# Patient Record
Sex: FEMALE | Race: WHITE | NOT HISPANIC OR LATINO | Employment: UNEMPLOYED | URBAN - METROPOLITAN AREA
[De-identification: names, ages, dates, MRNs, and addresses within clinical notes are randomized per-mention and may not be internally consistent; named-entity substitution may affect disease eponyms.]

---

## 2017-06-16 ENCOUNTER — HOSPITAL ENCOUNTER (OUTPATIENT)
Dept: MRI IMAGING | Facility: HOSPITAL | Age: 47
Discharge: HOME | End: 2017-06-16
Attending: ORTHOPAEDIC SURGERY | Admitting: ORTHOPAEDIC SURGERY

## 2017-07-02 ENCOUNTER — HOSPITAL ENCOUNTER (EMERGENCY)
Age: 47
Discharge: HOME | End: 2017-07-02
Attending: EMERGENCY MEDICINE

## 2017-07-02 VITALS
TEMPERATURE: 98.2 F | SYSTOLIC BLOOD PRESSURE: 130 MMHG | OXYGEN SATURATION: 98 % | HEART RATE: 81 BPM | DIASTOLIC BLOOD PRESSURE: 70 MMHG | RESPIRATION RATE: 16 BRPM

## 2017-07-02 DIAGNOSIS — N39.0 ACUTE UTI: Primary | ICD-10-CM

## 2017-07-02 LAB
APPEARANCE UR: ABNORMAL
BACTERIA #/AREA URNS HPF: ABNORMAL /HPF
BILIRUB UR QL STRIP: NEGATIVE
COLOR UR: YELLOW
GLUCOSE UR STRIP-MCNC: NEGATIVE MG/DL
HGB UR QL STRIP: ABNORMAL
HYALINE CASTS #/AREA URNS LPF: ABNORMAL /LPF (ref 0–5)
KETONES UR STRIP-MCNC: ABNORMAL MG/DL
LEUKOCYTE ESTERASE UR QL STRIP: ABNORMAL
NITRITE UR QL STRIP: NEGATIVE
PH UR STRIP: 6 UNITS (ref 5–7)
PROT UR STRIP-MCNC: 100 MG/DL
RBC #/AREA URNS HPF: >100 /HPF (ref 0–3)
SP GR UR STRIP: >1.03 (ref 1–1.03)
SPECIMEN SOURCE: ABNORMAL
SQUAMOUS #/AREA URNS HPF: ABNORMAL /HPF (ref 0–5)
UROBILINOGEN UR STRIP-MCNC: 1 MG/DL (ref 0–1)
WBC #/AREA URNS HPF: >100 /HPF (ref 0–5)

## 2017-07-02 PROCEDURE — 81001 URINALYSIS AUTO W/SCOPE: CPT

## 2017-07-02 PROCEDURE — 87086 URINE CULTURE/COLONY COUNT: CPT

## 2017-07-02 PROCEDURE — 99283 EMERGENCY DEPT VISIT LOW MDM: CPT

## 2017-07-02 RX ORDER — SULFAMETHOXAZOLE AND TRIMETHOPRIM 800; 160 MG/1; MG/1
1 TABLET ORAL 2 TIMES DAILY
Qty: 10 TABLET | Refills: 0 | Status: SHIPPED | OUTPATIENT
Start: 2017-07-02 | End: 2017-07-07

## 2017-07-02 ASSESSMENT — ENCOUNTER SYMPTOMS
ABDOMINAL PAIN: 1
NAUSEA: 0
NERVOUS/ANXIOUS: 0
DIARRHEA: 0
COUGH: 0
SHORTNESS OF BREATH: 0
VOMITING: 0
BACK PAIN: 0
HEADACHES: 0
CHILLS: 0
SORE THROAT: 0
FEVER: 0
WEAKNESS: 0

## 2017-07-02 ASSESSMENT — PAIN SCALES - GENERAL: PAINLEVEL_OUTOF10: 5

## 2017-07-03 LAB
ANNOTATION COMMENT IMP: NORMAL
BACTERIA UR CULT: NORMAL
REPORT STATUS (RPT): NORMAL
SPECIMEN SOURCE: NORMAL

## 2017-12-18 ENCOUNTER — HOSPITAL ENCOUNTER (OUTPATIENT)
Dept: FAMILY MEDICINE CLINIC | Facility: CLINIC | Age: 47
Setting detail: SPECIMEN
Discharge: HOME | End: 2017-12-18
Attending: INTERNAL MEDICINE | Admitting: INTERNAL MEDICINE

## 2017-12-18 LAB
ALBUMIN SERPL-MCNC: 3.9 G/DL (ref 3.5–4.8)
ALBUMIN/GLOB SERPL: 1.2 {RATIO} (ref 1–1.7)
ALP SERPL-CCNC: 136 IU/L (ref 32–91)
ALT SERPL-CCNC: 23 IU/L (ref 14–54)
ANION GAP SERPL CALC-SCNC: 10.1 MMOL/L (ref 10–20)
AST SERPL-CCNC: 26 IU/L (ref 15–41)
BASOPHILS # BLD AUTO: 0 10*3/UL (ref 0–0.2)
BASOPHILS NFR BLD AUTO: 0 % (ref 0–2)
BILIRUB SERPL-MCNC: 0.2 MG/DL (ref 0.3–1.2)
BUN SERPL-MCNC: 9 MG/DL (ref 8–20)
BUN/CREAT SERPL: 10 (ref 5.4–26.2)
CALCIUM SERPL-MCNC: 9.7 MG/DL (ref 8.9–10.3)
CHLORIDE SERPL-SCNC: 105 MMOL/L (ref 101–111)
CHOLEST SERPL-MCNC: 207 MG/DL
CHOLEST/HDLC SERPL: 4.7 {RATIO}
CONV CO2: 29 MMOL/L (ref 22–32)
CONV LDL CHOLESTEROL DIRECT: 136 MG/DL (ref 0–100)
CONV TOTAL PROTEIN: 7.2 G/DL (ref 6.1–7.9)
CREAT UR-MCNC: 0.9 MG/DL (ref 0.4–1)
DIFFERENTIAL METHOD BLD: (no result)
EOSINOPHIL # BLD AUTO: 0 % (ref 0–3)
EOSINOPHIL # BLD AUTO: 0 10*3/UL (ref 0–0.3)
ERYTHROCYTE [DISTWIDTH] IN BLOOD BY AUTOMATED COUNT: 15.8 % (ref 11.5–14.5)
GLOBULIN UR ELPH-MCNC: 3.3 G/DL (ref 2.5–3.8)
GLUCOSE SERPL-MCNC: 126 MG/DL (ref 65–99)
HCT VFR BLD AUTO: 38.6 % (ref 35–49)
HDLC SERPL-MCNC: 44 MG/DL
HGB BLD-MCNC: 12.5 G/DL (ref 12–15)
LDLC/HDLC SERPL: 3.1 {RATIO}
LIPID INTERPRETATION: ABNORMAL
LYMPHOCYTES # BLD AUTO: 1.8 10*3/UL (ref 0.8–4.8)
LYMPHOCYTES NFR BLD AUTO: 25 % (ref 18–42)
MCH RBC QN AUTO: 28.4 PG (ref 26–32)
MCHC RBC AUTO-ENTMCNC: 32.5 G/DL (ref 32–36)
MCV RBC AUTO: 87.4 FL (ref 80–94)
MONOCYTES # BLD AUTO: 0.5 10*3/UL (ref 0.1–1.3)
MONOCYTES NFR BLD AUTO: 7 % (ref 2–11)
NEUTROPHILS # BLD AUTO: 4.9 10*3/UL (ref 2.3–8.6)
NEUTROPHILS NFR BLD AUTO: 68 % (ref 50–75)
NRBC BLD AUTO-RTO: 0 /100{WBCS}
NRBC/RBC NFR BLD MANUAL: 0 10*3/UL
PLATELET # BLD AUTO: 247 10*3/UL (ref 150–450)
PMV BLD AUTO: 9.6 FL (ref 7.4–10.4)
POTASSIUM SERPL-SCNC: 4.1 MMOL/L (ref 3.6–5.1)
RBC # BLD AUTO: 4.42 10*6/UL (ref 4–5.4)
SODIUM SERPL-SCNC: 140 MMOL/L (ref 136–144)
TRIGL SERPL-MCNC: 233 MG/DL
VLDLC SERPL CALC-MCNC: 27.6 MG/DL
WBC # BLD AUTO: 7.2 10*3/UL (ref 4.5–11.5)

## 2018-03-08 ENCOUNTER — OFFICE (AMBULATORY)
Dept: URBAN - METROPOLITAN AREA CLINIC 64 | Facility: CLINIC | Age: 48
End: 2018-03-08

## 2018-03-08 VITALS
HEIGHT: 64 IN | HEART RATE: 76 BPM | DIASTOLIC BLOOD PRESSURE: 64 MMHG | WEIGHT: 267 LBS | SYSTOLIC BLOOD PRESSURE: 104 MMHG

## 2018-03-08 DIAGNOSIS — R15.9 FULL INCONTINENCE OF FECES: ICD-10-CM

## 2018-03-08 DIAGNOSIS — K58.0 IRRITABLE BOWEL SYNDROME WITH DIARRHEA: ICD-10-CM

## 2018-03-08 DIAGNOSIS — R19.7 DIARRHEA, UNSPECIFIED: ICD-10-CM

## 2018-03-08 PROCEDURE — 99214 OFFICE O/P EST MOD 30 MIN: CPT | Performed by: INTERNAL MEDICINE

## 2018-03-08 RX ORDER — ELUXADOLINE 75 MG/1
150 TABLET, FILM COATED ORAL
Qty: 60 | Refills: 5 | Status: ACTIVE
Start: 2018-03-08

## 2019-11-20 ENCOUNTER — OFFICE VISIT (OUTPATIENT)
Dept: FAMILY MEDICINE CLINIC | Facility: CLINIC | Age: 49
End: 2019-11-20

## 2019-11-20 VITALS
DIASTOLIC BLOOD PRESSURE: 82 MMHG | TEMPERATURE: 98 F | WEIGHT: 269.6 LBS | RESPIRATION RATE: 16 BRPM | BODY MASS INDEX: 44.86 KG/M2 | OXYGEN SATURATION: 96 % | HEART RATE: 76 BPM | SYSTOLIC BLOOD PRESSURE: 126 MMHG

## 2019-11-20 DIAGNOSIS — T43.595A LITHIUM ADVERSE REACTION: ICD-10-CM

## 2019-11-20 DIAGNOSIS — Z12.39 SCREENING FOR BREAST CANCER: ICD-10-CM

## 2019-11-20 DIAGNOSIS — E55.9 VITAMIN D DEFICIENCY DISEASE: ICD-10-CM

## 2019-11-20 DIAGNOSIS — F31.32 BIPOLAR 1 DISORDER, DEPRESSED, MODERATE (HCC): Primary | ICD-10-CM

## 2019-11-20 DIAGNOSIS — Z12.31 ENCOUNTER FOR SCREENING MAMMOGRAM FOR MALIGNANT NEOPLASM OF BREAST: ICD-10-CM

## 2019-11-20 LAB
25(OH)D3 SERPL-MCNC: 24.2 NG/ML (ref 30–100)
ALBUMIN SERPL-MCNC: 4.2 G/DL (ref 3.5–5.2)
ALBUMIN/GLOB SERPL: 1.2 G/DL
ALP SERPL-CCNC: 133 U/L (ref 39–117)
ALT SERPL W P-5'-P-CCNC: 16 U/L (ref 1–33)
ANION GAP SERPL CALCULATED.3IONS-SCNC: 14.5 MMOL/L (ref 5–15)
AST SERPL-CCNC: 22 U/L (ref 1–32)
BASOPHILS # BLD AUTO: 0.02 10*3/MM3 (ref 0–0.2)
BASOPHILS NFR BLD AUTO: 0.3 % (ref 0–1.5)
BILIRUB SERPL-MCNC: 0.2 MG/DL (ref 0.2–1.2)
BUN BLD-MCNC: 9 MG/DL (ref 6–20)
BUN/CREAT SERPL: 9.3 (ref 7–25)
CALCIUM SPEC-SCNC: 9.6 MG/DL (ref 8.6–10.5)
CHLORIDE SERPL-SCNC: 104 MMOL/L (ref 98–107)
CO2 SERPL-SCNC: 24.5 MMOL/L (ref 22–29)
CREAT BLD-MCNC: 0.97 MG/DL (ref 0.57–1)
DEPRECATED RDW RBC AUTO: 43.9 FL (ref 37–54)
EOSINOPHIL # BLD AUTO: 0 10*3/MM3 (ref 0–0.4)
EOSINOPHIL NFR BLD AUTO: 0 % (ref 0.3–6.2)
ERYTHROCYTE [DISTWIDTH] IN BLOOD BY AUTOMATED COUNT: 14.5 % (ref 12.3–15.4)
GFR SERPL CREATININE-BSD FRML MDRD: 61 ML/MIN/1.73
GLOBULIN UR ELPH-MCNC: 3.6 GM/DL
GLUCOSE BLD-MCNC: 126 MG/DL (ref 65–99)
HCT VFR BLD AUTO: 36.2 % (ref 34–46.6)
HGB BLD-MCNC: 12 G/DL (ref 12–15.9)
IMM GRANULOCYTES # BLD AUTO: 0.06 10*3/MM3 (ref 0–0.05)
IMM GRANULOCYTES NFR BLD AUTO: 0.9 % (ref 0–0.5)
LITHIUM SERPL-SCNC: 0.8 MMOL/L (ref 0.6–1.2)
LYMPHOCYTES # BLD AUTO: 1.8 10*3/MM3 (ref 0.7–3.1)
LYMPHOCYTES NFR BLD AUTO: 25.9 % (ref 19.6–45.3)
MCH RBC QN AUTO: 28 PG (ref 26.6–33)
MCHC RBC AUTO-ENTMCNC: 33.1 G/DL (ref 31.5–35.7)
MCV RBC AUTO: 84.4 FL (ref 79–97)
MONOCYTES # BLD AUTO: 0.43 10*3/MM3 (ref 0.1–0.9)
MONOCYTES NFR BLD AUTO: 6.2 % (ref 5–12)
NEUTROPHILS # BLD AUTO: 4.65 10*3/MM3 (ref 1.7–7)
NEUTROPHILS NFR BLD AUTO: 66.7 % (ref 42.7–76)
NRBC BLD AUTO-RTO: 0 /100 WBC (ref 0–0.2)
PLATELET # BLD AUTO: 242 10*3/MM3 (ref 140–450)
PMV BLD AUTO: 10.9 FL (ref 6–12)
POTASSIUM BLD-SCNC: 4 MMOL/L (ref 3.5–5.2)
PROT SERPL-MCNC: 7.8 G/DL (ref 6–8.5)
RBC # BLD AUTO: 4.29 10*6/MM3 (ref 3.77–5.28)
SODIUM BLD-SCNC: 143 MMOL/L (ref 136–145)
TSH SERPL DL<=0.05 MIU/L-ACNC: 1.67 UIU/ML (ref 0.27–4.2)
WBC NRBC COR # BLD: 6.96 10*3/MM3 (ref 3.4–10.8)

## 2019-11-20 PROCEDURE — 80178 ASSAY OF LITHIUM: CPT | Performed by: INTERNAL MEDICINE

## 2019-11-20 PROCEDURE — 85025 COMPLETE CBC W/AUTO DIFF WBC: CPT | Performed by: INTERNAL MEDICINE

## 2019-11-20 PROCEDURE — 84443 ASSAY THYROID STIM HORMONE: CPT | Performed by: INTERNAL MEDICINE

## 2019-11-20 PROCEDURE — 80053 COMPREHEN METABOLIC PANEL: CPT | Performed by: INTERNAL MEDICINE

## 2019-11-20 PROCEDURE — 82306 VITAMIN D 25 HYDROXY: CPT | Performed by: INTERNAL MEDICINE

## 2019-11-20 PROCEDURE — 99213 OFFICE O/P EST LOW 20 MIN: CPT | Performed by: INTERNAL MEDICINE

## 2019-11-20 RX ORDER — QUETIAPINE FUMARATE 50 MG/1
150 TABLET, FILM COATED ORAL 2 TIMES DAILY
Refills: 1 | COMMUNITY
Start: 2019-09-15 | End: 2020-04-03

## 2019-11-20 RX ORDER — HYOSCYAMINE SULFATE EXTENDED-RELEASE 0.38 MG/1
375 TABLET ORAL EVERY 12 HOURS PRN
Refills: 4 | COMMUNITY
Start: 2019-10-19 | End: 2021-06-04

## 2019-11-20 RX ORDER — BUSPIRONE HYDROCHLORIDE 10 MG/1
10 TABLET ORAL 2 TIMES DAILY
Qty: 60 TABLET | Refills: 1 | Status: SHIPPED | OUTPATIENT
Start: 2019-11-20 | End: 2019-12-13 | Stop reason: SDUPTHER

## 2019-11-20 RX ORDER — ARIPIPRAZOLE 30 MG/1
30 TABLET ORAL DAILY
Refills: 1 | COMMUNITY
Start: 2019-10-18 | End: 2021-02-25 | Stop reason: DRUGHIGH

## 2019-11-20 RX ORDER — LITHIUM CARBONATE 450 MG
900 TABLET, EXTENDED RELEASE ORAL
Refills: 0 | COMMUNITY
Start: 2019-10-17 | End: 2021-01-10

## 2019-11-20 RX ORDER — VENLAFAXINE HYDROCHLORIDE 150 MG/1
150 CAPSULE, EXTENDED RELEASE ORAL 2 TIMES DAILY
Refills: 2 | COMMUNITY
Start: 2019-11-04 | End: 2020-03-26

## 2019-11-20 NOTE — PROGRESS NOTES
Rooming Tab(CC,VS,Pt Hx,Fall Screen)  Chief Complaint   Patient presents with   • Drooling       Subjective   Pt here for drooling- has maxed out on psych meds and now with more side effects. Lots of stress at home with family. Denies chest pain or difficulty    I have reviewed and updated her medications, medical history and problem list during today's office visit.     Patient Care Team:  Marsha Lopez MD as PCP - General (Internal Medicine)    Problem List Tab  Medications Tab  Synopsis Tab  Chart Review Tab  Care Everywhere Tab  Immunizations Tab  Patient History Tab    Social History     Tobacco Use   • Smoking status: Never Smoker   • Smokeless tobacco: Never Used   Substance Use Topics   • Alcohol use: Not on file       Review of Systems   Constitutional: Negative for fatigue and fever.   HENT: Negative for congestion.    Respiratory: Negative for apnea, cough and wheezing.    Cardiovascular: Negative for chest pain.   Gastrointestinal: Negative for abdominal distention.   Neurological: Negative for syncope.   Psychiatric/Behavioral: Positive for depressed mood. Negative for behavioral problems. The patient is nervous/anxious.        Objective     Rooming Tab(CC,VS,Pt Hx,Fall Screen)  /82 (BP Location: Left arm, Patient Position: Sitting, Cuff Size: Adult)   Pulse 76   Temp 98 °F (36.7 °C) (Oral)   Resp 16   Wt 122 kg (269 lb 9.6 oz)   SpO2 96%   BMI 44.86 kg/m²     Body mass index is 44.86 kg/m².    Physical Exam   Constitutional: She is oriented to person, place, and time. She appears well-developed and well-nourished.   HENT:   Head: Normocephalic and atraumatic.   Right Ear: Tympanic membrane normal.   Left Ear: Tympanic membrane normal.   Eyes: Pupils are equal, round, and reactive to light.   Neck: Normal range of motion. Neck supple.   Cardiovascular: Normal rate and regular rhythm.   No murmur heard.  Pulmonary/Chest: Effort normal and breath sounds normal.   Abdominal: Soft. Bowel  sounds are normal. She exhibits no distension.   Neurological: She is oriented to person, place, and time.   Skin: Capillary refill takes less than 2 seconds.   Nursing note and vitals reviewed.       Statin Choice Calculator  Data Reviewed:                   Assessment/Plan   Order Review Tab  Health Maintenance Tab  Patient Plan/Order Tab  Diagnoses and all orders for this visit:    1. Bipolar 1 disorder, depressed, moderate (CMS/HCC) (Primary)  Comments:  check levels today as been > 1 year   add in buspar for anxiety at this point-  see Dr. Erazo  Orders:  -     TSH  -     CBC & Differential    2. Lithium adverse reaction  Comments:  call after thanksgiving for update  Orders:  -     Comprehensive Metabolic Panel  -     Lithium level    3. Vitamin D deficiency disease  -     Vitamin D 25 Hydroxy    Other orders  -     busPIRone (BUSPAR) 10 MG tablet; Take 1 tablet by mouth 2 (Two) Times a Day for 60 days.  Dispense: 60 tablet; Refill: 1        Wrapup Tab  Return in about 3 months (around 2/20/2020), or if symptoms worsen or fail to improve.       They were informed of the diagnosis and treatment plan and directed to f/u for any further problems or concerns.

## 2019-11-24 PROBLEM — Z12.31 ENCOUNTER FOR SCREENING MAMMOGRAM FOR MALIGNANT NEOPLASM OF BREAST: Status: ACTIVE | Noted: 2019-11-24

## 2019-11-24 PROBLEM — Z12.39 SCREENING FOR BREAST CANCER: Status: ACTIVE | Noted: 2019-11-24

## 2019-11-26 ENCOUNTER — TELEPHONE (OUTPATIENT)
Dept: FAMILY MEDICINE CLINIC | Facility: CLINIC | Age: 49
End: 2019-11-26

## 2019-11-26 NOTE — TELEPHONE ENCOUNTER
Patient called stating that she she has a lot going on right now with family. Patient states that family is all over the new right now and it is causing her a lot of anxiety. Patient is asking if script for   busPIRone (BUSPAR) 10 MG can be increased, or can she try something stronger.

## 2019-11-27 DIAGNOSIS — Z12.31 ENCOUNTER FOR SCREENING MAMMOGRAM FOR MALIGNANT NEOPLASM OF BREAST: ICD-10-CM

## 2019-11-27 DIAGNOSIS — Z12.39 SCREENING FOR BREAST CANCER: ICD-10-CM

## 2019-12-04 ENCOUNTER — TELEPHONE (OUTPATIENT)
Dept: FAMILY MEDICINE CLINIC | Facility: CLINIC | Age: 49
End: 2019-12-04

## 2019-12-13 RX ORDER — BUSPIRONE HYDROCHLORIDE 10 MG/1
10 TABLET ORAL 2 TIMES DAILY
Qty: 180 TABLET | Refills: 1 | Status: SHIPPED | OUTPATIENT
Start: 2019-12-13 | End: 2020-02-11

## 2020-02-20 ENCOUNTER — TELEPHONE (OUTPATIENT)
Dept: FAMILY MEDICINE CLINIC | Facility: CLINIC | Age: 50
End: 2020-02-20

## 2020-02-20 RX ORDER — METHYLPREDNISOLONE 4 MG/1
TABLET ORAL
Qty: 21 EACH | Refills: 0 | Status: SHIPPED | OUTPATIENT
Start: 2020-02-20 | End: 2020-03-26

## 2020-02-20 RX ORDER — TIZANIDINE 4 MG/1
4 TABLET ORAL EVERY 8 HOURS PRN
Qty: 30 TABLET | Refills: 1 | Status: SHIPPED | OUTPATIENT
Start: 2020-02-20 | End: 2020-04-03

## 2020-02-20 NOTE — TELEPHONE ENCOUNTER
VM MESSAGE.  Pt has been moving furniture and her back has gone completely out. She can't even lift her foot and having a lot of pain. She can't take Ibuprofen. Asking if you would send in med. Thank you.

## 2020-03-25 ENCOUNTER — APPOINTMENT (OUTPATIENT)
Dept: GENERAL RADIOLOGY | Facility: HOSPITAL | Age: 50
End: 2020-03-25

## 2020-03-25 ENCOUNTER — HOSPITAL ENCOUNTER (EMERGENCY)
Facility: HOSPITAL | Age: 50
Discharge: HOME | End: 2020-03-25
Admitting: EMERGENCY MEDICINE

## 2020-03-25 VITALS
HEART RATE: 88 BPM | DIASTOLIC BLOOD PRESSURE: 53 MMHG | HEIGHT: 65 IN | SYSTOLIC BLOOD PRESSURE: 115 MMHG | TEMPERATURE: 97.8 F | WEIGHT: 278.22 LBS | RESPIRATION RATE: 16 BRPM | BODY MASS INDEX: 46.35 KG/M2 | OXYGEN SATURATION: 99 %

## 2020-03-25 DIAGNOSIS — M54.50 BILATERAL LOW BACK PAIN WITHOUT SCIATICA, UNSPECIFIED CHRONICITY: Primary | ICD-10-CM

## 2020-03-25 PROCEDURE — 72110 X-RAY EXAM L-2 SPINE 4/>VWS: CPT

## 2020-03-25 PROCEDURE — 25010000002 DEXAMETHASONE SODIUM PHOSPHATE 10 MG/ML SOLUTION: Performed by: PHYSICIAN ASSISTANT

## 2020-03-25 PROCEDURE — 99283 EMERGENCY DEPT VISIT LOW MDM: CPT

## 2020-03-25 PROCEDURE — 96372 THER/PROPH/DIAG INJ SC/IM: CPT

## 2020-03-25 RX ORDER — METHOCARBAMOL 750 MG/1
750 TABLET, FILM COATED ORAL 3 TIMES DAILY
Qty: 10 TABLET | Refills: 0 | Status: SHIPPED | OUTPATIENT
Start: 2020-03-25 | End: 2020-03-26 | Stop reason: SDUPTHER

## 2020-03-25 RX ORDER — METHOCARBAMOL 750 MG/1
750 TABLET, FILM COATED ORAL ONCE
Status: COMPLETED | OUTPATIENT
Start: 2020-03-25 | End: 2020-03-25

## 2020-03-25 RX ORDER — LIDOCAINE 50 MG/G
1 PATCH TOPICAL EVERY 24 HOURS
Qty: 6 PATCH | Refills: 0 | Status: SHIPPED | OUTPATIENT
Start: 2020-03-25 | End: 2020-03-26

## 2020-03-25 RX ORDER — LIDOCAINE 50 MG/G
1 PATCH TOPICAL ONCE
Status: DISCONTINUED | OUTPATIENT
Start: 2020-03-25 | End: 2020-03-25 | Stop reason: HOSPADM

## 2020-03-25 RX ORDER — DEXAMETHASONE SODIUM PHOSPHATE 10 MG/ML
10 INJECTION, SOLUTION INTRAMUSCULAR; INTRAVENOUS ONCE
Status: COMPLETED | OUTPATIENT
Start: 2020-03-25 | End: 2020-03-25

## 2020-03-25 RX ADMIN — METHOCARBAMOL TABLETS 750 MG: 750 TABLET, COATED ORAL at 08:06

## 2020-03-25 RX ADMIN — LIDOCAINE 1 PATCH: 50 PATCH TOPICAL at 08:06

## 2020-03-25 RX ADMIN — DEXAMETHASONE SODIUM PHOSPHATE 10 MG: 10 INJECTION, SOLUTION INTRAMUSCULAR; INTRAVENOUS at 08:06

## 2020-03-26 ENCOUNTER — LAB (OUTPATIENT)
Dept: FAMILY MEDICINE CLINIC | Facility: CLINIC | Age: 50
End: 2020-03-26

## 2020-03-26 ENCOUNTER — OFFICE VISIT (OUTPATIENT)
Dept: FAMILY MEDICINE CLINIC | Facility: CLINIC | Age: 50
End: 2020-03-26

## 2020-03-26 VITALS
TEMPERATURE: 97.7 F | HEART RATE: 71 BPM | BODY MASS INDEX: 46.29 KG/M2 | RESPIRATION RATE: 16 BRPM | DIASTOLIC BLOOD PRESSURE: 72 MMHG | WEIGHT: 278.2 LBS | OXYGEN SATURATION: 99 % | SYSTOLIC BLOOD PRESSURE: 140 MMHG

## 2020-03-26 DIAGNOSIS — R73.9 HYPERGLYCEMIA: Primary | ICD-10-CM

## 2020-03-26 DIAGNOSIS — F31.32 BIPOLAR 1 DISORDER, DEPRESSED, MODERATE (HCC): ICD-10-CM

## 2020-03-26 DIAGNOSIS — T43.595A LITHIUM ADVERSE REACTION: ICD-10-CM

## 2020-03-26 DIAGNOSIS — R73.9 HYPERGLYCEMIA: ICD-10-CM

## 2020-03-26 LAB
ALBUMIN SERPL-MCNC: 4.3 G/DL (ref 3.5–5.2)
ALBUMIN/GLOB SERPL: 1.2 G/DL
ALP SERPL-CCNC: 126 U/L (ref 39–117)
ALT SERPL W P-5'-P-CCNC: 14 U/L (ref 1–33)
ANION GAP SERPL CALCULATED.3IONS-SCNC: 15.9 MMOL/L (ref 5–15)
AST SERPL-CCNC: 15 U/L (ref 1–32)
BILIRUB SERPL-MCNC: 0.3 MG/DL (ref 0.2–1.2)
BUN BLD-MCNC: 10 MG/DL (ref 6–20)
BUN/CREAT SERPL: 10.4 (ref 7–25)
CALCIUM SPEC-SCNC: 10.5 MG/DL (ref 8.6–10.5)
CHLORIDE SERPL-SCNC: 101 MMOL/L (ref 98–107)
CO2 SERPL-SCNC: 21.1 MMOL/L (ref 22–29)
CREAT BLD-MCNC: 0.96 MG/DL (ref 0.57–1)
GFR SERPL CREATININE-BSD FRML MDRD: 62 ML/MIN/1.73
GLOBULIN UR ELPH-MCNC: 3.5 GM/DL
GLUCOSE BLD-MCNC: 153 MG/DL (ref 65–99)
LITHIUM SERPL-SCNC: 0.8 MMOL/L (ref 0.6–1.2)
POTASSIUM BLD-SCNC: 4.2 MMOL/L (ref 3.5–5.2)
PROT SERPL-MCNC: 7.8 G/DL (ref 6–8.5)
SODIUM BLD-SCNC: 138 MMOL/L (ref 136–145)

## 2020-03-26 PROCEDURE — 80178 ASSAY OF LITHIUM: CPT | Performed by: INTERNAL MEDICINE

## 2020-03-26 PROCEDURE — 99214 OFFICE O/P EST MOD 30 MIN: CPT | Performed by: INTERNAL MEDICINE

## 2020-03-26 PROCEDURE — 36415 COLL VENOUS BLD VENIPUNCTURE: CPT

## 2020-03-26 PROCEDURE — 80053 COMPREHEN METABOLIC PANEL: CPT | Performed by: INTERNAL MEDICINE

## 2020-03-26 PROCEDURE — 83036 HEMOGLOBIN GLYCOSYLATED A1C: CPT | Performed by: INTERNAL MEDICINE

## 2020-03-26 RX ORDER — METFORMIN HYDROCHLORIDE 500 MG/1
500 TABLET, EXTENDED RELEASE ORAL
Qty: 30 TABLET | Refills: 2 | Status: ON HOLD | OUTPATIENT
Start: 2020-03-26 | End: 2020-04-04 | Stop reason: SDUPTHER

## 2020-03-26 RX ORDER — METHOCARBAMOL 750 MG/1
750 TABLET, FILM COATED ORAL 3 TIMES DAILY
Qty: 60 TABLET | Refills: 1 | Status: SHIPPED | OUTPATIENT
Start: 2020-03-26 | End: 2020-05-24

## 2020-03-26 NOTE — PROGRESS NOTES
Rooming Tab(CC,VS,Pt Hx,Fall Screen)  Chief Complaint   Patient presents with   • Shaking   • Polydipsia   • Hyperglycemia       Subjective   Pt here for elevated sugars- been at [facility] and [facility]- pt has change in psych as MD retired- now with elevated abilify along with increased seroquel and high dose of lithium. No SI stopped buspar as didn't work well- feels anxiety is under control   also with increased back pain- and sciatic pain- had medrol 2/20 and then steroid injection yesterday at [facility].  Started robaxin but only 2 days worth   very thirsty now-   Has some incontinence but no yeast infections.     I have reviewed and updated her medications, medical history and problem list during today's office visit.     Patient Care Team:  Marsha Lopez MD as PCP - General (Internal Medicine)    Problem List Tab  Medications Tab  Synopsis Tab  Chart Review Tab  Care Everywhere Tab  Immunizations Tab  Patient History Tab    Social History     Tobacco Use   • Smoking status: Never Smoker   • Smokeless tobacco: Never Used   Substance Use Topics   • Alcohol use: Not on file       Review of Systems   Constitutional: Positive for fatigue.   HENT: Negative for congestion and swollen glands.    Eyes: Negative for blurred vision and double vision.   Gastrointestinal: Positive for GERD. Negative for abdominal pain.   Endocrine: Positive for polydipsia and polyphagia.   Musculoskeletal: Positive for back pain. Negative for joint swelling.   Skin: Negative for rash and skin lesions.   Neurological: Negative for syncope and numbness.   Psychiatric/Behavioral: Positive for sleep disturbance. Negative for depressed mood.       Objective     Rooming Tab(CC,VS,Pt Hx,Fall Screen)  /72 (BP Location: Left arm, Patient Position: Sitting, Cuff Size: Adult)   Pulse 71   Temp 97.7 °F (36.5 °C) (Oral)   Resp 16   Wt 126 kg (278 lb 3.2 oz)   SpO2 99%   BMI 46.29 kg/m²     Body mass index is 46.29  kg/m².    Physical Exam   Constitutional: She is oriented to person, place, and time. She appears well-developed and well-nourished.   HENT:   Head: Normocephalic and atraumatic.   Right Ear: Tympanic membrane normal.   Left Ear: Tympanic membrane normal.   Eyes: Pupils are equal, round, and reactive to light.   Neck: Normal range of motion. Neck supple.   Cardiovascular: Normal rate and regular rhythm.   No murmur heard.  Pulmonary/Chest: Effort normal and breath sounds normal.   Abdominal: Soft. Bowel sounds are normal. She exhibits no distension.   Neurological: She is oriented to person, place, and time.   Skin: Skin is warm. Capillary refill takes less than 2 seconds.   Nursing note and vitals reviewed.       Statin Choice Calculator  Data Reviewed:    Xr Spine Lumbar Complete 4+vw    Result Date: 3/25/2020  Impression: Minimal degenerative change without acute osseous abnormality.  Electronically Signed By-Hernandez Ortez On:3/25/2020 8:03 AM This report was finalized on [id number] by  Hernandez Ortez, .                Assessment/Plan   Order Review Tab  Health Maintenance Tab  Patient Plan/Order Tab  Diagnoses and all orders for this visit:    1. Hyperglycemia (Primary)  Comments:  DM- check  a21c start metformin- has strips at home to check daily  probably  partially from steroid   Orders:  -     Hemoglobin A1c; Future  -     Comprehensive Metabolic Panel    2. Lithium adverse reaction    3. Bipolar 1 disorder, depressed, moderate (CMS/HCC)  Comments:  decrease seroquel to night only and stay on abilify  Orders:  -     Lithium level; Future    Other orders  -     methocarbamol (ROBAXIN) 750 MG tablet; Take 1 tablet by mouth 3 (Three) Times a Day.  Dispense: 60 tablet; Refill: 1  -     metFORMIN ER (GLUCOPHAGE-XR) 500 MG 24 hr tablet; Take 1 tablet by mouth Daily With Breakfast.  Dispense: 30 tablet; Refill: 2        Wrapup Tab  Return in about 4 weeks (around 4/23/2020), or if symptoms worsen or fail to  improve.       They were informed of the diagnosis and treatment plan and directed to f/u for any further problems or concerns.

## 2020-03-27 LAB — HBA1C MFR BLD: 5.9 % (ref 3.5–5.6)

## 2020-03-27 NOTE — PROGRESS NOTES
A1c was 5.9-- I think the high sugars were with the steroid- you are just borderline and the steroid threw you over the jenni- but the sugars should improve in the next week. Lets just do 1 daily med of metformin daily

## 2020-04-02 ENCOUNTER — TELEPHONE (OUTPATIENT)
Dept: FAMILY MEDICINE CLINIC | Facility: CLINIC | Age: 50
End: 2020-04-02

## 2020-04-02 NOTE — TELEPHONE ENCOUNTER
PATIENT CALLED IN AND STATED SHE NEEDS A NEW BLOOD SURGAR MONITOR AND TEST STRIPS . PHARMACY VERIFIED [facility] . PATIENT CALL BACK [phone].

## 2020-04-03 ENCOUNTER — TELEPHONE (OUTPATIENT)
Dept: FAMILY MEDICINE CLINIC | Facility: CLINIC | Age: 50
End: 2020-04-03

## 2020-04-03 ENCOUNTER — HOSPITAL ENCOUNTER (OUTPATIENT)
Facility: HOSPITAL | Age: 50
Setting detail: OBSERVATION
Discharge: HOME | End: 2020-04-04
Attending: EMERGENCY MEDICINE | Admitting: INTERNAL MEDICINE

## 2020-04-03 ENCOUNTER — APPOINTMENT (OUTPATIENT)
Dept: GENERAL RADIOLOGY | Facility: HOSPITAL | Age: 50
End: 2020-04-03

## 2020-04-03 DIAGNOSIS — R07.9 CHEST PAIN, UNSPECIFIED TYPE: Primary | ICD-10-CM

## 2020-04-03 DIAGNOSIS — Z20.822 SUSPECTED COVID-19 VIRUS INFECTION: ICD-10-CM

## 2020-04-03 DIAGNOSIS — R06.00 DYSPNEA, UNSPECIFIED TYPE: ICD-10-CM

## 2020-04-03 PROBLEM — R07.89 CHEST WALL PAIN: Status: ACTIVE | Noted: 2020-04-03

## 2020-04-03 PROBLEM — E66.01 MORBID OBESITY: Chronic | Status: ACTIVE | Noted: 2020-04-03

## 2020-04-03 PROBLEM — E11.9 TYPE 2 DIABETES MELLITUS WITHOUT COMPLICATION, WITHOUT LONG-TERM CURRENT USE OF INSULIN: Chronic | Status: ACTIVE | Noted: 2020-04-03

## 2020-04-03 LAB
ALBUMIN SERPL-MCNC: 3.7 G/DL (ref 3.5–5.2)
ALBUMIN/GLOB SERPL: 1.1 G/DL
ALP SERPL-CCNC: 123 U/L (ref 39–117)
ALT SERPL W P-5'-P-CCNC: 19 U/L (ref 1–33)
ANION GAP SERPL CALCULATED.3IONS-SCNC: 11 MMOL/L (ref 5–15)
AST SERPL-CCNC: 23 U/L (ref 1–32)
B PERT DNA SPEC QL NAA+PROBE: NOT DETECTED
BASOPHILS # BLD AUTO: 0.1 10*3/MM3 (ref 0–0.2)
BASOPHILS NFR BLD AUTO: 0.8 % (ref 0–1.5)
BILIRUB SERPL-MCNC: 0.2 MG/DL (ref 0.2–1.2)
BUN BLD-MCNC: 5 MG/DL (ref 6–20)
BUN/CREAT SERPL: 5.7 (ref 7–25)
C PNEUM DNA NPH QL NAA+NON-PROBE: NOT DETECTED
CALCIUM SPEC-SCNC: 9.5 MG/DL (ref 8.6–10.5)
CHLORIDE SERPL-SCNC: 106 MMOL/L (ref 98–107)
CO2 SERPL-SCNC: 26 MMOL/L (ref 22–29)
CREAT BLD-MCNC: 0.87 MG/DL (ref 0.57–1)
CRP SERPL-MCNC: 1.41 MG/DL (ref 0–0.5)
DEPRECATED RDW RBC AUTO: 50.8 FL (ref 37–54)
EOSINOPHIL # BLD AUTO: 0 10*3/MM3 (ref 0–0.4)
EOSINOPHIL NFR BLD AUTO: 0 % (ref 0.3–6.2)
ERYTHROCYTE [DISTWIDTH] IN BLOOD BY AUTOMATED COUNT: 16.6 % (ref 12.3–15.4)
FLUAV H1 2009 PAND RNA NPH QL NAA+PROBE: NOT DETECTED
FLUAV H1 HA GENE NPH QL NAA+PROBE: NOT DETECTED
FLUAV H3 RNA NPH QL NAA+PROBE: NOT DETECTED
FLUAV SUBTYP SPEC NAA+PROBE: NOT DETECTED
FLUBV RNA ISLT QL NAA+PROBE: NOT DETECTED
GFR SERPL CREATININE-BSD FRML MDRD: 69 ML/MIN/1.73
GLOBULIN UR ELPH-MCNC: 3.3 GM/DL
GLUCOSE BLD-MCNC: 90 MG/DL (ref 65–99)
GLUCOSE BLDC GLUCOMTR-MCNC: 125 MG/DL (ref 70–105)
GLUCOSE BLDC GLUCOMTR-MCNC: 153 MG/DL (ref 70–105)
GLUCOSE BLDC GLUCOMTR-MCNC: 82 MG/DL (ref 70–105)
HADV DNA SPEC NAA+PROBE: NOT DETECTED
HCOV 229E RNA SPEC QL NAA+PROBE: NOT DETECTED
HCOV HKU1 RNA SPEC QL NAA+PROBE: NOT DETECTED
HCOV NL63 RNA SPEC QL NAA+PROBE: NOT DETECTED
HCOV OC43 RNA SPEC QL NAA+PROBE: NOT DETECTED
HCT VFR BLD AUTO: 35.5 % (ref 34–46.6)
HGB BLD-MCNC: 11.9 G/DL (ref 12–15.9)
HMPV RNA NPH QL NAA+NON-PROBE: NOT DETECTED
HPIV1 RNA SPEC QL NAA+PROBE: NOT DETECTED
HPIV2 RNA SPEC QL NAA+PROBE: NOT DETECTED
HPIV3 RNA NPH QL NAA+PROBE: NOT DETECTED
HPIV4 P GENE NPH QL NAA+PROBE: NOT DETECTED
INR PPP: 1.02 (ref 0.9–1.1)
LDH SERPL-CCNC: 232 U/L (ref 135–214)
LYMPHOCYTES # BLD AUTO: 1.9 10*3/MM3 (ref 0.7–3.1)
LYMPHOCYTES NFR BLD AUTO: 22.5 % (ref 19.6–45.3)
M PNEUMO IGG SER IA-ACNC: NOT DETECTED
MCH RBC QN AUTO: 29 PG (ref 26.6–33)
MCHC RBC AUTO-ENTMCNC: 33.5 G/DL (ref 31.5–35.7)
MCV RBC AUTO: 86.5 FL (ref 79–97)
MONOCYTES # BLD AUTO: 0.6 10*3/MM3 (ref 0.1–0.9)
MONOCYTES NFR BLD AUTO: 7.4 % (ref 5–12)
NEUTROPHILS # BLD AUTO: 5.7 10*3/MM3 (ref 1.7–7)
NEUTROPHILS NFR BLD AUTO: 69.3 % (ref 42.7–76)
NRBC BLD AUTO-RTO: 0.1 /100 WBC (ref 0–0.2)
NT-PROBNP SERPL-MCNC: 76.5 PG/ML (ref 5–450)
PLATELET # BLD AUTO: 261 10*3/MM3 (ref 140–450)
PMV BLD AUTO: 7.8 FL (ref 6–12)
POTASSIUM BLD-SCNC: 3.9 MMOL/L (ref 3.5–5.2)
PROCALCITONIN SERPL-MCNC: 0.08 NG/ML (ref 0.1–0.25)
PROT SERPL-MCNC: 7 G/DL (ref 6–8.5)
PROTHROMBIN TIME: 10.7 SECONDS (ref 9.6–11.7)
RBC # BLD AUTO: 4.11 10*6/MM3 (ref 3.77–5.28)
RHINOVIRUS RNA SPEC NAA+PROBE: NOT DETECTED
RSV RNA NPH QL NAA+NON-PROBE: NOT DETECTED
SODIUM BLD-SCNC: 143 MMOL/L (ref 136–145)
TROPONIN T SERPL-MCNC: <0.01 NG/ML (ref 0–0.03)
TROPONIN T SERPL-MCNC: <0.01 NG/ML (ref 0–0.03)
WBC NRBC COR # BLD: 8.2 10*3/MM3 (ref 3.4–10.8)

## 2020-04-03 PROCEDURE — 93005 ELECTROCARDIOGRAM TRACING: CPT | Performed by: EMERGENCY MEDICINE

## 2020-04-03 PROCEDURE — 83036 HEMOGLOBIN GLYCOSYLATED A1C: CPT | Performed by: INTERNAL MEDICINE

## 2020-04-03 PROCEDURE — G0378 HOSPITAL OBSERVATION PER HR: HCPCS

## 2020-04-03 PROCEDURE — 85610 PROTHROMBIN TIME: CPT | Performed by: EMERGENCY MEDICINE

## 2020-04-03 PROCEDURE — 86140 C-REACTIVE PROTEIN: CPT | Performed by: EMERGENCY MEDICINE

## 2020-04-03 PROCEDURE — 82962 GLUCOSE BLOOD TEST: CPT

## 2020-04-03 PROCEDURE — 83880 ASSAY OF NATRIURETIC PEPTIDE: CPT | Performed by: EMERGENCY MEDICINE

## 2020-04-03 PROCEDURE — 99220 PR INITIAL OBSERVATION CARE/DAY 70 MINUTES: CPT | Performed by: INTERNAL MEDICINE

## 2020-04-03 PROCEDURE — 71045 X-RAY EXAM CHEST 1 VIEW: CPT

## 2020-04-03 PROCEDURE — 63710000001 INSULIN LISPRO (HUMAN) PER 5 UNITS: Performed by: INTERNAL MEDICINE

## 2020-04-03 PROCEDURE — 84484 ASSAY OF TROPONIN QUANT: CPT | Performed by: EMERGENCY MEDICINE

## 2020-04-03 PROCEDURE — 83615 LACTATE (LD) (LDH) ENZYME: CPT | Performed by: EMERGENCY MEDICINE

## 2020-04-03 PROCEDURE — 0099U HC BIOFIRE FILMARRAY RESP PANEL 1: CPT | Performed by: EMERGENCY MEDICINE

## 2020-04-03 PROCEDURE — U0001 2019-NCOV DIAGNOSTIC P: HCPCS | Performed by: EMERGENCY MEDICINE

## 2020-04-03 PROCEDURE — 85025 COMPLETE CBC W/AUTO DIFF WBC: CPT | Performed by: EMERGENCY MEDICINE

## 2020-04-03 PROCEDURE — 99284 EMERGENCY DEPT VISIT MOD MDM: CPT

## 2020-04-03 PROCEDURE — 84484 ASSAY OF TROPONIN QUANT: CPT | Performed by: NURSE PRACTITIONER

## 2020-04-03 PROCEDURE — 84145 PROCALCITONIN (PCT): CPT | Performed by: EMERGENCY MEDICINE

## 2020-04-03 PROCEDURE — 80053 COMPREHEN METABOLIC PANEL: CPT | Performed by: EMERGENCY MEDICINE

## 2020-04-03 RX ORDER — SODIUM CHLORIDE 0.9 % (FLUSH) 0.9 %
10 SYRINGE (ML) INJECTION AS NEEDED
Status: DISCONTINUED | OUTPATIENT
Start: 2020-04-03 | End: 2020-04-04 | Stop reason: HOSPADM

## 2020-04-03 RX ORDER — QUETIAPINE 150 MG/1
150 TABLET, FILM COATED, EXTENDED RELEASE ORAL NIGHTLY
COMMUNITY
End: 2020-07-15

## 2020-04-03 RX ORDER — LANCETS 28 GAUGE
EACH MISCELLANEOUS
Qty: 50 EACH | Refills: 12 | Status: SHIPPED | OUTPATIENT
Start: 2020-04-03 | End: 2020-04-03

## 2020-04-03 RX ORDER — ACETAMINOPHEN 325 MG/1
650 TABLET ORAL EVERY 4 HOURS PRN
Status: DISCONTINUED | OUTPATIENT
Start: 2020-04-03 | End: 2020-04-04 | Stop reason: HOSPADM

## 2020-04-03 RX ORDER — METHOCARBAMOL 750 MG/1
750 TABLET, FILM COATED ORAL 4 TIMES DAILY
Status: DISCONTINUED | OUTPATIENT
Start: 2020-04-03 | End: 2020-04-04 | Stop reason: HOSPADM

## 2020-04-03 RX ORDER — NICOTINE POLACRILEX 4 MG
15 LOZENGE BUCCAL
Status: DISCONTINUED | OUTPATIENT
Start: 2020-04-03 | End: 2020-04-04 | Stop reason: HOSPADM

## 2020-04-03 RX ORDER — BLOOD-GLUCOSE METER
KIT MISCELLANEOUS
Qty: 1 EACH | Refills: 0 | Status: SHIPPED | OUTPATIENT
Start: 2020-04-03 | End: 2020-04-03

## 2020-04-03 RX ORDER — ACETAMINOPHEN 650 MG/1
650 SUPPOSITORY RECTAL EVERY 4 HOURS PRN
Status: DISCONTINUED | OUTPATIENT
Start: 2020-04-03 | End: 2020-04-04 | Stop reason: HOSPADM

## 2020-04-03 RX ORDER — SODIUM CHLORIDE 0.9 % (FLUSH) 0.9 %
10 SYRINGE (ML) INJECTION EVERY 12 HOURS SCHEDULED
Status: DISCONTINUED | OUTPATIENT
Start: 2020-04-03 | End: 2020-04-04 | Stop reason: HOSPADM

## 2020-04-03 RX ORDER — ACETAMINOPHEN 160 MG/5ML
650 SOLUTION ORAL EVERY 4 HOURS PRN
Status: DISCONTINUED | OUTPATIENT
Start: 2020-04-03 | End: 2020-04-04 | Stop reason: HOSPADM

## 2020-04-03 RX ORDER — VENLAFAXINE HYDROCHLORIDE 75 MG/1
150 CAPSULE, EXTENDED RELEASE ORAL 2 TIMES DAILY
Status: DISCONTINUED | OUTPATIENT
Start: 2020-04-03 | End: 2020-04-04 | Stop reason: HOSPADM

## 2020-04-03 RX ORDER — CHOLECALCIFEROL (VITAMIN D3) 125 MCG
5 CAPSULE ORAL NIGHTLY PRN
Status: DISCONTINUED | OUTPATIENT
Start: 2020-04-03 | End: 2020-04-04 | Stop reason: HOSPADM

## 2020-04-03 RX ORDER — ONDANSETRON 2 MG/ML
4 INJECTION INTRAMUSCULAR; INTRAVENOUS EVERY 6 HOURS PRN
Status: DISCONTINUED | OUTPATIENT
Start: 2020-04-03 | End: 2020-04-04 | Stop reason: HOSPADM

## 2020-04-03 RX ORDER — VENLAFAXINE HYDROCHLORIDE 150 MG/1
150 CAPSULE, EXTENDED RELEASE ORAL 2 TIMES DAILY
COMMUNITY
End: 2022-07-08

## 2020-04-03 RX ORDER — ARIPIPRAZOLE 10 MG/1
30 TABLET ORAL NIGHTLY
Status: DISCONTINUED | OUTPATIENT
Start: 2020-04-03 | End: 2020-04-04 | Stop reason: HOSPADM

## 2020-04-03 RX ORDER — BISACODYL 10 MG
10 SUPPOSITORY, RECTAL RECTAL DAILY PRN
Status: DISCONTINUED | OUTPATIENT
Start: 2020-04-03 | End: 2020-04-04 | Stop reason: HOSPADM

## 2020-04-03 RX ORDER — BLOOD-GLUCOSE METER
KIT MISCELLANEOUS
Qty: 50 EACH | Refills: 12 | Status: SHIPPED | OUTPATIENT
Start: 2020-04-03 | End: 2020-04-03

## 2020-04-03 RX ORDER — LITHIUM CARBONATE 450 MG
450 TABLET, EXTENDED RELEASE ORAL EVERY 12 HOURS SCHEDULED
Status: DISCONTINUED | OUTPATIENT
Start: 2020-04-03 | End: 2020-04-04 | Stop reason: HOSPADM

## 2020-04-03 RX ORDER — DEXTROSE MONOHYDRATE 25 G/50ML
25 INJECTION, SOLUTION INTRAVENOUS
Status: DISCONTINUED | OUTPATIENT
Start: 2020-04-03 | End: 2020-04-04 | Stop reason: HOSPADM

## 2020-04-03 RX ORDER — ALUMINA, MAGNESIA, AND SIMETHICONE 2400; 2400; 240 MG/30ML; MG/30ML; MG/30ML
15 SUSPENSION ORAL EVERY 6 HOURS PRN
Status: DISCONTINUED | OUTPATIENT
Start: 2020-04-03 | End: 2020-04-04 | Stop reason: HOSPADM

## 2020-04-03 RX ORDER — BISACODYL 5 MG/1
5 TABLET, DELAYED RELEASE ORAL DAILY PRN
Status: DISCONTINUED | OUTPATIENT
Start: 2020-04-03 | End: 2020-04-04 | Stop reason: HOSPADM

## 2020-04-03 RX ORDER — HYOSCYAMINE SULFATE EXTENDED-RELEASE 0.38 MG/1
375 TABLET ORAL EVERY 12 HOURS PRN
Status: DISCONTINUED | OUTPATIENT
Start: 2020-04-03 | End: 2020-04-04 | Stop reason: HOSPADM

## 2020-04-03 RX ORDER — ONDANSETRON 4 MG/1
4 TABLET, FILM COATED ORAL EVERY 6 HOURS PRN
Status: DISCONTINUED | OUTPATIENT
Start: 2020-04-03 | End: 2020-04-04 | Stop reason: HOSPADM

## 2020-04-03 RX ADMIN — ARIPIPRAZOLE 30 MG: 10 TABLET ORAL at 21:40

## 2020-04-03 RX ADMIN — QUETIAPINE 150 MG: 25 TABLET, FILM COATED ORAL at 21:53

## 2020-04-03 RX ADMIN — VENLAFAXINE HYDROCHLORIDE 150 MG: 75 CAPSULE, EXTENDED RELEASE ORAL at 21:40

## 2020-04-03 RX ADMIN — INSULIN LISPRO 2 UNITS: 100 INJECTION, SOLUTION INTRAVENOUS; SUBCUTANEOUS at 17:22

## 2020-04-03 RX ADMIN — METHOCARBAMOL 750 MG: 750 TABLET ORAL at 21:39

## 2020-04-03 RX ADMIN — Medication 10 ML: at 21:27

## 2020-04-03 RX ADMIN — LITHIUM CARBONATE 450 MG: 450 TABLET ORAL at 21:39

## 2020-04-03 RX ADMIN — METFORMIN HYDROCHLORIDE 1000 MG: 500 TABLET, FILM COATED ORAL at 17:22

## 2020-04-03 NOTE — TELEPHONE ENCOUNTER
Sent in.  I dont know if this katherine be covered. If not, she needs to call her insurance policy to see what is.

## 2020-04-03 NOTE — TELEPHONE ENCOUNTER
Pt's BS this am is 409, and she stated she is headed to [facility] (lives in [place]). Is there any other instructions for her. Thank you.

## 2020-04-04 VITALS
SYSTOLIC BLOOD PRESSURE: 119 MMHG | BODY MASS INDEX: 44.81 KG/M2 | HEART RATE: 75 BPM | RESPIRATION RATE: 16 BRPM | DIASTOLIC BLOOD PRESSURE: 63 MMHG | HEIGHT: 65 IN | WEIGHT: 268.96 LBS | OXYGEN SATURATION: 92 % | TEMPERATURE: 98.8 F

## 2020-04-04 LAB
ANION GAP SERPL CALCULATED.3IONS-SCNC: 13 MMOL/L (ref 5–15)
BASOPHILS # BLD AUTO: 0.1 10*3/MM3 (ref 0–0.2)
BASOPHILS NFR BLD AUTO: 0.7 % (ref 0–1.5)
BUN BLD-MCNC: 7 MG/DL (ref 6–20)
BUN/CREAT SERPL: 7.2 (ref 7–25)
CALCIUM SPEC-SCNC: 9.8 MG/DL (ref 8.6–10.5)
CHLORIDE SERPL-SCNC: 104 MMOL/L (ref 98–107)
CO2 SERPL-SCNC: 26 MMOL/L (ref 22–29)
CREAT BLD-MCNC: 0.97 MG/DL (ref 0.57–1)
DEPRECATED RDW RBC AUTO: 51.6 FL (ref 37–54)
EOSINOPHIL # BLD AUTO: 0 10*3/MM3 (ref 0–0.4)
EOSINOPHIL NFR BLD AUTO: 0.1 % (ref 0.3–6.2)
ERYTHROCYTE [DISTWIDTH] IN BLOOD BY AUTOMATED COUNT: 16.8 % (ref 12.3–15.4)
GFR SERPL CREATININE-BSD FRML MDRD: 61 ML/MIN/1.73
GLUCOSE BLD-MCNC: 114 MG/DL (ref 65–99)
GLUCOSE BLDC GLUCOMTR-MCNC: 102 MG/DL (ref 70–105)
GLUCOSE BLDC GLUCOMTR-MCNC: 116 MG/DL (ref 70–105)
HCT VFR BLD AUTO: 36 % (ref 34–46.6)
HGB BLD-MCNC: 11.6 G/DL (ref 12–15.9)
LYMPHOCYTES # BLD AUTO: 2.2 10*3/MM3 (ref 0.7–3.1)
LYMPHOCYTES NFR BLD AUTO: 27.5 % (ref 19.6–45.3)
MCH RBC QN AUTO: 28.3 PG (ref 26.6–33)
MCHC RBC AUTO-ENTMCNC: 32.2 G/DL (ref 31.5–35.7)
MCV RBC AUTO: 87.7 FL (ref 79–97)
MONOCYTES # BLD AUTO: 0.5 10*3/MM3 (ref 0.1–0.9)
MONOCYTES NFR BLD AUTO: 6.4 % (ref 5–12)
NEUTROPHILS # BLD AUTO: 5.3 10*3/MM3 (ref 1.7–7)
NEUTROPHILS NFR BLD AUTO: 65.3 % (ref 42.7–76)
NRBC BLD AUTO-RTO: 0.1 /100 WBC (ref 0–0.2)
PLATELET # BLD AUTO: 279 10*3/MM3 (ref 140–450)
PMV BLD AUTO: 7.5 FL (ref 6–12)
POTASSIUM BLD-SCNC: 4 MMOL/L (ref 3.5–5.2)
RBC # BLD AUTO: 4.1 10*6/MM3 (ref 3.77–5.28)
SODIUM BLD-SCNC: 143 MMOL/L (ref 136–145)
TROPONIN T SERPL-MCNC: <0.01 NG/ML (ref 0–0.03)
TROPONIN T SERPL-MCNC: <0.01 NG/ML (ref 0–0.03)
WBC NRBC COR # BLD: 8.1 10*3/MM3 (ref 3.4–10.8)

## 2020-04-04 PROCEDURE — G0378 HOSPITAL OBSERVATION PER HR: HCPCS

## 2020-04-04 PROCEDURE — 84484 ASSAY OF TROPONIN QUANT: CPT | Performed by: NURSE PRACTITIONER

## 2020-04-04 PROCEDURE — 85025 COMPLETE CBC W/AUTO DIFF WBC: CPT | Performed by: NURSE PRACTITIONER

## 2020-04-04 PROCEDURE — 82962 GLUCOSE BLOOD TEST: CPT

## 2020-04-04 PROCEDURE — 99217 PR OBSERVATION CARE DISCHARGE MANAGEMENT: CPT | Performed by: INTERNAL MEDICINE

## 2020-04-04 PROCEDURE — 80048 BASIC METABOLIC PNL TOTAL CA: CPT | Performed by: NURSE PRACTITIONER

## 2020-04-04 RX ORDER — METFORMIN HYDROCHLORIDE 1000 MG/1
1000 TABLET, FILM COATED, EXTENDED RELEASE ORAL
Qty: 30 TABLET | Refills: 0 | Status: SHIPPED | OUTPATIENT
Start: 2020-04-04 | End: 2020-04-16

## 2020-04-04 RX ADMIN — VENLAFAXINE HYDROCHLORIDE 150 MG: 75 CAPSULE, EXTENDED RELEASE ORAL at 09:25

## 2020-04-04 RX ADMIN — METFORMIN HYDROCHLORIDE 1000 MG: 500 TABLET, FILM COATED ORAL at 09:25

## 2020-04-04 RX ADMIN — METHOCARBAMOL 750 MG: 750 TABLET ORAL at 09:25

## 2020-04-04 RX ADMIN — Medication 10 ML: at 09:25

## 2020-04-04 RX ADMIN — METHOCARBAMOL 750 MG: 750 TABLET ORAL at 12:13

## 2020-04-05 ENCOUNTER — READMISSION MANAGEMENT (OUTPATIENT)
Dept: CALL CENTER | Facility: HOSPITAL | Age: 50
End: 2020-04-05

## 2020-04-05 NOTE — OUTREACH NOTE
Prep Survey      Responses   [facility]   Is LACE score < 7 ?  No   Eligibility  Readm Mgmt   Discharge diagnosis  chest wall pain, DM2, suspected covid   Does the patient have one of the following disease processes/diagnoses(primary or secondary)?  Other   Does the patient have Home health ordered?  No   Is there a DME ordered?  No   Prep survey completed?  Yes          Keeley Del Angel RN

## 2020-04-05 NOTE — OUTREACH NOTE
Medical Week 1 Survey      Responses   [facility] patient discharged from?  [facility]   Does the patient have one of the following disease processes/diagnoses(primary or secondary)?  Other   Is there a successful TCM telephone encounter documented?  No   Week 1 attempt successful?  Yes   Call start time  1144   Call end time  1151   Discharge diagnosis  chest wall pain, DM2, suspected covid   Meds reviewed with patient/caregiver?  Yes   Is the patient having any side effects they believe may be caused by any medication additions or changes?  No   Does the patient have all medications ordered at discharge?  Yes   Is the patient taking all medications as directed (includes completed medication regime)?  Yes   Does the patient have a primary care provider?   Yes   Does the patient have an appointment with their PCP within 7 days of discharge?  Yes   Has the patient kept scheduled appointments due by today?  N/A   Has home health visited the patient within 72 hours of discharge?  N/A   Has all DME been delivered?  No   Psychosocial issues?  No   Did the patient receive a copy of their discharge instructions?  Yes   Nursing interventions  Reviewed instructions with patient   What is the patient's perception of their health status since discharge?  Improving   Is the patient/caregiver able to teach back signs and symptoms related to disease process for when to call PCP?  Yes   Is the patient/caregiver able to teach back signs and symptoms related to disease process for when to call 911?  Yes   Is the patient/caregiver able to teach back the hierarchy of who to call/visit for symptoms/problems? PCP, Specialist, Home health nurse, Urgent Care, ED, 911  Yes   Additional teach back comments  Afebrile, dry cough-feels its allergies, no shortness of air.  She only has 1 Thermometer.  Gave # for [place] rep.   Week 1 call completed?  Yes   Wrap up additional comments  She is doing well, says the cough is the same dry cough, no  SOA and she feels ok today.  She is isolating from her family as much as possible.          Shwetha Barry RN

## 2020-04-06 ENCOUNTER — READMISSION MANAGEMENT (OUTPATIENT)
Dept: CALL CENTER | Facility: HOSPITAL | Age: 50
End: 2020-04-06

## 2020-04-06 LAB — HBA1C MFR BLD: 6 % (ref 3.5–5.6)

## 2020-04-06 NOTE — OUTREACH NOTE
Medical Week 1 Survey      Responses   [facility] patient discharged from?  [facility]   COVID-19 Test Status  Negative   Does the patient have one of the following disease processes/diagnoses(primary or secondary)?  Other   Is there a successful TCM telephone encounter documented?  No   Week 1 attempt successful?  Yes   Call start time  1535   Call end time  1537   Meds reviewed with patient/caregiver?  Yes   Is the patient having any side effects they believe may be caused by any medication additions or changes?  No   Does the patient have all medications ordered at discharge?  Yes   Is the patient taking all medications as directed (includes completed medication regime)?  Yes   Does the patient have a primary care provider?   Yes   Does the patient have an appointment with their PCP within 7 days of discharge?  Yes   Has the patient kept scheduled appointments due by today?  N/A   Has home health visited the patient within 72 hours of discharge?  N/A   Psychosocial issues?  No   Did the patient receive a copy of their discharge instructions?  Yes   What is the patient's perception of their health status since discharge?  Improving   Week 1 call completed?  Yes   Wrap up additional comments  States is doing well-denies any problems with SOA or fever. STates BS running 130 today. Denies any complaints.          Bijal Minaya RN

## 2020-04-07 ENCOUNTER — TRANSITIONAL CARE MANAGEMENT TELEPHONE ENCOUNTER (OUTPATIENT)
Dept: FAMILY MEDICINE CLINIC | Facility: CLINIC | Age: 50
End: 2020-04-07

## 2020-04-07 ENCOUNTER — TELEPHONE (OUTPATIENT)
Dept: FAMILY MEDICINE CLINIC | Facility: CLINIC | Age: 50
End: 2020-04-07

## 2020-04-07 LAB
REF LAB TEST METHOD: NORMAL
SARS-COV-2 RNA RESP QL NAA+PROBE: NOT DETECTED

## 2020-04-07 RX ORDER — SULFAMETHOXAZOLE AND TRIMETHOPRIM 800; 160 MG/1; MG/1
1 TABLET ORAL 2 TIMES DAILY
Qty: 14 TABLET | Refills: 0 | Status: SHIPPED | OUTPATIENT
Start: 2020-04-07 | End: 2020-04-16

## 2020-04-07 NOTE — TELEPHONE ENCOUNTER
Patient states pharmacy never received monitor, test strips, or lancets. Could you please re-send to pharmacy? Thank you.

## 2020-04-08 RX ORDER — BLOOD-GLUCOSE METER
KIT MISCELLANEOUS
Qty: 50 EACH | Refills: 12 | Status: SHIPPED | OUTPATIENT
Start: 2020-04-08 | End: 2020-09-24

## 2020-04-08 RX ORDER — BLOOD-GLUCOSE METER
KIT MISCELLANEOUS
Qty: 1 EACH | Refills: 0 | Status: SHIPPED | OUTPATIENT
Start: 2020-04-08 | End: 2020-05-24 | Stop reason: SDUPTHER

## 2020-04-08 RX ORDER — LANCETS 28 GAUGE
EACH MISCELLANEOUS
Qty: 50 EACH | Refills: 12 | Status: SHIPPED | OUTPATIENT
Start: 2020-04-08 | End: 2020-09-24

## 2020-04-13 ENCOUNTER — READMISSION MANAGEMENT (OUTPATIENT)
Dept: CALL CENTER | Facility: HOSPITAL | Age: 50
End: 2020-04-13

## 2020-04-13 NOTE — OUTREACH NOTE
Medical Week 2 Survey      Responses   [facility] [place] patient discharged from?  [facility]   Does the patient have one of the following disease processes/diagnoses(primary or secondary)?  Other   Week 2 attempt successful?  Yes   Call start time  1520   Discharge diagnosis  chest wall pain, DM2, suspected covid   Call end time  1529   Meds reviewed with patient/caregiver?  Yes   Is the patient having any side effects they believe may be caused by any medication additions or changes?  No   Does the patient have all medications ordered at discharge?  Yes   Is the patient taking all medications as directed (includes completed medication regime)?  Yes   Does the patient have a primary care provider?   Yes   Does the patient have an appointment with their PCP within 7 days of discharge?  Yes   Has the patient kept scheduled appointments due by today?  N/A   Has home health visited the patient within 72 hours of discharge?  N/A   Has all DME been delivered?  No   Comments  denies shortness of breath, cough, FSBS WNL's   Did the patient receive a copy of their discharge instructions?  Yes   Nursing interventions  Reviewed instructions with patient   What is the patient's perception of their health status since discharge?  Improving   Is the patient/caregiver able to teach back signs and symptoms related to disease process for when to call PCP?  Yes   Is the patient/caregiver able to teach back signs and symptoms related to disease process for when to call 911?  Yes   Is the patient/caregiver able to teach back the hierarchy of who to call/visit for symptoms/problems? PCP, Specialist, Home health nurse, Urgent Care, ED, 911  Yes   Additional teach back comments  states has altered diet, reduced sugars, CHO's, has been losing wt, FSBS WNL's, states spouse has DM and is non-compliant with diet, but pt states continues to do own regimen with great recent success   Week 2 Call Completed?  Yes          Adrienne Ferguson RN

## 2020-04-16 ENCOUNTER — OFFICE VISIT (OUTPATIENT)
Dept: FAMILY MEDICINE CLINIC | Facility: CLINIC | Age: 50
End: 2020-04-16

## 2020-04-16 VITALS
WEIGHT: 273 LBS | OXYGEN SATURATION: 98 % | HEIGHT: 65 IN | RESPIRATION RATE: 16 BRPM | HEART RATE: 103 BPM | DIASTOLIC BLOOD PRESSURE: 80 MMHG | SYSTOLIC BLOOD PRESSURE: 134 MMHG | BODY MASS INDEX: 45.48 KG/M2 | TEMPERATURE: 97.7 F

## 2020-04-16 DIAGNOSIS — E11.9 TYPE 2 DIABETES MELLITUS WITHOUT COMPLICATION, WITHOUT LONG-TERM CURRENT USE OF INSULIN (HCC): Chronic | ICD-10-CM

## 2020-04-16 DIAGNOSIS — R07.89 CHEST WALL PAIN: ICD-10-CM

## 2020-04-16 DIAGNOSIS — IMO0001 TRANSITION OF CARE PERFORMED WITH SHARING OF CLINICAL SUMMARY: Primary | ICD-10-CM

## 2020-04-16 DIAGNOSIS — F31.32 BIPOLAR 1 DISORDER, DEPRESSED, MODERATE (HCC): ICD-10-CM

## 2020-04-16 PROBLEM — Z78.9 TRANSITION OF CARE PERFORMED WITH SHARING OF CLINICAL SUMMARY: Status: ACTIVE | Noted: 2020-04-16

## 2020-04-16 PROCEDURE — 99495 TRANSJ CARE MGMT MOD F2F 14D: CPT | Performed by: INTERNAL MEDICINE

## 2020-04-16 RX ORDER — BLOOD-GLUCOSE METER
KIT MISCELLANEOUS DAILY
COMMUNITY
Start: 2020-04-07 | End: 2020-09-24

## 2020-04-16 RX ORDER — INDOMETHACIN 50 MG/1
50 CAPSULE ORAL 2 TIMES DAILY WITH MEALS
Qty: 14 CAPSULE | Refills: 0 | Status: SHIPPED | OUTPATIENT
Start: 2020-04-16 | End: 2020-04-20 | Stop reason: SDUPTHER

## 2020-04-16 NOTE — ASSESSMENT & PLAN NOTE
Diabetes is improving with lifestyle modifications.   Dietary recommendations for ADA diet.  Diabetes will be reassessed in 3 months.   checking sugars 2/day and will call if worsens- increase seroquel so watch closely

## 2020-04-16 NOTE — PROGRESS NOTES
"Rooming Tab(CC,VS,Pt Hx,Fall Screen)  Chief Complaint   Patient presents with   • Transitional Care Management     chest pain       Subjective    Pt here for  TCM- was admitted overnight for  Chest pain- it was reproducible on right side- however EKG was slightly abnormal- had negative serial troponin- did NOT have stress test- and was discharged- stated the pain is still on going and worse with deep breathe or deep palpation of right chest. Has family history of early CAD however     stopped the metformin and watching diet  Down 5 lbs in 5 days- watching everything that eats and stopped the sodas-  Sugars from 400 to 125 - really working hard to avoid DM meds.   bipolar meds going well- talked to psych  And back on 150mg seroquel and 30 mg abilify.      also with increased diarrhea with metformin so stopped it and diarrhea resolved- tried to diet with DM    I have reviewed and updated her medications, medical history and problem list during today's office visit.     Patient Care Team:  Marsha Lopez MD as PCP - General (Internal Medicine)    Problem List Tab  Medications Tab  Synopsis Tab  Chart Review Tab  Care Everywhere Tab  Immunizations Tab  Patient History Tab    Social History     Tobacco Use   • Smoking status: Never Smoker   • Smokeless tobacco: Never Used   Substance Use Topics   • Alcohol use: No     Frequency: Never       Review of Systems   Constitutional: Negative for fatigue and fever.   HENT: Negative for congestion.    Respiratory: Negative for apnea, cough and wheezing.    Cardiovascular: Positive for chest pain.   Gastrointestinal: Negative for abdominal distention.   Neurological: Negative for syncope.   Psychiatric/Behavioral: Positive for sleep disturbance. Negative for behavioral problems.       Objective     Rooming Tab(CC,VS,Pt Hx,Fall Screen)  /80 (BP Location: Left arm, Cuff Size: Large Adult)   Pulse 103   Temp 97.7 °F (36.5 °C) (Oral)   Resp 16   Ht 165.1 cm (65\")   " Wt 124 kg (273 lb)   SpO2 98%   BMI 45.43 kg/m²     Body mass index is 45.43 kg/m².    Physical Exam   Constitutional: She is oriented to person, place, and time. She appears well-developed and well-nourished.   HENT:   Head: Normocephalic and atraumatic.   Right Ear: Tympanic membrane normal.   Left Ear: Tympanic membrane normal.   Eyes: Pupils are equal, round, and reactive to light.   Neck: Normal range of motion. Neck supple.   Cardiovascular: Normal rate and regular rhythm.   No murmur heard.  Pulmonary/Chest: Effort normal and breath sounds normal. She exhibits tenderness.   Abdominal: Soft. Bowel sounds are normal. She exhibits no distension.   Neurological: She is oriented to person, place, and time.   Skin: Capillary refill takes less than 2 seconds.   Nursing note and vitals reviewed.       Statin Choice Calculator  Data Reviewed:    Xr Chest Ap    Result Date: 4/3/2020  Impression: No acute process.  Electronically Signed By-Nick Koch On:4/3/2020 10:08 AM This report was finalized on [id number] by  Nick Koch .    Xr Spine Lumbar Complete 4+vw    Result Date: 3/25/2020  Impression: Minimal degenerative change without acute osseous abnormality.  Electronically Signed By-Hernandez Ortez On:3/25/2020 8:03 AM This report was finalized on [id number] by  Hernandez Ortez, .            Lab Results   Component Value Date    BUN 7 04/04/2020    CREATININE 0.97 04/04/2020    EGFRIFNONA 61 04/04/2020     04/04/2020    K 4.0 04/04/2020     04/04/2020    CALCIUM 9.8 04/04/2020    ALBUMIN 3.70 04/03/2020    BILITOT 0.2 04/03/2020    ALKPHOS 123 (H) 04/03/2020    AST 23 04/03/2020    ALT 19 04/03/2020    WBC 8.10 04/04/2020    RBC 4.10 04/04/2020    HCT 36.0 04/04/2020    MCV 87.7 04/04/2020    MCH 28.3 04/04/2020    INR 1.02 04/03/2020      Assessment/Plan   Order Review Tab  Health Maintenance Tab  Patient Plan/Order Tab  Diagnoses and all orders for this visit:    1. Transition of care performed  with sharing of clinical summary (Primary)  Comments:  treat as chest wall- wiht indocin but low threshold to schedule oupt stress test if changes symptoms  Assessment & Plan:  Will call if other symptoms and get a stress test      2. Bipolar 1 disorder, depressed, moderate (CMS/HCC)    3. Chest wall pain  Comments:   kidney function normal  Assessment & Plan:  Will give indocin and treat as pleruisy as pain worse with deep breath and cough/sneeze      4. Type 2 diabetes mellitus without complication, without long-term current use of insulin (CMS/[facility])  Assessment & Plan:  Diabetes is improving with lifestyle modifications.   Dietary recommendations for ADA diet.  Diabetes will be reassessed in 3 months.   checking sugars 2/day and will call if worsens- increase seroquel so watch closely      Other orders  -     indomethacin (INDOCIN) 50 MG capsule; Take 1 capsule by mouth 2 (Two) Times a Day With Meals.  Dispense: 14 capsule; Refill: 0      Wrapup Tab  Return in about 3 months (around 7/16/2020), or if symptoms worsen or fail to improve.         Transitional Care Management Certification  I certify that the following are true:  1. Communication was made within 2 business days of discharge.  2. Complexity of Medical Decision Making is moderate.  3. Face to face visit occurred within 11 days.    *Note: 46085 is for high complexity patients with a face to face visit within 7 days of discharge.  80115 is for high complexity patients with a face to face on days 8-14 post discharge or medium complexity with face to face visit within 14 days post discharge.

## 2020-04-17 PROBLEM — J45.909 ASTHMA: Status: ACTIVE | Noted: 2020-04-17

## 2020-04-20 ENCOUNTER — READMISSION MANAGEMENT (OUTPATIENT)
Dept: CALL CENTER | Facility: HOSPITAL | Age: 50
End: 2020-04-20

## 2020-04-20 RX ORDER — INDOMETHACIN 50 MG/1
50 CAPSULE ORAL 2 TIMES DAILY WITH MEALS
Qty: 14 CAPSULE | Refills: 0 | Status: SHIPPED | OUTPATIENT
Start: 2020-04-20 | End: 2020-07-15

## 2020-04-20 NOTE — OUTREACH NOTE
Medical Week 3 Survey      Responses   [facility] patient discharged from?  [facility]   COVID-19 Test Status  Negative   Does the patient have one of the following disease processes/diagnoses(primary or secondary)?  Other   Week 3 attempt successful?  Yes   Call start time  0901   Call end time  0904   Discharge diagnosis  chest wall pain, DM2, suspected covid   Meds reviewed with patient/caregiver?  Yes   Is the patient having any side effects they believe may be caused by any medication additions or changes?  No   Does the patient have all medications ordered at discharge?  Yes   Is the patient taking all medications as directed (includes completed medication regime)?  Yes   Medication comments  Had appt with PCP last Wed.   Does the patient have a primary care provider?   Yes   Does the patient have an appointment with their PCP within 7 days of discharge?  Yes   Has the patient kept scheduled appointments due by today?  Yes   Has home health visited the patient within 72 hours of discharge?  N/A   Did the patient receive a copy of their discharge instructions?  Yes   Nursing interventions  Reviewed instructions with patient   What is the patient's perception of their health status since discharge?  Improving   Is the patient/caregiver able to teach back signs and symptoms related to disease process for when to call PCP?  Yes   Is the patient/caregiver able to teach back signs and symptoms related to disease process for when to call 911?  Yes   Is the patient/caregiver able to teach back the hierarchy of who to call/visit for symptoms/problems? PCP, Specialist, Home health nurse, Urgent Care, ED, 911  Yes   Additional teach back comments  States she is doing well.  Has been monitoring her bs and they have been good.  No fever, sob or chest pains.     Week 3 Call Completed?  Yes   Graduated  Yes   Did the patient feel the follow up calls were helpful during their recovery period?  Yes   Was the number of calls  appropriate?  Yes   Graduated/Revoked comments  No quesitons or needs at this time          Alexandra Cardoso, ALEXIS

## 2020-05-19 ENCOUNTER — OFFICE VISIT (OUTPATIENT)
Dept: FAMILY MEDICINE CLINIC | Facility: CLINIC | Age: 50
End: 2020-05-19

## 2020-05-19 VITALS
DIASTOLIC BLOOD PRESSURE: 100 MMHG | HEIGHT: 65 IN | WEIGHT: 276 LBS | RESPIRATION RATE: 16 BRPM | BODY MASS INDEX: 45.98 KG/M2 | TEMPERATURE: 97.8 F | OXYGEN SATURATION: 99 % | SYSTOLIC BLOOD PRESSURE: 136 MMHG | HEART RATE: 73 BPM

## 2020-05-19 DIAGNOSIS — R07.89 ATYPICAL CHEST PAIN: Primary | ICD-10-CM

## 2020-05-19 DIAGNOSIS — R94.31 ABNORMAL EKG: ICD-10-CM

## 2020-05-19 PROCEDURE — 99214 OFFICE O/P EST MOD 30 MIN: CPT | Performed by: INTERNAL MEDICINE

## 2020-05-19 PROCEDURE — 93000 ELECTROCARDIOGRAM COMPLETE: CPT | Performed by: INTERNAL MEDICINE

## 2020-05-19 RX ORDER — INDOMETHACIN 50 MG/1
50 CAPSULE ORAL 2 TIMES DAILY WITH MEALS
Qty: 14 CAPSULE | Refills: 0 | Status: SHIPPED | OUTPATIENT
Start: 2020-05-19 | End: 2020-05-24 | Stop reason: SDUPTHER

## 2020-05-19 NOTE — PROGRESS NOTES
"Rooming Tab(CC,VS,Pt Hx,Fall Screen)  Chief Complaint   Patient presents with   • Chest Pain       Subjective   Pt here for right sided chest pain- worse with deep breathe- no radiating- no sweaty, not worse with food. Started 3 days ago-  Worse standing  than laying.   No ocughing last week, no fever.  Feels like needs to take a deep breathe- the sharp pain comes at end of breathe.  Was treated for pleurisy last month and this pain totally resolved then recurred 3 days ago    no increased working out or cleaning-    sugar doing better- off metformin as increased diarrhea- 134 was the highest in  Last 2 week without metfomrin  I have reviewed and updated her medications, medical history and problem list during today's office visit.     Patient Care Team:  Marsha Lopez MD as PCP - General (Internal Medicine)    Problem List Tab  Medications Tab  Synopsis Tab  Chart Review Tab  Care Everywhere Tab  Immunizations Tab  Patient History Tab    Social History     Tobacco Use   • Smoking status: Never Smoker   • Smokeless tobacco: Never Used   Substance Use Topics   • Alcohol use: No     Frequency: Never       Review of Systems   Constitutional: Negative for fatigue.   Respiratory: Positive for chest tightness and shortness of breath. Negative for cough.    Cardiovascular: Positive for chest pain.   Musculoskeletal: Negative for myalgias.   Neurological: Negative for weakness.   Hematological: Negative for adenopathy.       Objective     Rooming Tab(CC,VS,Pt Hx,Fall Screen)  /100 (BP Location: Right arm, Cuff Size: Large Adult)   Pulse 73   Temp 97.8 °F (36.6 °C)   Resp 16   Ht 165.1 cm (65\")   Wt 125 kg (276 lb)   SpO2 99%   BMI 45.93 kg/m²     Body mass index is 45.93 kg/m².    Physical Exam   Constitutional: She is oriented to person, place, and time. She appears well-developed and well-nourished.   HENT:   Head: Normocephalic and atraumatic.   Right Ear: Tympanic membrane normal.   Left Ear: " Tympanic membrane normal.   Eyes: Pupils are equal, round, and reactive to light.   Neck: Normal range of motion. Neck supple.   Cardiovascular: Normal rate and regular rhythm.   No murmur heard.  Not reproducible pain   Pulmonary/Chest: Effort normal and breath sounds normal.   Abdominal: Soft. Bowel sounds are normal. She exhibits no distension.   Neurological: She is oriented to person, place, and time.   Skin: Capillary refill takes less than 2 seconds.   Nursing note and vitals reviewed.       Statin Choice Calculator  Data Reviewed:      ECG 12 Lead  Date/Time: 5/19/2020 11:11 AM  Performed by: Marsha Lopez MD  Authorized by: Marsha Lopez MD   Previous ECG: no previous ECG available  Rhythm: sinus rhythm  QRS axis: normal  Other findings: T wave abnormality    Clinical impression: abnormal EKG  Comments: Diffuse ST changes lateral        .          Lab Results   Component Value Date    BUN 7 04/04/2020    CREATININE 0.97 04/04/2020    EGFRIFNONA 61 04/04/2020     04/04/2020    K 4.0 04/04/2020     04/04/2020    CALCIUM 9.8 04/04/2020    ALBUMIN 3.70 04/03/2020    BILITOT 0.2 04/03/2020    ALKPHOS 123 (H) 04/03/2020    AST 23 04/03/2020    ALT 19 04/03/2020    WBC 8.10 04/04/2020    RBC 4.10 04/04/2020    HCT 36.0 04/04/2020    MCV 87.7 04/04/2020    MCH 28.3 04/04/2020    INR 1.02 04/03/2020      Assessment/Plan   Order Review Tab  Health Maintenance Tab  Patient Plan/Order Tab  Diagnoses and all orders for this visit:    1. Atypical chest pain (Primary)  -     ECG 12 Lead    2. Abnormal EKG  Comments:  [facility] abnormal ST changes suggestive of lateral ischemia- will schedule outpt stress test at this time as not currently having chest pain  Orders:  -     Stress Test With Myocardial Perfusion One Day; Future    Other orders  -     indomethacin (INDOCIN) 50 MG capsule; Take 1 capsule by mouth 2 (Two) Times a Day With Meals.  Dispense: 14 capsule; Refill: 0        Wrapup Tab  Return in  about 6 months (around 11/19/2020), or if symptoms worsen or fail to improve.       They were informed of the diagnosis and treatment plan and directed to f/u for any further problems or concerns.

## 2020-05-24 PROBLEM — R94.31 ABNORMAL EKG: Status: ACTIVE | Noted: 2020-05-24

## 2020-05-24 PROBLEM — R07.89 ATYPICAL CHEST PAIN: Status: ACTIVE | Noted: 2020-05-24

## 2020-05-29 ENCOUNTER — HOSPITAL ENCOUNTER (OUTPATIENT)
Dept: NUCLEAR MEDICINE | Facility: HOSPITAL | Age: 50
Discharge: HOME | End: 2020-05-29

## 2020-05-29 DIAGNOSIS — R94.31 ABNORMAL EKG: ICD-10-CM

## 2020-05-29 LAB
BH CV NUCLEAR PRIOR STUDY: 3
BH CV STRESS BP STAGE 1: NORMAL
BH CV STRESS COMMENTS STAGE 1: NORMAL
BH CV STRESS DOSE REGADENOSON STAGE 1: 0.4
BH CV STRESS DURATION MIN STAGE 1: 4
BH CV STRESS DURATION SEC STAGE 1: 10
BH CV STRESS HR STAGE 1: 82
BH CV STRESS PROTOCOL 1: NORMAL
BH CV STRESS RECOVERY BP: NORMAL MMHG
BH CV STRESS RECOVERY HR: 67 BPM
BH CV STRESS STAGE 1: 1
LV EF NUC BP: 70 %
MAXIMAL PREDICTED HEART RATE: 171 BPM
PERCENT MAX PREDICTED HR: 47.95 %
STRESS BASELINE BP: NORMAL MMHG
STRESS BASELINE HR: 78 BPM
STRESS PERCENT HR: 56 %
STRESS POST PEAK BP: NORMAL MMHG
STRESS POST PEAK HR: 82 BPM
STRESS TARGET HR: 145 BPM

## 2020-05-29 PROCEDURE — 78452 HT MUSCLE IMAGE SPECT MULT: CPT

## 2020-05-29 PROCEDURE — A9500 TC99M SESTAMIBI: HCPCS | Performed by: INTERNAL MEDICINE

## 2020-05-29 PROCEDURE — 78452 HT MUSCLE IMAGE SPECT MULT: CPT | Performed by: INTERNAL MEDICINE

## 2020-05-29 PROCEDURE — 0 TECHNETIUM SESTAMIBI: Performed by: INTERNAL MEDICINE

## 2020-05-29 PROCEDURE — 93018 CV STRESS TEST I&R ONLY: CPT | Performed by: INTERNAL MEDICINE

## 2020-05-29 PROCEDURE — 25010000002 REGADENOSON 0.4 MG/5ML SOLUTION: Performed by: INTERNAL MEDICINE

## 2020-05-29 PROCEDURE — 93017 CV STRESS TEST TRACING ONLY: CPT

## 2020-05-29 RX ADMIN — REGADENOSON 0.4 MG: 0.08 INJECTION, SOLUTION INTRAVENOUS at 09:50

## 2020-05-29 RX ADMIN — TECHNETIUM TC 99M SESTAMIBI 1 DOSE: 1 INJECTION INTRAVENOUS at 08:30

## 2020-05-29 RX ADMIN — TECHNETIUM TC 99M SESTAMIBI 1 DOSE: 1 INJECTION INTRAVENOUS at 09:50

## 2020-06-01 ENCOUNTER — TELEPHONE (OUTPATIENT)
Dept: FAMILY MEDICINE CLINIC | Facility: CLINIC | Age: 50
End: 2020-06-01

## 2020-06-01 NOTE — TELEPHONE ENCOUNTER
The exercise part had one area that was of concern- however with the nuclear scan it did not she ischemia there- so she is negative but low exercise tolerance- needs to exercise more and lose weight

## 2020-06-17 RX ORDER — METFORMIN HYDROCHLORIDE 500 MG/1
TABLET, EXTENDED RELEASE ORAL
Qty: 90 TABLET | Refills: 0 | Status: SHIPPED | OUTPATIENT
Start: 2020-06-17 | End: 2020-07-15

## 2020-07-07 ENCOUNTER — TELEPHONE (OUTPATIENT)
Dept: FAMILY MEDICINE CLINIC | Facility: CLINIC | Age: 50
End: 2020-07-07

## 2020-07-07 DIAGNOSIS — F31.32 BIPOLAR 1 DISORDER, DEPRESSED, MODERATE (HCC): Primary | ICD-10-CM

## 2020-07-07 NOTE — TELEPHONE ENCOUNTER
PATIENT CALLED ADVISING WILL BE CHANGING PSYCHIATRIST. THE NEW PSYCH DR IS GEORGE STURGEON  PLEASE FAX AN ORDER TO  [phone]

## 2020-07-14 ENCOUNTER — TELEPHONE (OUTPATIENT)
Dept: FAMILY MEDICINE CLINIC | Facility: CLINIC | Age: 50
End: 2020-07-14

## 2020-07-14 NOTE — TELEPHONE ENCOUNTER
Patient called in saying that the Psychiatrist have not received the referral. Patient wants to know if the office can re fax it    Dr Princess Jones  Fax: [phone]

## 2020-07-15 ENCOUNTER — OFFICE VISIT (OUTPATIENT)
Dept: FAMILY MEDICINE CLINIC | Facility: CLINIC | Age: 50
End: 2020-07-15

## 2020-07-15 ENCOUNTER — LAB (OUTPATIENT)
Dept: FAMILY MEDICINE CLINIC | Facility: CLINIC | Age: 50
End: 2020-07-15

## 2020-07-15 VITALS
OXYGEN SATURATION: 98 % | HEART RATE: 62 BPM | RESPIRATION RATE: 8 BRPM | HEIGHT: 64 IN | WEIGHT: 275 LBS | TEMPERATURE: 97.8 F | SYSTOLIC BLOOD PRESSURE: 120 MMHG | BODY MASS INDEX: 46.95 KG/M2 | DIASTOLIC BLOOD PRESSURE: 80 MMHG

## 2020-07-15 DIAGNOSIS — T43.595A LITHIUM ADVERSE REACTION: Primary | ICD-10-CM

## 2020-07-15 DIAGNOSIS — T43.595A LITHIUM ADVERSE REACTION: ICD-10-CM

## 2020-07-15 DIAGNOSIS — K21.9 GASTROESOPHAGEAL REFLUX DISEASE WITHOUT ESOPHAGITIS: ICD-10-CM

## 2020-07-15 DIAGNOSIS — E11.9 TYPE 2 DIABETES MELLITUS WITHOUT COMPLICATION, WITHOUT LONG-TERM CURRENT USE OF INSULIN (HCC): ICD-10-CM

## 2020-07-15 LAB
ALBUMIN SERPL-MCNC: 4.2 G/DL (ref 3.5–5.2)
ALBUMIN/GLOB SERPL: 1.4 G/DL
ALP SERPL-CCNC: 135 U/L (ref 39–117)
ALT SERPL W P-5'-P-CCNC: 19 U/L (ref 1–33)
ANION GAP SERPL CALCULATED.3IONS-SCNC: 11.2 MMOL/L (ref 5–15)
AST SERPL-CCNC: 17 U/L (ref 1–32)
BILIRUB SERPL-MCNC: 0.3 MG/DL (ref 0–1.2)
BUN SERPL-MCNC: 10 MG/DL (ref 6–20)
BUN/CREAT SERPL: 10.5 (ref 7–25)
CALCIUM SPEC-SCNC: 9.7 MG/DL (ref 8.6–10.5)
CHLORIDE SERPL-SCNC: 103 MMOL/L (ref 98–107)
CO2 SERPL-SCNC: 25.8 MMOL/L (ref 22–29)
CREAT SERPL-MCNC: 0.95 MG/DL (ref 0.57–1)
GFR SERPL CREATININE-BSD FRML MDRD: 63 ML/MIN/1.73
GLOBULIN UR ELPH-MCNC: 3.1 GM/DL
GLUCOSE SERPL-MCNC: 107 MG/DL (ref 65–99)
HBA1C MFR BLD: 5.7 % (ref 3.5–5.6)
LITHIUM SERPL-SCNC: 0.8 MMOL/L (ref 0.6–1.2)
POTASSIUM SERPL-SCNC: 4.1 MMOL/L (ref 3.5–5.2)
PROT SERPL-MCNC: 7.3 G/DL (ref 6–8.5)
SODIUM SERPL-SCNC: 140 MMOL/L (ref 136–145)

## 2020-07-15 PROCEDURE — 80178 ASSAY OF LITHIUM: CPT | Performed by: INTERNAL MEDICINE

## 2020-07-15 PROCEDURE — 99213 OFFICE O/P EST LOW 20 MIN: CPT | Performed by: INTERNAL MEDICINE

## 2020-07-15 PROCEDURE — 83036 HEMOGLOBIN GLYCOSYLATED A1C: CPT | Performed by: INTERNAL MEDICINE

## 2020-07-15 PROCEDURE — 80053 COMPREHEN METABOLIC PANEL: CPT | Performed by: INTERNAL MEDICINE

## 2020-07-15 PROCEDURE — 36415 COLL VENOUS BLD VENIPUNCTURE: CPT

## 2020-07-15 RX ORDER — OMEPRAZOLE 20 MG/1
20 CAPSULE, DELAYED RELEASE ORAL DAILY
Qty: 30 CAPSULE | Refills: 1 | Status: SHIPPED | OUTPATIENT
Start: 2020-07-15 | End: 2020-08-06

## 2020-07-15 NOTE — PROGRESS NOTES
Rooming Tab(CC,VS,Pt Hx,Fall Screen)  Chief Complaint   Patient presents with   • Dry Mouth       Subjective   Pt here for drooling- thought was from seroquel so slowly off that.  She feels thirsty constantly so drinking water all day long- 7-8 glasses a day and doesn't quench the thirst.  No longer taking levbid every day as stomach not as bad- no indocin for awhile.   Dr. Erazo left [facility] and having to switch psychiatrist- has referral set up already. At [facility].    quit metformin as increased diarrhea- states sugars good range in mornings.  Started new job 4 weeks ago and very happy about it. happier      Answers for HPI/ROS submitted by the patient on 7/14/2020   What is the primary reason for your visit?: Other  Please describe your symptoms.: Referral tonpsych and discuss meds and drooling  Have you had these symptoms before?: No  How long have you been having these symptoms?: Greater than 2 weeks  Please list any medications you are currently taking for this condition.: Effexor. Abilify. Lithium    I have reviewed and updated her medications, medical history and problem list during today's office visit.     Patient Care Team:  Marsha Lopez MD as PCP - General (Internal Medicine)    Problem List Tab  Medications Tab  Synopsis Tab  Chart Review Tab  Care Everywhere Tab  Immunizations Tab  Patient History Tab    Social History     Tobacco Use   • Smoking status: Never Smoker   • Smokeless tobacco: Never Used   Substance Use Topics   • Alcohol use: No     Frequency: Never       Review of Systems   Constitutional: Negative for fatigue.   HENT: Positive for drooling. Negative for congestion.    Cardiovascular: Positive for chest pain.   Gastrointestinal: Positive for abdominal distention.   Musculoskeletal: Negative for arthralgias.   Psychiatric/Behavioral: Negative for sleep disturbance. The patient is not nervous/anxious.        Objective     Rooming Tab(CC,VS,Pt Hx,Fall Screen)  BP  "120/80   Pulse 62   Temp 97.8 °F (36.6 °C) (Temporal)   Resp 8   Ht 162.6 cm (64\")   Wt 125 kg (275 lb)   SpO2 98%   BMI 47.20 kg/m²     Body mass index is 47.2 kg/m².    Physical Exam   Constitutional: She is oriented to person, place, and time. She appears well-developed and well-nourished.   Muscle tone normal- but left side face resting is slightly ldrooping   HENT:   Head: Normocephalic and atraumatic.   Right Ear: Tympanic membrane normal.   Left Ear: Tympanic membrane normal.   Dry tongue- normal moist mucosa   Eyes: Pupils are equal, round, and reactive to light.   Neck: Normal range of motion. Neck supple.   Cardiovascular: Normal rate and regular rhythm.   No murmur heard.  Pulmonary/Chest: Effort normal and breath sounds normal.   Abdominal: Soft. Bowel sounds are normal. She exhibits no distension.   Neurological: She is oriented to person, place, and time.   Skin: Capillary refill takes less than 2 seconds.   Psychiatric: She has a normal mood and affect.   Nursing note and vitals reviewed.       Statin Choice Calculator  Data Reviewed:                   Assessment/Plan   Order Review Tab  Health Maintenance Tab  Patient Plan/Order Tab  Diagnoses and all orders for this visit:    1. Lithium adverse reaction (Primary)  Comments:  drooling can be from seroquel with abilify and  but could be lithium as well- will check level  Orders:  -     Lithium Level; Future    2. Type 2 diabetes mellitus without complication, without long-term current use of insulin (CMS/[facility])  Comments:  sugars being checked every morning- 140 highest- watching diet hoseaedgardo  stopped metformin last month  Orders:  -     Comprehensive Metabolic Panel  -     Hemoglobin A1c    3. Gastroesophageal reflux disease without esophagitis  Comments:  still with atypical CP- had negative cardiac- and no anxiety felt- will treat for PPI for GI symptoms    Other orders  -     omeprazole (PrilOSEC) 20 MG capsule; Take 1 capsule by mouth Daily.  " Dispense: 30 capsule; Refill: 1        Wrapup Tab  Return if symptoms worsen or fail to improve, for Medicare Wellness.       They were informed of the diagnosis and treatment plan and directed to f/u for any further problems or concerns.

## 2020-07-30 ENCOUNTER — OFFICE VISIT (OUTPATIENT)
Dept: FAMILY MEDICINE CLINIC | Facility: CLINIC | Age: 50
End: 2020-07-30

## 2020-07-30 VITALS
TEMPERATURE: 97.3 F | BODY MASS INDEX: 47.12 KG/M2 | RESPIRATION RATE: 8 BRPM | HEART RATE: 68 BPM | DIASTOLIC BLOOD PRESSURE: 70 MMHG | HEIGHT: 64 IN | OXYGEN SATURATION: 97 % | WEIGHT: 276 LBS | SYSTOLIC BLOOD PRESSURE: 130 MMHG

## 2020-07-30 DIAGNOSIS — R09.1 PLEURISY: ICD-10-CM

## 2020-07-30 DIAGNOSIS — K90.9 DIARRHEA DUE TO MALABSORPTION: Primary | ICD-10-CM

## 2020-07-30 DIAGNOSIS — R19.7 DIARRHEA DUE TO MALABSORPTION: Primary | ICD-10-CM

## 2020-07-30 PROCEDURE — 99213 OFFICE O/P EST LOW 20 MIN: CPT | Performed by: INTERNAL MEDICINE

## 2020-07-30 RX ORDER — DIPHENOXYLATE HYDROCHLORIDE AND ATROPINE SULFATE 2.5; .025 MG/1; MG/1
1 TABLET ORAL 4 TIMES DAILY PRN
Qty: 30 TABLET | Refills: 0 | Status: SHIPPED | OUTPATIENT
Start: 2020-07-30 | End: 2020-08-24

## 2020-08-06 RX ORDER — OMEPRAZOLE 20 MG/1
CAPSULE, DELAYED RELEASE ORAL
Qty: 30 CAPSULE | Refills: 1 | Status: SHIPPED | OUTPATIENT
Start: 2020-08-06 | End: 2020-08-08

## 2020-08-07 ENCOUNTER — OFFICE VISIT (OUTPATIENT)
Dept: FAMILY MEDICINE CLINIC | Facility: CLINIC | Age: 50
End: 2020-08-07

## 2020-08-07 ENCOUNTER — LAB (OUTPATIENT)
Dept: FAMILY MEDICINE CLINIC | Facility: CLINIC | Age: 50
End: 2020-08-07

## 2020-08-07 VITALS
TEMPERATURE: 97.7 F | HEART RATE: 80 BPM | DIASTOLIC BLOOD PRESSURE: 80 MMHG | SYSTOLIC BLOOD PRESSURE: 130 MMHG | OXYGEN SATURATION: 97 % | RESPIRATION RATE: 16 BRPM | WEIGHT: 275 LBS | BODY MASS INDEX: 46.95 KG/M2 | HEIGHT: 64 IN

## 2020-08-07 DIAGNOSIS — E11.9 TYPE 2 DIABETES MELLITUS WITHOUT COMPLICATION, WITHOUT LONG-TERM CURRENT USE OF INSULIN (HCC): ICD-10-CM

## 2020-08-07 DIAGNOSIS — Z12.11 SCREENING FOR COLON CANCER: ICD-10-CM

## 2020-08-07 DIAGNOSIS — Z00.00 WELL WOMAN EXAM WITHOUT GYNECOLOGICAL EXAM: ICD-10-CM

## 2020-08-07 DIAGNOSIS — E55.9 VITAMIN D DEFICIENCY DISEASE: ICD-10-CM

## 2020-08-07 DIAGNOSIS — Z00.00 MEDICARE ANNUAL WELLNESS VISIT, SUBSEQUENT: Primary | ICD-10-CM

## 2020-08-07 LAB
25(OH)D3 SERPL-MCNC: 29 NG/ML (ref 30–100)
ALBUMIN UR-MCNC: 1.5 MG/DL
CHOLEST SERPL-MCNC: 206 MG/DL (ref 0–200)
HDLC SERPL-MCNC: 38 MG/DL (ref 40–60)
LDLC SERPL CALC-MCNC: 131 MG/DL (ref 0–100)
LDLC/HDLC SERPL: 3.44 {RATIO}
TRIGL SERPL-MCNC: 187 MG/DL (ref 0–150)
TSH SERPL DL<=0.05 MIU/L-ACNC: 1.52 UIU/ML (ref 0.27–4.2)
VLDLC SERPL-MCNC: 37.4 MG/DL (ref 5–40)

## 2020-08-07 PROCEDURE — 80061 LIPID PANEL: CPT | Performed by: INTERNAL MEDICINE

## 2020-08-07 PROCEDURE — 82043 UR ALBUMIN QUANTITATIVE: CPT | Performed by: INTERNAL MEDICINE

## 2020-08-07 PROCEDURE — 90732 PPSV23 VACC 2 YRS+ SUBQ/IM: CPT | Performed by: INTERNAL MEDICINE

## 2020-08-07 PROCEDURE — G0439 PPPS, SUBSEQ VISIT: HCPCS | Performed by: INTERNAL MEDICINE

## 2020-08-07 PROCEDURE — 84443 ASSAY THYROID STIM HORMONE: CPT | Performed by: INTERNAL MEDICINE

## 2020-08-07 PROCEDURE — 99213 OFFICE O/P EST LOW 20 MIN: CPT | Performed by: INTERNAL MEDICINE

## 2020-08-07 PROCEDURE — 82306 VITAMIN D 25 HYDROXY: CPT | Performed by: INTERNAL MEDICINE

## 2020-08-07 PROCEDURE — G0009 ADMIN PNEUMOCOCCAL VACCINE: HCPCS | Performed by: INTERNAL MEDICINE

## 2020-08-07 RX ORDER — ALBUTEROL SULFATE 90 UG/1
2 AEROSOL, METERED RESPIRATORY (INHALATION) EVERY 4 HOURS PRN
Qty: 1 INHALER | Refills: 5 | Status: SHIPPED | OUTPATIENT
Start: 2020-08-07 | End: 2021-06-04

## 2020-08-07 NOTE — PROGRESS NOTES
The ABCs of the Annual Wellness Visit  Subsequent Medicare Wellness Visit    Chief Complaint   Patient presents with   • Medicare Wellness-subsequent   • Gynecologic Exam       Subjective   History of Present Illness:  Tessie Conner is a 50 y.o. female who presents for a Subsequent Medicare Wellness Visit and other concerns   has not had a pap in 5 years- not sexually active- .  Itch bumps in perineum.  Had mammogram and was good.  Sleeping  Great-  Work is stressful but likes it- hard to concentrate the whole time.  Still with GERD despite the  Omeprazole feels like gets worse with breathing- and wheezing at times.   Discussed c- sope- ok for cologuard during panademic but states not eligible yet      HEALTH RISK ASSESSMENT    Recent Hospitalizations:  Recently treated at the following:  [facility]     Current Medical Providers:  Patient Care Team:  Marsha Lopez MD as PCP - General (Internal Medicine)    Smoking Status:  Social History     Tobacco Use   Smoking Status Never Smoker   Smokeless Tobacco Never Used       Alcohol Consumption:  Social History     Substance and Sexual Activity   Alcohol Use No   • Frequency: Never       Depression Screen:   PHQ-2/PHQ-9 Depression Screening 8/7/2020   Little interest or pleasure in doing things 0   Feeling down, depressed, or hopeless 1   Total Score 1       Fall Risk Screen:  STEADI Fall Risk Assessment has not been completed.    Health Habits and Functional and Cognitive Screening:  Functional & Cognitive Status 8/7/2020   Do you have difficulty preparing food and eating? No   Do you have difficulty bathing yourself, getting dressed or grooming yourself? No   Do you have difficulty using the toilet? No   Do you have difficulty moving around from place to place? No   Do you have trouble with steps or getting out of a bed or a chair? No   Current Diet Unhealthy Diet   Dental Exam Up to date   Eye Exam Up to date   Current Exercise Activities  Include None   Do you need help using the phone?  No   Are you deaf or do you have serious difficulty hearing?  No   Do you need help with transportation? No   Do you need help shopping? No   Do you need help preparing meals?  No   Do you need help with housework?  No   Do you need help with laundry? No   Do you need help taking your medications? No   Do you need help managing money? No   Do you ever drive or ride in a car without wearing a seat belt? No   Have you felt unusual stress, anger or loneliness in the last month? Yes   Who do you live with? Spouse   If you need help, do you have trouble finding someone available to you? No   Do you have difficulty concentrating, remembering or making decisions? Yes         Does the patient have evidence of cognitive impairment? No    Asprin use counseling:Does not need ASA (and currently is not on it)    Age-appropriate Screening Schedule:  Refer to the list below for future screening recommendations based on patient's age, sex and/or medical conditions. Orders for these recommended tests are listed in the plan section. The patient has been provided with a written plan.    Health Maintenance   Topic Date Due   • TDAP/TD VACCINES (1 - Tdap) 01/12/1982   • DIABETIC FOOT EXAM  11/20/2019   • PAP SMEAR  11/20/2019   • COLONOSCOPY  11/20/2019   • INFLUENZA VACCINE  08/01/2020   • HEMOGLOBIN A1C  01/15/2021   • DIABETIC EYE EXAM  06/01/2021   • LIPID PANEL  08/07/2021   • URINE MICROALBUMIN  08/07/2021   • PNEUMOCOCCAL VACCINE (19-64 MEDIUM RISK)  Completed          The following portions of the patient's history were reviewed and updated as appropriate: allergies, current medications, past family history, past medical history, past social history, past surgical history and problem list.    Outpatient Medications Prior to Visit   Medication Sig Dispense Refill   • ARIPiprazole (ABILIFY) 30 MG tablet Take 30 mg by mouth Daily.  1   • hyoscyamine (LEVBID) 0.375 MG 12 hr tablet  Take 375 mcg by mouth As Needed.  4   • lithium (ESKALITH) 450 MG CR tablet Take 900 mg by mouth every night at bedtime.  0   • venlafaxine XR (EFFEXOR-XR) 150 MG 24 hr capsule Take 150 mg by mouth 2 (Two) Times a Day. Verified ER BID with [facility]     • Blood Glucose Monitoring Suppl (FREESTYLE LITE) device Daily. USE TO TEST BLOOD GLUCOSE     • diphenoxylate-atropine (Lomotil) 2.5-0.025 MG per tablet Take 1 tablet by mouth 4 (Four) Times a Day As Needed for Diarrhea. 30 tablet 0   • FREESTYLE LITE test strip To test once daily     DX  E11.9 50 each 12   • Lancets (FREESTYLE) lancets To test once  daily    DX; E11.9 50 each 12   • diclofenac (VOLTAREN) 1 % gel gel APPLY 4 GRAMS TOPICALLY 4 TIMES A DAY AS NEEDED FOR PAIN     • omeprazole (priLOSEC) 20 MG capsule TAKE 1 CAPSULE BY MOUTH EVERY DAY 30 capsule 1     No facility-administered medications prior to visit.        Patient Active Problem List   Diagnosis   • Bipolar 1 disorder, depressed, moderate (CMS/HCC)   • Lithium adverse reaction   • Vitamin D deficiency disease   • Screening for breast cancer   • Encounter for screening mammogram for malignant neoplasm of breast    • Chest wall pain   • Suspected COVID-19 virus infection   • Type 2 diabetes mellitus without complication, without long-term current use of insulin (CMS/HCC)   • Morbid obesity (CMS/HCC)   • Transition of care performed with sharing of clinical summary   • Anxiety   • Asthma   • Bipolar disorder (CMS/HCC)   • Dermatitis   • Extrapyramidal and movement disorder   • Hyperlipidemia   • Posttraumatic stress disorder   • Encounter for general adult medical examination without abnormal findings   • Atypical chest pain   • Abnormal EKG   • Gastroesophageal reflux disease without esophagitis   • Pleurisy   • Diarrhea due to malabsorption       Advanced Care Planning:  ACP discussion was held with the patient during this visit. Patient does not have an advance directive, information provided.    Review of  "Systems   Constitutional: Positive for fatigue. Negative for chills.   HENT: Negative for congestion.    Respiratory: Negative for apnea, cough and wheezing.    Cardiovascular: Negative for chest pain.   Gastrointestinal: Negative for abdominal distention.   Genitourinary: Negative for dyspareunia.   Musculoskeletal: Positive for back pain. Negative for arthralgias.   Neurological: Negative for seizures.   Psychiatric/Behavioral: Positive for sleep disturbance. Negative for behavioral problems and decreased concentration.       Compared to one year ago, the patient feels her physical health is the same.  Compared to one year ago, the patient feels her mental health is the same.    Reviewed chart for potential of high risk medication in the elderly: yes  Reviewed chart for potential of harmful drug interactions in the elderly:yes    Objective         Vitals:    08/07/20 0851   BP: 130/80   BP Location: Right arm   Cuff Size: Large Adult   Pulse: 80   Resp: 16   Temp: 97.7 °F (36.5 °C)   SpO2: 97%   Weight: 125 kg (275 lb)   Height: 162.6 cm (64\")       Body mass index is 47.2 kg/m².  Discussed the patient's BMI with her. The BMI is in the acceptable range.    Physical Exam   Constitutional: She appears well-developed and well-nourished.   HENT:   Head: Normocephalic and atraumatic.   Right Ear: External ear normal.   Left Ear: External ear normal.   Mouth/Throat: Oropharynx is clear and moist.   Eyes: Pupils are equal, round, and reactive to light. Conjunctivae and EOM are normal.   Neck: Normal range of motion. Neck supple.   Cardiovascular: Normal rate, regular rhythm, normal heart sounds and intact distal pulses.   Pulmonary/Chest: Effort normal and breath sounds normal. Right breast exhibits no inverted nipple, no mass and no tenderness. Left breast exhibits no inverted nipple, no mass and no tenderness. No breast discharge.   Abdominal: Soft. Bowel sounds are normal. There is no tenderness.   Genitourinary: " Rectal exam shows no mass. No breast discharge. There is no rash, tenderness or lesion on the right labia. Right adnexum displays no fullness. Left adnexum displays no fullness.   Genitourinary Comments: No cervix/uterus. No adnexa fullness. Dry mucosa- hair follicles dried and irritated   Psychiatric: She has a normal mood and affect.   Nursing note and vitals reviewed.  Diabetic foot exam:   Left:    Pulses Dorsalis Pedis:  present   Reflexes 2+    Vibratory sensation normal   Proprioception normal   Sharp/dull discrimination normal       Right:    Pulses Dorsalis Pedis:  present   Reflexes 2+    Vibratory sensation normal   Proprioception normal   Sharp/dull discrimination normal      Lab Results   Component Value Date    TRIG 187 (H) 08/07/2020    HDL 38 (L) 08/07/2020     (H) 08/07/2020    VLDL 37.4 08/07/2020    HGBA1C 5.7 (H) 07/15/2020        Assessment/Plan   Medicare Risks and Personalized Health Plan  CMS Preventative Services Quick Reference  Fall Risk    The above risks/problems have been discussed with the patient.  Pertinent information has been shared with the patient in the After Visit Summary.  Follow up plans and orders are seen below in the Assessment/Plan Section.    Diagnoses and all orders for this visit:    1. Medicare annual wellness visit, subsequent (Primary)  Comments:  discussed all reocmmednations- will do cologuard, mammogram, PNA 23  Orders:  -     Lipid Panel  -     TSH    2. Well woman exam without gynecological exam  Comments:  routine today  Orders:  -     Cancel: IGP,Aptima HPV,Age Gdln; Future  -     IGP,Aptima HPV,Age Gdln; Future    3. Vitamin D deficiency disease  -     Vitamin D 25 Hydroxy    4. Type 2 diabetes mellitus without complication, without long-term current use of insulin (CMS/[facility])  Comments:  foot care and eye care very important  Orders:  -     MicroAlbumin, Urine, Random - Urine, Clean Catch    5. Screening for colon cancer    Other orders  -      albuterol sulfate HFA (ProAir HFA) 108 (90 Base) MCG/ACT inhaler; Inhale 2 puffs Every 4 (Four) Hours As Needed for Wheezing.  Dispense: 1 inhaler; Refill: 5  -     Pneumococcal Polysaccharide Vaccine 23-Valent Greater Than or Equal To 3yo Subcutaneous / IM      Follow Up:  Return in about 6 months (around 2/7/2021), or if symptoms worsen or fail to improve, for Recheck.     An After Visit Summary and PPPS were given to the patient.

## 2020-08-11 LAB
AGE GDLN ACOG TESTING: NORMAL
CHROM ANALY OVERALL INTERP-IMP: NORMAL
CONV .: NORMAL
CONV PERFORMED BY:: NORMAL
DX ICD CODE: NORMAL
HIV 1 & 2 AB SER-IMP: NORMAL
HPV I/H RISK 4 DNA CVX QL PROBE+SIG AMP: NEGATIVE
REF LAB TEST METHOD: NORMAL
STAT OF ADQ CVX/VAG CYTO-IMP: NORMAL

## 2020-08-12 ENCOUNTER — TELEPHONE (OUTPATIENT)
Dept: FAMILY MEDICINE CLINIC | Facility: CLINIC | Age: 50
End: 2020-08-12

## 2020-08-12 RX ORDER — FLUCONAZOLE 200 MG/1
200 TABLET ORAL DAILY
Qty: 2 TABLET | Refills: 0 | Status: SHIPPED | OUTPATIENT
Start: 2020-08-12 | End: 2020-09-03

## 2020-08-12 NOTE — TELEPHONE ENCOUNTER
Patient called in to request a medication be called in to the pharmacy.    Best Callback: [phone]    Medicine: Diflucan    Pharmacy: [id number] [address] - [phone]  - [phone]   [phone]    Patient Notes: Patient states medication was previously discussed.

## 2020-08-24 ENCOUNTER — TELEMEDICINE (OUTPATIENT)
Dept: FAMILY MEDICINE CLINIC | Facility: CLINIC | Age: 50
End: 2020-08-24

## 2020-08-24 DIAGNOSIS — F31.32 BIPOLAR 1 DISORDER, DEPRESSED, MODERATE (HCC): Primary | ICD-10-CM

## 2020-08-24 DIAGNOSIS — G47.33 OBSTRUCTIVE SLEEP APNEA SYNDROME: ICD-10-CM

## 2020-08-24 PROCEDURE — 99213 OFFICE O/P EST LOW 20 MIN: CPT | Performed by: INTERNAL MEDICINE

## 2020-08-24 RX ORDER — BUPROPION HYDROCHLORIDE 150 MG/1
150 TABLET ORAL DAILY
Qty: 30 TABLET | Refills: 3 | Status: SHIPPED | OUTPATIENT
Start: 2020-08-24 | End: 2020-09-24 | Stop reason: ALTCHOICE

## 2020-08-24 RX ORDER — ALPRAZOLAM 0.5 MG/1
0.5 TABLET ORAL 2 TIMES DAILY PRN
Qty: 45 TABLET | Refills: 0 | Status: SHIPPED | OUTPATIENT
Start: 2020-08-24 | End: 2020-09-03

## 2020-08-24 NOTE — PROGRESS NOTES
"Rooming Tab(CC,VS,Pt Hx,Fall Screen)  Chief Complaint   Patient presents with   • Anxiety   • Depression       Subjective   Pt presents for a telemedicine visit-   Started new job recently- and works till 1am- and having hard time driving home- feels very tired and afraid to drive. She is getting overwhelmed at work and getting depressed. Called her psychiatrist and was told to \"figure it out\"      I have reviewed and updated her medications, medical history and problem list during today's office visit.     Patient Care Team:  Marsha Lopez MD as PCP - General (Internal Medicine)    Problem List Tab  Medications Tab  Synopsis Tab  Chart Review Tab  Care Everywhere Tab  Immunizations Tab  Patient History Tab    Social History     Tobacco Use   • Smoking status: Never Smoker   • Smokeless tobacco: Never Used   Substance Use Topics   • Alcohol use: No     Frequency: Never       Review of Systems   Constitutional: Negative for fatigue.   Cardiovascular: Negative for chest pain.   Psychiatric/Behavioral: Positive for sleep disturbance, depressed mood and stress. The patient is nervous/anxious.        Objective     Rooming Tab(CC,VS,Pt Hx,Fall Screen)  There were no vitals taken for this visit.    There is no height or weight on file to calculate BMI.    Physical Exam   Constitutional: She is oriented to person, place, and time. She appears well-developed and well-nourished. No distress.   Pulmonary/Chest: No respiratory distress.   Neurological: She is alert and oriented to person, place, and time.   Psychiatric:   Appears overwhelmed and stressed, no /SI        Statin Choice Calculator  Data Reviewed:               Lab Results   Component Value Date    BUN 10 07/15/2020    CREATININE 0.95 07/15/2020    EGFRIFNONA 63 07/15/2020     07/15/2020    K 4.1 07/15/2020     07/15/2020    CALCIUM 9.7 07/15/2020    ALBUMIN 4.20 07/15/2020    BILITOT 0.3 07/15/2020    ALKPHOS 135 (H) 07/15/2020    AST 17 " 07/15/2020    ALT 19 07/15/2020    TRIG 187 (H) 08/07/2020    HDL 38 (L) 08/07/2020    VLDL 37.4 08/07/2020     (H) 08/07/2020    LDLHDL 3.44 08/07/2020    MICROALBUR 1.5 08/07/2020    TSH 1.520 08/07/2020    VVGF95LY 29.0 (L) 08/07/2020      Assessment/Plan   Order Review Tab  Health Maintenance Tab  Patient Plan/Order Tab  Diagnoses and all orders for this visit:    1. Bipolar 1 disorder, depressed, moderate (CMS/[facility]) (Primary)  Comments:  continue with lithium, effexor and abilift- will add in wellbutrin- and then few xanax as needed  get back into counseling and get 8 horus of sleep everyday  Orders:  -     ALPRAZolam (Xanax) 0.5 MG tablet; Take 1 tablet by mouth 2 (Two) Times a Day As Needed for Anxiety.  Dispense: 45 tablet; Refill: 0    2. Obstructive sleep apnea syndrome  Comments:  refer for sleep study  Orders:  -     Ambulatory Referral to Sleep Medicine    Other orders  -     buPROPion XL (Wellbutrin XL) 150 MG 24 hr tablet; Take 1 tablet by mouth Daily.  Dispense: 30 tablet; Refill: 3        Wrapup Tab  Return in about 3 months (around 11/24/2020), or if symptoms worsen or fail to improve.       They were informed of the diagnosis and treatment plan and directed to f/u for any further problems or concerns.

## 2020-09-03 ENCOUNTER — OFFICE VISIT (OUTPATIENT)
Dept: FAMILY MEDICINE CLINIC | Facility: CLINIC | Age: 50
End: 2020-09-03

## 2020-09-03 VITALS
HEIGHT: 64 IN | DIASTOLIC BLOOD PRESSURE: 68 MMHG | OXYGEN SATURATION: 98 % | BODY MASS INDEX: 46.61 KG/M2 | TEMPERATURE: 96.9 F | WEIGHT: 273 LBS | SYSTOLIC BLOOD PRESSURE: 142 MMHG | HEART RATE: 98 BPM | RESPIRATION RATE: 16 BRPM

## 2020-09-03 DIAGNOSIS — J01.40 ACUTE NON-RECURRENT PANSINUSITIS: Primary | ICD-10-CM

## 2020-09-03 PROCEDURE — 99213 OFFICE O/P EST LOW 20 MIN: CPT | Performed by: INTERNAL MEDICINE

## 2020-09-03 RX ORDER — AMOXICILLIN AND CLAVULANATE POTASSIUM 875; 125 MG/1; MG/1
1 TABLET, FILM COATED ORAL 2 TIMES DAILY
Qty: 20 TABLET | Refills: 0 | Status: SHIPPED | OUTPATIENT
Start: 2020-09-03 | End: 2020-09-16

## 2020-09-03 NOTE — PROGRESS NOTES
"Rooming Tab(CC,VS,Pt Hx,Fall Screen)  Chief Complaint   Patient presents with   • Sinusitis       Subjective   Pt here for sinus infection- started 1 week ago- thick congestion- PND , no fever, taking tylenol cold/flu and helping minimal.   increased stress and  infidelity and she moved out-   not sleep[ing- run down   got into new MD   I have reviewed and updated her medications, medical history and problem list during today's office visit.     Patient Care Team:  Marsha Lopez MD as PCP - General (Internal Medicine)    Problem List Tab  Medications Tab  Synopsis Tab  Chart Review Tab  Care Everywhere Tab  Immunizations Tab  Patient History Tab    Social History     Tobacco Use   • Smoking status: Never Smoker   • Smokeless tobacco: Never Used   Substance Use Topics   • Alcohol use: No     Frequency: Never       Review of Systems   Constitutional: Negative for fatigue.   HENT: Positive for congestion, facial swelling, sinus pressure and sore throat.    Cardiovascular: Negative for chest pain and leg swelling.   Gastrointestinal: Negative for abdominal pain.     Answers for HPI/ROS submitted by the patient on 9/2/2020   Shortness of breath  What is the primary reason for your visit?: Shortness of Breath  Chronicity: new  Onset: in the past 7 days  Frequency: daily  Progression since onset: unchanged  Episode duration: 2 minutes  coryza: Yes  headaches: Yes  PND: Yes  syncope: No  Aggravating factors: URIs, any activity    Objective     Rooming Tab(CC,VS,Pt Hx,Fall Screen)  /68 (BP Location: Left arm, Patient Position: Sitting, Cuff Size: Large Adult)   Pulse 98   Temp 96.9 °F (36.1 °C) (Temporal)   Resp 16   Ht 162.6 cm (64\")   Wt 124 kg (273 lb)   SpO2 98%   BMI 46.86 kg/m²     Body mass index is 46.86 kg/m².    Physical Exam   Constitutional: She is oriented to person, place, and time.   HENT:   Head: Normocephalic and atraumatic.   PND, turbinates red and swollen   Eyes: Pupils are " equal, round, and reactive to light. EOM are normal.   Left sinus tenderness- turbinate swollen and red   Cardiovascular:   No murmur heard.  Pulmonary/Chest: Effort normal and breath sounds normal. No respiratory distress.   Abdominal: Soft. Bowel sounds are normal. She exhibits no distension.   Musculoskeletal: Normal range of motion.   Neurological: She is alert and oriented to person, place, and time.   Skin: Capillary refill takes less than 2 seconds.   Psychiatric: She has a normal mood and affect.   Nursing note and vitals reviewed.       Statin Choice Calculator  Data Reviewed:               Lab Results   Component Value Date    BUN 10 07/15/2020    CREATININE 0.95 07/15/2020    EGFRIFNONA 63 07/15/2020     07/15/2020    K 4.1 07/15/2020     07/15/2020    CALCIUM 9.7 07/15/2020    ALBUMIN 4.20 07/15/2020    BILITOT 0.3 07/15/2020    ALKPHOS 135 (H) 07/15/2020    AST 17 07/15/2020    ALT 19 07/15/2020    TRIG 187 (H) 08/07/2020    HDL 38 (L) 08/07/2020    VLDL 37.4 08/07/2020     (H) 08/07/2020    LDLHDL 3.44 08/07/2020    MICROALBUR 1.5 08/07/2020    TSH 1.520 08/07/2020    OGUG98WQ 29.0 (L) 08/07/2020      Assessment/Plan   Order Review Tab  Health Maintenance Tab  Patient Plan/Order Tab  Diagnoses and all orders for this visit:    1. Acute non-recurrent pansinusitis (Primary)  Comments:  stay on tylenol cold and sinus-  augmentin and follow up if not improving-    Other orders  -     amoxicillin-clavulanate (Augmentin) 875-125 MG per tablet; Take 1 tablet by mouth 2 (Two) Times a Day.  Dispense: 20 tablet; Refill: 0        Wrapup Tab  Return if symptoms worsen or fail to improve.       They were informed of the diagnosis and treatment plan and directed to f/u for any further problems or concerns.

## 2020-09-04 ENCOUNTER — TELEPHONE (OUTPATIENT)
Dept: FAMILY MEDICINE CLINIC | Facility: CLINIC | Age: 50
End: 2020-09-04

## 2020-09-04 NOTE — TELEPHONE ENCOUNTER
Patient stated she was in to see doctor chasity and a medcation of either pina cilian or amoxacilian was called in and need the medication please call patient to verify at [phone]

## 2020-09-16 ENCOUNTER — OFFICE VISIT (OUTPATIENT)
Dept: FAMILY MEDICINE CLINIC | Facility: CLINIC | Age: 50
End: 2020-09-16

## 2020-09-16 VITALS
SYSTOLIC BLOOD PRESSURE: 128 MMHG | TEMPERATURE: 97.8 F | RESPIRATION RATE: 18 BRPM | HEIGHT: 64 IN | WEIGHT: 276.2 LBS | BODY MASS INDEX: 47.15 KG/M2 | OXYGEN SATURATION: 97 % | HEART RATE: 77 BPM | DIASTOLIC BLOOD PRESSURE: 68 MMHG

## 2020-09-16 DIAGNOSIS — N88.8 NABOTHIAN CYST: Primary | ICD-10-CM

## 2020-09-16 PROCEDURE — 99213 OFFICE O/P EST LOW 20 MIN: CPT | Performed by: INTERNAL MEDICINE

## 2020-09-16 RX ORDER — ESTRADIOL 0.1 MG/G
2 CREAM VAGINAL 2 TIMES WEEKLY
Qty: 42.5 G | Refills: 1 | Status: SHIPPED | OUTPATIENT
Start: 2020-09-17 | End: 2020-10-01

## 2020-09-24 ENCOUNTER — HOSPITAL ENCOUNTER (OUTPATIENT)
Facility: HOSPITAL | Age: 50
Discharge: HOME | End: 2020-09-25
Attending: HOSPITALIST | Admitting: HOSPITALIST

## 2020-09-24 ENCOUNTER — APPOINTMENT (OUTPATIENT)
Dept: GENERAL RADIOLOGY | Facility: HOSPITAL | Age: 50
End: 2020-09-24

## 2020-09-24 DIAGNOSIS — I20.0 UNSTABLE ANGINA PECTORIS (HCC): ICD-10-CM

## 2020-09-24 DIAGNOSIS — R07.9 CHEST PAIN, UNSPECIFIED TYPE: Primary | ICD-10-CM

## 2020-09-24 LAB
ALBUMIN SERPL-MCNC: 4 G/DL (ref 3.5–5.2)
ALBUMIN/GLOB SERPL: 1.2 G/DL
ALP SERPL-CCNC: 165 U/L (ref 39–117)
ALT SERPL W P-5'-P-CCNC: 18 U/L (ref 1–33)
ANION GAP SERPL CALCULATED.3IONS-SCNC: 12 MMOL/L (ref 5–15)
ANION GAP SERPL CALCULATED.3IONS-SCNC: 9 MMOL/L (ref 5–15)
APTT PPP: 25.4 SECONDS (ref 24–31)
AST SERPL-CCNC: 23 U/L (ref 1–32)
BASOPHILS # BLD AUTO: 0 10*3/MM3 (ref 0–0.2)
BASOPHILS # BLD AUTO: 0.1 10*3/MM3 (ref 0–0.2)
BASOPHILS NFR BLD AUTO: 0.5 % (ref 0–1.5)
BASOPHILS NFR BLD AUTO: 1.5 % (ref 0–1.5)
BILIRUB SERPL-MCNC: 0.3 MG/DL (ref 0–1.2)
BUN SERPL-MCNC: 7 MG/DL (ref 6–20)
BUN SERPL-MCNC: 7 MG/DL (ref 6–20)
BUN SERPL-MCNC: ABNORMAL MG/DL
BUN SERPL-MCNC: ABNORMAL MG/DL
BUN/CREAT SERPL: ABNORMAL
BUN/CREAT SERPL: ABNORMAL
CALCIUM SPEC-SCNC: 9.6 MG/DL (ref 8.6–10.5)
CALCIUM SPEC-SCNC: 9.9 MG/DL (ref 8.6–10.5)
CHLORIDE SERPL-SCNC: 101 MMOL/L (ref 98–107)
CHLORIDE SERPL-SCNC: 101 MMOL/L (ref 98–107)
CO2 SERPL-SCNC: 26 MMOL/L (ref 22–29)
CO2 SERPL-SCNC: 28 MMOL/L (ref 22–29)
CREAT SERPL-MCNC: 1.06 MG/DL (ref 0.57–1)
CREAT SERPL-MCNC: 1.07 MG/DL (ref 0.57–1)
DEPRECATED RDW RBC AUTO: 47.7 FL (ref 37–54)
DEPRECATED RDW RBC AUTO: 49.4 FL (ref 37–54)
EOSINOPHIL # BLD AUTO: 0 10*3/MM3 (ref 0–0.4)
EOSINOPHIL # BLD AUTO: 0 10*3/MM3 (ref 0–0.4)
EOSINOPHIL NFR BLD AUTO: 0.1 % (ref 0.3–6.2)
EOSINOPHIL NFR BLD AUTO: 0.2 % (ref 0.3–6.2)
ERYTHROCYTE [DISTWIDTH] IN BLOOD BY AUTOMATED COUNT: 16 % (ref 12.3–15.4)
ERYTHROCYTE [DISTWIDTH] IN BLOOD BY AUTOMATED COUNT: 16.2 % (ref 12.3–15.4)
GFR SERPL CREATININE-BSD FRML MDRD: 55 ML/MIN/1.73
GFR SERPL CREATININE-BSD FRML MDRD: 55 ML/MIN/1.73
GLOBULIN UR ELPH-MCNC: 3.3 GM/DL
GLUCOSE BLDC GLUCOMTR-MCNC: 139 MG/DL (ref 70–105)
GLUCOSE BLDC GLUCOMTR-MCNC: 86 MG/DL (ref 70–105)
GLUCOSE SERPL-MCNC: 141 MG/DL (ref 65–99)
GLUCOSE SERPL-MCNC: 141 MG/DL (ref 65–99)
HBA1C MFR BLD: 5.8 % (ref 3.5–5.6)
HCT VFR BLD AUTO: 35.9 % (ref 34–46.6)
HCT VFR BLD AUTO: 38.9 % (ref 34–46.6)
HGB BLD-MCNC: 11.6 G/DL (ref 12–15.9)
HGB BLD-MCNC: 12.5 G/DL (ref 12–15.9)
HOLD SPECIMEN: NORMAL
INR PPP: 1 (ref 0.93–1.1)
LIPASE SERPL-CCNC: 31 U/L (ref 13–60)
LYMPHOCYTES # BLD AUTO: 1.9 10*3/MM3 (ref 0.7–3.1)
LYMPHOCYTES # BLD AUTO: 2.5 10*3/MM3 (ref 0.7–3.1)
LYMPHOCYTES NFR BLD AUTO: 19.7 % (ref 19.6–45.3)
LYMPHOCYTES NFR BLD AUTO: 28 % (ref 19.6–45.3)
MCH RBC QN AUTO: 27.5 PG (ref 26.6–33)
MCH RBC QN AUTO: 27.5 PG (ref 26.6–33)
MCHC RBC AUTO-ENTMCNC: 32.2 G/DL (ref 31.5–35.7)
MCHC RBC AUTO-ENTMCNC: 32.3 G/DL (ref 31.5–35.7)
MCV RBC AUTO: 85.1 FL (ref 79–97)
MCV RBC AUTO: 85.6 FL (ref 79–97)
MONOCYTES # BLD AUTO: 0.5 10*3/MM3 (ref 0.1–0.9)
MONOCYTES # BLD AUTO: 0.6 10*3/MM3 (ref 0.1–0.9)
MONOCYTES NFR BLD AUTO: 5 % (ref 5–12)
MONOCYTES NFR BLD AUTO: 6.8 % (ref 5–12)
NEUTROPHILS NFR BLD AUTO: 5.7 10*3/MM3 (ref 1.7–7)
NEUTROPHILS NFR BLD AUTO: 64.5 % (ref 42.7–76)
NEUTROPHILS NFR BLD AUTO: 7.1 10*3/MM3 (ref 1.7–7)
NEUTROPHILS NFR BLD AUTO: 73.7 % (ref 42.7–76)
NRBC BLD AUTO-RTO: 0 /100 WBC (ref 0–0.2)
NRBC BLD AUTO-RTO: 0 /100 WBC (ref 0–0.2)
PLATELET # BLD AUTO: 268 10*3/MM3 (ref 140–450)
PLATELET # BLD AUTO: 304 10*3/MM3 (ref 140–450)
PMV BLD AUTO: 7.6 FL (ref 6–12)
PMV BLD AUTO: 7.7 FL (ref 6–12)
POTASSIUM SERPL-SCNC: 3.8 MMOL/L (ref 3.5–5.2)
POTASSIUM SERPL-SCNC: 3.9 MMOL/L (ref 3.5–5.2)
PROT SERPL-MCNC: 7.3 G/DL (ref 6–8.5)
PROTHROMBIN TIME: 10.8 SECONDS (ref 9.6–11.7)
RBC # BLD AUTO: 4.22 10*6/MM3 (ref 3.77–5.28)
RBC # BLD AUTO: 4.55 10*6/MM3 (ref 3.77–5.28)
SODIUM SERPL-SCNC: 138 MMOL/L (ref 136–145)
SODIUM SERPL-SCNC: 139 MMOL/L (ref 136–145)
TROPONIN T SERPL-MCNC: <0.01 NG/ML (ref 0–0.03)
WBC # BLD AUTO: 8.9 10*3/MM3 (ref 3.4–10.8)
WBC # BLD AUTO: 9.7 10*3/MM3 (ref 3.4–10.8)
WHOLE BLOOD HOLD SPECIMEN: NORMAL
WHOLE BLOOD HOLD SPECIMEN: NORMAL

## 2020-09-24 PROCEDURE — 99152 MOD SED SAME PHYS/QHP 5/>YRS: CPT | Performed by: INTERNAL MEDICINE

## 2020-09-24 PROCEDURE — G0378 HOSPITAL OBSERVATION PER HR: HCPCS

## 2020-09-24 PROCEDURE — A9270 NON-COVERED ITEM OR SERVICE: HCPCS | Performed by: INTERNAL MEDICINE

## 2020-09-24 PROCEDURE — 93005 ELECTROCARDIOGRAM TRACING: CPT | Performed by: HOSPITALIST

## 2020-09-24 PROCEDURE — C1894 INTRO/SHEATH, NON-LASER: HCPCS | Performed by: INTERNAL MEDICINE

## 2020-09-24 PROCEDURE — 84484 ASSAY OF TROPONIN QUANT: CPT | Performed by: NURSE PRACTITIONER

## 2020-09-24 PROCEDURE — 84484 ASSAY OF TROPONIN QUANT: CPT | Performed by: HOSPITALIST

## 2020-09-24 PROCEDURE — 99153 MOD SED SAME PHYS/QHP EA: CPT | Performed by: INTERNAL MEDICINE

## 2020-09-24 PROCEDURE — 99283 EMERGENCY DEPT VISIT LOW MDM: CPT

## 2020-09-24 PROCEDURE — 85025 COMPLETE CBC W/AUTO DIFF WBC: CPT | Performed by: NURSE PRACTITIONER

## 2020-09-24 PROCEDURE — 99219 PR INITIAL OBSERVATION CARE/DAY 50 MINUTES: CPT | Performed by: HOSPITALIST

## 2020-09-24 PROCEDURE — 85610 PROTHROMBIN TIME: CPT | Performed by: NURSE PRACTITIONER

## 2020-09-24 PROCEDURE — 0 IOPAMIDOL PER 1 ML: Performed by: INTERNAL MEDICINE

## 2020-09-24 PROCEDURE — 93005 ELECTROCARDIOGRAM TRACING: CPT

## 2020-09-24 PROCEDURE — 84520 ASSAY OF UREA NITROGEN: CPT | Performed by: HOSPITALIST

## 2020-09-24 PROCEDURE — 80053 COMPREHEN METABOLIC PANEL: CPT | Performed by: NURSE PRACTITIONER

## 2020-09-24 PROCEDURE — 82962 GLUCOSE BLOOD TEST: CPT

## 2020-09-24 PROCEDURE — 71045 X-RAY EXAM CHEST 1 VIEW: CPT

## 2020-09-24 PROCEDURE — 63710000001 LITHIUM 300 MG TABLET CONTROLLED-RELEASE: Performed by: INTERNAL MEDICINE

## 2020-09-24 PROCEDURE — 85730 THROMBOPLASTIN TIME PARTIAL: CPT | Performed by: NURSE PRACTITIONER

## 2020-09-24 PROCEDURE — 25010000002 FENTANYL CITRATE (PF) 100 MCG/2ML SOLUTION: Performed by: INTERNAL MEDICINE

## 2020-09-24 PROCEDURE — 93458 L HRT ARTERY/VENTRICLE ANGIO: CPT | Performed by: INTERNAL MEDICINE

## 2020-09-24 PROCEDURE — 63710000001 VENLAFAXINE XR 75 MG CAPSULE SUSTAINED-RELEASE 24 HR: Performed by: INTERNAL MEDICINE

## 2020-09-24 PROCEDURE — C1769 GUIDE WIRE: HCPCS | Performed by: INTERNAL MEDICINE

## 2020-09-24 PROCEDURE — 25010000002 MIDAZOLAM PER 1 MG: Performed by: INTERNAL MEDICINE

## 2020-09-24 PROCEDURE — 83036 HEMOGLOBIN GLYCOSYLATED A1C: CPT | Performed by: HOSPITALIST

## 2020-09-24 PROCEDURE — 85025 COMPLETE CBC W/AUTO DIFF WBC: CPT | Performed by: HOSPITALIST

## 2020-09-24 PROCEDURE — 83690 ASSAY OF LIPASE: CPT | Performed by: NURSE PRACTITIONER

## 2020-09-24 RX ORDER — FENTANYL CITRATE 50 UG/ML
INJECTION, SOLUTION INTRAMUSCULAR; INTRAVENOUS AS NEEDED
Status: DISCONTINUED | OUTPATIENT
Start: 2020-09-24 | End: 2020-09-24 | Stop reason: HOSPADM

## 2020-09-24 RX ORDER — SODIUM CHLORIDE 9 MG/ML
3 INJECTION, SOLUTION INTRAVENOUS CONTINUOUS
Status: DISPENSED | OUTPATIENT
Start: 2020-09-24 | End: 2020-09-25

## 2020-09-24 RX ORDER — QUETIAPINE 150 MG/1
150 TABLET, FILM COATED, EXTENDED RELEASE ORAL NIGHTLY
COMMUNITY
End: 2020-09-24 | Stop reason: ALTCHOICE

## 2020-09-24 RX ORDER — HEPARIN SODIUM 5000 [USP'U]/ML
5000 INJECTION, SOLUTION INTRAVENOUS; SUBCUTANEOUS EVERY 8 HOURS SCHEDULED
Status: DISCONTINUED | OUTPATIENT
Start: 2020-09-24 | End: 2020-09-25 | Stop reason: HOSPADM

## 2020-09-24 RX ORDER — SODIUM CHLORIDE 9 MG/ML
100 INJECTION, SOLUTION INTRAVENOUS CONTINUOUS
Status: DISCONTINUED | OUTPATIENT
Start: 2020-09-24 | End: 2020-09-25 | Stop reason: HOSPADM

## 2020-09-24 RX ORDER — ALBUTEROL SULFATE 2.5 MG/3ML
2.5 SOLUTION RESPIRATORY (INHALATION) EVERY 4 HOURS PRN
Status: DISCONTINUED | OUTPATIENT
Start: 2020-09-24 | End: 2020-09-25 | Stop reason: HOSPADM

## 2020-09-24 RX ORDER — CALCIUM CARBONATE 200(500)MG
1 TABLET,CHEWABLE ORAL 2 TIMES DAILY PRN
Status: DISCONTINUED | OUTPATIENT
Start: 2020-09-24 | End: 2020-09-25 | Stop reason: HOSPADM

## 2020-09-24 RX ORDER — SODIUM CHLORIDE 0.9 % (FLUSH) 0.9 %
10 SYRINGE (ML) INJECTION AS NEEDED
Status: DISCONTINUED | OUTPATIENT
Start: 2020-09-24 | End: 2020-09-25 | Stop reason: HOSPADM

## 2020-09-24 RX ORDER — PANTOPRAZOLE SODIUM 40 MG/1
40 TABLET, DELAYED RELEASE ORAL
Status: DISCONTINUED | OUTPATIENT
Start: 2020-09-25 | End: 2020-09-25 | Stop reason: HOSPADM

## 2020-09-24 RX ORDER — POTASSIUM CHLORIDE 7.45 MG/ML
10 INJECTION INTRAVENOUS
Status: DISCONTINUED | OUTPATIENT
Start: 2020-09-24 | End: 2020-09-25 | Stop reason: HOSPADM

## 2020-09-24 RX ORDER — SODIUM CHLORIDE 0.9 % (FLUSH) 0.9 %
10 SYRINGE (ML) INJECTION AS NEEDED
Status: DISCONTINUED | OUTPATIENT
Start: 2020-09-24 | End: 2020-09-24

## 2020-09-24 RX ORDER — NICOTINE POLACRILEX 4 MG
15 LOZENGE BUCCAL
Status: DISCONTINUED | OUTPATIENT
Start: 2020-09-24 | End: 2020-09-25 | Stop reason: HOSPADM

## 2020-09-24 RX ORDER — LITHIUM CARBONATE 300 MG/1
900 TABLET, FILM COATED, EXTENDED RELEASE ORAL NIGHTLY
Status: DISCONTINUED | OUTPATIENT
Start: 2020-09-24 | End: 2020-09-25 | Stop reason: HOSPADM

## 2020-09-24 RX ORDER — NITROGLYCERIN 0.4 MG/1
0.4 TABLET SUBLINGUAL
Status: DISCONTINUED | OUTPATIENT
Start: 2020-09-24 | End: 2020-09-25 | Stop reason: HOSPADM

## 2020-09-24 RX ORDER — MIDAZOLAM HYDROCHLORIDE 1 MG/ML
INJECTION INTRAMUSCULAR; INTRAVENOUS AS NEEDED
Status: DISCONTINUED | OUTPATIENT
Start: 2020-09-24 | End: 2020-09-24 | Stop reason: HOSPADM

## 2020-09-24 RX ORDER — DOCUSATE SODIUM 100 MG/1
100 CAPSULE, LIQUID FILLED ORAL 2 TIMES DAILY PRN
Status: DISCONTINUED | OUTPATIENT
Start: 2020-09-24 | End: 2020-09-25 | Stop reason: HOSPADM

## 2020-09-24 RX ORDER — SODIUM CHLORIDE 0.9 % (FLUSH) 0.9 %
10 SYRINGE (ML) INJECTION EVERY 12 HOURS SCHEDULED
Status: DISCONTINUED | OUTPATIENT
Start: 2020-09-24 | End: 2020-09-25 | Stop reason: HOSPADM

## 2020-09-24 RX ORDER — VENLAFAXINE HYDROCHLORIDE 75 MG/1
150 CAPSULE, EXTENDED RELEASE ORAL 2 TIMES DAILY
Status: DISCONTINUED | OUTPATIENT
Start: 2020-09-24 | End: 2020-09-25 | Stop reason: HOSPADM

## 2020-09-24 RX ORDER — MELATONIN
1000 DAILY
COMMUNITY
End: 2021-01-20

## 2020-09-24 RX ORDER — ASPIRIN 325 MG
325 TABLET, DELAYED RELEASE (ENTERIC COATED) ORAL DAILY
Status: DISCONTINUED | OUTPATIENT
Start: 2020-09-24 | End: 2020-09-25 | Stop reason: HOSPADM

## 2020-09-24 RX ORDER — MAGNESIUM SULFATE HEPTAHYDRATE 40 MG/ML
2 INJECTION, SOLUTION INTRAVENOUS AS NEEDED
Status: DISCONTINUED | OUTPATIENT
Start: 2020-09-24 | End: 2020-09-25 | Stop reason: HOSPADM

## 2020-09-24 RX ORDER — DEXTROSE MONOHYDRATE 25 G/50ML
25 INJECTION, SOLUTION INTRAVENOUS
Status: DISCONTINUED | OUTPATIENT
Start: 2020-09-24 | End: 2020-09-25 | Stop reason: HOSPADM

## 2020-09-24 RX ORDER — ACETAMINOPHEN 325 MG/1
650 TABLET ORAL EVERY 4 HOURS PRN
Status: DISCONTINUED | OUTPATIENT
Start: 2020-09-24 | End: 2020-09-25 | Stop reason: HOSPADM

## 2020-09-24 RX ORDER — POTASSIUM CHLORIDE 1.5 G/1.77G
40 POWDER, FOR SOLUTION ORAL AS NEEDED
Status: DISCONTINUED | OUTPATIENT
Start: 2020-09-24 | End: 2020-09-25 | Stop reason: HOSPADM

## 2020-09-24 RX ORDER — ATENOLOL 25 MG/1
25 TABLET ORAL
Status: DISCONTINUED | OUTPATIENT
Start: 2020-09-24 | End: 2020-09-24

## 2020-09-24 RX ORDER — ONDANSETRON 4 MG/1
4 TABLET, FILM COATED ORAL EVERY 6 HOURS PRN
Status: DISCONTINUED | OUTPATIENT
Start: 2020-09-24 | End: 2020-09-25 | Stop reason: HOSPADM

## 2020-09-24 RX ORDER — MAGNESIUM SULFATE HEPTAHYDRATE 40 MG/ML
4 INJECTION, SOLUTION INTRAVENOUS AS NEEDED
Status: DISCONTINUED | OUTPATIENT
Start: 2020-09-24 | End: 2020-09-25 | Stop reason: HOSPADM

## 2020-09-24 RX ORDER — MORPHINE SULFATE 4 MG/ML
2 INJECTION, SOLUTION INTRAMUSCULAR; INTRAVENOUS EVERY 4 HOURS PRN
Status: DISCONTINUED | OUTPATIENT
Start: 2020-09-24 | End: 2020-09-25 | Stop reason: HOSPADM

## 2020-09-24 RX ORDER — ASPIRIN 325 MG
325 TABLET ORAL ONCE
Status: COMPLETED | OUTPATIENT
Start: 2020-09-24 | End: 2020-09-24

## 2020-09-24 RX ORDER — SODIUM CHLORIDE 0.9 % (FLUSH) 0.9 %
3 SYRINGE (ML) INJECTION EVERY 12 HOURS SCHEDULED
Status: DISCONTINUED | OUTPATIENT
Start: 2020-09-24 | End: 2020-09-24

## 2020-09-24 RX ORDER — LIDOCAINE HYDROCHLORIDE 20 MG/ML
INJECTION, SOLUTION INFILTRATION; PERINEURAL AS NEEDED
Status: DISCONTINUED | OUTPATIENT
Start: 2020-09-24 | End: 2020-09-24 | Stop reason: HOSPADM

## 2020-09-24 RX ORDER — SUCRALFATE 1 G/1
1 TABLET ORAL
Status: DISCONTINUED | OUTPATIENT
Start: 2020-09-24 | End: 2020-09-25 | Stop reason: HOSPADM

## 2020-09-24 RX ORDER — HYOSCYAMINE SULFATE EXTENDED-RELEASE 0.38 MG/1
375 TABLET ORAL EVERY 12 HOURS PRN
Status: DISCONTINUED | OUTPATIENT
Start: 2020-09-24 | End: 2020-09-25 | Stop reason: HOSPADM

## 2020-09-24 RX ORDER — ONDANSETRON 2 MG/ML
4 INJECTION INTRAMUSCULAR; INTRAVENOUS EVERY 6 HOURS PRN
Status: DISCONTINUED | OUTPATIENT
Start: 2020-09-24 | End: 2020-09-25 | Stop reason: HOSPADM

## 2020-09-24 RX ORDER — ARIPIPRAZOLE 10 MG/1
30 TABLET ORAL DAILY
Status: DISCONTINUED | OUTPATIENT
Start: 2020-09-24 | End: 2020-09-25 | Stop reason: HOSPADM

## 2020-09-24 RX ORDER — POTASSIUM CHLORIDE 20 MEQ/1
40 TABLET, EXTENDED RELEASE ORAL AS NEEDED
Status: DISCONTINUED | OUTPATIENT
Start: 2020-09-24 | End: 2020-09-25 | Stop reason: HOSPADM

## 2020-09-24 RX ORDER — ALPRAZOLAM 0.25 MG/1
0.25 TABLET ORAL 2 TIMES DAILY PRN
COMMUNITY
End: 2020-09-24

## 2020-09-24 RX ADMIN — LITHIUM CARBONATE 900 MG: 300 TABLET, EXTENDED RELEASE ORAL at 22:14

## 2020-09-24 RX ADMIN — SODIUM CHLORIDE 100 ML/HR: 900 INJECTION, SOLUTION INTRAVENOUS at 16:01

## 2020-09-24 RX ADMIN — Medication 10 ML: at 16:04

## 2020-09-24 RX ADMIN — VENLAFAXINE HYDROCHLORIDE 150 MG: 75 CAPSULE, EXTENDED RELEASE ORAL at 22:14

## 2020-09-24 RX ADMIN — ACETAMINOPHEN 650 MG: 325 TABLET, FILM COATED ORAL at 16:08

## 2020-09-24 RX ADMIN — ASPIRIN 325 MG ORAL TABLET 325 MG: 325 PILL ORAL at 09:37

## 2020-09-25 ENCOUNTER — APPOINTMENT (OUTPATIENT)
Dept: RESPIRATORY THERAPY | Facility: HOSPITAL | Age: 50
End: 2020-09-25

## 2020-09-25 ENCOUNTER — TELEPHONE (OUTPATIENT)
Dept: FAMILY MEDICINE CLINIC | Facility: CLINIC | Age: 50
End: 2020-09-25

## 2020-09-25 ENCOUNTER — APPOINTMENT (OUTPATIENT)
Dept: CARDIOLOGY | Facility: HOSPITAL | Age: 50
End: 2020-09-25

## 2020-09-25 ENCOUNTER — READMISSION MANAGEMENT (OUTPATIENT)
Dept: CALL CENTER | Facility: HOSPITAL | Age: 50
End: 2020-09-25

## 2020-09-25 VITALS
HEART RATE: 60 BPM | TEMPERATURE: 98.1 F | BODY MASS INDEX: 45.48 KG/M2 | WEIGHT: 273 LBS | HEIGHT: 65 IN | OXYGEN SATURATION: 97 % | DIASTOLIC BLOOD PRESSURE: 69 MMHG | RESPIRATION RATE: 17 BRPM | SYSTOLIC BLOOD PRESSURE: 116 MMHG

## 2020-09-25 LAB
ANION GAP SERPL CALCULATED.3IONS-SCNC: 6 MMOL/L (ref 5–15)
BASOPHILS # BLD AUTO: 0 10*3/MM3 (ref 0–0.2)
BASOPHILS NFR BLD AUTO: 0.3 % (ref 0–1.5)
BUN SERPL-MCNC: 9 MG/DL (ref 6–20)
BUN SERPL-MCNC: ABNORMAL MG/DL
BUN/CREAT SERPL: ABNORMAL
CALCIUM SPEC-SCNC: 8.8 MG/DL (ref 8.6–10.5)
CHLORIDE SERPL-SCNC: 106 MMOL/L (ref 98–107)
CHOLEST SERPL-MCNC: 181 MG/DL (ref 0–200)
CO2 SERPL-SCNC: 26 MMOL/L (ref 22–29)
CREAT SERPL-MCNC: 0.9 MG/DL (ref 0.57–1)
DEPRECATED RDW RBC AUTO: 49.9 FL (ref 37–54)
EOSINOPHIL # BLD AUTO: 0 10*3/MM3 (ref 0–0.4)
EOSINOPHIL NFR BLD AUTO: 0.2 % (ref 0.3–6.2)
ERYTHROCYTE [DISTWIDTH] IN BLOOD BY AUTOMATED COUNT: 16.4 % (ref 12.3–15.4)
GFR SERPL CREATININE-BSD FRML MDRD: 67 ML/MIN/1.73
GLUCOSE BLDC GLUCOMTR-MCNC: 101 MG/DL (ref 70–105)
GLUCOSE SERPL-MCNC: 103 MG/DL (ref 65–99)
HCT VFR BLD AUTO: 34.7 % (ref 34–46.6)
HDLC SERPL-MCNC: 38 MG/DL (ref 40–60)
HGB BLD-MCNC: 11.1 G/DL (ref 12–15.9)
LDLC SERPL CALC-MCNC: 121 MG/DL (ref 0–100)
LDLC/HDLC SERPL: 3.18 {RATIO}
LYMPHOCYTES # BLD AUTO: 1.7 10*3/MM3 (ref 0.7–3.1)
LYMPHOCYTES NFR BLD AUTO: 32 % (ref 19.6–45.3)
MCH RBC QN AUTO: 27.2 PG (ref 26.6–33)
MCHC RBC AUTO-ENTMCNC: 31.8 G/DL (ref 31.5–35.7)
MCV RBC AUTO: 85.5 FL (ref 79–97)
MONOCYTES # BLD AUTO: 0.4 10*3/MM3 (ref 0.1–0.9)
MONOCYTES NFR BLD AUTO: 7.4 % (ref 5–12)
NEUTROPHILS NFR BLD AUTO: 3.2 10*3/MM3 (ref 1.7–7)
NEUTROPHILS NFR BLD AUTO: 60.1 % (ref 42.7–76)
NRBC BLD AUTO-RTO: 0.1 /100 WBC (ref 0–0.2)
PLATELET # BLD AUTO: 216 10*3/MM3 (ref 140–450)
PMV BLD AUTO: 7.5 FL (ref 6–12)
POTASSIUM SERPL-SCNC: 4 MMOL/L (ref 3.5–5.2)
RBC # BLD AUTO: 4.06 10*6/MM3 (ref 3.77–5.28)
SODIUM SERPL-SCNC: 138 MMOL/L (ref 136–145)
TRIGL SERPL-MCNC: 111 MG/DL (ref 0–150)
TROPONIN T SERPL-MCNC: <0.01 NG/ML (ref 0–0.03)
TROPONIN T SERPL-MCNC: <0.01 NG/ML (ref 0–0.03)
VLDLC SERPL-MCNC: 22.2 MG/DL
WBC # BLD AUTO: 5.2 10*3/MM3 (ref 3.4–10.8)

## 2020-09-25 PROCEDURE — 93270 REMOTE 30 DAY ECG REV/REPORT: CPT

## 2020-09-25 PROCEDURE — A9270 NON-COVERED ITEM OR SERVICE: HCPCS | Performed by: INTERNAL MEDICINE

## 2020-09-25 PROCEDURE — 84484 ASSAY OF TROPONIN QUANT: CPT | Performed by: NURSE PRACTITIONER

## 2020-09-25 PROCEDURE — G0378 HOSPITAL OBSERVATION PER HR: HCPCS

## 2020-09-25 PROCEDURE — 25010000002 SULFUR HEXAFLUORIDE MICROSPH 60.7-25 MG RECONSTITUTED SUSPENSION: Performed by: HOSPITALIST

## 2020-09-25 PROCEDURE — 63710000001 ARIPIPRAZOLE 10 MG TABLET: Performed by: INTERNAL MEDICINE

## 2020-09-25 PROCEDURE — 85025 COMPLETE CBC W/AUTO DIFF WBC: CPT | Performed by: INTERNAL MEDICINE

## 2020-09-25 PROCEDURE — 63710000001 PANTOPRAZOLE 40 MG TABLET DELAYED-RELEASE: Performed by: INTERNAL MEDICINE

## 2020-09-25 PROCEDURE — 80048 BASIC METABOLIC PNL TOTAL CA: CPT | Performed by: INTERNAL MEDICINE

## 2020-09-25 PROCEDURE — 63710000001 VENLAFAXINE XR 75 MG CAPSULE SUSTAINED-RELEASE 24 HR: Performed by: INTERNAL MEDICINE

## 2020-09-25 PROCEDURE — 80061 LIPID PANEL: CPT | Performed by: INTERNAL MEDICINE

## 2020-09-25 PROCEDURE — 93306 TTE W/DOPPLER COMPLETE: CPT

## 2020-09-25 PROCEDURE — 99217 PR OBSERVATION CARE DISCHARGE MANAGEMENT: CPT | Performed by: HOSPITALIST

## 2020-09-25 PROCEDURE — 93306 TTE W/DOPPLER COMPLETE: CPT | Performed by: INTERNAL MEDICINE

## 2020-09-25 PROCEDURE — 82962 GLUCOSE BLOOD TEST: CPT

## 2020-09-25 PROCEDURE — 63710000001 ASPIRIN EC 325 MG TABLET DELAYED-RELEASE: Performed by: INTERNAL MEDICINE

## 2020-09-25 PROCEDURE — 63710000001 ACETAMINOPHEN 325 MG TABLET: Performed by: INTERNAL MEDICINE

## 2020-09-25 PROCEDURE — 63710000001 SUCRALFATE 1 G TABLET: Performed by: INTERNAL MEDICINE

## 2020-09-25 RX ORDER — ATORVASTATIN CALCIUM 20 MG/1
20 TABLET, FILM COATED ORAL DAILY
Qty: 30 TABLET | Refills: 0 | Status: SHIPPED | OUTPATIENT
Start: 2020-09-25 | End: 2020-10-28 | Stop reason: SDUPTHER

## 2020-09-25 RX ORDER — ASPIRIN 81 MG/1
81 TABLET ORAL DAILY
Qty: 30 TABLET | Refills: 0 | Status: SHIPPED | OUTPATIENT
Start: 2020-09-25 | End: 2020-10-28 | Stop reason: SDUPTHER

## 2020-09-25 RX ADMIN — ARIPIPRAZOLE 30 MG: 10 TABLET ORAL at 00:58

## 2020-09-25 RX ADMIN — ASPIRIN 325 MG: 325 TABLET, COATED ORAL at 08:56

## 2020-09-25 RX ADMIN — ACETAMINOPHEN 650 MG: 325 TABLET, FILM COATED ORAL at 04:58

## 2020-09-25 RX ADMIN — SUCRALFATE 1 G: 1 TABLET ORAL at 08:56

## 2020-09-25 RX ADMIN — Medication 10 ML: at 08:56

## 2020-09-25 RX ADMIN — PANTOPRAZOLE SODIUM 40 MG: 40 TABLET, DELAYED RELEASE ORAL at 06:35

## 2020-09-25 RX ADMIN — ACETAMINOPHEN 650 MG: 325 TABLET, FILM COATED ORAL at 00:57

## 2020-09-25 RX ADMIN — SULFUR HEXAFLUORIDE 3 ML: KIT at 07:33

## 2020-09-25 RX ADMIN — VENLAFAXINE HYDROCHLORIDE 150 MG: 75 CAPSULE, EXTENDED RELEASE ORAL at 08:55

## 2020-09-25 RX ADMIN — SODIUM CHLORIDE 100 ML/HR: 900 INJECTION, SOLUTION INTRAVENOUS at 01:10

## 2020-09-25 NOTE — TELEPHONE ENCOUNTER
Hub staff attempted to follow warm transfer process and was unsuccessful     Caller: Tessie Conner    Relationship to patient: Self    Best call back number: [phone]     Patient is needing: SCHEDULE HOSPITAL FOLLOW UP APPOINTMENT

## 2020-09-25 NOTE — OUTREACH NOTE
Prep Survey      Responses   [facility] patient discharged from?  [facility]   Is LACE score < 7 ?  Yes   Eligibility  [facility]   Date of Admission  09/24/20   Date of Discharge  09/25/20   Discharge Disposition  Home or Self Care   Discharge diagnosis  Chest pain, s/p [facility]   COVID-19 Test Status  Negative   Does the patient have one of the following disease processes/diagnoses(primary or secondary)?  Other   Does the patient have Home health ordered?  No   Is there a DME ordered?  No   Prep survey completed?  Yes          Marsha Wade RN

## 2020-09-28 ENCOUNTER — TRANSITIONAL CARE MANAGEMENT TELEPHONE ENCOUNTER (OUTPATIENT)
Dept: CALL CENTER | Facility: HOSPITAL | Age: 50
End: 2020-09-28

## 2020-09-28 LAB
BH CV ECHO MEAS - AO MAX PG (FULL): 1.2 MMHG
BH CV ECHO MEAS - AO MAX PG: 4.8 MMHG
BH CV ECHO MEAS - AO MEAN PG (FULL): 0.57 MMHG
BH CV ECHO MEAS - AO MEAN PG: 2.4 MMHG
BH CV ECHO MEAS - AO ROOT AREA (BSA CORRECTED): 1.4
BH CV ECHO MEAS - AO ROOT AREA: 7.4 CM^2
BH CV ECHO MEAS - AO ROOT DIAM: 3.1 CM
BH CV ECHO MEAS - AO V2 MAX: 109.4 CM/SEC
BH CV ECHO MEAS - AO V2 MEAN: 73.6 CM/SEC
BH CV ECHO MEAS - AO V2 VTI: 24.1 CM
BH CV ECHO MEAS - ASC AORTA: 2.8 CM
BH CV ECHO MEAS - AVA(I,A): 3.4 CM^2
BH CV ECHO MEAS - AVA(I,D): 3.4 CM^2
BH CV ECHO MEAS - AVA(V,A): 3.4 CM^2
BH CV ECHO MEAS - AVA(V,D): 3.4 CM^2
BH CV ECHO MEAS - BSA(HAYCOCK): 2.5 M^2
BH CV ECHO MEAS - BSA: 2.3 M^2
BH CV ECHO MEAS - BZI_BMI: 45.4 KILOGRAMS/M^2
BH CV ECHO MEAS - BZI_METRIC_HEIGHT: 165.1 CM
BH CV ECHO MEAS - BZI_METRIC_WEIGHT: 123.8 KG
BH CV ECHO MEAS - CONTRAST EF 4CH: 57 CM2
BH CV ECHO MEAS - EDV(CUBED): 108 ML
BH CV ECHO MEAS - EDV(MOD-SP4): 81.9 ML
BH CV ECHO MEAS - EDV(TEICH): 105.5 ML
BH CV ECHO MEAS - EF(CUBED): 70.8 %
BH CV ECHO MEAS - EF(MOD-SP4): 56.7 %
BH CV ECHO MEAS - EF(TEICH): 62.4 %
BH CV ECHO MEAS - ESV(CUBED): 31.5 ML
BH CV ECHO MEAS - ESV(MOD-SP4): 35.4 ML
BH CV ECHO MEAS - ESV(TEICH): 39.7 ML
BH CV ECHO MEAS - FS: 33.7 %
BH CV ECHO MEAS - IVS/LVPW: 0.96
BH CV ECHO MEAS - IVSD: 1.1 CM
BH CV ECHO MEAS - LA DIMENSION(2D): 3.9 CM
BH CV ECHO MEAS - LV DIASTOLIC VOL/BSA (35-75): 36.3 ML/M^2
BH CV ECHO MEAS - LV MASS(C)D: 206.5 GRAMS
BH CV ECHO MEAS - LV MASS(C)DI: 91.5 GRAMS/M^2
BH CV ECHO MEAS - LV MAX PG: 3.6 MMHG
BH CV ECHO MEAS - LV MEAN PG: 1.9 MMHG
BH CV ECHO MEAS - LV SYSTOLIC VOL/BSA (12-30): 15.7 ML/M^2
BH CV ECHO MEAS - LV V1 MAX: 95.1 CM/SEC
BH CV ECHO MEAS - LV V1 MEAN: 63.4 CM/SEC
BH CV ECHO MEAS - LV V1 VTI: 21.1 CM
BH CV ECHO MEAS - LVIDD: 4.8 CM
BH CV ECHO MEAS - LVIDS: 3.2 CM
BH CV ECHO MEAS - LVOT AREA: 3.9 CM^2
BH CV ECHO MEAS - LVOT DIAM: 2.2 CM
BH CV ECHO MEAS - LVPWD: 1.2 CM
BH CV ECHO MEAS - MV A MAX VEL: 74.6 CM/SEC
BH CV ECHO MEAS - MV DEC SLOPE: 416.5 CM/SEC^2
BH CV ECHO MEAS - MV DEC TIME: 0.19 SEC
BH CV ECHO MEAS - MV E MAX VEL: 77.4 CM/SEC
BH CV ECHO MEAS - MV E/A: 1
BH CV ECHO MEAS - MV MAX PG: 3.5 MMHG
BH CV ECHO MEAS - MV MEAN PG: 1.4 MMHG
BH CV ECHO MEAS - MV V2 MAX: 93.3 CM/SEC
BH CV ECHO MEAS - MV V2 MEAN: 55.9 CM/SEC
BH CV ECHO MEAS - MV V2 VTI: 28.4 CM
BH CV ECHO MEAS - MVA(VTI): 2.9 CM^2
BH CV ECHO MEAS - PA ACC TIME: 0.08 SEC
BH CV ECHO MEAS - PA MAX PG (FULL): 1.1 MMHG
BH CV ECHO MEAS - PA MAX PG: 3.3 MMHG
BH CV ECHO MEAS - PA MEAN PG (FULL): 0.41 MMHG
BH CV ECHO MEAS - PA MEAN PG: 1.4 MMHG
BH CV ECHO MEAS - PA PR(ACCEL): 43.7 MMHG
BH CV ECHO MEAS - PA V2 MAX: 90.4 CM/SEC
BH CV ECHO MEAS - PA V2 MEAN: 53.6 CM/SEC
BH CV ECHO MEAS - PA V2 VTI: 15.6 CM
BH CV ECHO MEAS - PVA(I,A): 7.3 CM^2
BH CV ECHO MEAS - PVA(I,D): 7.3 CM^2
BH CV ECHO MEAS - PVA(V,A): 6.3 CM^2
BH CV ECHO MEAS - PVA(V,D): 6.3 CM^2
BH CV ECHO MEAS - QP/QS: 1.4
BH CV ECHO MEAS - RAP SYSTOLE: 3 MMHG
BH CV ECHO MEAS - RV MAX PG: 2.2 MMHG
BH CV ECHO MEAS - RV MEAN PG: 0.99 MMHG
BH CV ECHO MEAS - RV V1 MAX: 73.8 CM/SEC
BH CV ECHO MEAS - RV V1 MEAN: 44.7 CM/SEC
BH CV ECHO MEAS - RV V1 VTI: 14.6 CM
BH CV ECHO MEAS - RVDD: 2.2 CM
BH CV ECHO MEAS - RVOT AREA: 7.8 CM^2
BH CV ECHO MEAS - RVOT DIAM: 3.1 CM
BH CV ECHO MEAS - RVSP: 12 MMHG
BH CV ECHO MEAS - SI(AO): 79.4 ML/M^2
BH CV ECHO MEAS - SI(CUBED): 33.9 ML/M^2
BH CV ECHO MEAS - SI(LVOT): 36.1 ML/M^2
BH CV ECHO MEAS - SI(MOD-SP4): 20.6 ML/M^2
BH CV ECHO MEAS - SI(TEICH): 29.2 ML/M^2
BH CV ECHO MEAS - SV(AO): 179.3 ML
BH CV ECHO MEAS - SV(CUBED): 76.5 ML
BH CV ECHO MEAS - SV(LVOT): 81.5 ML
BH CV ECHO MEAS - SV(MOD-SP4): 46.5 ML
BH CV ECHO MEAS - SV(RVOT): 113.6 ML
BH CV ECHO MEAS - SV(TEICH): 65.9 ML
BH CV ECHO MEAS - TR MAX VEL: 150 CM/SEC

## 2020-09-28 NOTE — OUTREACH NOTE
Call Center TCM Note      Responses   [facility] patient discharged from?  [facility]   COVID-19 Test Status  Negative   Does the patient have one of the following disease processes/diagnoses(primary or secondary)?  Other   TCM attempt successful?  Yes   Call start time  0941   Call end time  0947   Discharge diagnosis  Chest pain, s/p C   Meds reviewed with patient/caregiver?  Yes   Is the patient having any side effects they believe may be caused by any medication additions or changes?  No   Does the patient have all medications ordered at discharge?  Yes   Is the patient taking all medications as directed (includes completed medication regime)?  Yes   Does the patient have a primary care provider?   Yes   Does the patient have an appointment with their PCP within 7 days of discharge?  Yes   Comments regarding PCP  She has a hospital followup on 10/1/2020 with Dr Lopez.    Has the patient kept scheduled appointments due by today?  N/A   Has home health visited the patient within 72 hours of discharge?  N/A   Has all DME been delivered?  No   Did the patient receive a copy of their discharge instructions?  Yes   Nursing interventions  Reviewed instructions with patient   What is the patient's perception of their health status since discharge?  Improving   Is the patient/caregiver able to teach back signs and symptoms related to disease process for when to call PCP?  Yes   Is the patient/caregiver able to teach back signs and symptoms related to disease process for when to call 911?  Yes   Is the patient/caregiver able to teach back the hierarchy of who to call/visit for symptoms/problems? PCP, Specialist, Home health nurse, Urgent Care, ED, 911  Yes   Additional teach back comments  Patient states she is doing better. Wearing a heart moniter. She denies chest pain.    TCM call completed?  Yes          Radhames Betts RN    9/28/2020, 09:47 EDT

## 2020-10-01 ENCOUNTER — LAB (OUTPATIENT)
Dept: FAMILY MEDICINE CLINIC | Facility: CLINIC | Age: 50
End: 2020-10-01

## 2020-10-01 ENCOUNTER — OFFICE VISIT (OUTPATIENT)
Dept: FAMILY MEDICINE CLINIC | Facility: CLINIC | Age: 50
End: 2020-10-01

## 2020-10-01 VITALS
WEIGHT: 273 LBS | RESPIRATION RATE: 16 BRPM | BODY MASS INDEX: 45.48 KG/M2 | SYSTOLIC BLOOD PRESSURE: 118 MMHG | DIASTOLIC BLOOD PRESSURE: 82 MMHG | HEIGHT: 65 IN | HEART RATE: 85 BPM | OXYGEN SATURATION: 98 % | TEMPERATURE: 97.1 F

## 2020-10-01 DIAGNOSIS — F31.32 BIPOLAR 1 DISORDER, DEPRESSED, MODERATE (HCC): ICD-10-CM

## 2020-10-01 DIAGNOSIS — Z76.89 ENCOUNTER FOR SUPPORT AND COORDINATION OF TRANSITION OF CARE: Primary | ICD-10-CM

## 2020-10-01 DIAGNOSIS — I25.10 CORONARY ARTERY DISEASE DUE TO CALCIFIED CORONARY LESION: ICD-10-CM

## 2020-10-01 DIAGNOSIS — I25.84 CORONARY ARTERY DISEASE DUE TO CALCIFIED CORONARY LESION: ICD-10-CM

## 2020-10-01 LAB
ALBUMIN SERPL-MCNC: 4.5 G/DL (ref 3.5–5.2)
ALBUMIN/GLOB SERPL: 1.4 G/DL
ALP SERPL-CCNC: 132 U/L (ref 39–117)
ALT SERPL W P-5'-P-CCNC: 24 U/L (ref 1–33)
ANION GAP SERPL CALCULATED.3IONS-SCNC: 12.3 MMOL/L (ref 5–15)
AST SERPL-CCNC: 28 U/L (ref 1–32)
BILIRUB SERPL-MCNC: 0.3 MG/DL (ref 0–1.2)
BUN SERPL-MCNC: 12 MG/DL (ref 6–20)
BUN/CREAT SERPL: 10.9 (ref 7–25)
CALCIUM SPEC-SCNC: 10.3 MG/DL (ref 8.6–10.5)
CHLORIDE SERPL-SCNC: 104 MMOL/L (ref 98–107)
CO2 SERPL-SCNC: 26.7 MMOL/L (ref 22–29)
CREAT SERPL-MCNC: 1.1 MG/DL (ref 0.57–1)
GFR SERPL CREATININE-BSD FRML MDRD: 53 ML/MIN/1.73
GLOBULIN UR ELPH-MCNC: 3.2 GM/DL
GLUCOSE SERPL-MCNC: 86 MG/DL (ref 65–99)
POTASSIUM SERPL-SCNC: 4 MMOL/L (ref 3.5–5.2)
PROT SERPL-MCNC: 7.7 G/DL (ref 6–8.5)
SODIUM SERPL-SCNC: 143 MMOL/L (ref 136–145)

## 2020-10-01 PROCEDURE — 80053 COMPREHEN METABOLIC PANEL: CPT | Performed by: INTERNAL MEDICINE

## 2020-10-01 PROCEDURE — 99495 TRANSJ CARE MGMT MOD F2F 14D: CPT | Performed by: INTERNAL MEDICINE

## 2020-10-01 PROCEDURE — 90686 IIV4 VACC NO PRSV 0.5 ML IM: CPT | Performed by: INTERNAL MEDICINE

## 2020-10-01 PROCEDURE — G0008 ADMIN INFLUENZA VIRUS VAC: HCPCS | Performed by: INTERNAL MEDICINE

## 2020-10-01 RX ORDER — LISINOPRIL 2.5 MG/1
2.5 TABLET ORAL DAILY
Qty: 90 TABLET | Refills: 1 | Status: SHIPPED | OUTPATIENT
Start: 2020-10-01 | End: 2020-10-29

## 2020-10-01 NOTE — PROGRESS NOTES
Transitional Care Follow Up Visit  Subjective     Tessie Conner is a 50 y.o. female who presents for a transitional care management visit.    Within 48 business hours after discharge our office contacted her via telephone to coordinate her care and needs.      I reviewed and discussed the details of that call along with the discharge summary, hospital problems, inpatient lab results, inpatient diagnostic studies, and consultation reports with Tessie.     Current outpatient and discharge medications have been reconciled for the patient.  Reviewed by: Marsha Lopez MD      Date of TCM Phone Call 2020   [facility]   Date of Admission 2020   Date of Discharge 2020   Discharge Disposition Home or Self Care     Risk for Readmission (LACE) No data recorded      History of Present Illness   Course During Hospital Stay:  Pt was admitted to  [facility] for chest pain- had cardiac cath- and showed minimal blockage on liptior and ASA and doing better- talked to psych and will decrease seroquel if lipids don't come down   on low fat diet/healthy heart . No sodas- and down 3 lbs.     The following portions of the patient's history were reviewed and updated as appropriate: current medications, past family history, past medical history, past social history, past surgical history and problem list.    Review of Systems   Constitutional: Positive for fatigue. Negative for chills.   HENT: Negative for congestion.    Respiratory: Positive for chest tightness. Negative for apnea, cough and wheezing.    Cardiovascular: Negative for chest pain.   Gastrointestinal: Negative for abdominal distention.   Musculoskeletal: Negative for arthralgias.   Neurological: Negative for seizures.   Psychiatric/Behavioral: Positive for sleep disturbance.       Objective   Physical Exam  Vitals signs and nursing note reviewed.   Constitutional:       Appearance: Normal appearance. She is well-developed. She is obese.    HENT:      Head: Normocephalic and atraumatic.      Right Ear: Tympanic membrane normal.      Left Ear: Tympanic membrane normal.   Eyes:      Pupils: Pupils are equal, round, and reactive to light.   Neck:      Musculoskeletal: Normal range of motion and neck supple.   Cardiovascular:      Rate and Rhythm: Normal rate and regular rhythm.      Heart sounds: No murmur.   Pulmonary:      Effort: Pulmonary effort is normal.      Breath sounds: Normal breath sounds.   Abdominal:      General: Bowel sounds are normal. There is no distension.      Palpations: Abdomen is soft.   Skin:     Capillary Refill: Capillary refill takes less than 2 seconds.   Neurological:      Mental Status: She is alert and oriented to person, place, and time.         Assessment/Plan   Diagnoses and all orders for this visit:    1. Encounter for support and coordination of transition of care (Primary)  Comments:  discussed cardiac results- will treat BP better- check labs    2. Coronary artery disease due to calcified coronary lesion  -     Comprehensive Metabolic Panel    3. Bipolar 1 disorder, depressed, moderate (CMS/HCC)  Comments:  to see counselor soon    Other orders  -     lisinopril (PRINIVIL,ZESTRIL) 2.5 MG tablet; Take 1 tablet by mouth Daily for 180 days.  Dispense: 90 tablet; Refill: 1  -     FluLaval Quad >6 Months ([id number])      They were informed of the diagnosis and treatment plan and directed to f/u for any further problems or concerns.      to see in 1 month for fasting labs anc appt with me

## 2020-10-05 ENCOUNTER — TELEPHONE (OUTPATIENT)
Dept: FAMILY MEDICINE CLINIC | Facility: CLINIC | Age: 50
End: 2020-10-05

## 2020-10-05 NOTE — TELEPHONE ENCOUNTER
Caller: Tessie Conner    Relationship: Self    Best call back number: [phone]     What test was performed: Comprehensive Metabolic Panel [LAB17] (Order [id number])    When was the test performed: 10/01/2020    Additional notes: Patient stated that the labs were released to her via [employer], but she wanted to discuss with someone.

## 2020-10-05 NOTE — TELEPHONE ENCOUNTER
Patient called in and stated she got her test results back and she stated her kidney and liver levels were high and wanted to know what would be happening moving forward .         Please call patient and advise [phone]

## 2020-10-06 NOTE — TELEPHONE ENCOUNTER
Kidney is still in good range- increase water consumption- but not in worrisome level.  Can recheck in 4 weeks

## 2020-10-06 NOTE — TELEPHONE ENCOUNTER
MS. MUHAMMAD CALLED TODAY STATING THAT SHE WAS EXPECTING A CALL BACK YESTERDAY, NOTIFIED MS. MUHAMMAD OF THE 48 HR CALL BACK WINDOW. SHE STATED THAT WAS OK. I DID LET PATIENT KNOW THAT SOMEONE WAS WORKING ON HER MESSAGE.     Tessie Muhammad [phone]

## 2020-10-13 LAB
Lab: 1
TOAL ENROLLMENT DAYS: 17

## 2020-10-13 PROCEDURE — 93272 ECG/REVIEW INTERPRET ONLY: CPT | Performed by: INTERNAL MEDICINE

## 2020-10-17 PROBLEM — N88.8 NABOTHIAN CYST: Status: ACTIVE | Noted: 2020-10-17

## 2020-10-25 PROBLEM — I25.10 CORONARY ARTERY DISEASE DUE TO CALCIFIED CORONARY LESION: Status: ACTIVE | Noted: 2020-10-25

## 2020-10-25 PROBLEM — Z76.89 ENCOUNTER FOR SUPPORT AND COORDINATION OF TRANSITION OF CARE: Status: ACTIVE | Noted: 2020-10-25

## 2020-10-25 PROBLEM — I25.84 CORONARY ARTERY DISEASE DUE TO CALCIFIED CORONARY LESION: Status: ACTIVE | Noted: 2020-10-25

## 2020-10-28 ENCOUNTER — OFFICE VISIT (OUTPATIENT)
Dept: CARDIOLOGY | Facility: CLINIC | Age: 50
End: 2020-10-28

## 2020-10-28 VITALS
WEIGHT: 269 LBS | BODY MASS INDEX: 44.82 KG/M2 | HEIGHT: 65 IN | HEART RATE: 82 BPM | DIASTOLIC BLOOD PRESSURE: 86 MMHG | RESPIRATION RATE: 18 BRPM | SYSTOLIC BLOOD PRESSURE: 108 MMHG

## 2020-10-28 DIAGNOSIS — R07.89 ATYPICAL CHEST PAIN: ICD-10-CM

## 2020-10-28 DIAGNOSIS — E78.00 PURE HYPERCHOLESTEROLEMIA: ICD-10-CM

## 2020-10-28 DIAGNOSIS — R07.89 CHEST WALL PAIN: ICD-10-CM

## 2020-10-28 DIAGNOSIS — I25.9 CHEST PAIN DUE TO MYOCARDIAL ISCHEMIA, UNSPECIFIED ISCHEMIC CHEST PAIN TYPE: Primary | ICD-10-CM

## 2020-10-28 PROCEDURE — 99214 OFFICE O/P EST MOD 30 MIN: CPT | Performed by: INTERNAL MEDICINE

## 2020-10-28 RX ORDER — ATORVASTATIN CALCIUM 20 MG/1
20 TABLET, FILM COATED ORAL DAILY
Qty: 30 TABLET | Refills: 0 | Status: SHIPPED | OUTPATIENT
Start: 2020-10-28 | End: 2020-10-29 | Stop reason: SDUPTHER

## 2020-10-28 RX ORDER — ASPIRIN 81 MG/1
81 TABLET ORAL DAILY
Qty: 30 TABLET | Refills: 0 | Status: SHIPPED | OUTPATIENT
Start: 2020-10-28 | End: 2020-10-29 | Stop reason: SDUPTHER

## 2020-10-28 NOTE — PROGRESS NOTES
Cardiology clinic note  Thomas Zamora MD, PhD  Subjective:     Encounter Date:10/28/2020      Patient ID: Tessie Conner is a 50 y.o. female.    Chief Complaint:  No chief complaint on file.      HPI:  History of Present Illness  Ms. Conner is a very pleasant 50-year-old female who I saw in the hospital ultimately with abnormal stress in May 2020 as well as recurrent atypical chest pain.  There was a small area reversible ischemia in the lateral wall of the apical segment with EF of 70%.  Ultimately with recurrent symptoms she underwent left heart catheterization demonstrating no obstructive coronary artery disease with normal LV function.  2D echo demonstrated normal LV function with no significant valvular abnormality essentially unremarkable study.  She underwent cardiac event monitoring secondary to palpitations as well as nocturnal awakenings concerning for arrhythmia but no arrhythmia was seen with no significant pauses, no tacky or bradycardia arrhythmias, normal sinus rhythm with 1 triggered event.  She has low risk features and has primary prevention goals presently for CAD on lipid therapy with type 2 diabetes.  She is reassured by the findings.  No other complaints today.  She is not presently had sleep study which should be ordered.  She has a history of bipolar disorder managed by psychiatry.  She does have significant daytime sleepiness and we did recommend a sleep study which she is amenable for.  We will refill her aspirin as well as atorvastatin 20 daily and she can come back to see us in 6 months to 1 year or as needed in the interim.    No other complaints today        Review of systems x14 were review of systems is negative except was mentioned above    Historical data copied forward from previous encounters any margin change    The following portions of the patient's history were reviewed and updated as appropriate: allergies, current medications, past family history, past medical history,  past social history, past surgical history and problem list.    Problem List:  Patient Active Problem List   Diagnosis   • Bipolar 1 disorder, depressed, moderate (CMS/[facility])   • Lithium adverse reaction   • Vitamin D deficiency disease   • Screening for breast cancer   • Encounter for screening mammogram for malignant neoplasm of breast    • Chest wall pain   • Suspected COVID-19 virus infection   • Type 2 diabetes mellitus without complication, without long-term current use of insulin (CMS/[facility])   • Morbid obesity (CMS/[facility])   • Transition of care performed with sharing of clinical summary   • Anxiety   • Asthma   • Bipolar disorder (CMS/[facility])   • Dermatitis   • Extrapyramidal and movement disorder   • Hyperlipidemia   • Posttraumatic stress disorder   • Encounter for general adult medical examination without abnormal findings   • Atypical chest pain   • Abnormal EKG   • Gastroesophageal reflux disease without esophagitis   • Pleurisy   • Diarrhea due to malabsorption   • Acute non-recurrent pansinusitis   • Chest pain   • Unstable angina pectoris (CMS/[facility])   • Nabothian cyst   • Coronary artery disease due to calcified coronary lesion   • Encounter for support and coordination of transition of care       Past Medical History:  Past Medical History:   Diagnosis Date   • Anxiety    • Asthma 2020   • Bipolar 1 disorder (CMS/[facility])    • Depression    • Headache    • Hyperlipidemia 10/4/2016   • Inflammatory bowel disease    • Irritable bowel syndrome    • Obesity    • Urinary tract infection Off and on   • Visual impairment        Past Surgical History:  Past Surgical History:   Procedure Laterality Date   • ABDOMINAL SURGERY      c- section   • CARDIAC CATHETERIZATION N/A 2020    Procedure: Left Heart Cath possible PCI, atherectomy, hemodynamic support;  Surgeon: Thomas Zamora MD;  [facility];  Service: Cardiology;  Laterality: N/A;   •   SECTION  2002   • FOOT/TOE TENDON REPAIR Left    • HYSTERECTOMY     • ROTATOR CUFF REPAIR Left    • TUBAL ABDOMINAL LIGATION  2002       Social History:  Social History     Socioeconomic History   • Marital status:      Spouse name: Not on file   • Number of children: Not on file   • Years of education: Not on file   • Highest education level: Not on file   Tobacco Use   • Smoking status: Never Smoker   • Smokeless tobacco: Never Used   Substance and Sexual Activity   • Alcohol use: No     Frequency: Never   • Drug use: No   • Sexual activity: Not Currently     Partners: Male     Birth control/protection: None       Allergies:  Allergies   Allergen Reactions   • Oxycodone-Acetaminophen Mental Status Change       Immunizations:  Immunization History   Administered Date(s) Administered   • Flulaval/Fluarix/Fluzone Quad 10/01/2020   • Pneumococcal Polysaccharide (PPSV23) 08/07/2020       ROS:  ROS       Objective:         There were no vitals taken for this visit.  Vitals reviewed blood pressure 108/86 with heart rate 80s weight is stable at 269 pounds which represents a pound weight loss since last encounter from intentional weight loss    Physical Exam  No acute distress alert and oriented x3 afebrile vital signs stable  Regular rate and rhythm no rubs murmurs or gallops  No clubbing cyanosis or edema  Pulses 2+ in all distributions  Clear to auscultation bilaterally  No neurologic deficits grossly  No bony abnormalities grossly  Normal mood and affect  No carotid bruits no JVD  Neurologically gross intact bilaterally  In-Office Procedure(s):  Procedures    ASCVD RIsk Score::  The 10-year ASCVD risk score (Yohannes BURTON Jr., et al., 2013) is: 2.6%    Values used to calculate the score:      Age: 50 years      Sex: Female      Is Non- : No      Diabetic: Yes      Tobacco smoker: No      Systolic Blood Pressure: 118 mmHg      Is BP treated: No      HDL Cholesterol: 38 mg/dL      Total  Cholesterol: 181 mg/dL    Recent Radiology:  Imaging Results (Most Recent)     None          Lab Review:   Office Visit on 10/01/2020   Component Date Value   • Glucose 10/01/2020 86    • BUN 10/01/2020 12    • Creatinine 10/01/2020 1.10*   • Sodium 10/01/2020 143    • Potassium 10/01/2020 4.0    • Chloride 10/01/2020 104    • CO2 10/01/2020 26.7    • Calcium 10/01/2020 10.3    • Total Protein 10/01/2020 7.7    • Albumin 10/01/2020 4.50    • ALT (SGPT) 10/01/2020 24    • AST (SGOT) 10/01/2020 28    • Alkaline Phosphatase 10/01/2020 132*   • Total Bilirubin 10/01/2020 0.3    • eGFR Non African Amer 10/01/2020 53*   • Globulin 10/01/2020 3.2    • A/G Ratio 10/01/2020 1.4    • BUN/Creatinine Ratio 10/01/2020 10.9    • Anion Gap 10/01/2020 12.3    No results displayed because visit has over 200 results.      Lab on 08/07/2020   Component Date Value   • Age Gdln ACOG Testing 08/07/2020 30-65    • Diagnosis 08/07/2020 Comment    • Specimen adequacy: 08/07/2020 Comment    • Clinician Provided ICD-1* 08/07/2020 Comment    • Performed by: 08/07/2020 Comment    • . 08/07/2020 .    • Note: 08/07/2020 Comment    • Method: 08/07/2020 Comment    • HPV Aptima 08/07/2020 Negative    Office Visit on 08/07/2020   Component Date Value   • Total Cholesterol 08/07/2020 206*   • Triglycerides 08/07/2020 187*   • HDL Cholesterol 08/07/2020 38*   • LDL Cholesterol  08/07/2020 131*   • VLDL Cholesterol 08/07/2020 37.4    • LDL/HDL Ratio 08/07/2020 3.44    • 25 Hydroxy, Vitamin D 08/07/2020 29.0*   • TSH 08/07/2020 1.520    • Microalbumin, Urine 08/07/2020 1.5    Lab on 07/15/2020   Component Date Value   • Lithium 07/15/2020 0.8    Office Visit on 07/15/2020   Component Date Value   • Glucose 07/15/2020 107*   • BUN 07/15/2020 10    • Creatinine 07/15/2020 0.95    • Sodium 07/15/2020 140    • Potassium 07/15/2020 4.1    • Chloride 07/15/2020 103    • CO2 07/15/2020 25.8    • Calcium 07/15/2020 9.7    • Total Protein 07/15/2020 7.3    •  Albumin 07/15/2020 4.20    • ALT (SGPT) 07/15/2020 19    • AST (SGOT) 07/15/2020 17    • Alkaline Phosphatase 07/15/2020 135*   • Total Bilirubin 07/15/2020 0.3    • eGFR Non African Amer 07/15/2020 63    • Globulin 07/15/2020 3.1    • A/G Ratio 07/15/2020 1.4    • BUN/Creatinine Ratio 07/15/2020 10.5    • Anion Gap 07/15/2020 11.2    • Hemoglobin A1C 07/15/2020 5.7*   Hospital Outpatient Visit on 05/29/2020   Component Date Value   •  CV STRESS PROTOCOL 1 05/29/2020 Pharmacologic    • Stage 1 05/29/2020 1    • HR Stage 1 05/29/2020 82    • BP Stage 1 05/29/2020 139/47    • Duration Min Stage 1 05/29/2020 4    • Duration Sec Stage 1 05/29/2020 10    • Stress Dose Regadenoson * 05/29/2020 0.4    • Stress Comments Stage 1 05/29/2020 10 sec bolus injection    • Baseline HR 05/29/2020 78    • Baseline BP 05/29/2020 139/58    • Peak HR 05/29/2020 82    • Percent Max Pred HR 05/29/2020 47.95    • Percent Target HR 05/29/2020 56    • Peak BP 05/29/2020 139/47    • Recovery HR 05/29/2020 67    • Recovery BP 05/29/2020 128/50    • Target HR (85%) 05/29/2020 145    • Max. Pred. HR (100%) 05/29/2020 171    • Nuclear Prior Study 05/29/2020 3    • Nuc Stress EF 05/29/2020 70         Interpretation Summary 2D echo September 2020       · Left ventricular ejection fraction appears to be 56 - 60%. Left ventricular systolic function is normal.  · Estimated right ventricular systolic pressure from tricuspid regurgitation is normal (<35 mmHg).     Normal LV and RV size and function  Normal atrial sizes  No significant valvular abnormality seen  Normal right left-sided pressures estimated  Normal diastolic function by criteria  No masses or effusion seen         Pre Procedure Diagnosis    unstable angina       Conclusion     Thomas Zamora MD, PhD  [facility] cardiology  Date 9-     Procedure  1.  Left heart catheterization with coronary angiography and left ventriculography in ORTIZ  position     Indication  Unstable angina with abnormal EKG     After informed consent the patient was brought to the catheterization lab sterilely prepped and draped in usual fashion with exposure the right groin for right common femoral arterial access via micropuncture modified Seldinger technique.  6 [place] sheath was placed to the right common femoral artery.  An 035 guidewire was advanced to the level of the aortic valve followed by diagnostic JL4 and JR4 6 [place] catheters for selective right and left coronary angiography.  The JR4 was used across aortic valve followed by LVEDP measurement, hand-injection of 8 cc for left ventriculography in ORTIZ position and pullback assessment of the transaortic valve gradient.  With no obstructive coronary disease all catheters and equipment were removed and the sheath flushed with heparinized saline.  Final angiography of the right common femoral artery revealed widely patent vessel with normal bifurcation and 6 [place] Angio-Seal was used to close the vessel with immediate hemostasis and maintenance of distal pulses     Complications none  Contrast usage 85 cc  Moderate conscious sedation time of 35 minutes, IV Versed and fentanyl administered by registered nurse with complete ECG pulse oximetry and hemodynamic running throughout the entire the case was used with complete observation by myself     Patient left the Cath Lab chest pain-free hemodynamically electrically stable alert talking to staff neurologically gross intact bilaterally     Findings  1.  Open aortic pressure 117/51  2.  Closing pressure unchanged  3.  Mildly increased LVEDP at 18 mmHg  4.  Normal LV systolic function EF of 60% with no regional abnormality  5.  No significant transaortic valve gradient     Angiography  1.  Left main long giving rise to LAD and nondominant circumflex.  No angiographic disease of the left main  2.  LAD is a medium caliber vessel giving off numerous septal perforators as well as  2 diagonal branches.  The second diagonal branch essentially reaches the apex and dual LAD physiology and the continuation LAD tapers dramatically to around 1 to 1.5 mm in diameter at the apex.  There is only mild luminal irregularities in the LAD and diagonal branch with ALISON-3 flow throughout.  3.  The circumflex nondominant with one obtuse marginal branch and continuation circumflex.  There is no angiographic disease of the circumflex distribution and ALSION-3 flow throughout  4.  The RCA is a large-caliber dominant vessel with PDA and PLV distally.  There is no angiographic disease of the RCA PLV or PDA branches with ALISON-3 flow throughout     Conclusions and recommendations  1.  Normal epicardial coronary anatomy with suggestions of microvascular dysfunction as well as small distal vessel size  2.  Continue max medical therapy, antianginals, long-acting nitrates and ACE inhibitor's for microvascular dysfunction  3.  With patient symptomatology and nighttime awakenings would suggest sleep study as outpatient next   #4 consider outpatient ambulatory ECG monitoring for heart rate rule out any tachyarrhythmias  5.  Primary prevention goals for CAD and comorbidities     It is a pleasure to be involved in her cardiovascular care.  Please call with any questions or concerns  Thomas Zamora MD, PhD          Holter monitor September 2020  Study Impressions    A relatively benign monitor study. Patient was monitored for 395 hours was 232 hours were usable  Average heart rate for monitor.  72 bpm  Tachycardia present for 3% of the readable data, bradycardia for 6% of readable data  No pauses 3 seconds or longer  1 manually manually transmitted recording associated with sinus rhythm  Normal AV and intraventricular conduction  No atrial fibrillation seen  No ventricular tachyarrhythmias seen  Essentially benign study             Assessment and plan:       Atypical chest pain  Abnormal EKG, no obstructive coronary  disease  Normal LV systolic function  Holter monitor without abnormal findings, no bradycardia, no tachycardia or bradycardia arrhythmias, no pauses, minimal ectopy, benign study  Primary prevention for CAD  Lipid goals for primary prevention  Goal blood pressure less than 140 systolic, continue present regimen    Possible evidence of microvascular dysfunction, continue aspirin, continue atorvastatin and refill this today    Recommend sleep study as outpatient for nocturnal awakenings    Return to clinic in 6 months to 1 year    Primary care in the interim    It is a pleasure to be involved in her cardiovascular care.  Please call with any question concerns    Level of Care:                 Thomas Zamora MD  10/28/20  .

## 2020-10-29 ENCOUNTER — OFFICE VISIT (OUTPATIENT)
Dept: FAMILY MEDICINE CLINIC | Facility: CLINIC | Age: 50
End: 2020-10-29

## 2020-10-29 ENCOUNTER — LAB (OUTPATIENT)
Dept: FAMILY MEDICINE CLINIC | Facility: CLINIC | Age: 50
End: 2020-10-29

## 2020-10-29 VITALS
BODY MASS INDEX: 44.15 KG/M2 | RESPIRATION RATE: 18 BRPM | TEMPERATURE: 97.3 F | WEIGHT: 265 LBS | HEART RATE: 80 BPM | HEIGHT: 65 IN | OXYGEN SATURATION: 97 % | DIASTOLIC BLOOD PRESSURE: 82 MMHG | SYSTOLIC BLOOD PRESSURE: 112 MMHG

## 2020-10-29 DIAGNOSIS — I25.84 CORONARY ARTERY DISEASE DUE TO CALCIFIED CORONARY LESION: ICD-10-CM

## 2020-10-29 DIAGNOSIS — G47.33 OSA (OBSTRUCTIVE SLEEP APNEA): Primary | ICD-10-CM

## 2020-10-29 DIAGNOSIS — I25.10 CORONARY ARTERY DISEASE DUE TO CALCIFIED CORONARY LESION: ICD-10-CM

## 2020-10-29 DIAGNOSIS — E11.9 TYPE 2 DIABETES MELLITUS WITHOUT COMPLICATION, WITHOUT LONG-TERM CURRENT USE OF INSULIN (HCC): Chronic | ICD-10-CM

## 2020-10-29 DIAGNOSIS — F31.32 BIPOLAR 1 DISORDER, DEPRESSED, MODERATE (HCC): ICD-10-CM

## 2020-10-29 LAB
ANION GAP SERPL CALCULATED.3IONS-SCNC: 8.2 MMOL/L (ref 5–15)
BUN SERPL-MCNC: 16 MG/DL (ref 6–20)
BUN/CREAT SERPL: 15.2 (ref 7–25)
CALCIUM SPEC-SCNC: 10.3 MG/DL (ref 8.6–10.5)
CHLORIDE SERPL-SCNC: 101 MMOL/L (ref 98–107)
CO2 SERPL-SCNC: 28.8 MMOL/L (ref 22–29)
CREAT SERPL-MCNC: 1.05 MG/DL (ref 0.57–1)
GFR SERPL CREATININE-BSD FRML MDRD: 56 ML/MIN/1.73
GLUCOSE SERPL-MCNC: 94 MG/DL (ref 65–99)
POTASSIUM SERPL-SCNC: 4.3 MMOL/L (ref 3.5–5.2)
SODIUM SERPL-SCNC: 138 MMOL/L (ref 136–145)

## 2020-10-29 PROCEDURE — 99214 OFFICE O/P EST MOD 30 MIN: CPT | Performed by: INTERNAL MEDICINE

## 2020-10-29 PROCEDURE — 80048 BASIC METABOLIC PNL TOTAL CA: CPT | Performed by: INTERNAL MEDICINE

## 2020-10-29 RX ORDER — ATORVASTATIN CALCIUM 20 MG/1
20 TABLET, FILM COATED ORAL DAILY
Qty: 90 TABLET | Refills: 3 | Status: SHIPPED | OUTPATIENT
Start: 2020-10-29 | End: 2021-11-18 | Stop reason: SDUPTHER

## 2020-10-29 RX ORDER — ASPIRIN 81 MG/1
81 TABLET ORAL DAILY
Qty: 90 TABLET | Refills: 3 | Status: SHIPPED | OUTPATIENT
Start: 2020-10-29 | End: 2022-01-16

## 2020-10-29 RX ORDER — BUPROPION HYDROCHLORIDE 150 MG/1
TABLET ORAL
COMMUNITY
Start: 2020-10-19 | End: 2020-10-29

## 2020-10-29 NOTE — PROGRESS NOTES
"Rooming Tab(CC,VS,Pt Hx,Fall Screen)  Chief Complaint   Patient presents with   • Hypertension       Subjective   Pt here for renal follow up.   down 8 lbs- getting food through the mail now. Feeling better   emotionally better too, getting along with spouse now   poor sleep- snores frequently and awakes exhausted.  Seen cardiologist yesterday- doing well  I have reviewed and updated her medications, medical history and problem list during today's office visit.     Patient Care Team:  Marsha Lopez MD as PCP - General (Internal Medicine)  Thomas Zamora MD as Consulting Physician (Cardiology)    Problem List Tab  Medications Tab  Synopsis Tab  Chart Review Tab  Care Everywhere Tab  Immunizations Tab  Patient History Tab    Social History     Tobacco Use   • Smoking status: Never Smoker   • Smokeless tobacco: Never Used   Substance Use Topics   • Alcohol use: No     Frequency: Never       Review of Systems   Constitutional: Negative for fatigue.   Cardiovascular: Negative for chest pain.   Musculoskeletal: Positive for arthralgias.   Psychiatric/Behavioral: Positive for sleep disturbance and depressed mood.       Objective     Rooming Tab(CC,VS,Pt Hx,Fall Screen)  /82 (BP Location: Right arm)   Pulse 80   Temp 97.3 °F (36.3 °C)   Resp 18   Ht 165.1 cm (65\")   Wt 120 kg (265 lb)   SpO2 97%   BMI 44.10 kg/m²     Body mass index is 44.1 kg/m².    Physical Exam  Vitals signs and nursing note reviewed.   Constitutional:       Appearance: Normal appearance. She is well-developed. She is obese.   HENT:      Head: Normocephalic and atraumatic.      Right Ear: Tympanic membrane normal.      Left Ear: Tympanic membrane normal.   Eyes:      Pupils: Pupils are equal, round, and reactive to light.   Neck:      Musculoskeletal: Normal range of motion and neck supple.   Cardiovascular:      Rate and Rhythm: Normal rate and regular rhythm.      Heart sounds: No murmur.   Pulmonary:      Effort: " Pulmonary effort is normal.      Breath sounds: Normal breath sounds.   Abdominal:      General: Bowel sounds are normal. There is no distension.      Palpations: Abdomen is soft.   Skin:     Capillary Refill: Capillary refill takes less than 2 seconds.   Neurological:      Mental Status: She is alert and oriented to person, place, and time.   Psychiatric:         Mood and Affect: Mood normal.         Behavior: Behavior normal.          Statin Choice Calculator  Data Reviewed:               Lab Results   Component Value Date    BUN 12 10/01/2020    CREATININE 1.10 (H) 10/01/2020    EGFRIFNONA 53 (L) 10/01/2020     10/01/2020    K 4.0 10/01/2020     10/01/2020    CALCIUM 10.3 10/01/2020    ALBUMIN 4.50 10/01/2020    BILITOT 0.3 10/01/2020    ALKPHOS 132 (H) 10/01/2020    AST 28 10/01/2020    ALT 24 10/01/2020    TRIG 111 09/25/2020    HDL 38 (L) 09/25/2020    VLDL 22.2 09/25/2020     (H) 09/25/2020    LDLHDL 3.18 09/25/2020    MICROALBUR 1.5 08/07/2020    WBC 5.20 09/25/2020    RBC 4.06 09/25/2020    HCT 34.7 09/25/2020    MCV 85.5 09/25/2020    MCH 27.2 09/25/2020    TSH 1.520 08/07/2020    INR 1.00 09/24/2020    [id number] 29.0 (L) 08/07/2020      Assessment/Plan   Order Review Tab  Health Maintenance Tab  Patient Plan/Order Tab  Diagnoses and all orders for this visit:    1. ADY (obstructive sleep apnea) (Primary)  -     Ambulatory Referral to Sleep Medicine    2. Coronary artery disease due to calcified coronary lesion  -     Basic metabolic panel    3. Type 2 diabetes mellitus without complication, without long-term current use of insulin (CMS/[facility])    4. Bipolar 1 disorder, depressed, moderate (CMS/HCC)    Other orders  -     aspirin (aspirin) 81 MG EC tablet; Take 1 tablet by mouth Daily.  Dispense: 90 tablet; Refill: 3  -     atorvastatin (Lipitor) 20 MG tablet; Take 1 tablet by mouth Daily.  Dispense: 90 tablet; Refill: 3     doing better will talk to psych about changing meds as very sleepy.  Continue t work on weight reduction. Watch sugars. Check BMP today for renal insufficiency last seen    Wrapup Tab  Return if symptoms worsen or fail to improve.         They were informed of the diagnosis and treatment plan and directed to f/u for any further problems or concerns.

## 2020-10-30 ENCOUNTER — TELEPHONE (OUTPATIENT)
Dept: FAMILY MEDICINE CLINIC | Facility: CLINIC | Age: 50
End: 2020-10-30

## 2020-11-03 ENCOUNTER — OFFICE VISIT (OUTPATIENT)
Dept: FAMILY MEDICINE CLINIC | Facility: CLINIC | Age: 50
End: 2020-11-03

## 2020-11-03 VITALS
HEART RATE: 91 BPM | OXYGEN SATURATION: 94 % | RESPIRATION RATE: 16 BRPM | WEIGHT: 268 LBS | TEMPERATURE: 97.3 F | SYSTOLIC BLOOD PRESSURE: 120 MMHG | BODY MASS INDEX: 44.65 KG/M2 | HEIGHT: 65 IN | DIASTOLIC BLOOD PRESSURE: 74 MMHG

## 2020-11-03 DIAGNOSIS — L02.93 CARBUNCLE: Primary | ICD-10-CM

## 2020-11-03 DIAGNOSIS — F31.32 BIPOLAR 1 DISORDER, DEPRESSED, MODERATE (HCC): ICD-10-CM

## 2020-11-03 PROCEDURE — 99214 OFFICE O/P EST MOD 30 MIN: CPT | Performed by: INTERNAL MEDICINE

## 2020-11-03 RX ORDER — CEPHALEXIN 500 MG/1
500 CAPSULE ORAL 3 TIMES DAILY
Qty: 21 CAPSULE | Refills: 0 | Status: SHIPPED | OUTPATIENT
Start: 2020-11-03 | End: 2021-01-10

## 2020-11-03 NOTE — PROGRESS NOTES
"Rooming Tab(CC,VS,Pt Hx,Fall Screen)  Chief Complaint   Patient presents with   • Abscess     vaginal area       Subjective   Pt worried with creatitine up- was at 1.2 and now 1.05- down to 50% of lithium only 1 day - and watching the  Kidney.  No new meds for the bipolar   drinking more eater   also with lesion on right labia- very tender 3 days ago-   I have reviewed and updated her medications, medical history and problem list during today's office visit.     Patient Care Team:  Marsha Lopez MD as PCP - General (Internal Medicine)  Thomas Zamora MD as Consulting Physician (Cardiology)    Problem List Tab  Medications Tab  Synopsis Tab  Chart Review Tab  Care Everywhere Tab  Immunizations Tab  Patient History Tab    Social History     Tobacco Use   • Smoking status: Never Smoker   • Smokeless tobacco: Never Used   Substance Use Topics   • Alcohol use: No     Frequency: Never       Review of Systems   Constitutional: Negative for fatigue.   Cardiovascular: Negative for chest pain.   Skin: Positive for skin lesions.       Objective     Rooming Tab(CC,VS,Pt Hx,Fall Screen)  /74 (BP Location: Right arm, Cuff Size: Large Adult)   Pulse 91   Temp 97.3 °F (36.3 °C)   Resp 16   Ht 165.1 cm (65\")   Wt 122 kg (268 lb)   SpO2 94%   BMI 44.60 kg/m²     Body mass index is 44.6 kg/m².    Physical Exam  Vitals signs and nursing note reviewed.   Constitutional:       Appearance: Normal appearance. She is well-developed. She is obese.   HENT:      Head: Normocephalic and atraumatic.      Right Ear: Tympanic membrane normal.      Left Ear: Tympanic membrane normal.   Eyes:      Pupils: Pupils are equal, round, and reactive to light.   Neck:      Musculoskeletal: Normal range of motion and neck supple.   Cardiovascular:      Rate and Rhythm: Normal rate and regular rhythm.      Heart sounds: No murmur.   Pulmonary:      Effort: Pulmonary effort is normal.      Breath sounds: Normal breath sounds. "   Abdominal:      General: Bowel sounds are normal. There is no distension.      Palpations: Abdomen is soft.   Genitourinary:     Comments: Right labia with hair follicle inflamed and swollen  Skin:     Capillary Refill: Capillary refill takes less than 2 seconds.   Neurological:      Mental Status: She is alert and oriented to person, place, and time.          Statin Choice Calculator  Data Reviewed:               Lab Results   Component Value Date    BUN 16 10/29/2020    CREATININE 1.05 (H) 10/29/2020    EGFRIFNONA 56 (L) 10/29/2020     10/29/2020    K 4.3 10/29/2020     10/29/2020    CALCIUM 10.3 10/29/2020    ALBUMIN 4.50 10/01/2020    BILITOT 0.3 10/01/2020    ALKPHOS 132 (H) 10/01/2020    AST 28 10/01/2020    ALT 24 10/01/2020    TRIG 111 09/25/2020    HDL 38 (L) 09/25/2020    VLDL 22.2 09/25/2020     (H) 09/25/2020    LDLHDL 3.18 09/25/2020    MICROALBUR 1.5 08/07/2020    WBC 5.20 09/25/2020    RBC 4.06 09/25/2020    HCT 34.7 09/25/2020    MCV 85.5 09/25/2020    MCH 27.2 09/25/2020    TSH 1.520 08/07/2020    INR 1.00 09/24/2020    [id number] 29.0 (L) 08/07/2020      Assessment/Plan   Order Review Tab  Health Maintenance Tab  Patient Plan/Order Tab  Diagnoses and all orders for this visit:    1. Carbuncle (Primary)  Comments:  warm compress  Assessment & Plan:   Warm compress and keflex given  No need to open at this time      2. Bipolar 1 disorder, depressed, moderate (CMS/HCC)  Comments:  weaning on lithium   watching renal closely    Other orders  -     cephalexin (Keflex) 500 MG capsule; Take 1 capsule by mouth 3 (Three) Times a Day.  Dispense: 21 capsule; Refill: 0      Wrapup Tab  No follow-ups on file.       They were informed of the diagnosis and treatment plan and directed to f/u for any further problems or concerns.

## 2021-01-07 ENCOUNTER — TELEPHONE (OUTPATIENT)
Dept: FAMILY MEDICINE CLINIC | Facility: CLINIC | Age: 51
End: 2021-01-07

## 2021-01-07 NOTE — TELEPHONE ENCOUNTER
Patient called, stated she is having stabbing pain in the left side of her head near her temple x two days, lasting 8-10 secs each time. She was taken off Lithium 2.5 wks ago.     Patient has HX of migraines, but hasn't had one for about 10 years, patient has no other symptoms.     Do you want to see her today at 4:45?

## 2021-01-07 NOTE — TELEPHONE ENCOUNTER
Nothing was started in place of lithium, it was stopped due to elevated levels and patient asked for it to be D/C. She is now only on her Abilify and Effexor.    She would like for you to send her in something please to [facility] in [place].

## 2021-01-07 NOTE — TELEPHONE ENCOUNTER
I already have 445 today. Can send in meds, or she can go to ER- make sure she is drinking lots of fluids,  2. What meds were started in place of lithium?

## 2021-01-08 NOTE — TELEPHONE ENCOUNTER
PATIENT IS CALLING TO CHECK ON THE STATUS OF SOMETHING BEING  CALLED IN FOR  HER HEADACHE.        PLEASE ADVISE ONCE IT HAS BEEN DONE IF POSSIBLE     [phone]

## 2021-01-10 RX ORDER — LAMOTRIGINE 25 MG/1
25 TABLET ORAL DAILY
Qty: 30 TABLET | Refills: 1 | Status: SHIPPED | OUTPATIENT
Start: 2021-01-10 | End: 2021-02-25

## 2021-01-20 ENCOUNTER — OFFICE VISIT (OUTPATIENT)
Dept: FAMILY MEDICINE CLINIC | Facility: CLINIC | Age: 51
End: 2021-01-20

## 2021-01-20 VITALS
HEART RATE: 83 BPM | DIASTOLIC BLOOD PRESSURE: 79 MMHG | SYSTOLIC BLOOD PRESSURE: 127 MMHG | TEMPERATURE: 97.3 F | RESPIRATION RATE: 12 BRPM | BODY MASS INDEX: 44.23 KG/M2 | WEIGHT: 265.8 LBS

## 2021-01-20 DIAGNOSIS — F31.32 BIPOLAR 1 DISORDER, DEPRESSED, MODERATE (HCC): ICD-10-CM

## 2021-01-20 DIAGNOSIS — G25.9 EXTRAPYRAMIDAL AND MOVEMENT DISORDER: Primary | ICD-10-CM

## 2021-01-20 PROCEDURE — 99213 OFFICE O/P EST LOW 20 MIN: CPT | Performed by: INTERNAL MEDICINE

## 2021-02-25 ENCOUNTER — LAB (OUTPATIENT)
Dept: FAMILY MEDICINE CLINIC | Facility: CLINIC | Age: 51
End: 2021-02-25

## 2021-02-25 ENCOUNTER — OFFICE VISIT (OUTPATIENT)
Dept: FAMILY MEDICINE CLINIC | Facility: CLINIC | Age: 51
End: 2021-02-25

## 2021-02-25 VITALS
TEMPERATURE: 97.1 F | WEIGHT: 265 LBS | BODY MASS INDEX: 44.15 KG/M2 | RESPIRATION RATE: 16 BRPM | DIASTOLIC BLOOD PRESSURE: 80 MMHG | HEIGHT: 65 IN | HEART RATE: 97 BPM | SYSTOLIC BLOOD PRESSURE: 116 MMHG | OXYGEN SATURATION: 98 %

## 2021-02-25 DIAGNOSIS — F31.32 BIPOLAR 1 DISORDER, DEPRESSED, MODERATE (HCC): Primary | ICD-10-CM

## 2021-02-25 LAB
ALBUMIN SERPL-MCNC: 4.2 G/DL (ref 3.5–5.2)
ALBUMIN/GLOB SERPL: 1.2 G/DL
ALP SERPL-CCNC: 177 U/L (ref 39–117)
ALT SERPL W P-5'-P-CCNC: 18 U/L (ref 1–33)
ANION GAP SERPL CALCULATED.3IONS-SCNC: 10 MMOL/L (ref 5–15)
AST SERPL-CCNC: 20 U/L (ref 1–32)
BILIRUB SERPL-MCNC: 0.2 MG/DL (ref 0–1.2)
BUN SERPL-MCNC: 13 MG/DL (ref 6–20)
BUN/CREAT SERPL: 15.7 (ref 7–25)
CALCIUM SPEC-SCNC: 10.2 MG/DL (ref 8.6–10.5)
CHLORIDE SERPL-SCNC: 102 MMOL/L (ref 98–107)
CO2 SERPL-SCNC: 28 MMOL/L (ref 22–29)
CREAT SERPL-MCNC: 0.83 MG/DL (ref 0.57–1)
GFR SERPL CREATININE-BSD FRML MDRD: 73 ML/MIN/1.73
GLOBULIN UR ELPH-MCNC: 3.4 GM/DL
GLUCOSE SERPL-MCNC: 93 MG/DL (ref 65–99)
POTASSIUM SERPL-SCNC: 4.3 MMOL/L (ref 3.5–5.2)
PROT SERPL-MCNC: 7.6 G/DL (ref 6–8.5)
SODIUM SERPL-SCNC: 140 MMOL/L (ref 136–145)
TSH SERPL DL<=0.05 MIU/L-ACNC: 0.41 UIU/ML (ref 0.27–4.2)

## 2021-02-25 PROCEDURE — 80053 COMPREHEN METABOLIC PANEL: CPT | Performed by: INTERNAL MEDICINE

## 2021-02-25 PROCEDURE — 99213 OFFICE O/P EST LOW 20 MIN: CPT | Performed by: INTERNAL MEDICINE

## 2021-02-25 PROCEDURE — 36415 COLL VENOUS BLD VENIPUNCTURE: CPT

## 2021-02-25 PROCEDURE — 84443 ASSAY THYROID STIM HORMONE: CPT | Performed by: INTERNAL MEDICINE

## 2021-02-25 RX ORDER — HYDROXYZINE 50 MG/1
50 TABLET, FILM COATED ORAL 2 TIMES DAILY PRN
COMMUNITY
Start: 2021-02-03 | End: 2021-06-04

## 2021-02-25 RX ORDER — ARIPIPRAZOLE 20 MG/1
15 TABLET ORAL NIGHTLY
COMMUNITY
Start: 2021-02-24 | End: 2022-07-13

## 2021-02-25 NOTE — PROGRESS NOTES
"Rooming Tab(CC,VS,Pt Hx,Fall Screen)  Chief Complaint   Patient presents with   • Body Fluid Exposure   • tongue tremor       Subjective   Pt her for several things   1. Had \"tongue tremor\" happening a few months ago ad is better- but getting more saliva now and slobbering at times which is very embarrassing - spoke to psych-  And decreased the abilify yesterday. Explained that should improve.   Went off lamictal didn't help at all.  Feels more stable  Getting along with  now- kids moved out. Sons girlfriend was all the drama  I have reviewed and updated her medications, medical history and problem list during today's office visit.     Patient Care Team:  Marsha Lopez MD as PCP - General (Internal Medicine)  Thomas Zamora MD as Consulting Physician (Cardiology)    Problem List Tab  Medications Tab  Synopsis Tab  Chart Review Tab  Care Everywhere Tab  Immunizations Tab  Patient History Tab    Social History     Tobacco Use   • Smoking status: Never Smoker   • Smokeless tobacco: Never Used   Substance Use Topics   • Alcohol use: No     Frequency: Never       Review of Systems    Objective     Rooming Tab(CC,VS,Pt Hx,Fall Screen)  /80 (BP Location: Left arm)   Pulse 97   Temp 97.1 °F (36.2 °C) (Temporal)   Resp 16   Ht 165.1 cm (65\")   Wt 120 kg (265 lb)   SpO2 98%   BMI 44.10 kg/m²     Body mass index is 44.1 kg/m².    Physical Exam  Vitals signs and nursing note reviewed.   Constitutional:       Appearance: Normal appearance. She is well-developed. She is obese.   HENT:      Head: Normocephalic and atraumatic.      Right Ear: Tympanic membrane normal.      Left Ear: Tympanic membrane normal.      Nose: No rhinorrhea.      Mouth/Throat:      Pharynx: No posterior oropharyngeal erythema.   Eyes:      Pupils: Pupils are equal, round, and reactive to light.   Neck:      Musculoskeletal: Normal range of motion and neck supple.   Cardiovascular:      Rate and Rhythm: Normal rate and " regular rhythm.      Pulses: Normal pulses.      Heart sounds: Normal heart sounds. No murmur.   Pulmonary:      Effort: Pulmonary effort is normal.      Breath sounds: Normal breath sounds.   Abdominal:      General: Bowel sounds are normal. There is no distension.      Palpations: Abdomen is soft.   Musculoskeletal:         General: No tenderness.   Skin:     Capillary Refill: Capillary refill takes less than 2 seconds.   Neurological:      Mental Status: She is alert and oriented to person, place, and time.   Psychiatric:         Mood and Affect: Mood normal.         Behavior: Behavior normal.          Statin Choice Calculator  Data Reviewed:         The data below has been reviewed by Marsha Lopez MD on 02/25/2021.      Lab Results   Component Value Date    BUN 13 02/25/2021    CREATININE 0.83 02/25/2021    EGFRIFNONA 73 02/25/2021     02/25/2021    K 4.3 02/25/2021     02/25/2021    CALCIUM 10.2 02/25/2021    ALBUMIN 4.20 02/25/2021    BILITOT 0.2 02/25/2021    ALKPHOS 177 (H) 02/25/2021    AST 20 02/25/2021    ALT 18 02/25/2021    TSH 0.407 02/25/2021      Assessment/Plan   Order Review Tab  Health Maintenance Tab  Patient Plan/Order Tab  Diagnoses and all orders for this visit:    1. Bipolar 1 disorder, depressed, moderate (CMS/HCC) (Primary)  Comments:  change in meds hsould improve symptoms- will check labs since recent lithium   Assessment & Plan:  Psychological condition is improving with treatment.  Continue current treatment regimen.  Psychological condition  will be reassessed at the next regular appointment.   No tongue fasciculations on exam- hoping decreased dose of abilify will improve drooling and motor movement    Orders:  -     Comprehensive Metabolic Panel  -     TSH      Wrapup Tab  No follow-ups on file.       They were informed of the diagnosis and treatment plan and directed to f/u for any further problems or concerns.

## 2021-02-26 NOTE — ASSESSMENT & PLAN NOTE
Psychological condition is improving with treatment.  Continue current treatment regimen.  Psychological condition  will be reassessed at the next regular appointment.   No tongue fasciculations on exam- hoping decreased dose of abilify will improve drooling and motor movement

## 2021-03-22 ENCOUNTER — TELEPHONE (OUTPATIENT)
Dept: FAMILY MEDICINE CLINIC | Facility: CLINIC | Age: 51
End: 2021-03-22

## 2021-03-22 NOTE — TELEPHONE ENCOUNTER
RECEIVED THE JAVON AND JAVON VACCINE ON 3/13 AND HAS HAD A KNOT AT THE INJECTION SITE SINCE SHE RECEIVED IT. ALSO STATED SORENESS IN ARM.    MALACHI [phone]

## 2021-06-04 ENCOUNTER — TELEPHONE (OUTPATIENT)
Dept: FAMILY MEDICINE CLINIC | Facility: CLINIC | Age: 51
End: 2021-06-04

## 2021-06-04 PROCEDURE — 87086 URINE CULTURE/COLONY COUNT: CPT | Performed by: NURSE PRACTITIONER

## 2021-06-04 NOTE — TELEPHONE ENCOUNTER
Caller: Tessie Conner    Relationship: Self    Best call back number:[phone]    What medication are you requesting: SOMETHING FOR A BLADDER INFECTION    What are your current symptoms BLOOD IN URINE    How long have you been experiencing symptoms:1 DAY    If a prescription is needed, what is your preferred pharmacy and phone number: [id number] - [place], [place] - [address] [phone] [facility] [phone]      Additional notes:

## 2021-06-04 NOTE — TELEPHONE ENCOUNTER
Caller: Tessie Conner    Relationship to patient: Self    Best call back number: [phone]    Patient is needing:  PATIENT CALLED TO CHECK ON PREVIOUS ENCOUNTER, SAID SHE IS GOING OUT OF TOWN FOR THE WEEKEND AND WANTED TO BE SURE SHE HAD EITHER MEDICATION CALLED IN OR WAS ABLE TO BE SEEN TODAY IF NEEDED     REQUESTED CALLBACK

## 2021-06-24 ENCOUNTER — TELEPHONE (OUTPATIENT)
Dept: ORTHOPEDIC SURGERY | Facility: CLINIC | Age: 51
End: 2021-06-24

## 2021-06-24 ENCOUNTER — TELEPHONE (OUTPATIENT)
Dept: FAMILY MEDICINE CLINIC | Facility: CLINIC | Age: 51
End: 2021-06-24

## 2021-06-24 NOTE — TELEPHONE ENCOUNTER
Caller: MRS MUHAMMAD     Relationship to patient: PATIENT     Best call back number: [phone]    Chief complaint: RIGHT ANKLE/FOOT PAIN     Type of visit: NEW PATIENT     Requested date: ASAP     If rescheduling, when is the original appointment: 7/20/21     Additional notes:PATIENT REFERRED INTERNALLY FOR HER RIGHT FOOT, PATIENT HAD XRAYS BUT DIDN'T SHOW ANYTHING PATIENT STATES PAIN IS GETTING WORST SHE IS HAVING TROUBLE WALKING ON THIS FOOT AND PATIENT STATES IT IS SWOLLEN AND RED. PATIENT WAS LOOKING FOR A SOONER APPT NEXT AVAILABLE IS NOT UNTIL 7/20/21. PATIENT STATED SHE HAS TRIED DIFFERENT OVER THE COUNTER MEDS TO HELP ALLEVIATE THE PAIN BUT NOTHING IS HELPING IT.   ADVISED PATIENT COULD SEND A MESSAGE TO PROVIDER TO SEE IF ABLE TO BE SEEN SOONER. PATIENT IS ASKING FOR A CALL BACK.

## 2021-06-24 NOTE — TELEPHONE ENCOUNTER
Called patient to offer her Monday apt as Dr Beck is out of the office on Friday. Patient stated she has apt with Dr Rubio tomorrow and will just keep that

## 2021-06-24 NOTE — TELEPHONE ENCOUNTER
Caller: [place] Tessie GREG    Relationship: Self    Best call back number: [phone]     What is the medical concern/diagnosis: FOOT PAIN    What specialty or service is being requested: REFERRAL TO ORTHOPEDIST    What is the provider, practice or medical service name: ANY PLACE THAT CAN GET HER IN BEFORE 7/20/21.    What is the office location:    What is the office phone number:     Any additional details: PATIENT STATES PAIN IS GETTING WORST SHE IS HAVING TROUBLE WALKING ON THIS FOOT AND PATIENT STATES IT IS SWOLLEN AND RED. PATIENT WAS LOOKING FOR A SOONER APPT NEXT AVAILABLE IS NOT UNTIL 7/20/21. PATIENT STATED SHE HAS TRIED DIFFERENT OVER THE COUNTER MEDS TO HELP ALLEVIATE THE PAIN BUT NOTHING IS HELPING IT.

## 2021-07-25 PROCEDURE — 87086 URINE CULTURE/COLONY COUNT: CPT | Performed by: FAMILY MEDICINE

## 2021-07-25 PROCEDURE — 87186 SC STD MICRODIL/AGAR DIL: CPT | Performed by: FAMILY MEDICINE

## 2021-07-25 PROCEDURE — 87077 CULTURE AEROBIC IDENTIFY: CPT | Performed by: FAMILY MEDICINE

## 2021-07-30 ENCOUNTER — TELEPHONE (OUTPATIENT)
Dept: CARDIOLOGY | Facility: CLINIC | Age: 51
End: 2021-07-30

## 2021-07-30 ENCOUNTER — OFFICE VISIT (OUTPATIENT)
Dept: FAMILY MEDICINE CLINIC | Facility: CLINIC | Age: 51
End: 2021-07-30

## 2021-07-30 VITALS
BODY MASS INDEX: 46.44 KG/M2 | DIASTOLIC BLOOD PRESSURE: 84 MMHG | TEMPERATURE: 97.5 F | SYSTOLIC BLOOD PRESSURE: 120 MMHG | WEIGHT: 272 LBS | HEART RATE: 74 BPM | OXYGEN SATURATION: 97 % | HEIGHT: 64 IN

## 2021-07-30 DIAGNOSIS — N30.90 CYSTITIS: ICD-10-CM

## 2021-07-30 DIAGNOSIS — R30.0 DYSURIA: ICD-10-CM

## 2021-07-30 DIAGNOSIS — N30.91 HEMATURIA DUE TO CYSTITIS: Primary | ICD-10-CM

## 2021-07-30 PROCEDURE — 87086 URINE CULTURE/COLONY COUNT: CPT | Performed by: NURSE PRACTITIONER

## 2021-07-30 PROCEDURE — 81003 URINALYSIS AUTO W/O SCOPE: CPT | Performed by: NURSE PRACTITIONER

## 2021-07-30 PROCEDURE — 99213 OFFICE O/P EST LOW 20 MIN: CPT | Performed by: NURSE PRACTITIONER

## 2021-07-30 RX ORDER — SULFAMETHOXAZOLE AND TRIMETHOPRIM 800; 160 MG/1; MG/1
1 TABLET ORAL 2 TIMES DAILY
Qty: 6 TABLET | Refills: 0 | Status: SHIPPED | OUTPATIENT
Start: 2021-07-30 | End: 2021-08-27

## 2021-07-30 NOTE — PROGRESS NOTES
"Chief Complaint  Blood in Urine    Subjective          Tessie Conner presents to [facility]  History of Present Illness  Here today for follow up for uti with hematuria  Was seen at  on 7/25 and  treated with omnicef bid x 5 days, took last dose   Urine is clear today, tells me that the symptoms have improved, but have not resolved  She does continue to have some low abdominal pressure as well as urgency and frequency    Tells me that she has had 4-5 uti in the past 6 months  Would like to see urology for evaluation of frequent uti  Discussed option of waiting for upcoming annual pelvic to determine if she may have a problem that would require a uro-gynecologist rather than a urologist - is agreeable to that      Objective   Vital Signs:   /84 (BP Location: Right arm, Patient Position: Sitting, Cuff Size: Large Adult)   Pulse 74   Temp 97.5 °F (36.4 °C) (Skin)   Ht 162.6 cm (64\")   Wt 123 kg (272 lb)   SpO2 97%   BMI 46.69 kg/m²     Physical Exam  Vitals reviewed.   Constitutional:       Appearance: Normal appearance. She is normal weight.   Cardiovascular:      Rate and Rhythm: Normal rate.      Heart sounds: Normal heart sounds.   Pulmonary:      Effort: Pulmonary effort is normal.   Abdominal:      Palpations: Abdomen is soft.      Tenderness: There is abdominal tenderness in the suprapubic area. There is no right CVA tenderness or left CVA tenderness.   Musculoskeletal:         General: Normal range of motion.      Right lower leg: No edema.      Left lower leg: No edema.   Neurological:      Mental Status: She is alert.        Result Review :                 Assessment and Plan    Diagnoses and all orders for this visit:    1. Hematuria due to cystitis (Primary)    2. Cystitis  -     Urine Culture - Urine, Urine, Clean Catch; Future  -     sulfamethoxazole-trimethoprim (BACTRIM DS,SEPTRA DS) 800-160 MG per tablet; Take 1 tablet by mouth 2 (Two) Times a Day.  " Dispense: 6 tablet; Refill: 0  -     Urine Culture - Urine, Urine, Clean Catch    Other orders  -     Cancel: Urinalysis With Microscopic - Urine, Clean Catch; Future        Follow Up   Return for Next scheduled follow up.  Patient was given instructions and counseling regarding her condition or for health maintenance advice. Please see specific information pulled into the AVS if appropriate.

## 2021-07-31 LAB — BACTERIA SPEC AEROBE CULT: NO GROWTH

## 2021-08-02 LAB
BILIRUB BLD-MCNC: NEGATIVE MG/DL
CLARITY, POC: ABNORMAL
COLOR UR: YELLOW
GLUCOSE UR STRIP-MCNC: NEGATIVE MG/DL
KETONES UR QL: NEGATIVE
LEUKOCYTE EST, POC: NEGATIVE
NITRITE UR-MCNC: NEGATIVE MG/ML
PH UR: 5 [PH] (ref 5–8)
PROT UR STRIP-MCNC: NEGATIVE MG/DL
RBC # UR STRIP: NEGATIVE /UL
SP GR UR: 1.02 (ref 1–1.03)
UROBILINOGEN UR QL: NORMAL

## 2021-08-23 ENCOUNTER — TELEPHONE (OUTPATIENT)
Dept: FAMILY MEDICINE CLINIC | Facility: CLINIC | Age: 51
End: 2021-08-23

## 2021-08-23 RX ORDER — MELOXICAM 15 MG/1
15 TABLET ORAL DAILY
Qty: 30 TABLET | Refills: 3 | Status: SHIPPED | OUTPATIENT
Start: 2021-08-23 | End: 2021-12-05

## 2021-08-23 NOTE — TELEPHONE ENCOUNTER
Caller: Tessie Conner    Relationship to patient: Self    Best call back number: [phone]    Where are you experiencing symptoms: SHOULDER PAIN    How long have you been experiencing symptoms:1 WEEK    Is it the symptoms constant or intermittent: [x] Constant  [] Intermittent       What therapies/medications have you tried: HYDROCODONE     If a prescription is needed, what is your preferred pharmacy:   [address] [place]  [phone] [facility] [phone]   [phone]    PATIENT IS SCHEDULED ON 8/27 BUT REQUESTED A CALL TO ADVISE WHAT SHE CAN DO FOR PAIN UNTIL APPOINTMENT

## 2021-08-27 ENCOUNTER — LAB (OUTPATIENT)
Dept: FAMILY MEDICINE CLINIC | Facility: CLINIC | Age: 51
End: 2021-08-27

## 2021-08-27 ENCOUNTER — OFFICE VISIT (OUTPATIENT)
Dept: FAMILY MEDICINE CLINIC | Facility: CLINIC | Age: 51
End: 2021-08-27

## 2021-08-27 VITALS
HEART RATE: 81 BPM | RESPIRATION RATE: 16 BRPM | BODY MASS INDEX: 46.1 KG/M2 | HEIGHT: 64 IN | TEMPERATURE: 98 F | DIASTOLIC BLOOD PRESSURE: 80 MMHG | SYSTOLIC BLOOD PRESSURE: 120 MMHG | WEIGHT: 270 LBS | OXYGEN SATURATION: 98 %

## 2021-08-27 DIAGNOSIS — Z00.00 ENCOUNTER FOR GENERAL ADULT MEDICAL EXAMINATION WITHOUT ABNORMAL FINDINGS: ICD-10-CM

## 2021-08-27 DIAGNOSIS — G47.33 OBSTRUCTIVE SLEEP APNEA SYNDROME: ICD-10-CM

## 2021-08-27 DIAGNOSIS — F31.32 BIPOLAR 1 DISORDER, DEPRESSED, MODERATE (HCC): ICD-10-CM

## 2021-08-27 DIAGNOSIS — E11.9 TYPE 2 DIABETES MELLITUS WITHOUT COMPLICATION, WITHOUT LONG-TERM CURRENT USE OF INSULIN (HCC): ICD-10-CM

## 2021-08-27 DIAGNOSIS — E55.9 VITAMIN D DEFICIENCY DISEASE: ICD-10-CM

## 2021-08-27 DIAGNOSIS — Z11.59 NEED FOR HEPATITIS C SCREENING TEST: Primary | ICD-10-CM

## 2021-08-27 PROBLEM — M19.91 PRIMARY OSTEOARTHRITIS: Status: ACTIVE | Noted: 2021-08-27

## 2021-08-27 PROBLEM — Z47.1 AFTERCARE FOLLOWING JOINT REPLACEMENT SURGERY: Status: ACTIVE | Noted: 2021-08-27

## 2021-08-27 PROBLEM — S80.01XA CONTUSION OF RIGHT KNEE: Status: ACTIVE | Noted: 2021-08-27

## 2021-08-27 LAB
25(OH)D3 SERPL-MCNC: 28.5 NG/ML (ref 30–100)
ALBUMIN SERPL-MCNC: 4.2 G/DL (ref 3.5–5.2)
ALBUMIN UR-MCNC: <1.2 MG/DL
ALBUMIN/GLOB SERPL: 1.2 G/DL
ALP SERPL-CCNC: 153 U/L (ref 39–117)
ALT SERPL W P-5'-P-CCNC: 20 U/L (ref 1–33)
ANION GAP SERPL CALCULATED.3IONS-SCNC: 9.5 MMOL/L (ref 5–15)
AST SERPL-CCNC: 22 U/L (ref 1–32)
BASOPHILS # BLD AUTO: 0.01 10*3/MM3 (ref 0–0.2)
BASOPHILS NFR BLD AUTO: 0.2 % (ref 0–1.5)
BILIRUB SERPL-MCNC: 0.3 MG/DL (ref 0–1.2)
BUN SERPL-MCNC: 9 MG/DL (ref 6–20)
BUN/CREAT SERPL: 10.7 (ref 7–25)
CALCIUM SPEC-SCNC: 9.5 MG/DL (ref 8.6–10.5)
CHLORIDE SERPL-SCNC: 102 MMOL/L (ref 98–107)
CHOLEST SERPL-MCNC: 171 MG/DL (ref 0–200)
CO2 SERPL-SCNC: 27.5 MMOL/L (ref 22–29)
CREAT SERPL-MCNC: 0.84 MG/DL (ref 0.57–1)
CREAT UR-MCNC: 108.4 MG/DL
DEPRECATED RDW RBC AUTO: 45.4 FL (ref 37–54)
EOSINOPHIL # BLD AUTO: 0.01 10*3/MM3 (ref 0–0.4)
EOSINOPHIL NFR BLD AUTO: 0.2 % (ref 0.3–6.2)
ERYTHROCYTE [DISTWIDTH] IN BLOOD BY AUTOMATED COUNT: 15.4 % (ref 12.3–15.4)
GFR SERPL CREATININE-BSD FRML MDRD: 72 ML/MIN/1.73
GLOBULIN UR ELPH-MCNC: 3.4 GM/DL
GLUCOSE SERPL-MCNC: 168 MG/DL (ref 65–99)
HBA1C MFR BLD: 6.2 % (ref 3.5–5.6)
HCT VFR BLD AUTO: 38 % (ref 34–46.6)
HCV AB SER DONR QL: NORMAL
HDLC SERPL-MCNC: 39 MG/DL (ref 40–60)
HGB BLD-MCNC: 12.1 G/DL (ref 12–15.9)
IMM GRANULOCYTES # BLD AUTO: 0.04 10*3/MM3 (ref 0–0.05)
IMM GRANULOCYTES NFR BLD AUTO: 0.6 % (ref 0–0.5)
LDLC SERPL CALC-MCNC: 105 MG/DL (ref 0–100)
LDLC/HDLC SERPL: 2.61 {RATIO}
LYMPHOCYTES # BLD AUTO: 1.74 10*3/MM3 (ref 0.7–3.1)
LYMPHOCYTES NFR BLD AUTO: 26.8 % (ref 19.6–45.3)
MCH RBC QN AUTO: 26.2 PG (ref 26.6–33)
MCHC RBC AUTO-ENTMCNC: 31.8 G/DL (ref 31.5–35.7)
MCV RBC AUTO: 82.3 FL (ref 79–97)
MICROALBUMIN/CREAT UR: NORMAL MG/G{CREAT}
MONOCYTES # BLD AUTO: 0.33 10*3/MM3 (ref 0.1–0.9)
MONOCYTES NFR BLD AUTO: 5.1 % (ref 5–12)
NEUTROPHILS NFR BLD AUTO: 4.37 10*3/MM3 (ref 1.7–7)
NEUTROPHILS NFR BLD AUTO: 67.1 % (ref 42.7–76)
NRBC BLD AUTO-RTO: 0 /100 WBC (ref 0–0.2)
PLATELET # BLD AUTO: 263 10*3/MM3 (ref 140–450)
PMV BLD AUTO: 12.6 FL (ref 6–12)
POTASSIUM SERPL-SCNC: 3.8 MMOL/L (ref 3.5–5.2)
PROT SERPL-MCNC: 7.6 G/DL (ref 6–8.5)
RBC # BLD AUTO: 4.62 10*6/MM3 (ref 3.77–5.28)
SODIUM SERPL-SCNC: 139 MMOL/L (ref 136–145)
T4 FREE SERPL-MCNC: 1.07 NG/DL (ref 0.93–1.7)
TRIGL SERPL-MCNC: 152 MG/DL (ref 0–150)
TSH SERPL DL<=0.05 MIU/L-ACNC: 0.44 UIU/ML (ref 0.27–4.2)
VLDLC SERPL-MCNC: 27 MG/DL (ref 5–40)
WBC # BLD AUTO: 6.5 10*3/MM3 (ref 3.4–10.8)

## 2021-08-27 PROCEDURE — 86803 HEPATITIS C AB TEST: CPT | Performed by: INTERNAL MEDICINE

## 2021-08-27 PROCEDURE — 84439 ASSAY OF FREE THYROXINE: CPT | Performed by: INTERNAL MEDICINE

## 2021-08-27 PROCEDURE — 82570 ASSAY OF URINE CREATININE: CPT | Performed by: INTERNAL MEDICINE

## 2021-08-27 PROCEDURE — 36415 COLL VENOUS BLD VENIPUNCTURE: CPT | Performed by: INTERNAL MEDICINE

## 2021-08-27 PROCEDURE — 80061 LIPID PANEL: CPT | Performed by: INTERNAL MEDICINE

## 2021-08-27 PROCEDURE — 83036 HEMOGLOBIN GLYCOSYLATED A1C: CPT | Performed by: INTERNAL MEDICINE

## 2021-08-27 PROCEDURE — 82043 UR ALBUMIN QUANTITATIVE: CPT | Performed by: INTERNAL MEDICINE

## 2021-08-27 PROCEDURE — 85025 COMPLETE CBC W/AUTO DIFF WBC: CPT | Performed by: INTERNAL MEDICINE

## 2021-08-27 PROCEDURE — G0439 PPPS, SUBSEQ VISIT: HCPCS | Performed by: INTERNAL MEDICINE

## 2021-08-27 PROCEDURE — 80053 COMPREHEN METABOLIC PANEL: CPT | Performed by: INTERNAL MEDICINE

## 2021-08-27 PROCEDURE — 84443 ASSAY THYROID STIM HORMONE: CPT | Performed by: INTERNAL MEDICINE

## 2021-08-27 PROCEDURE — 1160F RVW MEDS BY RX/DR IN RCRD: CPT | Performed by: INTERNAL MEDICINE

## 2021-08-27 PROCEDURE — 99213 OFFICE O/P EST LOW 20 MIN: CPT | Performed by: INTERNAL MEDICINE

## 2021-08-27 PROCEDURE — 82306 VITAMIN D 25 HYDROXY: CPT | Performed by: INTERNAL MEDICINE

## 2021-08-27 PROCEDURE — 1170F FXNL STATUS ASSESSED: CPT | Performed by: INTERNAL MEDICINE

## 2021-08-27 NOTE — PROGRESS NOTES
The ABCs of the Annual Wellness Visit  Subsequent Medicare Wellness Visit    Chief Complaint   Patient presents with   • Welcome To Medicare       Subjective   History of Present Illness:  Tessie Conner is a 51 y.o. female who presents for a Subsequent Medicare Wellness Visit and other concerns.  Been medicare for a few years- but new to [facility]. Sleeping well-   never had the sleep study-  says snores horrible but she is used to  It- willing to do a sleep study now.  Was at  clinic for 5 months- quit as too stressful  Not checking sugars- has glucometer but says old and unsure if still works. Was getting more exercise with working- but not now- usually sitting most days  Shoulders hurting  Right more than left- hard to lift and put on clothes- went to chiropractor 2 visits- told had bone spurs. No radicular pain to fingers. Seeing ortho on Monday  Stressed-  No unemployment-  Has 2 grown kids living with her.   Needs to eat with mobic- will change her regimen         HEALTH RISK ASSESSMENT    Recent Hospitalizations:  No hospitalization(s) within the last year.    Current Medical Providers:  Patient Care Team:  Marsha Lopez MD as PCP - General (Internal Medicine)  Thomas Zamora MD as Consulting Physician (Cardiology)    Smoking Status:  Social History     Tobacco Use   Smoking Status Never Smoker   Smokeless Tobacco Never Used       Alcohol Consumption:  Social History     Substance and Sexual Activity   Alcohol Use No       Depression Screen:   PHQ-2/PHQ-9 Depression Screening 8/27/2021   Little interest or pleasure in doing things 0   Feeling down, depressed, or hopeless 3   Total Score 3       Fall Risk Screen:  STEADI Fall Risk Assessment has not been completed.    Health Habits and Functional and Cognitive Screening:  Functional & Cognitive Status 8/27/2021   Do you have difficulty preparing food and eating? No   Do you have difficulty bathing yourself, getting dressed or  grooming yourself? No   Do you have difficulty using the toilet? No   Do you have difficulty moving around from place to place? No   Do you have trouble with steps or getting out of a bed or a chair? Yes   Current Diet Other        Current Diet Comment regular   Dental Exam Up to date   Eye Exam Up to date   Exercise (times per week) 0 times per week   Current Exercises Include No Regular Exercise   Current Exercise Activities Include -   Do you need help using the phone?  No   Are you deaf or do you have serious difficulty hearing?  No   Do you need help with transportation? No   Do you need help shopping? No   Do you need help preparing meals?  No   Do you need help with housework?  No   Do you need help with laundry? No   Do you need help taking your medications? No   Do you need help managing money? No   Do you ever drive or ride in a car without wearing a seat belt? No   Have you felt unusual stress, anger or loneliness in the last month? Yes   Who do you live with? Spouse   If you need help, do you have trouble finding someone available to you? Yes   Have you been bothered in the last four weeks by sexual problems? No   Do you have difficulty concentrating, remembering or making decisions? No         Does the patient have evidence of cognitive impairment? No    Asprin use counseling:Does not need ASA (and currently is not on it)    Age-appropriate Screening Schedule:  Refer to the list below for future screening recommendations based on patient's age, sex and/or medical conditions. Orders for these recommended tests are listed in the plan section. The patient has been provided with a written plan.    Health Maintenance   Topic Date Due   • TDAP/TD VACCINES (1 - Tdap) 08/27/2021 (Originally 1/12/1989)   • ZOSTER VACCINE (1 of 2) 09/05/2022 (Originally 1/12/2021)   • INFLUENZA VACCINE  10/01/2021   • MAMMOGRAM  11/22/2021   • HEMOGLOBIN A1C  02/27/2022   • DIABETIC EYE EXAM  06/01/2022   • DIABETIC FOOT EXAM   08/27/2022   • LIPID PANEL  08/27/2022   • URINE MICROALBUMIN  08/27/2022   • PAP SMEAR  08/07/2023          The following portions of the patient's history were reviewed and updated as appropriate: current medications, past family history, past medical history, past social history, past surgical history and problem list.    Outpatient Medications Prior to Visit   Medication Sig Dispense Refill   • ARIPiprazole (ABILIFY) 20 MG tablet      • aspirin (aspirin) 81 MG EC tablet Take 1 tablet by mouth Daily. 90 tablet 3   • atorvastatin (Lipitor) 20 MG tablet Take 1 tablet by mouth Daily. 90 tablet 3   • venlafaxine XR (EFFEXOR-XR) 150 MG 24 hr capsule Take 150 mg by mouth 2 (Two) Times a Day. Verified ER BID     • meloxicam (Mobic) 15 MG tablet Take 1 tablet by mouth Daily. 30 tablet 3   • cefdinir (OMNICEF) 300 MG capsule Take 1 capsule by mouth 2 (Two) Times a Day. 10 capsule 0   • cyclobenzaprine (FLEXERIL) 5 MG tablet Take 1 tablet by mouth 3 (Three) Times a Day As Needed for Muscle Spasms. 21 tablet 0   • phenazopyridine (Pyridium) 200 MG tablet 1 tablet p.o. 3 times daily before meals 6 tablet 0   • sulfamethoxazole-trimethoprim (BACTRIM DS,SEPTRA DS) 800-160 MG per tablet Take 1 tablet by mouth 2 (Two) Times a Day. 6 tablet 0     No facility-administered medications prior to visit.       Patient Active Problem List   Diagnosis   • Bipolar 1 disorder, depressed, moderate (CMS/HCC)   • Lithium adverse reaction   • Vitamin D deficiency disease   • Screening for breast cancer   • Encounter for screening mammogram for malignant neoplasm of breast    • Chest wall pain   • Suspected COVID-19 virus infection   • Type 2 diabetes mellitus without complication, without long-term current use of insulin (CMS/HCC)   • Morbid obesity (CMS/HCC)   • Transition of care performed with sharing of clinical summary   • Anxiety   • Asthma   • Bipolar disorder (CMS/HCC)   • Dermatitis   • Extrapyramidal and movement disorder   •  "Hyperlipidemia   • Posttraumatic stress disorder   • Encounter for general adult medical examination without abnormal findings   • Atypical chest pain   • Abnormal EKG   • Gastroesophageal reflux disease without esophagitis   • Pleurisy   • Diarrhea due to malabsorption   • Acute non-recurrent pansinusitis   • Chest pain   • Unstable angina pectoris (CMS/HCC)   • Nabothian cyst   • Coronary artery disease due to calcified coronary lesion   • Encounter for support and coordination of transition of care   • Carbuncle   • Aftercare following joint replacement surgery   • Contusion of right knee   • Primary osteoarthritis       Advanced Care Planning:  ACP discussion was held with the patient during this visit. Patient has an advance directive (not in EMR), copy requested.    Review of Systems    Compared to one year ago, the patient feels her physical health is worse.  Compared to one year ago, the patient feels her mental health is the same.    Reviewed chart for potential of high risk medication in the elderly: yes  Reviewed chart for potential of harmful drug interactions in the elderly:yes    Objective         Vitals:    08/27/21 0835   BP: 120/80   BP Location: Left arm   Patient Position: Sitting   Cuff Size: Large Adult   Pulse: 81   Resp: 16   Temp: 98 °F (36.7 °C)   TempSrc: Oral   SpO2: 98%   Weight: 122 kg (270 lb)   Height: 162.6 cm (64\")       Body mass index is 46.35 kg/m².  Discussed the patient's BMI with her. The BMI is above average; BMI management plan is completed.    Physical Exam  Vitals and nursing note reviewed.   Constitutional:       Appearance: Normal appearance. She is well-developed. She is obese.   HENT:      Head: Normocephalic and atraumatic.      Right Ear: Tympanic membrane normal.      Left Ear: Tympanic membrane normal.      Nose: No rhinorrhea.      Mouth/Throat:      Pharynx: No posterior oropharyngeal erythema.   Eyes:      Pupils: Pupils are equal, round, and reactive to light. "   Cardiovascular:      Rate and Rhythm: Normal rate and regular rhythm.      Pulses: Normal pulses.      Heart sounds: Normal heart sounds. No murmur heard.     Pulmonary:      Effort: Pulmonary effort is normal.      Breath sounds: Normal breath sounds.   Chest:      Breasts:         Right: No inverted nipple or mass.         Left: No inverted nipple or mass.   Abdominal:      General: Bowel sounds are normal. There is no distension.      Palpations: Abdomen is soft.   Musculoskeletal:         General: Tenderness present.      Cervical back: Normal range of motion and neck supple.   Skin:     Capillary Refill: Capillary refill takes less than 2 seconds.      Findings: No erythema.   Neurological:      Mental Status: She is alert and oriented to person, place, and time.   Psychiatric:         Mood and Affect: Mood normal.         Behavior: Behavior normal.         Lab Results   Component Value Date    TRIG 152 (H) 08/27/2021    HDL 39 (L) 08/27/2021     (H) 08/27/2021    VLDL 27 08/27/2021    HGBA1C 6.2 (H) 08/27/2021        Assessment/Plan   Medicare Risks and Personalized Health Plan  CMS Preventative Services Quick Reference  Cardiovascular risk  Fall Risk  Inactivity/Sedentary  Obesity/Overweight     The above risks/problems have been discussed with the patient.  Pertinent information has been shared with the patient in the After Visit Summary.  Follow up plans and orders are seen below in the Assessment/Plan Section.    Diagnoses and all orders for this visit:    1. Need for hepatitis C screening test (Primary)  -     Hepatitis C Antibody    2. Obstructive sleep apnea syndrome  -     Ambulatory Referral to Sleep Medicine    3. Bipolar 1 disorder, depressed, moderate (CMS/[facility])  Comments:  no SI    4. Type 2 diabetes mellitus without complication, without long-term current use of insulin (CMS/[facility])  -     Microalbumin / Creatinine Urine Ratio - Urine, Clean Catch    5. Encounter for general adult medical  examination without abnormal findings  Comments:  discussed all recommendations  Orders:  -     CBC Auto Differential  -     Comprehensive Metabolic Panel  -     Hemoglobin A1c  -     Vitamin D 25 Hydroxy  -     Lipid Panel  -     TSH  -     T4, Free    6. Vitamin D deficiency disease  -     Vitamin D 25 Hydroxy      Follow Up:  Return in about 6 months (around 2/27/2022), or if symptoms worsen or fail to improve.     An After Visit Summary and PPPS were given to the patient.     Eye MD in May      During this visit for their annual exam, we reviewed their personal history, social history and family history.  We went over their medications and all the recommended health maintenence items for their age group. They were given the opportunity to ask questions and discuss other concerns.

## 2021-09-30 ENCOUNTER — TELEPHONE (OUTPATIENT)
Dept: FAMILY MEDICINE CLINIC | Facility: CLINIC | Age: 51
End: 2021-09-30

## 2021-10-01 RX ORDER — NITROFURANTOIN 25; 75 MG/1; MG/1
100 CAPSULE ORAL 2 TIMES DAILY
Qty: 14 CAPSULE | Refills: 0 | Status: SHIPPED | OUTPATIENT
Start: 2021-10-01 | End: 2021-11-02

## 2021-11-02 ENCOUNTER — OFFICE VISIT (OUTPATIENT)
Dept: FAMILY MEDICINE CLINIC | Facility: CLINIC | Age: 51
End: 2021-11-02

## 2021-11-02 VITALS
HEART RATE: 80 BPM | TEMPERATURE: 97.1 F | WEIGHT: 270 LBS | SYSTOLIC BLOOD PRESSURE: 130 MMHG | OXYGEN SATURATION: 97 % | DIASTOLIC BLOOD PRESSURE: 90 MMHG | BODY MASS INDEX: 46.1 KG/M2 | HEIGHT: 64 IN

## 2021-11-02 DIAGNOSIS — R31.9 HEMATURIA, UNSPECIFIED TYPE: Primary | ICD-10-CM

## 2021-11-02 DIAGNOSIS — N39.0 RECURRENT UTI: ICD-10-CM

## 2021-11-02 DIAGNOSIS — D22.9 ATYPICAL NEVI: ICD-10-CM

## 2021-11-02 DIAGNOSIS — R03.0 ELEVATED BLOOD PRESSURE READING IN OFFICE WITHOUT DIAGNOSIS OF HYPERTENSION: ICD-10-CM

## 2021-11-02 LAB
BILIRUB BLD-MCNC: NEGATIVE MG/DL
CLARITY, POC: CLEAR
COLOR UR: YELLOW
EXPIRATION DATE: NORMAL
GLUCOSE UR STRIP-MCNC: NEGATIVE MG/DL
KETONES UR QL: NEGATIVE
LEUKOCYTE EST, POC: NEGATIVE
Lab: NORMAL
NITRITE UR-MCNC: NEGATIVE MG/ML
PH UR: 6 [PH] (ref 5–8)
PROT UR STRIP-MCNC: NEGATIVE MG/DL
RBC # UR STRIP: NEGATIVE /UL
SP GR UR: 1.01 (ref 1–1.03)
UROBILINOGEN UR QL: NORMAL

## 2021-11-02 PROCEDURE — 81003 URINALYSIS AUTO W/O SCOPE: CPT | Performed by: NURSE PRACTITIONER

## 2021-11-02 PROCEDURE — 90686 IIV4 VACC NO PRSV 0.5 ML IM: CPT | Performed by: NURSE PRACTITIONER

## 2021-11-02 PROCEDURE — 99214 OFFICE O/P EST MOD 30 MIN: CPT | Performed by: NURSE PRACTITIONER

## 2021-11-02 PROCEDURE — G0008 ADMIN INFLUENZA VIRUS VAC: HCPCS | Performed by: NURSE PRACTITIONER

## 2021-11-02 NOTE — PROGRESS NOTES
"Chief Complaint  suspicious mole on back of neck and recurrent UTI's  Subjective        Tessie Conner presents to [facility]  Pt comes in today with c/o of a suspicious mole on the back of her neck.  She first noticed it 4 weeks ago.  She reports that it itches and bleeds.   It is not painful.  She reports that it is getting bigger in size.   She has never seen derm.    She also comes in with c/o chronic UTIs.   She was traveling to [place] in October and received 3 different antibiotics. All started on Oct 1.   She reports that she had blood in her urine at one time; it has since resolved.  She took Macrobid and bactrim. She is unsure of the other abx.   She denies urgency, frequency, or dysuria when she is sitting  However, when she stands up and tries to move around, she has frequency.   She denies fever or chills.  She denies abdominal or back discomfort.   She is also concerned that she has seen visible blood in the urine on occasion.        Objective     Vital Signs:   /90 (BP Location: Right arm, Patient Position: Sitting, Cuff Size: Adult)   Pulse 80   Temp 97.1 °F (36.2 °C) (Skin)   Ht 162.6 cm (64\")   Wt 122 kg (270 lb)   SpO2 97%   BMI 46.35 kg/m²       BP Readings from Last 3 Encounters:   11/02/21 130/90   08/27/21 120/80   07/30/21 120/84       Wt Readings from Last 3 Encounters:   11/02/21 122 kg (270 lb)   08/27/21 122 kg (270 lb)   07/30/21 123 kg (272 lb)     Physical Exam  Constitutional:       Appearance: She is obese. She is not ill-appearing.   Cardiovascular:      Rate and Rhythm: Normal rate and regular rhythm.      Pulses: Normal pulses.      Heart sounds: Normal heart sounds.   Pulmonary:      Effort: Pulmonary effort is normal.      Breath sounds: Normal breath sounds.   Skin:     Findings: Lesion present.   Neurological:      Mental Status: She is alert and oriented to person, place, and time.   Psychiatric:         Behavior: Behavior " normal.        Result Review :                 Assessment and Plan    Diagnoses and all orders for this visit:    1. Hematuria, unspecified type (Primary)  -     Ambulatory Referral to Urology  -     POC Urinalysis Dipstick, Automated    2. Recurrent UTI  -     Ambulatory Referral to Urology  -     POC Urinalysis Dipstick, Automated    3. Elevated blood pressure reading in office without diagnosis of hypertension  Comments:  repeat /82. will monitor closely     4. Atypical nevi  Comments:  She would like to make an appt with [facility].     Other orders  -     FluLaval/Fluarix/Fluzone >6 Months (1057-8507)    FLU vaccine  UA negative today  Referral to urology  Monitor BP   F/u with derm   Discussed importance of regular exercise and recommended starting or continuing a regular exercise program for good health. The patient was also encouraged to lose weight for better health.   During this office visit, we discussed the pertinent aspects of the visit and treatment recommendations. Pt verbalizes understanding. Follow up was discussed. Patient was given the opportunity to ask questions and discuss other concerns.         Follow Up   Return if symptoms worsen or fail to improve.  Patient was given instructions and counseling regarding her condition or for health maintenance advice. Please see specific information pulled into the AVS if appropriate.        None

## 2021-11-07 ENCOUNTER — APPOINTMENT (OUTPATIENT)
Dept: GENERAL RADIOLOGY | Facility: HOSPITAL | Age: 51
End: 2021-11-07

## 2021-11-07 ENCOUNTER — HOSPITAL ENCOUNTER (EMERGENCY)
Facility: HOSPITAL | Age: 51
Discharge: HOME | End: 2021-11-08
Admitting: EMERGENCY MEDICINE

## 2021-11-07 DIAGNOSIS — Z20.822 LAB TEST NEGATIVE FOR COVID-19 VIRUS: ICD-10-CM

## 2021-11-07 DIAGNOSIS — M79.10 MYALGIA: ICD-10-CM

## 2021-11-07 DIAGNOSIS — R51.9 NONINTRACTABLE HEADACHE, UNSPECIFIED CHRONICITY PATTERN, UNSPECIFIED HEADACHE TYPE: Primary | ICD-10-CM

## 2021-11-07 LAB
BACTERIA UR QL AUTO: ABNORMAL /HPF
BILIRUB UR QL STRIP: ABNORMAL
CLARITY UR: ABNORMAL
COLOR UR: YELLOW
GLUCOSE UR STRIP-MCNC: NEGATIVE MG/DL
HGB UR QL STRIP.AUTO: NEGATIVE
HYALINE CASTS UR QL AUTO: ABNORMAL /LPF
KETONES UR QL STRIP: NEGATIVE
LEUKOCYTE ESTERASE UR QL STRIP.AUTO: ABNORMAL
MUCOUS THREADS URNS QL MICRO: ABNORMAL /HPF
NITRITE UR QL STRIP: NEGATIVE
PH UR STRIP.AUTO: 5.5 [PH] (ref 5–8)
PROT UR QL STRIP: NEGATIVE
RBC # UR: ABNORMAL /HPF
REF LAB TEST METHOD: ABNORMAL
SARS-COV-2 RNA PNL SPEC NAA+PROBE: NOT DETECTED
SP GR UR STRIP: 1.02 (ref 1–1.03)
SQUAMOUS #/AREA URNS HPF: ABNORMAL /HPF
UROBILINOGEN UR QL STRIP: ABNORMAL
WBC UR QL AUTO: ABNORMAL /HPF

## 2021-11-07 PROCEDURE — 81001 URINALYSIS AUTO W/SCOPE: CPT | Performed by: NURSE PRACTITIONER

## 2021-11-07 PROCEDURE — P9612 CATHETERIZE FOR URINE SPEC: HCPCS

## 2021-11-07 PROCEDURE — 25010000002 KETOROLAC TROMETHAMINE PER 15 MG: Performed by: NURSE PRACTITIONER

## 2021-11-07 PROCEDURE — 63710000001 ONDANSETRON ODT 4 MG TABLET DISPERSIBLE: Performed by: NURSE PRACTITIONER

## 2021-11-07 PROCEDURE — 99283 EMERGENCY DEPT VISIT LOW MDM: CPT

## 2021-11-07 PROCEDURE — 71045 X-RAY EXAM CHEST 1 VIEW: CPT

## 2021-11-07 PROCEDURE — 87086 URINE CULTURE/COLONY COUNT: CPT | Performed by: NURSE PRACTITIONER

## 2021-11-07 PROCEDURE — 96372 THER/PROPH/DIAG INJ SC/IM: CPT

## 2021-11-07 PROCEDURE — 87635 SARS-COV-2 COVID-19 AMP PRB: CPT | Performed by: NURSE PRACTITIONER

## 2021-11-07 RX ORDER — HYDROCODONE BITARTRATE AND ACETAMINOPHEN 5; 325 MG/1; MG/1
1 TABLET ORAL ONCE AS NEEDED
Status: DISCONTINUED | OUTPATIENT
Start: 2021-11-07 | End: 2021-11-08

## 2021-11-07 RX ORDER — KETOROLAC TROMETHAMINE 30 MG/ML
30 INJECTION, SOLUTION INTRAMUSCULAR; INTRAVENOUS ONCE
Status: COMPLETED | OUTPATIENT
Start: 2021-11-07 | End: 2021-11-07

## 2021-11-07 RX ORDER — ACETAMINOPHEN 500 MG
1000 TABLET ORAL ONCE
Status: COMPLETED | OUTPATIENT
Start: 2021-11-07 | End: 2021-11-07

## 2021-11-07 RX ORDER — ONDANSETRON 4 MG/1
4 TABLET, ORALLY DISINTEGRATING ORAL ONCE
Status: COMPLETED | OUTPATIENT
Start: 2021-11-07 | End: 2021-11-07

## 2021-11-07 RX ADMIN — ONDANSETRON 4 MG: 4 TABLET, ORALLY DISINTEGRATING ORAL at 22:00

## 2021-11-07 RX ADMIN — KETOROLAC TROMETHAMINE 30 MG: 30 INJECTION, SOLUTION INTRAMUSCULAR at 22:00

## 2021-11-07 RX ADMIN — ACETAMINOPHEN 1000 MG: 500 TABLET, FILM COATED ORAL at 23:28

## 2021-11-08 ENCOUNTER — OFFICE VISIT (OUTPATIENT)
Dept: FAMILY MEDICINE CLINIC | Facility: CLINIC | Age: 51
End: 2021-11-08

## 2021-11-08 ENCOUNTER — LAB (OUTPATIENT)
Dept: FAMILY MEDICINE CLINIC | Facility: CLINIC | Age: 51
End: 2021-11-08

## 2021-11-08 VITALS
RESPIRATION RATE: 18 BRPM | SYSTOLIC BLOOD PRESSURE: 138 MMHG | WEIGHT: 271.45 LBS | DIASTOLIC BLOOD PRESSURE: 67 MMHG | HEART RATE: 74 BPM | BODY MASS INDEX: 46.34 KG/M2 | OXYGEN SATURATION: 94 % | HEIGHT: 64 IN | TEMPERATURE: 98.3 F

## 2021-11-08 VITALS
HEIGHT: 64 IN | HEART RATE: 98 BPM | OXYGEN SATURATION: 94 % | WEIGHT: 269 LBS | TEMPERATURE: 96.9 F | BODY MASS INDEX: 45.93 KG/M2 | DIASTOLIC BLOOD PRESSURE: 80 MMHG | SYSTOLIC BLOOD PRESSURE: 124 MMHG | RESPIRATION RATE: 18 BRPM

## 2021-11-08 DIAGNOSIS — N30.00 ACUTE CYSTITIS WITHOUT HEMATURIA: ICD-10-CM

## 2021-11-08 DIAGNOSIS — N30.00 ACUTE CYSTITIS WITHOUT HEMATURIA: Primary | ICD-10-CM

## 2021-11-08 LAB
BILIRUB UR QL STRIP: NEGATIVE
CLARITY UR: CLEAR
COLOR UR: YELLOW
GLUCOSE UR STRIP-MCNC: NEGATIVE MG/DL
HGB UR QL STRIP.AUTO: NEGATIVE
KETONES UR QL STRIP: ABNORMAL
LEUKOCYTE ESTERASE UR QL STRIP.AUTO: NEGATIVE
NITRITE UR QL STRIP: NEGATIVE
PH UR STRIP.AUTO: 5.5 [PH] (ref 5–8)
PROT UR QL STRIP: NEGATIVE
SP GR UR STRIP: 1.02 (ref 1–1.03)
UROBILINOGEN UR QL STRIP: ABNORMAL

## 2021-11-08 PROCEDURE — 85025 COMPLETE CBC W/AUTO DIFF WBC: CPT | Performed by: HOSPITALIST

## 2021-11-08 PROCEDURE — 81003 URINALYSIS AUTO W/O SCOPE: CPT | Performed by: NURSE PRACTITIONER

## 2021-11-08 PROCEDURE — 36415 COLL VENOUS BLD VENIPUNCTURE: CPT

## 2021-11-08 PROCEDURE — 99213 OFFICE O/P EST LOW 20 MIN: CPT | Performed by: HOSPITALIST

## 2021-11-08 RX ORDER — PROCHLORPERAZINE MALEATE 5 MG/1
5 TABLET ORAL EVERY 6 HOURS PRN
Qty: 8 TABLET | Refills: 0 | Status: SHIPPED | OUTPATIENT
Start: 2021-11-08 | End: 2021-11-18

## 2021-11-08 RX ORDER — CEPHALEXIN 500 MG/1
500 CAPSULE ORAL 2 TIMES DAILY
Qty: 14 CAPSULE | Refills: 0 | Status: SHIPPED | OUTPATIENT
Start: 2021-11-08 | End: 2021-11-18

## 2021-11-08 RX ORDER — MELOXICAM 15 MG/1
15 TABLET ORAL DAILY
Qty: 10 TABLET | Refills: 0 | Status: SHIPPED | OUTPATIENT
Start: 2021-11-08 | End: 2021-11-18

## 2021-11-08 NOTE — ED PROVIDER NOTES
Subjective        Chief complaint:  Headache body aches    Context: Patient is a 51-year-old female who comes in complaining of a headache and body aches that started today.  She describes it as a frontal headache.  She states she has a history of migraines but this is is not as bad as those.  She denies any photophobia or phonophobia.  She denies any thunderclap type noise or worse headache of her life.  She denies any unilateral focal deficits weakness confusion ataxia or lethargy.  She states she has been fully covered vaccinated and denies any known ill contacts.  She denies any fever abdominal pain nausea vomiting diarrhea urinary complaints, has had some nausea.  She denies any chest pain or shortness of breath.  She denies any fever rash sore throat or ear pain.  No stiff neck.  No tick bites.  No recent travel.  She states she was on 3 rounds of antibiotics last month for a urinary tract infection .  She was walking around [facility] today and was feeling poorly so she checked her blood pressure there and it was 140/78.  She has had some minor low back achiness without any midline pain saddle anesthesia bowel or bladder incontinence retention or trauma..      Duration: Today    Timing: Waxes and wanes    Severity: Moderate      Associated symptoms:has not had any medications at home to alleviate her symptoms          PCP:  chasity          Review of Systems   Constitutional: Negative for fever.   HENT: Negative for sore throat.    Respiratory: Negative.    Cardiovascular: Negative.    Gastrointestinal: Positive for nausea. Negative for abdominal pain, blood in stool, constipation, diarrhea and vomiting.   Genitourinary: Negative.    Musculoskeletal: Positive for myalgias.   Skin: Negative.    Neurological: Positive for headaches. Negative for dizziness, tremors, seizures, syncope, facial asymmetry, speech difficulty, weakness, light-headedness and numbness.   Hematological: Does not bruise/bleed easily.    Psychiatric/Behavioral: Negative for confusion.       Past Medical History:   Diagnosis Date   • Anxiety    • Asthma 2020   • Bipolar 1 disorder (HCC)    • Depression    • Headache    • Hyperlipidemia 10/4/2016   • Inflammatory bowel disease    • Irritable bowel syndrome    • Obesity    • Urinary tract infection Off and on   • Visual impairment        Allergies   Allergen Reactions   • Oxycodone-Acetaminophen Mental Status Change       Past Surgical History:   Procedure Laterality Date   • ABDOMINAL SURGERY      c- section   • CARDIAC CATHETERIZATION N/A 2020    Procedure: Left Heart Cath possible PCI, atherectomy, hemodynamic support;  Surgeon: Thomas Zamora MD;  Location: [facility] INVASIVE LOCATION;  Service: Cardiology;  Laterality: N/A;   •  SECTION     • FOOT/TOE TENDON REPAIR Left    • HYSTERECTOMY     • ROTATOR CUFF REPAIR Left    • TUBAL ABDOMINAL LIGATION         Family History   Problem Relation Age of Onset   • Arthritis Mother    • Cancer Mother    • Depression Mother    • Diabetes Mother    • Early death Mother    • Mental illness Mother    • Anxiety disorder Mother    • Alcohol abuse Father    • Diabetes Father    • Early death Father    • Heart disease Father    • Hyperlipidemia Father    • Hypertension Father    • Vision loss Father    • Heart disease Sister    • Heart disease Brother    • Hypertension Brother    • Cancer Maternal Grandmother    • Drug abuse Brother    • Drug abuse Sister        Social History     Socioeconomic History   • Marital status:    Tobacco Use   • Smoking status: Never Smoker   • Smokeless tobacco: Never Used   Vaping Use   • Vaping Use: Never used   Substance and Sexual Activity   • Alcohol use: No   • Drug use: No   • Sexual activity: Not Currently     Partners: Male     Birth control/protection: None           Objective   Physical Exam     Vital signs and triage nurse note reviewed.    Constitutional: Awake, alert; well-developed and well-nourished. No acute distress is noted.   HEENT: Normocephalic, atraumatic; pupils are PERRL with intact EOM; oropharynx is pink and moist without exudate or erythema. TMs intact bilaterally without erythema bulging bleeding or discharge.  No pain over the frontal maxillary sinuses.  Neck: Supple, full range of motion without pain; no cervical lymphadenopathy.  Negative Brudzinski   Cardiovascular: Regular rate and rhythm, normal S1-S2.   Pulmonary: Respiratory effort regular nonlabored, breath sounds clear to auscultation all fields.   Abdomen: Soft, nontender nondistended with normoactive bowel sounds; no rebound or guarding.   Musculoskeletal: Independent range of motion of all extremities with no palpable tenderness or edema.   Neuro: Alert oriented x3, speech is clear and appropriate, GCS 15.  NIH 0.  No pronator drift, finger-nose intact.  No facial asymmetry.  Equal palate rise.   Skin:  Fleshtone warm, dry, intact; no erythematous or petechial rash or lesion       Procedures           ED Course  ED Course as of 11/08/21 0051   Sun Nov 07, 2021   2359 Clean-catch urine specimen is contaminated.  Cath was ordered [JW]      ED Course User Index  [JW] Flakita Fuentes APRN           Labs Reviewed   URINALYSIS W/ CULTURE IF INDICATED - Abnormal; Notable for the following components:       Result Value    Appearance, UA Cloudy (*)     Bilirubin, UA Small (1+) (*)     Leuk Esterase, UA Small (1+) (*)     All other components within normal limits   URINALYSIS, MICROSCOPIC ONLY - Abnormal; Notable for the following components:    RBC, UA 0-2 (*)     WBC, UA 31-50 (*)     Bacteria, UA Trace (*)     Squamous Epithelial Cells, UA 13-20 (*)     Mucus, UA Small/1+ (*)     All other components within normal limits   URINALYSIS W/ CULTURE IF INDICATED - Abnormal; Notable for the following components:    Ketones, UA Trace (*)     All other components within normal  limits    Narrative:     Urine microscopic not indicated.   COVID-19,CEPHEID/YAA/BDMAX,COR/DICK/PAD/BRIDGER IN-HOUSE,NP SWAB IN TRANSPORT MEDIA 3-4 HR TAT, RT-PCR - Normal    Narrative:     Fact sheet for providers: https://www.fda.gov/media/318824/download     Fact sheet for patients: https://www.fda.gov/media/411921/download  Fact sheet for providers: https://www.fda.gov/media/983433/download    Fact sheet for patients: https://www.fda.gov/media/474153/download    Test performed by PCR.   URINE CULTURE     Medications   ondansetron ODT (ZOFRAN-ODT) disintegrating tablet 4 mg (4 mg Oral Given 11/7/21 2200)   ketorolac (TORADOL) injection 30 mg (30 mg Intramuscular Given 11/7/21 2200)   acetaminophen (TYLENOL) tablet 1,000 mg (1,000 mg Oral Given 11/7/21 2328)     No radiology results for the last day  Prior to Admission medications    Medication Sig Start Date End Date Taking? Authorizing Provider   ARIPiprazole (ABILIFY) 20 MG tablet  2/24/21   Marko Abdullahi MD   aspirin (aspirin) 81 MG EC tablet Take 1 tablet by mouth Daily. 10/29/20   Marsha Lopez MD   atorvastatin (Lipitor) 20 MG tablet Take 1 tablet by mouth Daily. 10/29/20   Marsha Lopez MD   meloxicam (Mobic) 15 MG tablet Take 1 tablet by mouth Daily. 8/23/21   Marsha Lopez MD   prochlorperazine (COMPAZINE) 5 MG tablet Take 1 tablet by mouth Every 6 (Six) Hours As Needed for Nausea (headache). 11/8/21   Flakita Fuentes APRN   venlafaxine XR (EFFEXOR-XR) 150 MG 24 hr capsule Take 150 mg by mouth 2 (Two) Times a Day. Verified ER BID    ProviderMarko MD                                     MDM  Number of Diagnoses or Management Options  Lab test negative for COVID-19 virus  Myalgia  Nonintractable headache, unspecified chronicity pattern, unspecified headache type  Diagnosis management comments:      Comorbidities:  has a past medical history of Anxiety (2005), Asthma (4/17/2020), Bipolar 1 disorder (HCC), Depression  (2005), Headache (2000), Hyperlipidemia (10/4/2016), Inflammatory bowel disease (2010), Irritable bowel syndrome (2010), Obesity (1999), Urinary tract infection (Off and on), and Visual impairment (2011).  Differentials:   not all inclusive of differentials considered  Radiology interpretation:  X-rays reviewed by me and interpreted by Dr. Phelan, negative  Lab interpretation:  Labs viewed by me significant for, sterile urine sample negative.  Covid negative    Appropriate PPE worn during exam.  Patient had a Covid swab and was given Zofran and Toradol.  On reexam she states the pain is no better but no worse and is asking for something stronger.  I ordered Norco and chest x-ray and urine test.  She reports to nursing staff that she would like something not as strong as Norco and was given Tylenol.  Clean-catch urinalysis appear to be contaminated and patient had a sterile sample obtained which does not show any infection.  On reexam she states she continues to have a mild frontal headache but denies need for any additional medications.  She will be sent home with Compazine.  We discussed follow-up with her family doctor in 1 warning signs and when to return emergently to the ER.    i discussed findings with patient who voices understanding of discharge instructions, signs and symptoms requiring return to ED; discharged improved and in stable condition with follow up for re-evaluation.             Final diagnoses:   Nonintractable headache, unspecified chronicity pattern, unspecified headache type   Myalgia   Lab test negative for COVID-19 virus       ED Disposition  ED Disposition     ED Disposition Condition Comment    Discharge Stable           Marsha Lopez MD  [address] [facility] [address]  [place] [id number]  [phone]    Schedule an appointment as soon as possible for a visit            Medication List      New Prescriptions    prochlorperazine 5 MG tablet  Commonly known as: COMPAZINE  Take 1  tablet by mouth Every 6 (Six) Hours As Needed for Nausea (headache).           Where to Get Your Medications      These medications were sent to [facility] [address] [place] [address] [place] - [address] - [phone]  - [phone]   [address] [place] [address]    Phone: [phone]   · prochlorperazine 5 MG tablet          Flakita Fuentes, APRN  11/08/21 0051

## 2021-11-08 NOTE — PROGRESS NOTES
"Diego Conner is a 51 y.o. female.     Subjective / HPI  Patient complaining of some dysuria for last few days. Pt also complaining of chronic headache. She went to ER yesterday, UA revealed multiple WBC, surprisingly pt was not given any antibiotics. No nausea or vomiting. Pt says she gets UTI frequently.    Review of Systems    Objective     /80 (BP Location: Right arm, Cuff Size: Large Adult)   Pulse 98   Temp 96.9 °F (36.1 °C) (Infrared)   Resp 18   Ht 162.6 cm (64\")   Wt 122 kg (269 lb)   SpO2 94%   BMI 46.17 kg/m²      Physical Exam  Vitals and nursing note reviewed.   Constitutional:       General: She is not in acute distress.     Appearance: Normal appearance. She is well-developed. She is not ill-appearing, toxic-appearing or diaphoretic.   HENT:      Head: Normocephalic and atraumatic.      Right Ear: Ear canal and external ear normal.      Left Ear: Ear canal and external ear normal.      Nose: Nose normal. No congestion or rhinorrhea.      Mouth/Throat:      Mouth: Mucous membranes are moist.      Pharynx: No oropharyngeal exudate.   Eyes:      General: No scleral icterus.        Right eye: No discharge.         Left eye: No discharge.      Extraocular Movements: Extraocular movements intact.      Conjunctiva/sclera: Conjunctivae normal.      Pupils: Pupils are equal, round, and reactive to light.   Neck:      Thyroid: No thyromegaly.      Vascular: No carotid bruit or JVD.      Trachea: No tracheal deviation.   Cardiovascular:      Rate and Rhythm: Normal rate and regular rhythm.      Pulses: Normal pulses.      Heart sounds: Normal heart sounds. No murmur heard.  No friction rub. No gallop.    Pulmonary:      Effort: Pulmonary effort is normal. No respiratory distress.      Breath sounds: Normal breath sounds. No stridor. No wheezing, rhonchi or rales.   Chest:      Chest wall: No tenderness.   Abdominal:      General: Bowel sounds are normal. There is no distension.      " Palpations: Abdomen is soft. There is no mass.      Tenderness: There is no abdominal tenderness. There is no guarding or rebound.      Hernia: No hernia is present.   Musculoskeletal:         General: No swelling, tenderness, deformity or signs of injury. Normal range of motion.      Cervical back: Normal range of motion and neck supple. No rigidity. No muscular tenderness.      Right lower leg: No edema.      Left lower leg: No edema.   Lymphadenopathy:      Cervical: No cervical adenopathy.   Skin:     General: Skin is warm and dry.      Coloration: Skin is not jaundiced or pale.      Findings: No bruising, erythema or rash.   Neurological:      General: No focal deficit present.      Mental Status: She is alert and oriented to person, place, and time. Mental status is at baseline.      Cranial Nerves: No cranial nerve deficit.      Sensory: No sensory deficit.      Motor: No weakness or abnormal muscle tone.      Coordination: Coordination normal.   Psychiatric:         Mood and Affect: Mood normal.         Behavior: Behavior normal.         Thought Content: Thought content normal.         Judgment: Judgment normal.         Procedures       Assessment/Plan   Diagnoses and all orders for this visit:    1. Acute cystitis without hematuria (Primary)  -     CBC w AUTO Differential; Future  -     CT Abdomen Pelvis Stone Protocol; Future    Other orders  -     cephalexin (Keflex) 500 MG capsule; Take 1 capsule by mouth 2 (Two) Times a Day.  Dispense: 14 capsule; Refill: 0  -     meloxicam (Mobic) 15 MG tablet; Take 1 tablet by mouth Daily.  Dispense: 10 tablet; Refill: 0

## 2021-11-08 NOTE — DISCHARGE INSTRUCTIONS
Medications as directed.  Plenty fluids.  Follow-up with your family doctor tomorrow.  Return for any new or worsening symptoms.

## 2021-11-09 LAB
BACTERIA SPEC AEROBE CULT: NORMAL
BASOPHILS # BLD AUTO: 0.02 10*3/MM3 (ref 0–0.2)
BASOPHILS NFR BLD AUTO: 0.3 % (ref 0–1.5)
DEPRECATED RDW RBC AUTO: 49.8 FL (ref 37–54)
EOSINOPHIL # BLD AUTO: 0.01 10*3/MM3 (ref 0–0.4)
EOSINOPHIL NFR BLD AUTO: 0.1 % (ref 0.3–6.2)
ERYTHROCYTE [DISTWIDTH] IN BLOOD BY AUTOMATED COUNT: 16.2 % (ref 12.3–15.4)
HCT VFR BLD AUTO: 36.5 % (ref 34–46.6)
HGB BLD-MCNC: 12 G/DL (ref 12–15.9)
IMM GRANULOCYTES # BLD AUTO: 0.04 10*3/MM3 (ref 0–0.05)
IMM GRANULOCYTES NFR BLD AUTO: 0.5 % (ref 0–0.5)
LYMPHOCYTES # BLD AUTO: 2.52 10*3/MM3 (ref 0.7–3.1)
LYMPHOCYTES NFR BLD AUTO: 32.1 % (ref 19.6–45.3)
MCH RBC QN AUTO: 27.6 PG (ref 26.6–33)
MCHC RBC AUTO-ENTMCNC: 32.9 G/DL (ref 31.5–35.7)
MCV RBC AUTO: 83.9 FL (ref 79–97)
MONOCYTES # BLD AUTO: 0.53 10*3/MM3 (ref 0.1–0.9)
MONOCYTES NFR BLD AUTO: 6.7 % (ref 5–12)
NEUTROPHILS NFR BLD AUTO: 4.74 10*3/MM3 (ref 1.7–7)
NEUTROPHILS NFR BLD AUTO: 60.3 % (ref 42.7–76)
NRBC BLD AUTO-RTO: 0 /100 WBC (ref 0–0.2)
PLATELET # BLD AUTO: 269 10*3/MM3 (ref 140–450)
PMV BLD AUTO: 12.2 FL (ref 6–12)
RBC # BLD AUTO: 4.35 10*6/MM3 (ref 3.77–5.28)
WBC # BLD AUTO: 7.86 10*3/MM3 (ref 3.4–10.8)

## 2021-11-12 ENCOUNTER — OFFICE VISIT (OUTPATIENT)
Dept: FAMILY MEDICINE CLINIC | Facility: CLINIC | Age: 51
End: 2021-11-12

## 2021-11-12 ENCOUNTER — TELEPHONE (OUTPATIENT)
Dept: FAMILY MEDICINE CLINIC | Facility: CLINIC | Age: 51
End: 2021-11-12

## 2021-11-12 VITALS
RESPIRATION RATE: 12 BRPM | OXYGEN SATURATION: 95 % | DIASTOLIC BLOOD PRESSURE: 77 MMHG | BODY MASS INDEX: 46.1 KG/M2 | SYSTOLIC BLOOD PRESSURE: 155 MMHG | WEIGHT: 270 LBS | HEIGHT: 64 IN | HEART RATE: 77 BPM

## 2021-11-12 DIAGNOSIS — I10 PRIMARY HYPERTENSION: ICD-10-CM

## 2021-11-12 DIAGNOSIS — D86.9 SARCOIDOSIS: Primary | ICD-10-CM

## 2021-11-12 PROCEDURE — 99214 OFFICE O/P EST MOD 30 MIN: CPT | Performed by: HOSPITALIST

## 2021-11-12 RX ORDER — LISINOPRIL 10 MG/1
10 TABLET ORAL DAILY
Qty: 30 TABLET | Refills: 5 | Status: SHIPPED | OUTPATIENT
Start: 2021-11-12 | End: 2022-02-14

## 2021-11-12 NOTE — PROGRESS NOTES
"Diego Conner is a 51 y.o. female.     Subjective / HPI  Patient has appointment to see urology service for recurrent UTIs, pt underwent ct scan abdomen revealing splenomegaly and 2 mm lesions in lung. Pt also noted to have BP running on higher side    Review of Systems    Objective     /77   Pulse 77   Resp 12   Ht 162.6 cm (64\")   Wt 122 kg (270 lb)   SpO2 95%   BMI 46.35 kg/m²      Physical Exam  Vitals and nursing note reviewed.   Constitutional:       General: She is not in acute distress.     Appearance: Normal appearance. She is well-developed. She is not ill-appearing, toxic-appearing or diaphoretic.   HENT:      Head: Normocephalic and atraumatic.      Right Ear: Ear canal and external ear normal.      Left Ear: Ear canal and external ear normal.      Nose: Nose normal. No congestion or rhinorrhea.      Mouth/Throat:      Mouth: Mucous membranes are moist.      Pharynx: No oropharyngeal exudate.   Eyes:      General: No scleral icterus.        Right eye: No discharge.         Left eye: No discharge.      Extraocular Movements: Extraocular movements intact.      Conjunctiva/sclera: Conjunctivae normal.      Pupils: Pupils are equal, round, and reactive to light.   Neck:      Thyroid: No thyromegaly.      Vascular: No carotid bruit or JVD.      Trachea: No tracheal deviation.   Cardiovascular:      Rate and Rhythm: Normal rate and regular rhythm.      Pulses: Normal pulses.      Heart sounds: Normal heart sounds. No murmur heard.  No friction rub. No gallop.    Pulmonary:      Effort: Pulmonary effort is normal. No respiratory distress.      Breath sounds: Normal breath sounds. No stridor. No wheezing, rhonchi or rales.   Chest:      Chest wall: No tenderness.   Abdominal:      General: Bowel sounds are normal. There is no distension.      Palpations: Abdomen is soft. There is no mass.      Tenderness: There is no abdominal tenderness. There is no guarding or rebound.      Hernia: " No hernia is present.   Musculoskeletal:         General: No swelling, tenderness, deformity or signs of injury. Normal range of motion.      Cervical back: Normal range of motion and neck supple. No rigidity. No muscular tenderness.      Right lower leg: No edema.      Left lower leg: No edema.   Lymphadenopathy:      Cervical: No cervical adenopathy.   Skin:     General: Skin is warm and dry.      Coloration: Skin is not jaundiced or pale.      Findings: No bruising, erythema or rash.   Neurological:      General: No focal deficit present.      Mental Status: She is alert and oriented to person, place, and time. Mental status is at baseline.      Cranial Nerves: No cranial nerve deficit.      Sensory: No sensory deficit.      Motor: No weakness or abnormal muscle tone.      Coordination: Coordination normal.   Psychiatric:         Mood and Affect: Mood normal.         Behavior: Behavior normal.         Thought Content: Thought content normal.         Judgment: Judgment normal.         Procedures       Assessment/Plan   Diagnoses and all orders for this visit:    1. Sarcoidosis (Primary)  Comments:  Finding of ct scan abdomen revealed splenomegaly of unclear etiology and also 2 mm lesion in lung, will do ct scan chest with contrast and hematology evaluation  Orders:  -     CT Chest With Contrast; Future  -     Ambulatory Referral to Hematology    2. Primary hypertension  Comments:  noted running on higher side, will add lisinopril 10 mg po daily.    Other orders  -     lisinopril (PRINIVIL,ZESTRIL) 10 MG tablet; Take 1 tablet by mouth Daily.  Dispense: 30 tablet; Refill: 5

## 2021-11-12 NOTE — TELEPHONE ENCOUNTER
Caller: Tessie Conner    Relationship: Self    Best call back number: [phone]    What is the best time to reach you: ANYTIME    Who are you requesting to speak with (clinical staff, provider,  specific staff member): CLINICAL STAFF    Do you know the name of the person who called: PATIENT    What was the call regarding: CALLED TO CHECK ON REFERRAL TO HEMATOLOGY, WANTED TO HAVE THE NAME OF THE DOCTOR SHE WILL BE SEEING    Do you require a callback: REQUESTED CALLBACK

## 2021-11-15 ENCOUNTER — TELEPHONE (OUTPATIENT)
Dept: FAMILY MEDICINE CLINIC | Facility: CLINIC | Age: 51
End: 2021-11-15

## 2021-11-15 ENCOUNTER — TELEPHONE (OUTPATIENT)
Dept: ONCOLOGY | Facility: CLINIC | Age: 51
End: 2021-11-15

## 2021-11-15 NOTE — TELEPHONE ENCOUNTER
Caller: Tessie Muhammad    Relationship to patient: Self    Best call back number: [phone]       Patient is needing:   MS. MUHAMMAD WOULD LIKE TO KNOW WHY SHE IS BEING REFERRED TO THE [facility], SHE WOULD LIKE TO KNOW THAT SHE HAS NOT BEEN DIAGNOSED WITH CANCER. SHE HAS AN APPOINTMENT 11/19/2021

## 2021-11-15 NOTE — TELEPHONE ENCOUNTER
Caller: Tessie Conner    Relationship: Self    Best call back number: [phone]. CAN LEAVE VM IF NO ANSWER.     What is the best time to reach you: ANYTIME    Who are you requesting to speak with (clinical staff, provider,  specific staff member): DR KOLB OR NURSE    What was the call regarding: PT AND HER  CALLED IN VERY CONFUSED ABOUT WHAT THE REF WAS FOR. SHE WOULD LIKE A CALL BACK TO KNOW WHAT IS GOING ON.     Do you require a callback: YES, PLS CALL PT TO ADVISE.

## 2021-11-16 NOTE — TELEPHONE ENCOUNTER
Caller: Tessie Conner    Relationship: Self    Best call back number: [phone]     What medications are you currently taking:   Current Outpatient Medications on File Prior to Visit   Medication Sig Dispense Refill   • ARIPiprazole (ABILIFY) 20 MG tablet      • aspirin (aspirin) 81 MG EC tablet Take 1 tablet by mouth Daily. 90 tablet 3   • atorvastatin (Lipitor) 20 MG tablet Take 1 tablet by mouth Daily. 90 tablet 3   • cephalexin (Keflex) 500 MG capsule Take 1 capsule by mouth 2 (Two) Times a Day. 14 capsule 0   • lisinopril (PRINIVIL,ZESTRIL) 10 MG tablet Take 1 tablet by mouth Daily. 30 tablet 5   • meloxicam (Mobic) 15 MG tablet Take 1 tablet by mouth Daily. 30 tablet 3   • meloxicam (Mobic) 15 MG tablet Take 1 tablet by mouth Daily. 10 tablet 0   • prochlorperazine (COMPAZINE) 5 MG tablet Take 1 tablet by mouth Every 6 (Six) Hours As Needed for Nausea (headache). 8 tablet 0   • venlafaxine XR (EFFEXOR-XR) 150 MG 24 hr capsule Take 150 mg by mouth 2 (Two) Times a Day. Verified ER BID       No current facility-administered medications on file prior to visit.          When did you start taking these medications:1 WEEK    Which medication are you concerned about: LISINOPRIL 10 MG   Who prescribed you this medication: DR. ZAVALETA  What are your concerns: TESSIE SAYS HER BLOOD PRESSURE IS STAYING AROUND 170/102 , WITH A CONSTANT HEADACHE, SHE IS WANTING TO KNOW IF THE MEDICATION SHOULD BE INCREASED     How long have you had these concerns: 1 WEEK      [address]  [phone] [facility] [phone]   [phone]      
Pt wanting to know why she was referred to the office.  I explained that she would need to discuss this with the provider that gave the referral orders.  She v/u and will call their office.   
Yes. She can increase lisinopril to 20 mg po daily. If she need prescription ...Iet us know.  
balance training/gait training/bed mobility training/strengthening/transfer training

## 2021-11-17 ENCOUNTER — TELEPHONE (OUTPATIENT)
Dept: FAMILY MEDICINE CLINIC | Facility: CLINIC | Age: 51
End: 2021-11-17

## 2021-11-17 NOTE — TELEPHONE ENCOUNTER
Caller: Tessie Conner    Relationship: Self    Best call back number: [phone]     What is the best time to reach you: ANY    Who are you requesting to speak with (clinical staff, provider,  specific staff member): CLINICAL STAFF    Do you know the name of the person who called:     What was the call regarding: HIGH BLOOD PRESSURE    Do you require a callback: YES

## 2021-11-18 ENCOUNTER — OFFICE VISIT (OUTPATIENT)
Dept: FAMILY MEDICINE CLINIC | Facility: CLINIC | Age: 51
End: 2021-11-18

## 2021-11-18 VITALS
OXYGEN SATURATION: 96 % | SYSTOLIC BLOOD PRESSURE: 114 MMHG | HEIGHT: 64 IN | DIASTOLIC BLOOD PRESSURE: 68 MMHG | WEIGHT: 270 LBS | RESPIRATION RATE: 18 BRPM | BODY MASS INDEX: 46.1 KG/M2 | HEART RATE: 93 BPM | TEMPERATURE: 96.6 F

## 2021-11-18 DIAGNOSIS — E78.00 PURE HYPERCHOLESTEROLEMIA: ICD-10-CM

## 2021-11-18 DIAGNOSIS — F31.32 BIPOLAR 1 DISORDER, DEPRESSED, MODERATE (HCC): Primary | ICD-10-CM

## 2021-11-18 DIAGNOSIS — R16.1 SPLEEN ENLARGEMENT: ICD-10-CM

## 2021-11-18 DIAGNOSIS — R91.1 LUNG NODULE: ICD-10-CM

## 2021-11-18 DIAGNOSIS — I10 PRIMARY HYPERTENSION: ICD-10-CM

## 2021-11-18 PROCEDURE — 99214 OFFICE O/P EST MOD 30 MIN: CPT | Performed by: INTERNAL MEDICINE

## 2021-11-18 RX ORDER — ATORVASTATIN CALCIUM 20 MG/1
20 TABLET, FILM COATED ORAL DAILY
Qty: 90 TABLET | Refills: 3 | Status: SHIPPED | OUTPATIENT
Start: 2021-11-18 | End: 2022-11-10

## 2021-11-18 NOTE — PROGRESS NOTES
Hematology/Oncology Outpatient Consultation    Patient name: Tessie Conner  : 1970  MRN: [id number]  Primary Care Physician: Marsha Lopez MD  Referring Physician: Tammy Costa MD  Reason For Consult:     Chief Complaint   Patient presents with   • Appointment     Sarcoidosis       History of Present Illness:      This is a 51-year-old female has a history of recurrent urinary tract infection and for that reason she is actively cared for by the urology service.  During her work-up for the UTI she had a CT scan of the abdomen and pelvis on 2021 is basically showed diffuse hepatic steatosis, new mild splenomegaly with a few small hypoattenuating lesions partially imaged 2 mm right middle lobe nodule and 3 mm anterior right middle lobe nodule which are new.  Differentials on the splenomegaly mentioned include sarcoidosis, lymphoma CT scan of the chest was recommended to further evaluate.    Patient had a CBC done on 2021 white count was 7.8, hemoglobin 12, MCV was normal at 83 and platelets were 269.  Differentials are 60% neutrophils, 32% lymphocytes and 6% monocytes.    Patient scheduled for cystoscopy on 2021    Patient does complain of some night sweats, but she has not had weight loss.  She also complains of some fatigue.    Patient is  she has 3 adult children.  She does not smoke and does not drink.      Past Medical History:   Diagnosis Date   • Anxiety    • Asthma 2020   • Bipolar 1 disorder (HCC)    • Depression    • Headache    • Hyperlipidemia 10/4/2016   • Inflammatory bowel disease    • Irritable bowel syndrome    • Obesity    • Urinary tract infection Off and on   • Visual impairment        Past Surgical History:   Procedure Laterality Date   • ABDOMINAL SURGERY      c- section   • CARDIAC CATHETERIZATION N/A 2020    Procedure: Left Heart Cath possible PCI, atherectomy, hemodynamic support;  Surgeon: Thomas Zamora  Benjamin Orosco MD;  Location: [facility] INVASIVE LOCATION;  Service: Cardiology;  Laterality: N/A;   •  SECTION     • FOOT/TOE TENDON REPAIR Left    • HYSTERECTOMY     • ROTATOR CUFF REPAIR Left    • TUBAL ABDOMINAL LIGATION           Current Outpatient Medications:   •  ARIPiprazole (ABILIFY) 20 MG tablet, , Disp: , Rfl:   •  aspirin (aspirin) 81 MG EC tablet, Take 1 tablet by mouth Daily., Disp: 90 tablet, Rfl: 3  •  atorvastatin (Lipitor) 20 MG tablet, Take 1 tablet by mouth Daily., Disp: 90 tablet, Rfl: 3  •  lisinopril (PRINIVIL,ZESTRIL) 10 MG tablet, Take 1 tablet by mouth Daily., Disp: 30 tablet, Rfl: 5  •  meloxicam (Mobic) 15 MG tablet, Take 1 tablet by mouth Daily., Disp: 30 tablet, Rfl: 3  •  venlafaxine XR (EFFEXOR-XR) 150 MG 24 hr capsule, Take 150 mg by mouth 2 (Two) Times a Day. Verified ER BID, Disp: , Rfl:     Allergies   Allergen Reactions   • Oxycodone-Acetaminophen Mental Status Change       Immunization History   Administered Date(s) Administered   • COVID-19 (BEATRICE) 2021   • FluLaval/Fluarix/Fluzone >6 10/01/2020, 2021   • Pneumococcal Polysaccharide (PPSV23) 2020       Family History   Problem Relation Age of Onset   • Arthritis Mother    • Cancer Mother    • Depression Mother    • Diabetes Mother    • Early death Mother    • Mental illness Mother    • Anxiety disorder Mother    • Alcohol abuse Father    • Diabetes Father    • Early death Father    • Heart disease Father    • Hyperlipidemia Father    • Hypertension Father    • Vision loss Father    • Heart disease Sister    • Heart disease Brother    • Hypertension Brother    • Cancer Maternal Grandmother    • Drug abuse Brother    • Drug abuse Sister        Cancer-related family history includes Cancer in her maternal grandmother and mother.    Social History     Tobacco Use   • Smoking status: Never Smoker   • Smokeless tobacco: Never Used   Vaping Use   • Vaping Use: Never used   Substance Use Topics   •  "Alcohol use: No   • Drug use: No       ROS:    Review of Systems   Constitutional: Negative for chills and fever.   HENT: Negative for ear pain, mouth sores, nosebleeds and sore throat.    Eyes: Negative for photophobia and visual disturbance.   Respiratory: Negative for wheezing and stridor.    Cardiovascular: Negative for chest pain and palpitations.   Gastrointestinal: Negative for abdominal pain, diarrhea, nausea and vomiting.   Endocrine: Negative for cold intolerance and heat intolerance.   Genitourinary: Negative for dysuria and hematuria.   Musculoskeletal: Negative for joint swelling and neck stiffness.   Skin: Negative for color change and rash.   Neurological: Negative for seizures and syncope.   Hematological: Negative for adenopathy.        No obvious bleeding   Psychiatric/Behavioral: Negative for agitation, confusion and hallucinations.       Objective:    Vitals:    11/19/21 1122   BP: 137/83   Pulse: 66   Resp: 20   Temp: 97.3 °F (36.3 °C)   TempSrc: Infrared   Weight: 122 kg (269 lb)   Height: 162.6 cm (64\")   PainSc: 0-No pain     Body mass index is 46.17 kg/m².    ECOG  (0) Fully active, able to carry on all predisease performance without restriction    Physical Exam:  Physical Exam  Vitals and nursing note reviewed.   Constitutional:       General: She is not in acute distress.     Appearance: She is not diaphoretic.   HENT:      Head: Normocephalic and atraumatic.   Eyes:      General: No scleral icterus.        Right eye: No discharge.         Left eye: No discharge.      Conjunctiva/sclera: Conjunctivae normal.   Neck:      Thyroid: No thyromegaly.   Cardiovascular:      Rate and Rhythm: Normal rate and regular rhythm.      Heart sounds: Normal heart sounds. No friction rub. No gallop.    Pulmonary:      Effort: Pulmonary effort is normal. No respiratory distress.      Breath sounds: No stridor. No wheezing.   Abdominal:      General: Bowel sounds are normal.      Palpations: Abdomen is soft. " There is no mass.      Tenderness: There is no abdominal tenderness. There is no guarding or rebound.   Musculoskeletal:         General: No tenderness. Normal range of motion.      Cervical back: Normal range of motion and neck supple.   Lymphadenopathy:      Cervical: No cervical adenopathy.   Skin:     General: Skin is warm.      Findings: No erythema or rash.   Neurological:      Mental Status: She is alert and oriented to person, place, and time.      Motor: No abnormal muscle tone.   Psychiatric:         Behavior: Behavior normal.         RECENT LABS  WBC   Date Value Ref Range Status   11/19/2021 7.77 3.40 - 10.80 10*3/mm3 Final     RBC   Date Value Ref Range Status   11/19/2021 4.42 3.77 - 5.28 10*6/mm3 Final     Hemoglobin   Date Value Ref Range Status   11/19/2021 12.3 12.0 - 15.9 g/dL Final     Hematocrit   Date Value Ref Range Status   11/19/2021 39.6 34.0 - 46.6 % Final     MCV   Date Value Ref Range Status   11/19/2021 89.6 79.0 - 97.0 fL Final     MCH   Date Value Ref Range Status   11/19/2021 27.8 26.6 - 33.0 pg Final     MCHC   Date Value Ref Range Status   11/19/2021 31.1 (L) 31.5 - 35.7 g/dL Final     RDW   Date Value Ref Range Status   11/19/2021 17.0 (H) 12.3 - 15.4 % Final     RDW-SD   Date Value Ref Range Status   11/19/2021 54.1 (H) 37.0 - 54.0 fl Final     MPV   Date Value Ref Range Status   11/19/2021 9.6 6.0 - 12.0 fL Final     Platelets   Date Value Ref Range Status   11/19/2021 255 140 - 450 10*3/mm3 Final     Neutrophil %   Date Value Ref Range Status   11/19/2021 66.2 42.7 - 76.0 % Final     Lymphocyte %   Date Value Ref Range Status   11/19/2021 25.5 19.6 - 45.3 % Final     Monocyte %   Date Value Ref Range Status   11/19/2021 8.2 5.0 - 12.0 % Final     Eosinophil %   Date Value Ref Range Status   11/19/2021 0.0 (L) 0.3 - 6.2 % Final     Basophil %   Date Value Ref Range Status   11/19/2021 0.1 0.0 - 1.5 % Final     Immature Grans %   Date Value Ref Range Status   11/08/2021 0.5 0.0 -  0.5 % Final     Neutrophils, Absolute   Date Value Ref Range Status   11/19/2021 5.14 1.70 - 7.00 10*3/mm3 Final     Lymphocytes, Absolute   Date Value Ref Range Status   11/19/2021 1.98 0.70 - 3.10 10*3/mm3 Final     Monocytes, Absolute   Date Value Ref Range Status   11/19/2021 0.64 0.10 - 0.90 10*3/mm3 Final     Eosinophils, Absolute   Date Value Ref Range Status   11/19/2021 0.00 0.00 - 0.40 10*3/mm3 Final     Basophils, Absolute   Date Value Ref Range Status   11/19/2021 0.01 0.00 - 0.20 10*3/mm3 Final     Immature Grans, Absolute   Date Value Ref Range Status   11/08/2021 0.04 0.00 - 0.05 10*3/mm3 Final     nRBC   Date Value Ref Range Status   11/08/2021 0.0 0.0 - 0.2 /100 WBC Final       Lab Results   Component Value Date    GLUCOSE 168 (H) 08/27/2021    BUN 9 08/27/2021    CREATININE 0.84 08/27/2021    EGFRIFNONA 72 08/27/2021    EGFRIFAFRI 88 10/05/2016    BCR 10.7 08/27/2021    K 3.8 08/27/2021    CO2 27.5 08/27/2021    CALCIUM 9.5 08/27/2021    ALBUMIN 4.20 08/27/2021    LABIL2 1.2 12/18/2017    AST 22 08/27/2021    ALT 20 08/27/2021         Assessment/Plan   Splenomegaly  - JAK2 Mutation Analysis, Qual  - Erythropoietin  - Flow Cytometry, Blood  - BCR-ABL FISH  - Protein Elec + Interp, Serum  - JASON  - Sedimentation Rate  - Peripheral Blood Smear  - CBC & Differential  - ACE, CSF - Cerebrospinal Fluid,  - CBC & Differential      1. Pulmonary nodules  2. Mild splenomegaly    I have reviewed her imaging studies available lab results.  She has mild splenomegaly, 2 small pulmonary nodules of unclear etiology.  She is scheduled for a dedicated CT scan of the chest coming up soon.  She also has cystoscopy coming up in the near future for recurrent UTIs.  Discussed the differential diagnosis of splenomegaly.  Labs have been ordered to further evaluate including ACE level, JASON, BCR ABL, EPO level, peripheral blood flow cytometry, JAK2 analysis SPEP with FOX and a sed rate.      Plans    · Reviewed results of her  CT of the chest which is coming up  · Review results of the cystoscopy  · Labs as listed above  · Follow-up in 3 weeks or earlier as needed      Patient verbalized understanding and is in agreement of the above plan.            I spent 45 total minutes, face-to-face, caring for Tessie today.  80% of this time involved counseling and/or coordination of care as documented within this note.

## 2021-11-19 ENCOUNTER — APPOINTMENT (OUTPATIENT)
Dept: LAB | Facility: HOSPITAL | Age: 51
End: 2021-11-19

## 2021-11-19 ENCOUNTER — CONSULT (OUTPATIENT)
Dept: ONCOLOGY | Facility: CLINIC | Age: 51
End: 2021-11-19

## 2021-11-19 VITALS
TEMPERATURE: 97.3 F | HEART RATE: 66 BPM | SYSTOLIC BLOOD PRESSURE: 137 MMHG | BODY MASS INDEX: 45.93 KG/M2 | DIASTOLIC BLOOD PRESSURE: 83 MMHG | WEIGHT: 269 LBS | HEIGHT: 64 IN | RESPIRATION RATE: 20 BRPM

## 2021-11-19 DIAGNOSIS — R16.1 SPLENOMEGALY: Primary | ICD-10-CM

## 2021-11-19 LAB
BASOPHILS # BLD AUTO: 0.01 10*3/MM3 (ref 0–0.2)
BASOPHILS NFR BLD AUTO: 0.1 % (ref 0–1.5)
DEPRECATED RDW RBC AUTO: 54.1 FL (ref 37–54)
EOSINOPHIL # BLD AUTO: 0 10*3/MM3 (ref 0–0.4)
EOSINOPHIL NFR BLD AUTO: 0 % (ref 0.3–6.2)
ERYTHROCYTE [DISTWIDTH] IN BLOOD BY AUTOMATED COUNT: 17 % (ref 12.3–15.4)
ERYTHROCYTE [SEDIMENTATION RATE] IN BLOOD: 35 MM/HR (ref 0–30)
HCT VFR BLD AUTO: 39.6 % (ref 34–46.6)
HGB BLD-MCNC: 12.3 G/DL (ref 12–15.9)
LYMPHOCYTES # BLD AUTO: 1.98 10*3/MM3 (ref 0.7–3.1)
LYMPHOCYTES NFR BLD AUTO: 25.5 % (ref 19.6–45.3)
MCH RBC QN AUTO: 27.8 PG (ref 26.6–33)
MCHC RBC AUTO-ENTMCNC: 31.1 G/DL (ref 31.5–35.7)
MCV RBC AUTO: 89.6 FL (ref 79–97)
MONOCYTES # BLD AUTO: 0.64 10*3/MM3 (ref 0.1–0.9)
MONOCYTES NFR BLD AUTO: 8.2 % (ref 5–12)
NEUTROPHILS NFR BLD AUTO: 5.14 10*3/MM3 (ref 1.7–7)
NEUTROPHILS NFR BLD AUTO: 66.2 % (ref 42.7–76)
PATHOLOGY REVIEW: YES
PLATELET # BLD AUTO: 255 10*3/MM3 (ref 140–450)
PMV BLD AUTO: 9.6 FL (ref 6–12)
RBC # BLD AUTO: 4.42 10*6/MM3 (ref 3.77–5.28)
WBC NRBC COR # BLD: 7.77 10*3/MM3 (ref 3.4–10.8)

## 2021-11-19 PROCEDURE — 85025 COMPLETE CBC W/AUTO DIFF WBC: CPT | Performed by: INTERNAL MEDICINE

## 2021-11-19 PROCEDURE — 85652 RBC SED RATE AUTOMATED: CPT | Performed by: INTERNAL MEDICINE

## 2021-11-19 PROCEDURE — 99204 OFFICE O/P NEW MOD 45 MIN: CPT | Performed by: INTERNAL MEDICINE

## 2021-11-19 PROCEDURE — 86038 ANTINUCLEAR ANTIBODIES: CPT | Performed by: INTERNAL MEDICINE

## 2021-11-19 PROCEDURE — 36415 COLL VENOUS BLD VENIPUNCTURE: CPT | Performed by: INTERNAL MEDICINE

## 2021-11-22 ENCOUNTER — TELEPHONE (OUTPATIENT)
Dept: ONCOLOGY | Facility: CLINIC | Age: 51
End: 2021-11-22

## 2021-11-22 LAB
ALBUMIN SERPL ELPH-MCNC: 3.8 G/DL (ref 2.9–4.4)
ALBUMIN/GLOB SERPL: 1.1 {RATIO} (ref 0.7–1.7)
ALPHA1 GLOB SERPL ELPH-MCNC: 0.2 G/DL (ref 0–0.4)
ALPHA2 GLOB SERPL ELPH-MCNC: 0.7 G/DL (ref 0.4–1)
ANA SER QL: NEGATIVE
B-GLOBULIN SERPL ELPH-MCNC: 1.1 G/DL (ref 0.7–1.3)
EPO SERPL-ACNC: 15.6 MIU/ML (ref 2.6–18.5)
GAMMA GLOB SERPL ELPH-MCNC: 1.3 G/DL (ref 0.4–1.8)
GLOBULIN SER CALC-MCNC: 3.4 G/DL (ref 2.2–3.9)
LAB AP CASE REPORT: NORMAL
LABORATORY COMMENT REPORT: NORMAL
M PROTEIN SERPL ELPH-MCNC: NORMAL G/DL
PATH REPORT.FINAL DX SPEC: NORMAL
PROT PATTERN SERPL ELPH-IMP: NORMAL
PROT SERPL-MCNC: 7.2 G/DL (ref 6–8.5)

## 2021-11-22 NOTE — TELEPHONE ENCOUNTER
Caller: Tessie Conner    Relationship: Self    Best call back number: [phone]    What test was performed: LABS     When was the test performed: 11/19

## 2021-11-23 ENCOUNTER — TELEPHONE (OUTPATIENT)
Dept: ONCOLOGY | Facility: HOSPITAL | Age: 51
End: 2021-11-23

## 2021-11-23 ENCOUNTER — TELEPHONE (OUTPATIENT)
Dept: ONCOLOGY | Facility: CLINIC | Age: 51
End: 2021-11-23

## 2021-11-23 DIAGNOSIS — R16.1 SPLENOMEGALY: Primary | ICD-10-CM

## 2021-11-23 NOTE — TELEPHONE ENCOUNTER
Caller: Tessie Conner    Relationship to patient: Self    Best call back number: [phone]    Type of visit: LAB    Requested date: 11/24 AT 10AM     Additional notes: TESSIE SAYS SHE RECEIVED A MESSAGE THAT DR. KOLB NEEDS HER TO COME IN FOR MORE LAB WORK. PLEASE CALL ONCE SCHEDULED.

## 2021-11-23 NOTE — TELEPHONE ENCOUNTER
Caller: Tessie Conner    Relationship: Self    Best call back number: [phone]    What is the best time to reach you: ANY      What was the call regarding: PATIENT RETURNED CALL, NO MESSAGE LEFT     Do you require a callback: YES

## 2021-11-23 NOTE — TELEPHONE ENCOUNTER
Distress Screening   Patient Name: Tessie Conner  YOB: 1970  MRN #: [id number]    Patient completed distress screenin21  Distress Level: 5/10  Problems indicated: dealing with children, nervousness, worry, physical complaints     OSW called and asked for Tessie who was unavailable. Requested she call back.     Referrals:     Electronically signed by:   Shanda Forbes LCSW, OSW-C  21, 15:31 EST

## 2021-11-24 ENCOUNTER — LAB (OUTPATIENT)
Dept: LAB | Facility: HOSPITAL | Age: 51
End: 2021-11-24

## 2021-11-24 ENCOUNTER — DOCUMENTATION (OUTPATIENT)
Dept: ONCOLOGY | Facility: HOSPITAL | Age: 51
End: 2021-11-24

## 2021-11-24 DIAGNOSIS — R16.1 SPLENOMEGALY: ICD-10-CM

## 2021-11-24 PROCEDURE — 36415 COLL VENOUS BLD VENIPUNCTURE: CPT

## 2021-11-24 NOTE — PROGRESS NOTES
Pt called stating that she was wanting to know her recent lab results and if she had lymphoma. I explained to pt that we are still waiting on more lab results to be processed and Dr. Lin was wanting to get the ct chest done to help further treat/diagnosis. Pt verbalized understanding of this information.

## 2021-11-26 LAB — ACE SERPL-CCNC: 18 U/L (ref 14–82)

## 2021-11-27 ENCOUNTER — HOSPITAL ENCOUNTER (OUTPATIENT)
Dept: CT IMAGING | Facility: HOSPITAL | Age: 51
Discharge: HOME | End: 2021-11-27
Admitting: HOSPITALIST

## 2021-11-27 DIAGNOSIS — D86.9 SARCOIDOSIS: ICD-10-CM

## 2021-11-27 LAB — CREAT BLDA-MCNC: 0.8 MG/DL (ref 0.6–1.3)

## 2021-11-27 PROCEDURE — 0 IOPAMIDOL PER 1 ML: Performed by: HOSPITALIST

## 2021-11-27 PROCEDURE — 71260 CT THORAX DX C+: CPT

## 2021-11-27 PROCEDURE — 82565 ASSAY OF CREATININE: CPT

## 2021-11-27 RX ADMIN — IOPAMIDOL 100 ML: 755 INJECTION, SOLUTION INTRAVENOUS at 08:15

## 2021-11-29 ENCOUNTER — TELEPHONE (OUTPATIENT)
Dept: FAMILY MEDICINE CLINIC | Facility: CLINIC | Age: 51
End: 2021-11-29

## 2021-11-29 DIAGNOSIS — R91.8 LUNG MASS: Primary | ICD-10-CM

## 2021-11-29 LAB
CELLS ANALYZED: 200
CELLS COUNTED: 200
CHROM ANALY RESULT (ISCN): NORMAL
CLINICAL CYTOGENETICIST SPEC: NORMAL
DIAGNOSTIC IMP SPEC-IMP: NORMAL
SPECIMEN SOURCE: NORMAL

## 2021-11-29 NOTE — PROGRESS NOTES
Please call the patient regarding her abnormal result.    Patient has got 1.3 by 1.3 cm mass in right upper lobe .... could represent primary lung carcinoma ... referral in place for stat evaluation by pulmonary and oncology for further workup.  She has appointment with pulmonary service already.

## 2021-11-29 NOTE — TELEPHONE ENCOUNTER
Caller: Tessie Conner    Relationship: Self    Best call back number:[phone]    Caller requesting test results:     What test was performed: LABS AND CT SCAN     When was the test performed: 11/27/21    Where was the test performed:     Additional notes: WOULD LIKE A CALL BACK TO DISCUSS THE RESULTS OF THESE TESTS

## 2021-11-29 NOTE — TELEPHONE ENCOUNTER
Dr. KHAN- you ordered her CT and it was completed 11/27. Can you please review? Labs were ordered by Dr. Lin, I told the patient to give her office a call.

## 2021-11-29 NOTE — TELEPHONE ENCOUNTER
Caller: Cameron Tessie L    Relationship: Self    Best call back number: [phone]    Who are you requesting to speak with (clinical staff, provider,  specific staff member): DR JARAMILLO    What was the call regarding: ANXIETY IS GETTING OUT OF HAND.     PATIENT STATES SHE CAN NOT WAIT UNTIL 12/7 TO SPEAK WITH DR JARAMILLO. TESSIE IS REQUESTING A SOONER APPOINTMENT OR A CALL.     Do you require a callback: YES

## 2021-11-29 NOTE — TELEPHONE ENCOUNTER
Dr. Lopez, I spoke with Tessie giving her Dr. Costa's report on her ct chest, she had already read the report prior to him seeing it. I answered all of her questions the best that I could with the information I had. She is getting a stat pulm consult. But looks like she would like to speak with or see you as well. What would you like to do?

## 2021-11-29 NOTE — PROGRESS NOTES
Spoke with patient, she expressed understanding and is will schedule with pulmonary once they are her.

## 2021-11-30 ENCOUNTER — OFFICE VISIT (OUTPATIENT)
Dept: FAMILY MEDICINE CLINIC | Facility: CLINIC | Age: 51
End: 2021-11-30

## 2021-11-30 VITALS
DIASTOLIC BLOOD PRESSURE: 82 MMHG | WEIGHT: 266 LBS | RESPIRATION RATE: 16 BRPM | HEART RATE: 87 BPM | OXYGEN SATURATION: 97 % | BODY MASS INDEX: 45.41 KG/M2 | SYSTOLIC BLOOD PRESSURE: 122 MMHG | HEIGHT: 64 IN | TEMPERATURE: 97.1 F

## 2021-11-30 DIAGNOSIS — R91.8 LUNG MASS: Primary | ICD-10-CM

## 2021-11-30 LAB
JAK2 P.V617F BLD/T QL: NORMAL
LAB DIRECTOR NAME PROVIDER: NORMAL
LABORATORY COMMENT REPORT: NORMAL

## 2021-11-30 PROCEDURE — 99214 OFFICE O/P EST MOD 30 MIN: CPT | Performed by: INTERNAL MEDICINE

## 2021-11-30 RX ORDER — ALPRAZOLAM 0.5 MG/1
0.5 TABLET ORAL 2 TIMES DAILY PRN
Qty: 20 TABLET | Refills: 0 | Status: SHIPPED | OUTPATIENT
Start: 2021-11-30 | End: 2022-04-05

## 2021-12-02 NOTE — PROGRESS NOTES
Hematology/Oncology Outpatient Follow Up    PATIENT NAME:Tessie Conner  :1970  MRN: [id number]  PRIMARY CARE PHYSICIAN: Marsha Lopez MD  REFERRING PHYSICIAN: Marsha Lopez MD    Chief Complaint   Patient presents with   • Follow-up     Splenomegaly        HISTORY OF PRESENT ILLNESS:     This is a 51-year-old female has a history of recurrent urinary tract infection and for that reason she is actively cared for by the urology service.  During her work-up for the UTI she had a CT scan of the abdomen and pelvis on 2021 is basically showed diffuse hepatic steatosis, new mild splenomegaly with a few small hypoattenuating lesions partially imaged 2 mm right middle lobe nodule and 3 mm anterior right middle lobe nodule which are new.  Differentials on the splenomegaly mentioned include sarcoidosis, lymphoma CT scan of the chest was recommended to further evaluate.     Patient had a CBC done on 2021 white count was 7.8, hemoglobin 12, MCV was normal at 83 and platelets were 269.  Differentials are 60% neutrophils, 32% lymphocytes and 6% monocytes.     Patient scheduled for cystoscopy on 2021     Patient does complain of some night sweats, but she has not had weight loss.  She also complains of some fatigue.     Patient is  she has 3 adult children.  She does not smoke and does not drink.    · 2021 patient had an ACE level which was normal at 18 ranges 14-82.  Peripheral smear was unremarkable with no immature forms, white count was 7.7 hemoglobin, 12.3 and platelets were 255.  Differentials are 66% neutrophils, 25% lymphocytes, there was no monocytosis eosinophilia or basophilia.  Sed rate was 35 range 0-30, JAK2 analysis was negative erythropoietin level was normal at 15.6 peripheral blood flow cytometry still pending BCR ABL was negative SPEP with FOX did not reveal any monoclonal forms, JASON is negative.  Her blood flow cytometry did not show any significant  immunophenotypic phytic abnormalities.  · 2021 patient had CT scan of the chest this basically revealed a 1.3 x 1.3 cm lobulated mass in the posterior segment of the right upper lung of unclear etiology.  There are a few scattered subcentimeters lung nodules most pronounced in the right upper lobe.  There is no suspicious mass or nodule in the left lung base there is an enlarged right suprahilar lymph node measuring up to 1.3 cm.  A PET CT scan has been recommended to further evaluate  Past Medical History:   Diagnosis Date   • Anxiety    • Asthma 2020   • Bipolar 1 disorder (HCC)    • Depression    • Headache    • Hyperlipidemia 10/4/2016   • Inflammatory bowel disease    • Irritable bowel syndrome    • Obesity    • Urinary tract infection Off and on   • Visual impairment        Past Surgical History:   Procedure Laterality Date   • ABDOMINAL SURGERY      c- section   • CARDIAC CATHETERIZATION N/A 2020    Procedure: Left Heart Cath possible PCI, atherectomy, hemodynamic support;  Surgeon: Thomas Zamora MD;  Location: [facility] CATH INVASIVE LOCATION;  Service: Cardiology;  Laterality: N/A;   •  SECTION     • FOOT/TOE TENDON REPAIR Left    • HYSTERECTOMY     • ROTATOR CUFF REPAIR Left    • TUBAL ABDOMINAL LIGATION           Current Outpatient Medications:   •  ALPRAZolam (Xanax) 0.5 MG tablet, Take 1 tablet by mouth 2 (Two) Times a Day As Needed for Anxiety., Disp: 20 tablet, Rfl: 0  •  ARIPiprazole (ABILIFY) 20 MG tablet, , Disp: , Rfl:   •  aspirin (aspirin) 81 MG EC tablet, Take 1 tablet by mouth Daily., Disp: 90 tablet, Rfl: 3  •  atorvastatin (Lipitor) 20 MG tablet, Take 1 tablet by mouth Daily., Disp: 90 tablet, Rfl: 3  •  lisinopril (PRINIVIL,ZESTRIL) 10 MG tablet, Take 1 tablet by mouth Daily., Disp: 30 tablet, Rfl: 5  •  meloxicam (Mobic) 15 MG tablet, Take 1 tablet by mouth Daily., Disp: 30 tablet, Rfl: 3  •  venlafaxine XR (EFFEXOR-XR)  "150 MG 24 hr capsule, Take 150 mg by mouth 2 (Two) Times a Day. Verified ER BID, Disp: , Rfl:     Allergies   Allergen Reactions   • Oxycodone-Acetaminophen Mental Status Change       Family History   Problem Relation Age of Onset   • Arthritis Mother    • Cancer Mother    • Depression Mother    • Diabetes Mother    • Early death Mother    • Mental illness Mother    • Anxiety disorder Mother    • Alcohol abuse Father    • Diabetes Father    • Early death Father    • Heart disease Father    • Hyperlipidemia Father    • Hypertension Father    • Vision loss Father    • Heart disease Sister    • Heart disease Brother    • Hypertension Brother    • Cancer Maternal Grandmother    • Drug abuse Brother    • Drug abuse Sister        Cancer-related family history includes Cancer in her maternal grandmother and mother.    Social History     Tobacco Use   • Smoking status: Never Smoker   • Smokeless tobacco: Never Used   Vaping Use   • Vaping Use: Never used   Substance Use Topics   • Alcohol use: No   • Drug use: No       HPI, ROS and PFSH have been reviewed and confirmed on 12/3/2021.     SUBJECTIVE:      Patient is here today to review her lab results.  She does not otherwise have any new issues.  She is scheduled to see Dr. Goyo hinojosa next week and also a PET scan is scheduled on December 15.    REVIEW OF SYSTEMS:  Review of Systems  Dry cough  OBJECTIVE:    Vitals:    12/03/21 0830   BP: 144/81   Pulse: 65   Resp: 18   Temp: 97.1 °F (36.2 °C)   TempSrc: Infrared   Weight: 120 kg (265 lb)   Height: 162.6 cm (64\")   PainSc: 0-No pain     Body mass index is 45.49 kg/m².    ECOG  (0) Fully active, able to carry on all predisease performance without restriction    Physical Exam     Unchanged    RECENT LABS  WBC   Date Value Ref Range Status   12/03/2021 8.16 3.40 - 10.80 10*3/mm3 Final     RBC   Date Value Ref Range Status   12/03/2021 4.41 3.77 - 5.28 10*6/mm3 Final     Hemoglobin   Date Value Ref Range Status   12/03/2021 " 12.3 12.0 - 15.9 g/dL Final     Hematocrit   Date Value Ref Range Status   12/03/2021 39.7 34.0 - 46.6 % Final     MCV   Date Value Ref Range Status   12/03/2021 90.0 79.0 - 97.0 fL Final     MCH   Date Value Ref Range Status   12/03/2021 27.9 26.6 - 33.0 pg Final     MCHC   Date Value Ref Range Status   12/03/2021 31.0 (L) 31.5 - 35.7 g/dL Final     RDW   Date Value Ref Range Status   12/03/2021 16.2 (H) 12.3 - 15.4 % Final     RDW-SD   Date Value Ref Range Status   12/03/2021 51.9 37.0 - 54.0 fl Final     MPV   Date Value Ref Range Status   12/03/2021 9.9 6.0 - 12.0 fL Final     Platelets   Date Value Ref Range Status   12/03/2021 254 140 - 450 10*3/mm3 Final     Neutrophil %   Date Value Ref Range Status   12/03/2021 67.5 42.7 - 76.0 % Final     Lymphocyte %   Date Value Ref Range Status   12/03/2021 23.5 19.6 - 45.3 % Final     Monocyte %   Date Value Ref Range Status   12/03/2021 8.8 5.0 - 12.0 % Final     Eosinophil %   Date Value Ref Range Status   12/03/2021 0.1 (L) 0.3 - 6.2 % Final     Basophil %   Date Value Ref Range Status   12/03/2021 0.1 0.0 - 1.5 % Final     Immature Grans %   Date Value Ref Range Status   11/08/2021 0.5 0.0 - 0.5 % Final     Neutrophils, Absolute   Date Value Ref Range Status   12/03/2021 5.50 1.70 - 7.00 10*3/mm3 Final     Lymphocytes, Absolute   Date Value Ref Range Status   12/03/2021 1.92 0.70 - 3.10 10*3/mm3 Final     Monocytes, Absolute   Date Value Ref Range Status   12/03/2021 0.72 0.10 - 0.90 10*3/mm3 Final     Eosinophils, Absolute   Date Value Ref Range Status   12/03/2021 0.01 0.00 - 0.40 10*3/mm3 Final     Basophils, Absolute   Date Value Ref Range Status   12/03/2021 0.01 0.00 - 0.20 10*3/mm3 Final     Immature Grans, Absolute   Date Value Ref Range Status   11/08/2021 0.04 0.00 - 0.05 10*3/mm3 Final     nRBC   Date Value Ref Range Status   11/08/2021 0.0 0.0 - 0.2 /100 WBC Final       Lab Results   Component Value Date    GLUCOSE 168 (H) 08/27/2021    BUN 9 08/27/2021     CREATININE 0.80 11/27/2021    EGFRIFNONA 72 08/27/2021    EGFRIFAFRI 88 10/05/2016    BCR 10.7 08/27/2021    K 3.8 08/27/2021    CO2 27.5 08/27/2021    CALCIUM 9.5 08/27/2021    PROTENTOTREF 7.2 11/19/2021    ALBUMIN 3.8 11/19/2021    LABIL2 1.1 11/19/2021    AST 22 08/27/2021    ALT 20 08/27/2021         Assessment/Plan     Splenomegaly  - CBC & Differential      ASSESSMENT:      Splenomegaly  - JAK2 Mutation Analysis, Qual  - Erythropoietin  - Flow Cytometry, Blood  - BCR-ABL FISH  - Protein Elec + Interp, Serum  - JASON  - Sedimentation Rate  - Peripheral Blood Smear  - CBC & Differential  - ACE, CSF - Cerebrospinal Fluid,  - CBC & Differential        1. Pulmonary nodules, right upper lobe nodule measuring 1.3 cm, right hilar lymph node enlargement measuring also up to 1.3 cm.  PET CT scan has been recommended there are also a few scattered subcentimeter lung nodules most prominent in the right upper lobe of unclear etiology.  2. Mild splenomegaly: So far there is no hematologic abnormalities to account for the splenomegaly.      Prior discussion     I have reviewed her imaging studies available lab results.  She has mild splenomegaly, 2 small pulmonary nodules of unclear etiology.  She is scheduled for a dedicated CT scan of the chest coming up soon.  She also has cystoscopy coming up in the near future for recurrent UTIs.  Discussed the differential diagnosis of splenomegaly.  Labs have been ordered to further evaluate including ACE level, JASON, BCR ABL, EPO level, peripheral blood flow cytometry, JAK2 analysis SPEP with FOX and a sed rate.        Plans     · Reviewed results of her CT of the chest   · PET CT scan has been recommended with possible biopsy.  PET scan is scheduled December 15, 2021  · Follow-up with Dr. Nance December 6, 2021 for initial visit, biopsy discussions  · Review results of the cystoscopy  · Reviewed her labs as listed above, no abnormalities identified.  · Follow-up in 4  weeks        Patient verbalized understanding and is in agreement of the above plan.                 I spent 30 total minutes, face-to-face, caring for Tessie today.  90% of this time involved counseling and/or coordination of care as documented within this note.

## 2021-12-03 ENCOUNTER — OFFICE VISIT (OUTPATIENT)
Dept: ONCOLOGY | Facility: CLINIC | Age: 51
End: 2021-12-03

## 2021-12-03 ENCOUNTER — LAB (OUTPATIENT)
Dept: LAB | Facility: HOSPITAL | Age: 51
End: 2021-12-03

## 2021-12-03 VITALS
SYSTOLIC BLOOD PRESSURE: 144 MMHG | RESPIRATION RATE: 18 BRPM | HEART RATE: 65 BPM | DIASTOLIC BLOOD PRESSURE: 81 MMHG | BODY MASS INDEX: 45.24 KG/M2 | WEIGHT: 265 LBS | HEIGHT: 64 IN | TEMPERATURE: 97.1 F

## 2021-12-03 DIAGNOSIS — R16.1 SPLENOMEGALY: ICD-10-CM

## 2021-12-03 DIAGNOSIS — R16.1 SPLENOMEGALY: Primary | ICD-10-CM

## 2021-12-03 LAB
BASOPHILS # BLD AUTO: 0.01 10*3/MM3 (ref 0–0.2)
BASOPHILS NFR BLD AUTO: 0.1 % (ref 0–1.5)
DEPRECATED RDW RBC AUTO: 51.9 FL (ref 37–54)
EOSINOPHIL # BLD AUTO: 0.01 10*3/MM3 (ref 0–0.4)
EOSINOPHIL NFR BLD AUTO: 0.1 % (ref 0.3–6.2)
ERYTHROCYTE [DISTWIDTH] IN BLOOD BY AUTOMATED COUNT: 16.2 % (ref 12.3–15.4)
HCT VFR BLD AUTO: 39.7 % (ref 34–46.6)
HGB BLD-MCNC: 12.3 G/DL (ref 12–15.9)
HOLD SPECIMEN: NORMAL
HOLD SPECIMEN: NORMAL
LYMPHOCYTES # BLD AUTO: 1.92 10*3/MM3 (ref 0.7–3.1)
LYMPHOCYTES NFR BLD AUTO: 23.5 % (ref 19.6–45.3)
MCH RBC QN AUTO: 27.9 PG (ref 26.6–33)
MCHC RBC AUTO-ENTMCNC: 31 G/DL (ref 31.5–35.7)
MCV RBC AUTO: 90 FL (ref 79–97)
MONOCYTES # BLD AUTO: 0.72 10*3/MM3 (ref 0.1–0.9)
MONOCYTES NFR BLD AUTO: 8.8 % (ref 5–12)
NEUTROPHILS NFR BLD AUTO: 5.5 10*3/MM3 (ref 1.7–7)
NEUTROPHILS NFR BLD AUTO: 67.5 % (ref 42.7–76)
PLATELET # BLD AUTO: 254 10*3/MM3 (ref 140–450)
PMV BLD AUTO: 9.9 FL (ref 6–12)
RBC # BLD AUTO: 4.41 10*6/MM3 (ref 3.77–5.28)
WBC NRBC COR # BLD: 8.16 10*3/MM3 (ref 3.4–10.8)

## 2021-12-03 PROCEDURE — 85025 COMPLETE CBC W/AUTO DIFF WBC: CPT

## 2021-12-03 PROCEDURE — 36415 COLL VENOUS BLD VENIPUNCTURE: CPT

## 2021-12-03 PROCEDURE — 99214 OFFICE O/P EST MOD 30 MIN: CPT | Performed by: INTERNAL MEDICINE

## 2021-12-05 PROBLEM — R91.1 LUNG NODULE: Status: ACTIVE | Noted: 2021-11-30

## 2021-12-05 PROBLEM — R16.1 SPLEEN ENLARGEMENT: Status: ACTIVE | Noted: 2021-12-05

## 2021-12-06 LAB — REF LAB TEST METHOD: NORMAL

## 2021-12-07 ENCOUNTER — OFFICE VISIT (OUTPATIENT)
Dept: FAMILY MEDICINE CLINIC | Facility: CLINIC | Age: 51
End: 2021-12-07

## 2021-12-07 VITALS
DIASTOLIC BLOOD PRESSURE: 80 MMHG | WEIGHT: 265 LBS | RESPIRATION RATE: 18 BRPM | HEIGHT: 64 IN | BODY MASS INDEX: 45.24 KG/M2 | SYSTOLIC BLOOD PRESSURE: 116 MMHG | OXYGEN SATURATION: 97 % | TEMPERATURE: 97.1 F | HEART RATE: 77 BPM

## 2021-12-07 DIAGNOSIS — I10 PRIMARY HYPERTENSION: Primary | ICD-10-CM

## 2021-12-07 DIAGNOSIS — F31.32 BIPOLAR 1 DISORDER, DEPRESSED, MODERATE (HCC): ICD-10-CM

## 2021-12-07 DIAGNOSIS — R91.8 MASS OF LUNG: ICD-10-CM

## 2021-12-07 PROCEDURE — 99213 OFFICE O/P EST LOW 20 MIN: CPT | Performed by: INTERNAL MEDICINE

## 2021-12-07 NOTE — PROGRESS NOTES
"Rooming Tab(CC,VS,Pt Hx,Fall Screen)  Chief Complaint   Patient presents with   • Hypertension       Subjective   Pt here for BP follow up- when stands up will get light headed and have to hold on to something   has seen pulm and oncology- awaiting PET scan on 15th- and then will decide if open biopsy needed   To have IR do biopsy for lower nodule  Processing through issues better- only needed 1 xanax so far.  I have reviewed and updated her medications, medical history and problem list during today's office visit.     Patient Care Team:  Marsha Lopez MD as PCP - General (Internal Medicine)  Thomas Zamora MD as Consulting Physician (Cardiology)    Problem List Tab  Medications Tab  Synopsis Tab  Chart Review Tab  Care Everywhere Tab  Immunizations Tab  Patient History Tab    Social History     Tobacco Use   • Smoking status: Never Smoker   • Smokeless tobacco: Never Used   Substance Use Topics   • Alcohol use: No       Review of Systems    Objective     Rooming Tab(CC,VS,Pt Hx,Fall Screen)  /80 (BP Location: Left arm, Patient Position: Sitting, Cuff Size: Adult)   Pulse 77   Temp 97.1 °F (36.2 °C) (Temporal)   Resp 18   Ht 162.6 cm (64\")   Wt 120 kg (265 lb)   SpO2 97%   BMI 45.49 kg/m²     Body mass index is 45.49 kg/m².    Physical Exam  Vitals and nursing note reviewed.   Constitutional:       Appearance: Normal appearance. She is well-developed. She is obese.   HENT:      Head: Normocephalic and atraumatic.      Right Ear: Tympanic membrane normal.      Left Ear: Tympanic membrane normal.      Nose: No rhinorrhea.      Mouth/Throat:      Pharynx: No posterior oropharyngeal erythema.   Eyes:      Pupils: Pupils are equal, round, and reactive to light.   Cardiovascular:      Rate and Rhythm: Normal rate and regular rhythm.      Pulses: Normal pulses.      Heart sounds: Normal heart sounds. No murmur heard.      Pulmonary:      Effort: Pulmonary effort is normal.      Breath sounds: " Normal breath sounds.   Abdominal:      General: Bowel sounds are normal. There is no distension.      Palpations: Abdomen is soft.   Musculoskeletal:         General: No tenderness.      Cervical back: Normal range of motion and neck supple.   Skin:     Capillary Refill: Capillary refill takes less than 2 seconds.   Neurological:      Mental Status: She is alert and oriented to person, place, and time.   Psychiatric:         Mood and Affect: Mood normal.         Behavior: Behavior normal.          Statin Choice Calculator  Data Reviewed:    CT Chest With Contrast Diagnostic    Result Date: 11/27/2021  Impression:  1. 1.3 x 1.3 cm lobulated shaped mass in the posterior segment of the right upper lobe. This could represent primary lung carcinoma. I would recommend further evaluation with a whole-body PET/CT study. 2. There are a few scattered subcentimeter lung nodules which are most prominent in the right upper lobe. Although this could represent metastatic disease, this may be related to the patient's known sarcoidosis. 3. There are no abnormalities seen in the left lung base. 4. Enlarged right suprahilar lymph node which may be related to either metastatic disease or sarcoidosis. 5. Suggestion of hepatic steatosis.. 6. Borderline-mild splenomegaly  Electronically Signed By-Edwin Rea MD On:11/27/2021 10:01 AM This report was finalized on [id number] by  Edwin Rea MD.    XR Chest 1 View    Result Date: 11/8/2021  Impression: No acute cardiopulmonary process identified.  Electronically Signed By-Oscar Dukes MD On:11/8/2021 7:44 AM This report was finalized on [id number] by  Oscar Dukes MD.      The data below has been reviewed by Marsha Lopez MD on 12/07/2021.      Lab Results   Component Value Date    CREATININE 0.80 11/27/2021    ALBUMIN 3.8 11/19/2021    WBC 8.16 12/03/2021    RBC 4.41 12/03/2021    HCT 39.7 12/03/2021    MCV 90.0 12/03/2021    MCH 27.9 12/03/2021      Assessment/Plan    Order Review Tab  Health Maintenance Tab  Patient Plan/Order Tab  Diagnoses and all orders for this visit:    1. Primary hypertension (Primary)  Comments:   decrease to 5mg of pill lisniopril daily    2. Bipolar 1 disorder, depressed, moderate (HCC)    3. Mass of lung  Comments:   awaiting Petscan and biopsy        Wrapup Tab  Return if symptoms worsen or fail to improve.       They were informed of the diagnosis and treatment plan and directed to f/u for any further problems or concerns.                 acetaminophen 650 mg rectal suppository: 1 suppository(ies) rectal every 6 hours, As needed, Temp greater or equal to 38C (100.4F), Mild Pain (1 - 3)  allopurinol 100 mg oral tablet: 1 tab(s) orally 2 times a day  atorvastatin 40 mg oral tablet: 1 tab(s) orally once a day  cholecalciferol oral tablet: 2000 unit(s) orally once a day  insulin lispro: 1 Unit(s) if Glucose 151 - 200  2 Unit(s) if Glucose 201 - 250  3 Unit(s) if Glucose 251 - 300  4 Unit(s) if Glucose 301 - 350  5 Unit(s) if Glucose 351 - 400  6 Unit(s) if Glucose Greater Than 400  levETIRAcetam 500 mg oral tablet: 1 tab(s) orally 2 times a day  memantine 10 mg oral tablet: 1 tab(s) orally 2 times a day  polyethylene glycol 3350 oral powder for reconstitution: 17 gram(s) orally once a day  senna oral tablet: 2 tab(s) orally once a day (at bedtime)

## 2021-12-08 ENCOUNTER — TRANSCRIBE ORDERS (OUTPATIENT)
Dept: INTERVENTIONAL RADIOLOGY/VASCULAR | Facility: HOSPITAL | Age: 51
End: 2021-12-08

## 2021-12-08 DIAGNOSIS — Z01.812 ENCOUNTER FOR PREPROCEDURE SCREENING LABORATORY TESTING FOR COVID-19: Primary | ICD-10-CM

## 2021-12-08 DIAGNOSIS — Z20.822 ENCOUNTER FOR PREPROCEDURE SCREENING LABORATORY TESTING FOR COVID-19: Primary | ICD-10-CM

## 2021-12-10 ENCOUNTER — TELEPHONE (OUTPATIENT)
Dept: FAMILY MEDICINE CLINIC | Facility: CLINIC | Age: 51
End: 2021-12-10

## 2021-12-10 NOTE — TELEPHONE ENCOUNTER
PATIENT CALLED TO INFORM DR JARAMILLO SHE HAD HER PET SCAN DONE AT [facility] TODAY 12/10/21. THEY STATED HER RESULTS MAY BE IN TODAY    CALL BACK [phone]

## 2021-12-13 ENCOUNTER — TELEPHONE (OUTPATIENT)
Dept: FAMILY MEDICINE CLINIC | Facility: CLINIC | Age: 51
End: 2021-12-13

## 2021-12-13 NOTE — TELEPHONE ENCOUNTER
PATIENT CALLED AND SAID SHE HAS TO GO TO THE HOSPITAL FOR AN INTAKE. Tuesday 12/14/21,  THEN A BIOPSY ON Thursday. STATED SHE WILL NEED A CALL BACK BEFORE THEN   ON  12/13/21.    CALL BACK [phone]

## 2021-12-13 NOTE — TELEPHONE ENCOUNTER
Caller: Tessie Conner    Relationship: Self    Best call back number: [phone]     What test was performed: PET SCAN     When was the test performed: 12/10/2021    Where was the test performed: [facility]     Additional notes:   TESSIE REQUESTED A CALL BACK FROM

## 2021-12-13 NOTE — TELEPHONE ENCOUNTER
Please tell pt that PET scan shows very mild uptake in right upperlobe  more suggestive of infection than cancer. No other anju that were at all concerning. Breathing a sign of relief, but still needs further evaluation

## 2021-12-13 NOTE — TELEPHONE ENCOUNTER
Caller: Tessie Conner    Relationship: Self    Best call back number: [phone]      What test was performed: PET SCAN      When was the test performed: 12/10/2021     Where was the test performed: [facility]

## 2021-12-14 ENCOUNTER — LAB (OUTPATIENT)
Dept: LAB | Facility: HOSPITAL | Age: 51
End: 2021-12-14

## 2021-12-14 DIAGNOSIS — Z01.812 ENCOUNTER FOR PREPROCEDURE SCREENING LABORATORY TESTING FOR COVID-19: ICD-10-CM

## 2021-12-14 DIAGNOSIS — Z20.822 ENCOUNTER FOR PREPROCEDURE SCREENING LABORATORY TESTING FOR COVID-19: ICD-10-CM

## 2021-12-14 LAB — SARS-COV-2 ORF1AB RESP QL NAA+PROBE: NOT DETECTED

## 2021-12-14 PROCEDURE — U0004 COV-19 TEST NON-CDC HGH THRU: HCPCS

## 2021-12-14 PROCEDURE — C9803 HOPD COVID-19 SPEC COLLECT: HCPCS

## 2021-12-16 ENCOUNTER — HOSPITAL ENCOUNTER (OUTPATIENT)
Dept: GENERAL RADIOLOGY | Facility: HOSPITAL | Age: 51
Discharge: HOME | End: 2021-12-16

## 2021-12-16 ENCOUNTER — APPOINTMENT (OUTPATIENT)
Dept: PET IMAGING | Facility: HOSPITAL | Age: 51
End: 2021-12-16

## 2021-12-16 ENCOUNTER — HOSPITAL ENCOUNTER (OUTPATIENT)
Dept: CT IMAGING | Facility: HOSPITAL | Age: 51
Discharge: HOME | End: 2021-12-16

## 2021-12-16 VITALS
DIASTOLIC BLOOD PRESSURE: 52 MMHG | RESPIRATION RATE: 12 BRPM | TEMPERATURE: 98.5 F | SYSTOLIC BLOOD PRESSURE: 105 MMHG | OXYGEN SATURATION: 94 % | HEART RATE: 58 BPM | HEIGHT: 64 IN | WEIGHT: 262 LBS | BODY MASS INDEX: 44.73 KG/M2

## 2021-12-16 DIAGNOSIS — R91.1 NODULE OF UPPER LOBE OF RIGHT LUNG: ICD-10-CM

## 2021-12-16 LAB
APTT PPP: 24.5 SECONDS (ref 24–31)
BASOPHILS # BLD AUTO: 0 10*3/MM3 (ref 0–0.2)
BASOPHILS NFR BLD AUTO: 0.7 % (ref 0–1.5)
DEPRECATED RDW RBC AUTO: 52.1 FL (ref 37–54)
EOSINOPHIL # BLD AUTO: 0 10*3/MM3 (ref 0–0.4)
EOSINOPHIL NFR BLD AUTO: 0.1 % (ref 0.3–6.2)
ERYTHROCYTE [DISTWIDTH] IN BLOOD BY AUTOMATED COUNT: 17.1 % (ref 12.3–15.4)
HCT VFR BLD AUTO: 36.4 % (ref 34–46.6)
HGB BLD-MCNC: 12 G/DL (ref 12–15.9)
INR PPP: 0.98 (ref 0.93–1.1)
LYMPHOCYTES # BLD AUTO: 1.6 10*3/MM3 (ref 0.7–3.1)
LYMPHOCYTES NFR BLD AUTO: 23.7 % (ref 19.6–45.3)
MCH RBC QN AUTO: 28.1 PG (ref 26.6–33)
MCHC RBC AUTO-ENTMCNC: 33 G/DL (ref 31.5–35.7)
MCV RBC AUTO: 85.1 FL (ref 79–97)
MONOCYTES # BLD AUTO: 0.6 10*3/MM3 (ref 0.1–0.9)
MONOCYTES NFR BLD AUTO: 8.3 % (ref 5–12)
NEUTROPHILS NFR BLD AUTO: 4.6 10*3/MM3 (ref 1.7–7)
NEUTROPHILS NFR BLD AUTO: 67.2 % (ref 42.7–76)
NRBC BLD AUTO-RTO: 0.1 /100 WBC (ref 0–0.2)
PLATELET # BLD AUTO: 231 10*3/MM3 (ref 140–450)
PMV BLD AUTO: 7.7 FL (ref 6–12)
PROTHROMBIN TIME: 10.9 SECONDS (ref 9.6–11.7)
RBC # BLD AUTO: 4.28 10*6/MM3 (ref 3.77–5.28)
WBC NRBC COR # BLD: 6.9 10*3/MM3 (ref 3.4–10.8)

## 2021-12-16 PROCEDURE — 88334 PATH CONSLTJ SURG CYTO XM EA: CPT | Performed by: RADIOLOGY

## 2021-12-16 PROCEDURE — 85025 COMPLETE CBC W/AUTO DIFF WBC: CPT | Performed by: RADIOLOGY

## 2021-12-16 PROCEDURE — 88333 PATH CONSLTJ SURG CYTO XM 1: CPT | Performed by: RADIOLOGY

## 2021-12-16 PROCEDURE — 88305 TISSUE EXAM BY PATHOLOGIST: CPT | Performed by: RADIOLOGY

## 2021-12-16 PROCEDURE — 25010000002 MIDAZOLAM PER 1 MG: Performed by: RADIOLOGY

## 2021-12-16 PROCEDURE — 71045 X-RAY EXAM CHEST 1 VIEW: CPT

## 2021-12-16 PROCEDURE — 99152 MOD SED SAME PHYS/QHP 5/>YRS: CPT

## 2021-12-16 PROCEDURE — 25010000002 FENTANYL CITRATE (PF) 50 MCG/ML SOLUTION: Performed by: RADIOLOGY

## 2021-12-16 PROCEDURE — 0 LIDOCAINE 1 % SOLUTION: Performed by: RADIOLOGY

## 2021-12-16 PROCEDURE — 88312 SPECIAL STAINS GROUP 1: CPT | Performed by: RADIOLOGY

## 2021-12-16 PROCEDURE — 25010000002 ONDANSETRON PER 1 MG: Performed by: RADIOLOGY

## 2021-12-16 PROCEDURE — 85730 THROMBOPLASTIN TIME PARTIAL: CPT | Performed by: RADIOLOGY

## 2021-12-16 PROCEDURE — 85610 PROTHROMBIN TIME: CPT | Performed by: RADIOLOGY

## 2021-12-16 RX ORDER — SODIUM CHLORIDE 0.9 % (FLUSH) 0.9 %
10 SYRINGE (ML) INJECTION AS NEEDED
Status: DISCONTINUED | OUTPATIENT
Start: 2021-12-16 | End: 2021-12-17 | Stop reason: HOSPADM

## 2021-12-16 RX ORDER — LIDOCAINE HYDROCHLORIDE 10 MG/ML
INJECTION, SOLUTION INFILTRATION; PERINEURAL
Status: COMPLETED | OUTPATIENT
Start: 2021-12-16 | End: 2021-12-16

## 2021-12-16 RX ORDER — MIDAZOLAM HYDROCHLORIDE 1 MG/ML
INJECTION INTRAMUSCULAR; INTRAVENOUS
Status: COMPLETED | OUTPATIENT
Start: 2021-12-16 | End: 2021-12-16

## 2021-12-16 RX ORDER — ACETAMINOPHEN 325 MG/1
650 TABLET ORAL EVERY 4 HOURS PRN
Status: DISCONTINUED | OUTPATIENT
Start: 2021-12-16 | End: 2021-12-17 | Stop reason: HOSPADM

## 2021-12-16 RX ORDER — SODIUM CHLORIDE 0.9 % (FLUSH) 0.9 %
10 SYRINGE (ML) INJECTION EVERY 8 HOURS
Status: DISCONTINUED | OUTPATIENT
Start: 2021-12-16 | End: 2021-12-17 | Stop reason: HOSPADM

## 2021-12-16 RX ORDER — ONDANSETRON 2 MG/ML
INJECTION INTRAMUSCULAR; INTRAVENOUS
Status: COMPLETED | OUTPATIENT
Start: 2021-12-16 | End: 2021-12-16

## 2021-12-16 RX ORDER — FENTANYL CITRATE 50 UG/ML
INJECTION, SOLUTION INTRAMUSCULAR; INTRAVENOUS
Status: COMPLETED | OUTPATIENT
Start: 2021-12-16 | End: 2021-12-16

## 2021-12-16 RX ADMIN — MIDAZOLAM 1 MG: 1 INJECTION INTRAMUSCULAR; INTRAVENOUS at 08:58

## 2021-12-16 RX ADMIN — LIDOCAINE HYDROCHLORIDE 5 ML: 10 INJECTION, SOLUTION INFILTRATION; PERINEURAL at 09:11

## 2021-12-16 RX ADMIN — FENTANYL CITRATE 100 MCG: 50 INJECTION, SOLUTION INTRAMUSCULAR; INTRAVENOUS at 09:14

## 2021-12-16 RX ADMIN — MIDAZOLAM 1 MG: 1 INJECTION INTRAMUSCULAR; INTRAVENOUS at 09:01

## 2021-12-16 RX ADMIN — ONDANSETRON 4 MG: 2 INJECTION INTRAMUSCULAR; INTRAVENOUS at 08:53

## 2021-12-16 RX ADMIN — FENTANYL CITRATE 100 MCG: 50 INJECTION, SOLUTION INTRAMUSCULAR; INTRAVENOUS at 08:58

## 2021-12-16 NOTE — NURSING NOTE
Pt moved self over to CT table.  Right side down, feet first into scanner.  Pt c/o being very anxious.  Didn't take anxiety pill this am.

## 2021-12-16 NOTE — DISCHARGE INSTRUCTIONS
Lung Biopsy, Care After      Follow up with Dr. Nance.    This information will help you take care of yourself after your procedure. Your health care provider may also give you more specific instructions depending on the type of biopsy you had. If you have problems or questions, contact your health care provider.  What can I expect after the procedure?  After the procedure, it is common to have:  · A cough.  · A sore throat.  · Pain where a needle or incision was used to collect a biopsy sample (biopsy site).  Follow these instructions at home:  Medicines  · Take over-the-counter and prescription medicines only as told by your health care provider.  · Talk to your health care provider before you take any medicines that contain aspirin or NSAIDS, such as ibuprofen. These medicines can increase your risk of bleeding.  · Ask your health care provider if the medicine prescribed to you:  ? Requires you to avoid driving or using machinery.  ? Can cause constipation. You may need to take these actions to prevent or treat constipation:  § Drink enough fluid to keep your urine pale yellow.  § Take over-the-counter or prescription medicines.  § Eat foods that are high in fiber, such as beans, whole grains, fresh fruits and vegetables.  § Limit foods that are high in fat and processed sugars, such as fried or sweet foods.  Biopsy site care    · If you had a needle or open biopsy, follow instructions from your health care provider about how to take care of your biopsy site. Make sure you:  ? Wash your hands with soap and water for at least 20 seconds before and after you change your bandage (dressing). If soap and water are not available, use hand .  ? Change your dressing after 48 hours.  · Do not take baths, swim, or use a hot tub for 48 hours. You may only be allowed to take sponge baths.  · Check your biopsy site every day for signs of infection. Check for:  ? Redness, swelling, or more pain.  ? Fluid or  blood.  ? Warmth.  ? Pus or a bad smell.    General instructions  · Do not drink alcohol if your health care provider tells you not to drink.  · If you were given a sedative during the procedure, it can affect you for several hours. Do not drive or operate machinery until your health care provider says that it is safe.  · Return to your normal activities as told by your health care provider. Ask your health care provider what activities are safe for you.  · It is up to you to get the results of your procedure. Ask your health care provider, or the department that is doing the procedure, when your results will be ready.  · Keep all follow-up visits as told by your health care provider. This is important.  Contact a health care provider if:  · You have a fever.  · You have redness, swelling, or more pain around your biopsy site.  · You have fluid or blood coming from your biopsy site.  · Your biopsy site feels warm to the touch.  · You have pus or a bad smell coming from your biopsy site.  · You have pain that does not get better with medicine.  Get help right away if:  · You cough up blood.  · You have trouble breathing.  · You have chest pain.  · You lose consciousness.  These symptoms may represent a serious problem that is an emergency. Do not wait to see if the symptoms will go away. Get medical help right away. Call your local emergency services (911 in the [place] Do not drive yourself to the hospital.  Summary  · It is common to have some pain where a needle or incision was used to collect a biopsy sample (biopsy site).  · Return to your normal activities as told by your health care provider. Ask your health care provider what activities are safe for you.  · Take over-the-counter and prescription medicines only as told by your health care provider.  · Report any unusual symptoms to your health care provider.

## 2021-12-16 NOTE — NURSING NOTE
Pt moved over to stretcher, supine.  Cardiac monitor, NS at KVO and room air.  Pt being taken for post procedure chest xray.

## 2021-12-17 LAB
LAB AP CASE REPORT: NORMAL
Lab: NORMAL
PATH REPORT.FINAL DX SPEC: NORMAL
PATH REPORT.GROSS SPEC: NORMAL

## 2021-12-20 ENCOUNTER — TELEPHONE (OUTPATIENT)
Dept: FAMILY MEDICINE CLINIC | Facility: CLINIC | Age: 51
End: 2021-12-20

## 2021-12-20 NOTE — TELEPHONE ENCOUNTER
Caller: Tessie Muhammad    Relationship to patient: Self    Best call back number: [phone]     Chief complaint: FOLLOW UP TESTING, CAT, PET SCANS, BIOPSY     Type of visit: OFFICE VISIT  WITH DR. JARAMILLO     Requested date: WEEK OF 12/27/2021           Additional notes:    MS MUHAMMAD IS WANTING TO SCHEDULED A FOLLOW APPOINTMENT , SHE IS WANTING HER  PRESENT DURING THE VISIT .

## 2021-12-21 ENCOUNTER — TRANSCRIBE ORDERS (OUTPATIENT)
Dept: ADMINISTRATIVE | Facility: HOSPITAL | Age: 51
End: 2021-12-21

## 2021-12-21 DIAGNOSIS — R06.02 SHORTNESS OF BREATH: Primary | ICD-10-CM

## 2021-12-21 DIAGNOSIS — Z01.818 OTHER SPECIFIED PRE-OPERATIVE EXAMINATION: Primary | ICD-10-CM

## 2021-12-23 ENCOUNTER — TRANSCRIBE ORDERS (OUTPATIENT)
Dept: ADMINISTRATIVE | Facility: HOSPITAL | Age: 51
End: 2021-12-23

## 2021-12-23 DIAGNOSIS — R06.02 SHORTNESS OF BREATH: Primary | ICD-10-CM

## 2021-12-27 ENCOUNTER — APPOINTMENT (OUTPATIENT)
Dept: OTHER | Facility: HOSPITAL | Age: 51
End: 2021-12-27

## 2021-12-27 DIAGNOSIS — Z09 FOLLOW UP: ICD-10-CM

## 2021-12-28 ENCOUNTER — OFFICE VISIT (OUTPATIENT)
Dept: FAMILY MEDICINE CLINIC | Facility: CLINIC | Age: 51
End: 2021-12-28

## 2021-12-28 VITALS
OXYGEN SATURATION: 93 % | HEIGHT: 64 IN | TEMPERATURE: 96.9 F | SYSTOLIC BLOOD PRESSURE: 130 MMHG | BODY MASS INDEX: 44.9 KG/M2 | DIASTOLIC BLOOD PRESSURE: 94 MMHG | WEIGHT: 263 LBS | RESPIRATION RATE: 18 BRPM | HEART RATE: 82 BPM

## 2021-12-28 DIAGNOSIS — F31.32 BIPOLAR 1 DISORDER, DEPRESSED, MODERATE: ICD-10-CM

## 2021-12-28 DIAGNOSIS — R91.1 LUNG NODULE: Primary | ICD-10-CM

## 2021-12-28 PROCEDURE — 99214 OFFICE O/P EST MOD 30 MIN: CPT | Performed by: INTERNAL MEDICINE

## 2021-12-30 ENCOUNTER — OFFICE VISIT (OUTPATIENT)
Dept: SURGERY | Facility: CLINIC | Age: 51
End: 2021-12-30

## 2021-12-30 VITALS
WEIGHT: 259 LBS | OXYGEN SATURATION: 97 % | BODY MASS INDEX: 44.22 KG/M2 | HEIGHT: 64 IN | HEART RATE: 80 BPM | TEMPERATURE: 97.1 F

## 2021-12-30 DIAGNOSIS — R91.1 LUNG NODULE: Primary | ICD-10-CM

## 2021-12-30 PROCEDURE — 99205 OFFICE O/P NEW HI 60 MIN: CPT | Performed by: THORACIC SURGERY (CARDIOTHORACIC VASCULAR SURGERY)

## 2022-01-03 ENCOUNTER — PATIENT ROUNDING (BHMG ONLY) (OUTPATIENT)
Dept: SURGERY | Facility: CLINIC | Age: 52
End: 2022-01-03

## 2022-01-03 NOTE — PROGRESS NOTES
January 3, 2022    Hello, may I speak with Tessie Conner?    My name is RADHA    I am  with [facility]  [address] [place] [address].    Before we get started may I verify your date of birth? 1970    I am calling to officially welcome you to our practice and ask about your recent visit. Is this a good time to talk? yes    Tell me about your visit with us. What things went well?  PT WAS UNHAPPY WITH HAVING AN HOUR WAIT. SAID THAT PROVIDERS GARCÍAE GOT OUT SO SHE WAS LATE.  SHE SAID THAT PROVIDER DID TAKE HER TIME WITH HER ONCE SHE ARRIVED TO THE OFFICE AND WAS SATISFIED WITH THAT.       We're always looking for ways to make our patients' experiences even better. Do you have recommendations on ways we may improve?  no    Overall were you satisfied with your first visit to our practice? yes       I appreciate you taking the time to speak with me today. Is there anything else I can do for you? no      Thank you, and have a great day.

## 2022-01-04 NOTE — PROGRESS NOTES
Hematology/Oncology Outpatient Follow Up    PATIENT NAME:Tessie Conner  :1970  MRN: [id number]  PRIMARY CARE PHYSICIAN: Marsha Lopez MD  REFERRING PHYSICIAN: Marsha Lopez MD    Chief Complaint   Patient presents with   • Follow-up     Splenomegaly        HISTORY OF PRESENT ILLNESS:     This is a 51-year-old female has a history of recurrent urinary tract infection and for that reason she is actively cared for by the urology service.  During her work-up for the UTI she had a CT scan of the abdomen and pelvis on 2021 is basically showed diffuse hepatic steatosis, new mild splenomegaly with a few small hypoattenuating lesions partially imaged 2 mm right middle lobe nodule and 3 mm anterior right middle lobe nodule which are new.  Differentials on the splenomegaly mentioned include sarcoidosis, lymphoma CT scan of the chest was recommended to further evaluate.     Patient had a CBC done on 2021 white count was 7.8, hemoglobin 12, MCV was normal at 83 and platelets were 269.  Differentials are 60% neutrophils, 32% lymphocytes and 6% monocytes.     Patient scheduled for cystoscopy on 2021     Patient does complain of some night sweats, but she has not had weight loss.  She also complains of some fatigue.     Patient is  she has 3 adult children.  She does not smoke and does not drink.    · 2021 patient had an ACE level which was normal at 18 ranges 14-82.  Peripheral smear was unremarkable with no immature forms, white count was 7.7 hemoglobin, 12.3 and platelets were 255.  Differentials are 66% neutrophils, 25% lymphocytes, there was no monocytosis eosinophilia or basophilia.  Sed rate was 35 range 0-30, JAK2 analysis was negative erythropoietin level was normal at 15.6 peripheral blood flow cytometry still pending BCR ABL was negative SPEP with FOX did not reveal any monoclonal forms, JASON is negative.  Her blood flow cytometry did not show any significant  immunophenotypic phytic abnormalities.  · 2021 patient had CT scan of the chest this basically revealed a 1.3 x 1.3 cm lobulated mass in the posterior segment of the right upper lung of unclear etiology.  There are a few scattered subcentimeters lung nodules most pronounced in the right upper lobe.  There is no suspicious mass or nodule in the left lung base there is an enlarged right suprahilar lymph node measuring up to 1.3 cm.  A PET CT scan has been recommended to further evaluate  · 2021 patient had a PET CT scan which showed increased activity in the right suprahilar location measuring up to 5.6 corresponding to the central nodular density measuring about 1.7 cm.  There is mild hypermetabolism within the right paratracheal lymph node.  In the posterior right upper lobe there is an irregular marginated solid nodule with a metabolic activity of 3.1 measuring 1.5 x 1.2 cm  · 2021 patient underwent CT-guided biopsy of the lung nodule pathology revealed fragments of benign lung parenchyma with foci of chronic inflammation, no malignancy was identified  Past Medical History:   Diagnosis Date   • Anxiety    • Asthma 2020   • Bipolar 1 disorder (HCC)    • Depression    • Headache    • Hyperlipidemia 10/4/2016   • Inflammatory bowel disease    • Irritable bowel syndrome    • Obesity    • Urinary tract infection Off and on   • Visual impairment        Past Surgical History:   Procedure Laterality Date   • APPENDECTOMY     • CARDIAC CATHETERIZATION N/A 2020    Procedure: Left Heart Cath possible PCI, atherectomy, hemodynamic support;  Surgeon: Thomas Zamora MD;  Location: [facility] CATH INVASIVE LOCATION;  Service: Cardiology;  Laterality: N/A;   •  SECTION     • FOOT/TOE TENDON REPAIR Left    • HYSTERECTOMY     • ROTATOR CUFF REPAIR Left    • TUBAL ABDOMINAL LIGATION           Current Outpatient Medications:   •  ALPRAZolam (Xanax) 0.5  "MG tablet, Take 1 tablet by mouth 2 (Two) Times a Day As Needed for Anxiety., Disp: 20 tablet, Rfl: 0  •  ARIPiprazole (ABILIFY) 20 MG tablet, , Disp: , Rfl:   •  aspirin (aspirin) 81 MG EC tablet, Take 1 tablet by mouth Daily., Disp: 90 tablet, Rfl: 3  •  atorvastatin (Lipitor) 20 MG tablet, Take 1 tablet by mouth Daily., Disp: 90 tablet, Rfl: 3  •  lisinopril (PRINIVIL,ZESTRIL) 10 MG tablet, Take 1 tablet by mouth Daily., Disp: 30 tablet, Rfl: 5  •  venlafaxine XR (EFFEXOR-XR) 150 MG 24 hr capsule, Take 150 mg by mouth 2 (Two) Times a Day. Verified ER BID, Disp: , Rfl:     No Known Allergies    Family History   Problem Relation Age of Onset   • Arthritis Mother    • Cancer Mother    • Depression Mother    • Diabetes Mother    • Early death Mother    • Mental illness Mother    • Anxiety disorder Mother    • Alcohol abuse Father    • Diabetes Father    • Early death Father    • Heart disease Father    • Hyperlipidemia Father    • Hypertension Father    • Vision loss Father    • Heart disease Sister    • Heart disease Brother    • Hypertension Brother    • Cancer Maternal Grandmother    • Drug abuse Brother    • Drug abuse Sister        Cancer-related family history includes Cancer in her maternal grandmother and mother.    Social History     Tobacco Use   • Smoking status: Never Smoker   • Smokeless tobacco: Never Used   Vaping Use   • Vaping Use: Never used   Substance Use Topics   • Alcohol use: No   • Drug use: No       HPI, ROS and PFSH have been reviewed and confirmed on 1/6/2022.     SUBJECTIVE:      The biopsy she has been referred to Dr. Carroll with CTS who has recommended surgical resection    REVIEW OF SYSTEMS:  Review of Systems  Dry cough  OBJECTIVE:    Vitals:    01/06/22 0859   BP: 124/64   Pulse: 76   Resp: 20   Temp: 97.1 °F (36.2 °C)   TempSrc: Infrared   Weight: 119 kg (262 lb)   Height: 162.6 cm (64\")   PainSc: 0-No pain     Body mass index is 44.97 kg/m².    ECOG  (0) Fully active, able to carry " on all predisease performance without restriction    Physical Exam     Unchanged  I have reexamined the patient and the results are consistent with the previously documented exam. Miryamjessica Lin MD     RECENT LABS  WBC   Date Value Ref Range Status   01/06/2022 6.44 3.40 - 10.80 10*3/mm3 Final     RBC   Date Value Ref Range Status   01/06/2022 4.38 3.77 - 5.28 10*6/mm3 Final     Hemoglobin   Date Value Ref Range Status   01/06/2022 12.0 12.0 - 15.9 g/dL Final     Hematocrit   Date Value Ref Range Status   01/06/2022 39.2 34.0 - 46.6 % Final     MCV   Date Value Ref Range Status   01/06/2022 89.5 79.0 - 97.0 fL Final     MCH   Date Value Ref Range Status   01/06/2022 27.4 26.6 - 33.0 pg Final     MCHC   Date Value Ref Range Status   01/06/2022 30.6 (L) 31.5 - 35.7 g/dL Final     RDW   Date Value Ref Range Status   01/06/2022 15.1 12.3 - 15.4 % Final     RDW-SD   Date Value Ref Range Status   01/06/2022 48.2 37.0 - 54.0 fl Final     MPV   Date Value Ref Range Status   01/06/2022 9.7 6.0 - 12.0 fL Final     Platelets   Date Value Ref Range Status   01/06/2022 251 140 - 450 10*3/mm3 Final     Neutrophil %   Date Value Ref Range Status   01/06/2022 66.4 42.7 - 76.0 % Final     Lymphocyte %   Date Value Ref Range Status   01/06/2022 27.5 19.6 - 45.3 % Final     Monocyte %   Date Value Ref Range Status   01/06/2022 6.1 5.0 - 12.0 % Final     Eosinophil %   Date Value Ref Range Status   01/06/2022 0.0 (L) 0.3 - 6.2 % Final     Basophil %   Date Value Ref Range Status   01/06/2022 0.0 0.0 - 1.5 % Final     Immature Grans %   Date Value Ref Range Status   11/08/2021 0.5 0.0 - 0.5 % Final     Neutrophils, Absolute   Date Value Ref Range Status   01/06/2022 4.28 1.70 - 7.00 10*3/mm3 Final     Lymphocytes, Absolute   Date Value Ref Range Status   01/06/2022 1.77 0.70 - 3.10 10*3/mm3 Final     Monocytes, Absolute   Date Value Ref Range Status   01/06/2022 0.39 0.10 - 0.90 10*3/mm3 Final     Eosinophils, Absolute   Date  Value Ref Range Status   01/06/2022 0.00 0.00 - 0.40 10*3/mm3 Final     Basophils, Absolute   Date Value Ref Range Status   01/06/2022 0.00 0.00 - 0.20 10*3/mm3 Final     Immature Grans, Absolute   Date Value Ref Range Status   11/08/2021 0.04 0.00 - 0.05 10*3/mm3 Final     nRBC   Date Value Ref Range Status   12/16/2021 0.1 0.0 - 0.2 /100 WBC Final       Lab Results   Component Value Date    GLUCOSE 168 (H) 08/27/2021    BUN 9 08/27/2021    CREATININE 0.80 11/27/2021    EGFRIFNONA 72 08/27/2021    EGFRIFAFRI 88 10/05/2016    BCR 10.7 08/27/2021    K 3.8 08/27/2021    CO2 27.5 08/27/2021    CALCIUM 9.5 08/27/2021    PROTENTOTREF 7.2 11/19/2021    ALBUMIN 3.8 11/19/2021    LABIL2 1.1 11/19/2021    AST 22 08/27/2021    ALT 20 08/27/2021         Assessment/Plan     Splenomegaly  - CBC & Differential      ASSESSMENT:      Splenomegaly  - JAK2 Mutation Analysis, Qual  - Erythropoietin  - Flow Cytometry, Blood  - BCR-ABL FISH  - Protein Elec + Interp, Serum  - JASON  - Sedimentation Rate  - Peripheral Blood Smear  - CBC & Differential  - ACE, CSF - Cerebrospinal Fluid,  - CBC & Differential        1. Pulmonary nodules, right upper lobe nodule measuring 1.3 cm, right hilar lymph node enlargement measuring also up to 1.3 cm.  Needle guided biopsy was nondiagnostic.  There were atypical cells but definitive malignancy could not be confirmed.  Patient has recommended to undergo surgical resection.  She has an appointment with Dr. Lopez coming up soon  2. Mild splenomegaly: So far there is no hematologic abnormalities to account for the splenomegaly.  We will continue to evaluate this      Prior discussion     I have reviewed her imaging studies available lab results.  She has mild splenomegaly, 2 small pulmonary nodules of unclear etiology.  She is scheduled for a dedicated CT scan of the chest coming up soon.  She also has cystoscopy coming up in the near future for recurrent UTIs.  Discussed the differential diagnosis of  splenomegaly.  Labs have been ordered to further evaluate including ACE level, JASON, BCR ABL, EPO level, peripheral blood flow cytometry, JAK2 analysis SPEP with FOX and a sed rate.        Plans     · Reviewed results of her CT of the chest   · Reviewed also the results of the PET/CT.    · Biopsy was nondiagnostic therefore surgical resection has been recommended.  Patient will follow-up with Dr. Carroll to have this completed  · Review results of the cystoscopy  · Reviewed her labs as listed above, no abnormalities identified.  · Follow-up in third week in February 2022  · All her questions answered        Patient verbalized understanding and is in agreement of the above plan.                 I spent 40 total minutes, face-to-face, caring for Tessie today.  80% of this time involved counseling and/or coordination of care as documented within this note.  Spent time also reviewing  the differential diagnosis.

## 2022-01-06 ENCOUNTER — LAB (OUTPATIENT)
Dept: LAB | Facility: HOSPITAL | Age: 52
End: 2022-01-06

## 2022-01-06 ENCOUNTER — OFFICE VISIT (OUTPATIENT)
Dept: ONCOLOGY | Facility: CLINIC | Age: 52
End: 2022-01-06

## 2022-01-06 VITALS
RESPIRATION RATE: 20 BRPM | DIASTOLIC BLOOD PRESSURE: 64 MMHG | HEART RATE: 76 BPM | HEIGHT: 64 IN | BODY MASS INDEX: 44.73 KG/M2 | TEMPERATURE: 97.1 F | WEIGHT: 262 LBS | SYSTOLIC BLOOD PRESSURE: 124 MMHG

## 2022-01-06 DIAGNOSIS — Z20.822 ENCOUNTER FOR PREPROCEDURE SCREENING LABORATORY TESTING FOR COVID-19: Primary | ICD-10-CM

## 2022-01-06 DIAGNOSIS — Z01.812 ENCOUNTER FOR PREPROCEDURE SCREENING LABORATORY TESTING FOR COVID-19: Primary | ICD-10-CM

## 2022-01-06 DIAGNOSIS — R16.1 SPLENOMEGALY: Primary | ICD-10-CM

## 2022-01-06 DIAGNOSIS — R16.1 SPLENOMEGALY: ICD-10-CM

## 2022-01-06 LAB
BASOPHILS # BLD AUTO: 0 10*3/MM3 (ref 0–0.2)
BASOPHILS NFR BLD AUTO: 0 % (ref 0–1.5)
DEPRECATED RDW RBC AUTO: 48.2 FL (ref 37–54)
EOSINOPHIL # BLD AUTO: 0 10*3/MM3 (ref 0–0.4)
EOSINOPHIL NFR BLD AUTO: 0 % (ref 0.3–6.2)
ERYTHROCYTE [DISTWIDTH] IN BLOOD BY AUTOMATED COUNT: 15.1 % (ref 12.3–15.4)
HCT VFR BLD AUTO: 39.2 % (ref 34–46.6)
HGB BLD-MCNC: 12 G/DL (ref 12–15.9)
HOLD SPECIMEN: NORMAL
HOLD SPECIMEN: NORMAL
LYMPHOCYTES # BLD AUTO: 1.77 10*3/MM3 (ref 0.7–3.1)
LYMPHOCYTES NFR BLD AUTO: 27.5 % (ref 19.6–45.3)
MCH RBC QN AUTO: 27.4 PG (ref 26.6–33)
MCHC RBC AUTO-ENTMCNC: 30.6 G/DL (ref 31.5–35.7)
MCV RBC AUTO: 89.5 FL (ref 79–97)
MONOCYTES # BLD AUTO: 0.39 10*3/MM3 (ref 0.1–0.9)
MONOCYTES NFR BLD AUTO: 6.1 % (ref 5–12)
NEUTROPHILS NFR BLD AUTO: 4.28 10*3/MM3 (ref 1.7–7)
NEUTROPHILS NFR BLD AUTO: 66.4 % (ref 42.7–76)
PLATELET # BLD AUTO: 251 10*3/MM3 (ref 140–450)
PMV BLD AUTO: 9.7 FL (ref 6–12)
RBC # BLD AUTO: 4.38 10*6/MM3 (ref 3.77–5.28)
WBC NRBC COR # BLD: 6.44 10*3/MM3 (ref 3.4–10.8)

## 2022-01-06 PROCEDURE — 85025 COMPLETE CBC W/AUTO DIFF WBC: CPT

## 2022-01-06 PROCEDURE — 36415 COLL VENOUS BLD VENIPUNCTURE: CPT

## 2022-01-06 PROCEDURE — 99215 OFFICE O/P EST HI 40 MIN: CPT | Performed by: INTERNAL MEDICINE

## 2022-01-08 ENCOUNTER — LAB (OUTPATIENT)
Dept: LAB | Facility: HOSPITAL | Age: 52
End: 2022-01-08

## 2022-01-08 DIAGNOSIS — Z01.818 OTHER SPECIFIED PRE-OPERATIVE EXAMINATION: ICD-10-CM

## 2022-01-08 LAB — SARS-COV-2 ORF1AB RESP QL NAA+PROBE: NOT DETECTED

## 2022-01-08 PROCEDURE — U0004 COV-19 TEST NON-CDC HGH THRU: HCPCS

## 2022-01-08 PROCEDURE — C9803 HOPD COVID-19 SPEC COLLECT: HCPCS

## 2022-01-11 ENCOUNTER — APPOINTMENT (OUTPATIENT)
Dept: RESPIRATORY THERAPY | Facility: HOSPITAL | Age: 52
End: 2022-01-11

## 2022-01-11 ENCOUNTER — HOSPITAL ENCOUNTER (OUTPATIENT)
Dept: RESPIRATORY THERAPY | Facility: HOSPITAL | Age: 52
Discharge: HOME | End: 2022-01-11
Admitting: INTERNAL MEDICINE

## 2022-01-11 DIAGNOSIS — R06.02 SHORTNESS OF BREATH: ICD-10-CM

## 2022-01-11 PROCEDURE — 94060 EVALUATION OF WHEEZING: CPT

## 2022-01-11 PROCEDURE — 94729 DIFFUSING CAPACITY: CPT

## 2022-01-11 PROCEDURE — A9270 NON-COVERED ITEM OR SERVICE: HCPCS | Performed by: INTERNAL MEDICINE

## 2022-01-11 PROCEDURE — 63710000001 ALBUTEROL SULFATE HFA 108 (90 BASE) MCG/ACT AEROSOL SOLUTION 6.7 G INHALER: Performed by: INTERNAL MEDICINE

## 2022-01-11 PROCEDURE — 94726 PLETHYSMOGRAPHY LUNG VOLUMES: CPT

## 2022-01-11 RX ORDER — ALBUTEROL SULFATE 90 UG/1
2 AEROSOL, METERED RESPIRATORY (INHALATION) ONCE
Status: COMPLETED | OUTPATIENT
Start: 2022-01-11 | End: 2022-01-11

## 2022-01-11 RX ADMIN — ALBUTEROL SULFATE 2 PUFF: 108 INHALANT RESPIRATORY (INHALATION) at 07:06

## 2022-01-13 ENCOUNTER — OFFICE VISIT (OUTPATIENT)
Dept: SURGERY | Facility: CLINIC | Age: 52
End: 2022-01-13

## 2022-01-13 ENCOUNTER — PREP FOR SURGERY (OUTPATIENT)
Dept: OTHER | Facility: HOSPITAL | Age: 52
End: 2022-01-13

## 2022-01-13 VITALS
OXYGEN SATURATION: 97 % | TEMPERATURE: 98.7 F | WEIGHT: 261 LBS | DIASTOLIC BLOOD PRESSURE: 72 MMHG | HEIGHT: 64 IN | HEART RATE: 75 BPM | SYSTOLIC BLOOD PRESSURE: 122 MMHG | BODY MASS INDEX: 44.56 KG/M2

## 2022-01-13 DIAGNOSIS — R91.8 MASS OF LUNG: Primary | ICD-10-CM

## 2022-01-13 DIAGNOSIS — R91.1 LUNG NODULE: Primary | ICD-10-CM

## 2022-01-13 DIAGNOSIS — R79.1 ABNORMAL COAGULATION PROFILE: ICD-10-CM

## 2022-01-13 PROCEDURE — 99215 OFFICE O/P EST HI 40 MIN: CPT | Performed by: THORACIC SURGERY (CARDIOTHORACIC VASCULAR SURGERY)

## 2022-01-13 RX ORDER — CELECOXIB 200 MG/1
200 CAPSULE ORAL ONCE
Status: CANCELLED | OUTPATIENT
Start: 2022-02-01 | End: 2022-01-13

## 2022-01-13 RX ORDER — ACETAMINOPHEN 500 MG
1000 TABLET ORAL ONCE
Status: CANCELLED | OUTPATIENT
Start: 2022-02-01 | End: 2022-01-13

## 2022-01-13 RX ORDER — SODIUM CHLORIDE 0.9 % (FLUSH) 0.9 %
3-10 SYRINGE (ML) INJECTION AS NEEDED
Status: CANCELLED | OUTPATIENT
Start: 2022-02-01

## 2022-01-13 RX ORDER — SODIUM CHLORIDE 0.9 % (FLUSH) 0.9 %
3 SYRINGE (ML) INJECTION EVERY 12 HOURS SCHEDULED
Status: CANCELLED | OUTPATIENT
Start: 2022-02-01

## 2022-01-13 RX ORDER — HEPARIN SODIUM 5000 [USP'U]/ML
5000 INJECTION, SOLUTION INTRAVENOUS; SUBCUTANEOUS ONCE
Status: CANCELLED | OUTPATIENT
Start: 2022-02-01 | End: 2022-01-13

## 2022-01-13 RX ORDER — CEFAZOLIN SODIUM IN 0.9 % NACL 3 G/100 ML
3 INTRAVENOUS SOLUTION, PIGGYBACK (ML) INTRAVENOUS ONCE
Status: CANCELLED | OUTPATIENT
Start: 2022-02-01 | End: 2022-01-13

## 2022-01-13 RX ORDER — GABAPENTIN 300 MG/1
600 CAPSULE ORAL ONCE
Status: CANCELLED | OUTPATIENT
Start: 2022-02-01 | End: 2022-01-13

## 2022-01-14 NOTE — PROGRESS NOTES
"Chief Complaint  Lung Nodule (2 week follow up PFT )    Subjective          Tessie Conner presents to [facility]AC[facility] SURGERY  History of Present Illness   Miss Conner is a very pleasant 52-year-old lady who presented on 01/04/2022 after a 1.3 cm right upper lobe nodule was found. She was sent for pulmonary function studies and returns today to discuss the results. With her first biopsy, she was informed that her findings were \"atypical.\" She felt as if she did well with the pulmonary function tests but she is unable to find the results on MyChart. She is otherwise feeling well. The patient did inquire if she would need oxygen after having surgery as her mother had emphysema and was on chronic oxygen supplementation. She inquires if she would need a home health nurse after the surgery as her  works out of town.       Objective   Vital Signs:   /72 (BP Location: Left arm, Patient Position: Sitting, Cuff Size: Large Adult)   Pulse 75   Temp 98.7 °F (37.1 °C) (Infrared)   Ht 162.6 cm (64\")   Wt 118 kg (261 lb)   SpO2 97%   BMI 44.80 kg/m²     Physical Exam  Vitals and nursing note reviewed.   Constitutional:       Appearance: She is well-developed.   HENT:      Head: Normocephalic and atraumatic.      Nose: Nose normal.   Eyes:      Conjunctiva/sclera: Conjunctivae normal.   Cardiovascular:      Rate and Rhythm: Normal rate.   Pulmonary:      Effort: Pulmonary effort is normal.   Abdominal:      Palpations: Abdomen is soft.   Musculoskeletal:      Cervical back: Neck supple.   Skin:     General: Skin is warm and dry.   Neurological:      Mental Status: She is alert and oriented to person, place, and time.   Psychiatric:         Behavior: Behavior normal.         Thought Content: Thought content normal.         Judgment: Judgment normal.          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   Pulmonary function studies: FEV-1 2.01 or 69 percent predicted. DLCO 16.55 or 64 percent predicted.       Assessment and Plan      Miss Conner is a very pleasant 52-year-old lady who presented on 01/04/2022 after a 1.3 cm right upper lobe nodule was found. We reviewed her pulmonary function tests and biopsy results. Based on her pulmonary function test results, she should not have significant deficits should we proceed with surgical removal of the lung nodule. We discussed surgical removal of the lung nodule and the potential for removal of the right upper lobe should pathology of the nodule be significant for malignancy. If we perform the wedge resection, the procedure would take about 1 hour and she would be hospitalized overnight, but if she requires a right upper lobectomy, the procedure would be between 2 to 4 hours and she would be hospitalized for 3 days. We discussed that with either surgery, she will have a chest tube in place and removal will be based on the type of surgery.  We reviewed the potential risks of the surgery, including pneumonia. I advised the patient that she would likely not require long-term oxygen supplementation after the surgery but may be slightly more short of breath than she is currently. Her recovery period for the wedge resection is about 4 weeks and for the right upper lobectomy would be about 8 weeks. I do not believe she will require a home health nurse as she should still be relatively functional and able to perform her activities of daily living; however, I do recommend that her  plan to stay with her for about 1 week after surgery for assistance. She will be in contact with our  who will arrange for her to have her preoperative COVID-19 testing and will schedule the surgery at [facility]. I will see her back around 02/01/2022.    Diagnoses and all orders for this visit:    1. Lung nodule (Primary)      I spent 40 minutes caring for Tessie  on this date of service. This time includes time spent by me in the following activities:preparing for the visit, reviewing tests, obtaining and/or reviewing a separately obtained history, performing a medically appropriate examination and/or evaluation , counseling and educating the patient/family/caregiver, ordering medications, tests, or procedures, documenting information in the medical record and independently interpreting results and communicating that information with the patient/family/caregiver  Follow Up   No follow-ups on file.  Patient was given instructions and counseling regarding her condition or for health maintenance advice. Please see specific information pulled into the AVS if appropriate.       Transcribed from ambient dictation for Ayla Carroll MD by Essie Rodriges.   01/14/22   09:39 EST    Patient verbalized consent to the visit recording.

## 2022-01-25 ENCOUNTER — PRE-ADMISSION TESTING (OUTPATIENT)
Dept: PREADMISSION TESTING | Facility: HOSPITAL | Age: 52
End: 2022-01-25

## 2022-01-25 VITALS
WEIGHT: 261.7 LBS | HEART RATE: 86 BPM | HEIGHT: 64 IN | DIASTOLIC BLOOD PRESSURE: 76 MMHG | SYSTOLIC BLOOD PRESSURE: 124 MMHG | BODY MASS INDEX: 44.68 KG/M2 | RESPIRATION RATE: 20 BRPM | OXYGEN SATURATION: 96 % | TEMPERATURE: 98.2 F

## 2022-01-25 DIAGNOSIS — R79.1 ABNORMAL COAGULATION PROFILE: ICD-10-CM

## 2022-01-25 DIAGNOSIS — R91.8 MASS OF LUNG: ICD-10-CM

## 2022-01-25 LAB
ABO GROUP BLD: NORMAL
ANION GAP SERPL CALCULATED.3IONS-SCNC: 11.3 MMOL/L (ref 5–15)
APTT PPP: 34.9 SECONDS (ref 22.7–35.4)
BASOPHILS # BLD AUTO: 0 10*3/MM3 (ref 0–0.2)
BASOPHILS NFR BLD AUTO: 0 % (ref 0–1.5)
BLD GP AB SCN SERPL QL: NEGATIVE
BUN SERPL-MCNC: 10 MG/DL (ref 6–20)
BUN/CREAT SERPL: 14.1 (ref 7–25)
CALCIUM SPEC-SCNC: 9.7 MG/DL (ref 8.6–10.5)
CHLORIDE SERPL-SCNC: 103 MMOL/L (ref 98–107)
CO2 SERPL-SCNC: 26.7 MMOL/L (ref 22–29)
CREAT SERPL-MCNC: 0.71 MG/DL (ref 0.57–1)
DEPRECATED RDW RBC AUTO: 46 FL (ref 37–54)
EOSINOPHIL # BLD AUTO: 0 10*3/MM3 (ref 0–0.4)
EOSINOPHIL NFR BLD AUTO: 0 % (ref 0.3–6.2)
ERYTHROCYTE [DISTWIDTH] IN BLOOD BY AUTOMATED COUNT: 14.6 % (ref 12.3–15.4)
GFR SERPL CREATININE-BSD FRML MDRD: 87 ML/MIN/1.73
GLUCOSE SERPL-MCNC: 74 MG/DL (ref 65–99)
HCT VFR BLD AUTO: 39.2 % (ref 34–46.6)
HGB BLD-MCNC: 12.3 G/DL (ref 12–15.9)
IMM GRANULOCYTES # BLD AUTO: 0.03 10*3/MM3 (ref 0–0.05)
IMM GRANULOCYTES NFR BLD AUTO: 0.4 % (ref 0–0.5)
INR PPP: 1.02 (ref 0.9–1.1)
LYMPHOCYTES # BLD AUTO: 1.88 10*3/MM3 (ref 0.7–3.1)
LYMPHOCYTES NFR BLD AUTO: 26.7 % (ref 19.6–45.3)
MCH RBC QN AUTO: 26.9 PG (ref 26.6–33)
MCHC RBC AUTO-ENTMCNC: 31.4 G/DL (ref 31.5–35.7)
MCV RBC AUTO: 85.6 FL (ref 79–97)
MONOCYTES # BLD AUTO: 0.59 10*3/MM3 (ref 0.1–0.9)
MONOCYTES NFR BLD AUTO: 8.4 % (ref 5–12)
NEUTROPHILS NFR BLD AUTO: 4.53 10*3/MM3 (ref 1.7–7)
NEUTROPHILS NFR BLD AUTO: 64.5 % (ref 42.7–76)
NRBC BLD AUTO-RTO: 0 /100 WBC (ref 0–0.2)
PLATELET # BLD AUTO: 243 10*3/MM3 (ref 140–450)
PMV BLD AUTO: 10.1 FL (ref 6–12)
POTASSIUM SERPL-SCNC: 4 MMOL/L (ref 3.5–5.2)
PROTHROMBIN TIME: 13.2 SECONDS (ref 11.7–14.2)
QT INTERVAL: 393 MS
RBC # BLD AUTO: 4.58 10*6/MM3 (ref 3.77–5.28)
RH BLD: POSITIVE
SODIUM SERPL-SCNC: 141 MMOL/L (ref 136–145)
T&S EXPIRATION DATE: NORMAL
WBC NRBC COR # BLD: 7.03 10*3/MM3 (ref 3.4–10.8)

## 2022-01-25 PROCEDURE — 86901 BLOOD TYPING SEROLOGIC RH(D): CPT

## 2022-01-25 PROCEDURE — 85025 COMPLETE CBC W/AUTO DIFF WBC: CPT

## 2022-01-25 PROCEDURE — 85610 PROTHROMBIN TIME: CPT

## 2022-01-25 PROCEDURE — 93005 ELECTROCARDIOGRAM TRACING: CPT

## 2022-01-25 PROCEDURE — 86850 RBC ANTIBODY SCREEN: CPT

## 2022-01-25 PROCEDURE — 80048 BASIC METABOLIC PNL TOTAL CA: CPT

## 2022-01-25 PROCEDURE — 85730 THROMBOPLASTIN TIME PARTIAL: CPT

## 2022-01-25 PROCEDURE — 93010 ELECTROCARDIOGRAM REPORT: CPT | Performed by: INTERNAL MEDICINE

## 2022-01-25 PROCEDURE — 86900 BLOOD TYPING SEROLOGIC ABO: CPT

## 2022-01-25 PROCEDURE — 36415 COLL VENOUS BLD VENIPUNCTURE: CPT

## 2022-01-25 RX ORDER — CHLORHEXIDINE GLUCONATE 500 MG/1
CLOTH TOPICAL TAKE AS DIRECTED
Status: ON HOLD | COMMUNITY
End: 2022-02-08

## 2022-01-25 NOTE — DISCHARGE INSTRUCTIONS
Take the following medications the morning of surgery:  VENLAFAXINE (EFFEXOR)    ARRIVAL TIME FOR SURGERY IS 10:00AM      If you are on prescription narcotic pain medication to control your pain you may also take that medication the morning of surgery.    General Instructions:  • Do not eat solid food after midnight the night before surgery.  • You may drink clear liquids day of surgery but must stop at least one hour before your hospital arrival time.  • It is beneficial for you to have a clear drink that contains carbohydrates the day of surgery.  We suggest a 12 to 20 ounce bottle of Gatorade or Powerade for non-diabetic patients or a 12 to 20 ounce bottle of G2 or Powerade Zero for diabetic patients. (Pediatric patients, are not advised to drink a 12 to 20 ounce carbohydrate drink)    Clear liquids are liquids you can see through.  Nothing red in color.     Plain water                               Sports drinks  Sodas                                   Gelatin (Jell-O)  Fruit juices without pulp such as white grape juice and apple juice  Popsicles that contain no fruit or yogurt  Tea or coffee (no cream or milk added)  Gatorade / Powerade  G2 / Powerade Zero    • Infants may have breast milk up to four hours before surgery.  • Infants drinking formula may drink formula up to six hours before surgery.   • Patients who avoid smoking, chewing tobacco and alcohol for 4 weeks prior to surgery have a reduced risk of post-operative complications.  Quit smoking as many days before surgery as you can.  • Do not smoke, use chewing tobacco or drink alcohol the day of surgery.   • If applicable bring your C-PAP/ BI-PAP machine.  • Bring any papers given to you in the doctor’s office.  • Wear clean comfortable clothes.  • Do not wear contact lenses, false eyelashes or make-up.  Bring a case for your glasses.   • Bring crutches or walker if applicable.  • Remove all piercings.  Leave jewelry and any other valuables at  home.  • Hair extensions with metal clips must be removed prior to surgery.  • The Pre-Admission Testing nurse will instruct you to bring medications if unable to obtain an accurate list in Pre-Admission Testing.        If you were given a blood bank ID arm band remember to bring it with you the day of surgery.    Preventing a Surgical Site Infection:  • For 2 to 3 days before surgery, avoid shaving with a razor because the razor can irritate skin and make it easier to develop an infection.    • Any areas of open skin can increase the risk of a post-operative wound infection by allowing bacteria to enter and travel throughout the body.  Notify your surgeon if you have any skin wounds / rashes even if it is not near the expected surgical site.  The area will need assessed to determine if surgery should be delayed until it is healed.  • The night prior to surgery shower using a fresh bar of anti-bacterial soap (such as Dial) and clean washcloth.  Sleep in a clean bed with clean clothing.  Do not allow pets to sleep with you.  • Shower on the morning of surgery using a fresh bar of anti-bacterial soap (such as Dial) and clean washcloth.  Dry with a clean towel and dress in clean clothing.  • Ask your surgeon if you will be receiving antibiotics prior to surgery.  • Make sure you, your family, and all healthcare providers clean their hands with soap and water or an alcohol based hand  before caring for you or your wound.    Day of surgery:  Your arrival time is approximately two hours before your scheduled surgery time.  Upon arrival, a Pre-op nurse and Anesthesiologist will review your health history, obtain vital signs, and answer questions you may have.  The only belongings needed at this time will be a list of your home medications and if applicable your C-PAP/BI-PAP machine.  A Pre-op nurse will start an IV and you may receive medication in preparation for surgery, including something to help you relax.      Please be aware that surgery does come with discomfort.  We want to make every effort to control your discomfort so please discuss any uncontrolled symptoms with your nurse.   Your doctor will most likely have prescribed pain medications.      If you are going home after surgery you will receive individualized written care instructions before being discharged.  A responsible adult must drive you to and from the hospital on the day of your surgery and stay with you for 24 hours.  Discharge prescriptions can be filled by the hospital pharmacy during regular pharmacy hours.  If you are having surgery late in the day/evening your prescription may be e-prescribed to your pharmacy.  Please verify your pharmacy hours or chose a 24 hour pharmacy to avoid not having access to your prescription because your pharmacy has closed for the day.    If you are staying overnight following surgery, you will be transported to your hospital room following the recovery period.  [facility] has all private rooms.    If you have any questions please call Pre-Admission Testing at [phone].  Deductibles and co-payments are collected on the day of service. Please be prepared to pay the required co-pay, deductible or deposit on the day of service as defined by your plan.    Patient Education for Self-Quarantine Process    • Following your COVID testing, we strongly recommend that you wear a mask when you are with other people and practice social distancing.   • Limit your activities to only required outings.  • Wash your hands with soap and water frequently for at least 20 seconds.   • Avoid touching your eyes, nose and mouth with unwashed hands.  • Do not share anything - utensils, drinking glasses, food from the same bowl.   • Sanitize household surfaces daily. Include all high touch areas (door handles, light switches, phones, countertops, etc.)    Call your surgeon immediately if you experience any of the following  symptoms:  • Sore Throat  • Shortness of Breath or difficulty breathing  • Cough  • Chills  • Body soreness or muscle pain  • Headache  • Fever  • New loss of taste or smell  • Do not arrive for your surgery ill.  Your procedure will need to be rescheduled to another time.  You will need to call your physician before the day of surgery to avoid any unnecessary exposure to hospital staff as well as other patients.      CHLORHEXIDINE CLOTH INSTRUCTIONS  The morning of surgery follow these instructions using the Chlorhexidine cloths you've been given.  These steps reduce bacteria on the body.  Do not use the cloths near your eyes, ears mouth, genitalia or on open wounds.  Throw the cloths away after use but do not try to flush them down a toilet.      • Open and remove one cloth at a time from the package.    • Leave the cloth unfolded and begin the bathing.  • Massage the skin with the cloths using gentle pressure to remove bacteria.  Do not scrub harshly.   • Follow the steps below with one 2% CHG cloth per area (6 total cloths).  • One cloth for neck, shoulders and chest.  • One cloth for both arms, hands, fingers and underarms (do underarms last).  • One cloth for the abdomen followed by groin.  • One cloth for right leg and foot including between the toes.  • One cloth for left leg and foot including between the toes.  • The last cloth is to be used for the back of the neck, back and buttocks.    Allow the CHG to air dry 3 minutes on the skin which will give it time to work and decrease the chance of irritation.  The skin may feel sticky until it is dry.  Do not rinse with water or any other liquid or you will lose the beneficial effects of the CHG.  If mild skin irritation occurs, do rinse the skin to remove the CHG.  Report this to the nurse at time of admission.  Do not apply lotions, creams, ointments, deodorants or perfumes after using the clothes. Dress in clean clothes before coming to the hospital.

## 2022-01-29 ENCOUNTER — LAB (OUTPATIENT)
Dept: LAB | Facility: HOSPITAL | Age: 52
End: 2022-01-29

## 2022-01-29 DIAGNOSIS — Z01.818 OTHER SPECIFIED PRE-OPERATIVE EXAMINATION: Primary | ICD-10-CM

## 2022-01-29 LAB — SARS-COV-2 ORF1AB RESP QL NAA+PROBE: NOT DETECTED

## 2022-01-29 PROCEDURE — U0004 COV-19 TEST NON-CDC HGH THRU: HCPCS

## 2022-01-29 PROCEDURE — C9803 HOPD COVID-19 SPEC COLLECT: HCPCS

## 2022-01-31 ENCOUNTER — TELEPHONE (OUTPATIENT)
Dept: FAMILY MEDICINE CLINIC | Facility: CLINIC | Age: 52
End: 2022-01-31

## 2022-01-31 ENCOUNTER — TRANSCRIBE ORDERS (OUTPATIENT)
Dept: ADMINISTRATIVE | Facility: HOSPITAL | Age: 52
End: 2022-01-31

## 2022-01-31 DIAGNOSIS — Z01.818 OTHER SPECIFIED PRE-OPERATIVE EXAMINATION: Primary | ICD-10-CM

## 2022-01-31 NOTE — TELEPHONE ENCOUNTER
Caller: Tessie Conner    Relationship: Self    Best call back number: [phone]    What is the medical concern/diagnosis: LUNG CANCER SURGERY    What specialty or service is being requested: SURGEON    What is the provider, practice or medical service name: DOES NOT WANT TO GO THROUGH WITH CURRENT REFERRAL. SURGEON IS HOSPITALIZED AND TESSIE WORRIED IF SHE WILL BE WELL ENOUGH TO DO A LONG SURGERY ON HER.    WOULD LIKE A CALL WITH OPTIONS OF OTHER SURGEONS TO COMPLETE SURGERY

## 2022-02-05 ENCOUNTER — LAB (OUTPATIENT)
Dept: LAB | Facility: HOSPITAL | Age: 52
End: 2022-02-05

## 2022-02-05 DIAGNOSIS — Z01.818 OTHER SPECIFIED PRE-OPERATIVE EXAMINATION: ICD-10-CM

## 2022-02-05 LAB — SARS-COV-2 ORF1AB RESP QL NAA+PROBE: NOT DETECTED

## 2022-02-05 PROCEDURE — U0004 COV-19 TEST NON-CDC HGH THRU: HCPCS

## 2022-02-05 PROCEDURE — C9803 HOPD COVID-19 SPEC COLLECT: HCPCS

## 2022-02-07 ENCOUNTER — ANESTHESIA EVENT (OUTPATIENT)
Dept: PERIOP | Facility: HOSPITAL | Age: 52
End: 2022-02-07

## 2022-02-08 ENCOUNTER — HOSPITAL ENCOUNTER (INPATIENT)
Facility: HOSPITAL | Age: 52
LOS: 1 days | Discharge: HOME | End: 2022-02-09
Attending: THORACIC SURGERY (CARDIOTHORACIC VASCULAR SURGERY) | Admitting: THORACIC SURGERY (CARDIOTHORACIC VASCULAR SURGERY)

## 2022-02-08 ENCOUNTER — APPOINTMENT (OUTPATIENT)
Dept: GENERAL RADIOLOGY | Facility: HOSPITAL | Age: 52
End: 2022-02-08

## 2022-02-08 ENCOUNTER — ANESTHESIA (OUTPATIENT)
Dept: PERIOP | Facility: HOSPITAL | Age: 52
End: 2022-02-08

## 2022-02-08 DIAGNOSIS — R91.1 LUNG NODULE: Primary | ICD-10-CM

## 2022-02-08 DIAGNOSIS — R91.8 MASS OF LUNG: ICD-10-CM

## 2022-02-08 LAB — GLUCOSE BLDC GLUCOMTR-MCNC: 146 MG/DL (ref 70–130)

## 2022-02-08 PROCEDURE — 87075 CULTR BACTERIA EXCEPT BLOOD: CPT | Performed by: THORACIC SURGERY (CARDIOTHORACIC VASCULAR SURGERY)

## 2022-02-08 PROCEDURE — 25010000002 ONDANSETRON PER 1 MG: Performed by: NURSE ANESTHETIST, CERTIFIED REGISTERED

## 2022-02-08 PROCEDURE — 71045 X-RAY EXAM CHEST 1 VIEW: CPT

## 2022-02-08 PROCEDURE — 25010000002 CEFAZOLIN PER 500 MG: Performed by: THORACIC SURGERY (CARDIOTHORACIC VASCULAR SURGERY)

## 2022-02-08 PROCEDURE — 87205 SMEAR GRAM STAIN: CPT | Performed by: THORACIC SURGERY (CARDIOTHORACIC VASCULAR SURGERY)

## 2022-02-08 PROCEDURE — 0 BUPIVACAINE LIPOSOME 1.3 % SUSPENSION: Performed by: ANESTHESIOLOGY

## 2022-02-08 PROCEDURE — 87116 MYCOBACTERIA CULTURE: CPT | Performed by: THORACIC SURGERY (CARDIOTHORACIC VASCULAR SURGERY)

## 2022-02-08 PROCEDURE — 25010000002 HYDROMORPHONE 1 MG/ML SOLUTION: Performed by: THORACIC SURGERY (CARDIOTHORACIC VASCULAR SURGERY)

## 2022-02-08 PROCEDURE — 0BBC4ZX EXCISION OF RIGHT UPPER LUNG LOBE, PERCUTANEOUS ENDOSCOPIC APPROACH, DIAGNOSTIC: ICD-10-PCS | Performed by: THORACIC SURGERY (CARDIOTHORACIC VASCULAR SURGERY)

## 2022-02-08 PROCEDURE — 82962 GLUCOSE BLOOD TEST: CPT

## 2022-02-08 PROCEDURE — 32608 THORACOSCOPY W/BX NODULE: CPT | Performed by: THORACIC SURGERY (CARDIOTHORACIC VASCULAR SURGERY)

## 2022-02-08 PROCEDURE — 87102 FUNGUS ISOLATION CULTURE: CPT | Performed by: THORACIC SURGERY (CARDIOTHORACIC VASCULAR SURGERY)

## 2022-02-08 PROCEDURE — 94799 UNLISTED PULMONARY SVC/PX: CPT

## 2022-02-08 PROCEDURE — 25010000002 PROPOFOL 10 MG/ML EMULSION: Performed by: NURSE ANESTHETIST, CERTIFIED REGISTERED

## 2022-02-08 PROCEDURE — 25010000002 FENTANYL CITRATE (PF) 50 MCG/ML SOLUTION: Performed by: ANESTHESIOLOGY

## 2022-02-08 PROCEDURE — 25010000002 FENTANYL CITRATE (PF) 50 MCG/ML SOLUTION: Performed by: NURSE ANESTHETIST, CERTIFIED REGISTERED

## 2022-02-08 PROCEDURE — 25010000002 HEPARIN (PORCINE) PER 1000 UNITS: Performed by: THORACIC SURGERY (CARDIOTHORACIC VASCULAR SURGERY)

## 2022-02-08 PROCEDURE — 25010000002 DEXAMETHASONE PER 1 MG: Performed by: NURSE ANESTHETIST, CERTIFIED REGISTERED

## 2022-02-08 PROCEDURE — 88312 SPECIAL STAINS GROUP 1: CPT | Performed by: THORACIC SURGERY (CARDIOTHORACIC VASCULAR SURGERY)

## 2022-02-08 PROCEDURE — 25010000002 MAGNESIUM SULFATE PER 500 MG OF MAGNESIUM: Performed by: NURSE ANESTHETIST, CERTIFIED REGISTERED

## 2022-02-08 PROCEDURE — 88331 PATH CONSLTJ SURG 1 BLK 1SPC: CPT | Performed by: THORACIC SURGERY (CARDIOTHORACIC VASCULAR SURGERY)

## 2022-02-08 PROCEDURE — C1889 IMPLANT/INSERT DEVICE, NOC: HCPCS | Performed by: THORACIC SURGERY (CARDIOTHORACIC VASCULAR SURGERY)

## 2022-02-08 PROCEDURE — C9290 INJ, BUPIVACAINE LIPOSOME: HCPCS | Performed by: THORACIC SURGERY (CARDIOTHORACIC VASCULAR SURGERY)

## 2022-02-08 PROCEDURE — 25010000002 MIDAZOLAM PER 1 MG: Performed by: ANESTHESIOLOGY

## 2022-02-08 PROCEDURE — 25010000002 HYDROMORPHONE PER 4 MG: Performed by: NURSE ANESTHETIST, CERTIFIED REGISTERED

## 2022-02-08 PROCEDURE — 76942 ECHO GUIDE FOR BIOPSY: CPT | Performed by: THORACIC SURGERY (CARDIOTHORACIC VASCULAR SURGERY)

## 2022-02-08 PROCEDURE — 87176 TISSUE HOMOGENIZATION CULTR: CPT | Performed by: THORACIC SURGERY (CARDIOTHORACIC VASCULAR SURGERY)

## 2022-02-08 PROCEDURE — 32608 THORACOSCOPY W/BX NODULE: CPT | Performed by: SPECIALIST/TECHNOLOGIST, OTHER

## 2022-02-08 PROCEDURE — 87206 SMEAR FLUORESCENT/ACID STAI: CPT | Performed by: THORACIC SURGERY (CARDIOTHORACIC VASCULAR SURGERY)

## 2022-02-08 PROCEDURE — C9290 INJ, BUPIVACAINE LIPOSOME: HCPCS | Performed by: ANESTHESIOLOGY

## 2022-02-08 PROCEDURE — 87070 CULTURE OTHR SPECIMN AEROBIC: CPT | Performed by: THORACIC SURGERY (CARDIOTHORACIC VASCULAR SURGERY)

## 2022-02-08 PROCEDURE — 25010000002 NEOSTIGMINE 5 MG/10ML SOLUTION: Performed by: NURSE ANESTHETIST, CERTIFIED REGISTERED

## 2022-02-08 PROCEDURE — 0BJ08ZZ INSPECTION OF TRACHEOBRONCHIAL TREE, VIA NATURAL OR ARTIFICIAL OPENING ENDOSCOPIC: ICD-10-PCS | Performed by: THORACIC SURGERY (CARDIOTHORACIC VASCULAR SURGERY)

## 2022-02-08 PROCEDURE — 0 BUPIVACAINE LIPOSOME 1.3 % SUSPENSION 20 ML VIAL: Performed by: THORACIC SURGERY (CARDIOTHORACIC VASCULAR SURGERY)

## 2022-02-08 PROCEDURE — 88307 TISSUE EXAM BY PATHOLOGIST: CPT | Performed by: THORACIC SURGERY (CARDIOTHORACIC VASCULAR SURGERY)

## 2022-02-08 DEVICE — ARTICULATION RELOAD WITH TRI-STAPLE TECHNOLOGY
Type: IMPLANTABLE DEVICE | Site: CHEST | Status: FUNCTIONAL
Brand: ENDO GIA

## 2022-02-08 RX ORDER — BUPIVACAINE HYDROCHLORIDE 2.5 MG/ML
INJECTION, SOLUTION EPIDURAL; INFILTRATION; INTRACAUDAL
Status: COMPLETED | OUTPATIENT
Start: 2022-02-08 | End: 2022-02-08

## 2022-02-08 RX ORDER — FENTANYL CITRATE 50 UG/ML
INJECTION, SOLUTION INTRAMUSCULAR; INTRAVENOUS AS NEEDED
Status: DISCONTINUED | OUTPATIENT
Start: 2022-02-08 | End: 2022-02-08 | Stop reason: SURG

## 2022-02-08 RX ORDER — GABAPENTIN 300 MG/1
300 CAPSULE ORAL EVERY 8 HOURS SCHEDULED
Status: DISCONTINUED | OUTPATIENT
Start: 2022-02-08 | End: 2022-02-09 | Stop reason: HOSPADM

## 2022-02-08 RX ORDER — SODIUM CHLORIDE 9 MG/ML
30 INJECTION, SOLUTION INTRAVENOUS CONTINUOUS
Status: DISCONTINUED | OUTPATIENT
Start: 2022-02-08 | End: 2022-02-09 | Stop reason: HOSPADM

## 2022-02-08 RX ORDER — SODIUM CHLORIDE 0.9 % (FLUSH) 0.9 %
3-10 SYRINGE (ML) INJECTION AS NEEDED
Status: DISCONTINUED | OUTPATIENT
Start: 2022-02-08 | End: 2022-02-08 | Stop reason: HOSPADM

## 2022-02-08 RX ORDER — ATORVASTATIN CALCIUM 20 MG/1
20 TABLET, FILM COATED ORAL DAILY
Status: DISCONTINUED | OUTPATIENT
Start: 2022-02-08 | End: 2022-02-09 | Stop reason: HOSPADM

## 2022-02-08 RX ORDER — MAGNESIUM HYDROXIDE 1200 MG/15ML
LIQUID ORAL AS NEEDED
Status: DISCONTINUED | OUTPATIENT
Start: 2022-02-08 | End: 2022-02-08 | Stop reason: HOSPADM

## 2022-02-08 RX ORDER — SCOLOPAMINE TRANSDERMAL SYSTEM 1 MG/1
1 PATCH, EXTENDED RELEASE TRANSDERMAL ONCE
Status: DISCONTINUED | OUTPATIENT
Start: 2022-02-08 | End: 2022-02-08

## 2022-02-08 RX ORDER — GABAPENTIN 300 MG/1
600 CAPSULE ORAL ONCE
Status: COMPLETED | OUTPATIENT
Start: 2022-02-08 | End: 2022-02-08

## 2022-02-08 RX ORDER — ONDANSETRON 2 MG/ML
INJECTION INTRAMUSCULAR; INTRAVENOUS AS NEEDED
Status: DISCONTINUED | OUTPATIENT
Start: 2022-02-08 | End: 2022-02-08 | Stop reason: SURG

## 2022-02-08 RX ORDER — NEOSTIGMINE METHYLSULFATE 0.5 MG/ML
INJECTION, SOLUTION INTRAVENOUS AS NEEDED
Status: DISCONTINUED | OUTPATIENT
Start: 2022-02-08 | End: 2022-02-08 | Stop reason: SURG

## 2022-02-08 RX ORDER — DIPHENHYDRAMINE HYDROCHLORIDE 50 MG/ML
25 INJECTION INTRAMUSCULAR; INTRAVENOUS EVERY 4 HOURS PRN
Status: DISCONTINUED | OUTPATIENT
Start: 2022-02-08 | End: 2022-02-08 | Stop reason: HOSPADM

## 2022-02-08 RX ORDER — ROCURONIUM BROMIDE 10 MG/ML
INJECTION, SOLUTION INTRAVENOUS AS NEEDED
Status: DISCONTINUED | OUTPATIENT
Start: 2022-02-08 | End: 2022-02-08 | Stop reason: SURG

## 2022-02-08 RX ORDER — ARIPIPRAZOLE 5 MG/1
20 TABLET ORAL NIGHTLY
Status: DISCONTINUED | OUTPATIENT
Start: 2022-02-08 | End: 2022-02-09 | Stop reason: HOSPADM

## 2022-02-08 RX ORDER — HEPARIN SODIUM 5000 [USP'U]/ML
5000 INJECTION, SOLUTION INTRAVENOUS; SUBCUTANEOUS ONCE
Status: COMPLETED | OUTPATIENT
Start: 2022-02-08 | End: 2022-02-08

## 2022-02-08 RX ORDER — CELECOXIB 200 MG/1
200 CAPSULE ORAL ONCE
Status: COMPLETED | OUTPATIENT
Start: 2022-02-08 | End: 2022-02-08

## 2022-02-08 RX ORDER — VENLAFAXINE HYDROCHLORIDE 150 MG/1
150 CAPSULE, EXTENDED RELEASE ORAL 2 TIMES DAILY
Status: DISCONTINUED | OUTPATIENT
Start: 2022-02-08 | End: 2022-02-09 | Stop reason: HOSPADM

## 2022-02-08 RX ORDER — OXYCODONE HYDROCHLORIDE 5 MG/1
5 TABLET ORAL EVERY 4 HOURS PRN
Status: DISCONTINUED | OUTPATIENT
Start: 2022-02-08 | End: 2022-02-09 | Stop reason: HOSPADM

## 2022-02-08 RX ORDER — SODIUM CHLORIDE 0.9 % (FLUSH) 0.9 %
3 SYRINGE (ML) INJECTION EVERY 12 HOURS SCHEDULED
Status: DISCONTINUED | OUTPATIENT
Start: 2022-02-08 | End: 2022-02-08 | Stop reason: HOSPADM

## 2022-02-08 RX ORDER — MIDAZOLAM HYDROCHLORIDE 1 MG/ML
1 INJECTION INTRAMUSCULAR; INTRAVENOUS
Status: DISCONTINUED | OUTPATIENT
Start: 2022-02-08 | End: 2022-02-08 | Stop reason: HOSPADM

## 2022-02-08 RX ORDER — DEXAMETHASONE SODIUM PHOSPHATE 10 MG/ML
INJECTION INTRAMUSCULAR; INTRAVENOUS AS NEEDED
Status: DISCONTINUED | OUTPATIENT
Start: 2022-02-08 | End: 2022-02-08 | Stop reason: SURG

## 2022-02-08 RX ORDER — FENTANYL CITRATE 50 UG/ML
INJECTION, SOLUTION INTRAMUSCULAR; INTRAVENOUS
Status: COMPLETED | OUTPATIENT
Start: 2022-02-08 | End: 2022-02-08

## 2022-02-08 RX ORDER — CEFAZOLIN SODIUM IN 0.9 % NACL 3 G/100 ML
3 INTRAVENOUS SOLUTION, PIGGYBACK (ML) INTRAVENOUS ONCE
Status: COMPLETED | OUTPATIENT
Start: 2022-02-08 | End: 2022-02-08

## 2022-02-08 RX ORDER — NALOXONE HCL 0.4 MG/ML
0.4 VIAL (ML) INJECTION
Status: DISCONTINUED | OUTPATIENT
Start: 2022-02-08 | End: 2022-02-08 | Stop reason: HOSPADM

## 2022-02-08 RX ORDER — FENTANYL CITRATE 50 UG/ML
50 INJECTION, SOLUTION INTRAMUSCULAR; INTRAVENOUS
Status: DISCONTINUED | OUTPATIENT
Start: 2022-02-08 | End: 2022-02-08 | Stop reason: HOSPADM

## 2022-02-08 RX ORDER — HYDROMORPHONE HYDROCHLORIDE 1 MG/ML
0.25 INJECTION, SOLUTION INTRAMUSCULAR; INTRAVENOUS; SUBCUTANEOUS
Status: DISCONTINUED | OUTPATIENT
Start: 2022-02-08 | End: 2022-02-08 | Stop reason: HOSPADM

## 2022-02-08 RX ORDER — ONDANSETRON 2 MG/ML
4 INJECTION INTRAMUSCULAR; INTRAVENOUS ONCE AS NEEDED
Status: DISCONTINUED | OUTPATIENT
Start: 2022-02-08 | End: 2022-02-08 | Stop reason: HOSPADM

## 2022-02-08 RX ORDER — GLYCOPYRROLATE 0.2 MG/ML
INJECTION INTRAMUSCULAR; INTRAVENOUS AS NEEDED
Status: DISCONTINUED | OUTPATIENT
Start: 2022-02-08 | End: 2022-02-08 | Stop reason: SURG

## 2022-02-08 RX ORDER — HEPARIN SODIUM 5000 [USP'U]/ML
5000 INJECTION, SOLUTION INTRAVENOUS; SUBCUTANEOUS EVERY 8 HOURS SCHEDULED
Status: DISCONTINUED | OUTPATIENT
Start: 2022-02-09 | End: 2022-02-09 | Stop reason: HOSPADM

## 2022-02-08 RX ORDER — CELECOXIB 100 MG/1
100 CAPSULE ORAL EVERY 12 HOURS SCHEDULED
Status: DISCONTINUED | OUTPATIENT
Start: 2022-02-08 | End: 2022-02-09 | Stop reason: HOSPADM

## 2022-02-08 RX ORDER — SODIUM CHLORIDE, SODIUM LACTATE, POTASSIUM CHLORIDE, CALCIUM CHLORIDE 600; 310; 30; 20 MG/100ML; MG/100ML; MG/100ML; MG/100ML
9 INJECTION, SOLUTION INTRAVENOUS CONTINUOUS
Status: DISCONTINUED | OUTPATIENT
Start: 2022-02-08 | End: 2022-02-09 | Stop reason: HOSPADM

## 2022-02-08 RX ORDER — ALPRAZOLAM 0.5 MG/1
0.5 TABLET ORAL 2 TIMES DAILY PRN
Status: DISCONTINUED | OUTPATIENT
Start: 2022-02-08 | End: 2022-02-09 | Stop reason: HOSPADM

## 2022-02-08 RX ORDER — FAMOTIDINE 10 MG/ML
20 INJECTION, SOLUTION INTRAVENOUS ONCE
Status: COMPLETED | OUTPATIENT
Start: 2022-02-08 | End: 2022-02-08

## 2022-02-08 RX ORDER — LIDOCAINE HYDROCHLORIDE 40 MG/ML
SOLUTION TOPICAL AS NEEDED
Status: DISCONTINUED | OUTPATIENT
Start: 2022-02-08 | End: 2022-02-08 | Stop reason: SURG

## 2022-02-08 RX ORDER — MIDAZOLAM HYDROCHLORIDE 1 MG/ML
INJECTION INTRAMUSCULAR; INTRAVENOUS
Status: COMPLETED | OUTPATIENT
Start: 2022-02-08 | End: 2022-02-08

## 2022-02-08 RX ORDER — METOCLOPRAMIDE HYDROCHLORIDE 5 MG/ML
10 INJECTION INTRAMUSCULAR; INTRAVENOUS ONCE AS NEEDED
Status: DISCONTINUED | OUTPATIENT
Start: 2022-02-08 | End: 2022-02-08 | Stop reason: HOSPADM

## 2022-02-08 RX ORDER — OXYCODONE HYDROCHLORIDE 5 MG/1
5 TABLET ORAL ONCE AS NEEDED
Status: DISCONTINUED | OUTPATIENT
Start: 2022-02-08 | End: 2022-02-08 | Stop reason: HOSPADM

## 2022-02-08 RX ORDER — MAGNESIUM SULFATE HEPTAHYDRATE 500 MG/ML
INJECTION, SOLUTION INTRAMUSCULAR; INTRAVENOUS AS NEEDED
Status: DISCONTINUED | OUTPATIENT
Start: 2022-02-08 | End: 2022-02-08 | Stop reason: SURG

## 2022-02-08 RX ORDER — DIPHENHYDRAMINE HCL 25 MG
25 CAPSULE ORAL EVERY 4 HOURS PRN
Status: DISCONTINUED | OUTPATIENT
Start: 2022-02-08 | End: 2022-02-08 | Stop reason: HOSPADM

## 2022-02-08 RX ORDER — ACETAMINOPHEN 500 MG
1000 TABLET ORAL 3 TIMES DAILY
Status: DISCONTINUED | OUTPATIENT
Start: 2022-02-08 | End: 2022-02-09 | Stop reason: HOSPADM

## 2022-02-08 RX ORDER — LIDOCAINE HYDROCHLORIDE 20 MG/ML
INJECTION, SOLUTION INFILTRATION; PERINEURAL AS NEEDED
Status: DISCONTINUED | OUTPATIENT
Start: 2022-02-08 | End: 2022-02-08 | Stop reason: SURG

## 2022-02-08 RX ORDER — PROPOFOL 10 MG/ML
VIAL (ML) INTRAVENOUS AS NEEDED
Status: DISCONTINUED | OUTPATIENT
Start: 2022-02-08 | End: 2022-02-08 | Stop reason: SURG

## 2022-02-08 RX ORDER — KETAMINE HCL IN NACL, ISO-OSM 100MG/10ML
10 SYRINGE (ML) INJECTION
Status: DISCONTINUED | OUTPATIENT
Start: 2022-02-08 | End: 2022-02-09

## 2022-02-08 RX ORDER — LIDOCAINE HYDROCHLORIDE 10 MG/ML
0.5 INJECTION, SOLUTION EPIDURAL; INFILTRATION; INTRACAUDAL; PERINEURAL ONCE AS NEEDED
Status: DISCONTINUED | OUTPATIENT
Start: 2022-02-08 | End: 2022-02-08 | Stop reason: HOSPADM

## 2022-02-08 RX ORDER — KETAMINE HYDROCHLORIDE 10 MG/ML
INJECTION INTRAMUSCULAR; INTRAVENOUS AS NEEDED
Status: DISCONTINUED | OUTPATIENT
Start: 2022-02-08 | End: 2022-02-08 | Stop reason: SURG

## 2022-02-08 RX ORDER — ACETAMINOPHEN 500 MG
1000 TABLET ORAL ONCE
Status: COMPLETED | OUTPATIENT
Start: 2022-02-08 | End: 2022-02-08

## 2022-02-08 RX ORDER — DEXTROSE, SODIUM CHLORIDE, AND POTASSIUM CHLORIDE 5; .45; .15 G/100ML; G/100ML; G/100ML
125 INJECTION INTRAVENOUS CONTINUOUS
Status: DISCONTINUED | OUTPATIENT
Start: 2022-02-08 | End: 2022-02-09 | Stop reason: HOSPADM

## 2022-02-08 RX ORDER — NITROGLYCERIN 0.4 MG/1
0.4 TABLET SUBLINGUAL
Status: DISCONTINUED | OUTPATIENT
Start: 2022-02-08 | End: 2022-02-09 | Stop reason: HOSPADM

## 2022-02-08 RX ADMIN — ONDANSETRON 4 MG: 2 INJECTION INTRAMUSCULAR; INTRAVENOUS at 09:39

## 2022-02-08 RX ADMIN — ACETAMINOPHEN 1000 MG: 500 TABLET ORAL at 15:13

## 2022-02-08 RX ADMIN — SCOPALAMINE 1 PATCH: 1 PATCH, EXTENDED RELEASE TRANSDERMAL at 06:21

## 2022-02-08 RX ADMIN — DEXAMETHASONE SODIUM PHOSPHATE 6 MG: 10 INJECTION INTRAMUSCULAR; INTRAVENOUS at 08:50

## 2022-02-08 RX ADMIN — FAMOTIDINE 20 MG: 10 INJECTION INTRAVENOUS at 06:22

## 2022-02-08 RX ADMIN — MAGNESIUM SULFATE HEPTAHYDRATE 1 G: 500 INJECTION, SOLUTION INTRAMUSCULAR; INTRAVENOUS at 09:00

## 2022-02-08 RX ADMIN — KETAMINE HYDROCHLORIDE 10 MG: 10 INJECTION INTRAMUSCULAR; INTRAVENOUS at 09:38

## 2022-02-08 RX ADMIN — HYDROMORPHONE HYDROCHLORIDE 0.25 MG: 1 INJECTION, SOLUTION INTRAMUSCULAR; INTRAVENOUS; SUBCUTANEOUS at 10:50

## 2022-02-08 RX ADMIN — OXYCODONE 5 MG: 5 TABLET ORAL at 15:26

## 2022-02-08 RX ADMIN — ACETAMINOPHEN 1000 MG: 500 TABLET ORAL at 20:43

## 2022-02-08 RX ADMIN — SODIUM CHLORIDE, POTASSIUM CHLORIDE, SODIUM LACTATE AND CALCIUM CHLORIDE 9 ML/HR: 600; 310; 30; 20 INJECTION, SOLUTION INTRAVENOUS at 06:20

## 2022-02-08 RX ADMIN — OXYCODONE 5 MG: 5 TABLET ORAL at 20:53

## 2022-02-08 RX ADMIN — CELECOXIB 100 MG: 100 CAPSULE ORAL at 12:45

## 2022-02-08 RX ADMIN — FENTANYL CITRATE 50 MCG: 0.05 INJECTION, SOLUTION INTRAMUSCULAR; INTRAVENOUS at 07:07

## 2022-02-08 RX ADMIN — ARIPIPRAZOLE 20 MG: 5 TABLET ORAL at 20:42

## 2022-02-08 RX ADMIN — FENTANYL CITRATE 50 MCG: 0.05 INJECTION, SOLUTION INTRAMUSCULAR; INTRAVENOUS at 08:20

## 2022-02-08 RX ADMIN — GLYCOPYRROLATE 0.4 MG: 0.2 INJECTION INTRAMUSCULAR; INTRAVENOUS at 09:41

## 2022-02-08 RX ADMIN — Medication 10 MG: at 10:31

## 2022-02-08 RX ADMIN — GABAPENTIN 600 MG: 300 CAPSULE ORAL at 06:23

## 2022-02-08 RX ADMIN — LIDOCAINE HYDROCHLORIDE 60 MG: 20 INJECTION, SOLUTION INFILTRATION; PERINEURAL at 08:20

## 2022-02-08 RX ADMIN — CELECOXIB 100 MG: 100 CAPSULE ORAL at 20:42

## 2022-02-08 RX ADMIN — HYDROMORPHONE HYDROCHLORIDE 0.25 MG: 1 INJECTION, SOLUTION INTRAMUSCULAR; INTRAVENOUS; SUBCUTANEOUS at 10:18

## 2022-02-08 RX ADMIN — ATORVASTATIN CALCIUM 20 MG: 20 TABLET, FILM COATED ORAL at 12:45

## 2022-02-08 RX ADMIN — NEOSTIGMINE METHYLSULFATE 3 MG: 0.5 INJECTION INTRAVENOUS at 09:41

## 2022-02-08 RX ADMIN — BUPIVACAINE HYDROCHLORIDE 20 ML: 2.5 INJECTION, SOLUTION EPIDURAL; INFILTRATION; INTRACAUDAL; PERINEURAL at 07:13

## 2022-02-08 RX ADMIN — GABAPENTIN 300 MG: 300 CAPSULE ORAL at 20:43

## 2022-02-08 RX ADMIN — HEPARIN SODIUM 5000 UNITS: 5000 INJECTION INTRAVENOUS; SUBCUTANEOUS at 06:56

## 2022-02-08 RX ADMIN — ROCURONIUM BROMIDE 50 MG: 50 INJECTION INTRAVENOUS at 08:20

## 2022-02-08 RX ADMIN — FENTANYL CITRATE 50 MCG: 0.05 INJECTION, SOLUTION INTRAMUSCULAR; INTRAVENOUS at 07:12

## 2022-02-08 RX ADMIN — MIDAZOLAM 1 MG: 1 INJECTION INTRAMUSCULAR; INTRAVENOUS at 07:10

## 2022-02-08 RX ADMIN — MIDAZOLAM 1 MG: 1 INJECTION INTRAMUSCULAR; INTRAVENOUS at 07:30

## 2022-02-08 RX ADMIN — PROPOFOL 50 MG: 10 INJECTION, EMULSION INTRAVENOUS at 08:52

## 2022-02-08 RX ADMIN — KETAMINE HYDROCHLORIDE 40 MG: 10 INJECTION INTRAMUSCULAR; INTRAVENOUS at 08:45

## 2022-02-08 RX ADMIN — POTASSIUM CHLORIDE, DEXTROSE MONOHYDRATE AND SODIUM CHLORIDE 125 ML/HR: 150; 5; 450 INJECTION, SOLUTION INTRAVENOUS at 12:48

## 2022-02-08 RX ADMIN — CELECOXIB 200 MG: 200 CAPSULE ORAL at 06:23

## 2022-02-08 RX ADMIN — FENTANYL CITRATE 50 MCG: 0.05 INJECTION, SOLUTION INTRAMUSCULAR; INTRAVENOUS at 07:30

## 2022-02-08 RX ADMIN — GABAPENTIN 300 MG: 300 CAPSULE ORAL at 15:13

## 2022-02-08 RX ADMIN — MIDAZOLAM 1 MG: 1 INJECTION INTRAMUSCULAR; INTRAVENOUS at 07:07

## 2022-02-08 RX ADMIN — MAGNESIUM SULFATE HEPTAHYDRATE 1 G: 500 INJECTION, SOLUTION INTRAMUSCULAR; INTRAVENOUS at 08:45

## 2022-02-08 RX ADMIN — PROPOFOL 200 MG: 10 INJECTION, EMULSION INTRAVENOUS at 08:20

## 2022-02-08 RX ADMIN — LIDOCAINE HYDROCHLORIDE 1 EACH: 40 SOLUTION TOPICAL at 08:21

## 2022-02-08 RX ADMIN — CEFAZOLIN SODIUM 3 G: 10 INJECTION, POWDER, FOR SOLUTION INTRAVENOUS at 08:08

## 2022-02-08 RX ADMIN — POTASSIUM CHLORIDE, DEXTROSE MONOHYDRATE AND SODIUM CHLORIDE 125 ML/HR: 150; 5; 450 INJECTION, SOLUTION INTRAVENOUS at 21:32

## 2022-02-08 RX ADMIN — FENTANYL CITRATE 50 MCG: 0.05 INJECTION, SOLUTION INTRAMUSCULAR; INTRAVENOUS at 08:52

## 2022-02-08 RX ADMIN — BUPIVACAINE 10 ML: 13.3 INJECTION, SUSPENSION, LIPOSOMAL INFILTRATION at 07:13

## 2022-02-08 RX ADMIN — ACETAMINOPHEN 1000 MG: 500 TABLET ORAL at 06:23

## 2022-02-08 RX ADMIN — HYDROMORPHONE HYDROCHLORIDE 0.5 MG: 1 INJECTION, SOLUTION INTRAMUSCULAR; INTRAVENOUS; SUBCUTANEOUS at 12:45

## 2022-02-08 RX ADMIN — VENLAFAXINE HYDROCHLORIDE 150 MG: 150 CAPSULE, EXTENDED RELEASE ORAL at 16:13

## 2022-02-08 NOTE — ANESTHESIA PROCEDURE NOTES
Arterial Line      Patient location during procedure: holding area  Start time: 2/8/2022 7:15 AM  Stop Time:2/8/2022 7:35 AM       Line placed for hemodynamic monitoring and ABGs/Labs/ISTAT.  Performed By   Anesthesiologist: Joshua Silva MD  Preanesthetic Checklist  Completed: patient identified, IV checked, site marked, risks and benefits discussed, surgical consent, monitors and equipment checked, pre-op evaluation and timeout performed  Arterial Line Prep   Sterile Tech: cap, gloves and mask  Prep: ChloraPrep  Patient monitoring: blood pressure monitoring, continuous pulse oximetry and EKG  Arterial Line Procedure   Laterality:right  Location:  radial artery  Catheter size: 20 G   Guidance: ultrasound guided  PROCEDURE NOTE/ULTRASOUND INTERPRETATION.  Using ultrasound guidance the potential vascular sites for insertion of the catheter were visualized to determine the patency of the vessel to be used for vascular access.  After selecting the appropriate site for insertion, the needle was visualized under ultrasound being inserted into the radial artery, followed by ultrasound confirmation of wire and catheter placement. There were no abnormalities seen on ultrasound; an image was taken; and the patient tolerated the procedure with no complications.   Number of attempts: 2  Successful placement: yes  Post Assessment   Dressing Type: biopatch applied, occlusive dressing applied, secured with tape and wrist guard applied.   Complications no  Circ/Move/Sens Assessment: normal and unchanged.   Patient Tolerance: patient tolerated the procedure well with no apparent complications  Additional Notes  Tried 3 times on left with U/S, could not thread, then used U/S on right, took 2 attempts, needed smaller wire to thread

## 2022-02-08 NOTE — PLAN OF CARE
Problem: Adult Inpatient Plan of Care  Goal: Plan of Care Review  Outcome: Ongoing, Progressing  Flowsheets (Taken 2/8/2022 9880)  Progress: no change  Plan of Care Reviewed With: patient  Outcome Summary: VSS. chest tube to -20 suction, no air leak present. c/o pain decreased with prn pain medication. voided spontaneously after rasmussen catheter removal. possible d/c tomorrow.   Goal Outcome Evaluation:  Plan of Care Reviewed With: patient        Progress: no change  Outcome Summary: VSS. chest tube to -20 suction, no air leak present. c/o pain decreased with prn pain medication. voided spontaneously after rasmussen catheter removal. possible d/c tomorrow.

## 2022-02-08 NOTE — OP NOTE
THORACOSCOPY VIDEO ASSISTED WITH LOBECTOMY  Procedure Report    Patient Name:  Tessie Conner  YOB: 1970    Date of Surgery:  2/8/2022     Indications: Right upper lobe nodule, concerning for bronchogenic malignancy    Pre-op Diagnosis:   Mass of lung [R91.8]       Post-Op Diagnosis Codes:     * Mass of lung [R91.8]    Procedure/CPT® Codes:      Procedure(s):  BRONCHOSCOPY, THORACOSCOPY VIDEO ASSISTED wedge resection X2, INTERCOSTAL NERVE BLOCK    Staff:  Surgeon(s):  Ayla Carroll MD    Assistant: Rinku Grimaldo CSA was responsible for performing the following activities: Retraction, Suction, Closing, Placing Dressing and Held/Positioned Camera and their skilled assistance was necessary for the success of this case.     Anesthesia: General with Block    Estimated Blood Loss: minimal    Implants:    Implant Name Type Inv. Item Serial No.  Lot No. LRB No. Used Action   RELOAD STPLR ENDO MAMADOU TRISTAPLE 45 ART MD [facility] [id number] Implant RELOAD STPLR ENDO MAMADOU TRISTAPLE 45 ART MD [employer] [id number] Right 3 Implanted   RELOAD STPLR ENDO MAMADOU TRISTAPLE 45 ART MD [facility] - [id number] Implant RELOAD STPLR ENDO MAMADOU TRISTAPLE 45 ART MD [employer]SHK[employer] MAYEN [id number] Right 2 Implanted       Specimen:          Specimens     ID Source Type Tests Collected By Collected At Frozen?    1 Lung, Right Upper Lobe Tissue · ANAEROBIC CULTURE  · FUNGAL CULTURE  · TISSUE / BONE CULTURE  · AFB CULTURE   Ayla Carroll MD 2/8/22 0930     Description: RIGHT UPPER LOBE FOR CULTURE    A Lung, Right Upper Lobe Tissue · TISSUE PATHOLOGY EXAM   Ayla Carroll MD 2/8/22 0905 Yes    Description: RIGHT UPPER LOBE WEDGE FOR FROZEN PLEASE CALL OR 9     B Lung, Right Upper Lobe Tissue · TISSUE PATHOLOGY EXAM   Ayla Carroll MD 2/8/22 0912 No    Description: RIGHT UPPER LOBE WEDGE #2            Findings: Right upper lobe nodule identified, adherent to the chest wall.  Frozen section consistent with caseating  granulomatous inflammation    Complications: None apparent    Description of Procedure: Tessie Conner was identified in the preoperative holding area and again his consent for the procedure was verified.  The patient was transported to the operating room and placed on the operating room table in supine position.  A general anesthetic was successfully administered and He was intubated with a double lumen endotracheal tube without difficulty.  Placement of the double lumen was confirmed with a video bronchoscope examination.  A time out was performed to verify correct patient, correct procedure and the correct administration of IV antibiotics per [facility] protocol.     The patient was placed in left lateral decubitus position, right side up and all pressure points were padded.  The patient was prepped and draped in standard sterile fashion.  An approximately 2cm incision was made in the 8th intercostal space midaxillary line, dissection was carried down into the chest cavity using Bovie electrocautery under direct vision.  An yamilet wound protector was placed into the incision.  The camera was inserted into the chest and the right upper lobe was adherent to the chest wall focally.    A second approximately 2cm incision was made in the anterior axillary line 5th intercostal space and the chest was entered and the rib space was opened under direct visualization.     The adhesions were mobilized using proper electrocautery, the lung was palpated and there were multiple lung nodules, the largest of which was located just under the adhesions.  Multiple sequential firings of an Endo MAMADOU purple tissue load stapler were used to perform a wedge resection of the largest of the right upper lobe nodules.  This was opened on the back table and there was purulent drainage, a small portion was sent for culture.  This was sent for frozen section and returned caseating granulomatous inflammation.  A another small nodule was identified  and a second wedge resection was performed and sent for permanent evaluation.    A 21 gauge needle was inserted into the chest under direct visualization and 20mL of Exparel was inserted directly into the 3rd-9th rib spaces for a rib block.  A 24 Maltese chest tube was placed in the 8th intercostal space incision. The lung was reexpanded under direct visualization.  The incisions were closed using deep vicryl sutures and Monocryl for the skin in standard fashion.  The chest tube was secured to the skin.      The patient tolerated this procedure well and was extubated and transported to the recovery room in stable condition.  The counts were correct at the end of the case.           Ayla Carroll MD     Date: 2/8/2022  Time: 09:49 EST

## 2022-02-08 NOTE — ANESTHESIA POSTPROCEDURE EVALUATION
Patient: Tessie Conner    Procedure Summary     Date: 02/08/22 Room / Location: [facility] OR 09 / [facility] MAIN OR    Anesthesia Start: 0812 Anesthesia Stop: 1009    Procedure: BRONCHOSCOPY, THORACOSCOPY VIDEO ASSISTED wedge resection X2, INTERCOSTAL NERVE BLOCK (Right Chest) Diagnosis:       Mass of lung      (Mass of lung [R91.8])    Surgeons: Ayla Carroll MD Provider: Joshua Silva MD    Anesthesia Type: general with block ASA Status: 3          Anesthesia Type: general with block    Vitals  Vitals Value Taken Time   /39 02/08/22 1101   Temp 36.6 °C (97.9 °F) 02/08/22 1009   Pulse 67 02/08/22 1105   Resp 16 02/08/22 1100   SpO2 99 % 02/08/22 1102   Vitals shown include unvalidated device data.        Post Anesthesia Care and Evaluation    Patient location during evaluation: PACU  Patient participation: complete - patient participated  Level of consciousness: awake and alert  Pain management: adequate  Airway patency: patent  Anesthetic complications: No anesthetic complications  PONV Status: none  Cardiovascular status: acceptable and hemodynamically stable  Respiratory status: acceptable, nonlabored ventilation and spontaneous ventilation  Hydration status: acceptable

## 2022-02-08 NOTE — ANESTHESIA PROCEDURE NOTES
Peripheral Block      Patient reassessed immediately prior to procedure    Patient location during procedure: holding area  Start time: 2/8/2022 7:08 AM  Stop time: 2/8/2022 7:13 AM  Reason for block: at surgeon's request and post-op pain management  Preanesthetic Checklist  Completed: patient identified, IV checked, site marked, risks and benefits discussed, surgical consent, monitors and equipment checked, pre-op evaluation and timeout performed  Prep:  Pt Position: sitting  Sterile barriers:cap, gloves and sterile barriers  Prep: ChloraPrep  Patient monitoring: blood pressure monitoring, continuous pulse oximetry and EKG  Procedure    Sedation: yes  Performed under: MAC  Guidance:ultrasound guided  Images:still images obtained, printed/placed on chart    Laterality:right  Block Type:erector spinae block  Injection Technique:single-shot  Needle Type:echogenic  Needle Gauge:21 G  Resistance on Injection: none    Medications Used: bupivacaine liposome (EXPAREL) 1.3 % injection, 10 mL  bupivacaine PF (MARCAINE) 0.25 % injection, 20 mL  Med administered at 2/8/2022 7:13 AM      Post Assessment  Injection Assessment: negative aspiration for heme, no paresthesia on injection and incremental injection  Patient Tolerance:comfortable throughout block  Complications:no

## 2022-02-08 NOTE — ANESTHESIA PREPROCEDURE EVALUATION
Anesthesia Evaluation     Patient summary reviewed and Nursing notes reviewed   history of anesthetic complications:               Airway   Mallampati: III  TM distance: <3 FB  Neck ROM: full  Possible difficult intubation  Dental - normal exam     Pulmonary - normal exam   (+) asthma,shortness of breath,     ROS comment: Right upper lobe lung mass  Cardiovascular - normal exam    ECG reviewed  Rhythm: regular  Rate: normal    (+) hypertension less than 2 medications, valvular problems/murmurs murmur, CAD, angina, hyperlipidemia,     ROS comment: Normal cath and ECHO 9/20  PE comment: No murmurs heard    Neuro/Psych  (+) headaches, psychiatric history Anxiety, Depression, Bipolar and PTSD,       ROS Comment: Migraines  GI/Hepatic/Renal/Endo    (+) obesity, morbid obesity, GERD,  diabetes mellitus type 2,     Musculoskeletal     Abdominal   (+) obese,    Substance History      OB/GYN          Other   arthritis,                      Anesthesia Plan    ASA 3     general with block   (GETA with DL ETT/may need CMAC for intubation/art line and TIBURCIO block preop)  intravenous induction     Anesthetic plan, all risks, benefits, and alternatives have been provided, discussed and informed consent has been obtained with: patient.    Plan discussed with CRNA.        CODE STATUS:

## 2022-02-08 NOTE — ANESTHESIA PROCEDURE NOTES
Airway  Urgency: elective    Date/Time: 2/8/2022 8:21 AM  Airway not difficult    General Information and Staff    Patient location during procedure: OR  Anesthesiologist: Joshua Silva MD  CRNA: Manny Humphries CRNA    Indications and Patient Condition  Indications for airway management: airway protection    Preoxygenated: yes  MILS maintained throughout  Mask difficulty assessment: 2 - vent by mask + OA or adjuvant +/- NMBA    Final Airway Details  Final airway type: endotracheal airway      Successful airway: EBT - double lumen left  Cuffed: yes   Successful intubation technique: direct laryngoscopy  Endotracheal tube insertion site: oral  Blade: Ziggy  Blade size: 3  EBT DL size (fr): 35  Cormack-Lehane Classification: grade IIa - partial view of glottis  Placement verified by: chest auscultation, bronchoscopy and capnometry   Measured from: teeth  ETT/EBT  to teeth (cm): 26  Number of attempts at approach: 1  Assessment: lips, teeth, and gum same as pre-op and atraumatic intubation

## 2022-02-09 ENCOUNTER — APPOINTMENT (OUTPATIENT)
Dept: GENERAL RADIOLOGY | Facility: HOSPITAL | Age: 52
End: 2022-02-09

## 2022-02-09 ENCOUNTER — READMISSION MANAGEMENT (OUTPATIENT)
Dept: CALL CENTER | Facility: HOSPITAL | Age: 52
End: 2022-02-09

## 2022-02-09 VITALS
TEMPERATURE: 97.9 F | HEIGHT: 64 IN | BODY MASS INDEX: 43.98 KG/M2 | DIASTOLIC BLOOD PRESSURE: 68 MMHG | WEIGHT: 257.6 LBS | SYSTOLIC BLOOD PRESSURE: 129 MMHG | OXYGEN SATURATION: 96 % | RESPIRATION RATE: 17 BRPM | HEART RATE: 56 BPM

## 2022-02-09 LAB
ANION GAP SERPL CALCULATED.3IONS-SCNC: 13.9 MMOL/L (ref 5–15)
BASOPHILS # BLD AUTO: 0.02 10*3/MM3 (ref 0–0.2)
BASOPHILS NFR BLD AUTO: 0.1 % (ref 0–1.5)
BUN SERPL-MCNC: 6 MG/DL (ref 6–20)
BUN/CREAT SERPL: 8 (ref 7–25)
CALCIUM SPEC-SCNC: 9.3 MG/DL (ref 8.6–10.5)
CHLORIDE SERPL-SCNC: 103 MMOL/L (ref 98–107)
CO2 SERPL-SCNC: 22.1 MMOL/L (ref 22–29)
CREAT SERPL-MCNC: 0.75 MG/DL (ref 0.57–1)
DEPRECATED RDW RBC AUTO: 44.2 FL (ref 37–54)
EOSINOPHIL # BLD AUTO: 0 10*3/MM3 (ref 0–0.4)
EOSINOPHIL NFR BLD AUTO: 0 % (ref 0.3–6.2)
ERYTHROCYTE [DISTWIDTH] IN BLOOD BY AUTOMATED COUNT: 14.2 % (ref 12.3–15.4)
GFR SERPL CREATININE-BSD FRML MDRD: 81 ML/MIN/1.73
GLUCOSE SERPL-MCNC: 170 MG/DL (ref 65–99)
HCT VFR BLD AUTO: 35.2 % (ref 34–46.6)
HGB BLD-MCNC: 11.1 G/DL (ref 12–15.9)
IMM GRANULOCYTES # BLD AUTO: 0.11 10*3/MM3 (ref 0–0.05)
IMM GRANULOCYTES NFR BLD AUTO: 0.8 % (ref 0–0.5)
LYMPHOCYTES # BLD AUTO: 1.63 10*3/MM3 (ref 0.7–3.1)
LYMPHOCYTES NFR BLD AUTO: 12.2 % (ref 19.6–45.3)
MCH RBC QN AUTO: 26.9 PG (ref 26.6–33)
MCHC RBC AUTO-ENTMCNC: 31.5 G/DL (ref 31.5–35.7)
MCV RBC AUTO: 85.2 FL (ref 79–97)
MONOCYTES # BLD AUTO: 0.92 10*3/MM3 (ref 0.1–0.9)
MONOCYTES NFR BLD AUTO: 6.9 % (ref 5–12)
NEUTROPHILS NFR BLD AUTO: 10.66 10*3/MM3 (ref 1.7–7)
NEUTROPHILS NFR BLD AUTO: 80 % (ref 42.7–76)
NRBC BLD AUTO-RTO: 0 /100 WBC (ref 0–0.2)
PLATELET # BLD AUTO: 252 10*3/MM3 (ref 140–450)
PMV BLD AUTO: 10.3 FL (ref 6–12)
POTASSIUM SERPL-SCNC: 4.6 MMOL/L (ref 3.5–5.2)
RBC # BLD AUTO: 4.13 10*6/MM3 (ref 3.77–5.28)
SODIUM SERPL-SCNC: 139 MMOL/L (ref 136–145)
WBC NRBC COR # BLD: 13.34 10*3/MM3 (ref 3.4–10.8)

## 2022-02-09 PROCEDURE — 71045 X-RAY EXAM CHEST 1 VIEW: CPT

## 2022-02-09 PROCEDURE — 25010000002 HEPARIN (PORCINE) PER 1000 UNITS: Performed by: THORACIC SURGERY (CARDIOTHORACIC VASCULAR SURGERY)

## 2022-02-09 PROCEDURE — 99239 HOSP IP/OBS DSCHRG MGMT >30: CPT | Performed by: NURSE PRACTITIONER

## 2022-02-09 PROCEDURE — 80048 BASIC METABOLIC PNL TOTAL CA: CPT | Performed by: THORACIC SURGERY (CARDIOTHORACIC VASCULAR SURGERY)

## 2022-02-09 PROCEDURE — 85025 COMPLETE CBC W/AUTO DIFF WBC: CPT | Performed by: THORACIC SURGERY (CARDIOTHORACIC VASCULAR SURGERY)

## 2022-02-09 PROCEDURE — 25010000002 HYDROMORPHONE 1 MG/ML SOLUTION: Performed by: THORACIC SURGERY (CARDIOTHORACIC VASCULAR SURGERY)

## 2022-02-09 RX ORDER — OXYCODONE HYDROCHLORIDE 5 MG/1
5 TABLET ORAL EVERY 4 HOURS PRN
Qty: 30 TABLET | Refills: 0 | Status: SHIPPED | OUTPATIENT
Start: 2022-02-09 | End: 2022-02-14

## 2022-02-09 RX ORDER — ACETAMINOPHEN 500 MG
1000 TABLET ORAL 3 TIMES DAILY
Qty: 84 TABLET | Refills: 0 | Status: SHIPPED | OUTPATIENT
Start: 2022-02-09 | End: 2022-02-23

## 2022-02-09 RX ORDER — GABAPENTIN 300 MG/1
300 CAPSULE ORAL EVERY 8 HOURS SCHEDULED
Qty: 90 CAPSULE | Refills: 0 | Status: SHIPPED | OUTPATIENT
Start: 2022-02-09 | End: 2022-02-14

## 2022-02-09 RX ORDER — CELECOXIB 100 MG/1
100 CAPSULE ORAL EVERY 12 HOURS SCHEDULED
Qty: 60 CAPSULE | Refills: 0 | Status: SHIPPED | OUTPATIENT
Start: 2022-02-09 | End: 2022-02-14

## 2022-02-09 RX ADMIN — OXYCODONE 5 MG: 5 TABLET ORAL at 08:49

## 2022-02-09 RX ADMIN — OXYCODONE 5 MG: 5 TABLET ORAL at 04:32

## 2022-02-09 RX ADMIN — HYDROMORPHONE HYDROCHLORIDE 0.5 MG: 1 INJECTION, SOLUTION INTRAMUSCULAR; INTRAVENOUS; SUBCUTANEOUS at 01:44

## 2022-02-09 RX ADMIN — ATORVASTATIN CALCIUM 20 MG: 20 TABLET, FILM COATED ORAL at 08:49

## 2022-02-09 RX ADMIN — GABAPENTIN 300 MG: 300 CAPSULE ORAL at 05:38

## 2022-02-09 RX ADMIN — POTASSIUM CHLORIDE, DEXTROSE MONOHYDRATE AND SODIUM CHLORIDE 125 ML/HR: 150; 5; 450 INJECTION, SOLUTION INTRAVENOUS at 05:40

## 2022-02-09 RX ADMIN — CELECOXIB 100 MG: 100 CAPSULE ORAL at 08:49

## 2022-02-09 RX ADMIN — ACETAMINOPHEN 1000 MG: 500 TABLET ORAL at 08:49

## 2022-02-09 RX ADMIN — VENLAFAXINE HYDROCHLORIDE 150 MG: 150 CAPSULE, EXTENDED RELEASE ORAL at 08:49

## 2022-02-09 RX ADMIN — HEPARIN SODIUM 5000 UNITS: 5000 INJECTION, SOLUTION INTRAVENOUS; SUBCUTANEOUS at 05:38

## 2022-02-09 NOTE — PLAN OF CARE
Problem: Adult Inpatient Plan of Care  Goal: Plan of Care Review  Outcome: Met  Flowsheets (Taken 2/9/2022 1402)  Progress: improving  Plan of Care Reviewed With: patient  Outcome Summary: VSS. chest tube removed by thoracic. c/o pain decreased with prn medication.   Goal Outcome Evaluation:  Plan of Care Reviewed With: patient        Progress: improving  Outcome Summary: VSS. chest tube removed by thoracic. c/o pain decreased with prn medication.

## 2022-02-09 NOTE — PLAN OF CARE
Problem: Adult Inpatient Plan of Care  Goal: Plan of Care Review  Outcome: Ongoing, Progressing  Flowsheets (Taken 2/9/2022 0634)  Progress: improving  Plan of Care Reviewed With: patient  Outcome Summary:   Pt is OX4   VSS. CT to waterseal with output of 24ml over night. Pt complained of pain on right side   controlled with PRN pain med. IV fluids going   purewick in place overnight. Will continue to monitor and support.  Goal: Patient-Specific Goal (Individualized)  Outcome: Ongoing, Progressing  Goal: Absence of Hospital-Acquired Illness or Injury  Outcome: Ongoing, Progressing  Intervention: Identify and Manage Fall Risk  Recent Flowsheet Documentation  Taken 2/9/2022 0400 by Mliagros Herrera, RN  Safety Promotion/Fall Prevention:   activity supervised   clutter free environment maintained   gait belt   nonskid shoes/slippers when out of bed   room organization consistent   safety round/check completed  Taken 2/9/2022 0200 by Milagros Herrera, RN  Safety Promotion/Fall Prevention:   activity supervised   clutter free environment maintained   gait belt   nonskid shoes/slippers when out of bed   room organization consistent   safety round/check completed  Taken 2/9/2022 0000 by Milagros Herrera, RN  Safety Promotion/Fall Prevention:   activity supervised   clutter free environment maintained   gait belt   nonskid shoes/slippers when out of bed   room organization consistent   safety round/check completed  Taken 2/8/2022 2200 by Milagros Herrera, RN  Safety Promotion/Fall Prevention:   activity supervised   clutter free environment maintained   gait belt   nonskid shoes/slippers when out of bed   safety round/check completed   room organization consistent  Taken 2/8/2022 2000 by Milagros Herrera, RN  Safety Promotion/Fall Prevention:   activity supervised   clutter free environment maintained   gait belt   nonskid shoes/slippers when out of bed   room organization consistent   safety round/check  completed  Intervention: Prevent Skin Injury  Recent Flowsheet Documentation  Taken 2/9/2022 0400 by Milagros Herrera RN  Body Position:   position changed independently   supine  Taken 2/9/2022 0200 by Milagros Herrera RN  Body Position:   weight shift assistance provided   supine  Taken 2/9/2022 0000 by Milagros Herrera RN  Body Position:   tilted, left   turned  Taken 2/8/2022 2200 by Milagros Herrera RN  Body Position:   position changed independently   weight shift assistance provided  Taken 2/8/2022 2000 by Milagros Herrera RN  Body Position:   position changed independently   supine  Intervention: Prevent Infection  Recent Flowsheet Documentation  Taken 2/9/2022 0400 by Milagros Herrera RN  Infection Prevention:   rest/sleep promoted   single patient room provided   hand hygiene promoted  Taken 2/9/2022 0200 by Milagros Herrera RN  Infection Prevention:   rest/sleep promoted   single patient room provided   hand hygiene promoted  Taken 2/9/2022 0000 by Milagros Herrera RN  Infection Prevention:   single patient room provided   rest/sleep promoted   hand hygiene promoted  Taken 2/8/2022 2200 by Milagros Herrera RN  Infection Prevention:   rest/sleep promoted   single patient room provided   hand hygiene promoted  Taken 2/8/2022 2000 by Milagros Herrera RN  Infection Prevention:   rest/sleep promoted   single patient room provided   hand hygiene promoted  Goal: Optimal Comfort and Wellbeing  Outcome: Ongoing, Progressing  Intervention: Provide Person-Centered Care  Recent Flowsheet Documentation  Taken 2/8/2022 2000 by Milagros Herrera RN  Trust Relationship/Rapport: care explained  Goal: Readiness for Transition of Care  Outcome: Ongoing, Progressing     Problem: Hypertension Comorbidity  Goal: Blood Pressure in Desired Range  Outcome: Ongoing, Progressing  Intervention: Maintain Hypertension-Management Strategies  Recent Flowsheet Documentation  Taken 2/9/2022 0400 by Milagros Herrera  RN  Medication Review/Management: medications reviewed  Taken 2/9/2022 0200 by Milagros Herrera, RN  Medication Review/Management: medications reviewed  Taken 2/9/2022 0000 by Milagros Herrera, RN  Medication Review/Management: medications reviewed  Taken 2/8/2022 2000 by Milagros Herrera, RN  Medication Review/Management: medications reviewed   Goal Outcome Evaluation:  Plan of Care Reviewed With: patient        Progress: improving  Outcome Summary: Pt is OX4; VSS. CT to waterseal with output of 24ml over night. Pt complained of pain on right side; controlled with PRN pain med. IV fluids going; purewick in place overnight. Will continue to monitor and support.

## 2022-02-09 NOTE — CONSULTS
"Adult Nutrition  Assessment/PES    Patient Name:  Tessie Conner  YOB: 1970  MRN: [id number]  Admit Date:  2/8/2022    Assessment Date:  2/9/2022  Nutrition assessment triggered by MST Score-2 and RN consult.  EMR reviewed. Labs, meds reviewed.  Admitted with lung mass, POD 1 bronch, thoracoscopy.  CC diet. Patient reported appetite has improved. About 10# weight loss in the past 4 months.  She has been doing more small, frequent meals/snacks, recommended to continue. Agreed to try ONS-ordered boost glucose control daily. Gave menu for always available selection. Will follow/monitor.    Reason for Assessment     Row Name  02/09/22 1052       Reason for Assessment    Reason For Assessment  identified at risk by screening criteria; nurse/nurse practitioner consult    Diagnosis   lung mass, POD 1 bronch, thoracoscopy    Identified At Risk by Screening Criteria  MST SCORE 2+               Nutrition/Diet History     Row Name 02/09/22 1052          Nutrition/Diet History    Typical Food/Fluid Intake CC diet, breakfast 75% consumed, 10# weight loss in the past 4 months                Anthropometrics     Row Name 02/09/22 1053          Anthropometrics    Height 162.6 cm (64.02\")            Admit Weight    Admit Weight 117 kg (257 lb 15 oz)            Ideal Body Weight (IBW)    Ideal Body Weight (IBW) (kg) 55.04     % of Ideal Body Weight Assessment --  212%            Body Mass Index (BMI)    BMI Assessment BMI 40 or greater: obesity grade III  BMI-44.2                Labs/Tests/Procedures/Meds     Row Name 02/09/22 1053          Labs/Procedures/Meds    Lab Results Reviewed reviewed, pertinent     Lab Results Comments Glu            Diagnostic Tests/Procedures    Diagnostic Test/Procedure Reviewed reviewed, pertinent            Medications    Pertinent Medications Reviewed reviewed, pertinent     Pertinent Medications Comments D5% with NaCl, KCl                Physical Findings     Row Name 02/09/22 1053 " "         Physical Findings    Overall Physical Appearance obese     Tubes chest tube                Estimated/Assessed Needs     Row Name 02/09/22 1053          Calculation Measurements    Height 162.6 cm (64.02\")                Nutrition Prescription Ordered     Row Name 02/09/22 1054          Nutrition Prescription PO    Common Modifiers Consistent Carbohydrate                Evaluation of Received Nutrient/Fluid Intake     Row Name 02/09/22 1054          PO Evaluation    Number of Meals 1     % PO Intake 75                     Problem/Interventions:   Problem 1     Row Name 02/09/22 1054          Nutrition Diagnoses Problem 1    Problem 1 Inadequate Nutrient Intake     Etiology (related to) Medical Diagnosis  lung mass     Signs/Symptoms (evidenced by) Unintended Weight Change     Unintended Weight Change Loss     Number of Pounds Lost 10#     Weight loss time period 4 months                      Intervention Goal     Row Name 02/09/22 1054          Intervention Goal    General Maintain nutrition; Disease management/therapy; Meet nutritional needs for age/condition; Reduce/improve symptoms     PO Tolerate PO; Maintain intake     Weight Appropriate weight loss                Nutrition Intervention     Row Name 02/09/22 1054          Nutrition Intervention    RD/Tech Action Follow Tx progress; Care plan reviewd; Interview for preference; Menu provided; Encourage intake; Recommend/ordered     Recommended/Ordered Supplement                Nutrition Prescription     Row Name 02/09/22 1054          Nutrition Prescription PO    PO Prescription Begin/change supplement     Supplement Boost Glucose Control     Supplement Frequency Daily     New PO Prescription Ordered? Yes                Education/Evaluation     Row Name 02/09/22 1054          Education    Education Will Instruct as appropriate            Monitor/Evaluation    Monitor Per protocol                 Electronically signed by:  Tia Joshua RD  02/09/22 " 10:56 EST

## 2022-02-09 NOTE — DISCHARGE SUMMARY
Patient Care Team:  Marsha Lopez MD as PCP - General (Internal Medicine)  Thomas Zamora MD as Consulting Physician (Cardiology)    Date of Admission: 2/8/2022   Date of Discharge:  2/9/2022    Discharge Diagnosis: Right upper lobe nodule    Presenting Problem  Mass of lung [R91.8]  Lung nodule [R91.1]     History of Present Illness  Tessie Conner is a 52 y.o. female who presented to Dr. Carroll for a pulmonary nodule in the right upper lobe.  A CT-guided needle biopsy was performed which demonstrated benign alveolated lung with chronic inflammation and fibrosis with atypical cells. After further evaluation, Dr. Carroll recommended wedge resection. The patient's questions were answered to her satisfaction and she agreed to proceed.    Hospital Course  Patient was admitted postoperatively status post right VATS with right upper lobe wedge resection.  For further details, please refer to Dr. Carroll's operative note.    She was extubated and recovered in the postanesthesia care unit without complication.  She was then transferred to [facility].  She has had a satisfactory postoperative course.  This morning's chest x-ray is stable without evidence of pneumothorax.  Chest was removed at the bedside without difficulty.  Follow-up imaging is stable.  She is tolerating a regular diet, hemodynamically stable on room air with pain well controlled and ambulating with minimal assistance.  Postoperative care instructions have been discussed with the patient at length.  She is eager to discharge home today with plans to follow-up in our clinic in 2 weeks with a hospital performed chest x-ray.        Procedures Performed  Procedure(s):  BRONCHOSCOPY, THORACOSCOPY VIDEO ASSISTED wedge resection X2, INTERCOSTAL NERVE BLOCK       Consults:   Consults     No orders found for last 30 day(s).          Pertinent Test Results:     Imaging Results (Last 24 Hours)     Procedure Component Value Units Date/Time    XR Chest 1  View [[id number]] Collected: 02/09/22 0713     Updated: 02/09/22 0718    Narrative:      XR CHEST 1 VW-     HISTORY:  Postop lung surgery.      COMPARISON:  Chest radiograph 02/08/2022     FINDINGS:    A single portable view of the chest was obtained. There is a right  apical chest tube, unchanged. The cardiac silhouette is upper normal to  mildly enlarged. There is calcific aortic atherosclerosis. Previously  noted mediastinal widening has improved. Central pulmonary venous  congestion has improved. There is improved aeration of the lungs. There  is a decreased small right apical pneumothorax. There is a trace left  pleural effusion. There is a small amount of subcutaneous air in the  lateral lower right chest wall.     This report was finalized on 2/9/2022 7:15 AM by Dr. Yenny Bailey M.D.       XR Chest 1 View [[id number]] Collected: 02/08/22 1106     Updated: 02/08/22 1106    Narrative:      SINGLE VIEW CHEST RADIOGRAPH     HISTORY: 52-year-old female status post lung surgery.     FINDINGS: A single view upright chest radiograph was obtained. No prior  examination is available for comparison. A right apical chest tube is  noted. There is a 1.9 cm moderate-sized right apical pneumothorax.  Prominence of the mediastinum, likely related to recent surgery and a  mediastinal hematoma, is noted. Mild bilateral interstitial prominence  is noted and may be related to increased central vascular volume.       Impression:      There is a right apical pneumothorax with an apically  positioned right-sided chest tube. Increased central vascular volume and  a mediastinal hematoma are suspected.             Lab Results (last 24 hours)     Procedure Component Value Units Date/Time    Tissue / Bone Culture - Tissue, Lung, Right Upper Lobe [[id number]] Collected: 02/08/22 0930    Specimen: Tissue from Lung, Right Upper Lobe Updated: 02/09/22 1101     Tissue Culture No growth     Gram Stain No WBCs or organisms seen    Basic Metabolic  Panel [[id number]]  (Abnormal) Collected: 02/09/22 0603    Specimen: Blood Updated: 02/09/22 0728     Glucose 170 mg/dL      BUN 6 mg/dL      Creatinine 0.75 mg/dL      Sodium 139 mmol/L      Potassium 4.6 mmol/L      Chloride 103 mmol/L      CO2 22.1 mmol/L      Calcium 9.3 mg/dL      eGFR Non African Amer 81 mL/min/1.73      BUN/Creatinine Ratio 8.0     Anion Gap 13.9 mmol/L     Narrative:      GFR Normal >60  Chronic Kidney Disease <60  Kidney Failure <15      CBC & Differential [[id number]]  (Abnormal) Collected: 02/09/22 0603    Specimen: Blood Updated: 02/09/22 0705    Narrative:      The following orders were created for panel order CBC & Differential.  Procedure                               Abnormality         Status                     ---------                               -----------         ------                     CBC Auto Differential[[id number]]        Abnormal            Final result                 Please view results for these tests on the individual orders.    CBC Auto Differential [[id number]]  (Abnormal) Collected: 02/09/22 0603    Specimen: Blood Updated: 02/09/22 0705     WBC 13.34 10*3/mm3      RBC 4.13 10*6/mm3      Hemoglobin 11.1 g/dL      Hematocrit 35.2 %      MCV 85.2 fL      MCH 26.9 pg      MCHC 31.5 g/dL      RDW 14.2 %      RDW-SD 44.2 fl      MPV 10.3 fL      Platelets 252 10*3/mm3      Neutrophil % 80.0 %      Lymphocyte % 12.2 %      Monocyte % 6.9 %      Eosinophil % 0.0 %      Basophil % 0.1 %      Immature Grans % 0.8 %      Neutrophils, Absolute 10.66 10*3/mm3      Lymphocytes, Absolute 1.63 10*3/mm3      Monocytes, Absolute 0.92 10*3/mm3      Eosinophils, Absolute 0.00 10*3/mm3      Basophils, Absolute 0.02 10*3/mm3      Immature Grans, Absolute 0.11 10*3/mm3      nRBC 0.0 /100 WBC             Condition on Discharge:  stable    Vital Signs  Temp:  [97.7 °F (36.5 °C)-98.1 °F (36.7 °C)] 98.1 °F (36.7 °C)  Heart Rate:  [54-88] 66  Resp:  [16-18] 16  BP: ()/(11-97)  123/66  Arterial Line BP: (105-112)/(51-53) 105/53    Physical Exam:     General Appearance:    Alert, cooperative, in no acute distress   Head:    Normocephalic, without obvious abnormality, atraumatic   Eyes:            Lids and lashes normal, conjunctivae and sclerae normal, no   icterus, no pallor, corneas clear, PERRLA   Ears:    Ears appear intact with no abnormalities noted   Throat:   No oral lesions, no thrush, oral mucosa moist   Neck:   No adenopathy, supple, trachea midline, no thyromegaly, no     carotid bruit, no JVD   Back:     No kyphosis present, no scoliosis present, no skin lesions,       erythema or scars, no tenderness to percussion or                   palpation,   range of motion normal   Lungs:     Clear to auscultation,respirations regular, even and                   unlabored    Heart:    Regular rhythm and normal rate, normal S1 and S2, no            murmur, no gallop, no rub, no click   Breast Exam:    Deferred   Abdomen:     Normal bowel sounds, no masses, no organomegaly, soft        non-tender, non-distended, no guarding, no rebound                 tenderness   Genitalia:    Deferred   Extremities:   Moves all extremities well, no edema, no cyanosis, no              redness   Pulses:   Pulses palpable and equal bilaterally   Skin:   No bleeding, bruising or rash.  Back incision well approximated with Dermabond intact.  DuoDERM applied to chest tube site.   Lymph nodes:   No palpable adenopathy   Neurologic:   Cranial nerves 2 - 12 grossly intact, sensation intact, DTR        present and equal bilaterally       Discharge Disposition  Home today    Discharge Medications     Discharge Medications      New Medications      Instructions Start Date   oxyCODONE 5 MG immediate release tablet  Commonly known as: Roxicodone   5 mg, Oral, Every 4 Hours PRN         ASK your doctor about these medications      Instructions Start Date   ALPRAZolam 0.5 MG tablet  Commonly known as: Xanax   0.5 mg,  Oral, 2 Times Daily PRN      ARIPiprazole 20 MG tablet  Commonly known as: ABILIFY   20 mg, Oral, Nightly      atorvastatin 20 MG tablet  Commonly known as: Lipitor   20 mg, Oral, Daily      lisinopril 10 MG tablet  Commonly known as: PRINIVIL,ZESTRIL   10 mg, Oral, Daily      venlafaxine  MG 24 hr capsule  Commonly known as: EFFEXOR-XR   150 mg, Oral, 2 Times Daily             Discharge Instructions:  · No heavy lifting, pushing, pulling greater than 10 pounds.  · No driving up until 2 weeks after surgery and no longer taking narcotics.  · Resume home diet as tolerated.  · Continue incentive spirometer at least 4 times per day.  · Remove dressing from post chest tube site after 48 hours, may shower and clean surgical sites with antibacterial soap or hydrogen peroxide, and apply gauze dressing or band-aid as needed for any drainage.  No dressing needed once no longer draining.          Follow-up Appointments  Future Appointments   Date Time Provider Department Center   2/22/2022  9:15 AM LAB MD [facility] ONC LAB [facility]   2/22/2022  9:30 AM Miryam Lin MD [facility]         Test Results Pending at Discharge  Pending Labs     Order Current Status    AFB Culture - Tissue, Lung, Right Upper Lobe In process    Anaerobic Culture - Tissue, Lung, Right Upper Lobe In process    Fungus Culture - Tissue, Lung, Right Upper Lobe In process    Tissue Pathology Exam In process    Tissue / Bone Culture - Tissue, Lung, Right Upper Lobe Preliminary result          For any questions regarding patient's stay, please refer to patient's chart.    Eileen Massey, ALEXIS, APRN  [facility]  02/09/22  11:02 EST        Greater than 30 minutes spent on the unit discharging this patient, with more than 50% of time spent assessing the patient, counseling the patient on postoperative care and discharge planning.

## 2022-02-10 ENCOUNTER — TRANSITIONAL CARE MANAGEMENT TELEPHONE ENCOUNTER (OUTPATIENT)
Dept: CALL CENTER | Facility: HOSPITAL | Age: 52
End: 2022-02-10

## 2022-02-10 LAB
LAB AP CASE REPORT: NORMAL
LAB AP DIAGNOSIS COMMENT: NORMAL
Lab: NORMAL
PATH REPORT.FINAL DX SPEC: NORMAL
PATH REPORT.GROSS SPEC: NORMAL

## 2022-02-10 NOTE — CASE MANAGEMENT/SOCIAL WORK
Case Management Discharge Note      Final Note: Pt discharged home, no known needs.  ANGELINA Lopez RN         Selected Continued Care - Discharged on 2/9/2022 Admission date: 2/8/2022 - Discharge disposition: Home or Self Care    Destination    No services have been selected for the patient.              Durable Medical Equipment    No services have been selected for the patient.              Dialysis/Infusion    No services have been selected for the patient.              Home Medical Care    No services have been selected for the patient.              Therapy    No services have been selected for the patient.              Community Resources    No services have been selected for the patient.              Community & DME    No services have been selected for the patient.                  Transportation Services  Private: Car    Final Discharge Disposition Code: 01 - home or self-care

## 2022-02-10 NOTE — OUTREACH NOTE
Call Center TCM Note      Responses   [facility] patient discharged from? [facility]   Does the patient have one of the following disease processes/diagnoses(primary or secondary)? Cardiothoracic surgery   TCM attempt successful? Yes   Call start time 1542   Call end time 1545   Discharge diagnosis Right upper lobe nodule  BRONCHOSCOPY, THORACOSCOPY VIDEO ASSISTED wedge resection X2, INTERCOSTAL NERVE BLOCK   Meds reviewed with patient/caregiver? Yes   Is the patient having any side effects they believe may be caused by any medication additions or changes? No   Does the patient have all medications related to this admission filled (includes all antibiotics, pain medications, cardiac medications, etc.) Yes   Is the patient taking all medications as directed (includes completed medication regime)? Yes   Does the patient have a primary care provider?  Yes   Does the patient have an appointment scheduled with their C/T surgeon? Yes   Comments regarding PCP She has a hospital followup on 2/14/2022 with Dr Lopez.    Has the patient kept scheduled appointments due by today? N/A   Has home health visited the patient within 72 hours of discharge? N/A   Has all DME been delivered? No   Psychosocial issues? No   Did the patient receive a copy of their discharge instructions? Yes   Nursing interventions Reviewed instructions with patient   What is the patient's perception of their health status since discharge? Improving   Nursing interventions Nurse provided patient education   Is the patient/caregiver able to teach back normal signs of recovery? Pain or discomfort at incisional site   Nursing interventions Reassured on normal signs of recovery   Is the patient /caregiver able to teach back basic post-op care? Drive as instructed by MD in discharge instructions,  Take showers only when approved by MD-sponge bathe until then,  No tub bath, swimming, or hot tub until instructed by MD,  Lifting as instructed by MD in  discharge instructions,  Keep incision areas clean, dry and protected   Is the patient/caregiver able to teach back signs and symptoms of incisional infection? Increased drainage or bleeding,  Incisional warmth,  Increased redness, swelling or pain at the incisonal site,  Pus or odor from incision,  Fever   Is the patient/caregiver able to teach back steps to recovery at home? Set small, achievable goals for return to baseline health,  Rest and rebuild strength, gradually increase activity,  Make a list of questions for surgeon's appointment   Is the patient /caregiver able to teach back the importance of cardiac rehab? Yes   Nursing interventions Provided education on importance of cardiac rehab   If the patient is a current smoker, are they able to teach back resources for cessation? Not a smoker   Is the patient/caregiver able to teach back the hierarchy of who to call/visit for symptoms/problems? PCP, Specialist, Home health nurse, Urgent Care, ED, 911 Yes   TCM call completed? Yes   Wrap up additional comments Patient reports she is doing well.           Radhames Betts RN    2/10/2022, 15:45 EST

## 2022-02-11 ENCOUNTER — TELEPHONE (OUTPATIENT)
Dept: SURGERY | Facility: CLINIC | Age: 52
End: 2022-02-11

## 2022-02-11 LAB
BACTERIA SPEC AEROBE CULT: NORMAL
GRAM STN SPEC: NORMAL

## 2022-02-11 NOTE — TELEPHONE ENCOUNTER
Called patient to check on her post op. She is doing fine. She does have some pain but feels it is normal and not concerning. She is taking her medications. I booked her PO appt 2-24-22 at 2;15. She will get her CXR at [facility] same day as appt. encouraged her to call with any questions.

## 2022-02-13 LAB — BACTERIA SPEC ANAEROBE CULT: NORMAL

## 2022-02-14 ENCOUNTER — OFFICE VISIT (OUTPATIENT)
Dept: FAMILY MEDICINE CLINIC | Facility: CLINIC | Age: 52
End: 2022-02-14

## 2022-02-14 ENCOUNTER — LAB (OUTPATIENT)
Dept: FAMILY MEDICINE CLINIC | Facility: CLINIC | Age: 52
End: 2022-02-14

## 2022-02-14 VITALS
TEMPERATURE: 97.3 F | OXYGEN SATURATION: 100 % | WEIGHT: 263 LBS | BODY MASS INDEX: 44.9 KG/M2 | HEART RATE: 84 BPM | DIASTOLIC BLOOD PRESSURE: 80 MMHG | SYSTOLIC BLOOD PRESSURE: 104 MMHG | RESPIRATION RATE: 16 BRPM | HEIGHT: 64 IN

## 2022-02-14 DIAGNOSIS — I10 PRIMARY HYPERTENSION: Primary | ICD-10-CM

## 2022-02-14 DIAGNOSIS — R73.09 ELEVATED GLUCOSE: ICD-10-CM

## 2022-02-14 DIAGNOSIS — R16.1 SPLEEN ENLARGEMENT: ICD-10-CM

## 2022-02-14 DIAGNOSIS — R91.1 LUNG NODULE: ICD-10-CM

## 2022-02-14 DIAGNOSIS — Z09 HOSPITAL DISCHARGE FOLLOW-UP: ICD-10-CM

## 2022-02-14 PROCEDURE — 99496 TRANSJ CARE MGMT HIGH F2F 7D: CPT | Performed by: INTERNAL MEDICINE

## 2022-02-14 PROCEDURE — 1111F DSCHRG MED/CURRENT MED MERGE: CPT | Performed by: INTERNAL MEDICINE

## 2022-02-14 RX ORDER — LISINOPRIL 5 MG/1
5 TABLET ORAL DAILY
Qty: 90 TABLET | Refills: 1 | Status: SHIPPED | OUTPATIENT
Start: 2022-02-14 | End: 2022-07-13

## 2022-02-14 NOTE — PROGRESS NOTES
"Transitional Care Follow Up Visit  Subjective     Tessie Conner is a 52 y.o. female who presents for a transitional care management visit.    Within 48 business hours after discharge our office contacted her via telephone to coordinate her care and needs.      I reviewed and discussed the details of that call along with the discharge summary, hospital problems, inpatient lab results, inpatient diagnostic studies, and consultation reports with Tessie.     Current outpatient and discharge medications have been reconciled for the patient.  Reviewed by: Marsha Lopez MD      Date of TCM Phone Call 2/9/2022   [facility]   Date of Admission 2/8/2022   Date of Discharge 2/9/2022   Discharge Disposition Home or Self Care     Risk for Readmission (LACE) Score: 8 (2/9/2022  6:01 AM)      History of Present Illness   /80 (BP Location: Left arm, Patient Position: Sitting, Cuff Size: Adult)   Pulse 84   Temp 97.3 °F (36.3 °C) (Temporal)   Resp 16   Ht 162.6 cm (64.02\")   Wt 119 kg (263 lb)   SpO2 100%   BMI 45.12 kg/m²   Course During Hospital Stay:  Pt was  Admitted to [facility] for thorascopy      Tessie Conner is a 51-year-old female who presents today for a transitional care follow-up from thoracoscopy.    Hospital discharge  The patient reports that the surgeon removed the wedge. She reports that she is unsure if they are going to watch the nodule. The patient states that they removed the nodule that was atypical. The nodule by her gland is still present. She reports that her blood pressure kept dropping after the surgery. She reports that she has an appointment on 02/23/2022 to get an x-ray of her lung.    Medications  She reports that she has not taken any of the oxycodone. She states that she does not need them and that she does not feel bad. She states that she does not have any pain or nerve pain. She reports that she hardly takes her Xanax. She reports that Abilify has helped " with sleep and leveled. She reports that she has been doing well on 10 mg of lisinopril. She denies feeling lightheaded. She is not on any steroids at this time.    Breathing exercise  She reports that the highest she has gotten is 450 L/min. She states that her goal I/S is to 3,000 L/min.    Spleen enlargement  She reports that her spleen was enlarged and unsure if needs further evaluation or needs to keep appointment with hematology.    Appointments  She reports that she has a sleep study at the end of 02/2022.    Throat pain  She reports that her throat is sore and believes it is from the chest tube. Her incision is healing well.    The following portions of the patient's history were reviewed and updated as appropriate: current medications, past family history, past medical history, past social history, past surgical history and problem list.    Review of Systems   A review of systems was performed, and positive findings are noted in the HPI.    Objective   Physical Exam  Vitals and nursing note reviewed.   Constitutional:       Appearance: Normal appearance. She is well-developed. She is obese.   HENT:      Head: Normocephalic and atraumatic.      Right Ear: Tympanic membrane normal.      Left Ear: Tympanic membrane normal.      Nose: No rhinorrhea.      Mouth/Throat:      Pharynx: No posterior oropharyngeal erythema.   Eyes:      Pupils: Pupils are equal, round, and reactive to light.   Cardiovascular:      Rate and Rhythm: Normal rate and regular rhythm.      Pulses: Normal pulses.      Heart sounds: Normal heart sounds. No murmur heard.      Pulmonary:      Effort: Pulmonary effort is normal.      Breath sounds: Normal breath sounds.   Abdominal:      General: Bowel sounds are normal. There is no distension.      Palpations: Abdomen is soft.   Musculoskeletal:         General: No tenderness.      Cervical back: Normal range of motion and neck supple.   Skin:     Capillary Refill: Capillary refill takes less  than 2 seconds.      Comments: Right axilla/side healing 2 incisions   Neurological:      Mental Status: She is alert and oriented to person, place, and time.   Psychiatric:         Mood and Affect: Mood normal.         Behavior: Behavior normal.         Assessment/Plan   Diagnoses and all orders for this visit:    1. Primary hypertension (Primary)    2. Spleen enlargement  -     CT Abdomen Pelvis With Contrast; Future  -     CBC Auto Differential    3. Lung nodule  -     CT Chest With Contrast; Future    4. Hospital discharge follow-up  Assessment & Plan:   Doing well with recovery      5. Elevated glucose  -     Hemoglobin A1c  -     Comprehensive Metabolic Panel    Other orders  -     lisinopril (PRINIVIL,ZESTRIL) 5 MG tablet; Take 1 tablet by mouth Daily for 180 days.  Dispense: 90 tablet; Refill: 1  1. Hypertension  - Her blood pressure has been low so we will decrease her lisinopril from 10 mg to 5 mg daily.  - She will have blood work done today.    2. Pain  - Discontinue Celebrex, oxycodone, and gabapentin.  - If she has any lower back pain, neck pain, or anything causing burning shooting pain she can go back to Celebrex or gabapentin.    3. Hospital follow up  - Continue breathing exercises. She should be at [id number] L/min, I advised her to take a deep breath.  - She has some throat soreness most likely from the tube in her throat. I advised her tp gargle salt water and do not eat anything crunchy like  popcorn.    4. Psych  - She on current regimen of sleep medications. Everything seems to be working well.    5. Spleen enlargement/ lung nodule  - Repeat CT scan in 06/2022.    They were informed of the diagnosis and treatment plan and directed to f/u for any further problems or concerns.       Transcribed from ambient dictation for Marsha Lopez MD by Essie Rodriges.  02/14/22   13:19 EST    Patient verbalized consent to the visit recording.  I have personally performed the services described in  this document as transcribed by the above individual, and it is both accurate and complete.  Marsha Lopez MD  2/15/2022  08:27 EST

## 2022-02-15 ENCOUNTER — LAB (OUTPATIENT)
Dept: FAMILY MEDICINE CLINIC | Facility: CLINIC | Age: 52
End: 2022-02-15

## 2022-02-15 DIAGNOSIS — R16.1 SPLENOMEGALY: ICD-10-CM

## 2022-02-15 LAB
ALBUMIN SERPL-MCNC: 4.2 G/DL (ref 3.5–5.2)
ALBUMIN/GLOB SERPL: 1.2 G/DL
ALP SERPL-CCNC: 159 U/L (ref 39–117)
ALT SERPL W P-5'-P-CCNC: 21 U/L (ref 1–33)
ANION GAP SERPL CALCULATED.3IONS-SCNC: 8.8 MMOL/L (ref 5–15)
AST SERPL-CCNC: 17 U/L (ref 1–32)
BASOPHILS # BLD AUTO: 0 10*3/MM3 (ref 0–0.2)
BASOPHILS NFR BLD AUTO: 0.5 % (ref 0–1.5)
BILIRUB SERPL-MCNC: 0.3 MG/DL (ref 0–1.2)
BUN SERPL-MCNC: 10 MG/DL (ref 6–20)
BUN/CREAT SERPL: 11.9 (ref 7–25)
CALCIUM SPEC-SCNC: 10.2 MG/DL (ref 8.6–10.5)
CHLORIDE SERPL-SCNC: 99 MMOL/L (ref 98–107)
CO2 SERPL-SCNC: 31.2 MMOL/L (ref 22–29)
CREAT SERPL-MCNC: 0.84 MG/DL (ref 0.57–1)
DEPRECATED RDW RBC AUTO: 47.7 FL (ref 37–54)
EOSINOPHIL # BLD AUTO: 0 10*3/MM3 (ref 0–0.4)
EOSINOPHIL NFR BLD AUTO: 0.1 % (ref 0.3–6.2)
ERYTHROCYTE [DISTWIDTH] IN BLOOD BY AUTOMATED COUNT: 16.5 % (ref 12.3–15.4)
GFR SERPL CREATININE-BSD FRML MDRD: 71 ML/MIN/1.73
GLOBULIN UR ELPH-MCNC: 3.4 GM/DL
GLUCOSE SERPL-MCNC: 79 MG/DL (ref 65–99)
HBA1C MFR BLD: 6 % (ref 3.5–5.6)
HCT VFR BLD AUTO: 37.4 % (ref 34–46.6)
HGB BLD-MCNC: 12.3 G/DL (ref 12–15.9)
LYMPHOCYTES # BLD AUTO: 2 10*3/MM3 (ref 0.7–3.1)
LYMPHOCYTES NFR BLD AUTO: 23 % (ref 19.6–45.3)
MCH RBC QN AUTO: 27 PG (ref 26.6–33)
MCHC RBC AUTO-ENTMCNC: 33 G/DL (ref 31.5–35.7)
MCV RBC AUTO: 82 FL (ref 79–97)
MONOCYTES # BLD AUTO: 0.7 10*3/MM3 (ref 0.1–0.9)
MONOCYTES NFR BLD AUTO: 7.9 % (ref 5–12)
NEUTROPHILS NFR BLD AUTO: 5.9 10*3/MM3 (ref 1.7–7)
NEUTROPHILS NFR BLD AUTO: 68.5 % (ref 42.7–76)
NRBC BLD AUTO-RTO: 0.2 /100 WBC (ref 0–0.2)
PLATELET # BLD AUTO: 280 10*3/MM3 (ref 140–450)
PMV BLD AUTO: 8.7 FL (ref 6–12)
POTASSIUM SERPL-SCNC: 4.1 MMOL/L (ref 3.5–5.2)
PROT SERPL-MCNC: 7.6 G/DL (ref 6–8.5)
RBC # BLD AUTO: 4.56 10*6/MM3 (ref 3.77–5.28)
SODIUM SERPL-SCNC: 139 MMOL/L (ref 136–145)
WBC NRBC COR # BLD: 8.6 10*3/MM3 (ref 3.4–10.8)

## 2022-02-15 PROCEDURE — 80053 COMPREHEN METABOLIC PANEL: CPT | Performed by: INTERNAL MEDICINE

## 2022-02-15 PROCEDURE — 85025 COMPLETE CBC W/AUTO DIFF WBC: CPT | Performed by: INTERNAL MEDICINE

## 2022-02-15 PROCEDURE — 83036 HEMOGLOBIN GLYCOSYLATED A1C: CPT | Performed by: INTERNAL MEDICINE

## 2022-02-15 PROCEDURE — 36415 COLL VENOUS BLD VENIPUNCTURE: CPT | Performed by: INTERNAL MEDICINE

## 2022-02-17 ENCOUNTER — READMISSION MANAGEMENT (OUTPATIENT)
Dept: CALL CENTER | Facility: HOSPITAL | Age: 52
End: 2022-02-17

## 2022-02-17 NOTE — OUTREACH NOTE
CT Surgery Week 2 Survey      Responses   [facility] patient discharged from? [facility]   Does the patient have one of the following disease processes/diagnoses(primary or secondary)? Cardiothoracic surgery   Week 2 attempt successful? Yes   Call start time 1439   Call end time 1441   Discharge diagnosis Right upper lobe nodule  BRONCHOSCOPY, THORACOSCOPY VIDEO ASSISTED wedge resection X2, INTERCOSTAL NERVE BLOCK   Person spoke with today (if not patient) and relationship patient   Meds reviewed with patient/caregiver? Yes   Is the patient having any side effects they believe may be caused by any medication additions or changes? No   Does the patient have all medications related to this admission filled (includes all antibiotics, pain medications, cardiac medications, etc.) Yes   Is the patient taking all medications as directed (includes completed medication regime)? Yes   Comments regarding appointments pt will f/u with surgeon this coming thursday.   Does the patient have a primary care provider?  Yes   Does the patient have an appointment scheduled with their C/T surgeon? Yes   Comments regarding PCP She has a hospital followup on 2/14/2022 with Dr Lopez.    Has the patient kept scheduled appointments due by today? Yes   Comments Pt will have an xray at next visit   Has all DME been delivered? No   Psychosocial issues? No   Did the patient receive a copy of their discharge instructions? Yes   Nursing interventions Reviewed instructions with patient   What is the patient's perception of their health status since discharge? Improving   Is the patient /caregiver able to teach back the importance of cardiac rehab? Yes   If the patient is a current smoker, are they able to teach back resources for cessation? [id number]   Week 2 call completed? Yes   Wrap up additional comments Pt reports she is doing well at this time.           Candace Ibarra RN

## 2022-02-21 ENCOUNTER — TELEPHONE (OUTPATIENT)
Dept: FAMILY MEDICINE CLINIC | Facility: CLINIC | Age: 52
End: 2022-02-21

## 2022-02-21 ENCOUNTER — READMISSION MANAGEMENT (OUTPATIENT)
Dept: CALL CENTER | Facility: HOSPITAL | Age: 52
End: 2022-02-21

## 2022-02-21 NOTE — TELEPHONE ENCOUNTER
Caller: Tessie Conner    Relationship to patient: Self    Best call back number: [phone]    Patient is needing:     PATIENT WAS HOSPITALIZED OVER THE WEEKEND AT [facility] OVER THIS PAST WEEKEND AND THEY FOUND ANOTHER LUNG NODULE IN HER RIGHT LUNG IN THE MIDDLE LOBE     SHE SAID THAT THIS IS A NEW ONE, SHE HAD JUST HAD SURGERY TO REMOVE A NODULE OUT OF THE UPPER LOBE OF HER RIGHT LUNG     SHE IS WANTING TO SEE WHAT DR. JARAMILLO WANTS HER TO DO, AND WHETHER SHE WOULD NEED TO SEE ANOTHER DOCTOR, OR HAVE ANOTHER CT SCAN    SHE IS WONDERING IF SHE NEEDS TO SEE AN INFECTIOUS DISEASE DOCTOR TO SEE WHAT KEEPS CAUSING THESE NODULES    REQUESTED CALLBACK

## 2022-02-21 NOTE — TELEPHONE ENCOUNTER
Caller: Tessie Conner    Relationship to patient: Self    Best call back number: [phone]    New or established patient?  [] New  [x] Established    Date of discharge: 02/21/22    Facility discharged from: [facility]    Diagnosis/Symptoms: NODULE IN MIDDLE LOBE OF RIGHT LUNG     Length of stay (If applicable): 2 DAYS    Specialty Only: Did you see a [facility] provider?    [] Yes  [] No  If so, who? N/A

## 2022-02-21 NOTE — OUTREACH NOTE
Prep Survey      Responses   [facility] discharged from? [facility]   Is LACE score < 7 ? Non-[facility] Discharge   Emergency Room discharge w/ pulse ox? No   Eligibility [facility]   Date of Discharge 02/21/22   Discharge diagnosis NODULE IN MIDDLE LOBE OF RIGHT LUNG   Does the patient have one of the following disease processes/diagnoses(primary or secondary)? Non-[facility] Discharge   Prep survey completed? Yes          Jocelyn Ham RN

## 2022-02-21 NOTE — TELEPHONE ENCOUNTER
Would not do anything further for now- repeat CT in 3 months. Will check previous and see if they did histoplasmosis workup- if not, at her next appointment will do that

## 2022-02-22 ENCOUNTER — APPOINTMENT (OUTPATIENT)
Dept: LAB | Facility: HOSPITAL | Age: 52
End: 2022-02-22

## 2022-02-22 ENCOUNTER — TRANSITIONAL CARE MANAGEMENT TELEPHONE ENCOUNTER (OUTPATIENT)
Dept: CALL CENTER | Facility: HOSPITAL | Age: 52
End: 2022-02-22

## 2022-02-22 NOTE — OUTREACH NOTE
Call Center TCM Note      Responses   [facility] patient discharged from? Non-   Does the patient have one of the following disease processes/diagnoses(primary or secondary)? Non-BH Discharge   TCM attempt successful? Yes   Discharge diagnosis NODULE IN MIDDLE LOBE OF RIGHT LUNG   Meds reviewed with patient/caregiver? Yes   Is the patient having any side effects they believe may be caused by any medication additions or changes? No   Does the patient have all medications ordered at discharge? Yes   Is the patient taking all medications as directed (includes completed medication regime)? Yes   Does the patient have a primary care provider?  Yes   Does the patient have an appointment with their PCP within 7 days of discharge? No   Comments regarding PCP Pt does hav POST OP sched with Thoracic srgn on 02/24/2022. Declines TCM FWP with PCP Dr Lopez. Pt states she has already spoken with PCP and no TCM FWP is needed.    Has the patient kept scheduled appointments due by today? N/A   Has home health visited the patient within 72 hours of discharge? N/A   Psychosocial issues? No   Did the patient receive a copy of their discharge instructions? Yes   Nursing interventions Reviewed instructions with patient   What is the patient's perception of their health status since discharge? Improving   Is the patient/caregiver able to teach back signs and symptoms related to disease process for when to call PCP? Yes   Is the patient/caregiver able to teach back signs and symptoms related to disease process for when to call 911? Yes   Is the patient/caregiver able to teach back the hierarchy of who to call/visit for symptoms/problems? PCP, Specialist, Home health nurse, Urgent Care, ED, 911 Yes   If the patient is a current smoker, are they able to teach back resources for cessation? Not a smoker   TCM call completed? Yes   Wrap up additional comments Pt states she is doing well s/p bronchoscopy with thorascopy and wedge  resection x 2. Minimal discomfort, no SOB. No quesitons. Meds in place. Pt does hav POST OP sched with Thoracic srgn on 02/24/2022. Declines TCM FWP with PCP Dr Lopez. Pt states she has already spoken with PCP and no TCM FWP is needed.           Misa Li MA    2/22/2022, 10:21 EST

## 2022-02-24 ENCOUNTER — OFFICE VISIT (OUTPATIENT)
Dept: SURGERY | Facility: CLINIC | Age: 52
End: 2022-02-24

## 2022-02-24 VITALS
OXYGEN SATURATION: 97 % | HEART RATE: 76 BPM | SYSTOLIC BLOOD PRESSURE: 104 MMHG | TEMPERATURE: 99.1 F | DIASTOLIC BLOOD PRESSURE: 66 MMHG | WEIGHT: 261 LBS | BODY MASS INDEX: 44.56 KG/M2 | HEIGHT: 64 IN

## 2022-02-24 DIAGNOSIS — R91.1 LUNG NODULE: Primary | ICD-10-CM

## 2022-02-24 PROCEDURE — 99024 POSTOP FOLLOW-UP VISIT: CPT | Performed by: THORACIC SURGERY (CARDIOTHORACIC VASCULAR SURGERY)

## 2022-02-24 RX ORDER — GABAPENTIN 100 MG/1
100 CAPSULE ORAL 3 TIMES DAILY
COMMUNITY
End: 2022-04-05

## 2022-02-24 RX ORDER — CELECOXIB 100 MG/1
100 CAPSULE ORAL 2 TIMES DAILY PRN
COMMUNITY
End: 2022-07-13

## 2022-02-24 NOTE — PROGRESS NOTES
"Chief Complaint  Post-op Follow-up (2 week VATSwedge 2-8-22 withCXR ) and Lung Nodule    Subjective          Tessie Conner presents to [facility] THORACIC SURGERY  History of Present Illness    Miss Conner is a very pleasant 52-year-old lady status post VATS wedge resection on 02/08/2022. She presents for postoperative follow-up.     She reports that she was doing great for 10 to 11 days. She reports that she was not on any pain medication. She reports that she woke up on 02/18/2022 and was in pain all day. She reports that she went to the hospital because the pain did not get better.  He is currently doing better.  Her pain is more controlled with pain medication.    Objective   Vital Signs:   /66 (BP Location: Left arm, Patient Position: Sitting, Cuff Size: Large Adult)   Pulse 76   Temp 99.1 °F (37.3 °C) (Infrared)   Ht 162.6 cm (64\")   Wt 118 kg (261 lb)   SpO2 97%   BMI 44.80 kg/m²     Physical Exam  Vitals and nursing note reviewed.   Constitutional:       General: She is not in acute distress.     Appearance: Normal appearance. She is well-developed. She is not ill-appearing, toxic-appearing or diaphoretic.   Cardiovascular:      Pulses: Normal pulses.   Musculoskeletal:         General: No swelling, tenderness, deformity or signs of injury.   Skin:     General: Skin is warm and dry.      Coloration: Skin is not pale.      Findings: No erythema or rash.   Neurological:      Mental Status: She is alert.      Motor: No abnormal muscle tone.      Coordination: Coordination normal.          Result Review :            I have independently reviewed the CT of her chest performed on 02/21/2022 which demonstrates some hemorrhage in the right side associated with a stable line pneumothorax. No new nodules.     Pathology consistent with caseating granulomas.                 Assessment and Plan      Miss Conner is a very pleasant 52-year-old lady status post VATS wedge resection on " 02/08/2022. I reviewed imaging with the patient.  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 Her incisions are healing well. I will see her back after 06/01/2022 with CT scan of chest for continued surveillance.        Diagnoses and all orders for this visit:    1. Lung nodule (Primary)  -     CT Chest Without Contrast; Future        Follow Up {Instructions Charge Capture  Follow-up Communications :23}  No follow-ups on file.  Patient was given instructions and counseling regarding her condition or for health maintenance advice. Please see specific information pulled into the AVS if appropriate.       Transcribed from ambient dictation for Ayla Carroll MD by Essie Rodriges.  02/24/22   16:36 EST    Patient verbalized consent to the visit recording.  I have personally performed the services described in this document as transcribed by the above individual, and it is both accurate and complete.  Ayla Carroll MD  2/28/2022  17:51 EST

## 2022-03-04 ENCOUNTER — OFFICE VISIT (OUTPATIENT)
Dept: FAMILY MEDICINE CLINIC | Facility: CLINIC | Age: 52
End: 2022-03-04

## 2022-03-04 VITALS
WEIGHT: 257.4 LBS | DIASTOLIC BLOOD PRESSURE: 64 MMHG | SYSTOLIC BLOOD PRESSURE: 118 MMHG | RESPIRATION RATE: 16 BRPM | TEMPERATURE: 97.7 F | HEART RATE: 97 BPM | HEIGHT: 64 IN | BODY MASS INDEX: 43.94 KG/M2 | OXYGEN SATURATION: 95 %

## 2022-03-04 DIAGNOSIS — R19.7 DIARRHEA, UNSPECIFIED TYPE: Primary | ICD-10-CM

## 2022-03-04 DIAGNOSIS — K58.2 IRRITABLE BOWEL SYNDROME WITH BOTH CONSTIPATION AND DIARRHEA: ICD-10-CM

## 2022-03-04 PROCEDURE — 99213 OFFICE O/P EST LOW 20 MIN: CPT | Performed by: NURSE PRACTITIONER

## 2022-03-04 RX ORDER — DICYCLOMINE HYDROCHLORIDE 10 MG/1
10 CAPSULE ORAL
Qty: 120 CAPSULE | Refills: 0 | Status: SHIPPED | OUTPATIENT
Start: 2022-03-04 | End: 2022-07-13

## 2022-03-04 NOTE — PROGRESS NOTES
"Chief Complaint  Diarrhea (IBS follow-up)    Subjective          Tessie Conner presents to [facility] FAM[facility] MEDICINE  History of Present Illness    Ms. Conner is a patient of Dr. Lopez, new to me today  Is here today with c/o persistent diarrhea over the past 1 week  Has h/o IBS, used to see GI, has not seem them lately, used to be on hyoscyamine  Has also used dicyclomine in the past    Review of Systems   Constitutional: Negative.  Negative for appetite change, fatigue and fever.   Gastrointestinal: Positive for diarrhea. Negative for blood in stool and constipation.        Watery diarrhea with each meal   Genitourinary: Negative.  Negative for dysuria, frequency and urgency.       Objective   Vital Signs:   /64   Pulse 97   Temp 97.7 °F (36.5 °C)   Resp 16   Ht 162.6 cm (64\")   Wt 117 kg (257 lb 6.4 oz)   SpO2 95%   BMI 44.18 kg/m²     Physical Exam  Vitals reviewed.   Constitutional:       Appearance: Normal appearance.   Cardiovascular:      Rate and Rhythm: Normal rate and regular rhythm.      Pulses: Normal pulses.      Heart sounds: Normal heart sounds.   Pulmonary:      Effort: Pulmonary effort is normal.      Breath sounds: Normal breath sounds.   Abdominal:      General: Bowel sounds are normal.      Palpations: Abdomen is soft.      Tenderness: There is no abdominal tenderness. There is no right CVA tenderness, left CVA tenderness or guarding.   Skin:     General: Skin is warm.   Neurological:      Mental Status: She is alert and oriented to person, place, and time.   Psychiatric:         Mood and Affect: Mood normal.        Result Review :                 Assessment and Plan    Diagnoses and all orders for this visit:    1. Diarrhea, unspecified type (Primary)  -     dicyclomine (Bentyl) 10 MG capsule; Take 1 capsule by mouth 4 (Four) Times a Day Before Meals & at Bedtime.  Dispense: 120 capsule; Refill: 0    2. Irritable bowel syndrome with both constipation and " diarrhea  -     dicyclomine (Bentyl) 10 MG capsule; Take 1 capsule by mouth 4 (Four) Times a Day Before Meals & at Bedtime.  Dispense: 120 capsule; Refill: 0  -     Ambulatory Referral to Gastroenterology        Follow Up   Return as needed, for Next scheduled follow up.  Patient was given instructions and counseling regarding her condition or for health maintenance advice. Please see specific information pulled into the AVS if appropriate.

## 2022-03-04 NOTE — PATIENT INSTRUCTIONS
"Diet for Irritable Bowel Syndrome  When you have irritable bowel syndrome (IBS), it is very important to eat the foods and follow the eating habits that are best for your condition. IBS may cause various symptoms such as pain in the abdomen, constipation, or diarrhea. Choosing the right foods can help to ease the discomfort from these symptoms. Work with your health care provider and diet and nutrition specialist (dietitian) to find the eating plan that will help to control your symptoms.  What are tips for following this plan?         · Keep a food diary. This will help you identify foods that cause symptoms. Write down:  ? What you eat and when you eat it.  ? What symptoms you have.  ? When symptoms occur in relation to your meals, such as \"pain in abdomen 2 hours after dinner.\"  · Eat your meals slowly and in a relaxed setting.  · Aim to eat 5-6 small meals per day. Do not skip meals.  · Drink enough fluid to keep your urine pale yellow.  · Ask your health care provider if you should take an over-the-counter probiotic to help restore healthy bacteria in your gut (digestive tract).  ? Probiotics are foods that contain good bacteria and yeasts.  · Your dietitian may have specific dietary recommendations for you based on your symptoms. He or she may recommend that you:  ? Avoid foods that cause symptoms. Talk with your dietitian about other ways to get the same nutrients that are in those problem foods.  ? Avoid foods with gluten. Gluten is a protein that is found in rye, wheat, and barley.  ? Eat more foods that contain soluble fiber. Examples of foods with high soluble fiber include oats, seeds, and certain fruits and vegetables. Take a fiber supplement if directed by your dietitian.  ? Reduce or avoid certain foods called FODMAPs. These are foods that contain carbohydrates that are hard to digest. Ask your doctor which foods contain these carbohydrates.  What foods are not recommended?  The following are some " foods and drinks that may make your symptoms worse:  · Fatty foods, such as french fries.  · Foods that contain gluten, such as pasta and cereal.  · Dairy products, such as milk, cheese, and ice cream.  · Chocolate.  · Alcohol.  · Products with caffeine, such as coffee.  · Carbonated drinks, such as soda.  · Foods that are high in FODMAPs. These include certain fruits and vegetables.  · Products with sweeteners such as honey, high fructose corn syrup, sorbitol, and mannitol.  The items listed above may not be a complete list of foods and beverages you should avoid. Contact a dietitian for more information.  What foods are good sources of fiber?  Your health care provider or dietitian may recommend that you eat more foods that contain fiber. Fiber can help to reduce constipation and other IBS symptoms. Add foods with fiber to your diet a little at a time so your body can get used to them. Too much fiber at one time might cause gas and swelling of your abdomen. The following are some foods that are good sources of fiber:  · Berries, such as raspberries, strawberries, and blueberries.  · Tomatoes.  · Carrots.  · Brown rice.  · Oats.  · Seeds, such as nata and pumpkin seeds.  The items listed above may not be a complete list of recommended sources of fiber. Contact your dietitian for more options.  Where to find more information  · International Foundation for Functional Gastrointestinal Disorders: www.iffgd.org  · National [place] of Diabetes and Digestive and Kidney Diseases: www.niddk.nih.gov  Summary  · When you have irritable bowel syndrome (IBS), it is very important to eat the foods and follow the eating habits that are best for your condition.  · IBS may cause various symptoms such as pain in the abdomen, constipation, or diarrhea.  · Choosing the right foods can help to ease the discomfort that comes from symptoms.  · Keep a food diary. This will help you identify foods that cause symptoms.  · Your health  care provider or diet and nutrition specialist (dietitian) may recommend that you eat more foods that contain fiber.  This information is not intended to replace advice given to you by your health care provider. Make sure you discuss any questions you have with your health care provider.  Document Revised: 04/08/2020 Document Reviewed: 08/21/2018  [employer] Patient Education © 2021 [employer]

## 2022-03-08 LAB — FUNGUS WND CULT: NORMAL

## 2022-03-22 LAB
MYCOBACTERIUM SPEC CULT: NORMAL
NIGHT BLUE STAIN TISS: NORMAL

## 2022-03-31 ENCOUNTER — APPOINTMENT (OUTPATIENT)
Dept: GENERAL RADIOLOGY | Facility: HOSPITAL | Age: 52
End: 2022-03-31

## 2022-03-31 ENCOUNTER — HOSPITAL ENCOUNTER (EMERGENCY)
Facility: HOSPITAL | Age: 52
Discharge: HOME | End: 2022-03-31
Attending: EMERGENCY MEDICINE | Admitting: EMERGENCY MEDICINE

## 2022-03-31 VITALS
HEART RATE: 82 BPM | RESPIRATION RATE: 18 BRPM | OXYGEN SATURATION: 95 % | WEIGHT: 260.14 LBS | SYSTOLIC BLOOD PRESSURE: 130 MMHG | HEIGHT: 65 IN | BODY MASS INDEX: 43.34 KG/M2 | DIASTOLIC BLOOD PRESSURE: 57 MMHG | TEMPERATURE: 98.4 F

## 2022-03-31 DIAGNOSIS — J20.9 ACUTE BRONCHITIS, UNSPECIFIED ORGANISM: Primary | ICD-10-CM

## 2022-03-31 DIAGNOSIS — J98.01 BRONCHOSPASM: ICD-10-CM

## 2022-03-31 LAB — SARS-COV-2 RNA PNL SPEC NAA+PROBE: NOT DETECTED

## 2022-03-31 PROCEDURE — 87635 SARS-COV-2 COVID-19 AMP PRB: CPT | Performed by: EMERGENCY MEDICINE

## 2022-03-31 PROCEDURE — 71045 X-RAY EXAM CHEST 1 VIEW: CPT

## 2022-03-31 PROCEDURE — 94799 UNLISTED PULMONARY SVC/PX: CPT

## 2022-03-31 PROCEDURE — 94640 AIRWAY INHALATION TREATMENT: CPT

## 2022-03-31 PROCEDURE — 93005 ELECTROCARDIOGRAM TRACING: CPT | Performed by: EMERGENCY MEDICINE

## 2022-03-31 PROCEDURE — 99283 EMERGENCY DEPT VISIT LOW MDM: CPT

## 2022-03-31 PROCEDURE — 63710000001 PREDNISONE PER 5 MG: Performed by: EMERGENCY MEDICINE

## 2022-03-31 PROCEDURE — 93005 ELECTROCARDIOGRAM TRACING: CPT

## 2022-03-31 RX ORDER — DOXYCYCLINE 100 MG/1
100 TABLET ORAL ONCE
Status: COMPLETED | OUTPATIENT
Start: 2022-03-31 | End: 2022-03-31

## 2022-03-31 RX ORDER — ALBUTEROL SULFATE 2.5 MG/3ML
2.5 SOLUTION RESPIRATORY (INHALATION) ONCE
Status: COMPLETED | OUTPATIENT
Start: 2022-03-31 | End: 2022-03-31

## 2022-03-31 RX ORDER — DOXYCYCLINE 100 MG/1
100 CAPSULE ORAL 2 TIMES DAILY
Qty: 14 CAPSULE | Refills: 0 | Status: SHIPPED | OUTPATIENT
Start: 2022-03-31 | End: 2022-04-06 | Stop reason: HOSPADM

## 2022-03-31 RX ORDER — PREDNISONE 50 MG/1
50 TABLET ORAL DAILY
Qty: 4 TABLET | Refills: 0 | Status: SHIPPED | OUTPATIENT
Start: 2022-03-31 | End: 2022-04-06 | Stop reason: HOSPADM

## 2022-03-31 RX ORDER — ALBUTEROL SULFATE 90 UG/1
2 AEROSOL, METERED RESPIRATORY (INHALATION) EVERY 4 HOURS PRN
Qty: 8 G | Refills: 0 | Status: SHIPPED | OUTPATIENT
Start: 2022-03-31 | End: 2023-02-17 | Stop reason: SDUPTHER

## 2022-03-31 RX ADMIN — ALBUTEROL SULFATE 2.5 MG: 2.5 SOLUTION RESPIRATORY (INHALATION) at 19:15

## 2022-03-31 RX ADMIN — DOXYCYCLINE 100 MG: 100 TABLET ORAL at 21:09

## 2022-03-31 RX ADMIN — PREDNISONE 60 MG: 10 TABLET ORAL at 21:11

## 2022-04-01 LAB — QT INTERVAL: 386 MS

## 2022-04-01 NOTE — DISCHARGE INSTRUCTIONS
Rest, drink plenty of fluids, use albuterol inhaler for wheezing, start prednisone and doxycycline.  Follow-up with your doctor in 1 to 2 weeks for repeat chest x-ray to rule out nodule.  Return for increased shortness of breath, high fever, vomiting or any other concerns

## 2022-04-05 ENCOUNTER — APPOINTMENT (OUTPATIENT)
Dept: CT IMAGING | Facility: HOSPITAL | Age: 52
End: 2022-04-05

## 2022-04-05 ENCOUNTER — HOSPITAL ENCOUNTER (OUTPATIENT)
Facility: HOSPITAL | Age: 52
Setting detail: OBSERVATION
Discharge: HOME | End: 2022-04-06
Attending: EMERGENCY MEDICINE | Admitting: EMERGENCY MEDICINE

## 2022-04-05 DIAGNOSIS — J98.01 BRONCHOSPASM: ICD-10-CM

## 2022-04-05 DIAGNOSIS — R06.02 SHORTNESS OF BREATH: Primary | ICD-10-CM

## 2022-04-05 DIAGNOSIS — J10.1 INFLUENZA A: ICD-10-CM

## 2022-04-05 PROBLEM — R06.00 DYSPNEA: Status: ACTIVE | Noted: 2022-04-05

## 2022-04-05 LAB
ANION GAP SERPL CALCULATED.3IONS-SCNC: 13 MMOL/L (ref 5–15)
B PARAPERT DNA SPEC QL NAA+PROBE: NOT DETECTED
B PERT DNA SPEC QL NAA+PROBE: NOT DETECTED
BASOPHILS # BLD AUTO: 0 10*3/MM3 (ref 0–0.2)
BASOPHILS NFR BLD AUTO: 0.4 % (ref 0–1.5)
BUN SERPL-MCNC: 13 MG/DL (ref 6–20)
BUN/CREAT SERPL: 17.1 (ref 7–25)
C PNEUM DNA NPH QL NAA+NON-PROBE: NOT DETECTED
CALCIUM SPEC-SCNC: 9.3 MG/DL (ref 8.6–10.5)
CHLORIDE SERPL-SCNC: 103 MMOL/L (ref 98–107)
CO2 SERPL-SCNC: 23 MMOL/L (ref 22–29)
CREAT SERPL-MCNC: 0.76 MG/DL (ref 0.57–1)
DEPRECATED RDW RBC AUTO: 47.3 FL (ref 37–54)
EGFRCR SERPLBLD CKD-EPI 2021: 95 ML/MIN/1.73
EOSINOPHIL # BLD AUTO: 0 10*3/MM3 (ref 0–0.4)
EOSINOPHIL NFR BLD AUTO: 0.1 % (ref 0.3–6.2)
ERYTHROCYTE [DISTWIDTH] IN BLOOD BY AUTOMATED COUNT: 17 % (ref 12.3–15.4)
FLUAV H3 RNA NPH QL NAA+PROBE: DETECTED
FLUBV RNA ISLT QL NAA+PROBE: NOT DETECTED
GLUCOSE SERPL-MCNC: 95 MG/DL (ref 65–99)
HADV DNA SPEC NAA+PROBE: NOT DETECTED
HCOV 229E RNA SPEC QL NAA+PROBE: NOT DETECTED
HCOV HKU1 RNA SPEC QL NAA+PROBE: NOT DETECTED
HCOV NL63 RNA SPEC QL NAA+PROBE: NOT DETECTED
HCOV OC43 RNA SPEC QL NAA+PROBE: NOT DETECTED
HCT VFR BLD AUTO: 34.6 % (ref 34–46.6)
HGB BLD-MCNC: 12 G/DL (ref 12–15.9)
HMPV RNA NPH QL NAA+NON-PROBE: NOT DETECTED
HPIV1 RNA ISLT QL NAA+PROBE: NOT DETECTED
HPIV2 RNA SPEC QL NAA+PROBE: NOT DETECTED
HPIV3 RNA NPH QL NAA+PROBE: NOT DETECTED
HPIV4 P GENE NPH QL NAA+PROBE: NOT DETECTED
LYMPHOCYTES # BLD AUTO: 2.6 10*3/MM3 (ref 0.7–3.1)
LYMPHOCYTES NFR BLD AUTO: 27.8 % (ref 19.6–45.3)
M PNEUMO IGG SER IA-ACNC: NOT DETECTED
MCH RBC QN AUTO: 27.5 PG (ref 26.6–33)
MCHC RBC AUTO-ENTMCNC: 34.5 G/DL (ref 31.5–35.7)
MCV RBC AUTO: 79.5 FL (ref 79–97)
MONOCYTES # BLD AUTO: 0.9 10*3/MM3 (ref 0.1–0.9)
MONOCYTES NFR BLD AUTO: 10.2 % (ref 5–12)
NEUTROPHILS NFR BLD AUTO: 5.7 10*3/MM3 (ref 1.7–7)
NEUTROPHILS NFR BLD AUTO: 61.5 % (ref 42.7–76)
NRBC BLD AUTO-RTO: 0.4 /100 WBC (ref 0–0.2)
PLATELET # BLD AUTO: 232 10*3/MM3 (ref 140–450)
PMV BLD AUTO: 7.7 FL (ref 6–12)
POTASSIUM SERPL-SCNC: 3.4 MMOL/L (ref 3.5–5.2)
RBC # BLD AUTO: 4.36 10*6/MM3 (ref 3.77–5.28)
RHINOVIRUS RNA SPEC NAA+PROBE: NOT DETECTED
RSV RNA NPH QL NAA+NON-PROBE: NOT DETECTED
SARS-COV-2 RNA NPH QL NAA+NON-PROBE: NOT DETECTED
SODIUM SERPL-SCNC: 139 MMOL/L (ref 136–145)
TROPONIN T SERPL-MCNC: <0.01 NG/ML (ref 0–0.03)
WBC NRBC COR # BLD: 9.3 10*3/MM3 (ref 3.4–10.8)

## 2022-04-05 PROCEDURE — G0378 HOSPITAL OBSERVATION PER HR: HCPCS

## 2022-04-05 PROCEDURE — 0 IOPAMIDOL PER 1 ML: Performed by: EMERGENCY MEDICINE

## 2022-04-05 PROCEDURE — 94799 UNLISTED PULMONARY SVC/PX: CPT

## 2022-04-05 PROCEDURE — 25010000002 ENOXAPARIN PER 10 MG: Performed by: NURSE PRACTITIONER

## 2022-04-05 PROCEDURE — 0202U NFCT DS 22 TRGT SARS-COV-2: CPT | Performed by: EMERGENCY MEDICINE

## 2022-04-05 PROCEDURE — 71275 CT ANGIOGRAPHY CHEST: CPT

## 2022-04-05 PROCEDURE — 96361 HYDRATE IV INFUSION ADD-ON: CPT

## 2022-04-05 PROCEDURE — 63710000001 PREDNISONE PER 5 MG: Performed by: NURSE PRACTITIONER

## 2022-04-05 PROCEDURE — 84484 ASSAY OF TROPONIN QUANT: CPT | Performed by: NURSE PRACTITIONER

## 2022-04-05 PROCEDURE — 96365 THER/PROPH/DIAG IV INF INIT: CPT

## 2022-04-05 PROCEDURE — 99284 EMERGENCY DEPT VISIT MOD MDM: CPT

## 2022-04-05 PROCEDURE — 25010000002 METHYLPREDNISOLONE PER 125 MG: Performed by: EMERGENCY MEDICINE

## 2022-04-05 PROCEDURE — 85025 COMPLETE CBC W/AUTO DIFF WBC: CPT | Performed by: EMERGENCY MEDICINE

## 2022-04-05 PROCEDURE — 96366 THER/PROPH/DIAG IV INF ADDON: CPT

## 2022-04-05 PROCEDURE — 96372 THER/PROPH/DIAG INJ SC/IM: CPT

## 2022-04-05 PROCEDURE — 96375 TX/PRO/DX INJ NEW DRUG ADDON: CPT

## 2022-04-05 PROCEDURE — 80048 BASIC METABOLIC PNL TOTAL CA: CPT | Performed by: EMERGENCY MEDICINE

## 2022-04-05 PROCEDURE — 94640 AIRWAY INHALATION TREATMENT: CPT

## 2022-04-05 PROCEDURE — 25010000002 MAGNESIUM SULFATE 2 GM/50ML SOLUTION: Performed by: EMERGENCY MEDICINE

## 2022-04-05 RX ORDER — DICYCLOMINE HYDROCHLORIDE 10 MG/1
10 CAPSULE ORAL
Status: DISCONTINUED | OUTPATIENT
Start: 2022-04-05 | End: 2022-04-06 | Stop reason: HOSPADM

## 2022-04-05 RX ORDER — VENLAFAXINE HYDROCHLORIDE 75 MG/1
150 CAPSULE, EXTENDED RELEASE ORAL 2 TIMES DAILY
Status: DISCONTINUED | OUTPATIENT
Start: 2022-04-05 | End: 2022-04-06 | Stop reason: HOSPADM

## 2022-04-05 RX ORDER — POTASSIUM CHLORIDE 20 MEQ/1
40 TABLET, EXTENDED RELEASE ORAL ONCE
Status: COMPLETED | OUTPATIENT
Start: 2022-04-05 | End: 2022-04-05

## 2022-04-05 RX ORDER — SODIUM CHLORIDE, SODIUM LACTATE, POTASSIUM CHLORIDE, CALCIUM CHLORIDE 600; 310; 30; 20 MG/100ML; MG/100ML; MG/100ML; MG/100ML
125 INJECTION, SOLUTION INTRAVENOUS CONTINUOUS
Status: DISCONTINUED | OUTPATIENT
Start: 2022-04-05 | End: 2022-04-06 | Stop reason: HOSPADM

## 2022-04-05 RX ORDER — ARIPIPRAZOLE 5 MG/1
20 TABLET ORAL NIGHTLY
Status: DISCONTINUED | OUTPATIENT
Start: 2022-04-05 | End: 2022-04-06 | Stop reason: HOSPADM

## 2022-04-05 RX ORDER — ACETAMINOPHEN 160 MG/5ML
650 SOLUTION ORAL EVERY 4 HOURS PRN
Status: DISCONTINUED | OUTPATIENT
Start: 2022-04-05 | End: 2022-04-06 | Stop reason: HOSPADM

## 2022-04-05 RX ORDER — IPRATROPIUM BROMIDE AND ALBUTEROL SULFATE 2.5; .5 MG/3ML; MG/3ML
3 SOLUTION RESPIRATORY (INHALATION) ONCE
Status: COMPLETED | OUTPATIENT
Start: 2022-04-05 | End: 2022-04-05

## 2022-04-05 RX ORDER — SODIUM CHLORIDE 0.9 % (FLUSH) 0.9 %
10 SYRINGE (ML) INJECTION EVERY 12 HOURS SCHEDULED
Status: DISCONTINUED | OUTPATIENT
Start: 2022-04-05 | End: 2022-04-06 | Stop reason: HOSPADM

## 2022-04-05 RX ORDER — ONDANSETRON 4 MG/1
4 TABLET, FILM COATED ORAL EVERY 6 HOURS PRN
Status: DISCONTINUED | OUTPATIENT
Start: 2022-04-05 | End: 2022-04-06 | Stop reason: HOSPADM

## 2022-04-05 RX ORDER — BENZONATATE 100 MG/1
100 CAPSULE ORAL EVERY 4 HOURS PRN
Status: DISCONTINUED | OUTPATIENT
Start: 2022-04-05 | End: 2022-04-06 | Stop reason: HOSPADM

## 2022-04-05 RX ORDER — ONDANSETRON 2 MG/ML
4 INJECTION INTRAMUSCULAR; INTRAVENOUS EVERY 6 HOURS PRN
Status: DISCONTINUED | OUTPATIENT
Start: 2022-04-05 | End: 2022-04-06 | Stop reason: HOSPADM

## 2022-04-05 RX ORDER — MAGNESIUM SULFATE HEPTAHYDRATE 40 MG/ML
2 INJECTION, SOLUTION INTRAVENOUS ONCE
Status: COMPLETED | OUTPATIENT
Start: 2022-04-05 | End: 2022-04-05

## 2022-04-05 RX ORDER — ACETAMINOPHEN 325 MG/1
650 TABLET ORAL EVERY 4 HOURS PRN
Status: DISCONTINUED | OUTPATIENT
Start: 2022-04-05 | End: 2022-04-06 | Stop reason: HOSPADM

## 2022-04-05 RX ORDER — ATORVASTATIN CALCIUM 20 MG/1
20 TABLET, FILM COATED ORAL NIGHTLY
Status: DISCONTINUED | OUTPATIENT
Start: 2022-04-05 | End: 2022-04-06 | Stop reason: HOSPADM

## 2022-04-05 RX ORDER — METHYLPREDNISOLONE SODIUM SUCCINATE 125 MG/2ML
60 INJECTION, POWDER, LYOPHILIZED, FOR SOLUTION INTRAMUSCULAR; INTRAVENOUS ONCE
Status: COMPLETED | OUTPATIENT
Start: 2022-04-05 | End: 2022-04-05

## 2022-04-05 RX ORDER — LISINOPRIL 5 MG/1
5 TABLET ORAL DAILY
Status: DISCONTINUED | OUTPATIENT
Start: 2022-04-05 | End: 2022-04-06 | Stop reason: HOSPADM

## 2022-04-05 RX ORDER — IPRATROPIUM BROMIDE AND ALBUTEROL SULFATE 2.5; .5 MG/3ML; MG/3ML
3 SOLUTION RESPIRATORY (INHALATION)
Status: DISCONTINUED | OUTPATIENT
Start: 2022-04-05 | End: 2022-04-06 | Stop reason: HOSPADM

## 2022-04-05 RX ORDER — ACETAMINOPHEN 650 MG/1
650 SUPPOSITORY RECTAL EVERY 4 HOURS PRN
Status: DISCONTINUED | OUTPATIENT
Start: 2022-04-05 | End: 2022-04-06 | Stop reason: HOSPADM

## 2022-04-05 RX ORDER — SODIUM CHLORIDE 0.9 % (FLUSH) 0.9 %
10 SYRINGE (ML) INJECTION AS NEEDED
Status: DISCONTINUED | OUTPATIENT
Start: 2022-04-05 | End: 2022-04-06 | Stop reason: HOSPADM

## 2022-04-05 RX ORDER — ALBUTEROL SULFATE 2.5 MG/3ML
2.5 SOLUTION RESPIRATORY (INHALATION) ONCE
Status: COMPLETED | OUTPATIENT
Start: 2022-04-05 | End: 2022-04-05

## 2022-04-05 RX ORDER — CELECOXIB 100 MG/1
100 CAPSULE ORAL 2 TIMES DAILY PRN
Status: DISCONTINUED | OUTPATIENT
Start: 2022-04-05 | End: 2022-04-06 | Stop reason: HOSPADM

## 2022-04-05 RX ORDER — IPRATROPIUM BROMIDE AND ALBUTEROL SULFATE 2.5; .5 MG/3ML; MG/3ML
3 SOLUTION RESPIRATORY (INHALATION) EVERY 4 HOURS PRN
Status: DISCONTINUED | OUTPATIENT
Start: 2022-04-05 | End: 2022-04-06 | Stop reason: HOSPADM

## 2022-04-05 RX ORDER — PREDNISONE 50 MG/1
50 TABLET ORAL DAILY
Status: DISCONTINUED | OUTPATIENT
Start: 2022-04-05 | End: 2022-04-06 | Stop reason: HOSPADM

## 2022-04-05 RX ADMIN — DICYCLOMINE HYDROCHLORIDE 10 MG: 10 CAPSULE ORAL at 20:42

## 2022-04-05 RX ADMIN — ENOXAPARIN SODIUM 40 MG: 40 INJECTION SUBCUTANEOUS at 13:05

## 2022-04-05 RX ADMIN — SODIUM CHLORIDE, POTASSIUM CHLORIDE, SODIUM LACTATE AND CALCIUM CHLORIDE 125 ML/HR: 600; 310; 30; 20 INJECTION, SOLUTION INTRAVENOUS at 17:37

## 2022-04-05 RX ADMIN — VENLAFAXINE HYDROCHLORIDE 150 MG: 75 CAPSULE, EXTENDED RELEASE ORAL at 20:42

## 2022-04-05 RX ADMIN — IPRATROPIUM BROMIDE AND ALBUTEROL SULFATE 3 ML: .5; 3 SOLUTION RESPIRATORY (INHALATION) at 05:47

## 2022-04-05 RX ADMIN — ALBUTEROL SULFATE 2.5 MG: 2.5 SOLUTION RESPIRATORY (INHALATION) at 07:37

## 2022-04-05 RX ADMIN — ARIPIPRAZOLE 20 MG: 5 TABLET ORAL at 20:42

## 2022-04-05 RX ADMIN — ACETAMINOPHEN 650 MG: 325 TABLET ORAL at 13:05

## 2022-04-05 RX ADMIN — METHYLPREDNISOLONE SODIUM SUCCINATE 60 MG: 125 INJECTION, POWDER, FOR SOLUTION INTRAMUSCULAR; INTRAVENOUS at 06:25

## 2022-04-05 RX ADMIN — MAGNESIUM SULFATE HEPTAHYDRATE 2 G: 2 INJECTION, SOLUTION INTRAVENOUS at 08:19

## 2022-04-05 RX ADMIN — PREDNISONE 50 MG: 50 TABLET ORAL at 16:40

## 2022-04-05 RX ADMIN — ATORVASTATIN CALCIUM 20 MG: 20 TABLET, FILM COATED ORAL at 20:42

## 2022-04-05 RX ADMIN — DICYCLOMINE HYDROCHLORIDE 10 MG: 10 CAPSULE ORAL at 16:40

## 2022-04-05 RX ADMIN — IPRATROPIUM BROMIDE AND ALBUTEROL SULFATE 3 ML: .5; 3 SOLUTION RESPIRATORY (INHALATION) at 14:55

## 2022-04-05 RX ADMIN — IOPAMIDOL 100 ML: 755 INJECTION, SOLUTION INTRAVENOUS at 07:02

## 2022-04-05 RX ADMIN — SODIUM CHLORIDE, POTASSIUM CHLORIDE, SODIUM LACTATE AND CALCIUM CHLORIDE 125 ML/HR: 600; 310; 30; 20 INJECTION, SOLUTION INTRAVENOUS at 06:25

## 2022-04-05 RX ADMIN — POTASSIUM CHLORIDE 40 MEQ: 20 TABLET, EXTENDED RELEASE ORAL at 08:16

## 2022-04-05 NOTE — ED NOTES
Pt was seen here 6 days ago for a cough. Pt states the cough has continued and has not gotten better.

## 2022-04-05 NOTE — PLAN OF CARE
Goal Outcome Evaluation:  Pt sts she is feeling better today.  Vitals WNL.  Pt eating and drinking without difficulty.  Will continue to monitor.         Progress: improving

## 2022-04-05 NOTE — H&P
[facility] H&P    Patient Name: Tessie Conner  : 1970  MRN: [id number]  Primary Care Physician: Marsha Lopez MD  Date of admission: 2022     Patient Care Team:  Marsha Lopez MD as PCP - General (Internal Medicine)  Thomas Zamora MD as Consulting Physician (Cardiology)          Subjective   History Present Illness     Chief Complaint:   Chief Complaint   Patient presents with   • Cough         Ms. Conner is a 52 y.o.  presents to [facility] complaining of persistent cough with shortness of breath.      52 year old female who presents to the ER with a chief complaint of worsening non productive cough and shortness of breath with exertion. The patient denies subjective fever or chills. She does report sore throat and post nasal drainage. She is on steroids and doxycycline for recent dx of bronchitis but has not had significant improvement of her symptoms. She had recent VATS with biopsy showing inframammilary changes, no malignancy.    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  Heart cath dated 2020 showing primarily normal epicardial coronary anatomy suggestive of microvascular dysfunction as well as small distal vessel size.  Maximize nitrates, ACE inhibitor, sleep study, cardiac ambulatory monitoring.        Review of Systems   HENT: Positive for congestion and sore throat.    Respiratory: Positive for cough and shortness of breath. Negative for sputum production.    All other systems reviewed and are negative.          Personal History     Past Medical History:   Past Medical History:   Diagnosis Date   • Anxiety    • Arthritis    • Asthma 2020   • At risk for sleep apnea    • Bipolar 1  disorder (HCC)    • Depression    • Dyspnea on minimal exertion    • Frequent UTI    • Heart murmur     FROM CHILDHOOD   • History of anemia     POST PREGNANCY   • Hyperlipidemia 10/04/2016   • Hypertension    • Mass of upper lobe of right lung    • Migraines    • Obesity    • PONV (postoperative nausea and vomiting)    • Spinal headache    • Visual impairment     WEARS GLASSES       Surgical History:      Past Surgical History:   Procedure Laterality Date   • APPENDECTOMY     • CARDIAC CATHETERIZATION N/A 2020    Procedure: Left Heart Cath possible PCI, atherectomy, hemodynamic support;  Surgeon: Thomas Zamora MD;  Location: [facility] INVASIVE LOCATION;  Service: Cardiology;  Laterality: N/A;   •  SECTION     • FOOT/TOE TENDON REPAIR Left    • HYSTERECTOMY     • LUNG BIOPSY Right 2020   • LUNG LOBECTOMY Right 2022    pt had partial Right lobectomy and nodule removal   • ROTATOR CUFF REPAIR Left    • THORACOSCOPY VIDEO ASSISTED WITH LOBECTOMY Right 2022    Procedure: BRONCHOSCOPY, THORACOSCOPY VIDEO ASSISTED wedge resection X2, INTERCOSTAL NERVE BLOCK;  Surgeon: Ayla Carroll MD;  Location: [facility] OR;  Service: Thoracic;  Laterality: Right;   • TUBAL ABDOMINAL LIGATION             Family History: family history includes Alcohol abuse in her father; Anxiety disorder in her mother; Arthritis in her mother; Cancer in her maternal grandmother and mother; Depression in her mother; Diabetes in her father and mother; Drug abuse in her brother and sister; Early death in her father and mother; Heart disease in her brother, father, and sister; Hyperlipidemia in her father; Hypertension in her brother and father; Mental illness in her mother; Vision loss in her father. Otherwise pertinent FHx was reviewed and unremarkable.     Social History:  reports that she has never smoked. She has never used smokeless tobacco. She reports current alcohol use. She  reports that she does not use drugs.      Medications:  Prior to Admission medications    Medication Sig Start Date End Date Taking? Authorizing Provider   albuterol sulfate  (90 Base) MCG/ACT inhaler Inhale 2 puffs Every 4 (Four) Hours As Needed for Wheezing. 3/31/22  Yes Mauro Mendiola MD   ARIPiprazole (ABILIFY) 20 MG tablet Take 20 mg by mouth Every Night. 2/24/21  Yes Marko Abdullahi MD   atorvastatin (Lipitor) 20 MG tablet Take 1 tablet by mouth Daily.  Patient taking differently: Take 20 mg by mouth Every Night. 11/18/21  Yes Marsha Lopez MD   celecoxib (CeleBREX) 100 MG capsule Take 100 mg by mouth 2 (Two) Times a Day As Needed for Mild Pain .   Yes Marko Abdullahi MD   dicyclomine (Bentyl) 10 MG capsule Take 1 capsule by mouth 4 (Four) Times a Day Before Meals & at Bedtime. 3/4/22  Yes Taya eKndall APRN   doxycycline (MONODOX) 100 MG capsule Take 1 capsule by mouth 2 (Two) Times a Day. 3/31/22  Yes Mauro Mendiola MD   lisinopril (PRINIVIL,ZESTRIL) 5 MG tablet Take 1 tablet by mouth Daily for 180 days. 2/14/22 8/13/22 Yes Marsha Lopez MD   predniSONE (DELTASONE) 50 MG tablet Take 1 tablet by mouth Daily. 3/31/22  Yes Mauro Mendiola MD   venlafaxine XR (EFFEXOR-XR) 150 MG 24 hr capsule Take 150 mg by mouth 2 (Two) Times a Day.   Yes Marko Abdullahi MD   ALPRAZolam (Xanax) 0.5 MG tablet Take 1 tablet by mouth 2 (Two) Times a Day As Needed for Anxiety. 11/30/21 4/5/22  Marsha Lopez MD   gabapentin (NEURONTIN) 100 MG capsule Take 100 mg by mouth 3 (Three) Times a Day.  4/5/22  Marko Abdullahi MD       Allergies:    Allergies   Allergen Reactions   • Percocet [Oxycodone-Acetaminophen] Mental Status Change       Objective   Objective     Vital Signs  Temp:  [97.6 °F (36.4 °C)-98 °F (36.7 °C)] 97.8 °F (36.6 °C)  Heart Rate:  [67-90] 67  Resp:  [16-20] 16  BP: ()/(51-77) 113/72  SpO2:  [92 %-100 %] 100 %  on   ;   Device (Oxygen  Therapy): room air  Body mass index is 47.45 kg/m².    Physical Exam  Vitals and nursing note reviewed.   Constitutional:       Appearance: Normal appearance. She is not toxic-appearing.   HENT:      Head: Normocephalic and atraumatic.      Right Ear: External ear normal.      Left Ear: External ear normal.      Nose: Congestion present. No rhinorrhea.      Mouth/Throat:      Mouth: Mucous membranes are moist.   Eyes:      General: No scleral icterus.        Right eye: No discharge.      Extraocular Movements: Extraocular movements intact.      Conjunctiva/sclera: Conjunctivae normal.      Pupils: Pupils are equal, round, and reactive to light.   Cardiovascular:      Rate and Rhythm: Normal rate and regular rhythm.      Pulses: Normal pulses.      Heart sounds: Normal heart sounds. No murmur heard.  Pulmonary:      Effort: Pulmonary effort is normal. No respiratory distress.      Breath sounds: Normal breath sounds. No rhonchi.   Chest:      Chest wall: Tenderness present.   Abdominal:      General: Bowel sounds are normal.      Palpations: Abdomen is soft.   Musculoskeletal:         General: Normal range of motion.      Cervical back: Normal range of motion and neck supple.      Right lower leg: No edema.      Left lower leg: No edema.   Skin:     General: Skin is warm and dry.      Capillary Refill: Capillary refill takes less than 2 seconds.   Neurological:      General: No focal deficit present.      Mental Status: She is alert and oriented to person, place, and time.   Psychiatric:         Mood and Affect: Mood normal.         Behavior: Behavior normal.         Thought Content: Thought content normal.         Judgment: Judgment normal.           Results Review:  I have personally reviewed most recent cardiac tracings, lab results, microbiology results and radiology images and interpretations and agree with findings.    Results from last 7 days   Lab Units 04/05/22  0614   WBC 10*3/mm3 9.30   HEMOGLOBIN g/dL  12.0   HEMATOCRIT % 34.6   PLATELETS 10*3/mm3 232     Results from last 7 days   Lab Units 04/05/22  1432 04/05/22  0614   SODIUM mmol/L  --  139   POTASSIUM mmol/L  --  3.4*   CHLORIDE mmol/L  --  103   CO2 mmol/L  --  23.0   BUN mg/dL  --  13   CREATININE mg/dL  --  0.76   GLUCOSE mg/dL  --  95   CALCIUM mg/dL  --  9.3   TROPONIN T ng/mL <0.010  --      Estimated Creatinine Clearance: 114.5 mL/min (by C-G formula based on SCr of 0.76 mg/dL).  Brief Urine Lab Results  (Last result in the past 365 days)      Color   Clarity   Blood   Leuk Est   Nitrite   Protein   CREAT   Urine HCG        11/08/21 0015 Yellow   Clear   Negative   Negative   Negative   Negative                 Microbiology Results (last 10 days)     Procedure Component Value - Date/Time    Respiratory Panel PCR w/COVID-19(SARS-CoV-2) URVASHI/GUI/DICK/PAD/COR/MAD/CLARI In-House, NP Swab in UTM/VTM, 3-4 HR TAT - Swab, Nasopharynx [[id number]]  (Abnormal) Collected: 04/05/22 0541    Lab Status: Final result Specimen: Swab from Nasopharynx Updated: 04/05/22 0712     ADENOVIRUS, PCR Not Detected     Coronavirus 229E Not Detected     Coronavirus HKU1 Not Detected     Coronavirus NL63 Not Detected     Coronavirus OC43 Not Detected     COVID19 Not Detected     Human Metapneumovirus Not Detected     Human Rhinovirus/Enterovirus Not Detected     Influenza A H3 Detected     Influenza B PCR Not Detected     Parainfluenza Virus 1 Not Detected     Parainfluenza Virus 2 Not Detected     Parainfluenza Virus 3 Not Detected     Parainfluenza Virus 4 Not Detected     RSV, PCR Not Detected     Bordetella pertussis pcr Not Detected     Bordetella parapertussis PCR Not Detected     Chlamydophila pneumoniae PCR Not Detected     Mycoplasma pneumo by PCR Not Detected    Narrative:      In the setting of a positive respiratory panel with a viral infection PLUS a negative procalcitonin without other underlying concern for bacterial infection, consider observing off antibiotics or  discontinuation of antibiotics and continue supportive care. If the respiratory panel is positive for atypical bacterial infection (Bordetella pertussis, Chlamydophila pneumoniae, or Mycoplasma pneumoniae), consider antibiotic de-escalation to target atypical bacterial infection.    COVID-19,CEPHEID/YAA,COR/DICK/PAD/BRIDGER IN-HOUSE(OR EMERGENT/ADD-ON),NP SWAB IN TRANSPORT MEDIA 3-4 HR TAT, RT-PCR - Swab, Nasopharynx [[id number]]  (Normal) Collected: 03/31/22 1910    Lab Status: Final result Specimen: Swab from Nasopharynx Updated: 03/31/22 1942     COVID19 Not Detected    Narrative:      Fact sheet for providers: https://www.fda.gov/media/898583/download     Fact sheet for patients: https://www.fda.gov/media/623809/download  Fact sheet for providers: https://www.fda.gov/media/377101/download    Fact sheet for patients: https://www.fda.gov/media/499980/download    Test performed by PCR.          ECG/EMG Results (most recent)     None              Results for orders placed during the hospital encounter of 09/24/20    Adult Transthoracic Echo Complete W/ Cont if Necessary Per Protocol    Interpretation Summary  · Left ventricular ejection fraction appears to be 56 - 60%. Left ventricular systolic function is normal.  · Estimated right ventricular systolic pressure from tricuspid regurgitation is normal (<35 mmHg).    Normal LV and RV size and function  Normal atrial sizes  No significant valvular abnormality seen  Normal right left-sided pressures estimated  Normal diastolic function by criteria  No masses or effusion seen      XR Chest 1 View    Result Date: 3/31/2022   1. Questionable nodular density/mass in the right suprahilar region measuring 2.2 cm. 2. Otherwise no active disease.  Electronically Signed By-Edwin Rea MD On:3/31/2022 7:51 PM This report was finalized on [id number] by  Edwin Rea MD.    CT Angiogram Chest Pulmonary Embolism    Result Date: 4/5/2022   1. No evidence of pulmonary embolism. No acute  pulmonary abnormality demonstrated 2. Stable postoperative changes in the right upper lobe with atelectasis adjacent to the staple line 3. Stable multiple pulmonary nodules and mildly enlarged right hilar lymph nodes  Electronically Signed By-Darion Medina On:4/5/2022 7:32 AM This report was finalized on [id number] by  Darion Medina, .        Estimated Creatinine Clearance: 114.5 mL/min (by C-G formula based on SCr of 0.76 mg/dL).    Assessment/Plan   Assessment/Plan       Active Hospital Problems    Diagnosis  POA   • Dyspnea [R06.00]  Yes      Resolved Hospital Problems   No resolved problems to display.     Shortness of breath with cough--likley secondary to influenza infection superimposed on asthma with recent right video-assisted thoracotomy wedge resection: Oxygen support as needed; add Tessalon Perles; continue IV steroid; duo nebs 4 times daily and every 4 hours as needed; hold doxycycline; hold oral prednisone     Bipolar disorder, chronic: Continue Abilify; continue Effexor    IBS, chronic: Continue Bentyl    Hypertension, chronic: continue Lisinopril      HLD, chronic: continue atorvastatin l      VTE Prophylaxis -   Mechanical Order History:     None      Pharmalogical Order History:      Ordered     Dose Route Frequency Stop    04/05/22 1105  enoxaparin (LOVENOX) syringe 40 mg         40 mg SC Every 12 Hours --                CODE STATUS:    Code Status and Medical Interventions:   Ordered at: 04/05/22 1105     Code Status (Patient has no pulse and is not breathing):    CPR (Attempt to Resuscitate)     Medical Interventions (Patient has pulse or is breathing):    Full Support       This patient has been examined wearing personal protective equipment.     I discussed the patient's findings and my recommendations with patient and nursing staff.      Signature:Electronically signed by BECKY Bianchi, 04/06/22, 6:04 AM EDT.

## 2022-04-05 NOTE — ED PROVIDER NOTES
Subjective   History of Present Illness  Context: 52-year-old female presents with cough shortness of breath.  She has had symptoms for approximately a week.  She had been seen previously started on prednisone doxycycline and Tessalon Perles.  She has had continued cough since then.  She states minimal sputum production.  She has had some sore throat from coughing.  She has had no fever.  No vomiting no diarrhea.  Location: Chest  Quality: Cough  Duration: 1 week  Timing: Constant  Severity: Significant   Modifying factors: She has just completed prednisone and is finishing doxycycline and on Tessalon Perles  Associated signs and symptoms: VAT in February for right upper lobe resection    Review of Systems   Constitutional: Negative for fever and unexpected weight change.   HENT: Positive for sore throat. Negative for congestion.    Eyes: Negative for pain.   Respiratory: Positive for cough and shortness of breath.    Cardiovascular: Positive for chest pain. Negative for leg swelling.        Chest pain with cough   Gastrointestinal: Negative for abdominal pain, diarrhea and vomiting.   Genitourinary: Negative for dysuria and urgency.   Musculoskeletal: Negative for back pain.   Skin: Negative for rash.   Neurological: Negative for dizziness, weakness and headaches.   Psychiatric/Behavioral: Negative for confusion.       Past Medical History:   Diagnosis Date   • Anxiety 2005   • Arthritis    • Asthma 4/17/2020   • At risk for sleep apnea    • Bipolar 1 disorder (HCC)    • Depression 2005   • Dyspnea on minimal exertion    • Frequent UTI    • Heart murmur     FROM CHILDHOOD   • History of anemia     POST PREGNANCY   • Hyperlipidemia 10/4/2016   • Hypertension    • Mass of upper lobe of right lung    • Migraines    • Obesity 1999   • PONV (postoperative nausea and vomiting)    • Spinal headache    • Visual impairment 2011    WEARS GLASSES       Allergies   Allergen Reactions   • Percocet [Oxycodone-Acetaminophen]  Mental Status Change       Past Surgical History:   Procedure Laterality Date   • APPENDECTOMY     • CARDIAC CATHETERIZATION N/A 2020    Procedure: Left Heart Cath possible PCI, atherectomy, hemodynamic support;  Surgeon: Thomas Zamora MD;  [facility] CATH [facility];  Service: Cardiology;  Laterality: N/A;   •  SECTION     • FOOT/TOE TENDON REPAIR Left    • HYSTERECTOMY     • LUNG BIOPSY Right 2020   • ROTATOR CUFF REPAIR Left    • THORACOSCOPY VIDEO ASSISTED WITH LOBECTOMY Right 2022    Procedure: BRONCHOSCOPY, THORACOSCOPY VIDEO ASSISTED wedge resection X2, INTERCOSTAL NERVE BLOCK;  Surgeon: Ayla Carroll MD;  Location: [facility] MAIN OR;  Service: Thoracic;  Laterality: Right;   • TUBAL ABDOMINAL LIGATION         Family History   Problem Relation Age of Onset   • Arthritis Mother    • Cancer Mother    • Depression Mother    • Diabetes Mother    • Early death Mother    • Mental illness Mother    • Anxiety disorder Mother    • Alcohol abuse Father    • Diabetes Father    • Early death Father    • Heart disease Father    • Hyperlipidemia Father    • Hypertension Father    • Vision loss Father    • Heart disease Sister    • Heart disease Brother    • Hypertension Brother    • Cancer Maternal Grandmother    • Drug abuse Brother    • Drug abuse Sister    • Malig Hyperthermia Neg Hx        Social History     Socioeconomic History   • Marital status:    Tobacco Use   • Smoking status: Never Smoker   • Smokeless tobacco: Never Used   Vaping Use   • Vaping Use: Never used   Substance and Sexual Activity   • Alcohol use: Yes     Comment: VERY RARE   • Drug use: No   • Sexual activity: Not Currently     Partners: Male     Birth control/protection: None           Objective   Physical Exam  52-year-old female awake alert.  Generally well-developed well-nourished.  She is noted to have nonproductive cough.  Does sound somewhat congested.  Pupils equal round at  light.  Oropharynx clear mucous memories moist neck supple chest clear equal breath sounds.  Cardiovascular regular rate and rhythm.  She does report some anterior chest pain with palpation or cough.  Abdomen soft nontender.  Extremities no tenderness or edema.  Neurologic exam Jerry Coma Scale 15.  Skin without rash noted.  Procedures  Patient was given DuoNeb.  Pretreatment peak flow 215 she improved to 250 posttreatment         ED Course      Results for orders placed or performed during the hospital encounter of 04/05/22   Respiratory Panel PCR w/COVID-19(SARS-CoV-2) URVASHI/GUI/DICK/PAD/COR/MAD/CLAIR In-House, NP Swab in UTM/VTM, 3-4 HR TAT - Swab, Nasopharynx    Specimen: Nasopharynx; Swab   Result Value Ref Range    ADENOVIRUS, PCR Not Detected Not Detected    Coronavirus 229E Not Detected Not Detected    Coronavirus HKU1 Not Detected Not Detected    Coronavirus NL63 Not Detected Not Detected    Coronavirus OC43 Not Detected Not Detected    COVID19 Not Detected Not Detected - Ref. Range    Human Metapneumovirus Not Detected Not Detected    Human Rhinovirus/Enterovirus Not Detected Not Detected    Influenza A H3 Detected (A) Not Detected    Influenza B PCR Not Detected Not Detected    Parainfluenza Virus 1 Not Detected Not Detected    Parainfluenza Virus 2 Not Detected Not Detected    Parainfluenza Virus 3 Not Detected Not Detected    Parainfluenza Virus 4 Not Detected Not Detected    RSV, PCR Not Detected Not Detected    Bordetella pertussis pcr Not Detected Not Detected    Bordetella parapertussis PCR Not Detected Not Detected    Chlamydophila pneumoniae PCR Not Detected Not Detected    Mycoplasma pneumo by PCR Not Detected Not Detected   Basic Metabolic Panel    Specimen: Blood   Result Value Ref Range    Glucose 95 65 - 99 mg/dL    BUN 13 6 - 20 mg/dL    Creatinine 0.76 0.57 - 1.00 mg/dL    Sodium 139 136 - 145 mmol/L    Potassium 3.4 (L) 3.5 - 5.2 mmol/L    Chloride 103 98 - 107 mmol/L    CO2 23.0 22.0 - 29.0  mmol/L    Calcium 9.3 8.6 - 10.5 mg/dL    BUN/Creatinine Ratio 17.1 7.0 - 25.0    Anion Gap 13.0 5.0 - 15.0 mmol/L    eGFR 95.0 >60.0 mL/min/1.73   CBC Auto Differential    Specimen: Blood   Result Value Ref Range    WBC 9.30 3.40 - 10.80 10*3/mm3    RBC 4.36 3.77 - 5.28 10*6/mm3    Hemoglobin 12.0 12.0 - 15.9 g/dL    Hematocrit 34.6 34.0 - 46.6 %    MCV 79.5 79.0 - 97.0 fL    MCH 27.5 26.6 - 33.0 pg    MCHC 34.5 31.5 - 35.7 g/dL    RDW 17.0 (H) 12.3 - 15.4 %    RDW-SD 47.3 37.0 - 54.0 fl    MPV 7.7 6.0 - 12.0 fL    Platelets 232 140 - 450 10*3/mm3    Neutrophil % 61.5 42.7 - 76.0 %    Lymphocyte % 27.8 19.6 - 45.3 %    Monocyte % 10.2 5.0 - 12.0 %    Eosinophil % 0.1 (L) 0.3 - 6.2 %    Basophil % 0.4 0.0 - 1.5 %    Neutrophils, Absolute 5.70 1.70 - 7.00 10*3/mm3    Lymphocytes, Absolute 2.60 0.70 - 3.10 10*3/mm3    Monocytes, Absolute 0.90 0.10 - 0.90 10*3/mm3    Eosinophils, Absolute 0.00 0.00 - 0.40 10*3/mm3    Basophils, Absolute 0.00 0.00 - 0.20 10*3/mm3    nRBC 0.4 (H) 0.0 - 0.2 /100 WBC     CT Angiogram Chest Pulmonary Embolism    Result Date: 4/5/2022   1. No evidence of pulmonary embolism. No acute pulmonary abnormality demonstrated 2. Stable postoperative changes in the right upper lobe with atelectasis adjacent to the staple line 3. Stable multiple pulmonary nodules and mildly enlarged right hilar lymph nodes  Electronically Signed By-Darion Medina On:4/5/2022 7:32 AM This report was finalized on [id number] by  Darion Medina, .    Medications   lactated ringers infusion (125 mL/hr Intravenous New Bag 4/5/22 0625)   magnesium sulfate 2g/50 mL (PREMIX) infusion (has no administration in time range)   potassium chloride (K-DUR,KLOR-CON) CR tablet 40 mEq (has no administration in time range)   ipratropium-albuterol (DUO-NEB) nebulizer solution 3 mL (3 mL Nebulization Given 4/5/22 0547)   methylPREDNISolone sodium succinate (SOLU-Medrol) injection 60 mg (60 mg Intravenous Given 4/5/22 0625)   albuterol  "(PROVENTIL) nebulizer solution 0.083% 2.5 mg/3mL (2.5 mg Nebulization Given 4/5/22 0737)   iopamidol (ISOVUE-370) 76 % injection 100 mL (100 mL Intravenous Given 4/5/22 0702)     BP 92/53   Pulse 72   Temp 97.6 °F (36.4 °C) (Oral)   Resp 16   Ht 162.6 cm (64\")   Wt 117 kg (258 lb 9.6 oz)   SpO2 100%   BMI 44.39 kg/m²                                              MDM  Chart review: Patient had right upper lobe resection found to have benign lung tissue with numerous coalescing caseating granuloma.  Rare organisms consistent with histoplasmosis no AFB identified.  Culture also negative  Comorbidity: As per past history  Differential: Bronchospasm, viral bronchiolitis/ bronchitis,  My EKG interpretation:   Lab: White count 9.3 with hemoglobin 12 platelet count 232 with normal differential basic metabolic panel remarkable for potassium 3.4  Radiology: I reviewed CT chest PE protocol.  Notes of pulmonary embolus.  No acute pulmonary abnormality.  There is stable postoperative changes in the right upper lobe with atelectasis adjacent to the staple line there are stable multiple pulmonary nodules and mildly enlarged right hilar lymph nodes.  There is no mediastinal adenopathy.  Discussion/treatment: Patient had IV placed.  Was given DuoNeb.  She was given Solu-Medrol.  She was then given second mini neb.  Patient's findings were discussed with her.  She is still short of breath with decreased peak flows.  She will placed in observation for continued treatment.  She was given potassium and magnesium.  Patient was evaluated using appropriate PPE      Final diagnoses:   Shortness of breath   Bronchospasm   Influenza A       ED Disposition  ED Disposition     ED Disposition   Decision to Admit    Condition   --    Comment   --             No follow-up provider specified.       Medication List      No changes were made to your prescriptions during this visit.       "

## 2022-04-06 ENCOUNTER — READMISSION MANAGEMENT (OUTPATIENT)
Dept: CALL CENTER | Facility: HOSPITAL | Age: 52
End: 2022-04-06

## 2022-04-06 VITALS
SYSTOLIC BLOOD PRESSURE: 132 MMHG | DIASTOLIC BLOOD PRESSURE: 77 MMHG | HEART RATE: 85 BPM | TEMPERATURE: 98.3 F | WEIGHT: 259.2 LBS | RESPIRATION RATE: 18 BRPM | BODY MASS INDEX: 44.25 KG/M2 | HEIGHT: 64 IN | OXYGEN SATURATION: 94 %

## 2022-04-06 LAB
ANION GAP SERPL CALCULATED.3IONS-SCNC: 10 MMOL/L (ref 5–15)
BASOPHILS # BLD AUTO: 0.1 10*3/MM3 (ref 0–0.2)
BASOPHILS NFR BLD AUTO: 0.7 % (ref 0–1.5)
BUN SERPL-MCNC: 14 MG/DL (ref 6–20)
BUN/CREAT SERPL: 21.5 (ref 7–25)
CALCIUM SPEC-SCNC: 9.6 MG/DL (ref 8.6–10.5)
CHLORIDE SERPL-SCNC: 105 MMOL/L (ref 98–107)
CO2 SERPL-SCNC: 24 MMOL/L (ref 22–29)
CREAT SERPL-MCNC: 0.65 MG/DL (ref 0.57–1)
DEPRECATED RDW RBC AUTO: 47.3 FL (ref 37–54)
EGFRCR SERPLBLD CKD-EPI 2021: 106.7 ML/MIN/1.73
EOSINOPHIL # BLD AUTO: 0 10*3/MM3 (ref 0–0.4)
EOSINOPHIL NFR BLD AUTO: 0 % (ref 0.3–6.2)
ERYTHROCYTE [DISTWIDTH] IN BLOOD BY AUTOMATED COUNT: 17.1 % (ref 12.3–15.4)
GLUCOSE SERPL-MCNC: 196 MG/DL (ref 65–99)
HCT VFR BLD AUTO: 33.2 % (ref 34–46.6)
HGB BLD-MCNC: 11.3 G/DL (ref 12–15.9)
LYMPHOCYTES # BLD AUTO: 0.8 10*3/MM3 (ref 0.7–3.1)
LYMPHOCYTES NFR BLD AUTO: 8.5 % (ref 19.6–45.3)
MCH RBC QN AUTO: 26.7 PG (ref 26.6–33)
MCHC RBC AUTO-ENTMCNC: 33.9 G/DL (ref 31.5–35.7)
MCV RBC AUTO: 78.5 FL (ref 79–97)
MONOCYTES # BLD AUTO: 0.6 10*3/MM3 (ref 0.1–0.9)
MONOCYTES NFR BLD AUTO: 6 % (ref 5–12)
NEUTROPHILS NFR BLD AUTO: 7.9 10*3/MM3 (ref 1.7–7)
NEUTROPHILS NFR BLD AUTO: 84.8 % (ref 42.7–76)
NRBC BLD AUTO-RTO: 0 /100 WBC (ref 0–0.2)
PLATELET # BLD AUTO: 239 10*3/MM3 (ref 140–450)
PMV BLD AUTO: 8 FL (ref 6–12)
POTASSIUM SERPL-SCNC: 4.5 MMOL/L (ref 3.5–5.2)
RBC # BLD AUTO: 4.22 10*6/MM3 (ref 3.77–5.28)
SODIUM SERPL-SCNC: 139 MMOL/L (ref 136–145)
WBC NRBC COR # BLD: 9.3 10*3/MM3 (ref 3.4–10.8)

## 2022-04-06 PROCEDURE — 96372 THER/PROPH/DIAG INJ SC/IM: CPT

## 2022-04-06 PROCEDURE — 94799 UNLISTED PULMONARY SVC/PX: CPT

## 2022-04-06 PROCEDURE — 25010000002 ENOXAPARIN PER 10 MG: Performed by: NURSE PRACTITIONER

## 2022-04-06 PROCEDURE — G0378 HOSPITAL OBSERVATION PER HR: HCPCS

## 2022-04-06 PROCEDURE — 80048 BASIC METABOLIC PNL TOTAL CA: CPT | Performed by: NURSE PRACTITIONER

## 2022-04-06 PROCEDURE — 94664 DEMO&/EVAL PT USE INHALER: CPT

## 2022-04-06 PROCEDURE — 96361 HYDRATE IV INFUSION ADD-ON: CPT

## 2022-04-06 PROCEDURE — 85025 COMPLETE CBC W/AUTO DIFF WBC: CPT | Performed by: NURSE PRACTITIONER

## 2022-04-06 PROCEDURE — 63710000001 PREDNISONE PER 5 MG: Performed by: NURSE PRACTITIONER

## 2022-04-06 RX ORDER — IPRATROPIUM BROMIDE AND ALBUTEROL SULFATE 2.5; .5 MG/3ML; MG/3ML
3 SOLUTION RESPIRATORY (INHALATION) EVERY 4 HOURS PRN
Qty: 360 ML | Refills: 0 | Status: SHIPPED | OUTPATIENT
Start: 2022-04-06 | End: 2023-02-17 | Stop reason: SDUPTHER

## 2022-04-06 RX ORDER — PREDNISONE 10 MG/1
TABLET ORAL
Qty: 30 TABLET | Refills: 0 | Status: SHIPPED | OUTPATIENT
Start: 2022-04-06 | End: 2022-07-13

## 2022-04-06 RX ORDER — BENZONATATE 100 MG/1
100 CAPSULE ORAL 3 TIMES DAILY PRN
Qty: 30 CAPSULE | Refills: 0 | Status: SHIPPED | OUTPATIENT
Start: 2022-04-06 | End: 2022-04-16

## 2022-04-06 RX ORDER — IPRATROPIUM BROMIDE AND ALBUTEROL SULFATE 2.5; .5 MG/3ML; MG/3ML
3 SOLUTION RESPIRATORY (INHALATION) EVERY 4 HOURS PRN
Qty: 360 ML | Refills: 0 | Status: SHIPPED | OUTPATIENT
Start: 2022-04-06 | End: 2022-04-06 | Stop reason: SDUPTHER

## 2022-04-06 RX ADMIN — DICYCLOMINE HYDROCHLORIDE 10 MG: 10 CAPSULE ORAL at 07:42

## 2022-04-06 RX ADMIN — IPRATROPIUM BROMIDE AND ALBUTEROL SULFATE 3 ML: .5; 3 SOLUTION RESPIRATORY (INHALATION) at 00:40

## 2022-04-06 RX ADMIN — ENOXAPARIN SODIUM 40 MG: 40 INJECTION SUBCUTANEOUS at 00:15

## 2022-04-06 RX ADMIN — SODIUM CHLORIDE, POTASSIUM CHLORIDE, SODIUM LACTATE AND CALCIUM CHLORIDE 125 ML/HR: 600; 310; 30; 20 INJECTION, SOLUTION INTRAVENOUS at 00:15

## 2022-04-06 RX ADMIN — VENLAFAXINE HYDROCHLORIDE 150 MG: 75 CAPSULE, EXTENDED RELEASE ORAL at 07:42

## 2022-04-06 RX ADMIN — IPRATROPIUM BROMIDE AND ALBUTEROL SULFATE 3 ML: .5; 3 SOLUTION RESPIRATORY (INHALATION) at 06:52

## 2022-04-06 RX ADMIN — DICYCLOMINE HYDROCHLORIDE 10 MG: 10 CAPSULE ORAL at 11:10

## 2022-04-06 RX ADMIN — ENOXAPARIN SODIUM 40 MG: 40 INJECTION SUBCUTANEOUS at 11:10

## 2022-04-06 RX ADMIN — LISINOPRIL 5 MG: 5 TABLET ORAL at 07:42

## 2022-04-06 RX ADMIN — Medication 10 ML: at 07:42

## 2022-04-06 RX ADMIN — BENZONATATE 100 MG: 100 CAPSULE ORAL at 00:53

## 2022-04-06 RX ADMIN — IPRATROPIUM BROMIDE AND ALBUTEROL SULFATE 3 ML: .5; 3 SOLUTION RESPIRATORY (INHALATION) at 12:01

## 2022-04-06 RX ADMIN — PREDNISONE 50 MG: 50 TABLET ORAL at 07:42

## 2022-04-06 RX ADMIN — SODIUM CHLORIDE, POTASSIUM CHLORIDE, SODIUM LACTATE AND CALCIUM CHLORIDE 125 ML/HR: 600; 310; 30; 20 INJECTION, SOLUTION INTRAVENOUS at 07:42

## 2022-04-06 NOTE — NURSING NOTE
Patient voiced concern if she can get a nebulizer to use  At home ordered upon discharged. Notified EVETTE Bhatia thru secured chat.

## 2022-04-06 NOTE — CASE MANAGEMENT/SOCIAL WORK
Discharge Planning Assessment  [facility]     Patient Name: Tessie Conner  MRN: [id number]  Today's Date: 4/6/2022    Admit Date: 4/5/2022     Discharge Needs Assessment     Row Name 04/06/22 1239       Living Environment    People in Home spouse    Current Living Arrangements home    Primary Care Provided by self    Provides Primary Care For no one    Family Caregiver if Needed spouse    Family Caregiver Names  Shay    Quality of Family Relationships supportive;helpful    Able to Return to Prior Arrangements yes       Resource/Environmental Concerns    Resource/Environmental Concerns none    Transportation Concerns none       Transition Planning    Patient/Family Anticipates Transition to home with family    Patient/Family Anticipated Services at Transition none    Transportation Anticipated family or friend will provide       Discharge Needs Assessment    Readmission Within the Last 30 Days no previous admission in last 30 days    Equipment Currently Used at Home none    Concerns to be Addressed discharge planning    Anticipated Changes Related to Illness none    Equipment Needed After Discharge none               Discharge Plan     Row Name 04/06/22 1243       Plan    Plan Anticipate Routine Home    Patient/Family in Agreement with Plan yes    Plan Comments Met with patient and  at bedside. IADL's,  will provide transportation at discharge. Pcp and Pharmacy verified, able to afford home medications. Home nebulizer ordered and delivered via [employer].              Continued Care and Services - Admitted Since 4/5/2022     Durable Medical Equipment     Service Provider Request Status Selected Services Address Phone Fax Patient Preferred    [facility]  Accepted N/A [id number] [id number] [phone] --              Expected Discharge Date and Time     Expected Discharge Date Expected Discharge Time    Apr 6, 2022          Demographic Summary      Row Name 04/06/22 1238       General Information    Admission Type observation    Arrived From emergency department    Required Notices Provided --    Referral Source admission list    Reason for Consult discharge planning    Preferred Language English               Functional Status     Row Name 04/06/22 1239       Functional Status    Usual Activity Tolerance good    Current Activity Tolerance good       Functional Status, IADL    Medications independent    Meal Preparation independent    Housekeeping independent    Laundry independent    Shopping independent       Mental Status    General Appearance WDL WDL       Mental Status Summary    Recent Changes in Mental Status/Cognitive Functioning no changes              Met with patient at bedside wearing mask and goggles, Spent less than 15 minutes in room at greater than 6 feet distance.           Sarah Benitez RN

## 2022-04-06 NOTE — DISCHARGE SUMMARY
"[facility]    Marsha Lopez MD    CHIEF COMPLAINT:     Cough and dyspnea    HISTORY OF PRESENT ILLNESS:    Obtained from [facility] on 4/5/2022:  Ms. Conner is a 52 y.o.  presents to [facility] complaining of persistent cough with shortness of breath.        52 year old female who presents to the ER with a chief complaint of worsening non productive cough and shortness of breath with exertion. The patient denies subjective fever or chills. She does report sore throat and post nasal drainage. She is on steroids and doxycycline for recent dx of bronchitis but has not had significant improvement of her symptoms. She had recent VATS with biopsy showing inframammilary changes, no malignancy.     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  Heart cath dated 9/24/2020 showing primarily normal epicardial coronary anatomy suggestive of microvascular dysfunction as well as small distal vessel size.  Maximize nitrates, ACE inhibitor, sleep study, cardiac ambulatory monitoring.    4/6/2022: Patient reports significant improvement following treatment from the previous day.  Some mild wheezing and dyspnea is present but she notes this is \"much better than it has been\".  She continues to maintain adequate saturations on room air and notes significant improvement following nebulizer treatments.        Past Medical History:   Diagnosis Date   • Anxiety 2005   • Arthritis    • Asthma 04/17/2020   • At risk for sleep apnea    • Bipolar 1 disorder (HCC)    • Depression 2005   • Dyspnea on minimal exertion    • Frequent UTI    • Heart murmur     FROM CHILDHOOD   • History of anemia     POST PREGNANCY   • Hyperlipidemia 10/04/2016   • Hypertension    • " Mass of upper lobe of right lung    • Migraines    • Obesity    • PONV (postoperative nausea and vomiting)    • Spinal headache    • Visual impairment     WEARS GLASSES     Past Surgical History:   Procedure Laterality Date   • APPENDECTOMY     • CARDIAC CATHETERIZATION N/A 2020    Procedure: Left Heart Cath possible PCI, atherectomy, hemodynamic support;  Surgeon: Thomas Zamora MD;  Location: [facility] INVASIVE LOCATION;  Service: Cardiology;  Laterality: N/A;   •  SECTION     • FOOT/TOE TENDON REPAIR Left    • HYSTERECTOMY     • LUNG BIOPSY Right 2020   • LUNG LOBECTOMY Right 2022    pt had partial Right lobectomy and nodule removal   • ROTATOR CUFF REPAIR Left    • THORACOSCOPY VIDEO ASSISTED WITH LOBECTOMY Right 2022    Procedure: BRONCHOSCOPY, THORACOSCOPY VIDEO ASSISTED wedge resection X2, INTERCOSTAL NERVE BLOCK;  Surgeon: Ayla Carroll MD;  Location: [facility] OR;  Service: Thoracic;  Laterality: Right;   • TUBAL ABDOMINAL LIGATION       Family History   Problem Relation Age of Onset   • Arthritis Mother    • Cancer Mother    • Depression Mother    • Diabetes Mother    • Early death Mother    • Mental illness Mother    • Anxiety disorder Mother    • Alcohol abuse Father    • Diabetes Father    • Early death Father    • Heart disease Father    • Hyperlipidemia Father    • Hypertension Father    • Vision loss Father    • Heart disease Sister    • Heart disease Brother    • Hypertension Brother    • Cancer Maternal Grandmother    • Drug abuse Brother    • Drug abuse Sister    • Malig Hyperthermia Neg Hx      Social History     Tobacco Use   • Smoking status: Never Smoker   • Smokeless tobacco: Never Used   Vaping Use   • Vaping Use: Never used   Substance Use Topics   • Alcohol use: Yes     Comment: VERY RARE   • Drug use: No     Medications Prior to Admission   Medication Sig Dispense Refill Last Dose   • albuterol sulfate  (90 Base)  MCG/ACT inhaler Inhale 2 puffs Every 4 (Four) Hours As Needed for Wheezing. 8 g 0 Past Week at Unknown time   • ARIPiprazole (ABILIFY) 20 MG tablet Take 20 mg by mouth Every Night.   4/4/2022 at Unknown time   • atorvastatin (Lipitor) 20 MG tablet Take 1 tablet by mouth Daily. (Patient taking differently: Take 20 mg by mouth Every Night.) 90 tablet 3 4/4/2022 at Unknown time   • celecoxib (CeleBREX) 100 MG capsule Take 100 mg by mouth 2 (Two) Times a Day As Needed for Mild Pain .   Past Week at Unknown time   • dicyclomine (Bentyl) 10 MG capsule Take 1 capsule by mouth 4 (Four) Times a Day Before Meals & at Bedtime. 120 capsule 0 4/4/2022 at Unknown time   • doxycycline (MONODOX) 100 MG capsule Take 1 capsule by mouth 2 (Two) Times a Day. 14 capsule 0 4/4/2022 at Unknown time   • lisinopril (PRINIVIL,ZESTRIL) 5 MG tablet Take 1 tablet by mouth Daily for 180 days. 90 tablet 1 4/4/2022 at Unknown time   • predniSONE (DELTASONE) 50 MG tablet Take 1 tablet by mouth Daily. 4 tablet 0 4/5/2022 at Unknown time   • venlafaxine XR (EFFEXOR-XR) 150 MG 24 hr capsule Take 150 mg by mouth 2 (Two) Times a Day.   4/4/2022 at Unknown time     Allergies:  Percocet [oxycodone-acetaminophen]    Immunization History   Administered Date(s) Administered   • COVID-19 (BEATRICE) 03/13/2021   • FluLaval/Fluarix/Fluzone >6 10/01/2020, 11/02/2021   • Pneumococcal Polysaccharide (PPSV23) 08/07/2020           REVIEW OF SYSTEMS:    ROS   HENT: Positive for congestion and sore throat.    Respiratory: Positive for cough and shortness of breath. Negative for sputum production.    All other systems reviewed and are negative.    Vital Signs  Temp:  [97.6 °F (36.4 °C)-98.3 °F (36.8 °C)] 98.3 °F (36.8 °C)  Heart Rate:  [61-92] 85  Resp:  [16-18] 18  BP: (111-132)/(62-77) 132/77          Physical Exam:  Physical Exam  Vitals reviewed.   Constitutional:       General: She is not in acute distress.     Appearance: Normal appearance. She is obese. She is  not ill-appearing, toxic-appearing or diaphoretic.   HENT:      Head: Normocephalic and atraumatic.      Right Ear: External ear normal.      Left Ear: External ear normal.      Nose: Nose normal.      Mouth/Throat:      Mouth: Mucous membranes are moist.   Eyes:      Extraocular Movements: Extraocular movements intact.   Cardiovascular:      Rate and Rhythm: Normal rate and regular rhythm.      Pulses: Normal pulses.      Heart sounds: Normal heart sounds.   Pulmonary:      Effort: Pulmonary effort is normal.      Breath sounds: Wheezing present.      Comments: Mild wheezing and right apex shortly after neb treatment  Abdominal:      General: Bowel sounds are normal. There is no distension.      Palpations: Abdomen is soft.      Tenderness: There is no abdominal tenderness.   Musculoskeletal:         General: Normal range of motion.      Cervical back: Normal range of motion.      Right lower leg: No edema.      Left lower leg: No edema.   Skin:     General: Skin is warm and dry.      Capillary Refill: Capillary refill takes less than 2 seconds.   Neurological:      General: No focal deficit present.      Mental Status: She is alert and oriented to person, place, and time.   Psychiatric:         Mood and Affect: Mood normal.         Behavior: Behavior normal.         Thought Content: Thought content normal.         Judgment: Judgment normal.         Emotional Behavior:   Normal   Debilities:  None  Results Review:    I reviewed the patient's new clinical results.  Lab Results (most recent)     Procedure Component Value Units Date/Time    Basic Metabolic Panel [[id number]]  (Abnormal) Collected: 04/06/22 0229    Specimen: Blood Updated: 04/06/22 0333     Glucose 196 mg/dL      BUN 14 mg/dL      Creatinine 0.65 mg/dL      Sodium 139 mmol/L      Potassium 4.5 mmol/L      Chloride 105 mmol/L      CO2 24.0 mmol/L      Calcium 9.6 mg/dL      BUN/Creatinine Ratio 21.5     Anion Gap 10.0 mmol/L      eGFR 106.7 mL/min/1.73       Comment: National Kidney Foundation and American Society of Nephrology (ASN) Task Force recommended calculation based on the Chronic Kidney Disease Epidemiology Collaboration (CKD-EPI) equation refit without adjustment for race.       Narrative:      GFR Normal >60  Chronic Kidney Disease <60  Kidney Failure <15      CBC Auto Differential [[id number]]  (Abnormal) Collected: 04/06/22 0229    Specimen: Blood Updated: 04/06/22 0311     WBC 9.30 10*3/mm3      RBC 4.22 10*6/mm3      Hemoglobin 11.3 g/dL      Hematocrit 33.2 %      MCV 78.5 fL      MCH 26.7 pg      MCHC 33.9 g/dL      RDW 17.1 %      RDW-SD 47.3 fl      MPV 8.0 fL      Platelets 239 10*3/mm3      Neutrophil % 84.8 %      Lymphocyte % 8.5 %      Monocyte % 6.0 %      Eosinophil % 0.0 %      Basophil % 0.7 %      Neutrophils, Absolute 7.90 10*3/mm3      Lymphocytes, Absolute 0.80 10*3/mm3      Monocytes, Absolute 0.60 10*3/mm3      Eosinophils, Absolute 0.00 10*3/mm3      Basophils, Absolute 0.10 10*3/mm3      nRBC 0.0 /100 WBC     Troponin [[id number]]  (Normal) Collected: 04/05/22 1432    Specimen: Blood Updated: 04/05/22 1542     Troponin T <0.010 ng/mL     Narrative:      Troponin T Reference Range:  <= 0.03 ng/mL-   Negative for AMI  >0.03 ng/mL-     Abnormal for myocardial necrosis.  Clinicians would have to utilize clinical acumen, EKG, Troponin and serial changes to determine if it is an Acute Myocardial Infarction or myocardial injury due to an underlying chronic condition.       Results may be falsely decreased if patient taking Biotin.      Respiratory Panel PCR w/COVID-19(SARS-CoV-2) URVASHI/GUI/DICK/PAD/COR/MAD/CLAIR In-House, NP Swab in UTM/VTM, 3-4 HR TAT - Swab, Nasopharynx [[id number]]  (Abnormal) Collected: 04/05/22 0541    Specimen: Swab from Nasopharynx Updated: 04/05/22 0712     ADENOVIRUS, PCR Not Detected     Coronavirus 229E Not Detected     Coronavirus HKU1 Not Detected     Coronavirus NL63 Not Detected     Coronavirus OC43 Not Detected      COVID19 Not Detected     Human Metapneumovirus Not Detected     Human Rhinovirus/Enterovirus Not Detected     Influenza A H3 Detected     Influenza B PCR Not Detected     Parainfluenza Virus 1 Not Detected     Parainfluenza Virus 2 Not Detected     Parainfluenza Virus 3 Not Detected     Parainfluenza Virus 4 Not Detected     RSV, PCR Not Detected     Bordetella pertussis pcr Not Detected     Bordetella parapertussis PCR Not Detected     Chlamydophila pneumoniae PCR Not Detected     Mycoplasma pneumo by PCR Not Detected    Narrative:      In the setting of a positive respiratory panel with a viral infection PLUS a negative procalcitonin without other underlying concern for bacterial infection, consider observing off antibiotics or discontinuation of antibiotics and continue supportive care. If the respiratory panel is positive for atypical bacterial infection (Bordetella pertussis, Chlamydophila pneumoniae, or Mycoplasma pneumoniae), consider antibiotic de-escalation to target atypical bacterial infection.    Basic Metabolic Panel [[id number]]  (Abnormal) Collected: 04/05/22 0614    Specimen: Blood Updated: 04/05/22 0648     Glucose 95 mg/dL      BUN 13 mg/dL      Creatinine 0.76 mg/dL      Sodium 139 mmol/L      Potassium 3.4 mmol/L      Comment: Slight hemolysis detected by analyzer. Results may be affected.        Chloride 103 mmol/L      CO2 23.0 mmol/L      Calcium 9.3 mg/dL      BUN/Creatinine Ratio 17.1     Anion Gap 13.0 mmol/L      eGFR 95.0 mL/min/1.73      Comment: National Kidney Foundation and American Society of Nephrology (ASN) Task Force recommended calculation based on the Chronic Kidney Disease Epidemiology Collaboration (CKD-EPI) equation refit without adjustment for race.       Narrative:      GFR Normal >60  Chronic Kidney Disease <60  Kidney Failure <15      CBC & Differential [[id number]]  (Abnormal) Collected: 04/05/22 0614    Specimen: Blood Updated: 04/05/22 0625    Narrative:      The  following orders were created for panel order CBC & Differential.  Procedure                               Abnormality         Status                     ---------                               -----------         ------                     CBC Auto Differential[[id number]]        Abnormal            Final result                 Please view results for these tests on the individual orders.    CBC Auto Differential [[id number]]  (Abnormal) Collected: 04/05/22 0614    Specimen: Blood Updated: 04/05/22 0625     WBC 9.30 10*3/mm3      RBC 4.36 10*6/mm3      Hemoglobin 12.0 g/dL      Hematocrit 34.6 %      MCV 79.5 fL      MCH 27.5 pg      MCHC 34.5 g/dL      RDW 17.0 %      RDW-SD 47.3 fl      MPV 7.7 fL      Platelets 232 10*3/mm3      Neutrophil % 61.5 %      Lymphocyte % 27.8 %      Monocyte % 10.2 %      Eosinophil % 0.1 %      Basophil % 0.4 %      Neutrophils, Absolute 5.70 10*3/mm3      Lymphocytes, Absolute 2.60 10*3/mm3      Monocytes, Absolute 0.90 10*3/mm3      Eosinophils, Absolute 0.00 10*3/mm3      Basophils, Absolute 0.00 10*3/mm3      nRBC 0.4 /100 WBC           Imaging Results (Most Recent)     Procedure Component Value Units Date/Time    CT Angiogram Chest Pulmonary Embolism [[id number]] Collected: 04/05/22 0724     Updated: 04/05/22 0734    Narrative:         DATE OF EXAM:  4/5/2022 6:59 AM     PROCEDURE:  CT ANGIOGRAM CHEST PULMONARY EMBOLISM-     INDICATIONS:   pain     COMPARISON:   02/21/2022, 11/27/2021     TECHNIQUE:  Routine transaxial slices were obtained through chest after  administration of intravenous 100 ml of Isovue 370. Reconstructed  coronal and sagittal images were also obtained. Automated exposure  control and iterative reconstruction methods were used.      FINDINGS:  Pulmonary arteries: Pulmonary arteries are adequately opacified. There  are some distortion of peripheral pulmonary arteries on several images  due to motion. No suspicious filling defects are demonstrated.      Mediastinum: Stable mildly enlarged right hilar lymph nodes. No  mediastinal adenopathy. Thoracic aorta normal in caliber. No coronary  artery calcifications. No pericardial effusion     Lungs/pleura: Status post partial right upper lobe resection. Stable  airspace disease adjacent to the staple line presumably atelectasis.  Multiple less than 5 mm noncalcified pulmonary nodules bilaterally which  are stable. No new pulmonary abnormality. No pleural effusion     Upper abdomen: No acute abnormality     Bones/soft tissues: No suspicious bony abnormality       Impression:         1. No evidence of pulmonary embolism. No acute pulmonary abnormality  demonstrated  2. Stable postoperative changes in the right upper lobe with atelectasis  adjacent to the staple line  3. Stable multiple pulmonary nodules and mildly enlarged right hilar  lymph nodes     Electronically Signed By-Darion Medina On:4/5/2022 7:32 AM  This report was finalized on [id number] by  Darion Medina, .        reviewed    ECG/EMG Results (most recent)     Procedure Component Value Units Date/Time    SCANNED - TELEMETRY   [[id number]] Resulted: 04/05/22     Updated: 04/06/22 1031    SCANNED - TELEMETRY   [[id number]] Resulted: 04/05/22     Updated: 04/06/22 1146    SCANNED - TELEMETRY   [[id number]] Resulted: 04/05/22     Updated: 04/06/22 1146        not reviewed        Results for orders placed during the hospital encounter of 09/24/20    Adult Transthoracic Echo Complete W/ Cont if Necessary Per Protocol    Interpretation Summary  · Left ventricular ejection fraction appears to be 56 - 60%. Left ventricular systolic function is normal.  · Estimated right ventricular systolic pressure from tricuspid regurgitation is normal (<35 mmHg).    Normal LV and RV size and function  Normal atrial sizes  No significant valvular abnormality seen  Normal right left-sided pressures estimated  Normal diastolic function by criteria  No masses or effusion  seen      Microbiology Results (last 10 days)     Procedure Component Value - Date/Time    Respiratory Panel PCR w/COVID-19(SARS-CoV-2) URVASHI/GUI/DICK/PAD/COR/MAD/CLAIR In-House, NP Swab in UTM/VTM, 3-4 HR TAT - Swab, Nasopharynx [[id number]]  (Abnormal) Collected: 04/05/22 0541    Lab Status: Final result Specimen: Swab from Nasopharynx Updated: 04/05/22 0712     ADENOVIRUS, PCR Not Detected     Coronavirus 229E Not Detected     Coronavirus HKU1 Not Detected     Coronavirus NL63 Not Detected     Coronavirus OC43 Not Detected     COVID19 Not Detected     Human Metapneumovirus Not Detected     Human Rhinovirus/Enterovirus Not Detected     Influenza A H3 Detected     Influenza B PCR Not Detected     Parainfluenza Virus 1 Not Detected     Parainfluenza Virus 2 Not Detected     Parainfluenza Virus 3 Not Detected     Parainfluenza Virus 4 Not Detected     RSV, PCR Not Detected     Bordetella pertussis pcr Not Detected     Bordetella parapertussis PCR Not Detected     Chlamydophila pneumoniae PCR Not Detected     Mycoplasma pneumo by PCR Not Detected    Narrative:      In the setting of a positive respiratory panel with a viral infection PLUS a negative procalcitonin without other underlying concern for bacterial infection, consider observing off antibiotics or discontinuation of antibiotics and continue supportive care. If the respiratory panel is positive for atypical bacterial infection (Bordetella pertussis, Chlamydophila pneumoniae, or Mycoplasma pneumoniae), consider antibiotic de-escalation to target atypical bacterial infection.    COVID-19,CEPHEID/YAA,COR/DICK/PAD/BRIDGER IN-HOUSE(OR EMERGENT/ADD-ON),NP SWAB IN TRANSPORT MEDIA 3-4 HR TAT, RT-PCR - Swab, Nasopharynx [[id number]]  (Normal) Collected: 03/31/22 1910    Lab Status: Final result Specimen: Swab from Nasopharynx Updated: 03/31/22 1942     COVID19 Not Detected    Narrative:      Fact sheet for providers: https://www.fda.gov/media/955915/download     Fact sheet for  patients: https://www.fda.gov/media/072137/download  Fact sheet for providers: https://www.fda.gov/media/675861/download    Fact sheet for patients: https://www.fda.gov/media/967862/download    Test performed by PCR.          Assessment/Plan     Dyspnea    Type 2 diabetes mellitus without complication, without long-term current use of insulin (HCC)    Bipolar disorder (HCC)    Hypertension       Shortness of breath with cough--likley secondary to influenza infection superimposed on asthma with recent right video-assisted thoracotomy wedge resection: Oxygen support as needed; add Tessalon Perles; continue IV steroid; duo nebs 4 times daily and every 4 hours as needed; hold doxycycline; hold oral prednisone    -Patient notes significant improvement on 4/6/2022 and has maintained adequate oxygen saturation on room air, discussed findings of her imaging which patient and reviewed on her my chart with plan for continued follow-up for her stable pulmonary nodules on an outpatient basis.  She will be prescribed prednisone and Tessalon at discharge and is also being ordered a nebulizer with DuoNeb     Bipolar disorder, chronic: Continue Abilify; continue Effexor     IBS, chronic: Continue Bentyl     Hypertension, chronic: continue Lisinopril       HLD, chronic: continue atorvastatin l       I discussed the patients findings and my recommendations with patient and nursing staff.     Discharge Diagnosis:      Dyspnea    Type 2 diabetes mellitus without complication, without long-term current use of insulin (HCC)    Bipolar disorder (HCC)    Hypertension      Hospital Course  Patient is a 52 y.o. female presented with cough and dyspnea with an HPI noted above.  Troponin was found to be less than 0.010 and patient's BMP and CBC remained generally unremarkable.  Patient was positive for influenza A on respiratory viral panel.  CT PE protocol showed no evidence of pulmonary embolism or acute abnormality with stable postoperative  changes in the right upper lobe with atelectasis adjacent to the staple line noted along with stable multiple pulmonary nodules and mildly enlarged right hilar lymph nodes.  She was treated symptomatically with steroid therapy, breathing treatments and antitussives and was able to maintain O2 saturations above 90% on room air.  She notes significant improvement in symptoms on the morning following admission and is felt to be in good condition for discharge with close follow-up with her PCP as well as thoracic surgeon as scheduled.  Her full testing/results and plan were discussed with patient along with concerning/alarm symptoms which to call 911/return to the ED.  A home nebulizer as well as DuoNeb treatments are being ordered in addition to a steroid taper and Tessalon Perles.  All questions were answered and she verbalizes her understanding and agreement.    Past Medical History:     Past Medical History:   Diagnosis Date   • Anxiety    • Arthritis    • Asthma 2020   • At risk for sleep apnea    • Bipolar 1 disorder (HCC)    • Depression    • Dyspnea on minimal exertion    • Frequent UTI    • Heart murmur     FROM CHILDHOOD   • History of anemia     POST PREGNANCY   • Hyperlipidemia 10/04/2016   • Hypertension    • Mass of upper lobe of right lung    • Migraines    • Obesity    • PONV (postoperative nausea and vomiting)    • Spinal headache    • Visual impairment     WEARS GLASSES       Past Surgical History:     Past Surgical History:   Procedure Laterality Date   • APPENDECTOMY     • CARDIAC CATHETERIZATION N/A 2020    Procedure: Left Heart Cath possible PCI, atherectomy, hemodynamic support;  Surgeon: Thomas Zamora MD;  Location: [facility] CATH INVASIVE LOCATION;  Service: Cardiology;  Laterality: N/A;   •  SECTION     • FOOT/TOE TENDON REPAIR Left    • HYSTERECTOMY     • LUNG BIOPSY Right 2020   • LUNG LOBECTOMY Right 2022    pt had partial Right  lobectomy and nodule removal   • ROTATOR CUFF REPAIR Left    • THORACOSCOPY VIDEO ASSISTED WITH LOBECTOMY Right 02/08/2022    Procedure: BRONCHOSCOPY, THORACOSCOPY VIDEO ASSISTED wedge resection X2, INTERCOSTAL NERVE BLOCK;  Surgeon: Ayla Carroll MD;  Location: [facility];  Service: Thoracic;  Laterality: Right;   • TUBAL ABDOMINAL LIGATION  2002       Social History:   Social History     Socioeconomic History   • Marital status:    Tobacco Use   • Smoking status: Never Smoker   • Smokeless tobacco: Never Used   Vaping Use   • Vaping Use: Never used   Substance and Sexual Activity   • Alcohol use: Yes     Comment: VERY RARE   • Drug use: No   • Sexual activity: Not Currently     Partners: Male     Birth control/protection: None       Procedures Performed         Consults:   Consults     No orders found for last 30 day(s).          Condition on Discharge:     Stable    Discharge Disposition  Home or Self Care    Discharge Medications     Discharge Medications      New Medications      Instructions Start Date   benzonatate 100 MG capsule  Commonly known as: TESSALON   100 mg, Oral, 3 Times Daily PRN      ipratropium-albuterol 0.5-2.5 mg/3 ml nebulizer  Commonly known as: DUO-NEB   3 mL, Nebulization, Every 4 Hours PRN         Changes to Medications      Instructions Start Date   atorvastatin 20 MG tablet  Commonly known as: Lipitor  What changed: when to take this   20 mg, Oral, Daily      predniSONE 10 MG tablet  Commonly known as: DELTASONE  What changed:   · medication strength  · how much to take  · how to take this  · when to take this  · additional instructions   Take 5 tablets by mouth on day 1 & 2, 4 on day 3 & 4, 3 on day 5 & 6, 2 on day 7 & 8, 1 on day 9 &10         Continue These Medications      Instructions Start Date   albuterol sulfate  (90 Base) MCG/ACT inhaler  Commonly known as: PROVENTIL HFA;VENTOLIN HFA;PROAIR HFA   2 puffs, Inhalation, Every 4 Hours PRN      ARIPiprazole 20  MG tablet  Commonly known as: ABILIFY   20 mg, Oral, Nightly      celecoxib 100 MG capsule  Commonly known as: CeleBREX   100 mg, Oral, 2 Times Daily PRN      dicyclomine 10 MG capsule  Commonly known as: Bentyl   10 mg, Oral, 4 Times Daily Before Meals & Nightly      lisinopril 5 MG tablet  Commonly known as: PRINIVIL,ZESTRIL   5 mg, Oral, Daily      venlafaxine  MG 24 hr capsule  Commonly known as: EFFEXOR-XR   150 mg, Oral, 2 Times Daily         Stop These Medications    doxycycline 100 MG capsule  Commonly known as: MONODOX            Discharge Diet:     Activity at Discharge:     Follow-up Appointments  Future Appointments   Date Time Provider Department Center   4/7/2022  1:00 PM Marsha Lopez MD [facility] D[facility]   6/1/2022 10:00 AM DICK CT 2 BH DICK CT DICK   6/2/2022  9:15 AM Ayla Carroll MD [facility]     Additional Instructions for the Follow-ups that You Need to Schedule     Discharge Follow-up with PCP   As directed       Currently Documented PCP:    Marsha Lopez MD    PCP Phone Number:    [phone]     Follow Up Details: 5 to 7 days               Test Results Pending at Discharge       Risk for Readmission (LACE) Score: 6 (4/6/2022  6:01 AM)          Jose Rogel PA-C  04/06/22  12:09 EDT

## 2022-04-06 NOTE — PLAN OF CARE
Problem: Adult Inpatient Plan of Care  Goal: Plan of Care Review  Outcome: Ongoing, Progressing  Flowsheets (Taken 4/5/2022 1927 by Samantha Rao LPN)  Progress: improving  Goal: Patient-Specific Goal (Individualized)  Outcome: Ongoing, Progressing  Goal: Absence of Hospital-Acquired Illness or Injury  Outcome: Ongoing, Progressing  Intervention: Identify and Manage Fall Risk  Recent Flowsheet Documentation  Taken 4/6/2022 1211 by Netta Zamora RN  Safety Promotion/Fall Prevention: safety round/check completed  Taken 4/6/2022 1111 by Netta Zamora RN  Safety Promotion/Fall Prevention:   activity supervised   clutter free environment maintained   safety round/check completed  Taken 4/6/2022 0900 by Netta Zamora RN  Safety Promotion/Fall Prevention: safety round/check completed  Taken 4/6/2022 0746 by Netta Zamora RN  Safety Promotion/Fall Prevention:   safety round/check completed   room organization consistent   nonskid shoes/slippers when out of bed   fall prevention program maintained   clutter free environment maintained  Intervention: Prevent Skin Injury  Recent Flowsheet Documentation  Taken 4/6/2022 1111 by Netta Zamora RN  Body Position:   position changed independently   supine  Skin Protection: adhesive use limited  Taken 4/6/2022 0900 by Netta Zamora RN  Body Position: position changed independently  Taken 4/6/2022 0746 by Netta Zamora RN  Body Position:   position changed independently   supine   sitting up in bed  Skin Protection: adhesive use limited  Intervention: Prevent and Manage VTE (Venous Thromboembolism) Risk  Recent Flowsheet Documentation  Taken 4/6/2022 1111 by Netta Zamora RN  Activity Management: activity adjusted per tolerance  Taken 4/6/2022 0900 by Netta Zamora RN  Activity Management: ambulated to bathroom  Taken 4/6/2022 0746 by Netta Zamora RN  Activity Management: activity adjusted per tolerance  Goal: Optimal  Comfort and Wellbeing  Outcome: Ongoing, Progressing  Intervention: Provide Person-Centered Care  Recent Flowsheet Documentation  Taken 4/6/2022 1111 by Netta Zamora RN  Trust Relationship/Rapport: care explained  Taken 4/6/2022 0900 by Netta Zamora RN  Trust Relationship/Rapport: care explained  Taken 4/6/2022 0746 by Netta Zamora RN  Trust Relationship/Rapport: care explained  Goal: Readiness for Transition of Care  Outcome: Ongoing, Progressing  Goal: Plan of Care Review  Outcome: Ongoing, Progressing  Goal: Patient-Specific Goal (Individualized)  Outcome: Ongoing, Progressing  Goal: Absence of Hospital-Acquired Illness or Injury  Outcome: Ongoing, Progressing  Intervention: Identify and Manage Fall Risk  Recent Flowsheet Documentation  Taken 4/6/2022 1211 by Netta Zamora RN  Safety Promotion/Fall Prevention: safety round/check completed  Taken 4/6/2022 1111 by Netta Zamora RN  Safety Promotion/Fall Prevention:   activity supervised   clutter free environment maintained   safety round/check completed  Taken 4/6/2022 0900 by Netta Zamora RN  Safety Promotion/Fall Prevention: safety round/check completed  Taken 4/6/2022 0746 by Netta Zamora RN  Safety Promotion/Fall Prevention:   safety round/check completed   room organization consistent   nonskid shoes/slippers when out of bed   fall prevention program maintained   clutter free environment maintained  Intervention: Prevent Skin Injury  Recent Flowsheet Documentation  Taken 4/6/2022 1111 by Netta Zamora RN  Body Position:   position changed independently   supine  Skin Protection: adhesive use limited  Taken 4/6/2022 0900 by Netta Zamora RN  Body Position: position changed independently  Taken 4/6/2022 0746 by Netta Zamora RN  Body Position:   position changed independently   supine   sitting up in bed  Skin Protection: adhesive use limited  Intervention: Prevent and Manage VTE (Venous  Thromboembolism) Risk  Recent Flowsheet Documentation  Taken 4/6/2022 1111 by Netta Zamora RN  Activity Management: activity adjusted per tolerance  Taken 4/6/2022 0900 by Netta Zamora RN  Activity Management: ambulated to bathroom  Taken 4/6/2022 0746 by Netta Zamora RN  Activity Management: activity adjusted per tolerance  Goal: Optimal Comfort and Wellbeing  Outcome: Ongoing, Progressing  Intervention: Provide Person-Centered Care  Recent Flowsheet Documentation  Taken 4/6/2022 1111 by Netta Zamora RN  Trust Relationship/Rapport: care explained  Taken 4/6/2022 0900 by Netta Zamora RN  Trust Relationship/Rapport: care explained  Taken 4/6/2022 0746 by Netta Zamora RN  Trust Relationship/Rapport: care explained  Goal: Readiness for Transition of Care  Outcome: Ongoing, Progressing   Goal Outcome Evaluation:

## 2022-04-06 NOTE — DISCHARGE PLACEMENT REQUEST
"Tessie Conner (52 y.o. Female)             Date of Birth   1970    Social Security Number       Address   [address] [place] [address] [place] [address]    Home Phone   [phone]    MRN   [id number]       Anglican   None    Marital Status                               Admission Date   4/5/22    Admission Type   Emergency    Admitting Provider   Jacobo Moralez MD    Attending Provider   Jacobo Moralez MD    Department, Room/Bed   [facility] OBSERVATION, 103/1       Discharge Date       Discharge Disposition       Discharge Destination                               Attending Provider: Jacobo Moralez MD    Allergies: Percocet [Oxycodone-acetaminophen]    Isolation: Droplet   Infection: Influenza (04/05/22)   Code Status: CPR   Advance Care Planning Activity    Ht: 162.6 cm (64\")   Wt: 118 kg (259 lb 3.2 oz)    Admission Cmt: None   Principal Problem: Dyspnea [R06.00]                 Active Insurance as of 4/5/2022     Primary Coverage     Payor Plan Insurance Group Employer/Plan Group    [facility] MEDICARE REPLACEMENT [facility] MEDICARE REPLACEMENT [id number]     Payor Plan Address Payor Plan Phone Number Payor Plan Fax Number Effective Dates    [address] [address]   1/1/2021 - None Entered    [facility] [id number]       Subscriber Name Subscriber Birth Date Member ID       TESSIE CONNER 1970 [id number]           Secondary Coverage     Payor Plan Insurance Group Employer/Plan Group    [place]      Payor Plan Address Payor Plan Phone Number Payor Plan Fax Number Effective Dates    [address]   11/20/2019 - None Entered    [place] [address]       Subscriber Name Subscriber Birth Date Member ID       TESSIE CONNER 1970 [id number]                 Emergency Contacts      (Rel.) Home Phone Work Phone Mobile Phone    SABINACLARITA HEMPHILL (Spouse) [phone] -- [phone]    sabinaaime hemphill (Son) -- -- [phone]    "

## 2022-04-07 ENCOUNTER — TRANSITIONAL CARE MANAGEMENT TELEPHONE ENCOUNTER (OUTPATIENT)
Dept: CALL CENTER | Facility: HOSPITAL | Age: 52
End: 2022-04-07

## 2022-04-07 ENCOUNTER — OFFICE VISIT (OUTPATIENT)
Dept: FAMILY MEDICINE CLINIC | Facility: CLINIC | Age: 52
End: 2022-04-07

## 2022-04-07 VITALS
HEIGHT: 64 IN | HEART RATE: 102 BPM | OXYGEN SATURATION: 97 % | RESPIRATION RATE: 18 BRPM | DIASTOLIC BLOOD PRESSURE: 70 MMHG | BODY MASS INDEX: 43.77 KG/M2 | SYSTOLIC BLOOD PRESSURE: 109 MMHG | TEMPERATURE: 96.9 F | WEIGHT: 256.4 LBS

## 2022-04-07 DIAGNOSIS — F31.77 BIPOLAR DISORDER, IN PARTIAL REMISSION, MOST RECENT EPISODE MIXED: ICD-10-CM

## 2022-04-07 DIAGNOSIS — E66.01 MORBID OBESITY: Primary | Chronic | ICD-10-CM

## 2022-04-07 DIAGNOSIS — IMO0001 LUNG NODULE < 6CM ON CT: ICD-10-CM

## 2022-04-07 DIAGNOSIS — J40 BRONCHITIS: ICD-10-CM

## 2022-04-07 DIAGNOSIS — Z09 HOSPITAL DISCHARGE FOLLOW-UP: ICD-10-CM

## 2022-04-07 PROCEDURE — 99495 TRANSJ CARE MGMT MOD F2F 14D: CPT | Performed by: INTERNAL MEDICINE

## 2022-04-07 PROCEDURE — 1111F DSCHRG MED/CURRENT MED MERGE: CPT | Performed by: INTERNAL MEDICINE

## 2022-04-07 NOTE — PROGRESS NOTES
Transitional Care Follow Up Visit  Subjective     Tessie Conner is a 52 y.o. female who presents for a transitional care management visit.    Within 48 business hours after discharge our office contacted her via telephone to coordinate her care and needs.      I reviewed and discussed the details of that call along with the discharge summary, hospital problems, inpatient lab results, inpatient diagnostic studies, and consultation reports with Tessie.     Current outpatient and discharge medications have been reconciled for the patient.  Reviewed by: Marsha Lopez MD      Date of TCM Phone Call 4/6/2022   [facility]   Date of Admission 4/5/2022   Date of Discharge 4/6/2022   Discharge Disposition Home or Self Care     Risk for Readmission (LACE) No data recorded      History of Present Illness   Course During Hospital Stay:  Admitted for overnight observation / 1 day/1 night    The patient presents today for a transitional care management visit.    The patient reports she was in the hospital on 03/31/2021 for shortness of breath and lots of wheezing. She states she was told that she had bronchitis and was sent home with antibiotics and steroids. The patient notes she had a chest x-ray done where a 2.2 cm nodule was discovered. She adds they did a CT on 04/05/2022 and they said there was multiple nodules, but they were all millimeters. The patient notes her next visit with Dr. Carroll is 06/01/2022. She reports she is on prednisone and doxycycline. The patient states she was on doxycycline and they took her off of it at the hospital on 04/05/2022. She reports she is now countdown prednisone and albuterol. The patient adds she is taking Tessalon for cough, she had already taken 7 days worth. The patient reports she was told to keep taking Allegra through this whole season. She denies having bats in her attic or going into caves a lot. The patient notes she did previously have a chicken coupe 4  "years ago. She adds she did not have temperature when she had he flu, she had a lot of achiness which was not bad. The patient reports they ran a COVID test at first which was negative then did a entire respiratory panel. She notes she was coughing rally bad it had her tongue hurting. The patient states her blood sugars were high at the hospital. She reports she does not check her blood sugars at home because she does not have to monitor anymore. The patient adds she has been on steroids since 03/31/2022. She reports she is staying stable with her Effexor and Abilify. The patient states she did not even use all her anxiety pills, most of them she did not use. She notes she was always afraid she was going to end up holding onto too much, she does not want to hold on to it unless the stress gets so bad she could not handle it. The patient adds her nephew was staying he was getting hot and cold flashes but they were not in close proximity with each. She states she took her flu shot, pneumonia shot and COVID shot. The patient reports she is still having wheezing in her right lung. She states every 4 hours she has to wake up in the night to take medication. The patient last nebulizer was 1130AM and she will do it again at 330PM. She feels she gets more medication with the nebulizer machine versus the albuterol inhaler. The patient has pain under her tongue from coughing.       The following portions of the patient's history were reviewed and updated as appropriate: current medications, past family history, past medical history, past social history, past surgical history and problem list.    Review of Systems    /70 (BP Location: Left arm, Patient Position: Sitting, Cuff Size: Large Adult)   Pulse 102   Temp 96.9 °F (36.1 °C) (Infrared)   Resp 18   Ht 162.6 cm (64\")   Wt 116 kg (256 lb 6.4 oz)   SpO2 97%   BMI 44.01 kg/m²   Objective   Physical Exam  Vitals and nursing note reviewed.   Constitutional:       " Appearance: Normal appearance. She is well-developed. She is obese.   HENT:      Head: Normocephalic and atraumatic.      Right Ear: Tympanic membrane normal.      Left Ear: Tympanic membrane normal.      Nose: No rhinorrhea.      Mouth/Throat:      Pharynx: No posterior oropharyngeal erythema.   Eyes:      Pupils: Pupils are equal, round, and reactive to light.   Cardiovascular:      Rate and Rhythm: Normal rate and regular rhythm.      Pulses: Normal pulses.      Heart sounds: Normal heart sounds. No murmur heard.  Pulmonary:      Effort: Pulmonary effort is normal.      Breath sounds: Normal breath sounds. No wheezing.   Abdominal:      General: Bowel sounds are normal. There is no distension.      Palpations: Abdomen is soft.   Musculoskeletal:         General: No tenderness.      Cervical back: Normal range of motion and neck supple.   Skin:     Capillary Refill: Capillary refill takes less than 2 seconds.   Neurological:      Mental Status: She is alert and oriented to person, place, and time.   Psychiatric:         Mood and Affect: Mood normal.         Behavior: Behavior normal.         Assessment/Plan   Diagnoses and all orders for this visit:    1. Morbid obesity (HCC) (Primary)    2. Hospital discharge follow-up    3. Bipolar disorder, in partial remission, most recent episode mixed (HCC)    4. Lung nodule < 6cm on CT    5. Bronchitis        - She will finish out the steroids and take deep breaths.        - I advised the patient to drink lots of water.         - If she starts feeling better, she will sleep through the night, then we will slowly wean her off the nebulizer.      They were informed of the diagnosis and treatment plan and directed to f/u for any further problems or concerns.       Transcribed from ambient dictation for Mrasha Lopez MD by Erwin Guaman.  04/07/22   15:18 EDT    Patient verbalized consent to the visit recording.  I have personally performed the services described  in this document as transcribed by the above individual, and it is both accurate and complete.  Marsha Lopez MD  4/23/2022  10:45 EDT

## 2022-04-07 NOTE — CASE MANAGEMENT/SOCIAL WORK
Case Management Discharge Note      Final Note: home             Durable Medical Equipment Coordination complete.    Service Provider Selected Services Address Phone Fax Patient Preferred    [facility]  Durable Medical Equipment [address], [place] [address] [phone] --                  Transportation Services  Private: Car    Final Discharge Disposition Code: 01 - home or self-care

## 2022-04-07 NOTE — OUTREACH NOTE
Call Center TCM Note    Flowsheet Row Responses   [facility] patient discharged from? [facility]   Does the patient have one of the following disease processes/diagnoses(primary or secondary)? Other   TCM attempt successful? Yes   Discharge diagnosis Cough and dyspnea   Wrap up additional comments Pt d/c from [facility] on 04/06/2022. Pt today completed FACE TO FACE TCM APPT with PCP Dr Lopez.           Misa Li MA    4/7/2022, 13:01 EDT

## 2022-04-12 ENCOUNTER — TELEPHONE (OUTPATIENT)
Dept: SURGERY | Facility: CLINIC | Age: 52
End: 2022-04-12

## 2022-04-12 NOTE — TELEPHONE ENCOUNTER
Patient called stating she  had CTA chest in the hospital last week. since testing was stable she wants to know if you want to push her follow up and CT chest out 2 more months? or do you want to keep her follow up in June ( 2 months from CTA chest)? Dr. maurer wants to see the patient in June with ct chest and follow up. No change in plan. Left message for patient

## 2022-05-11 ENCOUNTER — TELEPHONE (OUTPATIENT)
Dept: FAMILY MEDICINE CLINIC | Facility: CLINIC | Age: 52
End: 2022-05-11

## 2022-05-11 DIAGNOSIS — R51.9 ACUTE NONINTRACTABLE HEADACHE, UNSPECIFIED HEADACHE TYPE: Primary | ICD-10-CM

## 2022-05-11 NOTE — TELEPHONE ENCOUNTER
She needs to do a COVID test- I placed order for one at [facility] lab if she doesn't have a home test

## 2022-05-11 NOTE — TELEPHONE ENCOUNTER
Caller: Tessie Conner    Relationship: Self    Best call back number:[phone]     What medication are you requesting: DR. JARAMILLO SUGGESTION     What are your current symptoms: SORE THROAT, HEADACHE, SOME NAUSEA     How long have you been experiencing symptoms:2 DAYS     Have you had these symptoms before:    [] Yes  [x] No    Have you been treated for these symptoms before:   [] Yes  [x] No    If a prescription is needed, what is your preferred pharmacy and phone number:      [place] [address] [facility] [phone] [facility] [phone]   [phone]      Additional notes:    TESSIE IS UNABLE TO LEAVE WORK TO BE SOON,  SHE WOULD LIKE TO KNOW  IF DR. JARAMILLO WILL SEND PRESCRIPTION TO PHARMACY .

## 2022-06-01 ENCOUNTER — APPOINTMENT (OUTPATIENT)
Dept: CT IMAGING | Facility: HOSPITAL | Age: 52
End: 2022-06-01

## 2022-06-04 ENCOUNTER — APPOINTMENT (OUTPATIENT)
Dept: CT IMAGING | Facility: HOSPITAL | Age: 52
End: 2022-06-04

## 2022-07-04 ENCOUNTER — HOSPITAL ENCOUNTER (EMERGENCY)
Facility: HOSPITAL | Age: 52
Discharge: HOME | End: 2022-07-04
Attending: EMERGENCY MEDICINE | Admitting: EMERGENCY MEDICINE

## 2022-07-04 VITALS
HEART RATE: 60 BPM | RESPIRATION RATE: 17 BRPM | HEIGHT: 65 IN | TEMPERATURE: 97.8 F | SYSTOLIC BLOOD PRESSURE: 129 MMHG | OXYGEN SATURATION: 97 % | DIASTOLIC BLOOD PRESSURE: 63 MMHG | WEIGHT: 253.31 LBS | BODY MASS INDEX: 42.2 KG/M2

## 2022-07-04 DIAGNOSIS — M79.10 MYALGIA: Primary | ICD-10-CM

## 2022-07-04 LAB
ANION GAP SERPL CALCULATED.3IONS-SCNC: 12 MMOL/L (ref 5–15)
BASOPHILS # BLD AUTO: 0 10*3/MM3 (ref 0–0.2)
BASOPHILS NFR BLD AUTO: 0.2 % (ref 0–1.5)
BUN SERPL-MCNC: 12 MG/DL (ref 6–20)
BUN/CREAT SERPL: 16.4 (ref 7–25)
CALCIUM SPEC-SCNC: 9.2 MG/DL (ref 8.6–10.5)
CHLORIDE SERPL-SCNC: 101 MMOL/L (ref 98–107)
CO2 SERPL-SCNC: 25 MMOL/L (ref 22–29)
CREAT SERPL-MCNC: 0.73 MG/DL (ref 0.57–1)
DEPRECATED RDW RBC AUTO: 47.7 FL (ref 37–54)
EGFRCR SERPLBLD CKD-EPI 2021: 99.7 ML/MIN/1.73
EOSINOPHIL # BLD AUTO: 0 10*3/MM3 (ref 0–0.4)
EOSINOPHIL NFR BLD AUTO: 0.1 % (ref 0.3–6.2)
ERYTHROCYTE [DISTWIDTH] IN BLOOD BY AUTOMATED COUNT: 16.8 % (ref 12.3–15.4)
GLUCOSE SERPL-MCNC: 92 MG/DL (ref 65–99)
HCT VFR BLD AUTO: 37.6 % (ref 34–46.6)
HGB BLD-MCNC: 12.1 G/DL (ref 12–15.9)
LYMPHOCYTES # BLD AUTO: 1.9 10*3/MM3 (ref 0.7–3.1)
LYMPHOCYTES NFR BLD AUTO: 25.1 % (ref 19.6–45.3)
MCH RBC QN AUTO: 26 PG (ref 26.6–33)
MCHC RBC AUTO-ENTMCNC: 32.1 G/DL (ref 31.5–35.7)
MCV RBC AUTO: 80.9 FL (ref 79–97)
MONOCYTES # BLD AUTO: 0.6 10*3/MM3 (ref 0.1–0.9)
MONOCYTES NFR BLD AUTO: 7.6 % (ref 5–12)
NEUTROPHILS NFR BLD AUTO: 5.2 10*3/MM3 (ref 1.7–7)
NEUTROPHILS NFR BLD AUTO: 67 % (ref 42.7–76)
NRBC BLD AUTO-RTO: 0 /100 WBC (ref 0–0.2)
PLATELET # BLD AUTO: 244 10*3/MM3 (ref 140–450)
PMV BLD AUTO: 7.8 FL (ref 6–12)
POTASSIUM SERPL-SCNC: 4.3 MMOL/L (ref 3.5–5.2)
RBC # BLD AUTO: 4.64 10*6/MM3 (ref 3.77–5.28)
SODIUM SERPL-SCNC: 138 MMOL/L (ref 136–145)
WBC NRBC COR # BLD: 7.7 10*3/MM3 (ref 3.4–10.8)

## 2022-07-04 PROCEDURE — 85025 COMPLETE CBC W/AUTO DIFF WBC: CPT | Performed by: EMERGENCY MEDICINE

## 2022-07-04 PROCEDURE — 80048 BASIC METABOLIC PNL TOTAL CA: CPT | Performed by: EMERGENCY MEDICINE

## 2022-07-04 PROCEDURE — 99283 EMERGENCY DEPT VISIT LOW MDM: CPT

## 2022-07-04 PROCEDURE — 36415 COLL VENOUS BLD VENIPUNCTURE: CPT

## 2022-07-04 RX ORDER — ASPIRIN 81 MG/1
81 TABLET, CHEWABLE ORAL DAILY
COMMUNITY
End: 2023-01-18

## 2022-07-04 RX ORDER — NAPROXEN 375 MG/1
375 TABLET ORAL 2 TIMES DAILY PRN
Qty: 14 TABLET | Refills: 0 | Status: SHIPPED | OUTPATIENT
Start: 2022-07-04 | End: 2022-07-13

## 2022-07-04 NOTE — ED TRIAGE NOTES
Reports BLE cramps for past 3 months, pt reports worsening over past 2 days-worse to LLE, pt reports cramping with ambulation and at rest, c/o knot just below knee to LLE. Pt denies any h/o blood clots, no injury.

## 2022-07-04 NOTE — ED PROVIDER NOTES
Subjective   Patient is a 52-year-old female complaining of muscle spasms and pain in her legs admitted for the past several years.  She states been worse over the past several days.  Denies recent trauma numbness tingling weakness or other complaint.          Review of Systems  Negative for cough fever chest pain shortness of breath Rupert diarrhea dysuria achiness weight loss or other complaint.  A complete review system was obtained and is otherwise negative  Past Medical History:   Diagnosis Date   • Anxiety    • Arthritis    • Asthma 2020   • At risk for sleep apnea    • Bipolar 1 disorder (HCC)    • Depression    • Dyspnea on minimal exertion    • Frequent UTI    • Heart murmur     FROM CHILDHOOD   • History of anemia     POST PREGNANCY   • Hyperlipidemia 10/04/2016   • Hypertension    • Mass of upper lobe of right lung    • Migraines    • Obesity    • PONV (postoperative nausea and vomiting)    • Spinal headache    • Visual impairment     WEARS GLASSES       Allergies   Allergen Reactions   • Percocet [Oxycodone-Acetaminophen] Mental Status Change       Past Surgical History:   Procedure Laterality Date   • APPENDECTOMY     • CARDIAC CATHETERIZATION N/A 2020    Procedure: Left Heart Cath possible PCI, atherectomy, hemodynamic support;  Surgeon: Thomas Zamora MD;  Location: [facility] INVASIVE LOCATION;  Service: Cardiology;  Laterality: N/A;   •  SECTION     • FOOT/TOE TENDON REPAIR Left    • HYSTERECTOMY     • LUNG BIOPSY Right 2020   • LUNG LOBECTOMY Right 2022    pt had partial Right lobectomy and nodule removal   • ROTATOR CUFF REPAIR Left    • THORACOSCOPY VIDEO ASSISTED WITH LOBECTOMY Right 2022    Procedure: BRONCHOSCOPY, THORACOSCOPY VIDEO ASSISTED wedge resection X2, INTERCOSTAL NERVE BLOCK;  Surgeon: Ayla Carroll MD;  Location: [facility] OR;  Service: Thoracic;  Laterality: Right;   • TUBAL ABDOMINAL LIGATION          Family History   Problem Relation Age of Onset   • Arthritis Mother    • Cancer Mother    • Depression Mother    • Diabetes Mother    • Early death Mother    • Mental illness Mother    • Anxiety disorder Mother    • Alcohol abuse Father    • Diabetes Father    • Early death Father    • Heart disease Father    • Hyperlipidemia Father    • Hypertension Father    • Vision loss Father    • Heart disease Sister    • Heart disease Brother    • Hypertension Brother    • Cancer Maternal Grandmother    • Drug abuse Brother    • Drug abuse Sister    • Malig Hyperthermia Neg Hx        Social History     Socioeconomic History   • Marital status:    Tobacco Use   • Smoking status: Never Smoker   • Smokeless tobacco: Never Used   Vaping Use   • Vaping Use: Never used   Substance and Sexual Activity   • Alcohol use: Yes     Comment: VERY RARE   • Drug use: No   • Sexual activity: Not Currently     Partners: Male     Birth control/protection: None           Objective   Physical Exam  HEENT exam shows TMs to be clear.  Oropharynx comers but sclera nonicteric.  Neck has no adenopathy JVD or bruits.  Lungs clear.  Heart is regular rate rhythm without murmur gallop.  Chest is nontender.  Abdomen is soft nontender.  Extremity exam is no cyanosis or edema.  Procedures           ED Course      Results for orders placed or performed during the hospital encounter of 07/04/22   Basic Metabolic Panel    Specimen: Arm, Right; Blood   Result Value Ref Range    Glucose 92 65 - 99 mg/dL    BUN 12 6 - 20 mg/dL    Creatinine 0.73 0.57 - 1.00 mg/dL    Sodium 138 136 - 145 mmol/L    Potassium 4.3 3.5 - 5.2 mmol/L    Chloride 101 98 - 107 mmol/L    CO2 25.0 22.0 - 29.0 mmol/L    Calcium 9.2 8.6 - 10.5 mg/dL    BUN/Creatinine Ratio 16.4 7.0 - 25.0    Anion Gap 12.0 5.0 - 15.0 mmol/L    eGFR 99.7 >60.0 mL/min/1.73   CBC Auto Differential    Specimen: Arm, Right; Blood   Result Value Ref Range    WBC 7.70 3.40 - 10.80 10*3/mm3    RBC 4.64  3.77 - 5.28 10*6/mm3    Hemoglobin 12.1 12.0 - 15.9 g/dL    Hematocrit 37.6 34.0 - 46.6 %    MCV 80.9 79.0 - 97.0 fL    MCH 26.0 (L) 26.6 - 33.0 pg    MCHC 32.1 31.5 - 35.7 g/dL    RDW 16.8 (H) 12.3 - 15.4 %    RDW-SD 47.7 37.0 - 54.0 fl    MPV 7.8 6.0 - 12.0 fL    Platelets 244 140 - 450 10*3/mm3    Neutrophil % 67.0 42.7 - 76.0 %    Lymphocyte % 25.1 19.6 - 45.3 %    Monocyte % 7.6 5.0 - 12.0 %    Eosinophil % 0.1 (L) 0.3 - 6.2 %    Basophil % 0.2 0.0 - 1.5 %    Neutrophils, Absolute 5.20 1.70 - 7.00 10*3/mm3    Lymphocytes, Absolute 1.90 0.70 - 3.10 10*3/mm3    Monocytes, Absolute 0.60 0.10 - 0.90 10*3/mm3    Eosinophils, Absolute 0.00 0.00 - 0.40 10*3/mm3    Basophils, Absolute 0.00 0.00 - 0.20 10*3/mm3    nRBC 0.0 0.0 - 0.2 /100 WBC                                          MDM  Number of Diagnoses or Management Options  Diagnosis management comments: Patient is a 52-year-old male with no evidence of metabolic abnormality or renal insufficiency.  There is no evidence acute infectious process.  Patient has benign physical exam.  She will be discharged.  She will be placed on Naprosyn.  She is instructed to follow-up with her family physician for further outpatient evaluation.    Risk of Complications, Morbidity, and/or Mortality  Presenting problems: moderate  Diagnostic procedures: moderate  Management options: moderate    Patient Progress  Patient progress: stable      Final diagnoses:   Myalgia       ED Disposition  ED Disposition     ED Disposition   Discharge    Condition   Stable    Comment   --             No follow-up provider specified.       Medication List      New Prescriptions    naproxen 375 MG tablet  Commonly known as: NAPROSYN  Take 1 tablet by mouth 2 (Two) Times a Day As Needed for Moderate Pain .        Changed    atorvastatin 20 MG tablet  Commonly known as: Lipitor  Take 1 tablet by mouth Daily.  What changed: when to take this           Where to Get Your Medications      These medications  were sent to [facility]/pharmacy #[id number] - [place], [place] - [address] - [phone]  - [phone]  [address] [place] [address] [place] [address]    Hours: 24-hours Phone: [phone]   · naproxen 375 MG tablet          Manny Isaac MD  07/04/22 5064

## 2022-07-08 ENCOUNTER — TELEPHONE (OUTPATIENT)
Dept: FAMILY MEDICINE CLINIC | Facility: CLINIC | Age: 52
End: 2022-07-08

## 2022-07-08 RX ORDER — VENLAFAXINE HYDROCHLORIDE 150 MG/1
150 CAPSULE, EXTENDED RELEASE ORAL 2 TIMES DAILY
Qty: 60 CAPSULE | Refills: 3 | Status: SHIPPED | OUTPATIENT
Start: 2022-07-08 | End: 2023-01-12

## 2022-07-08 NOTE — TELEPHONE ENCOUNTER
Caller: [place] Tessie L    Relationship: Self    Best call back number: [phone]     What medications are you currently taking:   Current Outpatient Medications on File Prior to Visit   Medication Sig Dispense Refill   • albuterol sulfate  (90 Base) MCG/ACT inhaler Inhale 2 puffs Every 4 (Four) Hours As Needed for Wheezing. 8 g 0   • ARIPiprazole (ABILIFY) 20 MG tablet Take 15 mg by mouth Every Night.     • aspirin 81 MG chewable tablet Chew 81 mg Daily.     • atorvastatin (Lipitor) 20 MG tablet Take 1 tablet by mouth Daily. (Patient taking differently: Take 20 mg by mouth Every Night.) 90 tablet 3   • celecoxib (CeleBREX) 100 MG capsule Take 100 mg by mouth 2 (Two) Times a Day As Needed for Mild Pain .     • dicyclomine (Bentyl) 10 MG capsule Take 1 capsule by mouth 4 (Four) Times a Day Before Meals & at Bedtime. 120 capsule 0   • ipratropium-albuterol (DUO-NEB) 0.5-2.5 mg/3 ml nebulizer Take 3 mL by nebulization Every 4 (Four) Hours As Needed for Wheezing. 360 mL 0   • lisinopril (PRINIVIL,ZESTRIL) 5 MG tablet Take 1 tablet by mouth Daily for 180 days. 90 tablet 1   • naproxen (NAPROSYN) 375 MG tablet Take 1 tablet by mouth 2 (Two) Times a Day As Needed for Moderate Pain . 14 tablet 0   • predniSONE (DELTASONE) 10 MG tablet Take 5 tablets by mouth on day 1 & 2, 4 tablets on day 3 & 4, 3 tablets on day 5 & 6, 2 tablets on day 7 & 8, 1 tablet on day 9 &10 (Patient taking differently: Take 5 tablets by mouth on day 1 & 2, 4 tablets on day 3 & 4, 3 tablets on day 5 & 6, 2 tablets on day 7 & 8, 1 tablet on day 9 &10) 30 tablet 0   • venlafaxine XR (EFFEXOR-XR) 150 MG 24 hr capsule Take 150 mg by mouth 2 (Two) Times a Day.       No current facility-administered medications on file prior to visit.          When did you start taking these medications: 20 YEARS       Which medication are you concerned about:ABILIFY, EFFEXOR    Who prescribed you this medication: DR. JARAMILLO     What are your concerns: TESSIE  SAYS SHE IS WANTING TO DISCUSS THE DISCONTINUATION OF ABILIFY, REDUCTION OF EFFEXOR SHE IS AFRAID OF SIDE EFFECTS THAT SHE MAY HAVE WITHOUT THE MEDICATION, SHE IS WANTING TO KNOW IF DR. JARAMILLO WOULD CONSIDER REPLACING MEDICATION .    SHE SAYS SHE IS NOT SUICIDAL , BUT DOES NOT WANT TO GET BACK TO THAT PLACE WITH DISCONTINUATION OF THE MEDICATIONS.    PLEASE ADVISE     How long have you had these concerns: TODAY     [address] - [facility]8 MARQUEZ MENENDEZ - [phone] [facility] [phone]   [phone]

## 2022-07-08 NOTE — TELEPHONE ENCOUNTER
Called and said had twitching on tongue so weaned off abilify with new psych but also decreased effexor and feels more depressed.    will increased back to 300mg daily of effexor and then when TD resolved will find another stablizer for her if needed

## 2022-07-08 NOTE — TELEPHONE ENCOUNTER
Caller: Tessie Conner    Relationship to patient: Self    Best call back number: [phone]    Chief complaint: FOLLOW UP FROM EMERGENCY ROOM VISIT, PAIN IN LEGS     Type of visit: HOSPITAL FOLLOW UP     Requested date: SEEN AT THE EMERGENCY ROOM ON 07/04/122     If rescheduling, when is the original appointment: N/A     Additional notes: PATIENT WAS SEEN AT THE EMERGENCY ROOM ON 07/04 AND NEEDS TO SCHEDULE A HOSPITAL FOLLOW UP VISIT

## 2022-07-13 ENCOUNTER — OFFICE VISIT (OUTPATIENT)
Dept: FAMILY MEDICINE CLINIC | Facility: CLINIC | Age: 52
End: 2022-07-13

## 2022-07-13 VITALS
SYSTOLIC BLOOD PRESSURE: 130 MMHG | WEIGHT: 254 LBS | BODY MASS INDEX: 42.32 KG/M2 | OXYGEN SATURATION: 98 % | DIASTOLIC BLOOD PRESSURE: 94 MMHG | HEIGHT: 65 IN | TEMPERATURE: 97.3 F | RESPIRATION RATE: 16 BRPM | HEART RATE: 76 BPM

## 2022-07-13 DIAGNOSIS — M70.62 TROCHANTERIC BURSITIS OF LEFT HIP: Primary | ICD-10-CM

## 2022-07-13 DIAGNOSIS — I10 PRIMARY HYPERTENSION: ICD-10-CM

## 2022-07-13 PROCEDURE — 99214 OFFICE O/P EST MOD 30 MIN: CPT | Performed by: INTERNAL MEDICINE

## 2022-07-13 PROCEDURE — 20610 DRAIN/INJ JOINT/BURSA W/O US: CPT | Performed by: INTERNAL MEDICINE

## 2022-07-13 RX ORDER — TRIAMCINOLONE ACETONIDE 40 MG/ML
40 INJECTION, SUSPENSION INTRA-ARTICULAR; INTRAMUSCULAR ONCE
Status: COMPLETED | OUTPATIENT
Start: 2022-07-13 | End: 2022-07-13

## 2022-07-13 RX ORDER — LISINOPRIL 2.5 MG/1
2.5 TABLET ORAL DAILY
Qty: 30 TABLET | Refills: 1 | Status: SHIPPED | OUTPATIENT
Start: 2022-07-13 | End: 2022-09-16

## 2022-07-13 RX ADMIN — TRIAMCINOLONE ACETONIDE 40 MG: 40 INJECTION, SUSPENSION INTRA-ARTICULAR; INTRAMUSCULAR at 17:14

## 2022-07-23 ENCOUNTER — HOSPITAL ENCOUNTER (OUTPATIENT)
Dept: CT IMAGING | Facility: HOSPITAL | Age: 52
Discharge: HOME | End: 2022-07-23
Admitting: INTERNAL MEDICINE

## 2022-07-23 DIAGNOSIS — R91.1 LUNG NODULE: ICD-10-CM

## 2022-07-23 DIAGNOSIS — R16.1 SPLEEN ENLARGEMENT: ICD-10-CM

## 2022-07-23 PROCEDURE — 71260 CT THORAX DX C+: CPT

## 2022-07-23 PROCEDURE — 0 IOPAMIDOL PER 1 ML: Performed by: INTERNAL MEDICINE

## 2022-07-23 PROCEDURE — 74177 CT ABD & PELVIS W/CONTRAST: CPT

## 2022-07-23 RX ADMIN — IOPAMIDOL 100 ML: 755 INJECTION, SOLUTION INTRAVENOUS at 09:34

## 2022-07-26 ENCOUNTER — TELEPHONE (OUTPATIENT)
Dept: ONCOLOGY | Facility: CLINIC | Age: 52
End: 2022-07-26

## 2022-07-26 NOTE — TELEPHONE ENCOUNTER
Caller: Tessie Conner    Relationship: Self    Best call back number: [phone]    What is the best time to reach you: ANYTIME    Who are you requesting to speak with (clinical staff, provider,  specific staff member): DR KOLB OR NURSE    What was the call regarding: PT STATED THAT ANOTHER DR SHE HAS BELIEVES SHE MAY HAVE HEMOLYTIC ANEMIA AND SHE ALSO HAS AN ENLARGING SPLEEN. SHE WOULD LIKE TO BE SEEN ASAP THIS WEEK IF POSSIBLE, Thursday 7/28 WOULD WORK GOOD FOR HER EITHER IN THE MORNING OR AROUND NOON.    PLEASE CALL ASAP TO ADVISE.     Do you require a callback: YES

## 2022-07-27 ENCOUNTER — TELEPHONE (OUTPATIENT)
Dept: ONCOLOGY | Facility: CLINIC | Age: 52
End: 2022-07-27

## 2022-07-27 NOTE — TELEPHONE ENCOUNTER
Caller: Tessie Conner    Relationship: Self        What was the call regarding:     CALLING BACK REGARDING GETTING AN APPT, Thursday 07/28 TO BE SEEN HAS NOT HEARD BACK .      WARM TRANSFERRED TO DEVANTE  TO FURTHER ASSIST.

## 2022-07-28 ENCOUNTER — OFFICE VISIT (OUTPATIENT)
Dept: ONCOLOGY | Facility: CLINIC | Age: 52
End: 2022-07-28

## 2022-07-28 ENCOUNTER — OFFICE VISIT (OUTPATIENT)
Dept: SURGERY | Facility: CLINIC | Age: 52
End: 2022-07-28

## 2022-07-28 ENCOUNTER — OFFICE VISIT (OUTPATIENT)
Dept: FAMILY MEDICINE CLINIC | Facility: CLINIC | Age: 52
End: 2022-07-28

## 2022-07-28 ENCOUNTER — LAB (OUTPATIENT)
Dept: LAB | Facility: HOSPITAL | Age: 52
End: 2022-07-28

## 2022-07-28 VITALS
HEIGHT: 65 IN | SYSTOLIC BLOOD PRESSURE: 105 MMHG | RESPIRATION RATE: 18 BRPM | DIASTOLIC BLOOD PRESSURE: 66 MMHG | OXYGEN SATURATION: 96 % | HEART RATE: 101 BPM | WEIGHT: 253 LBS | TEMPERATURE: 96.9 F | BODY MASS INDEX: 42.15 KG/M2

## 2022-07-28 VITALS
HEART RATE: 76 BPM | TEMPERATURE: 98.4 F | SYSTOLIC BLOOD PRESSURE: 123 MMHG | BODY MASS INDEX: 42.15 KG/M2 | HEIGHT: 65 IN | DIASTOLIC BLOOD PRESSURE: 79 MMHG | WEIGHT: 253 LBS | OXYGEN SATURATION: 97 %

## 2022-07-28 VITALS
BODY MASS INDEX: 42.15 KG/M2 | TEMPERATURE: 97.3 F | OXYGEN SATURATION: 98 % | WEIGHT: 253 LBS | DIASTOLIC BLOOD PRESSURE: 84 MMHG | HEIGHT: 65 IN | HEART RATE: 73 BPM | SYSTOLIC BLOOD PRESSURE: 118 MMHG | RESPIRATION RATE: 16 BRPM

## 2022-07-28 DIAGNOSIS — R16.1 SPLENOMEGALY: Primary | ICD-10-CM

## 2022-07-28 DIAGNOSIS — R53.83 OTHER FATIGUE: ICD-10-CM

## 2022-07-28 DIAGNOSIS — R91.1 LUNG NODULE: Primary | ICD-10-CM

## 2022-07-28 LAB
ALBUMIN SERPL-MCNC: 4.4 G/DL (ref 3.5–5.2)
ALBUMIN/GLOB SERPL: 1.3 G/DL
ALP SERPL-CCNC: 156 U/L (ref 39–117)
ALT SERPL W P-5'-P-CCNC: 16 U/L (ref 1–33)
ANION GAP SERPL CALCULATED.3IONS-SCNC: 11 MMOL/L (ref 5–15)
AST SERPL-CCNC: 16 U/L (ref 1–32)
BASOPHILS # BLD AUTO: 0.01 10*3/MM3 (ref 0–0.2)
BASOPHILS NFR BLD AUTO: 0.1 % (ref 0–1.5)
BILIRUB SERPL-MCNC: 0.3 MG/DL (ref 0–1.2)
BUN SERPL-MCNC: 11 MG/DL (ref 6–20)
BUN/CREAT SERPL: 10.1 (ref 7–25)
CALCIUM SPEC-SCNC: 10 MG/DL (ref 8.6–10.5)
CHLORIDE SERPL-SCNC: 101 MMOL/L (ref 98–107)
CO2 SERPL-SCNC: 29 MMOL/L (ref 22–29)
CREAT SERPL-MCNC: 1.09 MG/DL (ref 0.57–1)
DEPRECATED RDW RBC AUTO: 49.7 FL (ref 37–54)
EGFRCR SERPLBLD CKD-EPI 2021: 61.6 ML/MIN/1.73
EOSINOPHIL # BLD AUTO: 0.01 10*3/MM3 (ref 0–0.4)
EOSINOPHIL NFR BLD AUTO: 0.1 % (ref 0.3–6.2)
ERYTHROCYTE [DISTWIDTH] IN BLOOD BY AUTOMATED COUNT: 15.9 % (ref 12.3–15.4)
GLOBULIN UR ELPH-MCNC: 3.3 GM/DL
GLUCOSE SERPL-MCNC: 143 MG/DL (ref 65–99)
HCT VFR BLD AUTO: 41.1 % (ref 34–46.6)
HGB BLD-MCNC: 13.2 G/DL (ref 12–15.9)
LYMPHOCYTES # BLD AUTO: 2.14 10*3/MM3 (ref 0.7–3.1)
LYMPHOCYTES NFR BLD AUTO: 19.1 % (ref 19.6–45.3)
MCH RBC QN AUTO: 27.6 PG (ref 26.6–33)
MCHC RBC AUTO-ENTMCNC: 32.1 G/DL (ref 31.5–35.7)
MCV RBC AUTO: 86 FL (ref 79–97)
MONOCYTES # BLD AUTO: 0.78 10*3/MM3 (ref 0.1–0.9)
MONOCYTES NFR BLD AUTO: 7 % (ref 5–12)
NEUTROPHILS NFR BLD AUTO: 73.7 % (ref 42.7–76)
NEUTROPHILS NFR BLD AUTO: 8.26 10*3/MM3 (ref 1.7–7)
PLATELET # BLD AUTO: 293 10*3/MM3 (ref 140–450)
PMV BLD AUTO: 9.3 FL (ref 6–12)
POTASSIUM SERPL-SCNC: 4.2 MMOL/L (ref 3.5–5.2)
PROT SERPL-MCNC: 7.7 G/DL (ref 6–8.5)
RBC # BLD AUTO: 4.78 10*6/MM3 (ref 3.77–5.28)
SODIUM SERPL-SCNC: 141 MMOL/L (ref 136–145)
T4 FREE SERPL-MCNC: 1.16 NG/DL (ref 0.93–1.7)
TSH SERPL DL<=0.05 MIU/L-ACNC: 1.2 UIU/ML (ref 0.27–4.2)
VIT B12 BLD-MCNC: 476 PG/ML (ref 211–946)
WBC NRBC COR # BLD: 11.2 10*3/MM3 (ref 3.4–10.8)

## 2022-07-28 PROCEDURE — 99214 OFFICE O/P EST MOD 30 MIN: CPT | Performed by: PHYSICIAN ASSISTANT

## 2022-07-28 PROCEDURE — 99213 OFFICE O/P EST LOW 20 MIN: CPT | Performed by: THORACIC SURGERY (CARDIOTHORACIC VASCULAR SURGERY)

## 2022-07-28 PROCEDURE — 85025 COMPLETE CBC W/AUTO DIFF WBC: CPT | Performed by: NURSE PRACTITIONER

## 2022-07-28 PROCEDURE — 84443 ASSAY THYROID STIM HORMONE: CPT

## 2022-07-28 PROCEDURE — 36415 COLL VENOUS BLD VENIPUNCTURE: CPT | Performed by: NURSE PRACTITIONER

## 2022-07-28 PROCEDURE — 82607 VITAMIN B-12: CPT | Performed by: NURSE PRACTITIONER

## 2022-07-28 PROCEDURE — 84439 ASSAY OF FREE THYROXINE: CPT

## 2022-07-28 PROCEDURE — 99214 OFFICE O/P EST MOD 30 MIN: CPT | Performed by: NURSE PRACTITIONER

## 2022-07-28 PROCEDURE — 80053 COMPREHEN METABOLIC PANEL: CPT | Performed by: NURSE PRACTITIONER

## 2022-07-28 NOTE — PROGRESS NOTES
"Chief Complaint  Follow-up (4 mo fu CT Chest, lung nodule )    Subjective        Tessie Conner presents to [facility] THORACIC SURGERY  History of Present Illness  Ms. Conner is a very pleasant 52-year-old lady who presents in follow-up for a lung nodule.    The patient reports that she is doing well. She states that she has an appointment with Dr. Lopez today at 11:00 AM and then an appointment at 2:00 PM Dr. Lin for her spleen. She reports that they think she has hemolytic anemia.    Objective   Vital Signs:  /79 (BP Location: Right arm, Patient Position: Sitting, Cuff Size: Large Adult)   Pulse 76   Temp 98.4 °F (36.9 °C) (Infrared)   Ht 165.1 cm (65\")   Wt 115 kg (253 lb)   SpO2 97%   BMI 42.10 kg/m²   Estimated body mass index is 42.1 kg/m² as calculated from the following:    Height as of this encounter: 165.1 cm (65\").    Weight as of this encounter: 115 kg (253 lb).          Physical Exam  Vitals and nursing note reviewed.   Constitutional:       Appearance: She is well-developed.   HENT:      Head: Normocephalic and atraumatic.      Nose: Nose normal.   Eyes:      Conjunctiva/sclera: Conjunctivae normal.   Cardiovascular:      Rate and Rhythm: Normal rate.   Pulmonary:      Effort: Pulmonary effort is normal.   Abdominal:      Palpations: Abdomen is soft.   Musculoskeletal:      Cervical back: Neck supple.   Skin:     General: Skin is warm and dry.   Neurological:      Mental Status: She is alert and oriented to person, place, and time.   Psychiatric:         Behavior: Behavior normal.         Thought Content: Thought content normal.         Judgment: Judgment normal.        Result Review :   I have independently reviewed the CT of the chest performed on 07/23/2022 which demonstrates scarring consistent with a right lung resection. There is decreased scarring in the right hilum now measuring 3 x 1.4. There are no new nodules. No mediastinal or hilar lymphadenopathy. " No pleural or pericardial effusion.                Assessment and Plan   There are no diagnoses linked to this encounter.   Ms. Conner is a very pleasant 52-year-old lady status post wedge resection for a caseating granuloma. She presents today in follow-up. Her CT of the chest does not demonstrate any concerning findings. The scarring from her surgery has decreased in size. She will need continued surveillance. We will plan to see her in 1 year with a CT of the chest.     I spent 20 minutes caring for Tessie on this date of service. This time includes time spent by me in the following activities:preparing for the visit, reviewing tests, obtaining and/or reviewing a separately obtained history, performing a medically appropriate examination and/or evaluation , counseling and educating the patient/family/caregiver, ordering medications, tests, or procedures, documenting information in the medical record and independently interpreting results and communicating that information with the patient/family/caregiver  Follow Up    The patient will follow up in 1 year with a CT.  No follow-ups on file.  Patient was given instructions and counseling regarding her condition or for health maintenance advice. Please see specific information pulled into the AVS if appropriate.     Transcribed from ambient dictation for Ayla Carroll MD by Joselyn Barry.  07/28/22   11:17 EDT    Patient verbalized consent to the visit recording.

## 2022-07-28 NOTE — PROGRESS NOTES
Hematology/Oncology Outpatient Follow Up    PATIENT NAME:Tessie Conner  :1970  MRN: [id number]  PRIMARY CARE PHYSICIAN: Marsha Lopez MD  REFERRING PHYSICIAN: No ref. provider found    Chief Complaint   Patient presents with   • Follow-up     Splenomegaly        HISTORY OF PRESENT ILLNESS:     This is a 51-year-old female has a history of recurrent urinary tract infection and for that reason she is actively cared for by the urology service.  During her work-up for the UTI she had a CT scan of the abdomen and pelvis on 2021 is basically showed diffuse hepatic steatosis, new mild splenomegaly with a few small hypoattenuating lesions partially imaged 2 mm right middle lobe nodule and 3 mm anterior right middle lobe nodule which are new.  Differentials on the splenomegaly mentioned include sarcoidosis, lymphoma CT scan of the chest was recommended to further evaluate.     Patient had a CBC done on 2021 white count was 7.8, hemoglobin 12, MCV was normal at 83 and platelets were 269.  Differentials are 60% neutrophils, 32% lymphocytes and 6% monocytes.     Patient scheduled for cystoscopy on 2021     Patient does complain of some night sweats, but she has not had weight loss.  She also complains of some fatigue.     Patient is  she has 3 adult children.  She does not smoke and does not drink.    · 2021 patient had an ACE level which was normal at 18 ranges 14-82.  Peripheral smear was unremarkable with no immature forms, white count was 7.7 hemoglobin, 12.3 and platelets were 255.  Differentials are 66% neutrophils, 25% lymphocytes, there was no monocytosis eosinophilia or basophilia.  Sed rate was 35 range 0-30, JAK2 analysis was negative erythropoietin level was normal at 15.6 peripheral blood flow cytometry still pending BCR ABL was negative SPEP with FOX did not reveal any monoclonal forms, JASON is negative.  Her blood flow cytometry did not show any significant  immunophenotypic phytic abnormalities.  · 11/27/2021 patient had CT scan of the chest this basically revealed a 1.3 x 1.3 cm lobulated mass in the posterior segment of the right upper lung of unclear etiology.  There are a few scattered subcentimeters lung nodules most pronounced in the right upper lobe.  There is no suspicious mass or nodule in the left lung base there is an enlarged right suprahilar lymph node measuring up to 1.3 cm.  A PET CT scan has been recommended to further evaluate  · 11/30/2021 patient had a PET CT scan which showed increased activity in the right suprahilar location measuring up to 5.6 corresponding to the central nodular density measuring about 1.7 cm.  There is mild hypermetabolism within the right paratracheal lymph node.  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 Multiple low-density foci throughout the spleen which may relate to contrast enhancement.  Multiple hemangiomas could also have this appearance.  Splenic lesions less likely. CT of the Chest  Residual focal abnormal attenuation within the central perihilar aspect of the residual right upper lung extending into the superior margin of the right hilum. The focus of abnormal attenuation is stable to mildly decreased in size.  ·   Past Medical History:   Diagnosis Date   • Abnormal ECG    • Anxiety 2005   • Arthritis    • Asthma 04/17/2020   • At risk for sleep apnea    • Bipolar 1 disorder (HCC)    • Coronary artery disease    • Depression 2005   • Diabetes  mellitus (HCC)    • Dyspnea on minimal exertion    • Frequent UTI    • Heart murmur     FROM CHILDHOOD   • History of anemia     POST PREGNANCY   • Hyperlipidemia 10/04/2016   • Hypertension    • Mass of upper lobe of right lung    • Migraines    • Obesity    • PONV (postoperative nausea and vomiting)    • Sleep apnea 22   • Spinal headache    • Visual impairment     WEARS GLASSES       Past Surgical History:   Procedure Laterality Date   • APPENDECTOMY     • CARDIAC CATHETERIZATION N/A 2020    Procedure: Left Heart Cath possible PCI, atherectomy, hemodynamic support;  Surgeon: Thomas Zamora MD;  Location: [facility] INVASIVE LOCATION;  Service: Cardiology;  Laterality: N/A;   • CARDIAC CATHETERIZATION  2020   •  SECTION     • FOOT/TOE TENDON REPAIR Left    • HYSTERECTOMY     • LUNG BIOPSY Right 2020   • LUNG LOBECTOMY Right 2022    pt had partial Right lobectomy and nodule removal   • ROTATOR CUFF REPAIR Left    • THORACOSCOPY VIDEO ASSISTED WITH LOBECTOMY Right 2022    Procedure: BRONCHOSCOPY, THORACOSCOPY VIDEO ASSISTED wedge resection X2, INTERCOSTAL NERVE BLOCK;  Surgeon: Ayla Carroll MD;  Location: [facility] OR;  Service: Thoracic;  Laterality: Right;   • TUBAL ABDOMINAL LIGATION           Current Outpatient Medications:   •  albuterol sulfate  (90 Base) MCG/ACT inhaler, Inhale 2 puffs Every 4 (Four) Hours As Needed for Wheezing., Disp: 8 g, Rfl: 0  •  aspirin 81 MG chewable tablet, Chew 81 mg Daily., Disp: , Rfl:   •  atorvastatin (Lipitor) 20 MG tablet, Take 1 tablet by mouth Daily. (Patient taking differently: Take 20 mg by mouth Every Night.), Disp: 90 tablet, Rfl: 3  •  ipratropium-albuterol (DUO-NEB) 0.5-2.5 mg/3 ml nebulizer, Take 3 mL by nebulization Every 4 (Four) Hours As Needed for Wheezing., Disp: 360 mL, Rfl: 0  •  lisinopril (PRINIVIL,ZESTRIL) 2.5 MG tablet, Take 1 tablet by mouth Daily for 180 days., Disp: 30  "tablet, Rfl: 1  •  venlafaxine XR (EFFEXOR-XR) 150 MG 24 hr capsule, Take 1 capsule by mouth 2 (Two) Times a Day., Disp: 60 capsule, Rfl: 3    Allergies   Allergen Reactions   • Percocet [Oxycodone-Acetaminophen] Mental Status Change       Family History   Problem Relation Age of Onset   • Arthritis Mother    • Cancer Mother    • Depression Mother    • Diabetes Mother    • Early death Mother    • Mental illness Mother    • Anxiety disorder Mother    • Alcohol abuse Father    • Diabetes Father    • Early death Father    • Heart disease Father    • Hyperlipidemia Father    • Hypertension Father         Father   • Vision loss Father    • Heart attack Father    • Heart disease Sister    • Heart disease Brother    • Hypertension Brother    • Cancer Maternal Grandmother    • Drug abuse Brother    • Hypertension Brother    • Drug abuse Sister    • Heart attack Sister    • Hypertension Sister         Sister   • Heart attack Brother    • Malig Hyperthermia Neg Hx        Cancer-related family history includes Cancer in her maternal grandmother and mother.    Social History     Tobacco Use   • Smoking status: Never Smoker   • Smokeless tobacco: Never Used   Vaping Use   • Vaping Use: Never used   Substance Use Topics   • Alcohol use: Yes     Comment: VERY RARE   • Drug use: No       HPI, ROS and PFSH have been reviewed and confirmed on 7/28/2022.     SUBJECTIVE:  Patient here today for follow-up of her splenomegaly.  C/o fatigue.  Saw PCP today and he ordered labs to be drawn here. She does admit that she has sleep apnea and does not wear a CPAP because she never went to the appointment to have that set up.        REVIEW OF SYSTEMS:  Review of Systems   Constitutional: Positive for fatigue.   All other systems reviewed and are negative.      OBJECTIVE:    Vitals:    07/28/22 1409   BP: 105/66   Pulse: 101   Resp: 18   Temp: 96.9 °F (36.1 °C)   SpO2: 96%   Weight: 115 kg (253 lb)   Height: 165.1 cm (65\")   PainSc: 0-No pain "     Body mass index is 42.1 kg/m².    ECOG  (0) Fully active, able to carry on all predisease performance without restriction    Physical Exam  Vitals reviewed.   Constitutional:       General: She is not in acute distress.     Appearance: Normal appearance. She is not ill-appearing.   HENT:      Head: Normocephalic and atraumatic.      Nose: Nose normal.   Eyes:      Extraocular Movements: Extraocular movements intact.      Conjunctiva/sclera: Conjunctivae normal.      Pupils: Pupils are equal, round, and reactive to light.   Cardiovascular:      Rate and Rhythm: Normal rate and regular rhythm.      Pulses: Normal pulses.      Heart sounds: Normal heart sounds.   Pulmonary:      Effort: Pulmonary effort is normal.      Breath sounds: Normal breath sounds.   Abdominal:      General: Abdomen is flat. Bowel sounds are normal.      Palpations: Abdomen is soft.   Musculoskeletal:      Cervical back: Normal range of motion and neck supple.   Skin:     General: Skin is warm and dry.   Neurological:      Mental Status: She is alert and oriented to person, place, and time.   Psychiatric:         Mood and Affect: Mood normal.          Unchanged  I have reexamined the patient and the results are consistent with the previously documented exam. Joyce Patel, BECKY     RECENT LABS  WBC   Date Value Ref Range Status   07/04/2022 7.70 3.40 - 10.80 10*3/mm3 Final     RBC   Date Value Ref Range Status   07/04/2022 4.64 3.77 - 5.28 10*6/mm3 Final     Hemoglobin   Date Value Ref Range Status   07/04/2022 12.1 12.0 - 15.9 g/dL Final     Hematocrit   Date Value Ref Range Status   07/04/2022 37.6 34.0 - 46.6 % Final     MCV   Date Value Ref Range Status   07/04/2022 80.9 79.0 - 97.0 fL Final     MCH   Date Value Ref Range Status   07/04/2022 26.0 (L) 26.6 - 33.0 pg Final     MCHC   Date Value Ref Range Status   07/04/2022 32.1 31.5 - 35.7 g/dL Final     RDW   Date Value Ref Range Status   07/04/2022 16.8 (H) 12.3 - 15.4 % Final      RDW-SD   Date Value Ref Range Status   07/04/2022 47.7 37.0 - 54.0 fl Final     MPV   Date Value Ref Range Status   07/04/2022 7.8 6.0 - 12.0 fL Final     Platelets   Date Value Ref Range Status   07/04/2022 244 140 - 450 10*3/mm3 Final     Neutrophil %   Date Value Ref Range Status   07/04/2022 67.0 42.7 - 76.0 % Final     Lymphocyte %   Date Value Ref Range Status   07/04/2022 25.1 19.6 - 45.3 % Final     Monocyte %   Date Value Ref Range Status   07/04/2022 7.6 5.0 - 12.0 % Final     Eosinophil %   Date Value Ref Range Status   07/04/2022 0.1 (L) 0.3 - 6.2 % Final     Basophil %   Date Value Ref Range Status   07/04/2022 0.2 0.0 - 1.5 % Final     Immature Grans %   Date Value Ref Range Status   02/09/2022 0.8 (H) 0.0 - 0.5 % Final     Neutrophils, Absolute   Date Value Ref Range Status   07/04/2022 5.20 1.70 - 7.00 10*3/mm3 Final     Lymphocytes, Absolute   Date Value Ref Range Status   07/04/2022 1.90 0.70 - 3.10 10*3/mm3 Final     Monocytes, Absolute   Date Value Ref Range Status   07/04/2022 0.60 0.10 - 0.90 10*3/mm3 Final     Eosinophils, Absolute   Date Value Ref Range Status   07/04/2022 0.00 0.00 - 0.40 10*3/mm3 Final     Basophils, Absolute   Date Value Ref Range Status   07/04/2022 0.00 0.00 - 0.20 10*3/mm3 Final     Immature Grans, Absolute   Date Value Ref Range Status   02/09/2022 0.11 (H) 0.00 - 0.05 10*3/mm3 Final     nRBC   Date Value Ref Range Status   07/04/2022 0.0 0.0 - 0.2 /100 WBC Final       Lab Results   Component Value Date    GLUCOSE 92 07/04/2022    BUN 12 07/04/2022    CREATININE 0.73 07/04/2022    EGFRIFNONA 71 02/15/2022    EGFRIFAFRI 88 10/05/2016    BCR 16.4 07/04/2022    K 4.3 07/04/2022    CO2 25.0 07/04/2022    CALCIUM 9.2 07/04/2022    PROTENTOTREF 7.2 11/19/2021    ALBUMIN 4.20 02/15/2022    LABIL2 1.1 11/19/2021    AST 17 02/15/2022    ALT 21 02/15/2022         Assessment & Plan     Splenomegaly  - CBC & Differential  - Comprehensive Metabolic Panel    Other fatigue  -  Vitamin B12  - TSH  - T4, Free      ASSESSMENT:      Splenomegaly  - JAK2 Mutation Analysis, Qual  - Erythropoietin  - Flow Cytometry, Blood  - BCR-ABL FISH  - Protein Elec + Interp, Serum  - JASON  - Sedimentation Rate  - Peripheral Blood Smear  - CBC & Differential  - ACE, CSF - Cerebrospinal Fluid,  - CBC & Differential        1. Pulmonary nodules, right upper lobe nodule measuring 1.3 cm, right hilar lymph node enlargement measuring also up to 1.3 cm.  Needle guided biopsy was nondiagnostic.  There were atypical cells but definitive malignancy could not be confirmed.  Patient has recommended to undergo surgical resection.  She has an appointment with Dr. Lopez coming up soon.      S/p VATS w/ Dr. Banks  2. Mild splenomegaly: So far there is no hematologic abnormalities to account for the splenomegaly.  We will continue to evaluate this with pathology showing likely histoplasmosis.       Prior discussion     I have reviewed her imaging studies available lab results.  She has mild splenomegaly, 2 small pulmonary nodules of unclear etiology.  She is scheduled for a dedicated CT scan of the chest coming up soon.  She also has cystoscopy coming up in the near future for recurrent UTIs.  Discussed the differential diagnosis of splenomegaly.  Labs have been ordered to further evaluate including ACE level, JASON, BCR ABL, EPO level, peripheral blood flow cytometry, JAK2 analysis SPEP with FOX and a sed rate.    Discussion Today    Spleen size is stable and initial work-up with Dr. Lin showed no hematological abnormalities. Lung nodules were also shown to be benign-from histoplasmosis.         Plans     · Reviewed results of her CT of the chest   · Reviewed her labs as listed above, no abnormalities identified.  · Follow-up in 6 months with Dr. Lin with repeat imaging  · Check b12 and thyroid due to fatigue, but encouraged to f/u for her CPAP as her ADY is most likely cause  · All her questions were  answered        Patient verbalized understanding and is in agreement of the above plan.

## 2022-07-29 ENCOUNTER — TELEPHONE (OUTPATIENT)
Dept: ONCOLOGY | Facility: CLINIC | Age: 52
End: 2022-07-29

## 2022-07-29 DIAGNOSIS — R16.1 SPLENOMEGALY: Primary | ICD-10-CM

## 2022-07-29 NOTE — TELEPHONE ENCOUNTER
Please call the patient to explain why her labs may be rising.  She had called and spoke with Serene in triage and she made her feel that unless they were critical that they are not important.  Patient is concerned and just asking for an explanation.

## 2022-07-29 NOTE — TELEPHONE ENCOUNTER
Caller: Tessie Conner    Relationship: Self    Best call back number: [phone]    What test was performed: LAB    When was the test performed: 07/28    Where was the test performed:

## 2022-07-29 NOTE — TELEPHONE ENCOUNTER
Spoke with SALONI Cook who saw pt yesterday and ordered the labwork and she states that all of her labs are stable, the only thing that was abnormal was her creat was slightly elevated and that can be due to dehydration, her WBC count was slightly elevated but are stable compared to all of her previous lab results. I called pt back and explained all of this to her and she is worried that we are just waiting for her labs to get critical, I explained to her that we are continuing to monitor her counts and spleen size and from our standpoint her lab work is stable. Pt states that she is waiting to hear back from her PCP but she is not comfortable to wait 6 months to have her labs checked again.  I let her know that we can recheck her CBC in a month if she would like and she stated that she will call back after she speaks with her PCP.

## 2022-07-29 NOTE — TELEPHONE ENCOUNTER
Advised patient that she had no critical lab values.  Some were slightly abnormal and that the provider or her assistant would advise further if there was something to be addressed.  She VU

## 2022-08-01 ENCOUNTER — TELEPHONE (OUTPATIENT)
Dept: FAMILY MEDICINE CLINIC | Facility: CLINIC | Age: 52
End: 2022-08-01

## 2022-08-01 NOTE — TELEPHONE ENCOUNTER
PATIENT WOULD LIKE TO DO A TELEPHONE VISIT WITH YOU DURING HER LUNCH BREAK OR AFTER WORK ONE DAY TO CLEAR UP HER CONFUSION ABOUT RESULTS. WOULD YOU BE ABLE TO DO THAT AND IF SO WHAT DAY?

## 2022-08-01 NOTE — TELEPHONE ENCOUNTER
Caller: Tessie Conner    Relationship: Self    Best call back number: [phone]    What is the best time to reach you: BETWEEN 12-1 OR AFTER 5     Who are you requesting to speak with (clinical staff, provider,  specific staff member): DR JARAMILLO         What was the call regarding: WOULD LIKE A CALL BACK TO DISCUSS HER LAB AND TEST RESULTS, WOULD LIKE TO TALK TO DR JARAMILLO, SHE IS UNCLEAR OF WHAT WAS SENT IN THE MESSAGES     Do you require a callback: YES

## 2022-08-02 ENCOUNTER — PATIENT ROUNDING (BHMG ONLY) (OUTPATIENT)
Dept: SURGERY | Facility: CLINIC | Age: 52
End: 2022-08-02

## 2022-08-02 NOTE — PROGRESS NOTES
August 2, 2022    Hello, may I speak with Tessie Conner?    My name is Apple     I am  with [facility]  [address] [place] [address].    Before we get started may I verify your date of birth? 1970    I am calling to officially welcome you to our practice and ask about your recent visit. Is this a good time to talk? yes    Tell me about your visit with us. What things went well?  patient was very satisfied with her visit.  She stated that Dr. Carroll is wonderful.       We're always looking for ways to make our patients' experiences even better. Do you have recommendations on ways we may improve?  no    Overall were you satisfied with your first visit to our practice? yes       I appreciate you taking the time to speak with me today. Is there anything else I can do for you? no      Thank you, and have a great day.

## 2022-08-03 ENCOUNTER — OFFICE VISIT (OUTPATIENT)
Dept: FAMILY MEDICINE CLINIC | Facility: CLINIC | Age: 52
End: 2022-08-03

## 2022-08-03 DIAGNOSIS — R89.9 ABNORMAL LABORATORY TEST: ICD-10-CM

## 2022-08-03 DIAGNOSIS — R16.1 SPLEEN ENLARGEMENT: ICD-10-CM

## 2022-08-03 DIAGNOSIS — Z12.31 SCREENING MAMMOGRAM FOR BREAST CANCER: Primary | ICD-10-CM

## 2022-08-03 DIAGNOSIS — F31.32 BIPOLAR 1 DISORDER, DEPRESSED, MODERATE: ICD-10-CM

## 2022-08-03 PROCEDURE — 99442 PR PHYS/QHP TELEPHONE EVALUATION 11-20 MIN: CPT | Performed by: INTERNAL MEDICINE

## 2022-08-03 NOTE — PROGRESS NOTES
Telehealth E-Visit Telephone      Patient Name: Tessie Conner  : 1970   MRN: [id number]     Chief Complaint:  No chief complaint on file.      I have reviewed the E-Visit questionnaire and the patient's answers, my assessment and plan are listed below.     This provider is located at [facility], using a secure telehealth platform through [employer]. Tessie Conner is being seen remotely via telehealth telephone visit at their home address in [place], and stated they are in a secure environment for this session. The patient's condition being diagnosed/treated is appropriate for telemedicine. I Marsha Lopez MD identified myself as well as my credentials.   The patient, and/or patients guardian, consent to be seen remotely, and when consent is given they understand that the consent allows for patient identifiable information to be sent to a third party as needed.   They may refuse to be seen remotely at any time. The electronic data is encrypted and password protected, and the patient and/or guardian has been advised of the potential risks to privacy not withstanding such measures.     You have chosen to receive care through a telehealth telephone visit.  Do you consent to use a telephone connection for your medical care today? yes  This visit complies with patient safety concerns in accordance with CDC recommendations.    History of Present Illness: Tessie Conner is a 52 y.o. female who has chosen to receive care through a telephone visit today. Tessie Conner consented to use a telephone visit for their medical care today.  Pt worried about blood tests and cT and what to do with increased spleen still. Reviewed electrolytes and the ranges      Subjective      Review of Systems:   Review of Systems    I have reviewed and the following portions of the patient's history were updated as appropriate: past family history, past medical history, past social history, past  surgical history and problem list.    Medications:     Current Outpatient Medications:   •  albuterol sulfate  (90 Base) MCG/ACT inhaler, Inhale 2 puffs Every 4 (Four) Hours As Needed for Wheezing., Disp: 8 g, Rfl: 0  •  aspirin 81 MG chewable tablet, Chew 81 mg Daily., Disp: , Rfl:   •  atorvastatin (Lipitor) 20 MG tablet, Take 1 tablet by mouth Daily. (Patient taking differently: Take 20 mg by mouth Every Night.), Disp: 90 tablet, Rfl: 3  •  cefdinir (OMNICEF) 300 MG capsule, Take 1 capsule by mouth 2 (Two) Times a Day., Disp: 10 capsule, Rfl: 0  •  ipratropium-albuterol (DUO-NEB) 0.5-2.5 mg/3 ml nebulizer, Take 3 mL by nebulization Every 4 (Four) Hours As Needed for Wheezing., Disp: 360 mL, Rfl: 0  •  lisinopril (PRINIVIL,ZESTRIL) 2.5 MG tablet, Take 1 tablet by mouth Daily for 180 days., Disp: 30 tablet, Rfl: 1  •  traMADol (ULTRAM) 50 MG tablet, Take 1 tablet by mouth Every 6 (Six) Hours As Needed for Severe Pain ., Disp: 12 tablet, Rfl: 0  •  venlafaxine XR (EFFEXOR-XR) 150 MG 24 hr capsule, Take 1 capsule by mouth 2 (Two) Times a Day., Disp: 60 capsule, Rfl: 3  No current facility-administered medications for this visit.    Allergies:   No Known Allergies    Objective     Physical Exam:  Vital Signs: There were no vitals filed for this visit.  There is no height or weight on file to calculate BMI.    Physical Exam  Vitals and nursing note reviewed.   Constitutional:       Appearance: Normal appearance. She is well-developed.   Pulmonary:      Effort: No respiratory distress.   Neurological:      Mental Status: She is alert and oriented to person, place, and time.   Psychiatric:         Mood and Affect: Mood normal.         Behavior: Behavior normal.         Assessment / Plan      Assessment/Plan:   Diagnoses and all orders for this visit:    1. Screening mammogram for breast cancer (Primary)  -     Mammo Screening Digital Tomosynthesis Bilateral With CAD; Future    2. Bipolar 1 disorder, depressed,  moderate (HCC)  Comments:  stay on same meds- will talk to therapist    3. Spleen enlargement    4. Abnormal laboratory test  Comments:  reviewed all meds          Follow Up:   Return if symptoms worsen or fail to improve.    Any medications prescribed have been sent electronically to   [id number] - [place], [place] - [address] - [phone] PH - [phone] FX  [address] [address]  [facility] [place] [address]  Phone: [phone] Fax: [phone]    [id number] - [place], [place] - [address] [facility] [address] [facility] - [phone]  - [phone] FX  [address] [facility] [place] [address]  Phone: [phone] Fax: [phone]      14 minutes were spent reviewing the patient's questionnaire, formulating a treatment plan, and relaying information to the patient via [employer].    Marsha Lopez MD  08/14/22  16:58 EDT

## 2022-08-11 ENCOUNTER — APPOINTMENT (OUTPATIENT)
Dept: CT IMAGING | Facility: HOSPITAL | Age: 52
End: 2022-08-11

## 2022-08-11 ENCOUNTER — HOSPITAL ENCOUNTER (EMERGENCY)
Facility: HOSPITAL | Age: 52
Discharge: HOME | End: 2022-08-11
Attending: EMERGENCY MEDICINE | Admitting: EMERGENCY MEDICINE

## 2022-08-11 VITALS
DIASTOLIC BLOOD PRESSURE: 62 MMHG | HEIGHT: 64 IN | WEIGHT: 230 LBS | HEART RATE: 70 BPM | SYSTOLIC BLOOD PRESSURE: 102 MMHG | BODY MASS INDEX: 39.27 KG/M2 | TEMPERATURE: 98.1 F | RESPIRATION RATE: 19 BRPM | OXYGEN SATURATION: 96 %

## 2022-08-11 DIAGNOSIS — R10.84 GENERALIZED ABDOMINAL PAIN: Primary | ICD-10-CM

## 2022-08-11 DIAGNOSIS — N39.0 URINARY TRACT INFECTION WITHOUT HEMATURIA, SITE UNSPECIFIED: ICD-10-CM

## 2022-08-11 LAB
ALBUMIN SERPL-MCNC: 2.5 G/DL (ref 3.5–5.2)
ALBUMIN/GLOB SERPL: 1.2 G/DL
ALP SERPL-CCNC: 96 U/L (ref 39–117)
ALT SERPL W P-5'-P-CCNC: 13 U/L (ref 1–33)
ANION GAP SERPL CALCULATED.3IONS-SCNC: 7 MMOL/L (ref 5–15)
AST SERPL-CCNC: 11 U/L (ref 1–32)
BACTERIA UR QL AUTO: ABNORMAL /HPF
BASOPHILS # BLD AUTO: 0.1 10*3/MM3 (ref 0–0.2)
BASOPHILS NFR BLD AUTO: 0.4 % (ref 0–1.5)
BILIRUB SERPL-MCNC: <0.2 MG/DL (ref 0–1.2)
BILIRUB UR QL STRIP: NEGATIVE
BUN SERPL-MCNC: 10 MG/DL (ref 6–20)
BUN/CREAT SERPL: 18.2 (ref 7–25)
CALCIUM SPEC-SCNC: 6.3 MG/DL (ref 8.6–10.5)
CHLORIDE SERPL-SCNC: 116 MMOL/L (ref 98–107)
CLARITY UR: ABNORMAL
CO2 SERPL-SCNC: 19 MMOL/L (ref 22–29)
COLOR UR: YELLOW
CREAT SERPL-MCNC: 0.55 MG/DL (ref 0.57–1)
DEPRECATED RDW RBC AUTO: 47.7 FL (ref 37–54)
EGFRCR SERPLBLD CKD-EPI 2021: 111.1 ML/MIN/1.73
EOSINOPHIL # BLD AUTO: 0 10*3/MM3 (ref 0–0.4)
EOSINOPHIL NFR BLD AUTO: 0 % (ref 0.3–6.2)
ERYTHROCYTE [DISTWIDTH] IN BLOOD BY AUTOMATED COUNT: 16.5 % (ref 12.3–15.4)
GLOBULIN UR ELPH-MCNC: 2.1 GM/DL
GLUCOSE SERPL-MCNC: 78 MG/DL (ref 65–99)
GLUCOSE UR STRIP-MCNC: NEGATIVE MG/DL
HCT VFR BLD AUTO: 39.9 % (ref 34–46.6)
HGB BLD-MCNC: 13 G/DL (ref 12–15.9)
HGB UR QL STRIP.AUTO: ABNORMAL
HOLD SPECIMEN: NORMAL
HYALINE CASTS UR QL AUTO: ABNORMAL /LPF
KETONES UR QL STRIP: NEGATIVE
LEUKOCYTE ESTERASE UR QL STRIP.AUTO: ABNORMAL
LIPASE SERPL-CCNC: 52 U/L (ref 13–60)
LYMPHOCYTES # BLD AUTO: 2 10*3/MM3 (ref 0.7–3.1)
LYMPHOCYTES NFR BLD AUTO: 14.4 % (ref 19.6–45.3)
MCH RBC QN AUTO: 26.7 PG (ref 26.6–33)
MCHC RBC AUTO-ENTMCNC: 32.6 G/DL (ref 31.5–35.7)
MCV RBC AUTO: 81.9 FL (ref 79–97)
MONOCYTES # BLD AUTO: 0.8 10*3/MM3 (ref 0.1–0.9)
MONOCYTES NFR BLD AUTO: 5.8 % (ref 5–12)
NEUTROPHILS NFR BLD AUTO: 11.2 10*3/MM3 (ref 1.7–7)
NEUTROPHILS NFR BLD AUTO: 79.4 % (ref 42.7–76)
NITRITE UR QL STRIP: POSITIVE
NRBC BLD AUTO-RTO: 0 /100 WBC (ref 0–0.2)
PH UR STRIP.AUTO: 5.5 [PH] (ref 5–8)
PLATELET # BLD AUTO: 291 10*3/MM3 (ref 140–450)
PMV BLD AUTO: 7.9 FL (ref 6–12)
POTASSIUM SERPL-SCNC: 2.9 MMOL/L (ref 3.5–5.2)
PROT SERPL-MCNC: 4.6 G/DL (ref 6–8.5)
PROT UR QL STRIP: ABNORMAL
RBC # BLD AUTO: 4.87 10*6/MM3 (ref 3.77–5.28)
RBC # UR STRIP: ABNORMAL /HPF
REF LAB TEST METHOD: ABNORMAL
SODIUM SERPL-SCNC: 142 MMOL/L (ref 136–145)
SP GR UR STRIP: 1.02 (ref 1–1.03)
SQUAMOUS #/AREA URNS HPF: ABNORMAL /HPF
UROBILINOGEN UR QL STRIP: ABNORMAL
WBC # UR STRIP: ABNORMAL /HPF
WBC NRBC COR # BLD: 14.1 10*3/MM3 (ref 3.4–10.8)

## 2022-08-11 PROCEDURE — 25010000002 CEFTRIAXONE PER 250 MG: Performed by: EMERGENCY MEDICINE

## 2022-08-11 PROCEDURE — 25010000002 MORPHINE PER 10 MG: Performed by: EMERGENCY MEDICINE

## 2022-08-11 PROCEDURE — 99283 EMERGENCY DEPT VISIT LOW MDM: CPT

## 2022-08-11 PROCEDURE — 87086 URINE CULTURE/COLONY COUNT: CPT

## 2022-08-11 PROCEDURE — 87186 SC STD MICRODIL/AGAR DIL: CPT

## 2022-08-11 PROCEDURE — 25010000002 ONDANSETRON PER 1 MG: Performed by: EMERGENCY MEDICINE

## 2022-08-11 PROCEDURE — 81001 URINALYSIS AUTO W/SCOPE: CPT | Performed by: EMERGENCY MEDICINE

## 2022-08-11 PROCEDURE — 80053 COMPREHEN METABOLIC PANEL: CPT | Performed by: EMERGENCY MEDICINE

## 2022-08-11 PROCEDURE — 96365 THER/PROPH/DIAG IV INF INIT: CPT

## 2022-08-11 PROCEDURE — 96375 TX/PRO/DX INJ NEW DRUG ADDON: CPT

## 2022-08-11 PROCEDURE — 87186 SC STD MICRODIL/AGAR DIL: CPT | Performed by: EMERGENCY MEDICINE

## 2022-08-11 PROCEDURE — 85025 COMPLETE CBC W/AUTO DIFF WBC: CPT | Performed by: EMERGENCY MEDICINE

## 2022-08-11 PROCEDURE — 74176 CT ABD & PELVIS W/O CONTRAST: CPT

## 2022-08-11 PROCEDURE — 87086 URINE CULTURE/COLONY COUNT: CPT | Performed by: EMERGENCY MEDICINE

## 2022-08-11 PROCEDURE — 96376 TX/PRO/DX INJ SAME DRUG ADON: CPT

## 2022-08-11 PROCEDURE — 83690 ASSAY OF LIPASE: CPT | Performed by: EMERGENCY MEDICINE

## 2022-08-11 PROCEDURE — 25010000002 MORPHINE PER 10 MG

## 2022-08-11 RX ORDER — ONDANSETRON 2 MG/ML
4 INJECTION INTRAMUSCULAR; INTRAVENOUS ONCE
Status: COMPLETED | OUTPATIENT
Start: 2022-08-11 | End: 2022-08-11

## 2022-08-11 RX ORDER — TRAMADOL HYDROCHLORIDE 50 MG/1
50 TABLET ORAL EVERY 6 HOURS PRN
Qty: 12 TABLET | Refills: 0 | Status: SHIPPED | OUTPATIENT
Start: 2022-08-11 | End: 2022-08-11 | Stop reason: SDUPTHER

## 2022-08-11 RX ORDER — CEFDINIR 300 MG/1
300 CAPSULE ORAL 2 TIMES DAILY
Qty: 10 CAPSULE | Refills: 0 | Status: SHIPPED | OUTPATIENT
Start: 2022-08-11 | End: 2022-08-11 | Stop reason: SDUPTHER

## 2022-08-11 RX ORDER — MORPHINE SULFATE 2 MG/ML
2 INJECTION, SOLUTION INTRAMUSCULAR; INTRAVENOUS ONCE
Status: COMPLETED | OUTPATIENT
Start: 2022-08-11 | End: 2022-08-11

## 2022-08-11 RX ORDER — TRAMADOL HYDROCHLORIDE 50 MG/1
50 TABLET ORAL EVERY 6 HOURS PRN
Qty: 12 TABLET | Refills: 0 | OUTPATIENT
Start: 2022-08-11 | End: 2022-09-18

## 2022-08-11 RX ORDER — MORPHINE SULFATE 2 MG/ML
INJECTION, SOLUTION INTRAMUSCULAR; INTRAVENOUS
Status: COMPLETED
Start: 2022-08-11 | End: 2022-08-11

## 2022-08-11 RX ORDER — SODIUM CHLORIDE 0.9 % (FLUSH) 0.9 %
10 SYRINGE (ML) INJECTION AS NEEDED
Status: DISCONTINUED | OUTPATIENT
Start: 2022-08-11 | End: 2022-08-11 | Stop reason: HOSPADM

## 2022-08-11 RX ORDER — CEFDINIR 300 MG/1
300 CAPSULE ORAL 2 TIMES DAILY
Qty: 10 CAPSULE | Refills: 0 | OUTPATIENT
Start: 2022-08-11 | End: 2022-09-18

## 2022-08-11 RX ADMIN — ONDANSETRON 4 MG: 2 INJECTION INTRAMUSCULAR; INTRAVENOUS at 16:56

## 2022-08-11 RX ADMIN — MORPHINE SULFATE 2 MG: 2 INJECTION, SOLUTION INTRAMUSCULAR; INTRAVENOUS at 17:02

## 2022-08-11 RX ADMIN — CEFTRIAXONE 1 G: 1 INJECTION, POWDER, FOR SOLUTION INTRAMUSCULAR; INTRAVENOUS at 20:15

## 2022-08-11 RX ADMIN — MORPHINE SULFATE 2 MG: 2 INJECTION, SOLUTION INTRAMUSCULAR; INTRAVENOUS at 16:55

## 2022-08-11 RX ADMIN — MORPHINE SULFATE 2 MG: 2 INJECTION, SOLUTION INTRAMUSCULAR; INTRAVENOUS at 18:53

## 2022-08-11 NOTE — ED PROVIDER NOTES
Subjective   Patient is a 52-year-old female complaint abdominal pain for the past 24 hours.  She also planes of urinary pressure and frequency.  She denies Rupert diarrhea or other complaint.  The pain is moderate and constant.          Review of Systems   Constitutional: Negative for chills and fever.   HENT: Negative for congestion and sore throat.    Eyes: Negative for visual disturbance.   Respiratory: Negative for cough and shortness of breath.    Cardiovascular: Negative for chest pain.   Gastrointestinal: Positive for abdominal pain and nausea. Negative for diarrhea and vomiting.   Endocrine: Negative for polyuria.   Genitourinary: Positive for dysuria. Negative for flank pain.   Musculoskeletal: Negative for back pain.   Neurological: Negative for dizziness and headaches.   A complete review of systems was obtained and is otherwise negative    Past Medical History:   Diagnosis Date   • Abnormal ECG    • Anxiety    • Arthritis    • Asthma 2020   • At risk for sleep apnea    • Bipolar 1 disorder (HCC)    • Coronary artery disease    • Depression    • Diabetes mellitus (HCC)    • Dyspnea on minimal exertion    • Frequent UTI    • Heart murmur     FROM CHILDHOOD   • History of anemia     POST PREGNANCY   • Hyperlipidemia 10/04/2016   • Hypertension    • Mass of upper lobe of right lung    • Migraines    • Obesity    • PONV (postoperative nausea and vomiting)    • Sleep apnea 22   • Spinal headache    • Visual impairment     WEARS GLASSES       No Known Allergies    Past Surgical History:   Procedure Laterality Date   • APPENDECTOMY     • CARDIAC CATHETERIZATION N/A 2020    Procedure: Left Heart Cath possible PCI, atherectomy, hemodynamic support;  Surgeon: Thomas Zamora MD;  Location: [facility] CATH INVASIVE LOCATION;  Service: Cardiology;  Laterality: N/A;   • CARDIAC CATHETERIZATION  2020   •  SECTION     • FOOT/TOE TENDON REPAIR Left    •  HYSTERECTOMY     • LUNG BIOPSY Right 12/2020   • LUNG LOBECTOMY Right 02/08/2022    pt had partial Right lobectomy and nodule removal   • ROTATOR CUFF REPAIR Left    • THORACOSCOPY VIDEO ASSISTED WITH LOBECTOMY Right 02/08/2022    Procedure: BRONCHOSCOPY, THORACOSCOPY VIDEO ASSISTED wedge resection X2, INTERCOSTAL NERVE BLOCK;  Surgeon: Ayla Carroll MD;  Location: [facility];  Service: Thoracic;  Laterality: Right;   • TUBAL ABDOMINAL LIGATION  2002       Family History   Problem Relation Age of Onset   • Arthritis Mother    • Cancer Mother    • Depression Mother    • Diabetes Mother    • Early death Mother    • Mental illness Mother    • Anxiety disorder Mother    • Alcohol abuse Father    • Diabetes Father    • Early death Father    • Heart disease Father    • Hyperlipidemia Father    • Hypertension Father         Father   • Vision loss Father    • Heart attack Father    • Heart disease Sister    • Heart disease Brother    • Hypertension Brother    • Cancer Maternal Grandmother    • Drug abuse Brother    • Hypertension Brother    • Drug abuse Sister    • Heart attack Sister    • Hypertension Sister         Sister   • Heart attack Brother    • Malig Hyperthermia Neg Hx        Social History     Socioeconomic History   • Marital status:    Tobacco Use   • Smoking status: Never Smoker   • Smokeless tobacco: Never Used   Vaping Use   • Vaping Use: Never used   Substance and Sexual Activity   • Alcohol use: Yes     Comment: VERY RARE   • Drug use: No   • Sexual activity: Not Currently     Partners: Male     Birth control/protection: None           Objective   Physical Exam  HEENT exam shows TMs to be clear.  Oropharynx comers but sclera nonicteric.  Neck has no adenopathy JVD or bruits.  Lungs are clear.  Heart has a regular rate rhythm without murmur rub or gallop.  Chest is nontender.  Abdomen is soft with mild to moderate diffuse tenderness.  Patient has normal bowel sounds without rebound or  guarding.  Back has no CVA tenderness.  Extremity exam is no cyanosis or edema.  Procedures       My EKG interpretation shows normal sinus rhythm at a rate of 80 with no acute ST change    ED Course      Results for orders placed or performed during the hospital encounter of 08/11/22   Comprehensive Metabolic Panel    Specimen: Blood   Result Value Ref Range    Glucose 78 65 - 99 mg/dL    BUN 10 6 - 20 mg/dL    Creatinine 0.55 (L) 0.57 - 1.00 mg/dL    Sodium 142 136 - 145 mmol/L    Potassium 2.9 (L) 3.5 - 5.2 mmol/L    Chloride 116 (H) 98 - 107 mmol/L    CO2 19.0 (L) 22.0 - 29.0 mmol/L    Calcium 6.3 (L) 8.6 - 10.5 mg/dL    Total Protein 4.6 (L) 6.0 - 8.5 g/dL    Albumin 2.50 (L) 3.50 - 5.20 g/dL    ALT (SGPT) 13 1 - 33 U/L    AST (SGOT) 11 1 - 32 U/L    Alkaline Phosphatase 96 39 - 117 U/L    Total Bilirubin <0.2 0.0 - 1.2 mg/dL    Globulin 2.1 gm/dL    A/G Ratio 1.2 g/dL    BUN/Creatinine Ratio 18.2 7.0 - 25.0    Anion Gap 7.0 5.0 - 15.0 mmol/L    eGFR 111.1 >60.0 mL/min/1.73   Lipase    Specimen: Blood   Result Value Ref Range    Lipase 52 13 - 60 U/L   Urinalysis With Culture If Indicated - Urine, Catheter    Specimen: Urine, Catheter   Result Value Ref Range    Color, UA Yellow Yellow, Straw    Appearance, UA Cloudy (A) Clear    pH, UA 5.5 5.0 - 8.0    Specific Gravity, UA 1.020 1.005 - 1.030    Glucose, UA Negative Negative    Ketones, UA Negative Negative    Bilirubin, UA Negative Negative    Blood, UA Large (3+) (A) Negative    Protein,  mg/dL (2+) (A) Negative    Leuk Esterase, UA Large (3+) (A) Negative    Nitrite, UA Positive (A) Negative    Urobilinogen, UA 1.0 E.U./dL 0.2 - 1.0 E.U./dL   CBC Auto Differential    Specimen: Blood   Result Value Ref Range    WBC 14.10 (H) 3.40 - 10.80 10*3/mm3    RBC 4.87 3.77 - 5.28 10*6/mm3    Hemoglobin 13.0 12.0 - 15.9 g/dL    Hematocrit 39.9 34.0 - 46.6 %    MCV 81.9 79.0 - 97.0 fL    MCH 26.7 26.6 - 33.0 pg    MCHC 32.6 31.5 - 35.7 g/dL    RDW 16.5 (H) 12.3 -  15.4 %    RDW-SD 47.7 37.0 - 54.0 fl    MPV 7.9 6.0 - 12.0 fL    Platelets 291 140 - 450 10*3/mm3    Neutrophil % 79.4 (H) 42.7 - 76.0 %    Lymphocyte % 14.4 (L) 19.6 - 45.3 %    Monocyte % 5.8 5.0 - 12.0 %    Eosinophil % 0.0 (L) 0.3 - 6.2 %    Basophil % 0.4 0.0 - 1.5 %    Neutrophils, Absolute 11.20 (H) 1.70 - 7.00 10*3/mm3    Lymphocytes, Absolute 2.00 0.70 - 3.10 10*3/mm3    Monocytes, Absolute 0.80 0.10 - 0.90 10*3/mm3    Eosinophils, Absolute 0.00 0.00 - 0.40 10*3/mm3    Basophils, Absolute 0.10 0.00 - 0.20 10*3/mm3    nRBC 0.0 0.0 - 0.2 /100 WBC   Urinalysis, Microscopic Only - Urine, Catheter    Specimen: Urine, Catheter   Result Value Ref Range    RBC, UA Too Numerous to Count (A) None Seen /HPF    WBC, UA Too Numerous to Count (A) None Seen /HPF    Bacteria, UA 3+ (A) None Seen /HPF    Squamous Epithelial Cells, UA 0-2 None Seen, 0-2 /HPF    Hyaline Casts, UA None Seen None Seen /LPF    Methodology Manual Light Microscopy    [facility] - [facility]   Result Value Ref Range    Extra Tube HOLD      CT Abdomen Pelvis Without Contrast    Result Date: 8/11/2022   1. No acute findings in the abdomen or pelvis. 2. No urinary tract stone or hydronephrosis. 3. Tiny gallstones. No evidence of cholecystitis. 4. Appendectomy. Hysterectomy.    Electronically Signed By-Marleny Chavez MD On:8/11/2022 4:41 PM This report was finalized on [id number] by  Marleny Chavez MD.                                         MDM  Number of Diagnoses or Management Options  Diagnosis management comments: CT scan of the patient abdomen pelvis shows no acute disease process.  Patient does have findings consistent with UTI.  Metabolic panel is at baseline without renal insufficiency or electrolyte abnormality.  There is no evidence of other infectious process.  She given Rocephin 1 g IV.  She will be discharged with a prescription for Ultram and Omnicef.  She will see MD for recheck.       Amount and/or Complexity of Data Reviewed  Clinical lab  tests: reviewed  Tests in the radiology section of CPT®: reviewed    Risk of Complications, Morbidity, and/or Mortality  Presenting problems: high  Diagnostic procedures: high  Management options: high    Patient Progress  Patient progress: stable      Final diagnoses:   Generalized abdominal pain   Urinary tract infection without hematuria, site unspecified       ED Disposition  ED Disposition     ED Disposition   Discharge    Condition   Stable    Comment   --             No follow-up provider specified.       Medication List      Changed    atorvastatin 20 MG tablet  Commonly known as: Lipitor  Take 1 tablet by mouth Daily.  What changed: when to take this             Manny Isaac MD  08/11/22 2000

## 2022-08-13 LAB — BACTERIA SPEC AEROBE CULT: ABNORMAL

## 2022-08-13 NOTE — PHARMACY RECOMMENDATION
Patient urine culture resulted with E. coli. Susceptible to Cephalosporins (3rd gen). Patient was given Rx for cefdinir. Therapy is appropriate coverage. No further follow-up required..    Microbiology Results (last 10 days)       Procedure Component Value - Date/Time    Urine Culture - Urine, Urine, Catheter [[id number]]  (Abnormal)  (Susceptibility) Collected: 08/11/22 1828    Lab Status: Final result Specimen: Urine, Catheter Updated: 08/13/22 1055     Urine Culture >100,000 CFU/mL Escherichia coli    Narrative:      Colonization of the urinary tract without infection is common. Treatment is discouraged unless the patient is symptomatic, pregnant, or undergoing an invasive urologic procedure.    Susceptibility        Escherichia coli      DAY      Ampicillin Resistant      Ampicillin + Sulbactam Intermediate      Cefazolin Susceptible      Cefepime Susceptible      Ceftazidime Susceptible      Ceftriaxone Susceptible      Gentamicin Susceptible      Levofloxacin Susceptible      Nitrofurantoin Susceptible      Piperacillin + Tazobactam Susceptible      Trimethoprim + Sulfamethoxazole Susceptible                           Urine Culture - Urine, Urine, Clean Catch [[id number]]  (Abnormal)  (Susceptibility) Collected: 08/11/22 1349    Lab Status: Final result Specimen: Urine, Clean Catch Updated: 08/13/22 1056     Urine Culture >100,000 CFU/mL Escherichia coli    Narrative:      Colonization of the urinary tract without infection is common. Treatment is discouraged unless the patient is symptomatic, pregnant, or undergoing an invasive urologic procedure.    Susceptibility        Escherichia coli      DAY      Ampicillin Resistant      Ampicillin + Sulbactam Resistant      Cefazolin Susceptible      Cefepime Susceptible      Ceftazidime Susceptible      Ceftriaxone Susceptible      Gentamicin Susceptible      Levofloxacin Susceptible      Nitrofurantoin Susceptible      Piperacillin + Tazobactam Susceptible       Trimethoprim + Sulfamethoxazole Susceptible                                   Pam Mccann, PharmD  8/13/2022 12:01 EDT

## 2022-08-14 ENCOUNTER — HOSPITAL ENCOUNTER (EMERGENCY)
Facility: HOSPITAL | Age: 52
Discharge: HOME | End: 2022-08-14
Attending: EMERGENCY MEDICINE | Admitting: EMERGENCY MEDICINE

## 2022-08-14 VITALS
DIASTOLIC BLOOD PRESSURE: 56 MMHG | HEIGHT: 64 IN | TEMPERATURE: 97.8 F | HEART RATE: 75 BPM | BODY MASS INDEX: 43.06 KG/M2 | RESPIRATION RATE: 18 BRPM | SYSTOLIC BLOOD PRESSURE: 108 MMHG | OXYGEN SATURATION: 94 % | WEIGHT: 252.21 LBS

## 2022-08-14 DIAGNOSIS — R10.9 FLANK PAIN: Primary | ICD-10-CM

## 2022-08-14 DIAGNOSIS — R11.0 NAUSEA: ICD-10-CM

## 2022-08-14 LAB
ALBUMIN SERPL-MCNC: 3.8 G/DL (ref 3.5–5.2)
ALBUMIN/GLOB SERPL: 1.3 G/DL
ALP SERPL-CCNC: 156 U/L (ref 39–117)
ALT SERPL W P-5'-P-CCNC: 20 U/L (ref 1–33)
ANION GAP SERPL CALCULATED.3IONS-SCNC: 6 MMOL/L (ref 5–15)
AST SERPL-CCNC: 20 U/L (ref 1–32)
BASOPHILS # BLD AUTO: 0.1 10*3/MM3 (ref 0–0.2)
BASOPHILS NFR BLD AUTO: 0.8 % (ref 0–1.5)
BILIRUB SERPL-MCNC: 0.3 MG/DL (ref 0–1.2)
BILIRUB UR QL STRIP: NEGATIVE
BUN SERPL-MCNC: 9 MG/DL (ref 6–20)
BUN/CREAT SERPL: 11.1 (ref 7–25)
CALCIUM SPEC-SCNC: 9.7 MG/DL (ref 8.6–10.5)
CHLORIDE SERPL-SCNC: 102 MMOL/L (ref 98–107)
CLARITY UR: CLEAR
CO2 SERPL-SCNC: 32 MMOL/L (ref 22–29)
COLOR UR: YELLOW
CREAT SERPL-MCNC: 0.81 MG/DL (ref 0.57–1)
DEPRECATED RDW RBC AUTO: 49 FL (ref 37–54)
EGFRCR SERPLBLD CKD-EPI 2021: 88 ML/MIN/1.73
EOSINOPHIL # BLD AUTO: 0 10*3/MM3 (ref 0–0.4)
EOSINOPHIL NFR BLD AUTO: 0.1 % (ref 0.3–6.2)
ERYTHROCYTE [DISTWIDTH] IN BLOOD BY AUTOMATED COUNT: 16.9 % (ref 12.3–15.4)
GLOBULIN UR ELPH-MCNC: 2.9 GM/DL
GLUCOSE SERPL-MCNC: 115 MG/DL (ref 65–99)
GLUCOSE UR STRIP-MCNC: NEGATIVE MG/DL
HCT VFR BLD AUTO: 37.6 % (ref 34–46.6)
HGB BLD-MCNC: 12.3 G/DL (ref 12–15.9)
HGB UR QL STRIP.AUTO: NEGATIVE
KETONES UR QL STRIP: NEGATIVE
LEUKOCYTE ESTERASE UR QL STRIP.AUTO: NEGATIVE
LIPASE SERPL-CCNC: 29 U/L (ref 13–60)
LYMPHOCYTES # BLD AUTO: 1.8 10*3/MM3 (ref 0.7–3.1)
LYMPHOCYTES NFR BLD AUTO: 17.7 % (ref 19.6–45.3)
MAGNESIUM SERPL-MCNC: 2.1 MG/DL (ref 1.6–2.6)
MCH RBC QN AUTO: 26.8 PG (ref 26.6–33)
MCHC RBC AUTO-ENTMCNC: 32.7 G/DL (ref 31.5–35.7)
MCV RBC AUTO: 82.2 FL (ref 79–97)
MONOCYTES # BLD AUTO: 0.6 10*3/MM3 (ref 0.1–0.9)
MONOCYTES NFR BLD AUTO: 6 % (ref 5–12)
NEUTROPHILS NFR BLD AUTO: 7.7 10*3/MM3 (ref 1.7–7)
NEUTROPHILS NFR BLD AUTO: 75.4 % (ref 42.7–76)
NITRITE UR QL STRIP: NEGATIVE
NRBC BLD AUTO-RTO: 0 /100 WBC (ref 0–0.2)
PH UR STRIP.AUTO: 7 [PH] (ref 5–8)
PLATELET # BLD AUTO: 252 10*3/MM3 (ref 140–450)
PMV BLD AUTO: 8 FL (ref 6–12)
POTASSIUM SERPL-SCNC: 3.8 MMOL/L (ref 3.5–5.2)
PROT SERPL-MCNC: 6.7 G/DL (ref 6–8.5)
PROT UR QL STRIP: NEGATIVE
RBC # BLD AUTO: 4.57 10*6/MM3 (ref 3.77–5.28)
SODIUM SERPL-SCNC: 140 MMOL/L (ref 136–145)
SP GR UR STRIP: <=1.005 (ref 1–1.03)
UROBILINOGEN UR QL STRIP: NORMAL
WBC NRBC COR # BLD: 10.3 10*3/MM3 (ref 3.4–10.8)

## 2022-08-14 PROCEDURE — 96375 TX/PRO/DX INJ NEW DRUG ADDON: CPT

## 2022-08-14 PROCEDURE — 36415 COLL VENOUS BLD VENIPUNCTURE: CPT

## 2022-08-14 PROCEDURE — 99283 EMERGENCY DEPT VISIT LOW MDM: CPT

## 2022-08-14 PROCEDURE — 83690 ASSAY OF LIPASE: CPT

## 2022-08-14 PROCEDURE — 25010000002 ONDANSETRON PER 1 MG

## 2022-08-14 PROCEDURE — 80053 COMPREHEN METABOLIC PANEL: CPT

## 2022-08-14 PROCEDURE — 85025 COMPLETE CBC W/AUTO DIFF WBC: CPT

## 2022-08-14 PROCEDURE — 81003 URINALYSIS AUTO W/O SCOPE: CPT

## 2022-08-14 PROCEDURE — 25010000002 KETOROLAC TROMETHAMINE PER 15 MG

## 2022-08-14 PROCEDURE — 83735 ASSAY OF MAGNESIUM: CPT

## 2022-08-14 PROCEDURE — 96374 THER/PROPH/DIAG INJ IV PUSH: CPT

## 2022-08-14 RX ORDER — ONDANSETRON 2 MG/ML
4 INJECTION INTRAMUSCULAR; INTRAVENOUS ONCE
Status: COMPLETED | OUTPATIENT
Start: 2022-08-14 | End: 2022-08-14

## 2022-08-14 RX ORDER — KETOROLAC TROMETHAMINE 15 MG/ML
15 INJECTION, SOLUTION INTRAMUSCULAR; INTRAVENOUS ONCE
Status: COMPLETED | OUTPATIENT
Start: 2022-08-14 | End: 2022-08-14

## 2022-08-14 RX ORDER — SODIUM CHLORIDE 0.9 % (FLUSH) 0.9 %
10 SYRINGE (ML) INJECTION AS NEEDED
Status: DISCONTINUED | OUTPATIENT
Start: 2022-08-14 | End: 2022-08-14 | Stop reason: HOSPADM

## 2022-08-14 RX ADMIN — KETOROLAC TROMETHAMINE 15 MG: 15 INJECTION, SOLUTION INTRAMUSCULAR; INTRAVENOUS at 11:02

## 2022-08-14 RX ADMIN — SODIUM CHLORIDE 1000 ML: 9 INJECTION, SOLUTION INTRAVENOUS at 11:02

## 2022-08-14 RX ADMIN — ONDANSETRON 4 MG: 2 INJECTION INTRAMUSCULAR; INTRAVENOUS at 11:02

## 2022-08-14 NOTE — ED PROVIDER NOTES
Subjective   Chief Complaint: Difficulty urinating    Context: Patient is a pleasant 52-year-old obese  female who presents today with complaints of urinary difficulties that is been ongoing for about a week.  Patient states that she was seen here 3 days ago, when she was diagnosed with a UTI.  She received Rocephin and was sent home with an order of cefdinir.  She states she has been taking her antibiotics as prescribed but she is not feeling any better.  She complains of nausea, bilateral flank pain, abdominal pain, abdominal cramping and pain after voiding.  She states that her symptoms have not changed since her last visit.  She currently rates her pain 8/10 and describes it as a burning.  It does not radiate outside of her flank and abdomen.  She does not know of anything that makes it worse or better.  She states that she was sent home with tramadol but has been avoiding taking it because she does not like the way drugs make her feel.  She denies shortness of breath, chest pain, fever and dizziness.  She states that she has no known drug allergies.  She denies use of drugs and alcohol.    Duration: 1 week    Timing: Waxes and wanes    Severity: 8/10    Associated: Flank pain, cramping, nausea          Review of Systems   Constitutional: Negative for chills, fatigue and fever.   HENT: Negative for congestion, rhinorrhea and sore throat.    Eyes: Negative.    Respiratory: Negative for cough, chest tightness and shortness of breath.    Cardiovascular: Negative for chest pain and palpitations.   Gastrointestinal: Positive for abdominal pain and nausea. Negative for constipation, diarrhea and vomiting.   Endocrine: Negative.    Genitourinary: Positive for difficulty urinating, dysuria and flank pain. Negative for urgency.   Musculoskeletal: Negative for arthralgias and myalgias.   Skin: Negative.    Neurological: Negative for dizziness, syncope, weakness and headaches.   Hematological: Negative.     Psychiatric/Behavioral: Negative for confusion. The patient is not nervous/anxious.    All other systems reviewed and are negative.      Past Medical History:   Diagnosis Date   • Abnormal ECG    • Anxiety    • Arthritis    • Asthma 2020   • At risk for sleep apnea    • Bipolar 1 disorder (HCC)    • Coronary artery disease    • Depression    • Diabetes mellitus (HCC)    • Dyspnea on minimal exertion    • Frequent UTI    • Heart murmur     FROM CHILDHOOD   • History of anemia     POST PREGNANCY   • Hyperlipidemia 10/04/2016   • Hypertension    • Mass of upper lobe of right lung    • Migraines    • Obesity    • PONV (postoperative nausea and vomiting)    • Sleep apnea 22   • Spinal headache    • Visual impairment     WEARS GLASSES       No Known Allergies    Past Surgical History:   Procedure Laterality Date   • APPENDECTOMY     • CARDIAC CATHETERIZATION N/A 2020    Procedure: Left Heart Cath possible PCI, atherectomy, hemodynamic support;  Surgeon: Thomas Zamora MD;  Location: [facility] INVASIVE LOCATION;  Service: Cardiology;  Laterality: N/A;   • CARDIAC CATHETERIZATION  2020   •  SECTION     • FOOT/TOE TENDON REPAIR Left    • HYSTERECTOMY     • LUNG BIOPSY Right 2020   • LUNG LOBECTOMY Right 2022    pt had partial Right lobectomy and nodule removal   • ROTATOR CUFF REPAIR Left    • THORACOSCOPY VIDEO ASSISTED WITH LOBECTOMY Right 2022    Procedure: BRONCHOSCOPY, THORACOSCOPY VIDEO ASSISTED wedge resection X2, INTERCOSTAL NERVE BLOCK;  Surgeon: Ayla Carroll MD;  Location: [facility];  Service: Thoracic;  Laterality: Right;   • TUBAL ABDOMINAL LIGATION         Family History   Problem Relation Age of Onset   • Arthritis Mother    • Cancer Mother    • Depression Mother    • Diabetes Mother    • Early death Mother    • Mental illness Mother    • Anxiety disorder Mother    • Alcohol abuse Father    • Diabetes Father    •  Early death Father    • Heart disease Father    • Hyperlipidemia Father    • Hypertension Father         Father   • Vision loss Father    • Heart attack Father    • Heart disease Sister    • Heart disease Brother    • Hypertension Brother    • Cancer Maternal Grandmother    • Drug abuse Brother    • Hypertension Brother    • Drug abuse Sister    • Heart attack Sister    • Hypertension Sister         Sister   • Heart attack Brother    • Malig Hyperthermia Neg Hx        Social History     Socioeconomic History   • Marital status:    Tobacco Use   • Smoking status: Never Smoker   • Smokeless tobacco: Never Used   Vaping Use   • Vaping Use: Never used   Substance and Sexual Activity   • Alcohol use: Yes     Comment: VERY RARE   • Drug use: No   • Sexual activity: Not Currently     Partners: Male     Birth control/protection: None           Objective   Physical Exam  Vitals and nursing note reviewed.   Constitutional:       Appearance: Normal appearance.   HENT:      Head: Normocephalic and atraumatic.   Eyes:      Extraocular Movements: Extraocular movements intact.      Pupils: Pupils are equal, round, and reactive to light.   Cardiovascular:      Rate and Rhythm: Normal rate and regular rhythm.      Pulses: Normal pulses.      Heart sounds: Murmur heard.   Pulmonary:      Effort: Pulmonary effort is normal. No respiratory distress.      Breath sounds: Normal breath sounds.   Abdominal:      General: Bowel sounds are normal. There is no distension.      Palpations: Abdomen is soft.      Tenderness: There is abdominal tenderness in the suprapubic area.   Musculoskeletal:         General: No swelling or tenderness. Normal range of motion.      Cervical back: Normal range of motion and neck supple.   Skin:     General: Skin is warm and dry.      Capillary Refill: Capillary refill takes less than 2 seconds.   Neurological:      General: No focal deficit present.      Mental Status: She is alert and oriented to  "person, place, and time. Mental status is at baseline.      GCS: GCS eye subscore is 4. GCS verbal subscore is 5. GCS motor subscore is 6.      Cranial Nerves: Cranial nerves are intact.      Sensory: Sensation is intact.      Deep Tendon Reflexes: Reflexes are normal and symmetric.   Psychiatric:         Mood and Affect: Mood normal.         Behavior: Behavior normal.         Procedures           ED Course      /56   Pulse 75   Temp 97.8 °F (36.6 °C) (Skin)   Resp 17   Ht 162.6 cm (64\")   Wt 114 kg (252 lb 3.3 oz)   LMP  (LMP Unknown)   SpO2 94%   BMI 43.29 kg/m²   Labs Reviewed   COMPREHENSIVE METABOLIC PANEL - Abnormal; Notable for the following components:       Result Value    Glucose 115 (*)     CO2 32.0 (*)     Alkaline Phosphatase 156 (*)     All other components within normal limits    Narrative:     GFR Normal >60  Chronic Kidney Disease <60  Kidney Failure <15     CBC WITH AUTO DIFFERENTIAL - Abnormal; Notable for the following components:    RDW 16.9 (*)     Lymphocyte % 17.7 (*)     Eosinophil % 0.1 (*)     Neutrophils, Absolute 7.70 (*)     All other components within normal limits   URINALYSIS W/ MICROSCOPIC IF INDICATED (NO CULTURE) - Normal    Narrative:     Urine microscopic not indicated.   LIPASE - Normal   MAGNESIUM - Normal   CBC AND DIFFERENTIAL    Narrative:     The following orders were created for panel order CBC & Differential.  Procedure                               Abnormality         Status                     ---------                               -----------         ------                     CBC Auto Differential[[id number]]        Abnormal            Final result                 Please view results for these tests on the individual orders.     Medications   sodium chloride 0.9 % flush 10 mL (has no administration in time range)   sodium chloride 0.9 % bolus 1,000 mL (1,000 mL Intravenous New Bag 8/14/22 1102)   ondansetron (ZOFRAN) injection 4 mg (4 mg Intravenous " Given 8/14/22 1102)   ketorolac (TORADOL) injection 15 mg (15 mg Intravenous Given 8/14/22 1102)     No radiology results for the last day                                       MDM  Number of Diagnoses or Management Options  Diagnosis management comments: Patient was placed in a gown prior to assessment.  She is alert, nontoxic-appearing and stable upon exam.  She is afebrile.  She states that her symptoms have not changed from previous visit, when she had a CT scan.  I discussed the risks of multiple CT scans and advised not to scan patient at this time due to continuation of symptoms and denial of new symptom onset.  Patient is agreeable.  IV was established and labs were obtained.  Urinalysis was ordered.  Patient was medicated with Zofran, Toradol and received a fluid bolus.  She states she is driving herself home and did not want narcotics.    Chart Review: Patient was recently seen in the emergency room with generalized abdominal pain.  It was found that she had UTI.  CT at that time was negative for acute findings.  It noted tiny gallstones but was negative for kidney stones and ureteral stones.    Comorbidities: Morbid obesity, recent UTI  Differentials: Persistent UTI, kidney stone, pyelonephritis, acute kidney injury.  Not all inclusive of differentials considered.     Lab Interpretation: As above  Radiology Interpretation: Not applicable    Appropriate PPE worn during exam.    Work-up today is unremarkable.  Urinalysis is clear and negative for infection.  Blood work has improved since last visit.  Her white count is normal and I do not believe a CT is warranted at this time.  Upon reassessment, patient states she is feeling better after Zofran and Toradol.  I believe patient symptoms are residual from recent UTI.  I discussed the findings with patient who voices understanding of discharge instructions, signs and symptoms requiring return to the ED; discharged improved and stable condition with follow-up  for reevaluation. She was advised to follow-up with urology if her symptoms persist.  Patient verbalizes understanding and is agreeable to plan of care.  She will be discharged home with instruction to follow-up with urology and take previously prescribed medication, including antibiotics.  She has remained stable, afebrile during her ER visit and is not requiring submental oxygen.  She was able to ambulate upright steadily without assistance upon discharge.    Patient is aware that discharge does not mean that nothing is wrong but it indicates no emergency is present and they must continue care with follow-up as given below or physician of their choice.    This document is intended for medical expert use only.  Reading of this document by patients and/or patient's family without participating medical staff guidance may result in misinterpretation and unintended morbidity.  Any interpretation of such data is the responsibility of the patient and/or family member responsible for the patient in concert with their primary or specialist providers, not to be left for sources of online search as such as [employer], [employer] or similar queries.  Relying on these approaches to knowledge may result in misinterpretation, misguided goals of care and even death should patient or family members try recommendations outside of the realm of professional medical care in a supervised inpatient environment.       Amount and/or Complexity of Data Reviewed  Clinical lab tests: reviewed and ordered  Decide to obtain previous medical records or to obtain history from someone other than the patient: yes    Risk of Complications, Morbidity, and/or Mortality  Presenting problems: high  Diagnostic procedures: high  Management options: high    Patient Progress  Patient progress: improved      Final diagnoses:   Flank pain   Nausea       ED Disposition  ED Disposition     ED Disposition   Discharge    Condition   Stable    Comment   --              Marsha Lopez MD  [address]  [place] [address]  [phone]          [facility] UROLOGY - [place] [id number] [facility] [address]  [phone]             Medication List      Changed    atorvastatin 20 MG tablet  Commonly known as: Lipitor  Take 1 tablet by mouth Daily.  What changed: when to take this             Joseline Ball, APRN  08/14/22 6407

## 2022-08-16 ENCOUNTER — PATIENT OUTREACH (OUTPATIENT)
Dept: CASE MANAGEMENT | Facility: OTHER | Age: 52
End: 2022-08-16

## 2022-08-16 NOTE — OUTREACH NOTE
AMBULATORY CASE MANAGEMENT NOTE    Name and Relationship of Patient/Support Person: Tessie Conner L - Self    Patient Outreach    Pt discharged from [facility] ED on 8/14/22, seen for urinary difficulties. RN-ACM outreach call made to pt, spoke very briefly. Explained role of RN-ACM. Pt states she's doing better. She denies any symptoms or concerns at this time. Reviewed ED AVS with pt. Education provided. Unable to further outreach, pt thanks RN-ACM for calling and ended call. Follow up outreach prn.     NOAH ESPINOZA  Ambulatory Case Management    8/16/2022, 09:45 EDT

## 2022-09-16 DIAGNOSIS — Z12.31 SCREENING MAMMOGRAM FOR BREAST CANCER: ICD-10-CM

## 2022-09-16 RX ORDER — LISINOPRIL 2.5 MG/1
TABLET ORAL
Qty: 30 TABLET | Refills: 1 | Status: SHIPPED | OUTPATIENT
Start: 2022-09-16 | End: 2022-10-19 | Stop reason: SDUPTHER

## 2022-09-18 PROCEDURE — 87086 URINE CULTURE/COLONY COUNT: CPT | Performed by: NURSE PRACTITIONER

## 2022-09-18 PROCEDURE — 87186 SC STD MICRODIL/AGAR DIL: CPT | Performed by: NURSE PRACTITIONER

## 2022-09-19 ENCOUNTER — TELEPHONE (OUTPATIENT)
Dept: FAMILY MEDICINE CLINIC | Facility: CLINIC | Age: 52
End: 2022-09-19

## 2022-09-19 NOTE — TELEPHONE ENCOUNTER
Caller: Tessie Conner    Relationship to patient: Self    Best call back number: [phone]     Patient is needing: PATIENT HAS AN APPOINTMENT 10/28 AND WAS WANTING TO KNOW IF SHE WAS ABLE TO GET THE SHOT IN HER HIP ON THAT DAY ALSO.

## 2022-09-19 NOTE — TELEPHONE ENCOUNTER
Caller: Tessie Conner    Relationship: Self    Best call back number: [phone]    What medication are you requesting: LONG TERM ANTIBIOTICS     Have you had these symptoms before:    [x] Yes  [] No    Have you been treated for these symptoms before:   [x] Yes  [] No    If a prescription is needed, what is your preferred pharmacy and phone number:      [facility]/pharmacy #[id number] [place], [place] - [address] - [phone]  - [phone]       Additional notes: PATIENT STATED THAT THE URGENT CARE CENTER RECOMMENDED THAT SHE CHECK WITH DR JARAMILLO TO SEE IF SHE SHOULD BE PUT ON LONG TERM ANTIBIOTICS SINCE SHE KEEPS GETTING BLADDER INFECTIONS. PATIENT IS CURRENTLY TAKING nitrofurantoin, macrocrystal-monohydrate, (Macrobid) 100 MG capsule FOR 10 DAYS FOR A BLADDER INFECTION, WHICH WAS PRESCRIBED BY URGENT CARE. PATIENT STATED THAT THE LAST TIME SHE SAW A UROLOGIST WAS LAST YEAR AND THEY SAID HER BLADDER LOOKED FINE AT THAT TIME. PLEASE ADVISE.

## 2022-09-20 NOTE — TELEPHONE ENCOUNTER
Please tell pt that I want a urine test after she has completed the abx to see if is it resolving or not

## 2022-09-26 ENCOUNTER — TELEPHONE (OUTPATIENT)
Dept: FAMILY MEDICINE CLINIC | Facility: CLINIC | Age: 52
End: 2022-09-26

## 2022-09-26 NOTE — TELEPHONE ENCOUNTER
Hub staff attempted to follow warm transfer process and was unsuccessful     Caller: Tessie Conner    Relationship to patient: Self    Best call back number: [phone]    Patient is needing: PATIENT FEELS LIKE SHE MIGHT BE HAVING AN ALLERGIC REACTION TO OVER THE COUNTER MEDICATION.  HER SYMPTOMS ARE ITCHING ON HER FACE AND HEAD.  PLEASE CONTACT HER AS SOON AS POSSIBLE.

## 2022-09-27 ENCOUNTER — TELEPHONE (OUTPATIENT)
Dept: FAMILY MEDICINE CLINIC | Facility: CLINIC | Age: 52
End: 2022-09-27

## 2022-09-27 NOTE — TELEPHONE ENCOUNTER
I SPOKE TO PATIENT AND TRIED TO SCHEDULE HER TODAY AND SHE SAID SHE ALREADY HAD AN APPT ON THE 30TH SO SARWAT WHY THIS MESSAGE WAS TAKEN.

## 2022-09-27 NOTE — TELEPHONE ENCOUNTER
Caller: Tessie Conner    Relationship to patient: Self    Best call back number: [phone]    Patient is needing: PATIENT IS REQUESTING TO KNOW IF SHE CAN HAVE A PAP ON ONE OF HER UPCOMING APPOINTMENTS ON 9/30 OR 10/28 WITH DR. JARAMILLO. PATIENT IS ALSO REQUESTING TO HAVE ANY VACCINATIONS THAT SHE MAY NEED AT ONE OF THE APPOINTMENTS AS WELL. PLEASE CALL PATIENT TO DISCUSS AND ADVISE.

## 2022-09-30 ENCOUNTER — LAB (OUTPATIENT)
Dept: FAMILY MEDICINE CLINIC | Facility: CLINIC | Age: 52
End: 2022-09-30

## 2022-09-30 ENCOUNTER — OFFICE VISIT (OUTPATIENT)
Dept: FAMILY MEDICINE CLINIC | Facility: CLINIC | Age: 52
End: 2022-09-30

## 2022-09-30 VITALS
WEIGHT: 250 LBS | HEART RATE: 96 BPM | DIASTOLIC BLOOD PRESSURE: 72 MMHG | OXYGEN SATURATION: 96 % | SYSTOLIC BLOOD PRESSURE: 112 MMHG | RESPIRATION RATE: 16 BRPM | HEIGHT: 64 IN | TEMPERATURE: 97.7 F | BODY MASS INDEX: 42.68 KG/M2

## 2022-09-30 DIAGNOSIS — N39.0 RECURRENT UTI: Primary | ICD-10-CM

## 2022-09-30 PROCEDURE — 90686 IIV4 VACC NO PRSV 0.5 ML IM: CPT | Performed by: INTERNAL MEDICINE

## 2022-09-30 PROCEDURE — 87086 URINE CULTURE/COLONY COUNT: CPT | Performed by: INTERNAL MEDICINE

## 2022-09-30 PROCEDURE — G0008 ADMIN INFLUENZA VIRUS VAC: HCPCS | Performed by: INTERNAL MEDICINE

## 2022-09-30 PROCEDURE — 99214 OFFICE O/P EST MOD 30 MIN: CPT | Performed by: INTERNAL MEDICINE

## 2022-10-01 LAB — BACTERIA SPEC AEROBE CULT: NORMAL

## 2022-10-18 ENCOUNTER — HOSPITAL ENCOUNTER (EMERGENCY)
Facility: HOSPITAL | Age: 52
Discharge: HOME | End: 2022-10-18
Attending: EMERGENCY MEDICINE | Admitting: EMERGENCY MEDICINE

## 2022-10-18 VITALS
HEART RATE: 70 BPM | SYSTOLIC BLOOD PRESSURE: 119 MMHG | HEIGHT: 64 IN | TEMPERATURE: 97.6 F | DIASTOLIC BLOOD PRESSURE: 65 MMHG | WEIGHT: 245.8 LBS | RESPIRATION RATE: 18 BRPM | OXYGEN SATURATION: 98 % | BODY MASS INDEX: 41.96 KG/M2

## 2022-10-18 DIAGNOSIS — T78.40XA ALLERGIC REACTION, INITIAL ENCOUNTER: Primary | ICD-10-CM

## 2022-10-18 PROCEDURE — 63710000001 DIPHENHYDRAMINE PER 50 MG: Performed by: EMERGENCY MEDICINE

## 2022-10-18 PROCEDURE — 99283 EMERGENCY DEPT VISIT LOW MDM: CPT

## 2022-10-18 PROCEDURE — 63710000001 PREDNISONE PER 5 MG: Performed by: EMERGENCY MEDICINE

## 2022-10-18 RX ORDER — PREDNISONE 20 MG/1
20 TABLET ORAL 2 TIMES DAILY
Qty: 10 TABLET | Refills: 0 | Status: SHIPPED | OUTPATIENT
Start: 2022-10-18 | End: 2022-10-28

## 2022-10-18 RX ORDER — DIPHENHYDRAMINE HCL 25 MG
50 CAPSULE ORAL ONCE
Status: COMPLETED | OUTPATIENT
Start: 2022-10-18 | End: 2022-10-18

## 2022-10-18 RX ADMIN — PREDNISONE 60 MG: 10 TABLET ORAL at 23:03

## 2022-10-18 RX ADMIN — DIPHENHYDRAMINE HYDROCHLORIDE 50 MG: 25 CAPSULE ORAL at 23:03

## 2022-10-19 RX ORDER — LISINOPRIL 2.5 MG/1
2.5 TABLET ORAL DAILY
Qty: 90 TABLET | Refills: 1 | Status: SHIPPED | OUTPATIENT
Start: 2022-10-19

## 2022-10-19 NOTE — ED PROVIDER NOTES
Subjective   History of Present Illness  52-year-old female with moderate diffuse itching after taking Tylenol at 8:30 PM.  Patient states she saw the dentist today and was taking Tylenol for some dental pain.  Patient denies any other medicines prescribed, no antibiotic usage.  Patient denies any other change in her routine.  Patient denies any other suspicion other than Tylenol for causing the suspected allergic reaction.  Minimal rash upper extremities, no wheezing or difficulty breathing, no edema, no difficulty swallowing.        Review of Systems   Constitutional: Negative.    HENT:        Dental pain   Respiratory: Negative.    Cardiovascular: Negative.    Skin: Positive for rash.   Psychiatric/Behavioral: The patient is nervous/anxious.        Past Medical History:   Diagnosis Date   • Abnormal ECG    • Anxiety    • Arthritis    • Asthma 2020   • At risk for sleep apnea    • Bipolar 1 disorder (HCC)    • Coronary artery disease    • Depression    • Diabetes mellitus (HCC)    • Dyspnea on minimal exertion    • Frequent UTI    • Heart murmur     FROM CHILDHOOD   • History of anemia     POST PREGNANCY   • Hyperlipidemia 10/04/2016   • Hypertension    • Mass of upper lobe of right lung    • Migraines    • Obesity    • PONV (postoperative nausea and vomiting)    • Sleep apnea 22   • Spinal headache    • Visual impairment     WEARS GLASSES       Allergies   Allergen Reactions   • Tylenol [Acetaminophen] Hives and Itching       Past Surgical History:   Procedure Laterality Date   • APPENDECTOMY     • CARDIAC CATHETERIZATION N/A 2020    Procedure: Left Heart Cath possible PCI, atherectomy, hemodynamic support;  Surgeon: Thomas Zamora MD;  Location: [facility] CATH INVASIVE LOCATION;  Service: Cardiology;  Laterality: N/A;   • CARDIAC CATHETERIZATION  2020   •  SECTION     • FOOT/TOE TENDON REPAIR Left    • HYSTERECTOMY     • LUNG BIOPSY Right 2020    • LUNG LOBECTOMY Right 02/08/2022    pt had partial Right lobectomy and nodule removal   • ROTATOR CUFF REPAIR Left    • THORACOSCOPY VIDEO ASSISTED WITH LOBECTOMY Right 02/08/2022    Procedure: BRONCHOSCOPY, THORACOSCOPY VIDEO ASSISTED wedge resection X2, INTERCOSTAL NERVE BLOCK;  Surgeon: Ayla Carroll MD;  Location: [facility] OR;  Service: Thoracic;  Laterality: Right;   • TUBAL ABDOMINAL LIGATION  2002       Family History   Problem Relation Age of Onset   • Arthritis Mother    • Cancer Mother    • Depression Mother    • Diabetes Mother    • Early death Mother    • Mental illness Mother    • Anxiety disorder Mother    • Alcohol abuse Father    • Diabetes Father    • Early death Father    • Heart disease Father    • Hyperlipidemia Father    • Hypertension Father         Father   • Vision loss Father    • Heart attack Father    • Heart disease Sister    • Heart disease Brother    • Hypertension Brother    • Cancer Maternal Grandmother    • Drug abuse Brother    • Hypertension Brother    • Drug abuse Sister    • Heart attack Sister    • Hypertension Sister         Sister   • Heart attack Brother    • Malig Hyperthermia Neg Hx        Social History     Socioeconomic History   • Marital status:    Tobacco Use   • Smoking status: Never   • Smokeless tobacco: Never   Vaping Use   • Vaping Use: Never used   Substance and Sexual Activity   • Alcohol use: Yes     Comment: VERY RARE   • Drug use: No   • Sexual activity: Not Currently     Partners: Male     Birth control/protection: None           Objective   Physical Exam  Constitutional:       Appearance: Normal appearance.   HENT:      Head: Normocephalic and atraumatic.      Mouth/Throat:      Mouth: Mucous membranes are moist.      Pharynx: Oropharynx is clear.   Eyes:      Conjunctiva/sclera: Conjunctivae normal.      Pupils: Pupils are equal, round, and reactive to light.   Cardiovascular:      Rate and Rhythm: Normal rate and regular rhythm.      Heart  sounds: Normal heart sounds.   Pulmonary:      Effort: Pulmonary effort is normal.      Breath sounds: Normal breath sounds.   Musculoskeletal:      Cervical back: Normal range of motion and neck supple.   Skin:     Capillary Refill: Capillary refill takes less than 2 seconds.      Comments: Mild hives bilateral upper extremities, otherwise normal skin exam   Neurological:      General: No focal deficit present.      Mental Status: She is alert.         Procedures           ED Course                                           MDM  Number of Diagnoses or Management Options  Allergic reaction, initial encounter  Diagnosis management comments: Patient states she is taken steroids in the past without issue, return precautions reviewed, recommend avoiding Tylenol in the future.      Final diagnoses:   Allergic reaction, initial encounter       ED Disposition  ED Disposition     ED Disposition   Discharge    Condition   Stable    Comment   --             Marsha Lopez MD  [address]  [place] [address] [phone]      As needed         Medication List      New Prescriptions    predniSONE 20 MG tablet  Commonly known as: DELTASONE  Take 1 tablet by mouth 2 (Two) Times a Day.        Changed    atorvastatin 20 MG tablet  Commonly known as: Lipitor  Take 1 tablet by mouth Daily.  What changed: when to take this           Where to Get Your Medications      These medications were sent to [facility]/pharmacy #[id number] - [place], [place] - [address] - [phone]  - [phone] FX  [address] [place] [address]    Phone: [phone]   · predniSONE 20 MG tablet          Justo Phelan MD  10/19/22 7604

## 2022-10-19 NOTE — ED NOTES
Patient comes in complaining of being itchy everywhere. Patient said she became itchy after taking tylenol. Patient has tolerated tylenol previous times. Patient states her arms, scalp, chest and eyes are all itchy. Patient denies an other medication changes.

## 2022-10-20 ENCOUNTER — PATIENT OUTREACH (OUTPATIENT)
Dept: CASE MANAGEMENT | Facility: OTHER | Age: 52
End: 2022-10-20

## 2022-10-20 NOTE — OUTREACH NOTE
AMBULATORY CASE MANAGEMENT NOTE    Name and Relationship of Patient/Support Person: Dalila Tessie GREG - Self    Send Education  Questions/Answers    Flowsheet Row Most Recent Value   Annual Wellness Visit:  --  [scheduled for 2/3/23]   Other Patient Education/Resources  24/[facility] Nurse Call Line   24/7 Nurse Call Line Education Method Verbal   Advanced Directives: --  [resources provided]        Patient Outreach    Pt discharged from [facility] ED on 10/18/22, seen for allergic reaction. RN-ACM outreach call made to pt, able to reach pt on 2nd attempt. Explained role of RN-ACM. Pt denies any symptoms or concerns at this time. Reviewed ED AVS with pt. Pt states she does not need steroid rx. She has PCP appt scheduled for 10/28/22. Reviewed SDOH. Pt denies any needs, advised her to call with any. Follow up outreach prn.     NOAH ESPINOZA  Ambulatory Case Management    10/20/2022, 12:34 EDT

## 2022-10-28 ENCOUNTER — LAB (OUTPATIENT)
Dept: FAMILY MEDICINE CLINIC | Facility: CLINIC | Age: 52
End: 2022-10-28

## 2022-10-28 ENCOUNTER — OFFICE VISIT (OUTPATIENT)
Dept: FAMILY MEDICINE CLINIC | Facility: CLINIC | Age: 52
End: 2022-10-28

## 2022-10-28 VITALS
HEIGHT: 64 IN | OXYGEN SATURATION: 96 % | WEIGHT: 246 LBS | TEMPERATURE: 96.6 F | RESPIRATION RATE: 18 BRPM | HEART RATE: 94 BPM | BODY MASS INDEX: 42 KG/M2

## 2022-10-28 DIAGNOSIS — R16.1 SPLEEN ENLARGEMENT: Primary | ICD-10-CM

## 2022-10-28 DIAGNOSIS — R68.2 DRY MOUTH: ICD-10-CM

## 2022-10-28 DIAGNOSIS — F31.77 BIPOLAR DISORDER, IN PARTIAL REMISSION, MOST RECENT EPISODE MIXED: ICD-10-CM

## 2022-10-28 LAB — HBA1C MFR BLD: 5.6 % (ref 3.5–5.6)

## 2022-10-28 PROCEDURE — 85025 COMPLETE CBC W/AUTO DIFF WBC: CPT | Performed by: INTERNAL MEDICINE

## 2022-10-28 PROCEDURE — 83036 HEMOGLOBIN GLYCOSYLATED A1C: CPT | Performed by: INTERNAL MEDICINE

## 2022-10-28 PROCEDURE — 36415 COLL VENOUS BLD VENIPUNCTURE: CPT | Performed by: INTERNAL MEDICINE

## 2022-10-28 PROCEDURE — 99213 OFFICE O/P EST LOW 20 MIN: CPT | Performed by: INTERNAL MEDICINE

## 2022-10-28 PROCEDURE — 80053 COMPREHEN METABOLIC PANEL: CPT | Performed by: INTERNAL MEDICINE

## 2022-10-28 RX ORDER — EPINEPHRINE 0.3 MG/.3ML
0.3 INJECTION SUBCUTANEOUS ONCE
Qty: 1 EACH | Refills: 1 | Status: SHIPPED | OUTPATIENT
Start: 2022-10-28 | End: 2022-10-29

## 2022-10-29 LAB
ALBUMIN SERPL-MCNC: 4.2 G/DL (ref 3.5–5.2)
ALBUMIN/GLOB SERPL: 1.2 G/DL
ALP SERPL-CCNC: 156 U/L (ref 39–117)
ALT SERPL W P-5'-P-CCNC: 16 U/L (ref 1–33)
ANION GAP SERPL CALCULATED.3IONS-SCNC: 9.9 MMOL/L (ref 5–15)
AST SERPL-CCNC: 23 U/L (ref 1–32)
BASOPHILS # BLD AUTO: 0.02 10*3/MM3 (ref 0–0.2)
BASOPHILS NFR BLD AUTO: 0.2 % (ref 0–1.5)
BILIRUB SERPL-MCNC: 0.4 MG/DL (ref 0–1.2)
BUN SERPL-MCNC: 9 MG/DL (ref 6–20)
BUN/CREAT SERPL: 10.7 (ref 7–25)
CALCIUM SPEC-SCNC: 9.5 MG/DL (ref 8.6–10.5)
CHLORIDE SERPL-SCNC: 101 MMOL/L (ref 98–107)
CO2 SERPL-SCNC: 29.1 MMOL/L (ref 22–29)
CREAT SERPL-MCNC: 0.84 MG/DL (ref 0.57–1)
DEPRECATED RDW RBC AUTO: 46.7 FL (ref 37–54)
EGFRCR SERPLBLD CKD-EPI 2021: 84.3 ML/MIN/1.73
EOSINOPHIL # BLD AUTO: 0 10*3/MM3 (ref 0–0.4)
EOSINOPHIL NFR BLD AUTO: 0 % (ref 0.3–6.2)
ERYTHROCYTE [DISTWIDTH] IN BLOOD BY AUTOMATED COUNT: 14.7 % (ref 12.3–15.4)
GLOBULIN UR ELPH-MCNC: 3.4 GM/DL
GLUCOSE SERPL-MCNC: 117 MG/DL (ref 65–99)
HCT VFR BLD AUTO: 39.1 % (ref 34–46.6)
HGB BLD-MCNC: 12.3 G/DL (ref 12–15.9)
IMM GRANULOCYTES # BLD AUTO: 0.05 10*3/MM3 (ref 0–0.05)
IMM GRANULOCYTES NFR BLD AUTO: 0.5 % (ref 0–0.5)
LYMPHOCYTES # BLD AUTO: 2 10*3/MM3 (ref 0.7–3.1)
LYMPHOCYTES NFR BLD AUTO: 19.2 % (ref 19.6–45.3)
MCH RBC QN AUTO: 27.3 PG (ref 26.6–33)
MCHC RBC AUTO-ENTMCNC: 31.5 G/DL (ref 31.5–35.7)
MCV RBC AUTO: 86.9 FL (ref 79–97)
MONOCYTES # BLD AUTO: 0.76 10*3/MM3 (ref 0.1–0.9)
MONOCYTES NFR BLD AUTO: 7.3 % (ref 5–12)
NEUTROPHILS NFR BLD AUTO: 7.61 10*3/MM3 (ref 1.7–7)
NEUTROPHILS NFR BLD AUTO: 72.8 % (ref 42.7–76)
NRBC BLD AUTO-RTO: 0 /100 WBC (ref 0–0.2)
PLATELET # BLD AUTO: 275 10*3/MM3 (ref 140–450)
PMV BLD AUTO: 11.8 FL (ref 6–12)
POTASSIUM SERPL-SCNC: 3.5 MMOL/L (ref 3.5–5.2)
PROT SERPL-MCNC: 7.6 G/DL (ref 6–8.5)
RBC # BLD AUTO: 4.5 10*6/MM3 (ref 3.77–5.28)
SODIUM SERPL-SCNC: 140 MMOL/L (ref 136–145)
WBC NRBC COR # BLD: 10.44 10*3/MM3 (ref 3.4–10.8)

## 2022-10-29 PROCEDURE — 87088 URINE BACTERIA CULTURE: CPT | Performed by: PHYSICIAN ASSISTANT

## 2022-10-29 PROCEDURE — 87086 URINE CULTURE/COLONY COUNT: CPT | Performed by: PHYSICIAN ASSISTANT

## 2022-10-29 PROCEDURE — 87186 SC STD MICRODIL/AGAR DIL: CPT | Performed by: PHYSICIAN ASSISTANT

## 2022-11-01 DIAGNOSIS — R16.1 SPLEEN ENLARGEMENT: Primary | ICD-10-CM

## 2022-11-01 PROBLEM — R68.2 DRY MOUTH: Status: ACTIVE | Noted: 2022-11-01

## 2022-11-07 ENCOUNTER — OFFICE VISIT (OUTPATIENT)
Dept: FAMILY MEDICINE CLINIC | Facility: CLINIC | Age: 52
End: 2022-11-07

## 2022-11-07 VITALS
RESPIRATION RATE: 18 BRPM | SYSTOLIC BLOOD PRESSURE: 118 MMHG | HEART RATE: 81 BPM | TEMPERATURE: 97.8 F | DIASTOLIC BLOOD PRESSURE: 74 MMHG | BODY MASS INDEX: 41.15 KG/M2 | HEIGHT: 64 IN | WEIGHT: 241 LBS | OXYGEN SATURATION: 99 %

## 2022-11-07 DIAGNOSIS — R42 LIGHTHEADED: Primary | ICD-10-CM

## 2022-11-07 DIAGNOSIS — R09.81 NASAL CONGESTION: ICD-10-CM

## 2022-11-07 DIAGNOSIS — R68.2 DRY MOUTH: ICD-10-CM

## 2022-11-07 DIAGNOSIS — R11.0 NAUSEA: ICD-10-CM

## 2022-11-07 LAB
EXPIRATION DATE: NORMAL
FLUAV AG UPPER RESP QL IA.RAPID: NOT DETECTED
FLUBV AG UPPER RESP QL IA.RAPID: NOT DETECTED
INTERNAL CONTROL: NORMAL
Lab: NORMAL
SARS-COV-2 AG UPPER RESP QL IA.RAPID: NOT DETECTED

## 2022-11-07 PROCEDURE — 87428 SARSCOV & INF VIR A&B AG IA: CPT | Performed by: NURSE PRACTITIONER

## 2022-11-07 PROCEDURE — 99213 OFFICE O/P EST LOW 20 MIN: CPT | Performed by: NURSE PRACTITIONER

## 2022-11-07 RX ORDER — ONDANSETRON 4 MG/1
4 TABLET, FILM COATED ORAL EVERY 8 HOURS PRN
Qty: 20 TABLET | Refills: 0 | Status: SHIPPED | OUTPATIENT
Start: 2022-11-07 | End: 2022-11-22

## 2022-11-07 NOTE — PROGRESS NOTES
"Chief Complaint  Nausea  Subjective        Tessie Conner presents to [facility]  History of Present Illness  Pt comes in today with c/o mouth dryness and lip dryness. Started about 3 weeks ago. States she lost sense of taste. All started about the time she took the covid booster.  Trouble breathing.   States she feels SOA.  Lightheadedness, dizziness, and nausea.  Recent symptoms started about 2 days ago.   No diarrhea. No vomiting.   Dry cough.  No ST.   Dry nasal congestion.   No fever or chills, but feels hot and flushed.   Not taking anything OTC for symptom relief.     She was seen last week by Dr. Lopez and had some labs checked for DM. All labs looked ok.        Objective     Vital Signs:   /74   Pulse 81   Temp 97.8 °F (36.6 °C)   Resp 18   Ht 162.6 cm (64\")   Wt 109 kg (241 lb)   SpO2 99%   BMI 41.37 kg/m²       BP Readings from Last 3 Encounters:   11/07/22 118/74   10/29/22 129/69   10/18/22 119/65       Wt Readings from Last 3 Encounters:   11/07/22 109 kg (241 lb)   10/29/22 112 kg (246 lb)   10/28/22 112 kg (246 lb)     Physical Exam  Constitutional:       Appearance: She is well-developed.   Eyes:      Pupils: Pupils are equal, round, and reactive to light.   Cardiovascular:      Rate and Rhythm: Normal rate and regular rhythm.   Pulmonary:      Effort: Pulmonary effort is normal.      Breath sounds: Normal breath sounds.   Neurological:      Mental Status: She is alert and oriented to person, place, and time.        Result Review :                 Assessment and Plan    Diagnoses and all orders for this visit:    1. Lightheaded (Primary)  -     POCT SARS-CoV-2 Antigen AGURANG + Flu    2. Nausea  -     POCT SARS-CoV-2 Antigen GAURANG + Flu  -     ondansetron (Zofran) 4 MG tablet; Take 1 tablet by mouth Every 8 (Eight) Hours As Needed for Nausea or Vomiting.  Dispense: 20 tablet; Refill: 0    3. Nasal congestion  -     POCT SARS-CoV-2 Antigen GAURANG + Flu    4. " Dry mouth    covid/flu negative  zofran prn  Symptoms most likely viral  Dry mouth could be associated with effexor. Will discuss this with psych  Recommended trying artifical saliva otc.   Stay hydrated   During this office visit, we discussed the pertinent aspects of the visit and treatment recommendations. Pt verbalizes understanding. Follow up was discussed. Patient was given the opportunity to ask questions and discuss other concerns.           Follow Up   Return if symptoms worsen or fail to improve.  Patient was given instructions and counseling regarding her condition or for health maintenance advice. Please see specific information pulled into the AVS if appropriate.

## 2022-11-08 ENCOUNTER — OFFICE VISIT (OUTPATIENT)
Dept: FAMILY MEDICINE CLINIC | Facility: CLINIC | Age: 52
End: 2022-11-08

## 2022-11-08 ENCOUNTER — LAB (OUTPATIENT)
Dept: LAB | Facility: HOSPITAL | Age: 52
End: 2022-11-08

## 2022-11-08 ENCOUNTER — TELEPHONE (OUTPATIENT)
Dept: CARDIOLOGY | Facility: CLINIC | Age: 52
End: 2022-11-08

## 2022-11-08 VITALS
HEART RATE: 84 BPM | DIASTOLIC BLOOD PRESSURE: 64 MMHG | SYSTOLIC BLOOD PRESSURE: 118 MMHG | RESPIRATION RATE: 20 BRPM | WEIGHT: 241 LBS | HEIGHT: 64 IN | OXYGEN SATURATION: 97 % | TEMPERATURE: 97.5 F | BODY MASS INDEX: 41.15 KG/M2

## 2022-11-08 DIAGNOSIS — R06.09 DOE (DYSPNEA ON EXERTION): Primary | ICD-10-CM

## 2022-11-08 DIAGNOSIS — M25.561 ACUTE PAIN OF RIGHT KNEE: ICD-10-CM

## 2022-11-08 LAB
CORTIS SERPL-MCNC: 6.47 MCG/DL
CRP SERPL-MCNC: 1.12 MG/DL (ref 0–0.5)
TSH SERPL DL<=0.05 MIU/L-ACNC: 0.29 UIU/ML (ref 0.27–4.2)

## 2022-11-08 PROCEDURE — 20610 DRAIN/INJ JOINT/BURSA W/O US: CPT | Performed by: INTERNAL MEDICINE

## 2022-11-08 PROCEDURE — 36415 COLL VENOUS BLD VENIPUNCTURE: CPT | Performed by: INTERNAL MEDICINE

## 2022-11-08 PROCEDURE — 86140 C-REACTIVE PROTEIN: CPT | Performed by: INTERNAL MEDICINE

## 2022-11-08 PROCEDURE — 82533 TOTAL CORTISOL: CPT | Performed by: INTERNAL MEDICINE

## 2022-11-08 PROCEDURE — 84443 ASSAY THYROID STIM HORMONE: CPT | Performed by: INTERNAL MEDICINE

## 2022-11-08 PROCEDURE — 99214 OFFICE O/P EST MOD 30 MIN: CPT | Performed by: INTERNAL MEDICINE

## 2022-11-08 RX ORDER — TRIAMCINOLONE ACETONIDE 40 MG/ML
40 INJECTION, SUSPENSION INTRA-ARTICULAR; INTRAMUSCULAR ONCE
Status: COMPLETED | OUTPATIENT
Start: 2022-11-08 | End: 2022-11-08

## 2022-11-08 RX ADMIN — TRIAMCINOLONE ACETONIDE 40 MG: 40 INJECTION, SUSPENSION INTRA-ARTICULAR; INTRAMUSCULAR at 09:47

## 2022-11-08 NOTE — TELEPHONE ENCOUNTER
Caller: Tessie Conner    Relationship to patient: Self    Best call back number: [phone]     Chief complaint: PT STATES SHE IS SWEATING A LOT, HARD TIME BREATHING WHEN WALKING AND WHEN JUST SITTING ACCOMPANIED WITH CHEST PAIN    Type of visit: FOLLOW UP    Requested date: ASAP    If rescheduling, when is the original appointment: N/A  Additional notes: PCP ORDERED AND ECHO BUT PT IS NOT ABLE TO GET IN UNTIL DEC AT [place]

## 2022-11-08 NOTE — PROGRESS NOTES
"Rooming Tab(CC,VS,Pt Hx,Fall Screen)  Chief Complaint   Patient presents with   • Nausea     Dry mouth, shaking hands   • Knee Pain       Subjective     I have reviewed and updated her medications, medical history and problem list during today's office visit.   Dry mouth  The patient reports that her mouth is extremely dry and she is beginning to think she is dehydrated because she can not drink much water. She states that she has been drinking Gatorade. She reports that she is not on a lot of medications. She states that she is sweaty and when she walks she has to breath really bad and gasping for air. She notes that she can not walk down the rotiz without getting out of breath. She adds that she gets dizzy and then she starts having chest pain. She reports that she physically does not feel well at all. She reports that she feels short of breath all the time. She states that she did not have a fever when this started. She reports that she has not taken any over the counter hormone pills. She reports that she tried Tylenol and she had an allergic reaction. She reports that she had another bladder infection the day after her last visit. She states that she took cefdinir and it did not make the short of breath worse or better. She mentions that she is sweating right now.     Weight loss  She reports that she has lost 5 pounds since her last visit and that she was 265 pounds in 12/2021. She mentions that she had a stress test 2 years ago. She reports that she has had electrocardiograms since then. She states that she is not coughing and did not have COVID-19. She adds that she has a hard time swallowing. She denies swelling, hot flashes or rashes. She reports that she is using an albuterol inhaler. She reports that she was dizzy when she was first put on the lisinopril. She adds that it went away and now she is back to \"real dizzy feeling\". She notes that her job does not allow her to get off that much. She states that " "if she goes to the hospital, she is going to need something and is hoping that they can give her another note.    Knee pain  She reports that she woke up and her knee \"screwed up.\" She reports that she can not walk. She reports that she is limping. She reports that she has had an injection in her hip in the past.    Patient Care Team:  Marsha Lopez MD as PCP - General (Internal Medicine)  Thomas Zamora MD as Consulting Physician (Cardiology)  Ayla Carroll MD as Surgeon (Thoracic Surgery)  Juanita Shen RN as Ambulatory  (Population Health)    Problem List Tab  Medications Tab  Synopsis Tab  Chart Review Tab  Care Everywhere Tab  Immunizations Tab  Patient History Tab    Social History     Tobacco Use   • Smoking status: Never   • Smokeless tobacco: Never   Substance Use Topics   • Alcohol use: Yes     Comment: VERY RARE       Review of Systems    Objective     Rooming Tab(CC,VS,Pt Hx,Fall Screen)  /64   Pulse 84   Temp 97.5 °F (36.4 °C)   Resp 20   Ht 162.6 cm (64\")   Wt 109 kg (241 lb)   LMP  (LMP Unknown)   SpO2 97%   BMI 41.37 kg/m²     Body mass index is 41.37 kg/m².    Physical Exam  Constitutional:       Appearance: Normal appearance. She is well-developed and normal weight.   HENT:      Head: Normocephalic and atraumatic.      Right Ear: Tympanic membrane, ear canal and external ear normal.      Left Ear: Tympanic membrane, ear canal and external ear normal.      Nose: Nose normal.      Mouth/Throat:      Mouth: Mucous membranes are moist.      Pharynx: Oropharynx is clear. No oropharyngeal exudate.   Eyes:      Extraocular Movements: Extraocular movements intact.      Conjunctiva/sclera: Conjunctivae normal.      Pupils: Pupils are equal, round, and reactive to light.   Cardiovascular:      Rate and Rhythm: Normal rate and regular rhythm.      Pulses: Normal pulses.      Heart sounds: Normal heart sounds.   Pulmonary:      Effort: Pulmonary effort is " normal.      Breath sounds: Normal breath sounds.   Abdominal:      General: Bowel sounds are normal.      Palpations: Abdomen is soft.   Musculoskeletal:         General: Tenderness present. Normal range of motion.      Cervical back: Normal range of motion and neck supple.   Skin:     General: Skin is warm and dry.   Neurological:      General: No focal deficit present.      Mental Status: She is alert and oriented to person, place, and time. Mental status is at baseline.   Psychiatric:         Mood and Affect: Mood normal.         Behavior: Behavior normal.         Thought Content: Thought content normal.         Judgment: Judgment normal.          Statin Choice Calculator  Data Reviewed:    Procedure Note:   Procedure performed:Right Knee injection    The risks (including but not limited pain, bleeding and infection) and benefits of the procedure  were discussed with patient. Questions were sought and answered and informed consent was given for the procedure.     The procedure site was prepped and draped in standard bedside fashion for the procedure as indicated. The skin and deeper tissue was anesthetized with 1 cc's of Lidocaine 2% without epinephrine. A 22 guage needle was utilized for the procedure and it was performed without complications.  The joint was injected with 1 cc's of kenalog    The patient tolerated the procedure well and my repeat post-procedure exam was unremarkable for any problems related to the procedure    Instructions were given to contact us for any problems related to the procedure.         The data below has been reviewed by Marsha Lopez MD on 11/08/2022.      Lab Results   Component Value Date    BUN 9 10/28/2022    CREATININE 0.84 10/28/2022     10/28/2022    K 3.5 10/28/2022     10/28/2022    CALCIUM 9.5 10/28/2022    ALBUMIN 4.20 10/28/2022    BILITOT 0.4 10/28/2022    ALKPHOS 156 (H) 10/28/2022    AST 23 10/28/2022    ALT 16 10/28/2022    WBC 10.44 10/28/2022     RBC 4.50 10/28/2022    HCT 39.1 10/28/2022    MCV 86.9 10/28/2022    MCH 27.3 10/28/2022      Assessment & Plan   Order Review Tab  Health Maintenance Tab  Patient Plan/Order Tab  Diagnoses and all orders for this visit:    1. GARCIA (dyspnea on exertion) (Primary)  -     C-reactive protein  -     TSH  -     Cancel: Adult Transthoracic Echo Complete W/ Cont if Necessary Per Protocol; Future  -     Cortisol  -     Adult Transthoracic Echo Complete W/ Cont if Necessary Per Protocol; Future    2. Acute pain of right knee  -     triamcinolone acetonide (KENALOG-40) injection 40 mg    1. Shortness of breath  - We will order an echocardiogram.  - We will prescribe an albuterol inhaler to use 2 to 3 times a day.  -We will order routine laboratory work for her.  -We will send her a work note through [employer] if she needs one.    2. Hypertension  - We will discontinue lisinopril for this week.    3. Knee pain  - We will administer an injection in the knee today.    Wrapup Tab  No follow-ups on file.       They were informed of the diagnosis and treatment plan and directed to f/u for any further problems or concerns.          Transcribed from ambient dictation for Marsha Lopez MD by Yumiko Livingston.  11/08/22   10:02 EST    Patient or patient representative verbalized consent to the visit recording.  I have personally performed the services described in this document as transcribed by the above individual, and it is both accurate and complete.  Marsha Lopez MD  11/8/2022  21:45 EST

## 2022-11-09 ENCOUNTER — TELEPHONE (OUTPATIENT)
Dept: FAMILY MEDICINE CLINIC | Facility: CLINIC | Age: 52
End: 2022-11-09

## 2022-11-09 ENCOUNTER — OFFICE VISIT (OUTPATIENT)
Dept: CARDIOLOGY | Facility: CLINIC | Age: 52
End: 2022-11-09

## 2022-11-09 VITALS
HEART RATE: 84 BPM | RESPIRATION RATE: 18 BRPM | BODY MASS INDEX: 41.48 KG/M2 | WEIGHT: 243 LBS | SYSTOLIC BLOOD PRESSURE: 156 MMHG | HEIGHT: 64 IN | DIASTOLIC BLOOD PRESSURE: 97 MMHG

## 2022-11-09 DIAGNOSIS — R42 DIZZINESS: Primary | ICD-10-CM

## 2022-11-09 PROCEDURE — 99213 OFFICE O/P EST LOW 20 MIN: CPT | Performed by: INTERNAL MEDICINE

## 2022-11-09 NOTE — TELEPHONE ENCOUNTER
Caller: Tessie Conner    Relationship to patient: Self    Best call back number: [phone]    Patient is needing: MUST SPEAK WITH DR ELLA WILL. PATIENT STATES HER CARDIOLOGIST HAS QUESTIONS  PLEASE CALL

## 2022-11-09 NOTE — PROGRESS NOTES
Cardiology Clinic Note  Thomas Zamora MD, PhD    Subjective:     Encounter Date:11/09/2022      Patient ID: Tessie Conner is a 52 y.o. female.    Chief Complaint:  Chief Complaint   Patient presents with   • Follow-up       HPI:    History of Present Illness  Ms. Conner is a very pleasant 52-year-old female who I saw in the hospital in 2020, ultimately with abnormal stress in May 2020 as well as recurrent atypical chest pain.  There was a small area reversible ischemia in the lateral wall of the apical segment with EF of 70%.  Ultimately with recurrent symptoms of atypical chest pain and vague complaints she underwent left heart catheterization demonstrating no obstructive coronary artery disease, 10% proximal LAD otherwise very smooth and normal contour arteries, essentially normal, with normal LV function.  2D echo demonstrated normal LV function with no significant valvular abnormality essentially unremarkable study.    She underwent cardiac event monitoring secondary to palpitations as well as nocturnal awakenings concerning for arrhythmia but no arrhythmia was seen with no significant pauses, no tacky or bradycardia arrhythmias, normal sinus rhythm with 1 triggered event.  She has low risk features and has primary prevention goals presently for CAD on lipid therapy with type 2 diabetes.    She has a history of bipolar disorder managed by psychiatry.  She does have significant daytime sleepiness with sleep apnea and we recommended compliance with CPAP.      We reexamined her cath films today with redemonstration of no significant coronary artery disease, primary prevention goals were discussed, she is still complains of some atypical chest pain or shortness of breath similar to back in 2020 as well as some significant dizziness with may be related to orthostasis.  Her blood pressure today is 156/97 with heart rate of 84, at home she is reports that she is in the 130s, primary care recently and given her  "shortness of air and possible cough symptoms stopped her lisinopril.  She is on aspirin low-dose atorvastatin, depression anxiety medications and bipolar medications otherwise.    Well compensated euvolemic        Review of systems x14 were review of systems is negative except was mentioned above     Historical data copied forward from previous encounters any margin change       Historical data copied forward from previous encounters in EMR including the history, exam, and assessment/plan has been reviewed and is unchanged unless noted otherwise.    Cardiac medicines reviewed with risk, benefits, and necessity of each discussed.    Risk and benefit of cardiac testing reviewed including death heart attack stroke pain bleeding infection need for vascular /cardiovascular surgery were discussed and the patient     Objective:         /97 (BP Location: Left arm, Patient Position: Sitting)   Pulse 84   Resp 18   Ht 162.6 cm (64\")   Wt 110 kg (243 lb)   LMP  (LMP Unknown)   BMI 41.71 kg/m²     Physical Exam  Regular rate and rhythm no rubs gallops heave or lift  No extra heart sounds  No clubbing cyanosis or edema  Obese soft nontender nondistended bowel sounds are positive  BMI is 41  Normal pulses normal cap refill  Intact grossly  Normal mood and affect today  Skin is warm and dry  Normocephalic/atraumatic pupils equal  Assessment:         Diagnoses and all orders for this visit:    1. Dizziness (Primary)  -     Tilt Table; Future    2D echo for structural and functional evaluation with dyspnea on exertion  Atypical chest pain, does not appear anginal by description, defer stress testing with completely normal angiographic appearance of her coronary arteries other than 10% proximal LAD narrowing essentially just 24 months ago unlikely to have significant progression in this time.  Optimize risk factors for primary mention of coronary disease  Essential hypertension is uncontrolled today, asked for twice daily " blood pressure logs at home which she will return to clinic as well as we will obtain tilt table values    Come back to clinic in 30 days for follow-up  Follow-up echo and tilt table results  Home blood pressure logs as        The pleasure to be involved in this patient's cardiovascular care.  Please call with any questions or concerns  Thomas Zamora MD, PhD    Most recent EKG as reviewed and interpreted by me:  Procedures     Most recent echo as reviewed and interpreted by me:  Results for orders placed during the hospital encounter of 09/24/20    Adult Transthoracic Echo Complete W/ Cont if Necessary Per Protocol    Interpretation Summary  · Left ventricular ejection fraction appears to be 56 - 60%. Left ventricular systolic function is normal.  · Estimated right ventricular systolic pressure from tricuspid regurgitation is normal (<35 mmHg).    Normal LV and RV size and function  Normal atrial sizes  No significant valvular abnormality seen  Normal right left-sided pressures estimated  Normal diastolic function by criteria  No masses or effusion seen      Most recent stress test as reviewed and interpreted by me:  Results for orders placed during the hospital encounter of 05/29/20    Stress Test With Myocardial Perfusion One Day    Interpretation Summary  · Small area of reversible ischemia involving the lateral wall in the apical segment  · Left ventricular ejection fraction is normal (Calculated EF = 70%).  · Impressions are consistent with a low risk study.    Electronically signed by BECKY Merchant, 05/29/20, 12:46 PM.      Most recent cardiac catheterization as reviewed interpreted by me:  Results for orders placed during the hospital encounter of 09/24/20    Cardiac Catheterization/Vascular Study    Narrative   Thomas Zamora MD, PhD  [facility] cardiology  Date 9-    Procedure  1.  Left heart catheterization with coronary angiography and left ventriculography in ORTIZ  position    Indication  Unstable angina with abnormal EKG    After informed consent the patient was brought to the catheterization lab sterilely prepped and draped in usual fashion with exposure the right groin for right common femoral arterial access via micropuncture modified Seldinger technique.  6 Vietnamese sheath was placed to the right common femoral artery.  An 035 guidewire was advanced to the level of the aortic valve followed by diagnostic JL4 and JR4 6 Vietnamese catheters for selective right and left coronary angiography.  The JR4 was used across aortic valve followed by LVEDP measurement, hand-injection of 8 cc for left ventriculography in ORTIZ position and pullback assessment of the transaortic valve gradient.  With no obstructive coronary disease all catheters and equipment were removed and the sheath flushed with heparinized saline.  Final angiography of the right common femoral artery revealed widely patent vessel with normal bifurcation and 6 Vietnamese Angio-Seal was used to close the vessel with immediate hemostasis and maintenance of distal pulses    Complications none  Contrast usage 85 cc  Moderate conscious sedation time of 35 minutes, IV Versed and fentanyl administered by registered nurse with complete ECG pulse oximetry and hemodynamic running throughout the entire the case was used with complete observation by myself    Patient left the Cath Lab chest pain-free hemodynamically electrically stable alert talking to staff neurologically gross intact bilaterally    Findings  1.  Open aortic pressure 117/51  2.  Closing pressure unchanged  3.  Mildly increased LVEDP at 18 mmHg  4.  Normal LV systolic function EF of 60% with no regional abnormality  5.  No significant transaortic valve gradient    Angiography  1.  Left main long giving rise to LAD and nondominant circumflex.  No angiographic disease of the left main  2.  LAD is a medium caliber vessel giving off numerous septal perforators as well as 2  diagonal branches.  The second diagonal branch essentially reaches the apex and dual LAD physiology and the continuation LAD tapers dramatically to around 1 to 1.5 mm in diameter at the apex.  There is only mild luminal irregularities in the LAD and diagonal branch with ALISON-3 flow throughout.  3.  The circumflex nondominant with one obtuse marginal branch and continuation circumflex.  There is no angiographic disease of the circumflex distribution and ALISON-3 flow throughout  4.  The RCA is a large-caliber dominant vessel with PDA and PLV distally.  There is no angiographic disease of the RCA PLV or PDA branches with ALISON-3 flow throughout    Conclusions and recommendations  1.  Normal epicardial coronary anatomy with suggestions of microvascular dysfunction as well as small distal vessel size  2.  Continue max medical therapy, antianginals, long-acting nitrates and ACE inhibitor's for microvascular dysfunction  3.  With patient symptomatology and nighttime awakenings would suggest sleep study as outpatient next  #4 consider outpatient ambulatory ECG monitoring for heart rate rule out any tachyarrhythmias  5.  Primary prevention goals for CAD and comorbidities    It is a pleasure to be involved in her cardiovascular care.  Please call with any questions or concerns  Thomas Zamora MD, PhD    The following portions of the patient's history were reviewed and updated as appropriate: allergies, current medications, past family history, past medical history, past social history, past surgical history and problem list.      ROS:  14 point review of systems negative except as mentioned above    Current Outpatient Medications:   •  albuterol sulfate  (90 Base) MCG/ACT inhaler, Inhale 2 puffs Every 4 (Four) Hours As Needed for Wheezing., Disp: 8 g, Rfl: 0  •  aspirin 81 MG chewable tablet, Chew 81 mg Daily., Disp: , Rfl:   •  atorvastatin (Lipitor) 20 MG tablet, Take 1 tablet by mouth Daily. (Patient taking  differently: Take 1 tablet by mouth Every Night.), Disp: 90 tablet, Rfl: 3  •  EPINEPHrine (EPIPEN) 0.3 MG/0.3ML solution auto-injector injection, , Disp: , Rfl:   •  ipratropium-albuterol (DUO-NEB) 0.5-2.5 mg/3 ml nebulizer, Take 3 mL by nebulization Every 4 (Four) Hours As Needed for Wheezing., Disp: 360 mL, Rfl: 0  •  ondansetron (Zofran) 4 MG tablet, Take 1 tablet by mouth Every 8 (Eight) Hours As Needed for Nausea or Vomiting., Disp: 20 tablet, Rfl: 0  •  venlafaxine XR (EFFEXOR-XR) 150 MG 24 hr capsule, Take 1 capsule by mouth 2 (Two) Times a Day., Disp: 60 capsule, Rfl: 3  •  lisinopril (PRINIVIL,ZESTRIL) 2.5 MG tablet, Take 1 tablet by mouth Daily., Disp: 90 tablet, Rfl: 1    Problem List:  Patient Active Problem List   Diagnosis   • Bipolar 1 disorder, depressed, moderate (HCC)   • Lithium adverse reaction   • Vitamin D deficiency disease   • Screening for breast cancer   • Encounter for screening mammogram for malignant neoplasm of breast    • Chest wall pain   • Type 2 diabetes mellitus without complication, without long-term current use of insulin ([facility])   • Morbid obesity (HCC)   • Transition of care performed with sharing of clinical summary   • Anxiety   • Asthma   • Bipolar disorder (HCC)   • Dermatitis   • Extrapyramidal and movement disorder   • Hyperlipidemia   • Posttraumatic stress disorder   • Encounter for general adult medical examination without abnormal findings   • Atypical chest pain   • Abnormal EKG   • Gastroesophageal reflux disease without esophagitis   • Pleurisy   • Diarrhea due to malabsorption   • Acute non-recurrent pansinusitis   • Chest pain   • Unstable angina pectoris (HCC)   • Nabothian cyst   • Coronary artery disease due to calcified coronary lesion   • Encounter for support and coordination of transition of care   • Carbuncle   • Aftercare following joint replacement surgery   • Contusion of right knee   • Primary osteoarthritis   • Hypertension   • Mass of lung   • Spleen  enlargement   • Lung nodule < 6cm on CT   • Hospital discharge follow-up   • Dyspnea   • Abnormal laboratory test   • Recurrent UTI   • Dry mouth   • Acute pain of right knee   • GARCIA (dyspnea on exertion)     Past Medical History:  Past Medical History:   Diagnosis Date   • Abnormal ECG    • Anxiety    • Arthritis    • Asthma 2020   • At risk for sleep apnea    • Bipolar 1 disorder (HCC)    • Cholelithiasis 10/2022   • Coronary artery disease    • Depression    • Diabetes mellitus (HCC)    • Dyspnea on minimal exertion    • Frequent UTI    • Heart murmur     FROM CHILDHOOD   • History of anemia     POST PREGNANCY   • Hyperlipidemia 10/04/2016   • Hypertension    • Mass of upper lobe of right lung    • Migraines    • Obesity    • PONV (postoperative nausea and vomiting)    • Sleep apnea 22   • Spinal headache    • Visual impairment     WEARS GLASSES     Past Surgical History:  Past Surgical History:   Procedure Laterality Date   • APPENDECTOMY     • CARDIAC CATHETERIZATION N/A 2020    Procedure: Left Heart Cath possible PCI, atherectomy, hemodynamic support;  Surgeon: Thomas Zamora MD;  Location: [facility] INVASIVE LOCATION;  Service: Cardiology;  Laterality: N/A;   • CARDIAC CATHETERIZATION  2020   •  SECTION     • FOOT/TOE TENDON REPAIR Left    • HYSTERECTOMY     • LUNG BIOPSY Right 2020   • LUNG LOBECTOMY Right 2022    pt had partial Right lobectomy and nodule removal   • ROTATOR CUFF REPAIR Left    • THORACOSCOPY VIDEO ASSISTED WITH LOBECTOMY Right 2022    Procedure: BRONCHOSCOPY, THORACOSCOPY VIDEO ASSISTED wedge resection X2, INTERCOSTAL NERVE BLOCK;  Surgeon: Ayla Carroll MD;  Location: [facility] OR;  Service: Thoracic;  Laterality: Right;   • TUBAL ABDOMINAL LIGATION       Social History:  Social History     Socioeconomic History   • Marital status:    Tobacco Use   • Smoking status: Never   • Smokeless  tobacco: Never   Vaping Use   • Vaping Use: Never used   Substance and Sexual Activity   • Alcohol use: Yes     Comment: VERY RARE   • Drug use: No   • Sexual activity: Not Currently     Partners: Male     Birth control/protection: None, Hysterectomy     Allergies:  Allergies   Allergen Reactions   • Tylenol [Acetaminophen] Hives and Itching     Immunizations:  Immunization History   Administered Date(s) Administered   • COVID-19 (BEATRICE) 03/13/2021   • COVID-19 (PFIZER) PURPLE CAP 12/30/2021   • FluLaval/Fluzone >6mos 03/23/2017, 10/01/2020, 11/02/2021, 09/30/2022   • Pneumococcal Polysaccharide (PPSV23) 08/07/2020, 02/20/2022            In-Office Procedure(s):  No orders to display        ASCVD RIsk Score::  The 10-year ASCVD risk score (Rebecca PAULINO, et al., 2019) is: 6.4%    Values used to calculate the score:      Age: 52 years      Sex: Female      Is Non- : No      Diabetic: Yes      Tobacco smoker: No      Systolic Blood Pressure: 156 mmHg      Is BP treated: Yes      HDL Cholesterol: 39 mg/dL      Total Cholesterol: 171 mg/dL    Imaging:    Results for orders placed during the hospital encounter of 03/31/22    XR Chest 1 View    Narrative  DATE OF EXAM:  3/31/2022 7:10 PM    PROCEDURE:  XR CHEST 1 VW-    INDICATIONS:  Shortness of breath, wheezing, cough.    COMPARISON:  Chest x-ray performed on 12/16/2021 and CT the chest performed at [facility] on 02/21/2022.    TECHNIQUE:  Single radiographic view of the chest was obtained.    FINDINGS:  The heart size is normal. The pulmonary vascular markings are normal.  There is a questionable nodular density/mass in the right suprahilar  region measuring 2.2 cm. There was an abnormal area of increased density  in this location on the outside CT of the chest. The remaining lungs and  pleural spaces are clear.  There is degenerative spondylosis of the  thoracic spine.    Impression  1. Questionable nodular density/mass in the right  suprahilar region  measuring 2.2 cm.  2. Otherwise no active disease.    Electronically Signed By-Edwin Rea MD On:3/31/2022 7:51 PM  This report was finalized on [id number] by  Edwin Rea MD.       Results for orders placed during the hospital encounter of 08/11/22    CT Abdomen Pelvis Without Contrast    Narrative  CT ABDOMEN PELVIS WO CONTRAST-    Date of Exam: 8/11/2022 4:32 PM    Indication: Right-sided abdominal pain.    Comparison: CT abdomen and pelvis with contrast 7/23/2022.    Technique: Contiguous axial CT images were obtained from the lung bases  to the pubic symphysis without contrast. Sagittal and coronal  reconstructions were performed.  Automated exposure control and  iterative reconstruction methods were used.    FINDINGS:    There is no urinary tract stone or hydronephrosis. Incidental note is  made of a small right gonadal vein phlebolith in the midabdomen. The  kidneys, urinary bladder has a normal noncontrast appearance.    Appendectomy, hysterectomy changes. The bowel does not appear abnormally  thickened, dilated or inflamed. No ascites or adenopathy is seen. Rectum  is within normal limits.    Noncontrast appearance of the liver, spleen, pancreas and adrenal glands  is normal. Tiny gallstones are present. No evidence of cholecystitis or  choledocholithiasis or abnormal biliary dilation.    The lung bases are free of consolidation. There may be minimal dependent  left basilar atelectasis. Heart size is within normal limits.    No acute or suspicious osseous abnormalities.    Impression  1. No acute findings in the abdomen or pelvis.  2. No urinary tract stone or hydronephrosis.  3. Tiny gallstones. No evidence of cholecystitis.  4. Appendectomy. Hysterectomy.        Electronically Signed By-Marleny Chavez MD On:8/11/2022 4:41 PM  This report was finalized on [id number] by  Marleny Chavez MD.      Results for orders placed during the hospital encounter of 08/11/22    CT Abdomen  Pelvis Without Contrast    Narrative  CT ABDOMEN PELVIS WO CONTRAST-    Date of Exam: 8/11/2022 4:32 PM    Indication: Right-sided abdominal pain.    Comparison: CT abdomen and pelvis with contrast 7/23/2022.    Technique: Contiguous axial CT images were obtained from the lung bases  to the pubic symphysis without contrast. Sagittal and coronal  reconstructions were performed.  Automated exposure control and  iterative reconstruction methods were used.    FINDINGS:    There is no urinary tract stone or hydronephrosis. Incidental note is  made of a small right gonadal vein phlebolith in the midabdomen. The  kidneys, urinary bladder has a normal noncontrast appearance.    Appendectomy, hysterectomy changes. The bowel does not appear abnormally  thickened, dilated or inflamed. No ascites or adenopathy is seen. Rectum  is within normal limits.    Noncontrast appearance of the liver, spleen, pancreas and adrenal glands  is normal. Tiny gallstones are present. No evidence of cholecystitis or  choledocholithiasis or abnormal biliary dilation.    The lung bases are free of consolidation. There may be minimal dependent  left basilar atelectasis. Heart size is within normal limits.    No acute or suspicious osseous abnormalities.    Impression  1. No acute findings in the abdomen or pelvis.  2. No urinary tract stone or hydronephrosis.  3. Tiny gallstones. No evidence of cholecystitis.  4. Appendectomy. Hysterectomy.        Electronically Signed By-Marleny Chavez MD On:8/11/2022 4:41 PM  This report was finalized on [id number] by  Marleny Chavez MD.      Lab Review:   Office Visit on 11/08/2022   Component Date Value   • C-Reactive Protein 11/08/2022 1.12 (H)    • TSH 11/08/2022 0.290    • Cortisol 11/08/2022 6.47    Office Visit on 11/07/2022   Component Date Value   • SARS Antigen 11/07/2022 Not Detected    • Influenza A Antigen GAURANG 11/07/2022 Not Detected    • Influenza B Antigen GAURANG 11/07/2022 Not Detected    •  Internal Control 11/07/2022 Passed    • Lot Number 11/07/2022 [id number]    • Expiration Date 11/07/2022 03/10/23    Admission on 10/29/2022, Discharged on 10/29/2022   Component Date Value   • Color 10/29/2022 Yellow    • Clarity, UA 10/29/2022 Cloudy (A)    • Specific Gravity  10/29/2022 1.030    • pH, Urine 10/29/2022 6.0    • Leukocytes 10/29/2022 Small (1+) (A)    • Nitrite, UA 10/29/2022 Negative    • Protein, POC 10/29/2022 300 mg/dL (A)    • Glucose, UA 10/29/2022 100 mg/dL (A)    • Ketones, UA 10/29/2022 Negative    • Bilirubin 10/29/2022 Small (1+) (A)    • Blood, UA 10/29/2022 Large (A)    • Lot Number 10/29/2022 [id number]    • Expiration Date 10/29/2022 8/23    • Urine Culture 10/29/2022 >100,000 CFU/mL Escherichia coli (A)    Office Visit on 10/28/2022   Component Date Value   • WBC 10/28/2022 10.44    • RBC 10/28/2022 4.50    • Hemoglobin 10/28/2022 12.3    • Hematocrit 10/28/2022 39.1    • MCV 10/28/2022 86.9    • MCH 10/28/2022 27.3    • MCHC 10/28/2022 31.5    • RDW 10/28/2022 14.7    • RDW-SD 10/28/2022 46.7    • MPV 10/28/2022 11.8    • Platelets 10/28/2022 275    • Neutrophil % 10/28/2022 72.8    • Lymphocyte % 10/28/2022 19.2 (L)    • Monocyte % 10/28/2022 7.3    • Eosinophil % 10/28/2022 0.0 (L)    • Basophil % 10/28/2022 0.2    • Immature Grans % 10/28/2022 0.5    • Neutrophils, Absolute 10/28/2022 7.61 (H)    • Lymphocytes, Absolute 10/28/2022 2.00    • Monocytes, Absolute 10/28/2022 0.76    • Eosinophils, Absolute 10/28/2022 0.00    • Basophils, Absolute 10/28/2022 0.02    • Immature Grans, Absolute 10/28/2022 0.05    • nRBC 10/28/2022 0.0    • Hemoglobin A1C 10/28/2022 5.6    • Glucose 10/28/2022 117 (H)    • BUN 10/28/2022 9    • Creatinine 10/28/2022 0.84    • Sodium 10/28/2022 140    • Potassium 10/28/2022 3.5    • Chloride 10/28/2022 101    • CO2 10/28/2022 29.1 (H)    • Calcium 10/28/2022 9.5    • Total Protein 10/28/2022 7.6    • Albumin 10/28/2022 4.20    • ALT (SGPT) 10/28/2022 16    •  AST (SGOT) 10/28/2022 23    • Alkaline Phosphatase 10/28/2022 156 (H)    • Total Bilirubin 10/28/2022 0.4    • Globulin 10/28/2022 3.4    • A/G Ratio 10/28/2022 1.2    • BUN/Creatinine Ratio 10/28/2022 10.7    • Anion Gap 10/28/2022 9.9    • eGFR 10/28/2022 84.3    Office Visit on 09/30/2022   Component Date Value   • Urine Culture 09/30/2022 50,000 CFU/mL Mixed Martha Isolated    Admission on 09/18/2022, Discharged on 09/18/2022   Component Date Value   • Color 09/18/2022 Dark Yellow    • Clarity, UA 09/18/2022 Cloudy (A)    • Specific Gravity  09/18/2022 1.030    • pH, Urine 09/18/2022 6.0    • Leukocytes 09/18/2022 Small (1+) (A)    • Nitrite, UA 09/18/2022 Negative    • Protein, POC 09/18/2022 100 mg/dL (A)    • Glucose, UA 09/18/2022 100 mg/dL (A)    • Ketones, UA 09/18/2022 Negative    • Bilirubin 09/18/2022 Negative    • Blood, UA 09/18/2022 Large (A)    • Lot Number 09/18/2022 [id number]    • Expiration Date 09/18/2022 5,302,023    • Urine Culture 09/18/2022 >100,000 CFU/mL Escherichia coli (A)    Admission on 08/14/2022, Discharged on 08/14/2022   Component Date Value   • Glucose 08/14/2022 115 (H)    • BUN 08/14/2022 9    • Creatinine 08/14/2022 0.81    • Sodium 08/14/2022 140    • Potassium 08/14/2022 3.8    • Chloride 08/14/2022 102    • CO2 08/14/2022 32.0 (H)    • Calcium 08/14/2022 9.7    • Total Protein 08/14/2022 6.7    • Albumin 08/14/2022 3.80    • ALT (SGPT) 08/14/2022 20    • AST (SGOT) 08/14/2022 20    • Alkaline Phosphatase 08/14/2022 156 (H)    • Total Bilirubin 08/14/2022 0.3    • Globulin 08/14/2022 2.9    • A/G Ratio 08/14/2022 1.3    • BUN/Creatinine Ratio 08/14/2022 11.1    • Anion Gap 08/14/2022 6.0    • eGFR 08/14/2022 88.0    • Color, UA 08/14/2022 Yellow    • Appearance, UA 08/14/2022 Clear    • pH, UA 08/14/2022 7.0    • Specific Gravity, UA 08/14/2022 <=1.005    • Glucose, UA 08/14/2022 Negative    • Ketones, UA 08/14/2022 Negative    • Bilirubin, UA 08/14/2022 Negative    • Blood, UA  08/14/2022 Negative    • Protein, UA 08/14/2022 Negative    • Leuk Esterase, UA 08/14/2022 Negative    • Nitrite, UA 08/14/2022 Negative    • Urobilinogen, UA 08/14/2022 1.0 E.U./dL    • Lipase 08/14/2022 29    • Magnesium 08/14/2022 2.1    • WBC 08/14/2022 10.30    • RBC 08/14/2022 4.57    • Hemoglobin 08/14/2022 12.3    • Hematocrit 08/14/2022 37.6    • MCV 08/14/2022 82.2    • MCH 08/14/2022 26.8    • MCHC 08/14/2022 32.7    • RDW 08/14/2022 16.9 (H)    • RDW-SD 08/14/2022 49.0    • MPV 08/14/2022 8.0    • Platelets 08/14/2022 252    • Neutrophil % 08/14/2022 75.4    • Lymphocyte % 08/14/2022 17.7 (L)    • Monocyte % 08/14/2022 6.0    • Eosinophil % 08/14/2022 0.1 (L)    • Basophil % 08/14/2022 0.8    • Neutrophils, Absolute 08/14/2022 7.70 (H)    • Lymphocytes, Absolute 08/14/2022 1.80    • Monocytes, Absolute 08/14/2022 0.60    • Eosinophils, Absolute 08/14/2022 0.00    • Basophils, Absolute 08/14/2022 0.10    • nRBC 08/14/2022 0.0    Admission on 08/11/2022, Discharged on 08/11/2022   Component Date Value   • Glucose 08/11/2022 78    • BUN 08/11/2022 10    • Creatinine 08/11/2022 0.55 (L)    • Sodium 08/11/2022 142    • Potassium 08/11/2022 2.9 (L)    • Chloride 08/11/2022 116 (H)    • CO2 08/11/2022 19.0 (L)    • Calcium 08/11/2022 6.3 (L)    • Total Protein 08/11/2022 4.6 (L)    • Albumin 08/11/2022 2.50 (L)    • ALT (SGPT) 08/11/2022 13    • AST (SGOT) 08/11/2022 11    • Alkaline Phosphatase 08/11/2022 96    • Total Bilirubin 08/11/2022 <0.2    • Globulin 08/11/2022 2.1    • A/G Ratio 08/11/2022 1.2    • BUN/Creatinine Ratio 08/11/2022 18.2    • Anion Gap 08/11/2022 7.0    • eGFR 08/11/2022 111.1    • Lipase 08/11/2022 52    • Color, UA 08/11/2022 Yellow    • Appearance, UA 08/11/2022 Cloudy (A)    • pH, UA 08/11/2022 5.5    • Specific Gravity, UA 08/11/2022 1.020    • Glucose, UA 08/11/2022 Negative    • Ketones, UA 08/11/2022 Negative    • Bilirubin, UA 08/11/2022 Negative    • Blood, UA 08/11/2022 Large  (3+) (A)    • Protein, UA 08/11/2022 100 mg/dL (2+) (A)    • Leuk Esterase, UA 08/11/2022 Large (3+) (A)    • Nitrite, UA 08/11/2022 Positive (A)    • Urobilinogen, UA 08/11/2022 1.0 E.U./dL    • WBC 08/11/2022 14.10 (H)    • RBC 08/11/2022 4.87    • Hemoglobin 08/11/2022 13.0    • Hematocrit 08/11/2022 39.9    • MCV 08/11/2022 81.9    • MCH 08/11/2022 26.7    • MCHC 08/11/2022 32.6    • RDW 08/11/2022 16.5 (H)    • RDW-SD 08/11/2022 47.7    • MPV 08/11/2022 7.9    • Platelets 08/11/2022 291    • Neutrophil % 08/11/2022 79.4 (H)    • Lymphocyte % 08/11/2022 14.4 (L)    • Monocyte % 08/11/2022 5.8    • Eosinophil % 08/11/2022 0.0 (L)    • Basophil % 08/11/2022 0.4    • Neutrophils, Absolute 08/11/2022 11.20 (H)    • Lymphocytes, Absolute 08/11/2022 2.00    • Monocytes, Absolute 08/11/2022 0.80    • Eosinophils, Absolute 08/11/2022 0.00    • Basophils, Absolute 08/11/2022 0.10    • nRBC 08/11/2022 0.0    • Extra Tube 08/11/2022 HOLD    • RBC, UA 08/11/2022 Too Numerous to Count (A)    • WBC, UA 08/11/2022 Too Numerous to Count (A)    • Bacteria, UA 08/11/2022 3+ (A)    • Squamous Epithelial Cell* 08/11/2022 0-2    • Hyaline Casts, UA 08/11/2022 None Seen    • Methodology 08/11/2022 Manual Light Microscopy    • Urine Culture 08/11/2022 >100,000 CFU/mL Escherichia coli (A)    Admission on 08/11/2022, Discharged on 08/11/2022   Component Date Value   • Color 08/11/2022 Dark Yellow    • Clarity, UA 08/11/2022 Cloudy (A)    • Specific Gravity  08/11/2022 1.025    • pH, Urine 08/11/2022 6.0    • Leukocytes 08/11/2022 Small (1+) (A)    • Nitrite, UA 08/11/2022 Negative    • Protein, POC 08/11/2022 100 mg/dL (A)    • Glucose, UA 08/11/2022 Negative    • Ketones, UA 08/11/2022 Negative    • Bilirubin 08/11/2022 Negative    • Blood, UA 08/11/2022 Large (A)    • Lot Number 08/11/2022 [id number]    • Expiration Date 08/11/2022 5/31/23    • Urine Culture 08/11/2022 >100,000 CFU/mL Escherichia coli (A)    Lab on 07/28/2022   Component  Date Value   • TSH 07/28/2022 1.200    • Free T4 07/28/2022 1.16    There may be more visits with results that are not included.     Recent labs reviewed and interpreted for clinical significance and application            Level of Care:           Thomas Zamora MD  11/09/22  .

## 2022-11-10 RX ORDER — ATORVASTATIN CALCIUM 20 MG/1
TABLET, FILM COATED ORAL
Qty: 90 TABLET | Refills: 1 | Status: SHIPPED | OUTPATIENT
Start: 2022-11-10

## 2022-11-10 NOTE — TELEPHONE ENCOUNTER
HUB TO SHARE: LEFT VM TO CALL BACK. PER DR JARAMILLO.  See what questions she has- I saw Dr. Whitley notes and agree for tilt table- Cardiology is only one that could order that. And agree with increasing BP checks 2/day and adjust meds as needed

## 2022-11-10 NOTE — TELEPHONE ENCOUNTER
See what questions she has- I saw Dr. Whitley notes and agree for tilt table- Cardiology is only one that could order that. And agree with increasing BP checks 2/day and adjust meds as needed

## 2022-11-12 ENCOUNTER — HOSPITAL ENCOUNTER (OUTPATIENT)
Dept: ULTRASOUND IMAGING | Facility: HOSPITAL | Age: 52
Discharge: HOME | End: 2022-11-12
Admitting: NURSE PRACTITIONER

## 2022-11-12 DIAGNOSIS — R16.1 SPLEEN ENLARGEMENT: ICD-10-CM

## 2022-11-12 PROCEDURE — 76705 ECHO EXAM OF ABDOMEN: CPT

## 2022-11-16 ENCOUNTER — APPOINTMENT (OUTPATIENT)
Dept: CT IMAGING | Facility: HOSPITAL | Age: 52
End: 2022-11-16

## 2022-11-16 ENCOUNTER — APPOINTMENT (OUTPATIENT)
Dept: GENERAL RADIOLOGY | Facility: HOSPITAL | Age: 52
End: 2022-11-16

## 2022-11-16 ENCOUNTER — HOSPITAL ENCOUNTER (EMERGENCY)
Facility: HOSPITAL | Age: 52
Discharge: HOME | End: 2022-11-16
Attending: EMERGENCY MEDICINE | Admitting: EMERGENCY MEDICINE

## 2022-11-16 ENCOUNTER — APPOINTMENT (OUTPATIENT)
Dept: ULTRASOUND IMAGING | Facility: HOSPITAL | Age: 52
End: 2022-11-16

## 2022-11-16 VITALS
BODY MASS INDEX: 40.87 KG/M2 | TEMPERATURE: 97.2 F | OXYGEN SATURATION: 98 % | WEIGHT: 239.42 LBS | SYSTOLIC BLOOD PRESSURE: 111 MMHG | HEIGHT: 64 IN | HEART RATE: 69 BPM | RESPIRATION RATE: 18 BRPM | DIASTOLIC BLOOD PRESSURE: 65 MMHG

## 2022-11-16 DIAGNOSIS — R07.9 RIGHT-SIDED CHEST PAIN: Primary | ICD-10-CM

## 2022-11-16 LAB
ALBUMIN SERPL-MCNC: 4.1 G/DL (ref 3.5–5.2)
ALBUMIN/GLOB SERPL: 1.2 G/DL
ALP SERPL-CCNC: 172 U/L (ref 39–117)
ALT SERPL W P-5'-P-CCNC: 16 U/L (ref 1–33)
ANION GAP SERPL CALCULATED.3IONS-SCNC: 11 MMOL/L (ref 5–15)
APTT PPP: 29.1 SECONDS (ref 61–76.5)
AST SERPL-CCNC: 13 U/L (ref 1–32)
BASOPHILS # BLD AUTO: 0 10*3/MM3 (ref 0–0.2)
BASOPHILS NFR BLD AUTO: 0.4 % (ref 0–1.5)
BILIRUB SERPL-MCNC: 0.3 MG/DL (ref 0–1.2)
BUN SERPL-MCNC: 11 MG/DL (ref 6–20)
BUN/CREAT SERPL: 11.8 (ref 7–25)
CALCIUM SPEC-SCNC: 9.8 MG/DL (ref 8.6–10.5)
CHLORIDE SERPL-SCNC: 101 MMOL/L (ref 98–107)
CO2 SERPL-SCNC: 24 MMOL/L (ref 22–29)
CREAT SERPL-MCNC: 0.93 MG/DL (ref 0.57–1)
D DIMER PPP FEU-MCNC: 0.53 MG/L (FEU) (ref 0–0.59)
DEPRECATED RDW RBC AUTO: 51.6 FL (ref 37–54)
EGFRCR SERPLBLD CKD-EPI 2021: 74.6 ML/MIN/1.73
EOSINOPHIL # BLD AUTO: 0 10*3/MM3 (ref 0–0.4)
EOSINOPHIL NFR BLD AUTO: 0.1 % (ref 0.3–6.2)
ERYTHROCYTE [DISTWIDTH] IN BLOOD BY AUTOMATED COUNT: 16.8 % (ref 12.3–15.4)
GLOBULIN UR ELPH-MCNC: 3.3 GM/DL
GLUCOSE SERPL-MCNC: 96 MG/DL (ref 65–99)
HCT VFR BLD AUTO: 38 % (ref 34–46.6)
HGB BLD-MCNC: 12.8 G/DL (ref 12–15.9)
HOLD SPECIMEN: NORMAL
HOLD SPECIMEN: NORMAL
INR PPP: 1.02 (ref 0.93–1.1)
LIPASE SERPL-CCNC: 38 U/L (ref 13–60)
LYMPHOCYTES # BLD AUTO: 1.9 10*3/MM3 (ref 0.7–3.1)
LYMPHOCYTES NFR BLD AUTO: 17.6 % (ref 19.6–45.3)
MCH RBC QN AUTO: 27.8 PG (ref 26.6–33)
MCHC RBC AUTO-ENTMCNC: 33.6 G/DL (ref 31.5–35.7)
MCV RBC AUTO: 82.8 FL (ref 79–97)
MONOCYTES # BLD AUTO: 0.8 10*3/MM3 (ref 0.1–0.9)
MONOCYTES NFR BLD AUTO: 7.8 % (ref 5–12)
NEUTROPHILS NFR BLD AUTO: 74.1 % (ref 42.7–76)
NEUTROPHILS NFR BLD AUTO: 8.1 10*3/MM3 (ref 1.7–7)
NRBC BLD AUTO-RTO: 0 /100 WBC (ref 0–0.2)
PLATELET # BLD AUTO: 275 10*3/MM3 (ref 140–450)
PMV BLD AUTO: 8 FL (ref 6–12)
POTASSIUM SERPL-SCNC: 3.9 MMOL/L (ref 3.5–5.2)
PROT SERPL-MCNC: 7.4 G/DL (ref 6–8.5)
PROTHROMBIN TIME: 10.5 SECONDS (ref 9.6–11.7)
RBC # BLD AUTO: 4.59 10*6/MM3 (ref 3.77–5.28)
SODIUM SERPL-SCNC: 136 MMOL/L (ref 136–145)
TROPONIN T SERPL-MCNC: <0.01 NG/ML (ref 0–0.03)
TROPONIN T SERPL-MCNC: <0.01 NG/ML (ref 0–0.03)
WBC NRBC COR # BLD: 10.9 10*3/MM3 (ref 3.4–10.8)
WHOLE BLOOD HOLD COAG: NORMAL
WHOLE BLOOD HOLD SPECIMEN: NORMAL

## 2022-11-16 PROCEDURE — 25010000002 KETOROLAC TROMETHAMINE PER 15 MG: Performed by: EMERGENCY MEDICINE

## 2022-11-16 PROCEDURE — 85730 THROMBOPLASTIN TIME PARTIAL: CPT | Performed by: EMERGENCY MEDICINE

## 2022-11-16 PROCEDURE — 80053 COMPREHEN METABOLIC PANEL: CPT | Performed by: EMERGENCY MEDICINE

## 2022-11-16 PROCEDURE — 36415 COLL VENOUS BLD VENIPUNCTURE: CPT

## 2022-11-16 PROCEDURE — 71045 X-RAY EXAM CHEST 1 VIEW: CPT

## 2022-11-16 PROCEDURE — 83690 ASSAY OF LIPASE: CPT | Performed by: EMERGENCY MEDICINE

## 2022-11-16 PROCEDURE — 93005 ELECTROCARDIOGRAM TRACING: CPT | Performed by: EMERGENCY MEDICINE

## 2022-11-16 PROCEDURE — 84484 ASSAY OF TROPONIN QUANT: CPT | Performed by: EMERGENCY MEDICINE

## 2022-11-16 PROCEDURE — 96374 THER/PROPH/DIAG INJ IV PUSH: CPT

## 2022-11-16 PROCEDURE — 71275 CT ANGIOGRAPHY CHEST: CPT

## 2022-11-16 PROCEDURE — 74177 CT ABD & PELVIS W/CONTRAST: CPT

## 2022-11-16 PROCEDURE — 85610 PROTHROMBIN TIME: CPT | Performed by: EMERGENCY MEDICINE

## 2022-11-16 PROCEDURE — 85025 COMPLETE CBC W/AUTO DIFF WBC: CPT | Performed by: EMERGENCY MEDICINE

## 2022-11-16 PROCEDURE — 99284 EMERGENCY DEPT VISIT MOD MDM: CPT

## 2022-11-16 PROCEDURE — 76705 ECHO EXAM OF ABDOMEN: CPT

## 2022-11-16 PROCEDURE — 85379 FIBRIN DEGRADATION QUANT: CPT | Performed by: EMERGENCY MEDICINE

## 2022-11-16 PROCEDURE — 0 IOPAMIDOL PER 1 ML: Performed by: EMERGENCY MEDICINE

## 2022-11-16 RX ORDER — NAPROXEN 500 MG/1
500 TABLET ORAL 2 TIMES DAILY WITH MEALS
Qty: 14 TABLET | Refills: 0 | Status: SHIPPED | OUTPATIENT
Start: 2022-11-16 | End: 2022-11-22 | Stop reason: SDUPTHER

## 2022-11-16 RX ORDER — KETOROLAC TROMETHAMINE 30 MG/ML
30 INJECTION, SOLUTION INTRAMUSCULAR; INTRAVENOUS ONCE
Status: COMPLETED | OUTPATIENT
Start: 2022-11-16 | End: 2022-11-16

## 2022-11-16 RX ADMIN — KETOROLAC TROMETHAMINE 30 MG: 30 INJECTION, SOLUTION INTRAMUSCULAR; INTRAVENOUS at 07:14

## 2022-11-16 RX ADMIN — IOPAMIDOL 100 ML: 755 INJECTION, SOLUTION INTRAVENOUS at 06:22

## 2022-11-16 NOTE — ED PROVIDER NOTES
Subjective   History of Present Illness  52-year-old female with sudden onset of right-sided inferior anterior chest pain radiating to her back, worse with deep breath that woke her up from sleep at 2:30 AM, moderate in intensity.  Patient denies any history of DVT or PE recent trips or travel caffeine or swelling.  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        Review of Systems   Respiratory:        Dry cough, painful breathing   Cardiovascular: Positive for chest pain.   Musculoskeletal: Positive for back pain.   All other systems reviewed and are negative.      Past Medical History:   Diagnosis Date   • Abnormal ECG    • Anxiety    • Arthritis    • Asthma 2020   • At risk for sleep apnea    • Bipolar 1 disorder (HCC)    • Cholelithiasis 10/2022   • Coronary artery disease    • Depression    • Diabetes mellitus (HCC)    • Dyspnea on minimal exertion    • Frequent UTI    • Heart murmur     FROM CHILDHOOD   • History of anemia     POST PREGNANCY   • Hyperlipidemia 10/04/2016   • Hypertension    • Mass of upper lobe of right lung    • Migraines    • Obesity    • PONV (postoperative nausea and vomiting)    • Sleep apnea 22   • Spinal headache    • Visual impairment     WEARS GLASSES       Allergies   Allergen Reactions   • Tylenol [Acetaminophen] Hives and Itching       Past Surgical History:   Procedure Laterality Date   • APPENDECTOMY     • CARDIAC CATHETERIZATION N/A 2020    Procedure: Left Heart Cath possible PCI, atherectomy, hemodynamic support;  Surgeon: Thomas Zamora MD;  Location: [facility] INVASIVE LOCATION;  Service: Cardiology;  Laterality: N/A;   • CARDIAC CATHETERIZATION  2020   •  SECTION     • FOOT/TOE TENDON REPAIR Left    • HYSTERECTOMY     • LUNG BIOPSY Right 2020   • LUNG LOBECTOMY Right 2022    pt had partial Right lobectomy and nodule removal   •  ROTATOR CUFF REPAIR Left    • THORACOSCOPY VIDEO ASSISTED WITH LOBECTOMY Right 02/08/2022    Procedure: BRONCHOSCOPY, THORACOSCOPY VIDEO ASSISTED wedge resection X2, INTERCOSTAL NERVE BLOCK;  Surgeon: Ayla Carroll MD;  Location: [facility] OR;  Service: Thoracic;  Laterality: Right;   • TUBAL ABDOMINAL LIGATION  2002       Family History   Problem Relation Age of Onset   • Arthritis Mother    • Cancer Mother    • Depression Mother    • Diabetes Mother    • Early death Mother    • Mental illness Mother    • Anxiety disorder Mother    • Alcohol abuse Father    • Diabetes Father    • Early death Father    • Heart disease Father    • Hyperlipidemia Father    • Hypertension Father         Father   • Vision loss Father    • Heart attack Father    • Heart disease Sister    • Heart disease Brother    • Hypertension Brother    • Cancer Maternal Grandmother    • Drug abuse Brother    • Hypertension Brother    • Heart disease Brother    • Drug abuse Sister    • Heart attack Sister    • Hypertension Sister         Sister   • Heart disease Sister    • Heart attack Brother    • Malig Hyperthermia Neg Hx        Social History     Socioeconomic History   • Marital status:    Tobacco Use   • Smoking status: Never   • Smokeless tobacco: Never   Vaping Use   • Vaping Use: Never used   Substance and Sexual Activity   • Alcohol use: Yes     Comment: VERY RARE   • Drug use: No   • Sexual activity: Not Currently     Partners: Male     Birth control/protection: None, Hysterectomy           Objective   Physical Exam  Constitutional:       Appearance: Normal appearance. She is well-developed.   HENT:      Head: Normocephalic and atraumatic.      Mouth/Throat:      Mouth: Mucous membranes are moist.      Pharynx: Oropharynx is clear.   Eyes:      Conjunctiva/sclera: Conjunctivae normal.      Pupils: Pupils are equal, round, and reactive to light.   Cardiovascular:      Rate and Rhythm: Normal rate and regular rhythm.      Heart  sounds: Normal heart sounds.   Pulmonary:      Effort: Pulmonary effort is normal.      Breath sounds: Normal breath sounds.   Abdominal:      General: Bowel sounds are normal. There is no distension.      Palpations: Abdomen is soft.      Tenderness: There is no abdominal tenderness.   Musculoskeletal:         General: No swelling or tenderness. Normal range of motion.   Skin:     General: Skin is warm and dry.      Capillary Refill: Capillary refill takes less than 2 seconds.   Neurological:      General: No focal deficit present.      Mental Status: She is alert and oriented to person, place, and time.   Psychiatric:         Mood and Affect: Mood normal.         Behavior: Behavior normal.         Procedures           ED Course  ED Course as of 11/16/22 0837   Wed Nov 16, 2022   0455 EKG interpretation: Normal sinus rhythm, rate 69, no STEMI, nonspecific ST depression and T wave inversion, not appreciably changed from tracing done on March 2022 [JR]   0816 Care assumed from Dr. Phelan pending US results and disposition with likely disposition home if US negative. [MD]   0836 On my examination, patient resting comfortably and in no distress.  She has no new complaints.  She is afebrile, hemodynamically stable.   Right upper quadrant ultrasound is unremarkable. [MD]   0836 Diagnosis and treatment plan discussed with patient.  Patient agreeable to plan.   I discussed findings with patient who voices understanding of discharge instructions, signs and symptoms requiring return to ED; discharged improved and in stable condition with follow up for re-evaluation.  Prescription for Naprosyn. [MD]      ED Course User Index  [JR] Justo Phelan MD  [MD] Wanda Gutierrez APRN            Labs Reviewed   COMPREHENSIVE METABOLIC PANEL - Abnormal; Notable for the following components:       Result Value    Alkaline Phosphatase 172 (*)     All other components within normal limits    Narrative:     GFR Normal >60  Chronic Kidney  Disease <60  Kidney Failure <15     APTT - Abnormal; Notable for the following components:    PTT 29.1 (*)     All other components within normal limits   CBC WITH AUTO DIFFERENTIAL - Abnormal; Notable for the following components:    WBC 10.90 (*)     RDW 16.8 (*)     Lymphocyte % 17.6 (*)     Eosinophil % 0.1 (*)     Neutrophils, Absolute 8.10 (*)     All other components within normal limits   PROTIME-INR - Normal   LIPASE - Normal   D-DIMER, QUANTITATIVE - Normal    Narrative:     Reference Range  --------------------------------------------------------------------     < 0.50   Negative Predictive Value  0.50-0.59   Indeterminate    >= 0.60   Probable VTE             A very low percentage of patients with DVT may yield D-Dimer results   below the cut-off of 0.50 mg/L FEU.  This is known to be more   prevalent in patients with distal DVT.             Results of this test should always be interpreted in conjunction with   the patient's medical history, clinical presentation and other   findings.  Clinical diagnosis should not be based on the result of   [place] D-Dimer alone.   TROPONIN (IN-HOUSE) - Normal    Narrative:     Troponin T Reference Range:  <= 0.03 ng/mL-   Negative for AMI  >0.03 ng/mL-     Abnormal for myocardial necrosis.  Clinicians would have to utilize clinical acumen, EKG, Troponin and serial changes to determine if it is an Acute Myocardial Infarction or myocardial injury due to an underlying chronic condition.       Results may be falsely decreased if patient taking Biotin.     TROPONIN (IN-HOUSE) - Normal    Narrative:     Troponin T Reference Range:  <= 0.03 ng/mL-   Negative for AMI  >0.03 ng/mL-     Abnormal for myocardial necrosis.  Clinicians would have to utilize clinical acumen, EKG, Troponin and serial changes to determine if it is an Acute Myocardial Infarction or myocardial injury due to an underlying chronic condition.       Results may be falsely decreased if patient taking  Biotin.     RAINBOW DRAW    Narrative:     The following orders were created for panel order [place] Draw.  Procedure                               Abnormality         Status                     ---------                               -----------         ------                     Green Top (Gel)[[id number]]                                  Final result               Lavender Top[[id number]]                                     Final result               Gold Top - SST[[id number]]                                   Final result               Light Blue Top[[id number]]                                   Final result                 Please view results for these tests on the individual orders.   GREEN TOP   LAVENDER TOP   GOLD TOP - SST   LIGHT BLUE TOP   CBC AND DIFFERENTIAL    Narrative:     The following orders were created for panel order CBC & Differential.  Procedure                               Abnormality         Status                     ---------                               -----------         ------                     CBC Auto Differential[[id number]]        Abnormal            Final result                 Please view results for these tests on the individual orders.     CT Abdomen Pelvis With Contrast    Result Date: 11/16/2022  1. Prominent right hilar lymph nodes and adjacent parenchymal changes are unchanged. 2. No evidence of pulmonary embolism. 3. No evidence of thoracic or abdominal aortic aneurysm or dissection. 4. No acute process seen within the abdomen or pelvis.  Electronically signed by:  Vaughn Douglas D.O.  11/16/2022 4:44 AM Mountain Time    XR Chest 1 View    Result Date: 11/16/2022  1.  Minimal right suprahilar opacity, possible infiltrate or mass. 2.  Minor cardiomegaly. No overt failure. Electronically signed by:  Melodie Christiansen M.D.  11/16/2022 4:47 AM Mountain Time    CT Angiogram Chest Pulmonary Embolism    Result Date: 11/16/2022  1. Prominent right hilar lymph nodes and adjacent  parenchymal changes are unchanged. 2. No evidence of pulmonary embolism. 3. No evidence of thoracic or abdominal aortic aneurysm or dissection. 4. No acute process seen within the abdomen or pelvis.  Electronically signed by:  Vaughn Douglas D.O.  11/16/2022 4:44 AM Mountain Time    US Abdomen Limited    Result Date: 11/16/2022  1. Normal right upper quadrant abdominal ultrasound.  Electronically Signed By-Marleny Chavez MD On:11/16/2022 8:27 AM This report was finalized on [id number] by  Marleny Chavez MD.    Medications   iopamidol (ISOVUE-370) 76 % injection 100 mL (100 mL Intravenous Given 11/16/22 0622)   ketorolac (TORADOL) injection 30 mg (30 mg Intravenous Given 11/16/22 0714)                                       MDM    Final diagnoses:   Right-sided chest pain       ED Disposition  ED Disposition     ED Disposition   Discharge    Condition   Stable    Comment   --             Marsha Lopez MD  [address] [facility] [address]  [place] [address] [phone]               Medication List      New Prescriptions    naproxen 500 MG EC tablet  Commonly known as: EC NAPROSYN  Take 1 tablet by mouth 2 (Two) Times a Day With Meals for 7 days.           Where to Get Your Medications      These medications were sent to [id number] - [place], [place] - [address] [address] - [phone]  - [phone] FX  [address] [place] [address]    Phone: [phone]   · naproxen 500 MG EC tablet          Wanda Gutierrez APRN  11/16/22 0850

## 2022-11-16 NOTE — DISCHARGE INSTRUCTIONS
Take medication as prescribed.  May continue current home medications.  Drink plenty fluids.  Rest.  No heavy lifting pulling or straining.  Follow-up with primary care provider.  Return for new or worsening symptoms.

## 2022-11-21 ENCOUNTER — TELEPHONE (OUTPATIENT)
Dept: FAMILY MEDICINE CLINIC | Facility: CLINIC | Age: 52
End: 2022-11-21

## 2022-11-21 LAB — QT INTERVAL: 362 MS

## 2022-11-21 NOTE — TELEPHONE ENCOUNTER
PATIENT CALLED BACK THIS MORNING REGARDING HER CANCELED APPOINTMENT FROM LAST WEEK. SHE SAID SHE REALLY NEEDS TO DISCUSS TEST RESULTS AND IS VERY UPSET THAT WE DIDN'T GET BACK WITH HER AND SEE HER LAST WEEK OR DO A VIRTUAL VISIT. SHE STATED SHE HAS NEVER HAD THIS MUCH TROUBLE GETTING RESPONSES BACK AND WANTS TO KNOW IF [facility] WANTS HER TO FIND A NEW DR INSTEAD. I APOLOGIZED AND LET HER KNOW HOW BUSY WE WERE LAST WEEK AND THAT [facility] IS ON VACATION THIS WEEK.

## 2022-11-22 ENCOUNTER — OFFICE VISIT (OUTPATIENT)
Dept: FAMILY MEDICINE CLINIC | Facility: CLINIC | Age: 52
End: 2022-11-22

## 2022-11-22 VITALS
WEIGHT: 238.6 LBS | HEIGHT: 64 IN | OXYGEN SATURATION: 99 % | DIASTOLIC BLOOD PRESSURE: 68 MMHG | BODY MASS INDEX: 40.74 KG/M2 | RESPIRATION RATE: 20 BRPM | HEART RATE: 109 BPM | SYSTOLIC BLOOD PRESSURE: 100 MMHG

## 2022-11-22 DIAGNOSIS — N89.8 VAGINAL DRYNESS: ICD-10-CM

## 2022-11-22 DIAGNOSIS — R16.1 SPLEEN ENLARGEMENT: ICD-10-CM

## 2022-11-22 DIAGNOSIS — R07.89 MUSCULOSKELETAL CHEST PAIN: Primary | ICD-10-CM

## 2022-11-22 DIAGNOSIS — N39.0 RECURRENT UTI: ICD-10-CM

## 2022-11-22 PROCEDURE — 99214 OFFICE O/P EST MOD 30 MIN: CPT | Performed by: STUDENT IN AN ORGANIZED HEALTH CARE EDUCATION/TRAINING PROGRAM

## 2022-11-22 RX ORDER — ESTRADIOL 10 UG/1
INSERT VAGINAL
Qty: 30 TABLET | Refills: 1 | Status: SHIPPED | OUTPATIENT
Start: 2022-11-22 | End: 2023-01-18 | Stop reason: ALTCHOICE

## 2022-11-22 RX ORDER — NAPROXEN 500 MG/1
500 TABLET ORAL 2 TIMES DAILY WITH MEALS
Qty: 28 TABLET | Refills: 0 | Status: SHIPPED | OUTPATIENT
Start: 2022-11-22 | End: 2022-12-06

## 2022-11-22 NOTE — PROGRESS NOTES
"Chief Complaint  Chief Complaint   Patient presents with   • Follow-up     CC: 11/2021 found an enlarged spleen and a spot on her r lung, removed a portion of lung  Last Saturday an ultra sound was done and it is still enlarged.  She wants to know WHY and to try and get better 2) Hospital this week with chest r chest pain, she still has it. is worse at night, a constant pain. Pain during day a 4,night it's an 8. It is stopping her from sleeping.Pt states when she saw her results her RBC was high, showed air in her bladder, her EKG concerned her, she wants to know what is wrong     Subjective        Tessie Conner is a 52 y.o. female who presents to [facility].    History of Present Illness  Enlarged spleen discovered 11/2021.  She has had multiple ultrasounds and CT scans since then.  She is asymptomatic but concerned.  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  She has regular CT scans q6 mos to monitor.      ED visit for chest pain on 11/16.  They focused on her heart and MI was ruled out.  The chest pain is R sided up underneath her R arm.  No radiation down her arm or up into her jaw.  She does have some pain with inspiration.    She has history of recurrent UTIs, all have been E coli this year.  She reports she has been having UTIs since she was a child.  She has vaginal dryness.  She had hysterectomy in early 2000s, thinks her ovaries were removed as well.  She was on estrogen therapy initially but has not been on for many years.  No history of breast cancer.      Objective   /68   Pulse 109   Resp 20   Ht 162.6 cm (64\")   Wt 108 kg (238 lb 9.6 oz)   SpO2 99%   BMI 40.96 kg/m²     Estimated body mass index is 40.96 kg/m² as calculated from the following:    Height as of this encounter: 162.6 cm (64\").    Weight as of this encounter: 108 kg (238 lb 9.6 oz).     Physical Exam   GEN: In no acute distress, non toxic " appearing  HEENT: Pupils equal and reactive to light, sclera clear.  CV: Regular rate and rhythm, no murmurs  RESP: Lungs clear to auscultation anteriorly and posteriorly in all lung fields bilaterally.  MSK: No ttp of R axillary area inferior to R arm, full ROM.    NEURO: AAO to person, place, and time. CN 2-12 intact grossly     Result Review :    The following data was reviewed by: Roosevelt Armstrong DO on 11/22/2022:  CMP    CMP 8/14/22 10/28/22 11/16/22   Glucose 115 (A) 117 (A) 96   BUN 9 9 11   Creatinine 0.81 0.84 0.93   Sodium 140 140 136   Potassium 3.8 3.5 3.9   Chloride 102 101 101   Calcium 9.7 9.5 9.8   Albumin 3.80 4.20 4.10   Total Bilirubin 0.3 0.4 0.3   Alkaline Phosphatase 156 (A) 156 (A) 172 (A)   AST (SGOT) 20 23 13   ALT (SGPT) 20 16 16   (A) Abnormal value            CBC    CBC 8/14/22 10/28/22 11/16/22   WBC 10.30 10.44 10.90 (A)   RBC 4.57 4.50 4.59   Hemoglobin 12.3 12.3 12.8   Hematocrit 37.6 39.1 38.0   MCV 82.2 86.9 82.8   MCH 26.8 27.3 27.8   MCHC 32.7 31.5 33.6   RDW 16.9 (A) 14.7 16.8 (A)   Platelets 252 275 275   (A) Abnormal value                TSH    TSH 7/28/22 11/8/22   TSH 1.200 0.290           Most Recent A1C    HGBA1C Most Recent 10/28/22   Hemoglobin A1C 5.6           Data reviewed: Radiologic studies multiple CT scans and abdominal ultrasounds over the last year       Assessment and Plan     Diagnoses and all orders for this visit:    1. Musculoskeletal chest pain (Primary)  Her pain does not sound cardiac in nature.  She has normal CXR from 11/16, no evidence of infection, PE or pneumothorax on CTA chest 11/16.  Suspect this is likely MSK in nature.  Prescribe naproxen 500 mg BID x 14 days to be taken with food, recommend gentle stretching, increase fluid intake.    2. Recurrent UTI  Question if the recurrent UTIs are related to vaginal atrophy.  Trial vagifem daily for 2 weeks then decrease to twice per week.  If infections continue may need to be on ppx antibiotics.       3. Vaginal dryness  See above    4. Spleen enlargement  Ultimately I do not feel the spleen is cause for concern at this point.  Her abd US from 11/12 shows spleen of 12.6 cm w/o lesion or ascites.  In July it was measuring up to 14 cm on CT scan so we have actually seen some improvement.  In the absence of symptoms + reassuring blood work I attempted to reassure her today.       Follow Up has appt scheduled with Dr. Lopez in February

## 2022-11-23 ENCOUNTER — HOSPITAL ENCOUNTER (OUTPATIENT)
Dept: CARDIOLOGY | Facility: HOSPITAL | Age: 52
End: 2022-11-23

## 2022-12-05 ENCOUNTER — HOSPITAL ENCOUNTER (OUTPATIENT)
Dept: CARDIOLOGY | Facility: HOSPITAL | Age: 52
Discharge: HOME | End: 2022-12-05
Admitting: INTERNAL MEDICINE

## 2022-12-05 DIAGNOSIS — R06.09 DOE (DYSPNEA ON EXERTION): ICD-10-CM

## 2022-12-05 LAB
BH CV ECHO MEAS - ACS: 1.7 CM
BH CV ECHO MEAS - AO MAX PG: 6.5 MMHG
BH CV ECHO MEAS - AO MEAN PG: 3.4 MMHG
BH CV ECHO MEAS - AO ROOT DIAM: 2.6 CM
BH CV ECHO MEAS - AO V2 MAX: 127.4 CM/SEC
BH CV ECHO MEAS - AO V2 VTI: 20.1 CM
BH CV ECHO MEAS - AVA(I,D): 1.24 CM2
BH CV ECHO MEAS - EDV(CUBED): 66.9 ML
BH CV ECHO MEAS - EDV(MOD-SP4): 45.4 ML
BH CV ECHO MEAS - EF(MOD-BP): 65 %
BH CV ECHO MEAS - EF(MOD-SP4): 64.8 %
BH CV ECHO MEAS - ESV(CUBED): 25.8 ML
BH CV ECHO MEAS - ESV(MOD-SP4): 16 ML
BH CV ECHO MEAS - FS: 27.3 %
BH CV ECHO MEAS - IVS/LVPW: 1.41 CM
BH CV ECHO MEAS - IVSD: 1.2 CM
BH CV ECHO MEAS - LA DIMENSION: 3.6 CM
BH CV ECHO MEAS - LV DIASTOLIC VOL/BSA (35-75): 21.6 CM2
BH CV ECHO MEAS - LV MASS(C)D: 134.8 GRAMS
BH CV ECHO MEAS - LV MAX PG: 4.2 MMHG
BH CV ECHO MEAS - LV MEAN PG: 1.92 MMHG
BH CV ECHO MEAS - LV SYSTOLIC VOL/BSA (12-30): 7.6 CM2
BH CV ECHO MEAS - LV V1 MAX: 102.6 CM/SEC
BH CV ECHO MEAS - LV V1 VTI: 14.2 CM
BH CV ECHO MEAS - LVIDD: 4.1 CM
BH CV ECHO MEAS - LVIDS: 3 CM
BH CV ECHO MEAS - LVOT AREA: 1.75 CM2
BH CV ECHO MEAS - LVOT DIAM: 1.49 CM
BH CV ECHO MEAS - LVPWD: 0.85 CM
BH CV ECHO MEAS - MV A MAX VEL: 76.6 CM/SEC
BH CV ECHO MEAS - MV DEC SLOPE: 262.9 CM/SEC2
BH CV ECHO MEAS - MV DEC TIME: 0.25 MSEC
BH CV ECHO MEAS - MV E MAX VEL: 64.8 CM/SEC
BH CV ECHO MEAS - MV E/A: 0.85
BH CV ECHO MEAS - MV MAX PG: 3.2 MMHG
BH CV ECHO MEAS - MV MEAN PG: 1.28 MMHG
BH CV ECHO MEAS - MV V2 VTI: 20.7 CM
BH CV ECHO MEAS - MVA(VTI): 1.21 CM2
BH CV ECHO MEAS - PA ACC TIME: 0.12 SEC
BH CV ECHO MEAS - PA PR(ACCEL): 22.9 MMHG
BH CV ECHO MEAS - PA V2 MAX: 88.7 CM/SEC
BH CV ECHO MEAS - PULM A REVS DUR: 0.11 SEC
BH CV ECHO MEAS - PULM A REVS VEL: 33.4 CM/SEC
BH CV ECHO MEAS - PULM DIAS VEL: 52.3 CM/SEC
BH CV ECHO MEAS - PULM S/D: 0.93
BH CV ECHO MEAS - PULM SYS VEL: 48.7 CM/SEC
BH CV ECHO MEAS - QP/QS: 2.38
BH CV ECHO MEAS - RAP SYSTOLE: 3 MMHG
BH CV ECHO MEAS - RV MAX PG: 1.53 MMHG
BH CV ECHO MEAS - RV V1 MAX: 61.9 CM/SEC
BH CV ECHO MEAS - RV V1 VTI: 10 CM
BH CV ECHO MEAS - RVDD: 3.5 CM
BH CV ECHO MEAS - RVOT DIAM: 2.7 CM
BH CV ECHO MEAS - RVSP: 24.2 MMHG
BH CV ECHO MEAS - SI(MOD-SP4): 14 ML/M2
BH CV ECHO MEAS - SV(LVOT): 25 ML
BH CV ECHO MEAS - SV(MOD-SP4): 29.5 ML
BH CV ECHO MEAS - SV(RVOT): 59.4 ML
BH CV ECHO MEAS - TR MAX PG: 21.2 MMHG
BH CV ECHO MEAS - TR MAX VEL: 230 CM/SEC
LV EF 2D ECHO EST: 65 %
MAXIMAL PREDICTED HEART RATE: 169 BPM
STRESS TARGET HR: 144 BPM

## 2022-12-05 PROCEDURE — 93306 TTE W/DOPPLER COMPLETE: CPT | Performed by: INTERNAL MEDICINE

## 2022-12-05 PROCEDURE — 93306 TTE W/DOPPLER COMPLETE: CPT

## 2022-12-06 ENCOUNTER — TELEPHONE (OUTPATIENT)
Dept: CARDIOLOGY | Facility: CLINIC | Age: 52
End: 2022-12-06

## 2022-12-06 NOTE — TELEPHONE ENCOUNTER
Caller: Tessie Conner    Relationship: Self    Best call back number: [phone]    What is the best time to reach you: ANYTIME     Who are you requesting to speak with (clinical staff, provider,  specific staff member): ANYONE     What was the call regarding: PREVIOUS PATIENT OF . WANTING SECOND OPINION. STATES SHE HAD ECHO DONE YESTERDAY AT [facility] AND ASKED THAT TESTING BE FAXED TO [facility] OFFICE. PLEASE ADVISE ON TRANSFER OF CARE.     Do you require a callback: YES

## 2022-12-07 ENCOUNTER — OFFICE VISIT (OUTPATIENT)
Dept: FAMILY MEDICINE CLINIC | Facility: CLINIC | Age: 52
End: 2022-12-07

## 2022-12-07 VITALS
RESPIRATION RATE: 18 BRPM | HEART RATE: 123 BPM | OXYGEN SATURATION: 95 % | WEIGHT: 232 LBS | HEIGHT: 64 IN | BODY MASS INDEX: 39.61 KG/M2 | DIASTOLIC BLOOD PRESSURE: 84 MMHG | SYSTOLIC BLOOD PRESSURE: 130 MMHG

## 2022-12-07 DIAGNOSIS — R68.2 DRY MOUTH: ICD-10-CM

## 2022-12-07 DIAGNOSIS — R06.02 SHORTNESS OF BREATH: ICD-10-CM

## 2022-12-07 DIAGNOSIS — F31.77 BIPOLAR DISORDER, IN PARTIAL REMISSION, MOST RECENT EPISODE MIXED: Primary | ICD-10-CM

## 2022-12-07 PROCEDURE — 99213 OFFICE O/P EST LOW 20 MIN: CPT | Performed by: INTERNAL MEDICINE

## 2022-12-07 NOTE — PROGRESS NOTES
"Rooming Tab(CC,VS,Pt Hx,Fall Screen)  Chief Complaint   Patient presents with   • Dizziness   • Excessive Sweating       Subjective      Dizziness  The patient reports that she is feeling \"awful\" constantly. She states that she is sweating and cannot stand up without getting dizzy. She notes that she is nauseated and has a headache. The patient's symptoms started on 11/07/2022. She notes that her mouth is still dry.    Depression  The patient reports that she is still administering Effexor. She feels that her stress level is somewhat better. She has used a different pharmacy so the manufacture may be different.    Hypertension  The patient's blood pressure today is 130/84 mmHg. She is administering 2.5 mg of lisinopril. The patient saw Dr. Zamora who decided that she did not need a tilt table test. He informed the patient that there was nothing wrong with her heart.     Weight loss   The patient states that she is still losing weight. She notes that she has lost 30 pounds in approximately the last year.     I have reviewed and updated her medications, medical history and problem list during today's office visit.     Patient Care Team:  Marsha Lopez MD as PCP - General (Internal Medicine)  Thomas Zamora MD as Consulting Physician (Cardiology)  Ayla Carroll MD as Surgeon (Thoracic Surgery)    Problem List Tab  Medications Tab  Synopsis Tab  Chart Review Tab  Care Everywhere Tab  Immunizations Tab  Patient History Tab    Social History     Tobacco Use   • Smoking status: Never     Passive exposure: Never   • Smokeless tobacco: Never   Substance Use Topics   • Alcohol use: Yes     Comment: VERY RARE       Review of Systems    Objective     Rooming Tab(CC,VS,Pt Hx,Fall Screen)  /84 (BP Location: Left arm, Cuff Size: Large Adult)   Pulse (!) 123   Resp 18   Ht 162.6 cm (64\")   Wt 105 kg (232 lb)   LMP  (LMP Unknown)   SpO2 95%   BMI 39.82 kg/m²     Body mass index is 39.82 " kg/m².    Physical Exam  Vitals and nursing note reviewed.   Constitutional:       Appearance: Normal appearance. She is well-developed. She is obese.   HENT:      Head: Normocephalic and atraumatic.      Nose: No rhinorrhea.   Eyes:      Pupils: Pupils are equal, round, and reactive to light.   Cardiovascular:      Rate and Rhythm: Normal rate and regular rhythm.      Pulses: Normal pulses.      Heart sounds: Normal heart sounds. No murmur heard.  Pulmonary:      Effort: Pulmonary effort is normal.      Breath sounds: Normal breath sounds.   Musculoskeletal:         General: No tenderness.      Cervical back: Normal range of motion and neck supple.   Skin:     Capillary Refill: Capillary refill takes less than 2 seconds.   Neurological:      Mental Status: She is alert and oriented to person, place, and time.   Psychiatric:         Mood and Affect: Mood normal.         Behavior: Behavior normal.          Statin Choice Calculator  Data Reviewed:    CT Abdomen Pelvis With Contrast    Result Date: 11/16/2022  Impression: 1. Prominent right hilar lymph nodes and adjacent parenchymal changes are unchanged. 2. No evidence of pulmonary embolism. 3. No evidence of thoracic or abdominal aortic aneurysm or dissection. 4. No acute process seen within the abdomen or pelvis.  Electronically signed by:  Vaughn Douglas D.O.  11/16/2022 4:44 AM Mountain Time    XR Chest 1 View    Result Date: 11/16/2022  Impression: 1.  Minimal right suprahilar opacity, possible infiltrate or mass. 2.  Minor cardiomegaly. No overt failure. Electronically signed by:  Melodie Christiansen M.D.  11/16/2022 4:47 AM Mountain Time    CT Angiogram Chest Pulmonary Embolism    Result Date: 11/16/2022  Impression: 1. Prominent right hilar lymph nodes and adjacent parenchymal changes are unchanged. 2. No evidence of pulmonary embolism. 3. No evidence of thoracic or abdominal aortic aneurysm or dissection. 4. No acute process seen within the abdomen or pelvis.   Electronically signed by:  Vaughn Douglas D.O.  11/16/2022 4:44 AM Mountain Time    US Abdomen Limited    Result Date: 11/16/2022  Impression: 1. Normal right upper quadrant abdominal ultrasound.  Electronically Signed By-Marleny Chavez MD On:11/16/2022 8:27 AM This report was finalized on [id number] by  Marleny Chavez MD.      The data below has been reviewed by Marsha Lopez MD on 12/07/2022.      Lab Results   Component Value Date    BUN 11 11/16/2022    CREATININE 0.93 11/16/2022     11/16/2022    K 3.9 11/16/2022     11/16/2022    CALCIUM 9.8 11/16/2022    ALBUMIN 4.10 11/16/2022    BILITOT 0.3 11/16/2022    ALKPHOS 172 (H) 11/16/2022    AST 13 11/16/2022    ALT 16 11/16/2022    WBC 10.90 (H) 11/16/2022    RBC 4.59 11/16/2022    HCT 38.0 11/16/2022    MCV 82.8 11/16/2022    MCH 27.8 11/16/2022    TSH 0.290 11/08/2022    INR 1.02 11/16/2022      Assessment & Plan   Order Review Tab  Health Maintenance Tab  Patient Plan/Order Tab  Diagnoses and all orders for this visit:    1. Bipolar disorder, in partial remission, most recent episode mixed (HCC) (Primary)    2. Dry mouth    3. Shortness of breath        Dizziness  I advised the patient to look at the other pharmacies and see who the manufacturers were for her Effexor.    Wrapup Tab  Return if symptoms worsen or fail to improve.       They were informed of the diagnosis and treatment plan and directed to f/u for any further problems or concerns.      Transcribed from ambient dictation for Marsha Lopez MD by Nikia Garg.  12/07/22   18:30 EST    Patient or patient representative verbalized consent to the visit recording.  I have personally performed the services described in this document as transcribed by the above individual, and it is both accurate and complete.  Marsha Lopez MD  12/12/2022  20:32 EST

## 2022-12-29 ENCOUNTER — TELEPHONE (OUTPATIENT)
Dept: FAMILY MEDICINE CLINIC | Facility: CLINIC | Age: 52
End: 2022-12-29

## 2022-12-29 NOTE — TELEPHONE ENCOUNTER
Caller: Tessie Conner    Relationship: Self    Best call back number: [phone]    Who are you requesting to speak with (clinical staff, provider,  specific staff member): CLINICAL STAFF     What was the call regarding: WANTS TO KNOW SHOULD SHE STILL FOLLOW UP WITH DR. KOLB OR IS DR. JARAMILLO GOING TO BE KEEPING UP WITH THE BLOOD WORK - CAN SEND HER A MESSAGE IN MY CHART     Do you require a callback: YES

## 2022-12-30 ENCOUNTER — OFFICE VISIT (OUTPATIENT)
Dept: FAMILY MEDICINE CLINIC | Facility: CLINIC | Age: 52
End: 2022-12-30
Payer: MEDICARE

## 2022-12-30 VITALS
HEIGHT: 64 IN | OXYGEN SATURATION: 98 % | DIASTOLIC BLOOD PRESSURE: 79 MMHG | BODY MASS INDEX: 39.61 KG/M2 | SYSTOLIC BLOOD PRESSURE: 134 MMHG | RESPIRATION RATE: 15 BRPM | TEMPERATURE: 97.8 F | HEART RATE: 94 BPM | WEIGHT: 232 LBS

## 2022-12-30 DIAGNOSIS — R09.1 PLEURISY: Primary | ICD-10-CM

## 2022-12-30 PROCEDURE — 99213 OFFICE O/P EST LOW 20 MIN: CPT | Performed by: FAMILY MEDICINE

## 2022-12-30 RX ORDER — SERTRALINE HYDROCHLORIDE 25 MG/1
25 TABLET, FILM COATED ORAL DAILY
COMMUNITY
Start: 2022-12-09 | End: 2023-01-18 | Stop reason: SDUPTHER

## 2022-12-30 RX ORDER — TEMAZEPAM 15 MG/1
15 CAPSULE ORAL NIGHTLY PRN
Qty: 30 CAPSULE | Refills: 1 | Status: SHIPPED | OUTPATIENT
Start: 2022-12-30 | End: 2023-01-18 | Stop reason: ALTCHOICE

## 2022-12-30 RX ORDER — BREXPIPRAZOLE 0.5 MG/1
1 TABLET ORAL DAILY
COMMUNITY
Start: 2022-12-08

## 2022-12-30 NOTE — PROGRESS NOTES
Chief Complaint  Insomnia and Chest Pain    Subjective        Tessie Conner presents to [facility]     The patient presents today for a follow-up.    The patient reports that she has been waking up with chest pains for the last 5 to 6 weeks. She notes that she has gone to the hospital where she has undergone a significant amount of tests on her heart. She states that there is nothing major going on; however, she reports that she underwent a lung surgery called thoracic surgery on 02/08/2022. She explains that she had a wedge removed from the top of her right lung. She states that the pain occurs more at night. She that she has only been able to have approximately 3 to 4 hours of sleep and wakes up in the middle of the night with sharp pains, which she describes as a stabbing pain. She states that it seems as if she will experience dull aches still when she stands up and ambulates; however, she notes that it is not as severe. She adds that it occurs every single night lately. She states that she has been miserable and could not sleep.    She states that the pain radiates underneath her right arm near her incision site. She denies any pain radiation to her back. She states that she still currently has staples, which are currently showing on her x-rays and wonders if her pains are from those staples. She describes that she will experience worst pains with taking deep breaths. She confirms that it feels the same as a jabbing pain from a knife. She states that the pain could be coming from different areas; however, she notes that it starts mainly in one area.     She confirms that she is able to sleep for a while when she first goes to bed and then the pain wakes her up every night. She states that she wakes up and coughs. She states that she gets to clear her throat. She notes that the pain makes her want to scream and wake up everybody in the house because it is severely painful.  She states feeling as  if she is losing her mind and has been going on for 10 months. She states that she did extremely well and notes that she did not even take pain pills after the surgery. She states that she was off all her pain medications. She mentions wondering what is actually going on. She states that she does not take pain medications and could not even take Tylenol, which she currently has allergies to. She reports that Tylenol causes her head to itch severely.    She states that she was taken off Effexor, which she has been on for 15 years, because she started having symptoms all at once. She notes that she started to have hot flashes, which is not possible because she already underwent hysterectomy. She also notes having a symptom of dry mouth.    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 She notes that she still has small nodules present, which have not been removed completely and are being monitored consistently. She states that she had to repeat lung scans in 6 to 7 months.     She notes that she has other ongoing problems including an enlarged spleen, which has decreased from previously. She states that she does have gallbladder stone, which she does not believe is from her gallbladder. She states that she continuously undergoes different tests to check if it is a cardiac issue; however, she states insisting that it is not cardiac in nature and that it is a lung problem.    She inquires if her chest pain will resolve. She explains that she feels fine during the day, unless she is taking deep breaths when ambulating.    She confirms that she could not tolerate ibuprofen at all. She states that she has only been taking 1 naproxen nightly to help her with sleeping.     She inquires if the nebulizer she has at home can help with her breathing; however, she states that she has tried it and has not been effective.    She  states that she has an appointment with Dr. Marsha Lopez in 02/2023 and adds that she will see Dr. Ayla Carroll on 01/25/2023.    She states that she is starting to go to work on 01/03/2023. She mentions that she did so good. She states that she did hardly take a pain pill after surgery. She states that she was prescribed 3 to 4 pain pills, but she never touched them. She states that she does not like being sleepy. She states that she does not like grogginess.    She states that she would like to weigh herself today, 12/30/2022, when she leaves the clinic. She states that she is abnormally losing weight. She states believing that she may have gained some of her weight back this Christmas 2022 and wants to do a quick weigh-in on the scale. She states that she feels a whole lot better again when she quit taking the Effexor 300 mg a day. She states that she has also been on lithium at the same time she was taking the Effexor, which proves that her medications have been reduced significantly. She notes believing that the combination of medications is causing her to be ill. She states that she has been taking one naproxen at night.        Objective   Vital Signs:  /79   Pulse 94   Temp 97.8 °F (36.6 °C)   Resp 15   Ht 162.6 cm (64\")   Wt 105 kg (232 lb)   SpO2 98%   BMI 39.82 kg/m²   Estimated body mass index is 39.82 kg/m² as calculated from the following:    Height as of this encounter: 162.6 cm (64\").    Weight as of this encounter: 105 kg (232 lb).          Physical Exam  Constitutional:       Appearance: Normal appearance. She is well-developed and normal weight.   HENT:      Head: Normocephalic and atraumatic.      Right Ear: Tympanic membrane, ear canal and external ear normal.      Left Ear: Tympanic membrane, ear canal and external ear normal.      Nose: Nose normal.      Mouth/Throat:      Mouth: Mucous membranes are moist.      Pharynx: Oropharynx is clear. No oropharyngeal exudate.   Eyes:       Extraocular Movements: Extraocular movements intact.      Conjunctiva/sclera: Conjunctivae normal.      Pupils: Pupils are equal, round, and reactive to light.   Cardiovascular:      Rate and Rhythm: Normal rate and regular rhythm.      Pulses: Normal pulses.      Heart sounds: Normal heart sounds.   Pulmonary:      Effort: Pulmonary effort is normal.      Breath sounds: Normal breath sounds.   Abdominal:      General: Bowel sounds are normal.      Palpations: Abdomen is soft.   Musculoskeletal:         General: Normal range of motion.      Cervical back: Normal range of motion and neck supple.   Skin:     General: Skin is warm and dry.   Neurological:      General: No focal deficit present.      Mental Status: She is alert and oriented to person, place, and time. Mental status is at baseline.   Psychiatric:         Mood and Affect: Mood normal.         Behavior: Behavior normal.         Thought Content: Thought content normal.         Judgment: Judgment normal.        Result Review :                Assessment and Plan     #1- Pleurisy  The patient is a 52-year-old white female who had, about 10 months ago in 02/2022, a surgery in her right lung where they resected a right upper lobe nodule that proved to be some scar tissue and granulomatous tissue from histoplasmosis. Postoperatively, the patient did quite well and healed from the surgery. About 6 months ago, in 06/2022, she began experiencing some pleurisy-like pain in the back of her neck down onto her right hemithorax and around to the front underneath her right breast. There is no rash in this area. There are two incisions on the posterolateral aspect of her right hemithorax where the surgeons went in with their scopes to remove the mass. The pain that she describes is sharp jabbing pleuritic-like pain and hurts with deep breathing, coughing, and movement. When she gets up in the morning, she moves around and is up and moving and it does not hurt near as  bad. The pain is sharp and sticking and will wake her up in the middle of the night after 3 or 4 hours of sleep. The patient is doing well except for this pain. After discussion with her and listening to her, her lungs are clear. I do not hear any friction rubs of any sort. Heart rate is regular. I think we are going to try some Naprosyn, which she does seem to tolerate. Other nonsteroidal anti-inflammatory drugs, she does not do too well with, but she has been able to take Naprosyn over the counter 220 mg tablet. I am going to have her try two of those. The patient is moving around well, and I am going to give her some Naprosyn to take 220 mg and then she can have one or two of those at night and then I am going to give her Restoril 15 mg, which she can increase to 30 mg at night to help her sleep. If nothing else, we can just get her some sleep, I think it will help her pain in the long run. I do not want to give her steroids in the face of histoplasmosis and she does not tolerate some of the other nonsteroidal anti-inflammatory drugs because her belly hurts.       I spent 26 minutes caring for Tessie on this date of service. This time includes time spent by me in the following activities:preparing for the visit, reviewing tests, obtaining and/or reviewing a separately obtained history, performing a medically appropriate examination and/or evaluation , counseling and educating the patient/family/caregiver, ordering medications, tests, or procedures, referring and communicating with other health care professionals , documenting information in the medical record, independently interpreting results and communicating that information with the patient/family/caregiver and care coordination  Follow Up       Return in about 4 weeks (around 1/27/2023), or if symptoms worsen or fail to improve, for Recheck.  Patient was given instructions and counseling regarding her condition or for health maintenance advice. Please see  specific information pulled into the AVS if appropriate.       Transcribed from ambient dictation for Bjorn Taylor MD by Jerry Villafana.  12/30/22   18:34 EST    Patient or patient representative verbalized consent to the visit recording.  I have personally performed the services described in this document as transcribed by the above individual, and it is both accurate and complete.  Bjorn Taylor MD  1/8/2023  18:49 EST

## 2023-01-09 ENCOUNTER — HOSPITAL ENCOUNTER (EMERGENCY)
Facility: HOSPITAL | Age: 53
Discharge: HOME | End: 2023-01-09
Attending: EMERGENCY MEDICINE | Admitting: EMERGENCY MEDICINE
Payer: MEDICARE

## 2023-01-09 ENCOUNTER — APPOINTMENT (OUTPATIENT)
Dept: CT IMAGING | Facility: HOSPITAL | Age: 53
End: 2023-01-09
Payer: MEDICARE

## 2023-01-09 ENCOUNTER — APPOINTMENT (OUTPATIENT)
Dept: GENERAL RADIOLOGY | Facility: HOSPITAL | Age: 53
End: 2023-01-09
Payer: MEDICARE

## 2023-01-09 VITALS
SYSTOLIC BLOOD PRESSURE: 106 MMHG | WEIGHT: 236.11 LBS | OXYGEN SATURATION: 100 % | TEMPERATURE: 98 F | HEIGHT: 64 IN | DIASTOLIC BLOOD PRESSURE: 44 MMHG | BODY MASS INDEX: 40.31 KG/M2 | HEART RATE: 69 BPM | RESPIRATION RATE: 15 BRPM

## 2023-01-09 DIAGNOSIS — G58.0 NEUROPATHY, INTERCOSTAL NERVE: Primary | ICD-10-CM

## 2023-01-09 DIAGNOSIS — J98.11 ATELECTASIS OF RIGHT LUNG: ICD-10-CM

## 2023-01-09 DIAGNOSIS — R07.81 PLEURITIC CHEST PAIN: ICD-10-CM

## 2023-01-09 LAB
ALBUMIN SERPL-MCNC: 4.1 G/DL (ref 3.5–5.2)
ALBUMIN/GLOB SERPL: 1.1 G/DL
ALP SERPL-CCNC: 195 U/L (ref 39–117)
ALT SERPL W P-5'-P-CCNC: 18 U/L (ref 1–33)
ANION GAP SERPL CALCULATED.3IONS-SCNC: 12 MMOL/L (ref 5–15)
AST SERPL-CCNC: 20 U/L (ref 1–32)
BASOPHILS # BLD AUTO: 0 10*3/MM3 (ref 0–0.2)
BASOPHILS NFR BLD AUTO: 0.4 % (ref 0–1.5)
BILIRUB SERPL-MCNC: 0.4 MG/DL (ref 0–1.2)
BUN SERPL-MCNC: 12 MG/DL (ref 6–20)
BUN/CREAT SERPL: 15.2 (ref 7–25)
CALCIUM SPEC-SCNC: 9.9 MG/DL (ref 8.6–10.5)
CHLORIDE SERPL-SCNC: 102 MMOL/L (ref 98–107)
CO2 SERPL-SCNC: 26 MMOL/L (ref 22–29)
CREAT SERPL-MCNC: 0.79 MG/DL (ref 0.57–1)
DEPRECATED RDW RBC AUTO: 49 FL (ref 37–54)
EGFRCR SERPLBLD CKD-EPI 2021: 90.7 ML/MIN/1.73
EOSINOPHIL # BLD AUTO: 0 10*3/MM3 (ref 0–0.4)
EOSINOPHIL NFR BLD AUTO: 0.1 % (ref 0.3–6.2)
ERYTHROCYTE [DISTWIDTH] IN BLOOD BY AUTOMATED COUNT: 16.6 % (ref 12.3–15.4)
GLOBULIN UR ELPH-MCNC: 3.6 GM/DL
GLUCOSE SERPL-MCNC: 79 MG/DL (ref 65–99)
HCT VFR BLD AUTO: 38.9 % (ref 34–46.6)
HGB BLD-MCNC: 12.4 G/DL (ref 12–15.9)
HOLD SPECIMEN: NORMAL
HOLD SPECIMEN: NORMAL
LYMPHOCYTES # BLD AUTO: 1.8 10*3/MM3 (ref 0.7–3.1)
LYMPHOCYTES NFR BLD AUTO: 18.5 % (ref 19.6–45.3)
MCH RBC QN AUTO: 27.1 PG (ref 26.6–33)
MCHC RBC AUTO-ENTMCNC: 31.8 G/DL (ref 31.5–35.7)
MCV RBC AUTO: 85 FL (ref 79–97)
MONOCYTES # BLD AUTO: 0.9 10*3/MM3 (ref 0.1–0.9)
MONOCYTES NFR BLD AUTO: 9.1 % (ref 5–12)
NEUTROPHILS NFR BLD AUTO: 6.8 10*3/MM3 (ref 1.7–7)
NEUTROPHILS NFR BLD AUTO: 71.9 % (ref 42.7–76)
NRBC BLD AUTO-RTO: 0 /100 WBC (ref 0–0.2)
PLATELET # BLD AUTO: 334 10*3/MM3 (ref 140–450)
PMV BLD AUTO: 7.7 FL (ref 6–12)
POTASSIUM SERPL-SCNC: 3.8 MMOL/L (ref 3.5–5.2)
PROT SERPL-MCNC: 7.7 G/DL (ref 6–8.5)
RBC # BLD AUTO: 4.58 10*6/MM3 (ref 3.77–5.28)
SODIUM SERPL-SCNC: 140 MMOL/L (ref 136–145)
TROPONIN T SERPL-MCNC: <0.01 NG/ML (ref 0–0.03)
TROPONIN T SERPL-MCNC: <0.01 NG/ML (ref 0–0.03)
WBC NRBC COR # BLD: 9.5 10*3/MM3 (ref 3.4–10.8)
WHOLE BLOOD HOLD COAG: NORMAL
WHOLE BLOOD HOLD SPECIMEN: NORMAL

## 2023-01-09 PROCEDURE — 84484 ASSAY OF TROPONIN QUANT: CPT | Performed by: NURSE PRACTITIONER

## 2023-01-09 PROCEDURE — 71250 CT THORAX DX C-: CPT

## 2023-01-09 PROCEDURE — 36415 COLL VENOUS BLD VENIPUNCTURE: CPT

## 2023-01-09 PROCEDURE — 71045 X-RAY EXAM CHEST 1 VIEW: CPT

## 2023-01-09 PROCEDURE — 85025 COMPLETE CBC W/AUTO DIFF WBC: CPT | Performed by: NURSE PRACTITIONER

## 2023-01-09 PROCEDURE — 80053 COMPREHEN METABOLIC PANEL: CPT | Performed by: NURSE PRACTITIONER

## 2023-01-09 PROCEDURE — 93005 ELECTROCARDIOGRAM TRACING: CPT

## 2023-01-09 PROCEDURE — 99284 EMERGENCY DEPT VISIT MOD MDM: CPT

## 2023-01-09 RX ORDER — ASPIRIN 81 MG/1
324 TABLET, CHEWABLE ORAL ONCE
Status: COMPLETED | OUTPATIENT
Start: 2023-01-09 | End: 2023-01-09

## 2023-01-09 RX ORDER — SODIUM CHLORIDE 0.9 % (FLUSH) 0.9 %
10 SYRINGE (ML) INJECTION AS NEEDED
Status: DISCONTINUED | OUTPATIENT
Start: 2023-01-09 | End: 2023-01-09 | Stop reason: HOSPADM

## 2023-01-09 RX ORDER — LIDOCAINE 50 MG/G
1 PATCH TOPICAL ONCE
Status: DISCONTINUED | OUTPATIENT
Start: 2023-01-09 | End: 2023-01-09 | Stop reason: HOSPADM

## 2023-01-09 RX ORDER — ONDANSETRON 2 MG/ML
4 INJECTION INTRAMUSCULAR; INTRAVENOUS ONCE
Status: DISCONTINUED | OUTPATIENT
Start: 2023-01-09 | End: 2023-01-09

## 2023-01-09 RX ORDER — GABAPENTIN 100 MG/1
100 CAPSULE ORAL ONCE
Status: COMPLETED | OUTPATIENT
Start: 2023-01-09 | End: 2023-01-09

## 2023-01-09 RX ORDER — GABAPENTIN 300 MG/1
300 CAPSULE ORAL 3 TIMES DAILY
Qty: 15 CAPSULE | Refills: 0 | Status: SHIPPED | OUTPATIENT
Start: 2023-01-09 | End: 2023-01-18

## 2023-01-09 RX ORDER — LIDOCAINE 50 MG/G
2 PATCH TOPICAL EVERY 24 HOURS
Qty: 5 PATCH | Refills: 0 | Status: SHIPPED | OUTPATIENT
Start: 2023-01-09 | End: 2023-03-03

## 2023-01-09 RX ADMIN — ASPIRIN 81 MG CHEWABLE TABLET 324 MG: 81 TABLET CHEWABLE at 11:59

## 2023-01-09 RX ADMIN — GABAPENTIN 100 MG: 100 CAPSULE ORAL at 15:24

## 2023-01-09 RX ADMIN — LIDOCAINE 1 PATCH: 700 PATCH TOPICAL at 15:24

## 2023-01-09 NOTE — Clinical Note
[facility] EMERGENCY DEPARTMENT  [address] [place] [address]  Phone: [phone]    Tessie Conner was seen and treated in our emergency department on 1/9/2023.  She may return to work on 01/10/2023.         Thank you for choosing [facility].    Kristi Jarquin, APRN

## 2023-01-09 NOTE — PROGRESS NOTES
Thoracic surgery progress note    Patient is status post right video-assisted thoracoscopy with right upper lobe wedge resection on 2/8/2022 by Dr. Ayla Carroll.  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  She had a satisfactory postoperative course.  She continues to follow with us with CT scans of the chest for routine surveillance.  She was last seen in our office in July 2022 with plans to follow-up with a CT chest in August 2023.  She called our office earlier today complaining of increased right-sided chest wall pain and was advised to go to the ER for further evaluation.    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  Of note, she had been seen by her primary care provider and was initiated on Naprosyn secondary to pleurisy 2 weeks ago without relief.      CT chest performed today was reviewed and demonstrates pleural-based right midlung opacity, likely atelectasis versus pneumonia.  Her white blood cell count is normal.  She is afebrile.  Vital signs are stable on room air.  Patient to be discharged on gabapentin, Lidoderm patches with incentive spirometer.    Follow-up appointment with Dr. Carroll is scheduled for January 18, 2023.

## 2023-01-09 NOTE — ED PROVIDER NOTES
Subjective   History of Present Illness  52-year-old female presents with plaint of right upper chest pain under her right breast that radiates to the ribs and back.  She reports its pleuritic in nature.  Denies any dyspnea.  She reports in the morning she has a postnasal drip with stimulated cough.  She denies fever sweats chills.  She denies recent travel, HRT, unilateral leg pain edema, DVT PE history.  She reports that she was referred to the ER by her cardiothoracic surgeon.  Her history significant for histoplasmosis with a right lobectomy and pleurisy.    Cardiothoracic surgeon Dr. Lopez    Primary care physician Dr. Lopez    1. Location: Right upper chest   2. Quality: Shooting  3. Severity: Moderate  4. Worsening factors: Palpation, deep inspiration  5. Alleviating factors: Denies  6. Onset: Today  7. Radiation: Right breast, right lateral ribs, right scapula  8. Frequency: Constant periods of intense  9. Co-morbidities: Past Medical History:  No date: Abnormal ECG  2005: Anxiety  No date: Arthritis  04/17/2020: Asthma  No date: At risk for sleep apnea  No date: Bipolar 1 disorder (HCC)  10/2022: Cholelithiasis  No date: Coronary artery disease  2005: Depression  2002: Diabetes mellitus (HCC)  No date: Dyspnea on minimal exertion  No date: Frequent UTI  No date: Heart murmur      Comment:  FROM CHILDHOOD  No date: History of anemia      Comment:  POST PREGNANCY  10/04/2016: Hyperlipidemia  No date: Hypertension  No date: Mass of upper lobe of right lung  No date: Migraines  1999: Obesity  No date: PONV (postoperative nausea and vomiting)  2/28/22: Sleep apnea  No date: Spinal headache  2011: Visual impairment      Comment:  WEARS GLASSES          Review of Systems    Past Medical History:   Diagnosis Date   • Abnormal ECG    • Anxiety 2005   • Arthritis    • Asthma 04/17/2020   • At risk for sleep apnea    • Bipolar 1 disorder (HCC)    • Cholelithiasis 10/2022   • Coronary artery disease    • Depression     • Diabetes mellitus (HCC)    • Dyspnea on minimal exertion    • Frequent UTI    • Heart murmur     FROM CHILDHOOD   • History of anemia     POST PREGNANCY   • Hyperlipidemia 10/04/2016   • Hypertension    • Mass of upper lobe of right lung    • Migraines    • Obesity    • PONV (postoperative nausea and vomiting)    • Sleep apnea 22   • Spinal headache    • Visual impairment     WEARS GLASSES       Allergies   Allergen Reactions   • Tylenol [Acetaminophen] Hives and Itching       Past Surgical History:   Procedure Laterality Date   • APPENDECTOMY     • CARDIAC CATHETERIZATION N/A 2020    Procedure: Left Heart Cath possible PCI, atherectomy, hemodynamic support;  Surgeon: Thomas Zamora MD;  Location: [facility] INVASIVE LOCATION;  Service: Cardiology;  Laterality: N/A;   • CARDIAC CATHETERIZATION  2020   •  SECTION     • FOOT/TOE TENDON REPAIR Left    • HYSTERECTOMY     • LUNG BIOPSY Right 2020   • LUNG LOBECTOMY Right 2022    pt had partial Right lobectomy and nodule removal   • ROTATOR CUFF REPAIR Left    • THORACOSCOPY VIDEO ASSISTED WITH LOBECTOMY Right 2022    Procedure: BRONCHOSCOPY, THORACOSCOPY VIDEO ASSISTED wedge resection X2, INTERCOSTAL NERVE BLOCK;  Surgeon: Ayla Carroll MD;  Location: [facility] OR;  Service: Thoracic;  Laterality: Right;   • TUBAL ABDOMINAL LIGATION         Family History   Problem Relation Age of Onset   • Arthritis Mother    • Cancer Mother    • Depression Mother    • Diabetes Mother    • Early death Mother    • Mental illness Mother    • Anxiety disorder Mother    • Alcohol abuse Father    • Diabetes Father    • Early death Father    • Heart disease Father    • Hyperlipidemia Father    • Hypertension Father         Father   • Vision loss Father    • Heart attack Father    • Heart disease Sister    • Heart disease Brother    • Hypertension Brother    • Cancer Maternal Grandmother    • Drug abuse  Brother    • Hypertension Brother    • Heart disease Brother    • Drug abuse Sister    • Heart attack Sister    • Hypertension Sister         Sister   • Heart disease Sister    • Heart attack Brother    • Malig Hyperthermia Neg Hx        Social History     Socioeconomic History   • Marital status:    Tobacco Use   • Smoking status: Never     Passive exposure: Never   • Smokeless tobacco: Never   Vaping Use   • Vaping Use: Never used   Substance and Sexual Activity   • Alcohol use: Yes     Comment: VERY RARE   • Drug use: No   • Sexual activity: Not Currently     Partners: Male     Birth control/protection: None, Hysterectomy           Objective   Physical Exam  Vitals and nursing note reviewed.   Constitutional:       General: She is awake. She is not in acute distress.     Appearance: Normal appearance. She is well-developed. She is obese.   HENT:      Head: Normocephalic and atraumatic.   Eyes:      Extraocular Movements: Extraocular movements intact.      Conjunctiva/sclera: Conjunctivae normal.      Pupils: Pupils are equal, round, and reactive to light.   Cardiovascular:      Rate and Rhythm: Normal rate and regular rhythm.      Pulses:           Radial pulses are 2+ on the right side and 2+ on the left side.        Dorsalis pedis pulses are 2+ on the right side and 2+ on the left side.        Posterior tibial pulses are 2+ on the right side and 2+ on the left side.      Heart sounds: Normal heart sounds, S1 normal and S2 normal. Heart sounds not distant. No murmur heard.    No friction rub. No gallop.   Pulmonary:      Effort: Pulmonary effort is normal. No respiratory distress.      Breath sounds: Normal breath sounds and air entry. No wheezing or rales.   Chest:      Chest wall: Tenderness present.       Abdominal:      General: Bowel sounds are normal. There is no distension.      Palpations: Abdomen is soft. There is no mass.      Tenderness: There is no abdominal tenderness. There is no guarding or  rebound.      Hernia: No hernia is present.   Musculoskeletal:         General: Normal range of motion.      Cervical back: Normal range of motion and neck supple.      Right lower leg: No tenderness. No edema.      Left lower leg: No tenderness. No edema.   Skin:     General: Skin is warm and dry.      Capillary Refill: Capillary refill takes less than 2 seconds.      Coloration: Skin is not pale.      Findings: No erythema or rash.   Neurological:      Mental Status: She is alert and oriented to person, place, and time.      GCS: GCS eye subscore is 4. GCS verbal subscore is 5. GCS motor subscore is 6.   Psychiatric:         Mood and Affect: Mood normal.         Behavior: Behavior normal. Behavior is cooperative.         Thought Content: Thought content normal.         Judgment: Judgment normal.         Procedures     Sinus rhythm with low voltage in the precordial leads rate of 78.  Compared to previous EKG from 11/15/2022 sinus rhythm abnormal T waves in anterior leads rate of 69.  Interpreted by Dr. Mendiola and reviewed by me.      ED Course  ED Course as of 01/09/23 2040   Mon Jan 09, 2023   1311 Awaiting patient to get a CT. [AL]   1335 Spoke with Marsha Massey NP with cardiothoracic surgery at this time at this time. [AL]   1410 Awaiting patient to go to CT. [AL]   1442 Awaiting CT results. [AL]   1504 Awaiting CT results. [AL]   1552 Re-paged cardiothoracic surgery at this time to review CT results. [AL]      ED Course User Index  [AL] Kristi Jarquin, BECKY    CT Chest Without Contrast Diagnostic    Result Date: 1/9/2023  1. Postsurgical changes on the right again noted with suture line at the right apex. 2. Increased subpleural opacity along the anterior chest wall on the right middle lobe. This is new from the comparison. Findings may represent atelectasis or pneumonia. 3. Stable noncalcified nodules right middle lobe. 4. Stable right hilar adenopathy. Recommend follow-up to document resolution.  Electronically Signed: Cipriano Cho  1/9/2023 3:36 PM EST  Workstation ID: [id number]    XR Chest 1 View    Result Date: 1/9/2023  Impression: Stable chronic findings without acute process. Electronically Signed: Nick Koch  1/9/2023 12:23 PM EST  Workstation ID: [id number]    Medications   aspirin chewable tablet 324 mg (324 mg Oral Given 1/9/23 1159)   gabapentin (NEURONTIN) capsule 100 mg (100 mg Oral Given 1/9/23 1524)     Labs Reviewed   COMPREHENSIVE METABOLIC PANEL - Abnormal; Notable for the following components:       Result Value    Alkaline Phosphatase 195 (*)     All other components within normal limits    Narrative:     GFR Normal >60  Chronic Kidney Disease <60  Kidney Failure <15     CBC WITH AUTO DIFFERENTIAL - Abnormal; Notable for the following components:    RDW 16.6 (*)     Lymphocyte % 18.5 (*)     Eosinophil % 0.1 (*)     All other components within normal limits   TROPONIN (IN-HOUSE) - Normal    Narrative:     Troponin T Reference Range:  <= 0.03 ng/mL-   Negative for AMI  >0.03 ng/mL-     Abnormal for myocardial necrosis.  Clinicians would have to utilize clinical acumen, EKG, Troponin and serial changes to determine if it is an Acute Myocardial Infarction or myocardial injury due to an underlying chronic condition.       Results may be falsely decreased if patient taking Biotin.     TROPONIN (IN-HOUSE) - Normal    Narrative:     Troponin T Reference Range:  <= 0.03 ng/mL-   Negative for AMI  >0.03 ng/mL-     Abnormal for myocardial necrosis.  Clinicians would have to utilize clinical acumen, EKG, Troponin and serial changes to determine if it is an Acute Myocardial Infarction or myocardial injury due to an underlying chronic condition.       Results may be falsely decreased if patient taking Biotin.     RAINBOW DRAW    Narrative:     The following orders were created for panel order [place] Draw.  Procedure                               Abnormality         Status                      ---------                               -----------         ------                     Green Top (Gel)[[id number]]                                  Final result               Lavender Top[[id number]]                                     Final result               Gold Top - SST[[id number]]                                   Final result               Light Blue Top[[id number]]                                   Final result                 Please view results for these tests on the individual orders.   CBC AND DIFFERENTIAL    Narrative:     The following orders were created for panel order CBC & Differential.  Procedure                               Abnormality         Status                     ---------                               -----------         ------                     CBC Auto Differential[[id number]]        Abnormal            Final result                 Please view results for these tests on the individual orders.   GREEN TOP   LAVENDER TOP   GOLD TOP - SST   LIGHT BLUE TOP                                            Medical Decision Making  Chart Review: 12/30/2022 patient was seen by primary care for insomnia and chest pain.  See note below.  Comorbidity: Past Medical History:  No date: Abnormal ECG  2005: Anxiety  No date: Arthritis  04/17/2020: Asthma  No date: At risk for sleep apnea  No date: Bipolar 1 disorder (HCC)  10/2022: Cholelithiasis  No date: Coronary artery disease  2005: Depression  2002: Diabetes mellitus ([facility])  No date: Dyspnea on minimal exertion  No date: Frequent UTI  No date: Heart murmur      Comment:  FROM CHILDHOOD  No date: History of anemia      Comment:  POST PREGNANCY  10/04/2016: Hyperlipidemia  No date: Hypertension  No date: Mass of upper lobe of right lung  No date: Migraines  1999: Obesity  No date: PONV (postoperative nausea and vomiting)  2/28/22: Sleep apnea  No date: Spinal headache  2011: Visual impairment      Comment:  WEARS GLASSES  Imaging: Was interpreted by  physician and reviewed by myself: CT Chest Without Contrast Diagnostic   Final Result        1. Postsurgical changes on the right again noted with suture line at the right apex.    2. Increased subpleural opacity along the anterior chest wall on the right middle lobe. This is new from the comparison. Findings may represent atelectasis or pneumonia.    3. Stable noncalcified nodules right middle lobe.    4. Stable right hilar adenopathy.        Recommend follow-up to document resolution.          Electronically Signed: Cipriano Cho      1/9/2023 3:36 PM EST      Workstation ID: [id number]     XR Chest 1 View   Final Result    Impression:    Stable chronic findings without acute process.        Electronically Signed: Nick Koch      1/9/2023 12:23 PM EST      Workstation ID: [id number]    Patient undressed and placed in gown for exam.  Appropriate PPE worn during patient exam. Patient is alert and oriented x3. No acute distress.  S1-S2 heart sounds on exam.  Lungs are clear to auscultation. No edema noted to the bilateral lower extremities.  IV established and labs obtained.  Cardiac work-up initiated.  Chest x-ray obtained with the above findings. Labs are found to be unremarkable.  Chest x-ray showed stable chronic findings without acute findings.  Spoke with patient's cardiothoracic surgeon who would like her to have a CT with IV contrast.  See above findings. Chest CT showed some atelectasis, this was reviewed by patient's cardiothoracic surgeon and she reported that patient can do incentive spirometry as she has a normal white blood cell count and is afebrile nontoxic in appearance.  She was given prescriptions for gabapentin and Lidoderm.    Disposition/Treatment: Discussed results with patient, verbalized understanding. Agreeable with plan of care.  Patient was stable upon admission.     Differential Diagnoses, not all-inclusive and does not constitute entirety of all causes: Intercostal neuropathy,  pleurisy.    Upon reassessment, patient reports relief with medications given.  She is pink warm and dry no distress noted vital signs are stable.    Part of this note may be an electronic transcription/translation of spoken language to printed text using the Dragon Dictation System.         Atelectasis of right lung: acute illness or injury  Neuropathy, intercostal nerve: acute illness or injury  Pleuritic chest pain: acute illness or injury  Amount and/or Complexity of Data Reviewed  External Data Reviewed: notes.     Details: Assessment and Plan      #1- Pleurisy  The patient is a 52-year-old white female who had, about 10 months ago in 02/2022, a surgery in her right lung where they resected a right upper lobe nodule that proved to be some scar tissue and granulomatous tissue from histoplasmosis. Postoperatively, the patient did quite well and healed from the surgery. About 6 months ago, in 06/2022, she began experiencing some pleurisy-like pain in the back of her neck down onto her right hemithorax and around to the front underneath her right breast. There is no rash in this area. There are two incisions on the posterolateral aspect of her right hemithorax where the surgeons went in with their scopes to remove the mass. The pain that she describes is sharp jabbing pleuritic-like pain and hurts with deep breathing, coughing, and movement. When she gets up in the morning, she moves around and is up and moving and it does not hurt near as bad. The pain is sharp and sticking and will wake her up in the middle of the night after 3 or 4 hours of sleep. The patient is doing well except for this pain. After discussion with her and listening to her, her lungs are clear. I do not hear any friction rubs of any sort. Heart rate is regular. I think we are going to try some Naprosyn, which she does seem to tolerate. Other nonsteroidal anti-inflammatory drugs, she does not do too well with, but she has been able to take  Naprosyn over the counter 220 mg tablet. I am going to have her try two of those. The patient is moving around well, and I am going to give her some Naprosyn to take 220 mg and then she can have one or two of those at night and then I am going to give her Restoril 15 mg, which she can increase to 30 mg at night to help her sleep. If nothing else, we can just get her some sleep, I think it will help her pain in the long run. I do not want to give her steroids in the face of histoplasmosis and she does not tolerate some of the other nonsteroidal anti-inflammatory drugs because her belly hurts.           I spent 26 minutes caring for Tessie on this date of service. This time includes time spent by me in the following activities:preparing for the visit, reviewing tests, obtaining and/or reviewing a separately obtained history, performing a medically appropriate examination and/or evaluation , counseling and educating the patient/family/caregiver, ordering medications, tests, or procedures, referring and communicating with other health care professionals , documenting information in the medical record, independently interpreting results and communicating that information with the patient/family/caregiver and care coordination  Follow Up         Return in about 4 weeks (around 1/27/2023), or if symptoms worsen or fail to improve, for Recheck.  Patient was given instructions and counseling regarding her condition or for health maintenance advice. Please see specific information pulled into the AVS if appropriate.         Transcribed from ambient dictation for Bjorn Taylor MD by Jerry Villafana.  12/30/22   18:34 EST     Patient or patient representative verbalized consent to the visit recording.  I have personally performed the services described in this document as transcribed by the above individual, and it is both accurate and complete.  Bjorn Taylor MD  1/8/2023  18:49 EST  Labs: ordered. Decision-making  details documented in ED Course.  Radiology: ordered. Decision-making details documented in ED Course.  ECG/medicine tests:  Decision-making details documented in ED Course.  Discussion of management or test interpretation with external provider(s): Spoke with Marsha Massey NP with cardiothoracic surgery.  She has been managing this patient and feels as though she has intercostal neuropathy.  She would like patient to go home on gabapentin 300 mg 3 times daily.  She reports that patient has a scheduled follow-up next week.    Risk  OTC drugs.  Prescription drug management.          Final diagnoses:   Neuropathy, intercostal nerve   Pleuritic chest pain   Atelectasis of right lung       ED Disposition  ED Disposition     ED Disposition   Discharge    Condition   Stable    Comment   --             Marsha Lopez MD  [facility]   [address] [place] [address] [phone]    Schedule an appointment as soon as possible for a visit       Ayla Carroll MD  [address] [address]  [phone]    Go to   as scheduled    [facility] EMERGENCY DEPARTMENT  [address] [place] [id number] [phone]  Go to   If symptoms worsen         Medication List      New Prescriptions    gabapentin 300 MG capsule  Commonly known as: NEURONTIN  Take 1 capsule by mouth 3 (Three) Times a Day for 5 days.     lidocaine 5 %  Commonly known as: LIDODERM  Place 2 patches on the skin as directed by provider Daily. Remove & Discard patch within 12 hours or as directed by MD        Changed    venlafaxine  MG 24 hr capsule  Commonly known as: EFFEXOR-XR  Take 1 capsule by mouth 2 (Two) Times a Day.  What changed: how much to take           Where to Get Your Medications      These medications were sent to [facility]/pharmacy #[id number] - [place], [place] - [address] - [phone]  - [phone] FX  [address] [address] [address]    Phone: [phone]   · gabapentin 300 MG  capsule  · lidocaine 5 %          Britton, Kristi HENSON, APRN  01/09/23 2042

## 2023-01-10 ENCOUNTER — TELEPHONE (OUTPATIENT)
Dept: SURGERY | Facility: CLINIC | Age: 53
End: 2023-01-10

## 2023-01-10 NOTE — TELEPHONE ENCOUNTER
Spoke to j luis on release and informed yes we will cancel clinic visit on 1-25-23. We will see her on 1-18-23

## 2023-01-10 NOTE — TELEPHONE ENCOUNTER
Caller: Tessie Conner    Relationship: Self    Best call back number: [phone] HUSBANDS NUMBER     What is the best time to reach you: ANY    Who are you requesting to speak with (clinical staff, provider,  specific staff member): CLINICAL     Do you know the name of the person who called: TESSIE    What was the call regarding: THE PATIENT IS WANTING TO KNOW IF SHE IS NEEDING BOTH OF HER APPOINTMENTS ON 1/18/23 AND 1/25/23 OR CAN SHE JUST KEEP ONE.    Do you require a callback: YES

## 2023-01-11 ENCOUNTER — PATIENT OUTREACH (OUTPATIENT)
Dept: CASE MANAGEMENT | Facility: OTHER | Age: 53
End: 2023-01-11
Payer: MEDICARE

## 2023-01-11 LAB — QT INTERVAL: 358 MS

## 2023-01-11 NOTE — OUTREACH NOTE
AMBULATORY CASE MANAGEMENT NOTE    Name and Relationship of Patient/Support Person: Tessie Conner L - Self    Patient Outreach    Pt discharged from [facility] ED on 1/9/23, seen for neuropathy, CP, atelectasis of right lung. RN-ACM outreach call made to pt. Explained role of RN-ACM and reason for call. Pt reports no change in symptoms. Reviewed ED AVS with pt. Education provided. Pt states she has follow up appt on 1/18 with thoracic surgeon, noted in chart. She declines sooner PCP appt. Reviewed SDOH. Pt denies any needs. No questions per pt. Advised her to call RN-ACM or [facility] nurse line with any needs. Follow up outreach prn.     Send Education  Questions/Answers    Flowsheet Row Most Recent Value   Annual Wellness Visit:  Patient Has Completed  [scheduled for 2/17/23]   Other Patient Education/Resources  24/7 [facility] Nurse Call Line   24/7 Nurse Call Line Education Method Verbal   Advanced Directives: --  [resources provided]        SDOH updated and reviewed with the patient during this program:  Questionnaire sent via Tom ESPINOZA  Ambulatory Case Management    1/11/2023, 10:31 EST

## 2023-01-12 ENCOUNTER — OFFICE VISIT (OUTPATIENT)
Dept: FAMILY MEDICINE CLINIC | Facility: CLINIC | Age: 53
End: 2023-01-12
Payer: MEDICARE

## 2023-01-12 VITALS
TEMPERATURE: 97.5 F | RESPIRATION RATE: 15 BRPM | HEART RATE: 70 BPM | HEIGHT: 64 IN | SYSTOLIC BLOOD PRESSURE: 119 MMHG | OXYGEN SATURATION: 97 % | BODY MASS INDEX: 40.12 KG/M2 | DIASTOLIC BLOOD PRESSURE: 75 MMHG | WEIGHT: 235 LBS

## 2023-01-12 DIAGNOSIS — R05.1 ACUTE COUGH: Primary | ICD-10-CM

## 2023-01-12 PROCEDURE — 99213 OFFICE O/P EST LOW 20 MIN: CPT | Performed by: NURSE PRACTITIONER

## 2023-01-12 RX ORDER — AZITHROMYCIN 250 MG/1
TABLET, FILM COATED ORAL
Qty: 6 TABLET | Refills: 0 | Status: SHIPPED | OUTPATIENT
Start: 2023-01-12 | End: 2023-01-17

## 2023-01-12 NOTE — PROGRESS NOTES
"Chief Complaint  Cough  Subjective        Tessie Conner presents to [facility]  History of Present Illness  Pt comes in today because she woke up this morning \"with gurgling in lungs\" and wheezing. Was seen in ER on Monday because she was having pain in right upper chest. Had hx of RUL wedge resection done. Thoracic surgeon told her to go to ER. Had CT chest and showed questionable PNA, however Dr. Carroll (thoracic surgery) instructed her to use incentive spirometer and was also prescribed gabapentin from questionable pleurisy or nerve pain from surgery. No fever or chills. symptoms seem better from this morning. Using incentive spirometer several times/day.   Not taking anything otc.   Taking gabapentin as prescribed.        Objective     Vital Signs:   /75   Pulse 70   Temp 97.5 °F (36.4 °C)   Resp 15   Ht 162.6 cm (64\")   Wt 107 kg (235 lb)   SpO2 97%   BMI 40.34 kg/m²       BP Readings from Last 3 Encounters:   01/12/23 119/75   01/09/23 106/44   12/30/22 134/79       Wt Readings from Last 3 Encounters:   01/12/23 107 kg (235 lb)   01/09/23 107 kg (236 lb 1.8 oz)   12/30/22 105 kg (232 lb)     Physical Exam  Constitutional:       Appearance: She is well-developed.   Eyes:      Pupils: Pupils are equal, round, and reactive to light.   Cardiovascular:      Rate and Rhythm: Normal rate and regular rhythm.   Pulmonary:      Effort: Pulmonary effort is normal.   Neurological:      Mental Status: She is alert and oriented to person, place, and time.        Result Review :                 Assessment and Plan    Diagnoses and all orders for this visit:    1. Acute cough (Primary)  -     azithromycin (ZITHROMAX) 250 MG tablet; Take 2 tablets po the first day, then 1 tablet po daily for 4 days.  Dispense: 6 tablet; Refill: 0    will treat with z-pack for poss PNA on CT scan  Recommend otc mucinex  Cont incentive spirometry   During this office visit, we discussed the " pertinent aspects of the visit and treatment recommendations. Pt verbalizes understanding. Follow up was discussed. Patient was given the opportunity to ask questions and discuss other concerns.         Follow Up   Return if symptoms worsen or fail to improve.  Patient was given instructions and counseling regarding her condition or for health maintenance advice. Please see specific information pulled into the AVS if appropriate.

## 2023-01-13 ENCOUNTER — OFFICE VISIT (OUTPATIENT)
Dept: SURGERY | Facility: CLINIC | Age: 53
End: 2023-01-13
Payer: MEDICARE

## 2023-01-13 DIAGNOSIS — G58.8 INTERCOSTAL NEURALGIA: ICD-10-CM

## 2023-01-13 DIAGNOSIS — R91.1 NODULE OF UPPER LOBE OF RIGHT LUNG: Primary | ICD-10-CM

## 2023-01-13 PROCEDURE — 99443 PR PHYS/QHP TELEPHONE EVALUATION 21-30 MIN: CPT | Performed by: NURSE PRACTITIONER

## 2023-01-13 RX ORDER — DIAZEPAM 2 MG/1
2 TABLET ORAL NIGHTLY PRN
Qty: 20 TABLET | Refills: 0 | Status: SHIPPED | OUTPATIENT
Start: 2023-01-13 | End: 2023-01-18 | Stop reason: ALTCHOICE

## 2023-01-13 NOTE — DISCHARGE INSTRUCTIONS
Continue taking previously prescribed medication.  Finish all antibiotics.  Rest.  Increase fluid intake and avoid dehydrating substances such as caffeine, coffee, tea and soda.    Follow-up with urology if not improving.  Follow-up with your primary care provider as needed.    Return to the ER for any new or worsening symptoms.  
None

## 2023-01-13 NOTE — PROGRESS NOTES
"Chief Complaint  Follow up, histoplasmosis    You have chosen to receive care through a telephone visit. Do you consent to use a telephone visit for your medical care today? Yes      Subjective        Tessie Conner presents to [facility] THORACIC SURGERY  History of Present Illness    Ms. Conner presents today via telephone for increased pain on her right side status post right VATS with upper lobe wedge resection on 2/8/2022 by Dr. 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  She presented to the ER earlier this week on January 9, 2023 complaining of what sounded like persistent intercostal neuralgia on the right.  CT chest was performed and was negative for acute intrathoracic process.  She was discharged with gabapentin per our recommendation.      She called our office earlier today with multiple complaints including tenderness and a sharp tingling sensation along her right breast that radiated to her back.  She denies shortness of breath and hemoptysis.  She was prescribed Naprosyn by her PCP in December 2022 secondary to pleurisy but reports this was not effective.  She initially recovered well from her surgery, and was last seen in our office in July 2022 buy Dr. Carroll at which time she was recommended to come back in 1 year with CT chest.  She reports her discomfort has since increased since that time.    Objective   Vital Signs:  There were no vitals taken for this visit.  Estimated body mass index is 40.34 kg/m² as calculated from the following:    Height as of 1/12/23: 162.6 cm (64\").    Weight as of 1/12/23: 107 kg (235 lb).             Physical Exam     Deferred secondary to phone visit    Result Review :          I have independently reviewed the CT of the chest performed on 1/9/2023 which demonstrates postsurgical changes associated with right upper lobe wedge resection.  Subpleural opacity to right middle lobe consistent with atelectasis/pneumonia. "  Stable 7 mm noncalcified nodule to the right middle lobe without change.  No pleural or pericardial effusion.  Stable right hilar adenopathy.  No new or enlarging pulmonary nodules.           Assessment and Plan   Diagnoses and all orders for this visit:    1. Nodule of upper lobe of right lung (Primary)  -     diazePAM (Valium) 2 MG tablet; Take 1 tablet by mouth At Night As Needed for Anxiety or Muscle Spasms.  Dispense: 20 tablet; Refill: 0  -     Ambulatory Referral to Pain Management    2. Intercostal neuralgia  -     diazePAM (Valium) 2 MG tablet; Take 1 tablet by mouth At Night As Needed for Anxiety or Muscle Spasms.  Dispense: 20 tablet; Refill: 0  -     Ambulatory Referral to Pain Management    Ms. 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  Her most recent CT chest does not demonstrate any recurrent or progression of disease.  She has been initiated on an antibiotic for possible pneumonia.  She has persistent right sided intercostal neuralgia.  I have advised her to continue gabapentin 300 mg 3 times daily as scheduled.  I have also added a short course of Valium as needed for chest wall spasms.  We will refer her to pain management for consideration of intercostal nerve blocks given her symptoms.  She is scheduled to follow-up with Dr. Carroll on 1/18/2023 and I have advised her to keep this appointment.         I spent 36 minutes caring for Tessie on this date of service. This time includes time spent by me in the following activities:preparing for the visit, reviewing tests, obtaining and/or reviewing a separately obtained history, ordering medications, tests, or procedures, referring and communicating with other health care professionals , documenting information in the medical record and independently interpreting results and communicating that information with the patient/family/caregiver     Follow  Up   Return in about 1 week (around 1/20/2023) for Next scheduled follow up.  Patient was given instructions and counseling regarding her condition or for health maintenance advice. Please see specific information pulled into the AVS if appropriate.

## 2023-01-17 NOTE — PROGRESS NOTES
CHIEF COMPLAINT  R chest pain.      Subjective   Tessie Conner is a 53 y.o. female who was referred by Eileen Massey, ALEXIS, APRN to our pain management clinic for consultation, evaluation and treatment of right chest pain and possible intercostal nerve block. 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 She had no pain after surgery up until 6/22.  She has tried gabapentin and Valium without significant pain relief. Scheduled to see Cardiologist for abnormalities in ECG as per patient.    Right mid/upper back pain is 1/10 on VAS, at maximum is 10/10. Pain is stabbing, sharp in nature. Pain is referred under R breast, R lateral ribs, R shoulder. The pain is intermittent. The pain is improved by nothing. The pain is worse with nothing in particular, but significantly worse at night time, deep breathing. She can barely sleep for 2-3 hours. She has tried muscle relaxant, gabapentin, heat, cold without significant relief.    PHQ-9- 19    SOAPP- 12  [place] back disability scale -27    PMH:   DM-2, HTN,  anxiety, depression, migraines, ADY, history of spinal headache, bipolar 1, frequent UTI, history of histoplasmosis causing granuloma and s/p right upper lobe wedge resection-2/2022, left rotator cuff repair    Current Medications:   Gabapentin 300 mg - has only taken 2 doses so far  Lidoderm 5% patch - insurance didn't cover it.   Aspirin 81 mg  Melatonin     Past Medications:  Tramadol - short prescription  Oxycodone- short prescription  Temazepam - for sleep  Valium 2 mg- took it one time.     Past Modalities:  TENS:       no          Physical Therapy Within The Last 6 Months     no  Psychotherapy     no  Massage Therapy      no    Patient Complains Of:  Uro-Fecal Incontinence no  Weight Gain/Loss  no  Fever/Chills   no  Weakness   no      PEG Assessment   What number best describes your pain on average in the past week?6  What number best describes  how, during the past week, pain has interfered with your enjoyment of life?8  What number best describes how, during the past week, pain has interfered with your general activity?  8        Current Outpatient Medications:   •  albuterol sulfate  (90 Base) MCG/ACT inhaler, Inhale 2 puffs Every 4 (Four) Hours As Needed for Wheezing., Disp: 8 g, Rfl: 0  •  aspirin 81 MG chewable tablet, Chew 81 mg Daily., Disp: , Rfl:   •  atorvastatin (LIPITOR) 20 MG tablet, TAKE 1 TABLET BY MOUTH EVERY DAY, Disp: 90 tablet, Rfl: 1  •  diazePAM (Valium) 2 MG tablet, Take 1 tablet by mouth At Night As Needed for Anxiety or Muscle Spasms., Disp: 20 tablet, Rfl: 0  •  EPINEPHrine (EPIPEN) 0.3 MG/0.3ML solution auto-injector injection, , Disp: , Rfl:   •  estradiol (VAGIFEM) 10 MCG tablet vaginal tablet, Insert one into the vagina daily for 2 weeks, then decrease to twice a week., Disp: 30 tablet, Rfl: 1  •  ipratropium-albuterol (DUO-NEB) 0.5-2.5 mg/3 ml nebulizer, Take 3 mL by nebulization Every 4 (Four) Hours As Needed for Wheezing., Disp: 360 mL, Rfl: 0  •  lidocaine (LIDODERM) 5 %, Place 2 patches on the skin as directed by provider Daily. Remove & Discard patch within 12 hours or as directed by MD, Disp: 5 patch, Rfl: 0  •  lisinopril (PRINIVIL,ZESTRIL) 2.5 MG tablet, Take 1 tablet by mouth Daily., Disp: 90 tablet, Rfl: 1  •  Rexulti 0.5 MG tablet, Take 1 tablet by mouth Daily., Disp: , Rfl:   •  sertraline (ZOLOFT) 50 MG tablet, Take 50 mg by mouth Daily., Disp: , Rfl:   •  temazepam (Restoril) 15 MG capsule, Take 1 capsule by mouth At Night As Needed for Sleep for up to 30 days., Disp: 30 capsule, Rfl: 1  •  gabapentin (NEURONTIN) 300 MG capsule, Take 1 capsule by mouth every night at bedtime for 60 days., Disp: 30 capsule, Rfl: 1    The following portions of the patient's history were reviewed and updated as appropriate: allergies, current medications, past family history, past medical history, past social history, past  surgical history, and problem list.      REVIEW OF PERTINENT MEDICAL DATA    Past Medical History:   Diagnosis Date   • Abnormal ECG    • Anxiety    • Arthritis    • Asthma 2020   • At risk for sleep apnea    • Bipolar 1 disorder (HCC)    • Cholelithiasis 10/2022   • Coronary artery disease    • Depression    • Diabetes mellitus (HCC)    • Dyspnea on minimal exertion    • Frequent UTI    • Heart murmur     FROM CHILDHOOD   • History of anemia     POST PREGNANCY   • Hyperlipidemia 10/04/2016   • Hypertension    • Mass of upper lobe of right lung    • Migraines    • Obesity    • PONV (postoperative nausea and vomiting)    • Shoulder pain, right    • Sleep apnea 22   • Spinal headache    • Visual impairment     WEARS GLASSES     Past Surgical History:   Procedure Laterality Date   • APPENDECTOMY     • CARDIAC CATHETERIZATION N/A 2020    Procedure: Left Heart Cath possible PCI, atherectomy, hemodynamic support;  Surgeon: Thomas Zamora MD;  Location: [facility] INVASIVE LOCATION;  Service: Cardiology;  Laterality: N/A;   • CARDIAC CATHETERIZATION  2020   •  SECTION     • FOOT/TOE TENDON REPAIR Left    • HYSTERECTOMY     • LUNG BIOPSY Right 2020   • LUNG LOBECTOMY Right 2022    pt had partial Right lobectomy and nodule removal   • ROTATOR CUFF REPAIR Left    • THORACOSCOPY VIDEO ASSISTED WITH LOBECTOMY Right 2022    Procedure: BRONCHOSCOPY, THORACOSCOPY VIDEO ASSISTED wedge resection X2, INTERCOSTAL NERVE BLOCK;  Surgeon: Ayla Carroll MD;  Location: [facility];  Service: Thoracic;  Laterality: Right;   • TUBAL ABDOMINAL LIGATION       Family History   Problem Relation Age of Onset   • Arthritis Mother    • Cancer Mother    • Depression Mother    • Diabetes Mother    • Early death Mother    • Mental illness Mother    • Anxiety disorder Mother    • Alcohol abuse Father    • Diabetes Father    • Early death Father    • Heart  disease Father    • Hyperlipidemia Father    • Hypertension Father         Father   • Vision loss Father    • Heart attack Father    • Heart disease Sister    • Heart disease Brother    • Hypertension Brother    • Cancer Maternal Grandmother    • Drug abuse Brother    • Hypertension Brother    • Heart disease Brother    • Drug abuse Sister    • Heart attack Sister    • Hypertension Sister         Sister   • Heart disease Sister    • Heart attack Brother    • Malig Hyperthermia Neg Hx      Social History     Socioeconomic History   • Marital status:    Tobacco Use   • Smoking status: Never     Passive exposure: Never   • Smokeless tobacco: Never   Vaping Use   • Vaping Use: Never used   Substance and Sexual Activity   • Alcohol use: Yes     Comment: VERY RARE   • Drug use: No   • Sexual activity: Not Currently     Partners: Male     Birth control/protection: None, Hysterectomy         Review of Systems   Musculoskeletal: Positive for arthralgias and back pain.         Vitals:    01/18/23 1026   BP: 121/55   Pulse: 70   Resp: 16   SpO2: 97%   PainSc:   1         Objective   Physical Exam  Chest:       Musculoskeletal:         General: Tenderness present.        Arms:            Imaging Reviewed:  CT chest without contrast  - Postsurgical changes of right again noted with suture line at the right apex  - Increased subpleural opacity along with anterior chest wall on the right middle lobe.  Atelectasis versus pneumonia.  - Stable noncalcified nodules in right middle lobe.    MRI lumbar spine without contrast-6/16/2017  L3-4-mild facet arthropathy with left paracentral disc protrusion causing moderate left neural foraminal narrowing.  L4-5-moderate bilateral facet arthropathy with small posterior disc endplate complex causing mild narrowing of the neural foramina.  L4 S1-mild to moderate bilateral facet arthropathy.      Assessment:    1. Intercostal neuralgia    2. Chest pain on breathing         Plan:   1.   Defer UDS for now.  2.  We are starting Biomed Cream: Formula #3D. Ketamine 5%, Baclofen 2%, Diclofenac 3%, Gabapentin 6%, Lidocaine 2% and Prilocaine 2%. Apply 1-2 grams topically to affected area three times per day to four times per day as needed for pain. do not apply to open skin, genitalia or mucosa. Possible side effects may include irritation at the application site- burning, itching, rash and redness or allergic reaction. Patient understands and agrees with the plan.   3. Given Xtildo 1.5% patches for pain relief.   4. Patient has pain in the chest on the RIGHT side from rib pain, discussed RIGHT intercostal nerve block at rib 4-7. Discussed the possibility of infection, bleeding, nerve damage, headache, increased pain, pneumothorax. Patient understands and agrees.     RTC for injection and then 3 week follow up.     Shane Medina DO  Pain Management   [facility]         INSPECT REPORT    As part of the patient's treatment plan, I may be prescribing controlled substances. The patient has been made aware of appropriate use of such medications, including potential risk of somnolence, limited ability to drive and/or work safely, and the potential for dependence or overdose. It has also been made clear that these medications are for use by this patient only, without concomitant use of alcohol or other substances unless prescribed.     Patient has completed prescribing agreement detailing terms of continued prescribing of controlled substances, including monitoring INSPECT reports, urine drug screening, and pill counts if necessary. The patient is aware that inappropriate use will results in cessation of prescribing such medications.    INSPECT report has been reviewed and scanned into the patient's chart.      EMR Dragon/Transcription Disclaimer:   Much of this encounter note is an electronic transcription/translation of spoken language to printed text. The electronic translation of spoken language may  permit erroneous, or at times, nonsensical words or phrases to be inadvertently transcribed; Although I have reviewed the note for such errors, some may still exist.

## 2023-01-18 ENCOUNTER — OFFICE VISIT (OUTPATIENT)
Dept: CARDIOLOGY | Facility: CLINIC | Age: 53
End: 2023-01-18
Payer: MEDICARE

## 2023-01-18 ENCOUNTER — OFFICE VISIT (OUTPATIENT)
Dept: SURGERY | Facility: CLINIC | Age: 53
End: 2023-01-18
Payer: MEDICARE

## 2023-01-18 ENCOUNTER — OFFICE VISIT (OUTPATIENT)
Dept: PAIN MEDICINE | Facility: CLINIC | Age: 53
End: 2023-01-18
Payer: MEDICARE

## 2023-01-18 VITALS
SYSTOLIC BLOOD PRESSURE: 130 MMHG | BODY MASS INDEX: 40.63 KG/M2 | DIASTOLIC BLOOD PRESSURE: 70 MMHG | WEIGHT: 238 LBS | HEART RATE: 84 BPM | HEIGHT: 64 IN | OXYGEN SATURATION: 97 %

## 2023-01-18 VITALS
DIASTOLIC BLOOD PRESSURE: 67 MMHG | BODY MASS INDEX: 40.46 KG/M2 | SYSTOLIC BLOOD PRESSURE: 96 MMHG | TEMPERATURE: 99.3 F | HEIGHT: 64 IN | OXYGEN SATURATION: 97 % | WEIGHT: 237 LBS | HEART RATE: 66 BPM

## 2023-01-18 VITALS
DIASTOLIC BLOOD PRESSURE: 55 MMHG | HEART RATE: 70 BPM | OXYGEN SATURATION: 97 % | SYSTOLIC BLOOD PRESSURE: 121 MMHG | RESPIRATION RATE: 16 BRPM

## 2023-01-18 DIAGNOSIS — E78.00 PURE HYPERCHOLESTEROLEMIA: Primary | ICD-10-CM

## 2023-01-18 DIAGNOSIS — I10 PRIMARY HYPERTENSION: ICD-10-CM

## 2023-01-18 DIAGNOSIS — G58.8 INTERCOSTAL NEURALGIA: Primary | ICD-10-CM

## 2023-01-18 DIAGNOSIS — R91.1 LUNG NODULE: Primary | ICD-10-CM

## 2023-01-18 DIAGNOSIS — R07.1 CHEST PAIN ON BREATHING: ICD-10-CM

## 2023-01-18 PROCEDURE — 99213 OFFICE O/P EST LOW 20 MIN: CPT | Performed by: INTERNAL MEDICINE

## 2023-01-18 PROCEDURE — 99214 OFFICE O/P EST MOD 30 MIN: CPT | Performed by: THORACIC SURGERY (CARDIOTHORACIC VASCULAR SURGERY)

## 2023-01-18 PROCEDURE — 99204 OFFICE O/P NEW MOD 45 MIN: CPT | Performed by: STUDENT IN AN ORGANIZED HEALTH CARE EDUCATION/TRAINING PROGRAM

## 2023-01-18 RX ORDER — GABAPENTIN 300 MG/1
300 CAPSULE ORAL
Qty: 30 CAPSULE | Refills: 1 | Status: SHIPPED | OUTPATIENT
Start: 2023-01-18 | End: 2023-02-22 | Stop reason: SDUPTHER

## 2023-01-18 RX ORDER — PHENOL 1.4 %
AEROSOL, SPRAY (ML) MUCOUS MEMBRANE
COMMUNITY
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
## 2023-01-18 NOTE — PROGRESS NOTES
"Cardiology Office Visit      Encounter Date:  01/18/2023    Patient ID:   Tessie Conner is a 53 y.o. female.    Reason For Followup:  Dizziness  Second opinion    Brief Clinical History:  Dear Marsha Pickard MD    I had the pleasure of seeing Tessie Conner today. As you are well aware, this is a 53 y.o. female with no established history of ischemic heart disease.  She does have a history of asthma, hyperlipidemia, and hypertension.  She presents today for the follow-up on the above conditions.    Interval History:  She denies any chest pain pressure heaviness or tightness.  She denies any shortness of breath.  She denies any PND orthopnea.  She denies any syncope or near syncope.  She reports feeling well from a cardiac perspective.    She had been seen by another cardiologist in the area and has opted to follow with our clinic.  She has been worked up in the past with a stress test and ultimately underwent cardiac catheterization.  She had only minor coronary occlusive disease (10%).  She had some dizziness and lightheadedness but this turns out to be the result of the medication.  This was stopped and she feels fine.    She is lost 30 pounds since February 2022.    Assessment & Plan    Impressions:  Dizziness-improved  Obesity  Hyperlipidemia      Recommendations:  Continuation of her current cardiovascular regimen at the present time.     This includes antihypertensives, and statin  Follow-up as needed.    Diagnoses and all orders for this visit:    1. Pure hypercholesterolemia (Primary)    2. Primary hypertension          Objective:    Vitals:  Vitals:    01/18/23 1332   BP: 130/70   Pulse: 84   SpO2: 97%   Weight: 108 kg (238 lb)   Height: 162.6 cm (64\")     Body mass index is 40.85 kg/m².      Physical Exam:    General: Alert, cooperative, no distress, appears stated age  Head:  Normocephalic, atraumatic, mucous membranes moist  Eyes:  Conjunctiva/corneas clear, EOM's intact   "   Neck:  Supple,  no bruit    Lungs: Clear to auscultation bilaterally, no wheezes rhonchi rales are noted  Chest wall: No tenderness  Heart::  Regular rate and rhythm, S1 and S2 normal, no murmur, rub or gallop  Abdomen: Soft, non-tender, nondistended bowel sounds active.  Obese  Extremities: No cyanosis, clubbing, or edema  Pulses: 2+ and symmetric all extremities  Skin:  No rashes or lesions  Neuro/psych: A&O x3. CN II through XII are grossly intact with appropriate affect      Allergies:  Allergies   Allergen Reactions   • Tylenol [Acetaminophen] Hives and Itching       Medication Review:     Current Outpatient Medications:   •  albuterol sulfate  (90 Base) MCG/ACT inhaler, Inhale 2 puffs Every 4 (Four) Hours As Needed for Wheezing., Disp: 8 g, Rfl: 0  •  atorvastatin (LIPITOR) 20 MG tablet, TAKE 1 TABLET BY MOUTH EVERY DAY, Disp: 90 tablet, Rfl: 1  •  gabapentin (NEURONTIN) 300 MG capsule, Take 1 capsule by mouth every night at bedtime for 60 days., Disp: 30 capsule, Rfl: 1  •  ipratropium-albuterol (DUO-NEB) 0.5-2.5 mg/3 ml nebulizer, Take 3 mL by nebulization Every 4 (Four) Hours As Needed for Wheezing., Disp: 360 mL, Rfl: 0  •  lidocaine (LIDODERM) 5 %, Place 2 patches on the skin as directed by provider Daily. Remove & Discard patch within 12 hours or as directed by MD, Disp: 5 patch, Rfl: 0  •  lisinopril (PRINIVIL,ZESTRIL) 2.5 MG tablet, Take 1 tablet by mouth Daily., Disp: 90 tablet, Rfl: 1  •  Melatonin 10 MG tablet, Take  by mouth., Disp: , Rfl:   •  Rexulti 0.5 MG tablet, Take 1 tablet by mouth Daily., Disp: , Rfl:   •  sertraline (ZOLOFT) 50 MG tablet, Take 50 mg by mouth Daily., Disp: , Rfl:   •  EPINEPHrine (EPIPEN) 0.3 MG/0.3ML solution auto-injector injection, , Disp: , Rfl:     Family History:  Family History   Problem Relation Age of Onset   • Arthritis Mother    • Cancer Mother    • Depression Mother    • Diabetes Mother    • Early death Mother    • Mental illness Mother    • Anxiety  disorder Mother    • Alcohol abuse Father    • Diabetes Father    • Early death Father    • Heart disease Father    • Hyperlipidemia Father    • Hypertension Father         Father   • Vision loss Father    • Heart attack Father    • Heart disease Sister    • Heart disease Brother    • Hypertension Brother    • Cancer Maternal Grandmother    • Drug abuse Brother    • Hypertension Brother    • Heart disease Brother    • Drug abuse Sister    • Heart attack Sister    • Hypertension Sister         Sister   • Heart disease Sister    • Heart attack Brother    • Malig Hyperthermia Neg Hx        Past Medical History:  Past Medical History:   Diagnosis Date   • Abnormal ECG    • Anxiety    • Arthritis    • Asthma 2020   • At risk for sleep apnea    • Bipolar 1 disorder (HCC)    • Cholelithiasis 10/2022   • Coronary artery disease    • Depression    • Diabetes mellitus (HCC)    • Dyspnea on minimal exertion    • Frequent UTI    • Heart murmur     FROM CHILDHOOD   • History of anemia     POST PREGNANCY   • Hyperlipidemia 10/04/2016   • Hypertension    • Mass of upper lobe of right lung    • Migraines    • Obesity    • PONV (postoperative nausea and vomiting)    • Shoulder pain, right    • Sleep apnea 22   • Spinal headache    • Visual impairment     WEARS GLASSES       Past Surgical History:  Past Surgical History:   Procedure Laterality Date   • APPENDECTOMY     • CARDIAC CATHETERIZATION N/A 2020    Procedure: Left Heart Cath possible PCI, atherectomy, hemodynamic support;  Surgeon: Thomas Zamora MD;  Location: [facility] CATH INVASIVE LOCATION;  Service: Cardiology;  Laterality: N/A;   • CARDIAC CATHETERIZATION  2020   •  SECTION     • FOOT/TOE TENDON REPAIR Left    • HYSTERECTOMY     • LUNG BIOPSY Right 2020   • LUNG LOBECTOMY Right 2022    pt had partial Right lobectomy and nodule removal   • ROTATOR CUFF REPAIR Left    • THORACOSCOPY VIDEO ASSISTED  WITH LOBECTOMY Right 02/08/2022    Procedure: BRONCHOSCOPY, THORACOSCOPY VIDEO ASSISTED wedge resection X2, INTERCOSTAL NERVE BLOCK;  Surgeon: Ayla Carroll MD;  Location: [facility];  Service: Thoracic;  Laterality: Right;   • TUBAL ABDOMINAL LIGATION  2002       Social History:  Social History     Socioeconomic History   • Marital status:    Tobacco Use   • Smoking status: Never     Passive exposure: Never   • Smokeless tobacco: Never   Vaping Use   • Vaping Use: Never used   Substance and Sexual Activity   • Alcohol use: Yes     Comment: VERY RARE   • Drug use: No   • Sexual activity: Not Currently     Partners: Male     Birth control/protection: None, Hysterectomy       Review of Systems:  The following systems were reviewed as they relate to the cardiovascular system: Constitutional, Eyes, ENT, Cardiovascular, Respiratory, Gastrointestinal, Integumentary, Neurological, Psychiatric, Hematologic, Endocrine, Musculoskeletal, and Genitourinary. The pertinent cardiovascular findings are reported above with all other cardiovascular points within those systems being negative.    Diagnostic Study Review:     Current Electrocardiogram:  Procedures no new EKG.  EKG dated 9 January 2023 demonstrates sinus rhythm with a ventricular rate of 78 bpm    Laboratory Data:  Lab Results   Component Value Date    GLUCOSE 79 01/09/2023    BUN 12 01/09/2023    CREATININE 0.79 01/09/2023    EGFRIFNONA 71 02/15/2022    EGFRIFAFRI 88 10/05/2016    BCR 15.2 01/09/2023    K 3.8 01/09/2023    CO2 26.0 01/09/2023    CALCIUM 9.9 01/09/2023    PROTENTOTREF 7.2 11/19/2021    ALBUMIN 4.1 01/09/2023    LABIL2 1.1 11/19/2021    AST 20 01/09/2023    ALT 18 01/09/2023     Lab Results   Component Value Date    GLUCOSE 79 01/09/2023    CALCIUM 9.9 01/09/2023     01/09/2023    K 3.8 01/09/2023    CO2 26.0 01/09/2023     01/09/2023    BUN 12 01/09/2023    CREATININE 0.79 01/09/2023    EGFRIFAFRI 88 10/05/2016    EGFRIFNONA 71  02/15/2022    BCR 15.2 01/09/2023    ANIONGAP 12.0 01/09/2023     Lab Results   Component Value Date    WBC 9.50 01/09/2023    HGB 12.4 01/09/2023    HCT 38.9 01/09/2023    MCV 85.0 01/09/2023     01/09/2023     Lab Results   Component Value Date    CHOL 171 08/27/2021    TRIG 152 (H) 08/27/2021    HDL 39 (L) 08/27/2021     (H) 08/27/2021     Lab Results   Component Value Date    HGBA1C 5.6 10/28/2022     Lab Results   Component Value Date    INR 1.02 11/16/2022    INR 1.02 01/25/2022    INR 0.98 12/16/2021    PROTIME 10.5 11/16/2022    PROTIME 13.2 01/25/2022    PROTIME 10.9 12/16/2021       Most Recent Echo:  Results for orders placed during the hospital encounter of 12/05/22    Adult Transthoracic Echo Complete W/ Cont if Necessary Per Protocol    Interpretation Summary  •  Left ventricular systolic function is normal. Left ventricular ejection fraction appears to be 66 - 70%.  •  Left ventricular wall thickness is consistent with borderline concentric hypertrophy.  •  The right ventricular cavity is mildly dilated.  •  Estimated right ventricular systolic pressure from tricuspid regurgitation is normal (<35 mmHg).    Transthoracic echocardiography reveals ejection fraction close to 70% and mild left atrial enlargement.  Right ventricle is mildly dilated with mild RVH with normal RV systolic function.  No effusion.    Electronically signed by Dwaine Marin MD, 12/05/22, 6:24 PM EST.       Most Recent Stress Test:  Results for orders placed during the hospital encounter of 05/29/20    Stress Test With Myocardial Perfusion One Day    Interpretation Summary  · Small area of reversible ischemia involving the lateral wall in the apical segment  · Left ventricular ejection fraction is normal (Calculated EF = 70%).  · Impressions are consistent with a low risk study.    Electronically signed by BECKY Merchant, 05/29/20, 12:46 PM.       Most Recent Cardiac Catheterization:   Results for  orders placed during the hospital encounter of 09/24/20    Cardiac Catheterization/Vascular Study    Narrative   Thomas Zamora MD, PhD  [facility] cardiology  Date 9-    Procedure  1.  Left heart catheterization with coronary angiography and left ventriculography in ORTIZ position    Indication  Unstable angina with abnormal EKG    After informed consent the patient was brought to the catheterization lab sterilely prepped and draped in usual fashion with exposure the right groin for right common femoral arterial access via micropuncture modified Seldinger technique.  6 Faroese sheath was placed to the right common femoral artery.  An 035 guidewire was advanced to the level of the aortic valve followed by diagnostic JL4 and JR4 6 Faroese catheters for selective right and left coronary angiography.  The JR4 was used across aortic valve followed by LVEDP measurement, hand-injection of 8 cc for left ventriculography in ORTIZ position and pullback assessment of the transaortic valve gradient.  With no obstructive coronary disease all catheters and equipment were removed and the sheath flushed with heparinized saline.  Final angiography of the right common femoral artery revealed widely patent vessel with normal bifurcation and 6 Faroese Angio-Seal was used to close the vessel with immediate hemostasis and maintenance of distal pulses    Complications none  Contrast usage 85 cc  Moderate conscious sedation time of 35 minutes, IV Versed and fentanyl administered by registered nurse with complete ECG pulse oximetry and hemodynamic running throughout the entire the case was used with complete observation by myself    Patient left the Cath Lab chest pain-free hemodynamically electrically stable alert talking to staff neurologically gross intact bilaterally    Findings  1.  Open aortic pressure 117/51  2.  Closing pressure unchanged  3.  Mildly increased LVEDP at 18 mmHg  4.  Normal LV systolic function EF  of 60% with no regional abnormality  5.  No significant transaortic valve gradient    Angiography  1.  Left main long giving rise to LAD and nondominant circumflex.  No angiographic disease of the left main  2.  LAD is a medium caliber vessel giving off numerous septal perforators as well as 2 diagonal branches.  The second diagonal branch essentially reaches the apex and dual LAD physiology and the continuation LAD tapers dramatically to around 1 to 1.5 mm in diameter at the apex.  There is only mild luminal irregularities in the LAD and diagonal branch with ALISON-3 flow throughout.  3.  The circumflex nondominant with one obtuse marginal branch and continuation circumflex.  There is no angiographic disease of the circumflex distribution and ALISON-3 flow throughout  4.  The RCA is a large-caliber dominant vessel with PDA and PLV distally.  There is no angiographic disease of the RCA PLV or PDA branches with ALISON-3 flow throughout    Conclusions and recommendations  1.  Normal epicardial coronary anatomy with suggestions of microvascular dysfunction as well as small distal vessel size  2.  Continue max medical therapy, antianginals, long-acting nitrates and ACE inhibitor's for microvascular dysfunction  3.  With patient symptomatology and nighttime awakenings would suggest sleep study as outpatient next  #4 consider outpatient ambulatory ECG monitoring for heart rate rule out any tachyarrhythmias  5.  Primary prevention goals for CAD and comorbidities    It is a pleasure to be involved in her cardiovascular care.  Please call with any questions or concerns  Thomas Zamora MD, PhD       NOTE: The following portions of the patient's note were reviewed, confirmed and/or updated this visit as appropriate: History of present illness/Interval history, physical examination, assessment & plan, allergies, current medications, past family history, past medical history, past social history, past surgical history and problem  list.

## 2023-01-20 ENCOUNTER — HOSPITAL ENCOUNTER (EMERGENCY)
Facility: HOSPITAL | Age: 53
Discharge: HOME | End: 2023-01-20
Attending: EMERGENCY MEDICINE | Admitting: EMERGENCY MEDICINE
Payer: MEDICARE

## 2023-01-20 ENCOUNTER — APPOINTMENT (OUTPATIENT)
Dept: GENERAL RADIOLOGY | Facility: HOSPITAL | Age: 53
End: 2023-01-20
Payer: MEDICARE

## 2023-01-20 VITALS
HEIGHT: 64 IN | RESPIRATION RATE: 22 BRPM | TEMPERATURE: 98.7 F | OXYGEN SATURATION: 98 % | DIASTOLIC BLOOD PRESSURE: 43 MMHG | HEART RATE: 71 BPM | WEIGHT: 238 LBS | SYSTOLIC BLOOD PRESSURE: 93 MMHG | BODY MASS INDEX: 40.63 KG/M2

## 2023-01-20 DIAGNOSIS — R07.89 CHEST WALL PAIN: Primary | ICD-10-CM

## 2023-01-20 LAB
ALBUMIN SERPL-MCNC: 4.1 G/DL (ref 3.5–5.2)
ALBUMIN/GLOB SERPL: 1.1 G/DL
ALP SERPL-CCNC: 173 U/L (ref 39–117)
ALT SERPL W P-5'-P-CCNC: 16 U/L (ref 1–33)
ANION GAP SERPL CALCULATED.3IONS-SCNC: 9 MMOL/L (ref 5–15)
AST SERPL-CCNC: 23 U/L (ref 1–32)
BASOPHILS # BLD AUTO: 0.1 10*3/MM3 (ref 0–0.2)
BASOPHILS NFR BLD AUTO: 0.6 % (ref 0–1.5)
BILIRUB SERPL-MCNC: 0.4 MG/DL (ref 0–1.2)
BUN SERPL-MCNC: 9 MG/DL (ref 6–20)
BUN/CREAT SERPL: 11.3 (ref 7–25)
CALCIUM SPEC-SCNC: 10.1 MG/DL (ref 8.6–10.5)
CHLORIDE SERPL-SCNC: 102 MMOL/L (ref 98–107)
CO2 SERPL-SCNC: 27 MMOL/L (ref 22–29)
CREAT SERPL-MCNC: 0.8 MG/DL (ref 0.57–1)
DEPRECATED RDW RBC AUTO: 50.8 FL (ref 37–54)
EGFRCR SERPLBLD CKD-EPI 2021: 88.8 ML/MIN/1.73
EOSINOPHIL # BLD AUTO: 0 10*3/MM3 (ref 0–0.4)
EOSINOPHIL NFR BLD AUTO: 0.1 % (ref 0.3–6.2)
ERYTHROCYTE [DISTWIDTH] IN BLOOD BY AUTOMATED COUNT: 16.8 % (ref 12.3–15.4)
GLOBULIN UR ELPH-MCNC: 3.8 GM/DL
GLUCOSE SERPL-MCNC: 91 MG/DL (ref 65–99)
HCT VFR BLD AUTO: 38.5 % (ref 34–46.6)
HGB BLD-MCNC: 12.1 G/DL (ref 12–15.9)
HOLD SPECIMEN: NORMAL
LYMPHOCYTES # BLD AUTO: 1.3 10*3/MM3 (ref 0.7–3.1)
LYMPHOCYTES NFR BLD AUTO: 13.7 % (ref 19.6–45.3)
MCH RBC QN AUTO: 26.9 PG (ref 26.6–33)
MCHC RBC AUTO-ENTMCNC: 31.5 G/DL (ref 31.5–35.7)
MCV RBC AUTO: 85.4 FL (ref 79–97)
MONOCYTES # BLD AUTO: 0.6 10*3/MM3 (ref 0.1–0.9)
MONOCYTES NFR BLD AUTO: 6.3 % (ref 5–12)
NEUTROPHILS NFR BLD AUTO: 7.8 10*3/MM3 (ref 1.7–7)
NEUTROPHILS NFR BLD AUTO: 79.3 % (ref 42.7–76)
NRBC BLD AUTO-RTO: 0 /100 WBC (ref 0–0.2)
PLATELET # BLD AUTO: 341 10*3/MM3 (ref 140–450)
PMV BLD AUTO: 7.8 FL (ref 6–12)
POTASSIUM SERPL-SCNC: 4.3 MMOL/L (ref 3.5–5.2)
PROT SERPL-MCNC: 7.9 G/DL (ref 6–8.5)
RBC # BLD AUTO: 4.51 10*6/MM3 (ref 3.77–5.28)
SODIUM SERPL-SCNC: 138 MMOL/L (ref 136–145)
TROPONIN T SERPL-MCNC: <0.01 NG/ML (ref 0–0.03)
WBC NRBC COR # BLD: 9.8 10*3/MM3 (ref 3.4–10.8)
WHOLE BLOOD HOLD COAG: NORMAL
WHOLE BLOOD HOLD SPECIMEN: NORMAL

## 2023-01-20 PROCEDURE — 96375 TX/PRO/DX INJ NEW DRUG ADDON: CPT

## 2023-01-20 PROCEDURE — 71045 X-RAY EXAM CHEST 1 VIEW: CPT

## 2023-01-20 PROCEDURE — 96374 THER/PROPH/DIAG INJ IV PUSH: CPT

## 2023-01-20 PROCEDURE — 36415 COLL VENOUS BLD VENIPUNCTURE: CPT

## 2023-01-20 PROCEDURE — 84484 ASSAY OF TROPONIN QUANT: CPT | Performed by: EMERGENCY MEDICINE

## 2023-01-20 PROCEDURE — 25010000002 ONDANSETRON PER 1 MG: Performed by: EMERGENCY MEDICINE

## 2023-01-20 PROCEDURE — 99283 EMERGENCY DEPT VISIT LOW MDM: CPT

## 2023-01-20 PROCEDURE — 25010000002 HYDROMORPHONE 1 MG/ML SOLUTION: Performed by: EMERGENCY MEDICINE

## 2023-01-20 PROCEDURE — 80053 COMPREHEN METABOLIC PANEL: CPT | Performed by: EMERGENCY MEDICINE

## 2023-01-20 PROCEDURE — 85025 COMPLETE CBC W/AUTO DIFF WBC: CPT | Performed by: EMERGENCY MEDICINE

## 2023-01-20 RX ORDER — ONDANSETRON 2 MG/ML
4 INJECTION INTRAMUSCULAR; INTRAVENOUS ONCE
Status: COMPLETED | OUTPATIENT
Start: 2023-01-20 | End: 2023-01-20

## 2023-01-20 RX ORDER — SODIUM CHLORIDE 0.9 % (FLUSH) 0.9 %
10 SYRINGE (ML) INJECTION AS NEEDED
Status: DISCONTINUED | OUTPATIENT
Start: 2023-01-20 | End: 2023-01-20 | Stop reason: HOSPADM

## 2023-01-20 RX ADMIN — HYDROMORPHONE HYDROCHLORIDE 1 MG: 1 INJECTION, SOLUTION INTRAMUSCULAR; INTRAVENOUS; SUBCUTANEOUS at 17:25

## 2023-01-20 RX ADMIN — ONDANSETRON 4 MG: 2 INJECTION INTRAMUSCULAR; INTRAVENOUS at 17:27

## 2023-01-20 NOTE — DISCHARGE INSTRUCTIONS
Maintain ongoing follow-up with your primary care and pain management specialist.  Return for increased pain, fever, shortness of breath or any other concerns.

## 2023-01-20 NOTE — ED PROVIDER NOTES
Subjective   History of Present Illness  Right-sided chest pain  53-year-old female states she had a stabbing pain in the right lateral chest since .  States it is a daily pain and she is followed by pain management and is scheduled for a nerve block later this month.  Pain is attributed to her previous lobectomy in this area.  States the day he had started being more persistent in the same location with more frequent stabbing and throbbing pain.  She reports no cough or fever.  States she felt mildly short of breath with the increased pain.  She reports no relieving or exacerbating factors.  She reports no abdominal pain.  She has had some nausea.  She was recently started on Neurontin for the discomfort.  Review of Systems    Past Medical History:   Diagnosis Date   • Abnormal ECG    • Anxiety    • Arthritis    • Asthma 2020   • At risk for sleep apnea    • Bipolar 1 disorder (HCC)    • Cholelithiasis 10/2022   • Coronary artery disease    • Depression    • Diabetes mellitus (HCC)    • Dyspnea on minimal exertion    • Frequent UTI    • Heart murmur     FROM CHILDHOOD   • History of anemia     POST PREGNANCY   • Hyperlipidemia 10/04/2016   • Hypertension    • Mass of upper lobe of right lung    • Migraines    • Obesity    • PONV (postoperative nausea and vomiting)    • Shoulder pain, right    • Sleep apnea 22   • Spinal headache    • Visual impairment     WEARS GLASSES       Allergies   Allergen Reactions   • Tylenol [Acetaminophen] Hives and Itching       Past Surgical History:   Procedure Laterality Date   • APPENDECTOMY     • CARDIAC CATHETERIZATION N/A 2020    Procedure: Left Heart Cath possible PCI, atherectomy, hemodynamic support;  Surgeon: Thomas Zamora MD;  Location: [facility] CATH INVASIVE LOC[facility];  Service: Cardiology;  Laterality: N/A;   • CARDIAC CATHETERIZATION  2020   •  SECTION     • FOOT/TOE TENDON REPAIR Left    • HYSTERECTOMY      • LUNG BIOPSY Right 12/2020   • LUNG LOBECTOMY Right 02/08/2022    pt had partial Right lobectomy and nodule removal   • ROTATOR CUFF REPAIR Left    • THORACOSCOPY VIDEO ASSISTED WITH LOBECTOMY Right 02/08/2022    Procedure: BRONCHOSCOPY, THORACOSCOPY VIDEO ASSISTED wedge resection X2, INTERCOSTAL NERVE BLOCK;  Surgeon: Ayla Carroll MD;  Location: [facility] OR;  Service: Thoracic;  Laterality: Right;   • TUBAL ABDOMINAL LIGATION  2002       Family History   Problem Relation Age of Onset   • Arthritis Mother    • Cancer Mother    • Depression Mother    • Diabetes Mother    • Early death Mother    • Mental illness Mother    • Anxiety disorder Mother    • Alcohol abuse Father    • Diabetes Father    • Early death Father    • Heart disease Father    • Hyperlipidemia Father    • Hypertension Father         Father   • Vision loss Father    • Heart attack Father    • Heart disease Sister    • Heart disease Brother    • Hypertension Brother    • Cancer Maternal Grandmother    • Drug abuse Brother    • Hypertension Brother    • Heart disease Brother    • Drug abuse Sister    • Heart attack Sister    • Hypertension Sister         Sister   • Heart disease Sister    • Heart attack Brother    • Malig Hyperthermia Neg Hx        Social History     Socioeconomic History   • Marital status:    Tobacco Use   • Smoking status: Never     Passive exposure: Never   • Smokeless tobacco: Never   Vaping Use   • Vaping Use: Never used   Substance and Sexual Activity   • Alcohol use: Yes     Comment: VERY RARE   • Drug use: No   • Sexual activity: Not Currently     Partners: Male     Birth control/protection: None, Hysterectomy     Prior to Admission medications    Medication Sig Start Date End Date Taking? Authorizing Provider   albuterol sulfate  (90 Base) MCG/ACT inhaler Inhale 2 puffs Every 4 (Four) Hours As Needed for Wheezing. 3/31/22   Mauro Mendiola MD   atorvastatin (LIPITOR) 20 MG tablet TAKE 1 TABLET BY  "MOUTH EVERY DAY 11/10/22   Marsha Lopez MD   EPINEPHrine (EPIPEN) 0.3 MG/0.3ML solution auto-injector injection  10/28/22   Marko Abdullahi MD   gabapentin (NEURONTIN) 300 MG capsule Take 1 capsule by mouth every night at bedtime for 60 days. 1/18/23 3/19/23  Shane Medina,    ipratropium-albuterol (DUO-NEB) 0.5-2.5 mg/3 ml nebulizer Take 3 mL by nebulization Every 4 (Four) Hours As Needed for Wheezing. 4/6/22   Jacoob Moralez MD   lidocaine (LIDODERM) 5 % Place 2 patches on the skin as directed by provider Daily. Remove & Discard patch within 12 hours or as directed by MD 1/9/23   Kristi Jarquin APRN   lisinopril (PRINIVIL,ZESTRIL) 2.5 MG tablet Take 1 tablet by mouth Daily. 10/19/22   Marsha Lopez MD   Melatonin 10 MG tablet Take  by mouth.    Marko Abdullahi MD   Rexulti 0.5 MG tablet Take 1 tablet by mouth Daily. 12/8/22   Marko Abdullhai MD   sertraline (ZOLOFT) 50 MG tablet Take 50 mg by mouth Daily. 1/10/23   Marko Abdullahi MD     BP 93/43   Pulse 71   Temp 98.7 °F (37.1 °C) (Oral)   Resp 22   Ht 162.6 cm (64\")   Wt 108 kg (238 lb)   LMP  (LMP Unknown)   SpO2 98%   BMI 40.85 kg/m²   I examined the patient using the appropriate personal protective equipment.          Objective   Physical Exam  General: Well-developed well-appearing, no acute distress, alert and appropriate  Eyes:  sclera nonicteric  HEENT: Mucous membranes moist, no mucosal swelling  Neck: Supple, no nuchal rigidity,   Respirations: Respirations nonlabored, equal breath sounds bilaterally, clear lungs, chest wall mildly tender palpation in the right lateral chest wall, no soft tissue swelling or erythema or rash  Heart regular rate and rhythm, no murmurs rubs or gallops,   Abdomen soft nontender nondistended, no hepatosplenomegaly, no hernia, no mass, normal bowel sounds, no CVA tenderness  Extremities no clubbing cyanosis or edema, calves are symmetric and nontender  Neuro cranial nerves " grossly intact, no focal limb deficits  Psych oriented, pleasant affect  Skin no rash, brisk cap refill  Procedures           ED Course      Results for orders placed or performed during the hospital encounter of 01/20/23   Comprehensive Metabolic Panel    Specimen: Blood   Result Value Ref Range    Glucose 91 65 - 99 mg/dL    BUN 9 6 - 20 mg/dL    Creatinine 0.80 0.57 - 1.00 mg/dL    Sodium 138 136 - 145 mmol/L    Potassium 4.3 3.5 - 5.2 mmol/L    Chloride 102 98 - 107 mmol/L    CO2 27.0 22.0 - 29.0 mmol/L    Calcium 10.1 8.6 - 10.5 mg/dL    Total Protein 7.9 6.0 - 8.5 g/dL    Albumin 4.1 3.5 - 5.2 g/dL    ALT (SGPT) 16 1 - 33 U/L    AST (SGOT) 23 1 - 32 U/L    Alkaline Phosphatase 173 (H) 39 - 117 U/L    Total Bilirubin 0.4 0.0 - 1.2 mg/dL    Globulin 3.8 gm/dL    A/G Ratio 1.1 g/dL    BUN/Creatinine Ratio 11.3 7.0 - 25.0    Anion Gap 9.0 5.0 - 15.0 mmol/L    eGFR 88.8 >60.0 mL/min/1.73   Troponin    Specimen: Blood   Result Value Ref Range    Troponin T <0.010 0.000 - 0.030 ng/mL   CBC Auto Differential    Specimen: Blood   Result Value Ref Range    WBC 9.80 3.40 - 10.80 10*3/mm3    RBC 4.51 3.77 - 5.28 10*6/mm3    Hemoglobin 12.1 12.0 - 15.9 g/dL    Hematocrit 38.5 34.0 - 46.6 %    MCV 85.4 79.0 - 97.0 fL    MCH 26.9 26.6 - 33.0 pg    MCHC 31.5 31.5 - 35.7 g/dL    RDW 16.8 (H) 12.3 - 15.4 %    RDW-SD 50.8 37.0 - 54.0 fl    MPV 7.8 6.0 - 12.0 fL    Platelets 341 140 - 450 10*3/mm3    Neutrophil % 79.3 (H) 42.7 - 76.0 %    Lymphocyte % 13.7 (L) 19.6 - 45.3 %    Monocyte % 6.3 5.0 - 12.0 %    Eosinophil % 0.1 (L) 0.3 - 6.2 %    Basophil % 0.6 0.0 - 1.5 %    Neutrophils, Absolute 7.80 (H) 1.70 - 7.00 10*3/mm3    Lymphocytes, Absolute 1.30 0.70 - 3.10 10*3/mm3    Monocytes, Absolute 0.60 0.10 - 0.90 10*3/mm3    Eosinophils, Absolute 0.00 0.00 - 0.40 10*3/mm3    Basophils, Absolute 0.10 0.00 - 0.20 10*3/mm3    nRBC 0.0 0.0 - 0.2 /100 WBC     XR Chest 1 View    Result Date: 1/20/2023  Impression: No acute  cardiopulmonary process. Electronically Signed: Courtney Gold  1/20/2023 4:48 PM EST  Workstation ID: [id number]                                         Medical Decision Making  Patient presents with some right chest pain differential diagnosis including acute coronary syndrome, pulmonary embolus, pneumonia, pneumothorax, dissection    Chest wall pain: chronic illness or injury  Amount and/or Complexity of Data Reviewed  Labs: ordered. Decision-making details documented in ED Course.     Details: Troponin normal, no leukocytosis  Radiology: ordered and independent interpretation performed.     Details: Chest x-ray no apparent acute process  ECG/medicine tests: ordered and independent interpretation performed.     Details: Sinus rhythm rate of 66, nonspecific T wave abnormalities      Risk  Prescription drug management.      Patient has reproducible chest discomfort that reproduced with palpation along the right chest wall.  There has been a pain in this area for several months per patient's report.  She is followed by pain management.  Today's emergency room evaluation did not demonstrate any new issues.  There is no sign of cardiac ischemia or pneumonia or pneumothorax.  She has no signs of DVT.  PE felt to be unlikely given patient's reproducibility of pain.  She was ordered some acute pain management with Dilaudid for discomfort.  She is being scheduled for a nerve block with pain management next week.  She was given warning signs for return and discharged good condition.  Notably she is in no respiratory distress, oxygen saturations of 100% on room air.    Final diagnoses:   Chest wall pain       ED Disposition  ED Disposition     ED Disposition   Discharge    Condition   Stable    Comment   --             Marsha Lopez MD  [address]  [facility] [place] [address] [phone]    Schedule an appointment as soon as possible for a visit in 3 days           Medication List      No changes were made  to your prescriptions during this visit.          Mauro Mendiola MD  01/20/23 2404

## 2023-01-23 ENCOUNTER — TELEPHONE (OUTPATIENT)
Dept: PAIN MEDICINE | Facility: CLINIC | Age: 53
End: 2023-01-23

## 2023-01-23 DIAGNOSIS — G58.8 INTERCOSTAL NEURALGIA: Primary | ICD-10-CM

## 2023-01-23 RX ORDER — TRAMADOL HYDROCHLORIDE 50 MG/1
50 TABLET ORAL EVERY 8 HOURS PRN
Qty: 21 TABLET | Refills: 0 | Status: SHIPPED | OUTPATIENT
Start: 2023-01-23 | End: 2023-01-30

## 2023-01-23 NOTE — TELEPHONE ENCOUNTER
PATIENT CALLING BACK, SAID SHE HAS CALLED TODAY AND Friday AND HASN'T HEARD BACK FROM ANYONE AT THE OFFICE.

## 2023-01-23 NOTE — TELEPHONE ENCOUNTER
Caller: MALACHI MUHAMMAD    Relationship to patient: SELF    Best call back number: [phone]    Patient is needing: PATIENT IS REQ TO SPEAK WITH CLINICAL STAFF REGARDING PERSISTANT PAIN- HAS BEEN TO ED ON 01/20/2023- [facility]- HAS APPT SCHEDULED ON 01/31/2023 FOR A NERVE BLOCK- HOWEVER IS IN SEVERE PAIN AT THIS TIME- STATES CURRENT MEDICATION IS NOT GIVING RELIEF AT THIS TIME.

## 2023-01-23 NOTE — TELEPHONE ENCOUNTER
PATIENT CALLED IN SAID SHE WAS PRESCRIBED TRAMADOL AND CANT TAKE IT BECAUSE SHE IS ALLERGIC TO TYLENOL.  PLEASE CONTACT IMMEDIATELY

## 2023-01-25 ENCOUNTER — APPOINTMENT (OUTPATIENT)
Dept: CT IMAGING | Facility: HOSPITAL | Age: 53
End: 2023-01-25
Payer: MEDICARE

## 2023-01-25 ENCOUNTER — HOSPITAL ENCOUNTER (EMERGENCY)
Facility: HOSPITAL | Age: 53
Discharge: HOME | End: 2023-01-25
Attending: EMERGENCY MEDICINE | Admitting: EMERGENCY MEDICINE
Payer: MEDICARE

## 2023-01-25 VITALS
TEMPERATURE: 98.7 F | HEART RATE: 61 BPM | SYSTOLIC BLOOD PRESSURE: 134 MMHG | BODY MASS INDEX: 40.63 KG/M2 | OXYGEN SATURATION: 96 % | RESPIRATION RATE: 18 BRPM | WEIGHT: 238 LBS | DIASTOLIC BLOOD PRESSURE: 65 MMHG | HEIGHT: 64 IN

## 2023-01-25 DIAGNOSIS — S16.1XXA CERVICAL MUSCLE STRAIN, INITIAL ENCOUNTER: Primary | ICD-10-CM

## 2023-01-25 DIAGNOSIS — M54.2 NECK PAIN: ICD-10-CM

## 2023-01-25 PROCEDURE — 72125 CT NECK SPINE W/O DYE: CPT

## 2023-01-25 PROCEDURE — 99283 EMERGENCY DEPT VISIT LOW MDM: CPT | Performed by: EMERGENCY MEDICINE

## 2023-01-25 PROCEDURE — 99283 EMERGENCY DEPT VISIT LOW MDM: CPT

## 2023-01-25 RX ORDER — IBUPROFEN 600 MG/1
600 TABLET ORAL ONCE
Status: COMPLETED | OUTPATIENT
Start: 2023-01-25 | End: 2023-01-25

## 2023-01-25 RX ORDER — CYCLOBENZAPRINE HCL 5 MG
5 TABLET ORAL 3 TIMES DAILY PRN
Qty: 30 TABLET | Refills: 0 | Status: SHIPPED | OUTPATIENT
Start: 2023-01-25 | End: 2023-01-31

## 2023-01-25 RX ADMIN — IBUPROFEN 600 MG: 600 TABLET, FILM COATED ORAL at 07:23

## 2023-01-26 ENCOUNTER — LAB (OUTPATIENT)
Dept: LAB | Facility: HOSPITAL | Age: 53
End: 2023-01-26
Payer: MEDICARE

## 2023-01-26 ENCOUNTER — TRANSCRIBE ORDERS (OUTPATIENT)
Dept: ADMINISTRATIVE | Facility: HOSPITAL | Age: 53
End: 2023-01-26
Payer: MEDICARE

## 2023-01-26 DIAGNOSIS — H32 HISTOPLASMOSIS RETINITIS: Primary | ICD-10-CM

## 2023-01-26 DIAGNOSIS — H32 HISTOPLASMOSIS RETINITIS: ICD-10-CM

## 2023-01-26 DIAGNOSIS — B39.9 HISTOPLASMOSIS RETINITIS: ICD-10-CM

## 2023-01-26 DIAGNOSIS — B39.9 HISTOPLASMOSIS RETINITIS: Primary | ICD-10-CM

## 2023-01-26 PROCEDURE — 87385 HISTOPLASMA CAPSUL AG IA: CPT

## 2023-01-30 ENCOUNTER — PATIENT OUTREACH (OUTPATIENT)
Dept: CASE MANAGEMENT | Facility: OTHER | Age: 53
End: 2023-01-30
Payer: MEDICARE

## 2023-01-30 ENCOUNTER — TELEPHONE (OUTPATIENT)
Dept: PAIN MEDICINE | Facility: CLINIC | Age: 53
End: 2023-01-30

## 2023-01-30 LAB — REF LAB TEST RESULTS: NORMAL

## 2023-01-30 NOTE — TELEPHONE ENCOUNTER
Provider: RUDI  Caller: MALACHI  Relationship to Patient: SELF  Pharmacy:   Phone Number: [phone]  Reason for Call: PT CALLING TO GET CLARIFICATION ON THE RULES OR RESTRICTIONS FOR BEFORE HER BLOCK.  ATTEMPTED TO WT

## 2023-01-30 NOTE — OUTREACH NOTE
AMBULATORY CASE MANAGEMENT NOTE    Name and Relationship of Patient/Support Person: Tessie Conner L - Self    Patient Outreach    Pt discharged from [facility] ED on 1/25/23, seen for neck pain. RN-ACM outreach call made to pt. Explained role of RN-ACM and reason for call. Pt states she has followed up with pain management and has a procedure scheduled for tomorrow, noted in chart. Reviewed ED AVS with pt. Education provided. She has PCP and pain management follow up appts scheduled. Advised pt to call with any needs. Follow up outreach prn.     Send Education  Questions/Answers    Flowsheet Row Most Recent Value   Annual Wellness Visit:  Patient Has Completed  [scheduled for 2/17/23]   Other Patient Education/Resources  24/7 [facility] Nurse Call Line   24/7 Nurse Call Line Education Method Verbal   Advanced Directives: --  [resources have been provided]        SDOH updated and reviewed with the patient during this program:  Questionnaire sent via Go Vocab    NOAH ESPINOZA  Ambulatory Case Management    1/30/2023, 09:57 EST

## 2023-01-31 ENCOUNTER — HOSPITAL ENCOUNTER (OUTPATIENT)
Dept: PAIN MEDICINE | Facility: HOSPITAL | Age: 53
Discharge: HOME | End: 2023-01-31
Payer: MEDICARE

## 2023-01-31 VITALS
DIASTOLIC BLOOD PRESSURE: 69 MMHG | HEART RATE: 80 BPM | SYSTOLIC BLOOD PRESSURE: 115 MMHG | BODY MASS INDEX: 40.63 KG/M2 | WEIGHT: 238 LBS | RESPIRATION RATE: 16 BRPM | HEIGHT: 64 IN | TEMPERATURE: 98.6 F | OXYGEN SATURATION: 96 %

## 2023-01-31 DIAGNOSIS — R52 PAIN: ICD-10-CM

## 2023-01-31 DIAGNOSIS — G58.8 INTERCOSTAL NEURALGIA: Primary | ICD-10-CM

## 2023-01-31 PROCEDURE — 25010000002 DEXAMETHASONE SODIUM PHOSPHATE 10 MG/ML SOLUTION: Performed by: STUDENT IN AN ORGANIZED HEALTH CARE EDUCATION/TRAINING PROGRAM

## 2023-01-31 PROCEDURE — 0 IOPAMIDOL 41 % SOLUTION: Performed by: STUDENT IN AN ORGANIZED HEALTH CARE EDUCATION/TRAINING PROGRAM

## 2023-01-31 PROCEDURE — 64420 NJX AA&/STRD NTRCOST NRV 1: CPT | Performed by: STUDENT IN AN ORGANIZED HEALTH CARE EDUCATION/TRAINING PROGRAM

## 2023-01-31 PROCEDURE — 64421 NJX AA&/STRD NTRCOST NRV EA: CPT | Performed by: STUDENT IN AN ORGANIZED HEALTH CARE EDUCATION/TRAINING PROGRAM

## 2023-01-31 PROCEDURE — 77003 FLUOROGUIDE FOR SPINE INJECT: CPT

## 2023-01-31 RX ORDER — LIDOCAINE HYDROCHLORIDE 10 MG/ML
5 INJECTION, SOLUTION EPIDURAL; INFILTRATION; INTRACAUDAL; PERINEURAL ONCE
Status: COMPLETED | OUTPATIENT
Start: 2023-01-31 | End: 2023-01-31

## 2023-01-31 RX ORDER — BUPIVACAINE HYDROCHLORIDE 2.5 MG/ML
10 INJECTION, SOLUTION EPIDURAL; INFILTRATION; INTRACAUDAL ONCE
Status: COMPLETED | OUTPATIENT
Start: 2023-01-31 | End: 2023-01-31

## 2023-01-31 RX ORDER — DEXAMETHASONE SODIUM PHOSPHATE 10 MG/ML
10 INJECTION, SOLUTION INTRAMUSCULAR; INTRAVENOUS ONCE
Status: COMPLETED | OUTPATIENT
Start: 2023-01-31 | End: 2023-01-31

## 2023-01-31 RX ADMIN — LIDOCAINE HYDROCHLORIDE 5 ML: 10 INJECTION, SOLUTION EPIDURAL; INFILTRATION; INTRACAUDAL; PERINEURAL at 14:15

## 2023-01-31 RX ADMIN — DEXAMETHASONE SODIUM PHOSPHATE 10 MG: 10 INJECTION, SOLUTION INTRAMUSCULAR; INTRAVENOUS at 14:28

## 2023-01-31 RX ADMIN — IOPAMIDOL 3 ML: 408 INJECTION, SOLUTION INTRATHECAL at 14:22

## 2023-01-31 RX ADMIN — BUPIVACAINE HYDROCHLORIDE 10 ML: 2.5 INJECTION, SOLUTION EPIDURAL; INFILTRATION; INTRACAUDAL; PERINEURAL at 14:28

## 2023-01-31 NOTE — PROCEDURES
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    ANESTHESIA:   Local    PROCEDURE IN DETAIL:    After obtaining informed consent from the patient, pre-procedure blood pressure and pulse were stable and recorded in patients clinic chart.   The patient was brought to the procedure room and placed in the prone position on fluoroscopy table. The patient’s back was prepped with antiseptic solution and draped in the usual sterile fashion. The skin over the 6th to 9th rib on the right side was identified under fluoroscopic guidance and infiltrated with 1% lidocaine for local anesthesia via 22 gauge needle. 22 gauge 1.5 inch spinal needle was inserted through the skin under fluoroscopic guidance. The lower border of the 9th rib was touched. The needle was walked off the rib in between the intercostal space.  Confirmation was done by injecting 1 cc of the omnipaque dye. Good spread of the dye was seen in the paola- posterior direction in between the 10th and the 9th rib. 1.5 cc of 0.25% bupivacine with kenalog was injected. The procedure was repeated from 5th to 8th rib. A total of 8 cc of 0.25% bupivacaine with 10 mg dexamethasone was injected. The patient tolerated it very well.       Following the procedure the patient's vital signs were stable. The patient was discharged home in good condition after being given discharge instructions.    COMPLICATIONS None    Shane Medina DO  Pain Management   [facility]

## 2023-01-31 NOTE — H&P
H and P reviewed from previous visit and no changes to patient's clinical presentation. Will proceed with procedure as planned. Patient denies  being on blood thinners. Well controlled DM-2.    Shane Medina DO  Pain Management   [facility]

## 2023-02-01 ENCOUNTER — TELEPHONE (OUTPATIENT)
Dept: PAIN MEDICINE | Facility: HOSPITAL | Age: 53
End: 2023-02-01
Payer: MEDICARE

## 2023-02-13 ENCOUNTER — TELEPHONE (OUTPATIENT)
Dept: ONCOLOGY | Facility: HOSPITAL | Age: 53
End: 2023-02-13
Payer: MEDICARE

## 2023-02-13 NOTE — TELEPHONE ENCOUNTER
Veena from [employer] left a voicemail on the [facility] line requesting assistance in getting records for this patient. Forwarded to clinic.

## 2023-02-17 ENCOUNTER — OFFICE VISIT (OUTPATIENT)
Dept: FAMILY MEDICINE CLINIC | Facility: CLINIC | Age: 53
End: 2023-02-17
Payer: MEDICARE

## 2023-02-17 ENCOUNTER — LAB (OUTPATIENT)
Dept: FAMILY MEDICINE CLINIC | Facility: CLINIC | Age: 53
End: 2023-02-17
Payer: MEDICARE

## 2023-02-17 VITALS
RESPIRATION RATE: 16 BRPM | SYSTOLIC BLOOD PRESSURE: 106 MMHG | OXYGEN SATURATION: 99 % | HEART RATE: 59 BPM | DIASTOLIC BLOOD PRESSURE: 68 MMHG | BODY MASS INDEX: 40.12 KG/M2 | TEMPERATURE: 96.9 F | WEIGHT: 235 LBS | HEIGHT: 64 IN

## 2023-02-17 DIAGNOSIS — R06.09 DOE (DYSPNEA ON EXERTION): ICD-10-CM

## 2023-02-17 DIAGNOSIS — Z00.00 MEDICARE ANNUAL WELLNESS VISIT, SUBSEQUENT: Primary | ICD-10-CM

## 2023-02-17 DIAGNOSIS — R16.1 SPLENOMEGALY: ICD-10-CM

## 2023-02-17 DIAGNOSIS — F31.77 BIPOLAR DISORDER, IN PARTIAL REMISSION, MOST RECENT EPISODE MIXED: ICD-10-CM

## 2023-02-17 DIAGNOSIS — I10 PRIMARY HYPERTENSION: ICD-10-CM

## 2023-02-17 DIAGNOSIS — R73.09 ELEVATED GLUCOSE: ICD-10-CM

## 2023-02-17 DIAGNOSIS — E55.9 VITAMIN D DEFICIENCY DISEASE: ICD-10-CM

## 2023-02-17 LAB
25(OH)D3 SERPL-MCNC: 17 NG/ML (ref 30–100)
ALBUMIN SERPL-MCNC: 4.1 G/DL (ref 3.5–5.2)
ALBUMIN UR-MCNC: <1.2 MG/DL
ALBUMIN/GLOB SERPL: 1.2 G/DL
ALP SERPL-CCNC: 126 U/L (ref 39–117)
ALT SERPL W P-5'-P-CCNC: 16 U/L (ref 1–33)
ANION GAP SERPL CALCULATED.3IONS-SCNC: 9 MMOL/L (ref 5–15)
AST SERPL-CCNC: 19 U/L (ref 1–32)
BILIRUB SERPL-MCNC: 0.4 MG/DL (ref 0–1.2)
BUN SERPL-MCNC: 8 MG/DL (ref 6–20)
BUN/CREAT SERPL: 11.3 (ref 7–25)
CALCIUM SPEC-SCNC: 9.4 MG/DL (ref 8.6–10.5)
CHLORIDE SERPL-SCNC: 101 MMOL/L (ref 98–107)
CHOLEST SERPL-MCNC: 123 MG/DL (ref 0–200)
CO2 SERPL-SCNC: 27 MMOL/L (ref 22–29)
CREAT SERPL-MCNC: 0.71 MG/DL (ref 0.57–1)
EGFRCR SERPLBLD CKD-EPI 2021: 102.5 ML/MIN/1.73
FOLATE SERPL-MCNC: 4.43 NG/ML (ref 4.78–24.2)
GLOBULIN UR ELPH-MCNC: 3.3 GM/DL
GLUCOSE SERPL-MCNC: 93 MG/DL (ref 65–99)
HBA1C MFR BLD: 6 % (ref 3.5–5.6)
HDLC SERPL-MCNC: 34 MG/DL (ref 40–60)
LDLC SERPL CALC-MCNC: 72 MG/DL (ref 0–100)
LDLC/HDLC SERPL: 2.09 {RATIO}
POTASSIUM SERPL-SCNC: 3.8 MMOL/L (ref 3.5–5.2)
PROT SERPL-MCNC: 7.4 G/DL (ref 6–8.5)
SODIUM SERPL-SCNC: 137 MMOL/L (ref 136–145)
TRIGL SERPL-MCNC: 89 MG/DL (ref 0–150)
VIT B12 BLD-MCNC: 661 PG/ML (ref 211–946)
VLDLC SERPL-MCNC: 17 MG/DL (ref 5–40)

## 2023-02-17 PROCEDURE — 86698 HISTOPLASMA ANTIBODY: CPT | Performed by: PHYSICIAN ASSISTANT

## 2023-02-17 PROCEDURE — 86235 NUCLEAR ANTIGEN ANTIBODY: CPT | Performed by: PHYSICIAN ASSISTANT

## 2023-02-17 PROCEDURE — G0439 PPPS, SUBSEQ VISIT: HCPCS | Performed by: INTERNAL MEDICINE

## 2023-02-17 PROCEDURE — 99214 OFFICE O/P EST MOD 30 MIN: CPT | Performed by: INTERNAL MEDICINE

## 2023-02-17 PROCEDURE — 3044F HG A1C LEVEL LT 7.0%: CPT | Performed by: INTERNAL MEDICINE

## 2023-02-17 PROCEDURE — 1160F RVW MEDS BY RX/DR IN RCRD: CPT | Performed by: INTERNAL MEDICINE

## 2023-02-17 PROCEDURE — 80061 LIPID PANEL: CPT | Performed by: INTERNAL MEDICINE

## 2023-02-17 PROCEDURE — 82306 VITAMIN D 25 HYDROXY: CPT | Performed by: INTERNAL MEDICINE

## 2023-02-17 PROCEDURE — 86664 EPSTEIN-BARR NUCLEAR ANTIGEN: CPT | Performed by: PHYSICIAN ASSISTANT

## 2023-02-17 PROCEDURE — 1170F FXNL STATUS ASSESSED: CPT | Performed by: INTERNAL MEDICINE

## 2023-02-17 PROCEDURE — 80053 COMPREHEN METABOLIC PANEL: CPT | Performed by: INTERNAL MEDICINE

## 2023-02-17 PROCEDURE — 82746 ASSAY OF FOLIC ACID SERUM: CPT | Performed by: INTERNAL MEDICINE

## 2023-02-17 PROCEDURE — 86665 EPSTEIN-BARR CAPSID VCA: CPT | Performed by: PHYSICIAN ASSISTANT

## 2023-02-17 PROCEDURE — 83036 HEMOGLOBIN GLYCOSYLATED A1C: CPT | Performed by: INTERNAL MEDICINE

## 2023-02-17 PROCEDURE — 86225 DNA ANTIBODY NATIVE: CPT | Performed by: PHYSICIAN ASSISTANT

## 2023-02-17 PROCEDURE — 82607 VITAMIN B-12: CPT | Performed by: INTERNAL MEDICINE

## 2023-02-17 PROCEDURE — 36415 COLL VENOUS BLD VENIPUNCTURE: CPT

## 2023-02-17 PROCEDURE — 82043 UR ALBUMIN QUANTITATIVE: CPT | Performed by: INTERNAL MEDICINE

## 2023-02-17 PROCEDURE — 83516 IMMUNOASSAY NONANTIBODY: CPT | Performed by: PHYSICIAN ASSISTANT

## 2023-02-17 RX ORDER — ALBUTEROL SULFATE 90 UG/1
2 AEROSOL, METERED RESPIRATORY (INHALATION) EVERY 4 HOURS PRN
Qty: 8 G | Refills: 2 | Status: SHIPPED | OUTPATIENT
Start: 2023-02-17

## 2023-02-17 RX ORDER — TIOTROPIUM BROMIDE INHALATION SPRAY 1.56 UG/1
2 SPRAY, METERED RESPIRATORY (INHALATION)
Qty: 4 G | Refills: 6 | Status: SHIPPED | OUTPATIENT
Start: 2023-02-17

## 2023-02-17 RX ORDER — IPRATROPIUM BROMIDE AND ALBUTEROL SULFATE 2.5; .5 MG/3ML; MG/3ML
3 SOLUTION RESPIRATORY (INHALATION) EVERY 4 HOURS PRN
Qty: 360 ML | Refills: 0 | Status: SHIPPED | OUTPATIENT
Start: 2023-02-17 | End: 2023-03-16

## 2023-02-17 NOTE — PROGRESS NOTES
The ABCs of the Annual Wellness Visit  Subsequent Medicare Wellness Visit    Subjective    Tessie Conner is a 53 y.o. female who presents for a Subsequent Medicare Wellness Visit.    The following portions of the patient's history were reviewed and   updated as appropriate: allergies, current medications, past family history, past medical history, past social history, past surgical history and problem list.    Compared to one year ago, the patient feels her physical   health is worse.    Compared to one year ago, the patient feels her mental   health is the same.    Recent Hospitalizations:  This patient has had a [facility] admission record on file within the last 365 days.    Current Medical Providers:  Patient Care Team:  Marsha Lopez MD as PCP - General (Internal Medicine)  Thomas Zamora MD as Consulting Physician (Cardiology)  Ayla Carroll MD as Surgeon (Thoracic Surgery)  Juanita Shen RN as Ambulatory  ([facility])    Outpatient Medications Prior to Visit   Medication Sig Dispense Refill   • atorvastatin (LIPITOR) 20 MG tablet TAKE 1 TABLET BY MOUTH EVERY DAY 90 tablet 1   • EPINEPHrine (EPIPEN) 0.3 MG/0.3ML solution auto-injector injection      • gabapentin (NEURONTIN) 300 MG capsule Take 1 capsule by mouth every night at bedtime for 60 days. 30 capsule 1   • lidocaine (LIDODERM) 5 % Place 2 patches on the skin as directed by provider Daily. Remove & Discard patch within 12 hours or as directed by MD 5 patch 0   • lisinopril (PRINIVIL,ZESTRIL) 2.5 MG tablet Take 1 tablet by mouth Daily. 90 tablet 1   • Melatonin 10 MG tablet Take  by mouth.     • Rexulti 0.5 MG tablet Take 1 tablet by mouth Daily.     • sertraline (ZOLOFT) 50 MG tablet Take 50 mg by mouth Daily.     • albuterol sulfate  (90 Base) MCG/ACT inhaler Inhale 2 puffs Every 4 (Four) Hours As Needed for Wheezing. 8 g 0   • ipratropium-albuterol (DUO-NEB) 0.5-2.5 mg/3 ml nebulizer Take 3 mL  by nebulization Every 4 (Four) Hours As Needed for Wheezing. 360 mL 0     No facility-administered medications prior to visit.       No opioid medication identified on active medication list. I have reviewed chart for other potential  high risk medication/s and harmful drug interactions in the elderly.          Aspirin is not on active medication list.  Aspirin use is not indicated based on review of current medical condition/s. Risk of harm outweighs potential benefits.  .    Patient Active Problem List   Diagnosis   • Bipolar 1 disorder, depressed, moderate (HCC)   • Lithium adverse reaction   • Vitamin D deficiency disease   • Screening for breast cancer   • Encounter for screening mammogram for malignant neoplasm of breast    • Chest wall pain   • Type 2 diabetes mellitus without complication, without long-term current use of insulin ([facility])   • Morbid obesity ([facility])   • Transition of care performed with sharing of clinical summary   • Anxiety   • Asthma   • Bipolar disorder ([facility])   • Dermatitis   • Extrapyramidal and movement disorder   • Hyperlipidemia   • Posttraumatic stress disorder   • Encounter for general adult medical examination without abnormal findings   • Atypical chest pain   • Abnormal EKG   • Gastroesophageal reflux disease without esophagitis   • Pleurisy   • Diarrhea due to malabsorption   • Acute non-recurrent pansinusitis   • Chest pain   • Unstable angina pectoris ([facility])   • Nabothian cyst   • Coronary artery disease due to calcified coronary lesion   • Encounter for support and coordination of transition of care   • Carbuncle   • Aftercare following joint replacement surgery   • Contusion of right knee   • Primary osteoarthritis   • Hypertension   • Mass of lung   • Spleen enlargement   • Lung nodule < 6cm on CT   • Hospital discharge follow-up   • Dyspnea   • Abnormal laboratory test   • Recurrent UTI   • Dry mouth   • Acute pain of right knee   • GARCIA (dyspnea on exertion)   • Vaginal dryness  "    Advance Care Planning  Advance Directive is on file.  ACP discussion was held with the patient during this visit. Patient has an advance directive in EMR which is still valid.      Objective    Vitals:    02/17/23 0841   BP: 106/68   Pulse: 59   Resp: 16   Temp: 96.9 °F (36.1 °C)   SpO2: 99%   Weight: 107 kg (235 lb)   Height: 162.6 cm (64\")     Estimated body mass index is 40.34 kg/m² as calculated from the following:    Height as of this encounter: 162.6 cm (64\").    Weight as of this encounter: 107 kg (235 lb).    Class 3 Severe Obesity (BMI >=40). Obesity-related health conditions include the following: hypertension and GERD. Obesity is unchanged. BMI is is above average; BMI management plan is completed. We discussed portion control and increasing exercise.      Does the patient have evidence of cognitive impairment? No    Lab Results   Component Value Date    TRIG 89 02/17/2023    HDL 34 (L) 02/17/2023    LDL 72 02/17/2023    VLDL 17 02/17/2023    HGBA1C 6.0 (H) 02/17/2023        HEALTH RISK ASSESSMENT    Smoking Status:  Social History     Tobacco Use   Smoking Status Never   • Passive exposure: Never   Smokeless Tobacco Never     Alcohol Consumption:  Social History     Substance and Sexual Activity   Alcohol Use Yes    Comment: VERY RARE     Fall Risk Screen:    ANNA Fall Risk Assessment was completed, and patient is at MODERATE risk for falls. Assessment completed on:2/17/2023    Depression Screening:  PHQ-2/PHQ-9 Depression Screening 2/17/2023   Little Interest or Pleasure in Doing Things 0-->not at all   Feeling Down, Depressed or Hopeless 0-->not at all   Trouble Falling or Staying Asleep, or Sleeping Too Much -   Feeling Tired or Having Little Energy -   Poor Appetite or Overeating -   Feeling Bad about Yourself - or that You are a Failure or Have Let Yourself or Your Family Down -   Trouble Concentrating on Things, Such as Reading the Newspaper or Watching Television -   Moving or Speaking So " Slowly that Other People Could Have Noticed? Or the Opposite - Being So Fidgety -   Thoughts that You Would be Better Off Dead or of Hurting Yourself in Some Way -   PHQ-9: Brief Depression Severity Measure Score 0   If You Checked Off Any Problems, How Difficult Have These Problems Made It For You to Do Your Work, Take Care of Things at Home, or Get Along with Other People? -       Health Habits and Functional and Cognitive Screening:  Functional & Cognitive Status 2/17/2023   Do you have difficulty preparing food and eating? No   Do you have difficulty bathing yourself, getting dressed or grooming yourself? No   Do you have difficulty using the toilet? No   Do you have difficulty moving around from place to place? No   Do you have trouble with steps or getting out of a bed or a chair? Yes   Current Diet Well Balanced Diet        Current Diet Comment -   Dental Exam Up to date   Eye Exam Up to date   Exercise (times per week) 0 times per week   Current Exercises Include No Regular Exercise   Current Exercise Activities Include -   Do you need help using the phone?  No   Are you deaf or do you have serious difficulty hearing?  No   Do you need help with transportation? No   Do you need help shopping? No   Do you need help preparing meals?  No   Do you need help with housework?  No   Do you need help with laundry? No   Do you need help taking your medications? No   Do you need help managing money? No   Do you ever drive or ride in a car without wearing a seat belt? No   Have you felt unusual stress, anger or loneliness in the last month? No   Who do you live with? Spouse   If you need help, do you have trouble finding someone available to you? No   Have you been bothered in the last four weeks by sexual problems? -   Do you have difficulty concentrating, remembering or making decisions? No       Age-appropriate Screening Schedule:  Refer to the list below for future screening recommendations based on patient's age,  sex and/or medical conditions. Orders for these recommended tests are listed in the plan section. The patient has been provided with a written plan.    Health Maintenance   Topic Date Due   • TDAP/TD VACCINES (1 - Tdap) Never done   • ZOSTER VACCINE (1 of 2) Never done   • DIABETIC EYE EXAM  06/01/2022   • DIABETIC FOOT EXAM  08/27/2022   • PAP SMEAR  08/07/2023   • HEMOGLOBIN A1C  08/17/2023   • LIPID PANEL  02/17/2024   • URINE MICROALBUMIN  02/17/2024   • MAMMOGRAM  09/16/2024   • INFLUENZA VACCINE  Completed                CMS Preventative Services Quick Reference  Risk Factors Identified During Encounter  Fall Risk-High or Moderate: Discussed Fall Prevention in the home  The above risks/problems have been discussed with the patient.  Pertinent information has been shared with the patient in the After Visit Summary.  An After Visit Summary and PPPS were made available to the patient.    Follow Up:   Next Medicare Wellness visit to be scheduled in 1 year.       Additional E&M Note during same encounter follows:  Patient has multiple medical problems which are significant and separately identifiable that require additional work above and beyond the Medicare Wellness Visit.      Chief Complaint  Medicare Wellness-subsequent    Subjective        HPI  Tessie Conner is also being seen today for multiple things     The patient presents today for a follow-up.    Medication  The patient was able to switch back on the medicine to the other brand and it did help. She cannot take Tylenol anymore because she has a reaction to Tylenol, itches all over. She states that it was really bad one night, all she takes is naproxen now. She feels good.     Her right lung is the only thing bothering her, having scar tissue there, it is more of a nerve issue. It is more of a stabbing pain and the nerve block did help. She went to [place] for 2 weeks and was fine and ended up taking her nebulizer with her.  She is on gabapentin and  usually takes it at night. She is on tramadol 3 times a day, but she cannot take it 3 times a day because she would not be able to work or do anything; she just takes it at bedtime. She was informed to keep taking it even with the nerve block and has not refilled it.     Work  She quit work in 11/2022. She enjoyed her actual boss she had, but he left, and another misty came in, and they could not see eye to eye. She is going to try to work from home as soon as she gets her internet, and they just moved in.     Insomnia  She is not sleeping better, waking up every 1.5 to 2 hours. Her sleep is awful. She does not feel like she is really thinking when she wakes up. She gets about 1.5 to 2 hours of sleep. She was put on a CPAP machine, but that does not work because she is not able to get to deep sleep. The only time she gets depressed is when she starts having the pain, is angry and she wants to rip her lung. The pain is better. Her vitamin D is always low and is not taking vitamin D.    Diabetes  Her blood sugar runs low at times. She does not eat that much and has been losing quite a bit. She was doing [employer], but then a month later she had all that pain. She could not breathe and her breathing is awful. She cannot blow out a candle and has to snuff it out.  She has a little wheezing and is using the rescue inhaler every day that seems like it helps. She has a cat and a dog, but she does not think she is allergic to them. Her breathing is worse in the morning. She has a nonproductive cough. She was advised to start taking Mucinex DM when she went to the pulmonologist. She is taking it, but does not feel like it has helped too much. She uses DuoNeb a lot.    Health maintenance  She just had a mammogram not too long ago and tries to do her breast exams, not noticing anything abnormal. She denies any discharge. Her bowels are working better than usual. She varicose veins, but not bad. Her knees are good since  "injections. She does not have intercourse.    Objective   Vital Signs:  /68   Pulse 59   Temp 96.9 °F (36.1 °C)   Resp 16   Ht 162.6 cm (64\")   Wt 107 kg (235 lb)   SpO2 99%   BMI 40.34 kg/m²     Physical Exam  Vitals and nursing note reviewed.   Constitutional:       Appearance: Normal appearance. She is well-developed.   HENT:      Head: Normocephalic and atraumatic.      Right Ear: Tympanic membrane normal.      Left Ear: Tympanic membrane normal.      Nose: No rhinorrhea.      Mouth/Throat:      Pharynx: No posterior oropharyngeal erythema.   Eyes:      Pupils: Pupils are equal, round, and reactive to light.   Cardiovascular:      Rate and Rhythm: Normal rate and regular rhythm.      Pulses: Normal pulses.      Heart sounds: Normal heart sounds. No murmur heard.  Pulmonary:      Effort: Pulmonary effort is normal.      Breath sounds: Normal breath sounds.   Chest:   Breasts:     Right: No mass or nipple discharge.      Left: No mass or nipple discharge.   Abdominal:      General: Bowel sounds are normal. There is no distension.      Palpations: Abdomen is soft.   Musculoskeletal:         General: Tenderness present.      Cervical back: Normal range of motion and neck supple.   Skin:     Capillary Refill: Capillary refill takes less than 2 seconds.   Neurological:      Mental Status: She is alert and oriented to person, place, and time.   Psychiatric:         Mood and Affect: Mood normal.         Behavior: Behavior normal.          The following data was reviewed by: Marsha Lopez MD on 02/17/2023:  Common labs    Common Labs 1/9/23 1/9/23 1/20/23 1/20/23 2/17/23 2/17/23 2/17/23 2/17/23    1210 1210 1622 1622 0925 0925 0925 0925   Glucose  79  91    93   BUN  12  9    8   Creatinine  0.79  0.80    0.71   Sodium  140  138    137   Potassium  3.8  4.3    3.8   Chloride  102  102    101   Calcium  9.9  10.1    9.4   Albumin  4.1  4.1    4.1   Total Bilirubin  0.4  0.4    0.4   Alkaline " Phosphatase  195 (A)  173 (A)    126 (A)   AST (SGOT)  20  23    19   ALT (SGPT)  18  16    16   WBC 9.50  9.80        Hemoglobin 12.4  12.1        Hematocrit 38.9  38.5        Platelets 334  341        Total Cholesterol       123    Triglycerides       89    HDL Cholesterol       34 (A)    LDL Cholesterol        72    Hemoglobin A1C     6.0 (A)      Microalbumin, Urine      <1.2     (A) Abnormal value       Comments are available for some flowsheets but are not being displayed.           Data reviewed: Radiologic studies CT           Assessment and Plan   Diagnoses and all orders for this visit:    1. Medicare annual wellness visit, subsequent (Primary)  Comments:  discussed all recommendations  Orders:  -     Lipid Panel  -     Vitamin B12 & Folate  -     Comprehensive Metabolic Panel    2. GARCIA (dyspnea on exertion)  -     Vitamin B12 & Folate    3. Bipolar disorder, in partial remission, most recent episode mixed (HCC)    4. Primary hypertension    5. Elevated glucose  -     MicroAlbumin, Urine, Random - Urine, Clean Catch  -     Hemoglobin A1c    6. Vitamin D deficiency disease  -     Vitamin D,25-Hydroxy    Other orders  -     Cholecalciferol 50 MCG (2000 UT) tablet; Take 1 tablet by mouth Daily.  Dispense: 30 each; Refill: 5  -     ipratropium-albuterol (DUO-NEB) 0.5-2.5 mg/3 ml nebulizer; Take 3 mL by nebulization Every 4 (Four) Hours As Needed for Wheezing.  Dispense: 360 mL; Refill: 0  -     albuterol sulfate  (90 Base) MCG/ACT inhaler; Inhale 2 puffs Every 4 (Four) Hours As Needed for Wheezing.  Dispense: 8 g; Refill: 2  -     Tiotropium Bromide Monohydrate (Spiriva Respimat) 1.25 MCG/ACT aerosol solution inhaler; Inhale 2 puffs Daily.  Dispense: 4 g; Refill: 6    1. Insomnia  - I advised the patient to take melatonin before going to bed.    2. Vitamin D deficiency  - I will prescribe vitamin D3 2000 units daily.    3. Diabetes  - I advised the patient to walk 5 days a week or 2.5 miles twice a  week.    4. Wheezing  - I will prescribe a daily inhaler.       I spent 38 minutes caring for Tessie on this date of service. This time includes time spent by me in the following activities:reviewing tests, obtaining and/or reviewing a separately obtained history, performing a medically appropriate examination and/or evaluation  and counseling and educating the patient/family/caregiver  Follow Up   Return in 6 months (on 8/17/2023), or if symptoms worsen or fail to improve.  Patient was given instructions and counseling regarding her condition or for health maintenance advice. Please see specific information pulled into the AVS if appropriate.     During this visit for their annual exam, we reviewed their personal history, social history and family history.  We went over their medications and all the recommended health maintenence items for their age group. They were given the opportunity to ask questions and discuss other concerns.    Transcribed from ambient dictation for Marsha Lopez MD by Salena Quintero.  02/17/23   11:09 EST    Patient or patient representative verbalized consent to the visit recording.  I have personally performed the services described in this document as transcribed by the above individual, and it is both accurate and complete.  Marsha Lopez MD  2/20/2023  09:26 EST

## 2023-02-18 ENCOUNTER — PATIENT MESSAGE (OUTPATIENT)
Dept: FAMILY MEDICINE CLINIC | Facility: CLINIC | Age: 53
End: 2023-02-18
Payer: MEDICARE

## 2023-02-18 LAB
CENTROMERE B AB SER-ACNC: <0.2 AI (ref 0–0.9)
CHROMATIN AB SERPL-ACNC: <0.2 AI (ref 0–0.9)
DSDNA AB SER-ACNC: 3 IU/ML (ref 0–9)
ENA JO1 AB SER-ACNC: <0.2 AI (ref 0–0.9)
ENA RNP AB SER-ACNC: <0.2 AI (ref 0–0.9)
ENA SCL70 AB SER-ACNC: <0.2 AI (ref 0–0.9)
ENA SM AB SER-ACNC: <0.2 AI (ref 0–0.9)
ENA SM+RNP AB SER-ACNC: <0.2 AI (ref 0–0.9)
ENA SS-A AB SER-ACNC: <0.2 AI (ref 0–0.9)
ENA SS-B AB SER-ACNC: <0.2 AI (ref 0–0.9)
Lab: NORMAL
RIBOSOMAL P AB SER-ACNC: <0.2 AI (ref 0–0.9)

## 2023-02-20 LAB
EBV NA IGG SER IA-ACNC: <18 U/ML (ref 0–17.9)
EBV VCA IGG SER IA-ACNC: >600 U/ML (ref 0–17.9)
EBV VCA IGM SER IA-ACNC: 67.8 U/ML (ref 0–35.9)
SERVICE CMNT-IMP: ABNORMAL

## 2023-02-21 ENCOUNTER — TELEPHONE (OUTPATIENT)
Dept: FAMILY MEDICINE CLINIC | Facility: CLINIC | Age: 53
End: 2023-02-21
Payer: MEDICARE

## 2023-02-21 NOTE — TELEPHONE ENCOUNTER
Caller: Dalila Tessie GREG    Relationship: Self    Best call back number: [phone]    Caller requesting test results: PATIENT     What test was performed: EBV    When was the test performed: 2/18/23    Where was the test performed: IN OFFICE    Additional notes: PATIENT STATES THAT SHE GOT A TEST RESULT FOR AN EBV THAT WAS ORDERED BY EVETTE GOEL. PATIENT IS CONFUSED AND WOULD LIKE SOME CLARIFICATION.

## 2023-02-21 NOTE — TELEPHONE ENCOUNTER
So it looks like this patient had some basic labs done on 2/17 by JASON, but also there were labs entered by Jluis on the same day, which she only saw JASON and as far as I can tell has not seen Jluis. I'm wondering if these were enter incorrectly?

## 2023-02-21 NOTE — TELEPHONE ENCOUNTER
"Hub to share:  Left patient detailed voicemail with the following information:  These were done a long time ago when I saw her for splenomegaly. They must have not been done and then when she got labs they pulled them all.  No changes continue regular follow up and with her specialists.  \"  "

## 2023-02-21 NOTE — PROGRESS NOTES
Subjective   Tessie Conner is a 53 y.o. female is here for follow up for mid and upper back pain. Patient was last seen on 1/31/2023 for intercostal nerve block on right side with injection.     On last visit: Started biomed cream- didn't help; given Ztlido patches - no help    Right mid/upper back pain is 0/10 on VAS, at maximum is 3/10. Pain is stabbing, sharp in nature. Pain is referred under R breast, R lateral ribs, R shoulder. The pain is intermittent. The pain is improved by nothing. The pain is worse with nothing in particular, but significantly worse at night time, deep breathing. She can barely sleep for 2-3 hours. She has tried muscle relaxant, gabapentin, heat, cold without significant relief.    Previous Injection:   1/13/2023- R intercostal nerve block-5-9- 90% Pain relief. Functional improvement.       Hx: Referred by Eileen Massey, ALEXIS, BECKY for right chest pain and possible intercostal nerve block. 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 She had no pain after surgery up until 6/22.  She has tried gabapentin and Valium without significant pain relief. Scheduled to see Cardiologist for abnormalities in ECG as per patient.     PHQ-9- 19                     SOAPP- 12  [place] back disability scale -27     PMH:   DM-2, HTN,  anxiety, depression, migraines, ADY, history of spinal headache, bipolar 1, frequent UTI, history of histoplasmosis causing granuloma and s/p right upper lobe wedge resection-2/2022, left rotator cuff repair     Current Medications:   Tramadol PRN  Gabapentin 300 mg - has only taken 2 doses so far  Lidoderm 5% patch - insurance didn't cover it.   Aspirin 81 mg  Melatonin      Past Medications:  Tramadol - short prescription  Oxycodone- short prescription  Temazepam - for sleep  Valium 2 mg- took it one time.      Past Modalities:  TENS:                                                                           no                                                  Physical Therapy Within The Last 6 Months              no  Psychotherapy                                                            no  Massage Therapy                                                       no     Patient Complains Of:  Uro-Fecal Incontinence          no  Weight Gain/Loss                   no  Fever/Chills                             no  Weakness                               no         Current Outpatient Medications:   •  albuterol sulfate  (90 Base) MCG/ACT inhaler, Inhale 2 puffs Every 4 (Four) Hours As Needed for Wheezing., Disp: 8 g, Rfl: 2  •  atorvastatin (LIPITOR) 20 MG tablet, TAKE 1 TABLET BY MOUTH EVERY DAY, Disp: 90 tablet, Rfl: 1  •  Cholecalciferol 50 MCG (2000 UT) tablet, Take 1 tablet by mouth Daily., Disp: 30 each, Rfl: 5  •  EPINEPHrine (EPIPEN) 0.3 MG/0.3ML solution auto-injector injection, , Disp: , Rfl:   •  [START ON 3/15/2023] gabapentin (NEURONTIN) 300 MG capsule, Take 1 capsule by mouth every night at bedtime for 90 days., Disp: 90 capsule, Rfl: 0  •  ipratropium-albuterol (DUO-NEB) 0.5-2.5 mg/3 ml nebulizer, Take 3 mL by nebulization Every 4 (Four) Hours As Needed for Wheezing., Disp: 360 mL, Rfl: 0  •  lidocaine (LIDODERM) 5 %, Place 2 patches on the skin as directed by provider Daily. Remove & Discard patch within 12 hours or as directed by MD, Disp: 5 patch, Rfl: 0  •  lisinopril (PRINIVIL,ZESTRIL) 2.5 MG tablet, Take 1 tablet by mouth Daily., Disp: 90 tablet, Rfl: 1  •  Melatonin 10 MG tablet, Take  by mouth., Disp: , Rfl:   •  Rexulti 0.5 MG tablet, Take 1 tablet by mouth Daily., Disp: , Rfl:   •  sertraline (ZOLOFT) 50 MG tablet, Take 50 mg by mouth Daily., Disp: , Rfl:   •  Tiotropium Bromide Monohydrate (Spiriva Respimat) 1.25 MCG/ACT aerosol solution inhaler, Inhale 2 puffs Daily., Disp: 4 g, Rfl: 6  •  traMADol (ULTRAM) 50 MG tablet, Take 1 tablet by mouth 3 (Three) Times a Day As Needed for Moderate Pain  for up to 7 days., Disp: 21 tablet, Rfl: 0    The following portions of the patient's history were reviewed and updated as appropriate: allergies, current medications, past family history, past medical history, past social history, past surgical history, and problem list.      REVIEW OF PERTINENT MEDICAL DATA    Past Medical History:   Diagnosis Date   • Abnormal ECG    • Anxiety    • Arthritis    • Asthma 2020   • At risk for sleep apnea    • Bipolar 1 disorder (HCC)    • Cholelithiasis 10/2022   • Coronary artery disease    • Depression    • Diabetes mellitus (HCC)    • Dyspnea on minimal exertion    • Frequent UTI    • Heart murmur     FROM CHILDHOOD   • History of anemia     POST PREGNANCY   • Hyperlipidemia 10/04/2016   • Hypertension    • Mass of upper lobe of right lung    • Migraines    • Obesity    • PONV (postoperative nausea and vomiting)    • Shoulder pain, right    • Sleep apnea 22   • Spinal headache    • Visual impairment     WEARS GLASSES     Past Surgical History:   Procedure Laterality Date   • APPENDECTOMY     • CARDIAC CATHETERIZATION N/A 2020    Procedure: Left Heart Cath possible PCI, atherectomy, hemodynamic support;  Surgeon: Thomas Zamora MD;  Location: [facility] INVAS[facility] LOC[facility];  Service: Cardiology;  Laterality: N/A;   • CARDIAC CATHETERIZATION  2020   •  SECTION     • FOOT/TOE TENDON REPAIR Left    • HYSTERECTOMY     • LUNG BIOPSY Right 2020   • LUNG LOBECTOMY Right 2022    pt had partial Right lobectomy and nodule removal   • ROTATOR CUFF REPAIR Left    • THORACOSCOPY VIDEO ASSISTED WITH LOBECTOMY Right 2022    Procedure: BRONCHOSCOPY, THORACOSCOPY VIDEO ASSISTED wedge resection X2, INTERCOSTAL NERVE BLOCK;  Surgeon: Ayla Carroll MD;  Location: [facility] OR;  Service: Thoracic;  Laterality: Right;   • TUBAL ABDOMINAL LIGATION       Family History   Problem Relation Age of Onset   • Arthritis  Mother    • Cancer Mother    • Depression Mother    • Diabetes Mother    • Early death Mother    • Mental illness Mother    • Anxiety disorder Mother    • Alcohol abuse Father    • Diabetes Father    • Early death Father    • Heart disease Father    • Hyperlipidemia Father    • Hypertension Father         Father   • Vision loss Father    • Heart attack Father    • Heart disease Sister    • Heart disease Brother    • Hypertension Brother    • Cancer Maternal Grandmother    • Drug abuse Brother    • Hypertension Brother    • Heart disease Brother    • Drug abuse Sister    • Heart attack Sister    • Hypertension Sister         Sister   • Heart disease Sister    • Heart attack Brother    • Malig Hyperthermia Neg Hx      Social History     Socioeconomic History   • Marital status:    Tobacco Use   • Smoking status: Never     Passive exposure: Never   • Smokeless tobacco: Never   Vaping Use   • Vaping Use: Never used   Substance and Sexual Activity   • Alcohol use: Yes     Comment: VERY RARE   • Drug use: No   • Sexual activity: Not Currently     Partners: Male     Birth control/protection: None, Hysterectomy         Review of Systems   Musculoskeletal: Positive for arthralgias and back pain.         Vitals:    02/22/23 0945   BP: 115/61   Pulse: 51   Resp: 16   SpO2: 96%   PainSc: 0-No pain         Objective   Physical Exam  Chest:       Musculoskeletal:         General: Tenderness present.        Arms:            Imaging Reviewed:  CT chest without contrast  - Postsurgical changes of right again noted with suture line at the right apex  - Increased subpleural opacity along with anterior chest wall on the right middle lobe.  Atelectasis versus pneumonia.  - Stable noncalcified nodules in right middle lobe.     MRI lumbar spine without contrast-6/16/2017  L3-4-mild facet arthropathy with left paracentral disc protrusion causing moderate left neural foraminal narrowing.  L4-5-moderate bilateral facet arthropathy  with small posterior disc endplate complex causing mild narrowing of the neural foramina.  L4 S1-mild to moderate bilateral facet arthropathy.         Assessment:    1. Intercostal neuralgia    2. Chest pain on breathing    3. Medication management            Plan:   1.  Defer UDS for now.  2. Continue Gabapentin 300 mg qhs (3 months supply - 6/14/23). Side effects discussed with the patient including but not limited to somnolence, dizziness, ataxia, headache, fatigue, blurred vision, cold or flu-like symptoms,delusions, hoarseness, lack or loss of strength, lower back or side pain, swelling of the hands, feet, or lower legs trembling or shaking. Patient understands and agrees with the plan.  3. ONLY Take Tramadol if in pain 50 mg qhs PRN. (#21 tabs - 2/22/23). Discussed with the patient regarding long-term side effects of opioids including but not limited to opioid induced hormonal suppression, hyperalgesia, fatigue, weight gain, possible opioid induced altered immune system, addiction, tolerance, dependence, risk of hearing loss and elevated risk of myocardial infarction. Proper use and potential life threatening side effects of over use discussed with patient. Patient states understanding of their use and risks.  4. Excellent relief with intercostal nerve block. Will repeat as needed.     RTC in 3 months.     Shane Medina DO  Pain Management   [facility]         INSPECT REPORT    As part of the patient's treatment plan, I may be prescribing controlled substances. The patient has been made aware of appropriate use of such medications, including potential risk of somnolence, limited ability to drive and/or work safely, and the potential for dependence or overdose. It has also been made clear that these medications are for use by this patient only, without concomitant use of alcohol or other substances unless prescribed.     Patient has completed prescribing agreement detailing terms of continued prescribing of  controlled substances, including monitoring INSPECT reports, urine drug screening, and pill counts if necessary. The patient is aware that inappropriate use will results in cessation of prescribing such medications.    INSPECT report has been reviewed and scanned into the patient's chart.

## 2023-02-22 ENCOUNTER — OFFICE VISIT (OUTPATIENT)
Dept: PAIN MEDICINE | Facility: CLINIC | Age: 53
End: 2023-02-22
Payer: MEDICARE

## 2023-02-22 VITALS
DIASTOLIC BLOOD PRESSURE: 61 MMHG | RESPIRATION RATE: 16 BRPM | HEART RATE: 51 BPM | OXYGEN SATURATION: 96 % | SYSTOLIC BLOOD PRESSURE: 115 MMHG

## 2023-02-22 DIAGNOSIS — Z79.899 MEDICATION MANAGEMENT: ICD-10-CM

## 2023-02-22 DIAGNOSIS — G58.8 INTERCOSTAL NEURALGIA: Primary | ICD-10-CM

## 2023-02-22 DIAGNOSIS — R07.1 CHEST PAIN ON BREATHING: ICD-10-CM

## 2023-02-22 LAB
H CAPSUL MYC AB TITR SER CF: ABNORMAL {TITER}
H CAPSUL YST AB TITR SER CF: ABNORMAL {TITER}

## 2023-02-22 PROCEDURE — 99214 OFFICE O/P EST MOD 30 MIN: CPT | Performed by: STUDENT IN AN ORGANIZED HEALTH CARE EDUCATION/TRAINING PROGRAM

## 2023-02-22 RX ORDER — GABAPENTIN 300 MG/1
300 CAPSULE ORAL
Qty: 90 CAPSULE | Refills: 0 | Status: SHIPPED | OUTPATIENT
Start: 2023-03-15 | End: 2023-03-09

## 2023-02-22 RX ORDER — TRAMADOL HYDROCHLORIDE 50 MG/1
50 TABLET ORAL 3 TIMES DAILY PRN
Qty: 21 TABLET | Refills: 0 | Status: SHIPPED | OUTPATIENT
Start: 2023-02-22 | End: 2023-03-01

## 2023-02-24 ENCOUNTER — TELEPHONE (OUTPATIENT)
Dept: FAMILY MEDICINE CLINIC | Facility: CLINIC | Age: 53
End: 2023-02-24
Payer: MEDICARE

## 2023-02-24 NOTE — TELEPHONE ENCOUNTER
Caller: Tessie Conner    Relationship: Self    Best call back number: [phone]    What test was performed: LABS    When was the test performed: 2/17/2023    Additional notes:PATIENT IS CONCERNED ABOUT HER Histoplasma capsulatum Antibodies RESULTS

## 2023-02-27 ENCOUNTER — TELEPHONE (OUTPATIENT)
Dept: FAMILY MEDICINE CLINIC | Facility: CLINIC | Age: 53
End: 2023-02-27
Payer: MEDICARE

## 2023-02-27 NOTE — TELEPHONE ENCOUNTER
Please schedule this patient for a telephone visit to discuss this more at depth with Dr. Lpoez. She can also do an in office visit if she chooses to.

## 2023-02-27 NOTE — TELEPHONE ENCOUNTER
Caller: Tessie Conner    Relationship: Self    Best call back number: [phone]    What test was performed: HYSTOPLASMOSIS, PATIENT STATES THAT SHE HAS VIEWED THE RESULTS AND IT CAME BACK HIGH. PATIENT IS CONCERNED AND WOULD LIKE FOR DR. JARAMILLO TO GIVE HER A CALL REGARDING THIS AND NOT A TEXT.    When was the test performed: 02/17    Where was the test performed: OFFICE     Additional notes: PLEASE ADVISE PATIENT

## 2023-02-28 ENCOUNTER — PATIENT MESSAGE (OUTPATIENT)
Dept: FAMILY MEDICINE CLINIC | Facility: CLINIC | Age: 53
End: 2023-02-28
Payer: MEDICARE

## 2023-02-28 DIAGNOSIS — R16.1 SPLENOMEGALY: Primary | ICD-10-CM

## 2023-02-28 RX ORDER — BENZONATATE 100 MG/1
100 CAPSULE ORAL 3 TIMES DAILY PRN
Qty: 42 CAPSULE | Refills: 0 | Status: SHIPPED | OUTPATIENT
Start: 2023-02-28 | End: 2023-03-27

## 2023-02-28 NOTE — TELEPHONE ENCOUNTER
From: Tessie Conner  To: Marsha Lopez  Sent: 2/28/2023 5:02 AM EST  Subject: histoplasmosis test high    'I have alot of concern about the histoplasmosis test coming back high. Should I be on an antifungal? Is the histoplasmosis still in my blood. I did have a urine test that came back negative for histoplasmosis but their was complications with the testing. Went to a lab that didn't have a way to test for histoplasmosis so they sent it to another lab. Which is more accurate blood or urine? If the blood test is correct what needs to be done? Please let me know.

## 2023-03-03 ENCOUNTER — OFFICE VISIT (OUTPATIENT)
Dept: FAMILY MEDICINE CLINIC | Facility: CLINIC | Age: 53
End: 2023-03-03
Payer: MEDICARE

## 2023-03-03 VITALS
HEIGHT: 64 IN | HEART RATE: 70 BPM | SYSTOLIC BLOOD PRESSURE: 102 MMHG | BODY MASS INDEX: 39.95 KG/M2 | OXYGEN SATURATION: 96 % | DIASTOLIC BLOOD PRESSURE: 76 MMHG | WEIGHT: 234 LBS

## 2023-03-03 DIAGNOSIS — B39.9 HISTOPLASMOSIS: ICD-10-CM

## 2023-03-03 DIAGNOSIS — I10 PRIMARY HYPERTENSION: Primary | ICD-10-CM

## 2023-03-03 DIAGNOSIS — D17.23 LIPOMA OF RIGHT LOWER EXTREMITY: ICD-10-CM

## 2023-03-03 PROCEDURE — 99214 OFFICE O/P EST MOD 30 MIN: CPT | Performed by: INTERNAL MEDICINE

## 2023-03-03 PROCEDURE — 3044F HG A1C LEVEL LT 7.0%: CPT | Performed by: INTERNAL MEDICINE

## 2023-03-03 RX ORDER — ITRACONAZOLE 100 MG/1
100 CAPSULE ORAL 3 TIMES DAILY
COMMUNITY
End: 2023-03-21 | Stop reason: HOSPADM

## 2023-03-03 NOTE — PROGRESS NOTES
Rooming Tab(CC,VS,Pt Hx,Fall Screen)  Chief Complaint   Patient presents with   • Leg Pain       Subjective      The patient presents today for a follow-up.    Lipoma  The patient has a knot on her right lower extremity. It feels like she is stuck on plastic and pulls and it is painful more or less in the mornings. It swells sometimes worse.    Leg cramps  The patient has the onset of slight leg cramps. Shee drinks about 6 bottles of water a day. She has lowered down to 1 soda a day and she has been drinking more water.    Cough  The patient went to the pulmonologist and he thought the haziness could be part of the histoplasmosis and he told her that he does not think anything is going to improve because it seems like in the morning she coughs so much that she is vomiting. Every morning at 4:00 AM, it is a constant cough. She was using the nebulizer in the mornings to just try to see if that improves some of the breathing. She just started the histoplasmosis medication on 03/02/2023 and her eating has improved lately. She can hardly eat sugar at all anymore. She has a weight loss of 4 pounds.    Hypertension  The patient has not been taking the lisinopril and her blood pressure has been running good intermittently. When she is in pain, her blood pressure drops out on her.    I have reviewed and updated her medications, medical history and problem list during today's office visit.     Patient Care Team:  Marsha Lopez MD as PCP - General (Internal Medicine)  Thomas Zamora MD as Consulting Physician (Cardiology)  Ayla Carroll MD as Surgeon (Thoracic Surgery)  Juanita Shen RN as Ambulatory  (Population Health)    Problem List Tab  Medications Tab  Synopsis Tab  Chart Review Tab  Care Everywhere Tab  Immunizations Tab  Patient History Tab    Social History     Tobacco Use   • Smoking status: Never     Passive exposure: Never   • Smokeless tobacco: Never   Substance Use Topics   •  "Alcohol use: Yes     Comment: VERY RARE       Review of Systems    Objective     Rooming Tab(CC,VS,Pt Hx,Fall Screen)  /76   Pulse 70   Ht 162.6 cm (64\")   Wt 106 kg (234 lb)   LMP  (LMP Unknown)   SpO2 96%   BMI 40.17 kg/m²     Body mass index is 40.17 kg/m².    Physical Exam  Vitals and nursing note reviewed.   Constitutional:       Appearance: Normal appearance. She is well-developed.   HENT:      Head: Normocephalic and atraumatic.      Right Ear: Tympanic membrane normal.      Left Ear: Tympanic membrane normal.      Nose: No rhinorrhea.      Mouth/Throat:      Pharynx: No posterior oropharyngeal erythema.   Eyes:      Pupils: Pupils are equal, round, and reactive to light.   Cardiovascular:      Rate and Rhythm: Normal rate and regular rhythm.      Pulses: Normal pulses.      Heart sounds: Normal heart sounds. No murmur heard.  Pulmonary:      Effort: Pulmonary effort is normal.      Breath sounds: Normal breath sounds.   Abdominal:      General: Bowel sounds are normal. There is no distension.      Palpations: Abdomen is soft.   Musculoskeletal:         General: No tenderness.      Cervical back: Normal range of motion and neck supple.   Skin:     Capillary Refill: Capillary refill takes less than 2 seconds.      Comments: Palpable mobile fatty tumor   Neurological:      Mental Status: She is alert and oriented to person, place, and time.   Psychiatric:         Mood and Affect: Mood normal.         Behavior: Behavior normal.          Statin Choice Calculator  Data Reviewed:         The data below has been reviewed by Marsha Lopez MD on 03/03/2023.      Lab Results   Component Value Date    BUN 8 02/17/2023    CREATININE 0.71 02/17/2023     02/17/2023    K 3.8 02/17/2023     02/17/2023    CALCIUM 9.4 02/17/2023    ALBUMIN 4.1 02/17/2023    BILITOT 0.4 02/17/2023    ALKPHOS 126 (H) 02/17/2023    AST 19 02/17/2023    ALT 16 02/17/2023    TRIG 89 02/17/2023    HDL 34 (L) " 02/17/2023    VLDL 17 02/17/2023    LDL 72 02/17/2023    LDLHDL 2.09 02/17/2023    MICROALBUR <1.2 02/17/2023    WBC 9.80 01/20/2023    RBC 4.51 01/20/2023    HCT 38.5 01/20/2023    MCV 85.4 01/20/2023    MCH 26.9 01/20/2023    SYOG09DS 17.0 (L) 02/17/2023      Assessment & Plan   Order Review Tab  Health Maintenance Tab  Patient Plan/Order Tab  Diagnoses and all orders for this visit:    1. Primary hypertension (Primary)    2. Histoplasmosis    3. Lipoma of right lower extremity      1. Lipoma  - I advised the patient to work on weight reduction and leg exercises to help the muscle.    2. Leg cramps  - I advised the patient to put a pillow between her knees and that will help the cramps slightly.  - I advised the patient to drink tonic water.    3. Hyperlipidemia  - I advised the patient to stop her atorvastatin for at least 1 month.  - Once she is off the atorvastatin, then we might need to go back on the lower dose of the cholesterol medicine.    Wrapup Tab  Return if symptoms worsen or fail to improve.       They were informed of the diagnosis and treatment plan and directed to f/u for any further problems or concerns.      Transcribed from ambient dictation for Marsha Lopez MD by Carleen Trinidad.  03/03/23   15:14 EST    Patient or patient representative verbalized consent to the visit recording.  I have personally performed the services described in this document as transcribed by the above individual, and it is both accurate and complete.  Marsha Lopez MD  3/6/2023  17:02 EST

## 2023-03-09 DIAGNOSIS — G58.8 INTERCOSTAL NEURALGIA: ICD-10-CM

## 2023-03-09 RX ORDER — GABAPENTIN 300 MG/1
300 CAPSULE ORAL
Qty: 90 CAPSULE | Refills: 0 | Status: SHIPPED | OUTPATIENT
Start: 2023-03-09 | End: 2023-06-07

## 2023-03-13 ENCOUNTER — TELEPHONE (OUTPATIENT)
Dept: FAMILY MEDICINE CLINIC | Facility: CLINIC | Age: 53
End: 2023-03-13
Payer: MEDICARE

## 2023-03-13 DIAGNOSIS — D17.23 LIPOMA OF RIGHT LOWER EXTREMITY: Primary | ICD-10-CM

## 2023-03-13 NOTE — TELEPHONE ENCOUNTER
Caller: Tessie Conner    Relationship: Self    Best call back number: [phone]    What is the best time to reach you: ANYTIME     Who are you requesting to speak with (clinical staff, provider,  specific staff member): DR. JARAMILLO     What was the call regarding: PATIENT STATES SHE HAS FATTY DEPOSITS IN HER RIGHT LEG AND THEY CAUSE HER A LOT OF PAIN. PATIENT STATES SHE SPOKE TO DR. JARAMILLO ABOUT THE POSSIBILITY OF SURGERY AND WOULD LIKE TO KNOW WHAT TYPE OF SURGEON SHE SHOULD GO TO OR IF THERE IS ANYONE DR. JARAMILLO RECOMMENDS.     Do you require a callback: YES

## 2023-03-16 RX ORDER — IPRATROPIUM BROMIDE AND ALBUTEROL SULFATE 2.5; .5 MG/3ML; MG/3ML
SOLUTION RESPIRATORY (INHALATION)
Qty: 360 ML | Refills: 0 | Status: SHIPPED | OUTPATIENT
Start: 2023-03-16

## 2023-03-17 ENCOUNTER — APPOINTMENT (OUTPATIENT)
Dept: GENERAL RADIOLOGY | Facility: HOSPITAL | Age: 53
End: 2023-03-17
Payer: MEDICARE

## 2023-03-17 ENCOUNTER — HOSPITAL ENCOUNTER (OUTPATIENT)
Facility: HOSPITAL | Age: 53
Discharge: HOME | End: 2023-03-17
Attending: EMERGENCY MEDICINE | Admitting: EMERGENCY MEDICINE
Payer: MEDICARE

## 2023-03-17 VITALS
DIASTOLIC BLOOD PRESSURE: 64 MMHG | OXYGEN SATURATION: 96 % | TEMPERATURE: 97.4 F | BODY MASS INDEX: 39.27 KG/M2 | SYSTOLIC BLOOD PRESSURE: 126 MMHG | WEIGHT: 230 LBS | HEART RATE: 58 BPM | HEIGHT: 64 IN | RESPIRATION RATE: 18 BRPM

## 2023-03-17 DIAGNOSIS — U07.1 COVID-19: Primary | ICD-10-CM

## 2023-03-17 LAB
FLUAV SUBTYP SPEC NAA+PROBE: NOT DETECTED
FLUBV RNA ISLT QL NAA+PROBE: NOT DETECTED
SARS-COV-2 RNA RESP QL NAA+PROBE: DETECTED

## 2023-03-17 PROCEDURE — 99213 OFFICE O/P EST LOW 20 MIN: CPT | Performed by: EMERGENCY MEDICINE

## 2023-03-17 PROCEDURE — 87636 SARSCOV2 & INF A&B AMP PRB: CPT | Performed by: EMERGENCY MEDICINE

## 2023-03-17 PROCEDURE — C9803 HOPD COVID-19 SPEC COLLECT: HCPCS

## 2023-03-17 PROCEDURE — G0463 HOSPITAL OUTPT CLINIC VISIT: HCPCS | Performed by: EMERGENCY MEDICINE

## 2023-03-17 PROCEDURE — 71046 X-RAY EXAM CHEST 2 VIEWS: CPT

## 2023-03-17 RX ORDER — DEXTROMETHORPHAN HYDROBROMIDE AND PROMETHAZINE HYDROCHLORIDE 15; 6.25 MG/5ML; MG/5ML
5 SYRUP ORAL EVERY 6 HOURS PRN
Qty: 240 ML | Refills: 0 | Status: SHIPPED | OUTPATIENT
Start: 2023-03-17

## 2023-03-17 NOTE — DISCHARGE INSTRUCTIONS
You have tested positive for COVID-19 and have been given a prescription for antiviral.  You do not need to make changes in any of your medications while you are taking LAGEVRIO.   You have also been given a prescription cough medication for your symptoms.  If you begin to experience shortness of breath, inability to care for yourself, chest pain, dizziness with weakness, please seek care in the emergency department for possible admission.    Although you are being discharged from the ED today, I encourage you to return for worsening symptoms. Things can, and do, change such that treatment at home with medication may not be adequate. Specifically I recommend returning for chest pain or discomfort, difficulty breathing, persistent vomiting or difficulty holding down liquids or medications, fever > 102.0 F,  or any other worsening or alarming symptoms.      Rest. Drink plenty of fluids.  Follow up with PCP or provider listed for further evaluation and management.  Follow up with primary care provider for further management.  Take all medications as prescribed.

## 2023-03-17 NOTE — FSED PROVIDER NOTE
EMERGENCY DEPARTMENT ENCOUNTER      Room Number: 09/09    History is provided by the patient, no translation services needed    HPI:      Narrative: Pt is a 53 y.o. female who presents complaining of approximately 2 days of cough, fatigue, headache.  Patient states onset was abrupt and has continued.  Patient states she is a history of prior histoplasmosis with right lung resection and is concerned that she may have pneumonia.  She has been taking Tessalon Perles with no resolution in her symptoms.  She states she has chronic shortness of breath this has not changed.  She denies chest pain, weakness, dizziness.  She is alert, oriented, no acute distress.      PMD: Marsha Lopez MD    REVIEW OF SYSTEMS  Review of Systems   Constitutional: Positive for fatigue. Negative for activity change, appetite change, chills, diaphoresis, fever and unexpected weight change.   HENT: Negative for congestion, facial swelling, postnasal drip, rhinorrhea, sinus pressure, sinus pain, sneezing and sore throat.    Eyes: Negative for itching and visual disturbance.   Respiratory: Positive for cough and shortness of breath (Chronic). Negative for chest tightness.    Cardiovascular: Negative for chest pain, palpitations and leg swelling.   Gastrointestinal: Negative for diarrhea, nausea and vomiting.   Genitourinary: Negative.    Musculoskeletal: Negative for arthralgias and myalgias.   Skin: Negative for pallor and rash.   Allergic/Immunologic: Negative.    Neurological: Negative for dizziness, weakness, light-headedness and headaches.   Hematological: Negative.    Psychiatric/Behavioral: Negative.          PAST MEDICAL HISTORY  Active Ambulatory Problems     Diagnosis Date Noted   • Bipolar 1 disorder, depressed, moderate (HCC) 11/20/2019   • Lithium adverse reaction 11/20/2019   • Vitamin D deficiency disease 11/20/2019   • Screening for breast cancer 11/24/2019   • Encounter for screening mammogram for malignant neoplasm of  breast  11/24/2019   • Chest wall pain 04/03/2020   • Type 2 diabetes mellitus without complication, without long-term current use of insulin (Formerly [facility]) 04/03/2020   • Morbid obesity (Formerly [facility]) 04/03/2020   • Transition of care performed with sharing of clinical summary 04/16/2020   • Anxiety 10/23/2013   • Asthma 04/17/2020   • Bipolar disorder (Formerly [facility]) 02/17/2012   • Dermatitis 10/23/2013   • Extrapyramidal and movement disorder 08/24/2015   • Hyperlipidemia 10/04/2016   • Posttraumatic stress disorder 06/27/2014   • Encounter for general adult medical examination without abnormal findings 08/28/2013   • Atypical chest pain 05/24/2020   • Abnormal EKG 05/24/2020   • Gastroesophageal reflux disease without esophagitis 07/15/2020   • Pleurisy 07/30/2020   • Diarrhea due to malabsorption 07/30/2020   • Acute non-recurrent pansinusitis 09/03/2020   • Chest pain 09/24/2020   • Unstable angina pectoris (Formerly [facility]) 09/24/2020   • Nabothian cyst 10/17/2020   • Coronary artery disease due to calcified coronary lesion 10/25/2020   • Encounter for support and coordination of transition of care 10/25/2020   • Carbuncle 11/03/2020   • Aftercare following joint replacement surgery 08/27/2021   • Contusion of right knee 08/27/2021   • Primary osteoarthritis 08/27/2021   • Hypertension 11/12/2021   • Mass of lung 11/30/2021   • Spleen enlargement 12/05/2021   • Lung nodule < 6cm on CT 01/13/2022   • Hospital discharge follow-up 02/14/2022   • Dyspnea 04/05/2022   • Abnormal laboratory test 08/03/2022   • Recurrent UTI 09/30/2022   • Dry mouth 11/01/2022   • Acute pain of right knee 11/08/2022   • GARCIA (dyspnea on exertion) 11/08/2022   • Vaginal dryness 11/22/2022   • Histoplasmosis 03/03/2023   • Lipoma of right lower extremity 03/03/2023     Resolved Ambulatory Problems     Diagnosis Date Noted   • No Resolved Ambulatory Problems     Past Medical History:   Diagnosis Date   • Abnormal ECG    • Arthritis    • At risk for sleep apnea    • Bipolar 1  disorder (HCC)    • Cholelithiasis 10/2022   • Coronary artery disease    • Depression    • Diabetes mellitus (HCC)    • Dyspnea on minimal exertion    • Frequent UTI    • Heart murmur    • History of anemia    • Mass of upper lobe of right lung    • Migraines    • Obesity    • PONV (postoperative nausea and vomiting)    • Shoulder pain, right    • Sleep apnea 22   • Spinal headache    • Visual impairment        PAST SURGICAL HISTORY  Past Surgical History:   Procedure Laterality Date   • APPENDECTOMY     • CARDIAC CATHETERIZATION N/A 2020    Procedure: Left Heart Cath possible PCI, atherectomy, hemodynamic support;  Surgeon: Thomas Zamora MD;  Location: [facility] INVASIVE LOCATION;  Service: Cardiology;  Laterality: N/A;   • CARDIAC CATHETERIZATION  2020   •  SECTION     • FOOT/TOE TENDON REPAIR Left    • HYSTERECTOMY     • LUNG BIOPSY Right 2020   • LUNG LOBECTOMY Right 2022    pt had partial Right lobectomy and nodule removal   • ROTATOR CUFF REPAIR Left    • THORACOSCOPY VIDEO ASSISTED WITH LOBECTOMY Right 2022    Procedure: BRONCHOSCOPY, THORACOSCOPY VIDEO ASSISTED wedge resection X2, INTERCOSTAL NERVE BLOCK;  Surgeon: Ayla Carroll MD;  Location: [facility] OR;  Service: Thoracic;  Laterality: Right;   • TUBAL ABDOMINAL LIGATION         FAMILY HISTORY  Family History   Problem Relation Age of Onset   • Arthritis Mother    • Cancer Mother    • Depression Mother    • Diabetes Mother    • Early death Mother    • Mental illness Mother    • Anxiety disorder Mother    • Alcohol abuse Father    • Diabetes Father    • Early death Father    • Heart disease Father    • Hyperlipidemia Father    • Hypertension Father         Father   • Vision loss Father    • Heart attack Father    • Heart disease Sister    • Heart disease Brother    • Hypertension Brother    • Cancer Maternal Grandmother    • Drug abuse Brother    • Hypertension Brother     • Heart disease Brother    • Drug abuse Sister    • Heart attack Sister    • Hypertension Sister         Sister   • Heart disease Sister    • Heart attack Brother    • Malig Hyperthermia Neg Hx        SOCIAL HISTORY  Social History     Socioeconomic History   • Marital status:    Tobacco Use   • Smoking status: Never     Passive exposure: Never   • Smokeless tobacco: Never   Vaping Use   • Vaping Use: Never used   Substance and Sexual Activity   • Alcohol use: Yes     Comment: VERY RARE   • Drug use: No   • Sexual activity: Not Currently     Partners: Male     Birth control/protection: None, Hysterectomy       ALLERGIES  Tylenol [acetaminophen]    No current facility-administered medications for this encounter.    Current Outpatient Medications:   •  albuterol sulfate  (90 Base) MCG/ACT inhaler, Inhale 2 puffs Every 4 (Four) Hours As Needed for Wheezing., Disp: 8 g, Rfl: 2  •  benzonatate (TESSALON) 100 MG capsule, Take 1 capsule by mouth 3 (Three) Times a Day As Needed for Cough., Disp: 42 capsule, Rfl: 0  •  gabapentin (NEURONTIN) 300 MG capsule, Take 1 capsule by mouth every night at bedtime for 90 days., Disp: 90 capsule, Rfl: 0  •  lisinopril (PRINIVIL,ZESTRIL) 2.5 MG tablet, Take 1 tablet by mouth Daily., Disp: 90 tablet, Rfl: 1  •  Rexulti 0.5 MG tablet, Take 0.5 mg by mouth Daily., Disp: , Rfl:   •  sertraline (ZOLOFT) 50 MG tablet, Take 1 tablet by mouth Daily., Disp: , Rfl:   •  Tiotropium Bromide Monohydrate (Spiriva Respimat) 1.25 MCG/ACT aerosol solution inhaler, Inhale 2 puffs Daily., Disp: 4 g, Rfl: 6  •  atorvastatin (LIPITOR) 20 MG tablet, TAKE 1 TABLET BY MOUTH EVERY DAY, Disp: 90 tablet, Rfl: 1  •  Cholecalciferol 50 MCG (2000 UT) tablet, Take 1 tablet by mouth Daily., Disp: 30 each, Rfl: 5  •  EPINEPHrine (EPIPEN) 0.3 MG/0.3ML solution auto-injector injection, , Disp: , Rfl:   •  ipratropium-albuterol (DUO-NEB) 0.5-2.5 mg/3 ml nebulizer, USE 1 VIAL PER NEBULIZER EVERY 4 HOURS AS  NEEDED FOR WHEEZING, Disp: 360 mL, Rfl: 0  •  itraconazole (SPORANOX) 100 MG capsule, Take 1 capsule by mouth 3 (Three) Times a Day., Disp: , Rfl:   •  Melatonin 10 MG tablet, Take  by mouth., Disp: , Rfl:   •  Molnupiravir (LAGEVRIO) 200 MG capsule, Take 4 capsules by mouth Every 12 (Twelve) Hours for 5 days., Disp: 40 capsule, Rfl: 0  •  promethazine-dextromethorphan (PROMETHAZINE-DM) 6.25-15 MG/5ML syrup, Take 5 mL by mouth Every 6 (Six) Hours As Needed for Cough., Disp: 240 mL, Rfl: 0    PHYSICAL EXAM  ED Triage Vitals   Temp Heart Rate Resp BP SpO2   03/17/23 1034 03/17/23 1034 03/17/23 1034 03/17/23 1037 03/17/23 1034   97.4 °F (36.3 °C) 58 18 126/64 96 %      Temp src Heart Rate Source Patient Position BP Location FiO2 (%)   03/17/23 1034 03/17/23 1034 03/17/23 1037 03/17/23 1037 --   Temporal Monitor Sitting Right arm        Physical Exam  Vitals and nursing note reviewed.   Constitutional:       General: She is not in acute distress.     Appearance: Normal appearance. She is not ill-appearing, toxic-appearing or diaphoretic.   HENT:      Head: Normocephalic and atraumatic.      Right Ear: Tympanic membrane, ear canal and external ear normal. There is no impacted cerumen.      Left Ear: Tympanic membrane, ear canal and external ear normal. There is no impacted cerumen.      Nose: Nose normal. No congestion or rhinorrhea.      Mouth/Throat:      Mouth: Mucous membranes are moist.      Pharynx: No oropharyngeal exudate or posterior oropharyngeal erythema.   Eyes:      Conjunctiva/sclera: Conjunctivae normal.   Cardiovascular:      Rate and Rhythm: Normal rate.      Pulses: Normal pulses.   Pulmonary:      Effort: Pulmonary effort is normal.      Breath sounds: No wheezing or rales.      Comments: Breath sounds decreased right side.  This is an expected finding as patient is post lung resection.  She has scattered rhonchi that improved with cough.  Musculoskeletal:         General: Normal range of motion.       Cervical back: Normal range of motion.      Right lower leg: No edema.      Left lower leg: No edema.   Skin:     General: Skin is warm and dry.      Capillary Refill: Capillary refill takes less than 2 seconds.   Neurological:      General: No focal deficit present.      Mental Status: She is alert.      Motor: No weakness.   Psychiatric:         Mood and Affect: Mood normal.         Behavior: Behavior normal.         Thought Content: Thought content normal.           LAB RESULTS  Lab Results (last 24 hours)     Procedure Component Value Units Date/Time    COVID-19 and FLU A/B PCR - Swab, Nasopharynx [[id number]]  (Abnormal) Collected: 03/17/23 1037    Specimen: Swab from Nasopharynx Updated: 03/17/23 1108     COVID19 Detected     Influenza A PCR Not Detected     Influenza B PCR Not Detected    Narrative:      Fact sheet for providers: https://www.fda.gov/media/066093/download    Fact sheet for patients: https://www.fda.gov/media/196408/download    Test performed by PCR.  Influenza A and Influenza B negative results should be considered presumptive in samples that have a positive SARS-CoV-2 result.    Competitive inhibition studies showed that SARS-CoV-2 virus, when present at concentrations above 3.6E+04 copies/mL, can inhibit the detection and amplification of influenza A and influenza B virus RNA if present at or below 1.8E+02 copies/mL or 4.9E+02 copies/mL, respectively, and may lead to false negative influenza virus results. If co-infection with influenza A or influenza B virus is suspected in samples with a positive SARS-CoV-2 result, the sample should be re-tested with another FDA cleared, approved, or authorized influenza test, if influenza virus detection would change clinical management.            I ordered the above labs and reviewed the results    RADIOLOGY  XR Chest 2 View    Result Date: 3/17/2023  XR CHEST 2 VW Date of Exam: 3/17/2023 11:18 AM EDT Indication: cough, pain right posterior flank with  coughing. PMH histoplasmosis. Comparison: AP portable chest 1/20/2023, CT chest 1/9/2023 Findings: No acute airspace disease. Chronic linear scarring in the right upper lobe and right midlung, similar to prior. Blunting of the right costophrenic angle could represent trace pleural fluid or chronic pleural scarring. Left lung is clear. Heart size is normal. Pulmonary vascular distribution is normal. Mild degenerative spurring in the midthoracic spine. No acute osseous abnormality.     Impression: 1. Right costophrenic angle blunting could represent trace pleural fluid or pleural scarring. 2. No acute airspace disease. Chronic linear scarring in the right lung. Electronically Signed: Marleny Chavez  3/17/2023 11:37 AM EDT  Workstation ID: [id number]      I ordered the above radiologic testing and reviewed the results    PROCEDURES  Procedures      PROGRESS AND CONSULTS  ED Course as of 03/17/23 1434   Fri Mar 17, 2023   1113 Negative for flu, positive for COVID. [VT]   1113 Chest x-ray pending [VT]   1153 IMPRESSION:  Impression:     1. Right costophrenic angle blunting could represent trace pleural fluid or pleural scarring.  2. No acute airspace disease. Chronic linear scarring in the right lung.     Electronically Signed: Marleny Antwonmaria esther    3/17/2023 11:37 AM EDT  [VT]   1153 I have personally viewed the images.  There is a right costophrenic angle blunting, there is no active disease present. [VT]   1153 She will be discharged home with antiviral for COVID. [VT]      ED Course User Index  [VT] Nisha Gonzalez, BECKY           MEDICAL DECISION MAKING    MDM  Number of Diagnoses or Management Options     Amount and/or Complexity of Data Reviewed  Clinical lab tests: reviewed  Tests in the radiology section of CPT®: reviewed          Differential  includes but is not limited to COVID, flu, pneumonia, viral illness.  you have tested positive for COVID-19 and have been given a prescription for antiviral.  You do not need  "to make changes in any of your medications while you are taking LAGEVRIO.   You have also been given a prescription cough medication for your symptoms.  If you begin to experience shortness of breath, inability to care for yourself, chest pain, dizziness with weakness, please seek care in the emergency department for possible admission.    Although you are being discharged from the ED today, I encourage you to return for worsening symptoms. Things can, and do, change such that treatment at home with medication may not be adequate. Specifically I recommend returning for chest pain or discomfort, difficulty breathing, persistent vomiting or difficulty holding down liquids or medications, fever > 102.0 F,  or any other worsening or alarming symptoms.      Rest. Drink plenty of fluids.  Follow up with PCP or provider listed for further evaluation and management.  Follow up with primary care provider for further management.  Take all medications as prescribed.    DIAGNOSIS  Final diagnoses:   COVID-19       Latest Documented Vital Signs:  As of 14:34 EDT  BP- 126/64 HR- 58 Temp- 97.4 °F (36.3 °C) (Temporal) O2 sat- 96%    DISPOSITION    Discussed pertinent findings with the patient/family.  Patient/Family voiced understanding of need to follow-up for recheck and further testing as needed.  Return to the Emergency Department warnings were given.         Medication List      New Prescriptions    Molnupiravir 200 MG capsule  Commonly known as: LAGEVRIO  Take 4 capsules by mouth Every 12 (Twelve) Hours for 5 days.     promethazine-dextromethorphan 6.25-15 MG/5ML syrup  Commonly known as: PROMETHAZINE-DM  Take 5 mL by mouth Every 6 (Six) Hours As Needed for Cough.           Where to Get Your Medications      These medications were sent to [facility]/pharmacy #[id number] - [place], [place] - [address] [facility] [facility] - [phone] [facility] [phone] [address]  [address], [place] [address]    Phone: [phone]   · Molnupiravir 200 " MG capsule  · promethazine-dextromethorphan 6.25-15 MG/5ML syrup              Follow-up Information     Marsha Lopez MD. Call in 3 days.    Specialties: Internal Medicine, Pediatrics  Why: As needed, If symptoms worsen  Contact information:  [address] [place]  [place] [address] [phone]                           Dictated utilizing Christian cunninghamation    MD ATTESTATION NOTE     I was available for discussion for this patient's history, physical exam, and treatment plan.However the APRN/NPP/PA performed substantive portion of the visit.     I have reviewed the documentation and I affirm the documentation.

## 2023-03-19 ENCOUNTER — APPOINTMENT (OUTPATIENT)
Dept: CT IMAGING | Facility: HOSPITAL | Age: 53
End: 2023-03-19
Payer: MEDICARE

## 2023-03-19 ENCOUNTER — APPOINTMENT (OUTPATIENT)
Dept: GENERAL RADIOLOGY | Facility: HOSPITAL | Age: 53
End: 2023-03-19
Payer: MEDICARE

## 2023-03-19 ENCOUNTER — HOSPITAL ENCOUNTER (OUTPATIENT)
Facility: HOSPITAL | Age: 53
Setting detail: OBSERVATION
Discharge: HOME | End: 2023-03-21
Attending: EMERGENCY MEDICINE | Admitting: INTERNAL MEDICINE
Payer: MEDICARE

## 2023-03-19 DIAGNOSIS — U07.1 COVID-19: Primary | ICD-10-CM

## 2023-03-19 DIAGNOSIS — R00.1 BRADYCARDIA: ICD-10-CM

## 2023-03-19 DIAGNOSIS — R06.02 SHORTNESS OF BREATH: ICD-10-CM

## 2023-03-19 LAB
ALBUMIN SERPL-MCNC: 3.4 G/DL (ref 3.5–5.2)
ALBUMIN/GLOB SERPL: 0.9 G/DL
ALP SERPL-CCNC: 119 U/L (ref 39–117)
ALT SERPL W P-5'-P-CCNC: 14 U/L (ref 1–33)
ANION GAP SERPL CALCULATED.3IONS-SCNC: 10 MMOL/L (ref 5–15)
AST SERPL-CCNC: 20 U/L (ref 1–32)
BASOPHILS # BLD AUTO: 0 10*3/MM3 (ref 0–0.2)
BASOPHILS NFR BLD AUTO: 0.8 % (ref 0–1.5)
BILIRUB SERPL-MCNC: 0.2 MG/DL (ref 0–1.2)
BUN SERPL-MCNC: 11 MG/DL (ref 6–20)
BUN/CREAT SERPL: 14.5 (ref 7–25)
CALCIUM SPEC-SCNC: 9.6 MG/DL (ref 8.6–10.5)
CHLORIDE SERPL-SCNC: 106 MMOL/L (ref 98–107)
CO2 SERPL-SCNC: 23 MMOL/L (ref 22–29)
CREAT SERPL-MCNC: 0.76 MG/DL (ref 0.57–1)
D DIMER PPP FEU-MCNC: 0.84 MG/L (FEU) (ref 0–0.52)
DEPRECATED RDW RBC AUTO: 52.1 FL (ref 37–54)
EGFRCR SERPLBLD CKD-EPI 2021: 94.4 ML/MIN/1.73
EOSINOPHIL # BLD AUTO: 0 10*3/MM3 (ref 0–0.4)
EOSINOPHIL NFR BLD AUTO: 0.2 % (ref 0.3–6.2)
ERYTHROCYTE [DISTWIDTH] IN BLOOD BY AUTOMATED COUNT: 17.5 % (ref 12.3–15.4)
GLOBULIN UR ELPH-MCNC: 3.7 GM/DL
GLUCOSE SERPL-MCNC: 81 MG/DL (ref 65–99)
HCT VFR BLD AUTO: 36.8 % (ref 34–46.6)
HGB BLD-MCNC: 11.6 G/DL (ref 12–15.9)
LYMPHOCYTES # BLD AUTO: 1.3 10*3/MM3 (ref 0.7–3.1)
LYMPHOCYTES NFR BLD AUTO: 22.2 % (ref 19.6–45.3)
MCH RBC QN AUTO: 25.2 PG (ref 26.6–33)
MCHC RBC AUTO-ENTMCNC: 31.5 G/DL (ref 31.5–35.7)
MCV RBC AUTO: 79.9 FL (ref 79–97)
MONOCYTES # BLD AUTO: 1 10*3/MM3 (ref 0.1–0.9)
MONOCYTES NFR BLD AUTO: 17.3 % (ref 5–12)
NEUTROPHILS NFR BLD AUTO: 3.5 10*3/MM3 (ref 1.7–7)
NEUTROPHILS NFR BLD AUTO: 59.5 % (ref 42.7–76)
NRBC BLD AUTO-RTO: 0.1 /100 WBC (ref 0–0.2)
NT-PROBNP SERPL-MCNC: 119.5 PG/ML (ref 0–900)
PLATELET # BLD AUTO: 270 10*3/MM3 (ref 140–450)
PMV BLD AUTO: 8.3 FL (ref 6–12)
POTASSIUM SERPL-SCNC: 4.3 MMOL/L (ref 3.5–5.2)
PROCALCITONIN SERPL-MCNC: 0.05 NG/ML (ref 0–0.25)
PROT SERPL-MCNC: 7.1 G/DL (ref 6–8.5)
RBC # BLD AUTO: 4.61 10*6/MM3 (ref 3.77–5.28)
SODIUM SERPL-SCNC: 139 MMOL/L (ref 136–145)
TROPONIN T SERPL HS-MCNC: 8 NG/L
WBC NRBC COR # BLD: 5.9 10*3/MM3 (ref 3.4–10.8)

## 2023-03-19 PROCEDURE — 85025 COMPLETE CBC W/AUTO DIFF WBC: CPT | Performed by: NURSE PRACTITIONER

## 2023-03-19 PROCEDURE — 83880 ASSAY OF NATRIURETIC PEPTIDE: CPT | Performed by: NURSE PRACTITIONER

## 2023-03-19 PROCEDURE — G0378 HOSPITAL OBSERVATION PER HR: HCPCS

## 2023-03-19 PROCEDURE — 93005 ELECTROCARDIOGRAM TRACING: CPT | Performed by: EMERGENCY MEDICINE

## 2023-03-19 PROCEDURE — 25510000001 IOPAMIDOL PER 1 ML: Performed by: EMERGENCY MEDICINE

## 2023-03-19 PROCEDURE — 85379 FIBRIN DEGRADATION QUANT: CPT | Performed by: EMERGENCY MEDICINE

## 2023-03-19 PROCEDURE — 87385 HISTOPLASMA CAPSUL AG IA: CPT | Performed by: NURSE PRACTITIONER

## 2023-03-19 PROCEDURE — 25010000002 HEPARIN (PORCINE) PER 1000 UNITS: Performed by: INTERNAL MEDICINE

## 2023-03-19 PROCEDURE — 80053 COMPREHEN METABOLIC PANEL: CPT | Performed by: NURSE PRACTITIONER

## 2023-03-19 PROCEDURE — 71275 CT ANGIOGRAPHY CHEST: CPT

## 2023-03-19 PROCEDURE — 63710000001 DEXAMETHASONE PER 0.25 MG: Performed by: INTERNAL MEDICINE

## 2023-03-19 PROCEDURE — 99284 EMERGENCY DEPT VISIT MOD MDM: CPT

## 2023-03-19 PROCEDURE — 71046 X-RAY EXAM CHEST 2 VIEWS: CPT

## 2023-03-19 PROCEDURE — 84145 PROCALCITONIN (PCT): CPT | Performed by: EMERGENCY MEDICINE

## 2023-03-19 PROCEDURE — 84484 ASSAY OF TROPONIN QUANT: CPT | Performed by: NURSE PRACTITIONER

## 2023-03-19 PROCEDURE — 93005 ELECTROCARDIOGRAM TRACING: CPT

## 2023-03-19 PROCEDURE — 96372 THER/PROPH/DIAG INJ SC/IM: CPT

## 2023-03-19 RX ORDER — ONDANSETRON 4 MG/1
4 TABLET, FILM COATED ORAL EVERY 6 HOURS PRN
Status: DISCONTINUED | OUTPATIENT
Start: 2023-03-19 | End: 2023-03-21 | Stop reason: HOSPADM

## 2023-03-19 RX ORDER — SODIUM CHLORIDE 9 MG/ML
100 INJECTION, SOLUTION INTRAVENOUS CONTINUOUS
Status: DISCONTINUED | OUTPATIENT
Start: 2023-03-19 | End: 2023-03-21 | Stop reason: HOSPADM

## 2023-03-19 RX ORDER — SODIUM CHLORIDE 0.9 % (FLUSH) 0.9 %
10 SYRINGE (ML) INJECTION AS NEEDED
Status: DISCONTINUED | OUTPATIENT
Start: 2023-03-19 | End: 2023-03-21 | Stop reason: HOSPADM

## 2023-03-19 RX ORDER — ALBUTEROL SULFATE 90 UG/1
2 AEROSOL, METERED RESPIRATORY (INHALATION) EVERY 4 HOURS PRN
Status: DISCONTINUED | OUTPATIENT
Start: 2023-03-19 | End: 2023-03-21 | Stop reason: HOSPADM

## 2023-03-19 RX ORDER — ITRACONAZOLE 100 MG/1
100 CAPSULE ORAL
Status: DISCONTINUED | OUTPATIENT
Start: 2023-03-19 | End: 2023-03-19

## 2023-03-19 RX ORDER — AMOXICILLIN 250 MG
2 CAPSULE ORAL 2 TIMES DAILY
Status: DISCONTINUED | OUTPATIENT
Start: 2023-03-19 | End: 2023-03-21 | Stop reason: HOSPADM

## 2023-03-19 RX ORDER — ITRACONAZOLE 100 MG/1
200 CAPSULE ORAL
Status: DISCONTINUED | OUTPATIENT
Start: 2023-03-22 | End: 2023-03-19

## 2023-03-19 RX ORDER — SODIUM CHLORIDE 9 MG/ML
40 INJECTION, SOLUTION INTRAVENOUS AS NEEDED
Status: DISCONTINUED | OUTPATIENT
Start: 2023-03-19 | End: 2023-03-21 | Stop reason: HOSPADM

## 2023-03-19 RX ORDER — ITRACONAZOLE 100 MG/1
200 CAPSULE ORAL
Status: DISCONTINUED | OUTPATIENT
Start: 2023-03-19 | End: 2023-03-19

## 2023-03-19 RX ORDER — ONDANSETRON 2 MG/ML
4 INJECTION INTRAMUSCULAR; INTRAVENOUS EVERY 6 HOURS PRN
Status: DISCONTINUED | OUTPATIENT
Start: 2023-03-19 | End: 2023-03-21 | Stop reason: HOSPADM

## 2023-03-19 RX ORDER — POLYETHYLENE GLYCOL 3350 17 G/17G
17 POWDER, FOR SOLUTION ORAL DAILY PRN
Status: DISCONTINUED | OUTPATIENT
Start: 2023-03-19 | End: 2023-03-21 | Stop reason: HOSPADM

## 2023-03-19 RX ORDER — BISACODYL 10 MG
10 SUPPOSITORY, RECTAL RECTAL DAILY PRN
Status: DISCONTINUED | OUTPATIENT
Start: 2023-03-19 | End: 2023-03-21 | Stop reason: HOSPADM

## 2023-03-19 RX ORDER — ATORVASTATIN CALCIUM 20 MG/1
20 TABLET, FILM COATED ORAL DAILY
Status: DISCONTINUED | OUTPATIENT
Start: 2023-03-19 | End: 2023-03-21 | Stop reason: HOSPADM

## 2023-03-19 RX ORDER — LISINOPRIL 5 MG/1
2.5 TABLET ORAL DAILY
Status: DISCONTINUED | OUTPATIENT
Start: 2023-03-19 | End: 2023-03-19

## 2023-03-19 RX ORDER — ITRACONAZOLE 100 MG/1
200 CAPSULE ORAL
Status: DISCONTINUED | OUTPATIENT
Start: 2023-03-19 | End: 2023-03-20

## 2023-03-19 RX ORDER — CHOLECALCIFEROL (VITAMIN D3) 125 MCG
5 CAPSULE ORAL NIGHTLY
Status: DISCONTINUED | OUTPATIENT
Start: 2023-03-19 | End: 2023-03-21 | Stop reason: HOSPADM

## 2023-03-19 RX ORDER — TRAMADOL HYDROCHLORIDE 50 MG/1
50 TABLET ORAL EVERY 6 HOURS PRN
Status: DISCONTINUED | OUTPATIENT
Start: 2023-03-19 | End: 2023-03-21 | Stop reason: HOSPADM

## 2023-03-19 RX ORDER — BENZONATATE 100 MG/1
100 CAPSULE ORAL 3 TIMES DAILY PRN
Status: DISCONTINUED | OUTPATIENT
Start: 2023-03-19 | End: 2023-03-21 | Stop reason: HOSPADM

## 2023-03-19 RX ORDER — GABAPENTIN 300 MG/1
300 CAPSULE ORAL EVERY 8 HOURS SCHEDULED
Status: DISCONTINUED | OUTPATIENT
Start: 2023-03-19 | End: 2023-03-21 | Stop reason: HOSPADM

## 2023-03-19 RX ORDER — GUAIFENESIN AND CODEINE PHOSPHATE 100; 10 MG/5ML; MG/5ML
5 SOLUTION ORAL EVERY 4 HOURS PRN
Status: DISCONTINUED | OUTPATIENT
Start: 2023-03-19 | End: 2023-03-21 | Stop reason: HOSPADM

## 2023-03-19 RX ORDER — DEXAMETHASONE 4 MG/1
4 TABLET ORAL
Status: DISCONTINUED | OUTPATIENT
Start: 2023-03-19 | End: 2023-03-20

## 2023-03-19 RX ORDER — NITROGLYCERIN 0.4 MG/1
0.4 TABLET SUBLINGUAL
Status: DISCONTINUED | OUTPATIENT
Start: 2023-03-19 | End: 2023-03-21 | Stop reason: HOSPADM

## 2023-03-19 RX ORDER — HEPARIN SODIUM 5000 [USP'U]/ML
5000 INJECTION, SOLUTION INTRAVENOUS; SUBCUTANEOUS EVERY 8 HOURS SCHEDULED
Status: DISCONTINUED | OUTPATIENT
Start: 2023-03-19 | End: 2023-03-21 | Stop reason: HOSPADM

## 2023-03-19 RX ORDER — BISACODYL 5 MG/1
5 TABLET, DELAYED RELEASE ORAL DAILY PRN
Status: DISCONTINUED | OUTPATIENT
Start: 2023-03-19 | End: 2023-03-21 | Stop reason: HOSPADM

## 2023-03-19 RX ORDER — SODIUM CHLORIDE 0.9 % (FLUSH) 0.9 %
10 SYRINGE (ML) INJECTION EVERY 12 HOURS SCHEDULED
Status: DISCONTINUED | OUTPATIENT
Start: 2023-03-19 | End: 2023-03-21 | Stop reason: HOSPADM

## 2023-03-19 RX ADMIN — GABAPENTIN 300 MG: 300 CAPSULE ORAL at 14:50

## 2023-03-19 RX ADMIN — SODIUM CHLORIDE 100 ML/HR: 9 INJECTION, SOLUTION INTRAVENOUS at 12:34

## 2023-03-19 RX ADMIN — HEPARIN SODIUM 5000 UNITS: 5000 INJECTION INTRAVENOUS; SUBCUTANEOUS at 14:50

## 2023-03-19 RX ADMIN — ATORVASTATIN CALCIUM 20 MG: 20 TABLET, FILM COATED ORAL at 12:34

## 2023-03-19 RX ADMIN — Medication 10 ML: at 12:42

## 2023-03-19 RX ADMIN — SERTRALINE 50 MG: 50 TABLET, FILM COATED ORAL at 12:34

## 2023-03-19 RX ADMIN — BENZONATATE 100 MG: 100 CAPSULE ORAL at 21:20

## 2023-03-19 RX ADMIN — ITRACONAZOLE 200 MG: 100 CAPSULE ORAL at 15:48

## 2023-03-19 RX ADMIN — IOPAMIDOL 81 ML: 755 INJECTION, SOLUTION INTRAVENOUS at 08:13

## 2023-03-19 RX ADMIN — Medication 10 ML: at 21:21

## 2023-03-19 RX ADMIN — DEXAMETHASONE 4 MG: 4 TABLET ORAL at 12:34

## 2023-03-19 RX ADMIN — GABAPENTIN 300 MG: 300 CAPSULE ORAL at 21:20

## 2023-03-19 NOTE — PLAN OF CARE
Goal Outcome Evaluation:  Plan of Care Reviewed With: patient    New admit from ER. Covid +. Enhanced contact precautions maintained. Son at bedside. Call light in reach. Up ad aquilino. Able to communicate needs. Pain controlled. Home meds restarted. Plan of care ongoing.

## 2023-03-19 NOTE — H&P
[facility] Medicine Services      Patient Name: Tessie Conner  : 1970  MRN: [id number]  Primary Care Physician:  Marsha Lopez MD  Date of admission: 3/19/2023      Subjective      Chief Complaint: Right-sided back pain and shortness of breath    History of Present Illness: Tessie Conner is a 53 y.o. female who presented to [facility] on 3/19/2023 complaining of persistent shortness of breath.  The patient was diagnosed with COVID-19 on  on Friday and was started on some outpatient antiviral therapy.  She states that the pain in her back is getting worse and she is still short of breath hence she came in for evaluation given her history of histoplasmosis with lobectomy and being high risk.  Patient follows with Dr. Nance and Dr. Patel outpatient.  Denies any fever diarrhea constipation sick contacts or travel.  No other complaints at this time.      ROS negative except as above    Personal History     Past Medical History:   Diagnosis Date   • Abnormal ECG    • Anxiety    • Arthritis    • Asthma 2020   • At risk for sleep apnea    • Bipolar 1 disorder (HCC)    • Cholelithiasis 10/2022   • Coronary artery disease    • Depression    • Diabetes mellitus (HCC)    • Dyspnea on minimal exertion    • Frequent UTI    • Heart murmur     FROM CHILDHOOD   • History of anemia     POST PREGNANCY   • Hyperlipidemia 10/04/2016   • Hypertension    • Mass of upper lobe of right lung    • Migraines    • Obesity    • PONV (postoperative nausea and vomiting)    • Shoulder pain, right    • Sleep apnea 22   • Spinal headache    • Visual impairment     WEARS GLASSES       Past Surgical History:   Procedure Laterality Date   • APPENDECTOMY     • CARDIAC CATHETERIZATION N/A 2020    Procedure: Left Heart Cath possible PCI, atherectomy, hemodynamic support;  Surgeon: Thomas Zamora MD;  Location: [facility] INVASIVE LOCATION;   Service: Cardiology;  Laterality: N/A;   • CARDIAC CATHETERIZATION  2020   •  SECTION     • FOOT/TOE TENDON REPAIR Left    • HYSTERECTOMY     • LUNG BIOPSY Right 2020   • LUNG LOBECTOMY Right 2022    pt had partial Right lobectomy and nodule removal   • ROTATOR CUFF REPAIR Left    • THORACOSCOPY VIDEO ASSISTED WITH LOBECTOMY Right 2022    Procedure: BRONCHOSCOPY, THORACOSCOPY VIDEO ASSISTED wedge resection X2, INTERCOSTAL NERVE BLOCK;  Surgeon: Ayla Carroll MD;  Location: [facility] OR;  Service: Thoracic;  Laterality: Right;   • TUBAL ABDOMINAL LIGATION         Family History: family history includes Alcohol abuse in her father; Anxiety disorder in her mother; Arthritis in her mother; Cancer in her maternal grandmother and mother; Depression in her mother; Diabetes in her father and mother; Drug abuse in her brother and sister; Early death in her father and mother; Heart attack in her brother, father, and sister; Heart disease in her brother, brother, father, sister, and sister; Hyperlipidemia in her father; Hypertension in her brother, brother, father, and sister; Mental illness in her mother; Vision loss in her father. Otherwise pertinent FHx was reviewed and not pertinent to current issue.    Social History:  reports that she has never smoked. She has never been exposed to tobacco smoke. She has never used smokeless tobacco. She reports current alcohol use. She reports that she does not use drugs.    Home Medications:  Prior to Admission Medications     Prescriptions Last Dose Informant Patient Reported? Taking?    albuterol sulfate  (90 Base) MCG/ACT inhaler   No No    Inhale 2 puffs Every 4 (Four) Hours As Needed for Wheezing.    atorvastatin (LIPITOR) 20 MG tablet   No No    TAKE 1 TABLET BY MOUTH EVERY DAY    benzonatate (TESSALON) 100 MG capsule   No No    Take 1 capsule by mouth 3 (Three) Times a Day As Needed for Cough.    Cholecalciferol 50 MCG ( UT)  tablet   No No    Take 1 tablet by mouth Daily.    EPINEPHrine (EPIPEN) 0.3 MG/0.3ML solution auto-injector injection   Yes No    gabapentin (NEURONTIN) 300 MG capsule   No No    Take 1 capsule by mouth every night at bedtime for 90 days.    ipratropium-albuterol (DUO-NEB) 0.5-2.5 mg/3 ml nebulizer   No No    USE 1 VIAL PER NEBULIZER EVERY 4 HOURS AS NEEDED FOR WHEEZING    itraconazole (SPORANOX) 100 MG capsule  Self Yes No    Take 1 capsule by mouth 3 (Three) Times a Day.    lisinopril (PRINIVIL,ZESTRIL) 2.5 MG tablet   No No    Take 1 tablet by mouth Daily.    Melatonin 10 MG tablet   Yes No    Take  by mouth.    Molnupiravir (LAGEVRIO) 200 MG capsule   No No    Take 4 capsules by mouth Every 12 (Twelve) Hours for 5 days.    promethazine-dextromethorphan (PROMETHAZINE-DM) 6.25-15 MG/5ML syrup   No No    Take 5 mL by mouth Every 6 (Six) Hours As Needed for Cough.    Rexulti 0.5 MG tablet   Yes No    Take 0.5 mg by mouth Daily.    sertraline (ZOLOFT) 50 MG tablet   Yes No    Take 1 tablet by mouth Daily.    Tiotropium Bromide Monohydrate (Spiriva Respimat) 1.25 MCG/ACT aerosol solution inhaler   No No    Inhale 2 puffs Daily.            Allergies:  Allergies   Allergen Reactions   • Tylenol [Acetaminophen] Hives and Itching       Objective      Vitals:   Temp:  [97.8 °F (36.6 °C)] 97.8 °F (36.6 °C)  Heart Rate:  [50-64] 50  Resp:  [18] 18  BP: ()/(43-62) 111/43    Physical Exam  Vitals reviewed.   Constitutional:       Comments: Nurse at bedside  Patient on room air   HENT:      Head: Normocephalic.      Nose: Nose normal.      Mouth/Throat:      Mouth: Mucous membranes are moist.   Cardiovascular:      Rate and Rhythm: Normal rate and regular rhythm.      Pulses: Normal pulses.      Heart sounds: Normal heart sounds.   Pulmonary:      Effort: Pulmonary effort is normal.      Breath sounds: Normal breath sounds.      Comments: Minimal breath sounds on the right lung  Abdominal:      General: Abdomen is flat.       Palpations: Abdomen is soft.   Musculoskeletal:         General: Normal range of motion.      Cervical back: Normal range of motion.   Skin:     General: Skin is warm.   Neurological:      General: No focal deficit present.      Mental Status: She is alert and oriented to person, place, and time.   Psychiatric:         Mood and Affect: Mood normal.         Behavior: Behavior normal.          Result Review    Result Review:  I have personally reviewed the results from the time of this admission to 3/19/2023 09:10 EDT and agree with these findings:  [x]  Laboratory  []  Microbiology  []  Radiology  []  EKG/Telemetry   []  Cardiology/Vascular   []  Pathology  []  Old records  []  Other:  Most notable findings include: Lab work is unremarkable except hemoglobin 11.6      Assessment & Plan    53-year-old female presents with acute COVID infection and history of histoplasmosis status post lobectomy right lung    Active Hospital Problems:  There are no active hospital problems to display for this patient.    Plan:   Respiratory distress without respiratory failure  Likely due to history of histoplasmosis status post lobectomy with superimposed acute COVID 19 infection.  Patient is taking antiviral therapy outpatient.  We will start Decadron 4 mg daily  Pulmonary consulted she sees Dr. Nance and Dr. Patel  Resume itraconazole for histoplasma    Resume all other medications including for anxiety depression    DVT prophylaxis:  No DVT prophylaxis order currently exists.    CODE STATUS:       Admission Status:  I believe this patient meets observation status.    I discussed the patient's findings and my recommendations with patient.    This patient has been examined wearing appropriate Personal Protective Equipment and discussed with Pulmonary. 03/19/23      Signature: Catracho Contreras MD

## 2023-03-19 NOTE — CONSULTS
Group: [facility]         CONSULT NOTE    Patient Identification:  Tessie Conner  53 y.o.  female  1970  [id number]            Requesting physician: Attending physician    Reason for Consultation: Histoplasmosis      History of Present Illness:  53-year-old female with history of suspected pulmonary histoplasmosis based on prior VATS surgery in February 2022 and positive serology who was started on itraconazole treatment at the end of February 2023.  She presented with hyperglycemia with a sugar being in the 400s.  From respiratory standpoint she has sore throat with congestion, cough and right-sided pleuritic pain for the last 3 days  She tested positive for COVID-19 3/17/2023 .Her EBV IgG was more than 600 and IgM 67.8 in 2/17/2023  Chest x-ray was clear with a CT scan showed bilateral groundglass opacities with mediastinal and hilar lymphadenopathy        Office note /28/2023    progressive SOA  chronic cough  pulmonary histoplasmosis: urine ag negative, serology is positive  Chest Ct 1/9/2023 subpleural opacity along the anterior chest wall on the right middle lobe  -patient states she is not taking deep breaths/cough because of pain.    HST AHI 13.8, just received CPAP 3-4 days ago  S/P VATS 2/8/22 with Carly, benign, but c/w histoplasmosis  1/11/22 PFT FEV1 2 L 69%, improved to 74%, ratio 84,  ERV 59%, RV 79%, TLC 75%, DLCO 64%  9/2020 echo EF 56-60% RVSP <35 mmHg  nonsmoker ,     Plan: start Itraconazole 200 mg tid x 3 d , then qd x 3 months and repeat CT scan  advise pt about side effects and interactions  repeat PFTs in 6 months  Tessalon pearls    Assessment:  COVID-19 pneumonia  pulmonary histoplasmosis  Restrictive lung disease: Negative connective tissue work-up  Hyperglycemia  Diabetes mellitus  HTN  HLD  ADY    Recommendations:  Consult ID for second opinion about the treatment for histoplasmosis  She has already finished 3 days of loading dose of itraconazole and now she is on  200 mg daily  Once her symptoms improved patient can resume using CPAP machine at home  The COVID symptoms appear to be mild, continue dexamethasone for 5 more days  Oxygen supplement and titration to maintain saturation 90 to 95%  Bronchodilatores  Glucose control  Lipitor  DVTprophylaxis          Review of Sytems:  Constitutional: Negative for chills, fever and malaise/fatigue.   HENT: Sore throat and nasal congestion  Eyes: Negative.    Cardiovascular: Negative.    Respiratory: Positive for cough and shortness of breath.    Skin: Negative.    Musculoskeletal: Negative.    Gastrointestinal: Negative.    Genitourinary: Negative.    Neurological: Negative.    Psychiatric/Behavioral: Negative.    Past Medical History:  Past Medical History:   Diagnosis Date   • Abnormal ECG    • Anxiety    • Arthritis    • Asthma 2020   • At risk for sleep apnea    • Bipolar 1 disorder (HCC)    • Cholelithiasis 10/2022   • Coronary artery disease    • Depression    • Diabetes mellitus (HCC)    • Dyspnea on minimal exertion    • Frequent UTI    • Heart murmur     FROM CHILDHOOD   • History of anemia     POST PREGNANCY   • Hyperlipidemia 10/04/2016   • Hypertension    • Mass of upper lobe of right lung    • Migraines    • Obesity    • PONV (postoperative nausea and vomiting)    • Shoulder pain, right    • Sleep apnea 22   • Spinal headache    • Visual impairment     WEARS GLASSES       Past Surgical History:  Past Surgical History:   Procedure Laterality Date   • APPENDECTOMY     • CARDIAC CATHETERIZATION N/A 2020    Procedure: Left Heart Cath possible PCI, atherectomy, hemodynamic support;  Surgeon: Thomas Zamora MD;  Location: [facility] CATH INVASIVE LOCATION;  Service: Cardiology;  Laterality: N/A;   • CARDIAC CATHETERIZATION  2020   •  SECTION     • FOOT/TOE TENDON REPAIR Left    • HYSTERECTOMY     • LUNG BIOPSY Right 2020   • LUNG LOBECTOMY Right 2022    pt  had partial Right lobectomy and nodule removal   • ROTATOR CUFF REPAIR Left    • THORACOSCOPY VIDEO ASSISTED WITH LOBECTOMY Right 02/08/2022    Procedure: BRONCHOSCOPY, THORACOSCOPY VIDEO ASSISTED wedge resection X2, INTERCOSTAL NERVE BLOCK;  Surgeon: Ayla Carroll MD;  Location: [facility];  Service: Thoracic;  Laterality: Right;   • TUBAL ABDOMINAL LIGATION  2002        Home Meds:  Medications Prior to Admission   Medication Sig Dispense Refill Last Dose   • Cholecalciferol 50 MCG (2000 UT) tablet Take 1 tablet by mouth Daily. 30 each 5 3/18/2023   • gabapentin (NEURONTIN) 300 MG capsule Take 1 capsule by mouth every night at bedtime for 90 days. 90 capsule 0 3/18/2023   • itraconazole (SPORANOX) 100 MG capsule Take 1 capsule by mouth 3 (Three) Times a Day.   3/18/2023   • lisinopril (PRINIVIL,ZESTRIL) 2.5 MG tablet Take 1 tablet by mouth Daily. 90 tablet 1 3/18/2023   • promethazine-dextromethorphan (PROMETHAZINE-DM) 6.25-15 MG/5ML syrup Take 5 mL by mouth Every 6 (Six) Hours As Needed for Cough. 240 mL 0 3/18/2023   • Rexulti 0.5 MG tablet Take 0.5 mg by mouth Daily.   3/18/2023   • sertraline (ZOLOFT) 50 MG tablet Take 1 tablet by mouth Daily.   3/18/2023   • Tiotropium Bromide Monohydrate (Spiriva Respimat) 1.25 MCG/ACT aerosol solution inhaler Inhale 2 puffs Daily. 4 g 6 3/18/2023   • albuterol sulfate  (90 Base) MCG/ACT inhaler Inhale 2 puffs Every 4 (Four) Hours As Needed for Wheezing. 8 g 2 More than a month   • atorvastatin (LIPITOR) 20 MG tablet TAKE 1 TABLET BY MOUTH EVERY DAY (Patient not taking: Reported on 3/19/2023) 90 tablet 1 Not Taking   • benzonatate (TESSALON) 100 MG capsule Take 1 capsule by mouth 3 (Three) Times a Day As Needed for Cough. (Patient not taking: Reported on 3/19/2023) 42 capsule 0 Not Taking   • EPINEPHrine (EPIPEN) 0.3 MG/0.3ML solution auto-injector injection       • ipratropium-albuterol (DUO-NEB) 0.5-2.5 mg/3 ml nebulizer USE 1 VIAL PER NEBULIZER EVERY 4 HOURS  "AS NEEDED FOR WHEEZING 360 mL 0    • Melatonin 10 MG tablet Take  by mouth.   More than a month   • Molnupiravir (LAGEVRIO) 200 MG capsule Take 4 capsules by mouth Every 12 (Twelve) Hours for 5 days. 40 capsule 0        Allergies:  Allergies   Allergen Reactions   • Tylenol [Acetaminophen] Hives and Itching       Social History:   Social History     Socioeconomic History   • Marital status:    Tobacco Use   • Smoking status: Never     Passive exposure: Never   • Smokeless tobacco: Never   Vaping Use   • Vaping Use: Never used   Substance and Sexual Activity   • Alcohol use: Yes     Comment: VERY RARE   • Drug use: No   • Sexual activity: Not Currently     Partners: Male     Birth control/protection: None, Hysterectomy       Family History:  Family History   Problem Relation Age of Onset   • Arthritis Mother    • Cancer Mother    • Depression Mother    • Diabetes Mother    • Early death Mother    • Mental illness Mother    • Anxiety disorder Mother    • Alcohol abuse Father    • Diabetes Father    • Early death Father    • Heart disease Father    • Hyperlipidemia Father    • Hypertension Father         Father   • Vision loss Father    • Heart attack Father    • Heart disease Sister    • Heart disease Brother    • Hypertension Brother    • Cancer Maternal Grandmother    • Drug abuse Brother    • Hypertension Brother    • Heart disease Brother    • Drug abuse Sister    • Heart attack Sister    • Hypertension Sister         Sister   • Heart disease Sister    • Heart attack Brother    • Malig Hyperthermia Neg Hx        Physical Exam:  /48 (BP Location: Left arm, Patient Position: Sitting)   Pulse 68   Temp 98.7 °F (37.1 °C) (Oral)   Resp 19   Ht 162.6 cm (64\")   Wt 102 kg (225 lb 5 oz)   LMP  (LMP Unknown)   SpO2 98%   BMI 38.67 kg/m²  Body mass index is 38.67 kg/m². 98% 102 kg (225 lb 5 oz)  General Appearance:  Alert   HEENT:  Normocephalic, without obvious abnormality, Conjunctiva/corneas clear,.  "  Nares normal, no drainage     Neck:  Supple, symmetrical, trachea midline. No JVD.  Lungs /Chest wall: Mild bilateral basal rhonchi, respirations unlabored, symmetrical wall movement.     Heart:  Regular rate and rhythm, S1 S2 normal  Abdomen: Soft, non-tender, no masses, no organomegaly.    Extremities: No edema, no clubbing or cyanosis    LABS:  Lab Results   Component Value Date    CALCIUM 9.6 03/19/2023     Results from last 7 days   Lab Units 03/19/23  0506   SODIUM mmol/L 139   POTASSIUM mmol/L 4.3   CHLORIDE mmol/L 106   CO2 mmol/L 23.0   BUN mg/dL 11   CREATININE mg/dL 0.76   GLUCOSE mg/dL 81   CALCIUM mg/dL 9.6   WBC 10*3/mm3 5.90   HEMOGLOBIN g/dL 11.6*   PLATELETS 10*3/mm3 270   ALT (SGPT) U/L 14   AST (SGOT) U/L 20   PROBNP pg/mL 119.5   PROCALCITONIN ng/mL 0.05     Lab Results   Component Value Date    TROPONINT 8 03/19/2023     Results from last 7 days   Lab Units 03/19/23  0506   HSTROP T ng/L 8         Results from last 7 days   Lab Units 03/19/23  0506   PROCALCITONIN ng/mL 0.05         Results from last 7 days   Lab Units 03/17/23  1037   INFLUENZA B PCR  Not Detected   INFLUENZA A PCR  Not Detected             Lab Results   Component Value Date    TSH 0.290 11/08/2022     Estimated Creatinine Clearance: 100.6 mL/min (by C-G formula based on SCr of 0.76 mg/dL).         Imaging:  Imaging Results (Last 24 Hours)     Procedure Component Value Units Date/Time    CT Angiogram Chest Pulmonary Embolism [[id number]] Collected: 03/19/23 0819     Updated: 03/19/23 0833    Narrative:      CT ANGIOGRAM CHEST PULMONARY EMBOLISM    Date of Exam: 3/19/2023 8:02 AM EDT    Indication: covid, elevated dimer.    Comparison: 11/16/2022    Technique: Axial CT images were obtained of the chest before and after the uneventful intravenous administration of iodinated contrast utilizing pulmonary embolism protocol.  Sagittal and coronal reconstructions were performed.  Automated exposure   control and iterative  reconstruction methods were used.     Findings:  There are no pulmonary arterial filling defects evident to suggest PE. Evaluation of the smaller subsegmental branches is somewhat limited due to misregistration/motion. The pulmonary arteries appear normal caliber. Thoracic aorta is normal caliber.   There is trace fluid in the superior pericardial recess. There is a surgical suture line in the right upper lung suggesting partial resection previously. There is patchy groundglass opacity throughout the lungs bilaterally suggesting infectious or   inflammatory process. This is consistent with patient's reported history of Covid infection. Central airways are grossly patent. There is a 4 mm subpleural nodule in the posterior left upper lobe on image #35 of the axial series. There is a 3 mm   subpleural nodule in the posterior right upper lobe on image #35. Additional 3 mm nodule in the right upper lobe on image #38. 3-4 mm nodule in the right middle lobe on image #76. There is a small right pleural effusion now present. There are prominent   lymph nodes in the right hilum and paratracheal region and subcarinal region which are likely reactive given the pulmonary findings. Borderline prominent axillary lymph nodes are noted on the left. Limited imaging through the upper abdomen demonstrates a   grossly normal adrenal morphology. The thyroid is heterogeneous and lobular which may be due to goitrous change. No aggressive osseous lesions are identified. There are degenerative changes in the spine. Fat-containing posterior left diaphragmatic   hernia again noted.      Impression:      Impression:    1. No evidence of pulmonary embolus.  2. Patchy groundglass opacities throughout the lungs bilaterally suggesting infectious or inflammatory process or edema. The appearance would be consistent with patient's reported history of Covid infection.  3. Small right pleural effusion.  4. Multiple bilateral subcentimeter pleural  nodules, measuring up to 4 mm. Follow-up chest CT in 12 months recommended per Fleischner guidelines.  5. Multiple enlarged lymph nodes, likely reactive given the pulmonary findings. Correlate clinically. Short-term follow-up CT could be performed to document resolution after resolution of clinical symptoms.    Electronically Signed: Manny Camacho    3/19/2023 8:30 AM EDT    Workstation ID: [id number]    XR Chest 2 View [[id number]] Collected: 03/19/23 0315     Updated: 03/19/23 0517    Narrative:      FRONTAL VIEW OF THE CHEST    CLINICAL INDICATION: Chest pain.    COMPARISON: 3/17/2023.    FINDINGS: No focal consolidation, pleural effusion or pneumothorax. Cardiomediastinal morphology is normal.       Impression:      No acute cardiopulmonary abnormality.    Electronically signed by:  Thomas Malik M.D.    3/19/2023 3:16 AM Mountain Time            Current Meds:   SCHEDULE  atorvastatin, 20 mg, Oral, Daily  dexamethasone, 4 mg, Oral, Daily With Breakfast  gabapentin, 300 mg, Oral, Q8H  heparin (porcine), 5,000 Units, Subcutaneous, Q8H  ipratropium, 0.5 mg, Nebulization, 4x Daily - RT  itraconazole, 100 mg, Oral, TID With Meals  melatonin, 5 mg, Oral, Nightly  senna-docusate sodium, 2 tablet, Oral, BID  sertraline, 50 mg, Oral, Daily  sodium chloride, 10 mL, Intravenous, Q12H      Infusions  sodium chloride, 100 mL/hr, Last Rate: 100 mL/hr (03/19/23 1234)      PRNs  •  albuterol sulfate HFA  •  benzonatate  •  senna-docusate sodium **AND** polyethylene glycol **AND** bisacodyl **AND** bisacodyl  •  guaiFENesin-codeine  •  nitroglycerin  •  ondansetron **OR** ondansetron  •  [COMPLETED] Insert Peripheral IV **AND** sodium chloride  •  sodium chloride  •  sodium chloride  •  traMADol        Chalo Patel MD  3/19/2023  13:45 EDT      Much of this encounter note is an electronic transcription/translation of spoken language to printed text using Dragon Software.

## 2023-03-19 NOTE — CONSULTS
Infectious Diseases Consult Note    Referring Provider: Dr. Patel    Reason for Consultation: Histoplasmosis    Patient Care Team:  Marsha Lopez MD as PCP - General (Internal Medicine)  Thomas Zamora MD as Consulting Physician (Cardiology)  Ayla Carroll MD as Surgeon (Thoracic Surgery)    Chief complaint shortness of breath, nonproductive cough pleuritic chest pain and a sore throat, diaphoresis    Subjective     The patient has been afebrile for the last 24 hours.  The patient is on room air, hemodynamically stable, and is tolerating antimicrobial therapy.     History of present illness:      This is a 53-year-old female presents to the hospital on 3/19/2023 with complaints of worsening pleuritic chest pain, some shortness of breath, nonproductive cough, sore throat and diaphoresis.  She denies fever or chills, GI symptoms, urinary symptoms.  Patient 2 reds resections in February 2022 for pulmonary nodules which pathology showed his histoplasmosis.  She is only had surveillance by thoracic surgery but in mid February a histoplasmosis antibody screen was taken and she was started on p.o. itraconazole.      Review of Systems   Review of Systems   Constitutional: Positive for diaphoresis.   HENT: Positive for sore throat.    Eyes: Negative.    Respiratory: Positive for cough and shortness of breath.    Cardiovascular: Positive for chest pain.   Gastrointestinal: Negative.    Endocrine: Negative.    Genitourinary: Negative.    Musculoskeletal: Negative.    Skin: Negative.    Neurological: Negative.    Psychiatric/Behavioral: Negative.    All other systems reviewed and are negative.      Medications  Medications Prior to Admission   Medication Sig Dispense Refill Last Dose   • Cholecalciferol 50 MCG (2000 UT) tablet Take 1 tablet by mouth Daily. 30 each 5 3/18/2023   • gabapentin (NEURONTIN) 300 MG capsule Take 1 capsule by mouth every night at bedtime for 90 days. 90 capsule 0 3/18/2023   •  itraconazole (SPORANOX) 100 MG capsule Take 1 capsule by mouth 3 (Three) Times a Day.   3/18/2023   • lisinopril (PRINIVIL,ZESTRIL) 2.5 MG tablet Take 1 tablet by mouth Daily. 90 tablet 1 3/18/2023   • promethazine-dextromethorphan (PROMETHAZINE-DM) 6.25-15 MG/5ML syrup Take 5 mL by mouth Every 6 (Six) Hours As Needed for Cough. 240 mL 0 3/18/2023   • Rexulti 0.5 MG tablet Take 0.5 mg by mouth Daily.   3/18/2023   • sertraline (ZOLOFT) 50 MG tablet Take 1 tablet by mouth Daily.   3/18/2023   • Tiotropium Bromide Monohydrate (Spiriva Respimat) 1.25 MCG/ACT aerosol solution inhaler Inhale 2 puffs Daily. 4 g 6 3/18/2023   • albuterol sulfate  (90 Base) MCG/ACT inhaler Inhale 2 puffs Every 4 (Four) Hours As Needed for Wheezing. 8 g 2 More than a month   • atorvastatin (LIPITOR) 20 MG tablet TAKE 1 TABLET BY MOUTH EVERY DAY (Patient not taking: Reported on 3/19/2023) 90 tablet 1 Not Taking   • benzonatate (TESSALON) 100 MG capsule Take 1 capsule by mouth 3 (Three) Times a Day As Needed for Cough. (Patient not taking: Reported on 3/19/2023) 42 capsule 0 Not Taking   • EPINEPHrine (EPIPEN) 0.3 MG/0.3ML solution auto-injector injection       • ipratropium-albuterol (DUO-NEB) 0.5-2.5 mg/3 ml nebulizer USE 1 VIAL PER NEBULIZER EVERY 4 HOURS AS NEEDED FOR WHEEZING 360 mL 0    • Melatonin 10 MG tablet Take  by mouth.   More than a month   • Molnupiravir (LAGEVRIO) 200 MG capsule Take 4 capsules by mouth Every 12 (Twelve) Hours for 5 days. 40 capsule 0        History  Past Medical History:   Diagnosis Date   • Abnormal ECG    • Anxiety 2005   • Arthritis    • Asthma 04/17/2020   • At risk for sleep apnea    • Bipolar 1 disorder (HCC)    • Cholelithiasis 10/2022   • Coronary artery disease    • Depression 2005   • Diabetes mellitus (HCC) 2002   • Dyspnea on minimal exertion    • Frequent UTI    • Heart murmur     FROM CHILDHOOD   • History of anemia     POST PREGNANCY   • Hyperlipidemia 10/04/2016   • Hypertension    •  Mass of upper lobe of right lung    • Migraines    • Obesity    • PONV (postoperative nausea and vomiting)    • Shoulder pain, right    • Sleep apnea 22   • Spinal headache    • Visual impairment     WEARS GLASSES     Past Surgical History:   Procedure Laterality Date   • APPENDECTOMY     • CARDIAC CATHETERIZATION N/A 2020    Procedure: Left Heart Cath possible PCI, atherectomy, hemodynamic support;  Surgeon: Thomas Zamora MD;  Location: [facility] INVASIVE LOCATION;  Service: Cardiology;  Laterality: N/A;   • CARDIAC CATHETERIZATION  2020   •  SECTION     • FOOT/TOE TENDON REPAIR Left    • HYSTERECTOMY     • LUNG BIOPSY Right 2020   • LUNG LOBECTOMY Right 2022    pt had partial Right lobectomy and nodule removal   • ROTATOR CUFF REPAIR Left    • THORACOSCOPY VIDEO ASSISTED WITH LOBECTOMY Right 2022    Procedure: BRONCHOSCOPY, THORACOSCOPY VIDEO ASSISTED wedge resection X2, INTERCOSTAL NERVE BLOCK;  Surgeon: Ayla Carroll MD;  Location: [facility] OR;  Service: Thoracic;  Laterality: Right;   • TUBAL ABDOMINAL LIGATION         Family History  Family History   Problem Relation Age of Onset   • Arthritis Mother    • Cancer Mother    • Depression Mother    • Diabetes Mother    • Early death Mother    • Mental illness Mother    • Anxiety disorder Mother    • Alcohol abuse Father    • Diabetes Father    • Early death Father    • Heart disease Father    • Hyperlipidemia Father    • Hypertension Father         Father   • Vision loss Father    • Heart attack Father    • Heart disease Sister    • Heart disease Brother    • Hypertension Brother    • Cancer Maternal Grandmother    • Drug abuse Brother    • Hypertension Brother    • Heart disease Brother    • Drug abuse Sister    • Heart attack Sister    • Hypertension Sister         Sister   • Heart disease Sister    • Heart attack Brother    • Malig Hyperthermia Neg Hx        Social History   reports  that she has never smoked. She has never been exposed to tobacco smoke. She has never used smokeless tobacco. She reports current alcohol use. She reports that she does not use drugs.    Allergies  Tylenol [acetaminophen]    Objective     Vital Signs   Vital Signs (last 24 hours)       03/18 0700  03/19 0659 03/19 0700  03/19 1635   Most Recent      Temp (°F)   97.8      98.7     98.7 (37.1) 03/19 1100    Heart Rate 50 -  64    50 -  68     68 03/19 1100    Resp   18      19 19 03/19 1100    BP 99/58 -  130/59    100/50 -  102/48     102/48 03/19 1100    SpO2 (%) 94 -  97    94 -  98     98 03/19 1100          Physical Exam:  Physical Exam  Vitals and nursing note reviewed.   Constitutional:       General: She is not in acute distress.     Appearance: Normal appearance. She is well-developed and normal weight. She is not diaphoretic.   HENT:      Head: Normocephalic and atraumatic.   Eyes:      General: No scleral icterus.     Extraocular Movements: Extraocular movements intact.      Conjunctiva/sclera: Conjunctivae normal.      Pupils: Pupils are equal, round, and reactive to light.   Cardiovascular:      Rate and Rhythm: Normal rate and regular rhythm.      Heart sounds: Normal heart sounds, S1 normal and S2 normal. No murmur heard.  Pulmonary:      Effort: Pulmonary effort is normal. No respiratory distress.      Breath sounds: No stridor. Rales present. No wheezing.   Chest:      Chest wall: No tenderness.   Abdominal:      General: Bowel sounds are normal. There is no distension.      Palpations: Abdomen is soft. There is no mass.      Tenderness: There is no abdominal tenderness. There is no guarding.   Musculoskeletal:         General: No swelling, tenderness or deformity. Normal range of motion.      Cervical back: Neck supple.   Skin:     General: Skin is warm and dry.      Coloration: Skin is not pale.      Findings: No bruising, erythema or rash.   Neurological:      General: No focal deficit present.       Mental Status: She is alert and oriented to person, place, and time. Mental status is at baseline.      Cranial Nerves: No cranial nerve deficit.   Psychiatric:         Mood and Affect: Mood normal.         Microbiology  Microbiology Results (last 10 days)     Procedure Component Value - Date/Time    COVID-19 and FLU A/B PCR - Swab, Nasopharynx [[id number]]  (Abnormal) Collected: 03/17/23 1037    Lab Status: Final result Specimen: Swab from Nasopharynx Updated: 03/17/23 1108     COVID19 Detected     Influenza A PCR Not Detected     Influenza B PCR Not Detected    Narrative:      Fact sheet for providers: https://www.fda.gov/media/322266/download    Fact sheet for patients: https://www.fda.gov/media/599252/download    Test performed by PCR.  Influenza A and Influenza B negative results should be considered presumptive in samples that have a positive SARS-CoV-2 result.    Competitive inhibition studies showed that SARS-CoV-2 virus, when present at concentrations above 3.6E+04 copies/mL, can inhibit the detection and amplification of influenza A and influenza B virus RNA if present at or below 1.8E+02 copies/mL or 4.9E+02 copies/mL, respectively, and may lead to false negative influenza virus results. If co-infection with influenza A or influenza B virus is suspected in samples with a positive SARS-CoV-2 result, the sample should be re-tested with another FDA cleared, approved, or authorized influenza test, if influenza virus detection would change clinical management.          Laboratory  Results from last 7 days   Lab Units 03/19/23  0506   WBC 10*3/mm3 5.90   HEMOGLOBIN g/dL 11.6*   HEMATOCRIT % 36.8   PLATELETS 10*3/mm3 270     Results from last 7 days   Lab Units 03/19/23  0506   SODIUM mmol/L 139   POTASSIUM mmol/L 4.3   CHLORIDE mmol/L 106   CO2 mmol/L 23.0   BUN mg/dL 11   CREATININE mg/dL 0.76   GLUCOSE mg/dL 81   CALCIUM mg/dL 9.6     Results from last 7 days   Lab Units 03/19/23  0506   SODIUM mmol/L 139    POTASSIUM mmol/L 4.3   CHLORIDE mmol/L 106   CO2 mmol/L 23.0   BUN mg/dL 11   CREATININE mg/dL 0.76   GLUCOSE mg/dL 81   CALCIUM mg/dL 9.6                   Radiology  Imaging Results (Last 72 Hours)     Procedure Component Value Units Date/Time    CT Angiogram Chest Pulmonary Embolism [[id number]] Collected: 03/19/23 0819     Updated: 03/19/23 0833    Narrative:      CT ANGIOGRAM CHEST PULMONARY EMBOLISM    Date of Exam: 3/19/2023 8:02 AM EDT    Indication: covid, elevated dimer.    Comparison: 11/16/2022    Technique: Axial CT images were obtained of the chest before and after the uneventful intravenous administration of iodinated contrast utilizing pulmonary embolism protocol.  Sagittal and coronal reconstructions were performed.  Automated exposure   control and iterative reconstruction methods were used.     Findings:  There are no pulmonary arterial filling defects evident to suggest PE. Evaluation of the smaller subsegmental branches is somewhat limited due to misregistration/motion. The pulmonary arteries appear normal caliber. Thoracic aorta is normal caliber.   There is trace fluid in the superior pericardial recess. There is a surgical suture line in the right upper lung suggesting partial resection previously. There is patchy groundglass opacity throughout the lungs bilaterally suggesting infectious or   inflammatory process. This is consistent with patient's reported history of Covid infection. Central airways are grossly patent. There is a 4 mm subpleural nodule in the posterior left upper lobe on image #35 of the axial series. There is a 3 mm   subpleural nodule in the posterior right upper lobe on image #35. Additional 3 mm nodule in the right upper lobe on image #38. 3-4 mm nodule in the right middle lobe on image #76. There is a small right pleural effusion now present. There are prominent   lymph nodes in the right hilum and paratracheal region and subcarinal region which are likely reactive  given the pulmonary findings. Borderline prominent axillary lymph nodes are noted on the left. Limited imaging through the upper abdomen demonstrates a   grossly normal adrenal morphology. The thyroid is heterogeneous and lobular which may be due to goitrous change. No aggressive osseous lesions are identified. There are degenerative changes in the spine. Fat-containing posterior left diaphragmatic   hernia again noted.      Impression:      Impression:    1. No evidence of pulmonary embolus.  2. Patchy groundglass opacities throughout the lungs bilaterally suggesting infectious or inflammatory process or edema. The appearance would be consistent with patient's reported history of Covid infection.  3. Small right pleural effusion.  4. Multiple bilateral subcentimeter pleural nodules, measuring up to 4 mm. Follow-up chest CT in 12 months recommended per Fleischner guidelines.  5. Multiple enlarged lymph nodes, likely reactive given the pulmonary findings. Correlate clinically. Short-term follow-up CT could be performed to document resolution after resolution of clinical symptoms.    Electronically Signed: Manny Camacho    3/19/2023 8:30 AM EDT    Workstation ID: [id number]    XR Chest 2 View [[id number]] Collected: 03/19/23 0315     Updated: 03/19/23 0517    Narrative:      FRONTAL VIEW OF THE CHEST    CLINICAL INDICATION: Chest pain.    COMPARISON: 3/17/2023.    FINDINGS: No focal consolidation, pleural effusion or pneumothorax. Cardiomediastinal morphology is normal.       Impression:      No acute cardiopulmonary abnormality.    Electronically signed by:  Thomas Malik M.D.    3/19/2023 3:16 AM Mountain Time          Cardiology      Results Review:  I have reviewed all clinical data, test, lab, and imaging results.       Schedule Meds  atorvastatin, 20 mg, Oral, Daily  dexamethasone, 4 mg, Oral, Daily With Breakfast  gabapentin, 300 mg, Oral, Q8H  heparin (porcine), 5,000 Units, Subcutaneous, Q8H  ipratropium, 0.5 mg,  Nebulization, 4x Daily - RT  itraconazole, 200 mg, Oral, Daily With Breakfast  melatonin, 5 mg, Oral, Nightly  senna-docusate sodium, 2 tablet, Oral, BID  sertraline, 50 mg, Oral, Daily  sodium chloride, 10 mL, Intravenous, Q12H        Infusion Meds  sodium chloride, 100 mL/hr, Last Rate: 100 mL/hr (03/19/23 1234)        PRN Meds  •  albuterol sulfate HFA  •  benzonatate  •  senna-docusate sodium **AND** polyethylene glycol **AND** bisacodyl **AND** bisacodyl  •  guaiFENesin-codeine  •  nitroglycerin  •  ondansetron **OR** ondansetron  •  [COMPLETED] Insert Peripheral IV **AND** sodium chloride  •  sodium chloride  •  sodium chloride  •  traMADol      Assessment & Plan       Assessment    Consulted for second opinion on histoplasmosis treatment.  Patient originally had a VATS procedure with 2 wedge resections due to pulmonary nodules on 2/8/2022.  Pathology showed rare organisms consistent with histoplasma.  Patient had surveillance CTs with thoracic surgery with noted increased pleuritic pain in January 2023.  Patient had histoplasmosis antibodies positive on 2/7/2023, which is expected having a known histoplasmosis infection.  Current CT does show a subcentimeter subpleural nodule in the left and right upper lobes, as well as subcentimeter nodules in the right upper lobe and right middle lobes  -The patient has no known immunosuppressive condition.  Current CT scan is mostly consistent with COVID-pneumonia.     COVID-19 infection.  Patient is currently on room air and does not appear to be in acute distress.  She complains of diaphoresis and a sore throat but has been afebrile since admission  -CT shows patchy groundglass opacities through both lungs    Type 2 diabetes    Plan    Patient is currently on itraconazole with unusual dose 200 mg daily.  We need to obtain records from the treating physician  We will ask for a histoplasmosis urine antigen-if this is negative we would recommend discontinuing the  itraconazole  We also recommend repeating the CT of the chest in a month or 2-patient states she is already has a CT scheduled for May  Would recommend discontinuing steroids since patient is on room air and is being worked up for a histoplasmosis infection  Continue supportive care  A.m. labs    Patient will need to be in enhanced airborne isolation for 10 days from the first positive COVID screen    Appropriate PPE was used during this assessment        Cierra Gross, BECKY  03/19/23  16:35 EDT    Note is dictated utilizing voice recognition software/Dragon

## 2023-03-19 NOTE — ED PROVIDER NOTES
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  She also has history of hypertension, high cholesterol, diabetes, CAD, and asthma.  She presents today from home with complaints of pain in the right chest that radiates into the right side of her back.  She complains of a sharp pain is worse when she lies flat or moves around.  She states when she lies down she also feels like she cannot breathe and she feels like she is gurgling.  She states this started last night.  She states a few days ago she was diagnosed with COVID.  She states she has had some cough congestion and nausea.  No fever or vomiting.  No leg pain or swelling recent travel or prolonged immobilization.        Review of Systems   Constitutional: Negative for fever.   Respiratory: Positive for cough and shortness of breath.    Cardiovascular: Positive for chest pain. Negative for leg swelling.   Gastrointestinal: Positive for nausea. Negative for abdominal pain and vomiting.       Past Medical History:   Diagnosis Date   • Abnormal ECG    • Anxiety 2005   • Arthritis    • Asthma 04/17/2020   • At risk for sleep apnea    • Bipolar 1 disorder (HCC)    • Cholelithiasis 10/2022   • Coronary artery disease    • Depression 2005   • Diabetes mellitus (HCC) 2002   • Dyspnea on minimal exertion    • Frequent UTI    • Heart murmur     FROM CHILDHOOD   • History of anemia     POST PREGNANCY   • Hyperlipidemia 10/04/2016   • Hypertension    • Mass of upper lobe of right lung    • Migraines    • Obesity 1999   • PONV (postoperative nausea and vomiting)    • Shoulder pain, right    • Sleep apnea 2/28/22   • Spinal headache    • Visual impairment 2011    WEARS GLASSES       Allergies   Allergen Reactions   • Tylenol [Acetaminophen] Hives and Itching       Past Surgical History:   Procedure Laterality Date   • APPENDECTOMY  2011   • CARDIAC CATHETERIZATION N/A 09/24/2020     Procedure: Left Heart Cath possible PCI, atherectomy, hemodynamic support;  Surgeon: Thomas Zamora MD;  Location: [facility] INVASIVE LOCATION;  Service: Cardiology;  Laterality: N/A;   • CARDIAC CATHETERIZATION  2020   •  SECTION     • FOOT/TOE TENDON REPAIR Left    • HYSTERECTOMY     • LUNG BIOPSY Right 2020   • LUNG LOBECTOMY Right 2022    pt had partial Right lobectomy and nodule removal   • ROTATOR CUFF REPAIR Left    • THORACOSCOPY VIDEO ASSISTED WITH LOBECTOMY Right 2022    Procedure: BRONCHOSCOPY, THORACOSCOPY VIDEO ASSISTED wedge resection X2, INTERCOSTAL NERVE BLOCK;  Surgeon: Ayla Carroll MD;  Location: [facility] OR;  Service: Thoracic;  Laterality: Right;   • TUBAL ABDOMINAL LIGATION         Family History   Problem Relation Age of Onset   • Arthritis Mother    • Cancer Mother    • Depression Mother    • Diabetes Mother    • Early death Mother    • Mental illness Mother    • Anxiety disorder Mother    • Alcohol abuse Father    • Diabetes Father    • Early death Father    • Heart disease Father    • Hyperlipidemia Father    • Hypertension Father         Father   • Vision loss Father    • Heart attack Father    • Heart disease Sister    • Heart disease Brother    • Hypertension Brother    • Cancer Maternal Grandmother    • Drug abuse Brother    • Hypertension Brother    • Heart disease Brother    • Drug abuse Sister    • Heart attack Sister    • Hypertension Sister         Sister   • Heart disease Sister    • Heart attack Brother    • Malig Hyperthermia Neg Hx        Social History     Socioeconomic History   • Marital status:    Tobacco Use   • Smoking status: Never     Passive exposure: Never   • Smokeless tobacco: Never   Vaping Use   • Vaping Use: Never used   Substance and Sexual Activity   • Alcohol use: Yes     Comment: VERY RARE   • Drug use: No   • Sexual activity: Not Currently     Partners: Male     Birth control/protection: None,  "Hysterectomy           Objective   Physical Exam  Vital signs and triage nurse note reviewed.  Constitutional: Awake, alert; well-developed and well-nourished. No acute distress is noted.  Obese.  HEENT: Normocephalic, atraumatic; pupils are PERRL with intact EOM; oropharynx is pink and moist without exudate or erythema.  No drooling or pooling of oral secretions.  Neck: Supple, full range of motion without pain; no cervical lymphadenopathy. Normal phonation.  Cardiovascular: Regular rate and rhythm, normal S1-S2.  No murmur noted.  Pulmonary: Respiratory effort regular nonlabored, breath sounds mildly diminished in the right upper lung fields.  Abdomen: Soft, nontender, nondistended with normoactive bowel sounds; no rebound or guarding.  Musculoskeletal: Independent range of motion of all extremities with no palpable tenderness or edema.  Calves are symmetric and nontender.  Neuro: Alert oriented x3, speech is clear and appropriate, GCS 15.    Skin: Flesh tone, warm, dry, intact; no erythematous or petechial rash or lesion.      Procedures           ED Course      /43   Pulse 50   Temp 97.8 °F (36.6 °C) (Oral)   Resp 18   Ht 162.6 cm (64\")   Wt 102 kg (225 lb 5 oz)   LMP  (LMP Unknown)   SpO2 96%   BMI 38.67 kg/m²   Labs Reviewed   COMPREHENSIVE METABOLIC PANEL - Abnormal; Notable for the following components:       Result Value    Albumin 3.4 (*)     Alkaline Phosphatase 119 (*)     All other components within normal limits    Narrative:     GFR Normal >60  Chronic Kidney Disease <60  Kidney Failure <15     CBC WITH AUTO DIFFERENTIAL - Abnormal; Notable for the following components:    Hemoglobin 11.6 (*)     MCH 25.2 (*)     RDW 17.5 (*)     Monocyte % 17.3 (*)     Eosinophil % 0.2 (*)     Monocytes, Absolute 1.00 (*)     All other components within normal limits   D-DIMER, QUANTITATIVE - Abnormal; Notable for the following components:    D-Dimer, Quantitative 0.84 (*)     All other components " "within normal limits    Narrative:     According to the assay 's published package insert, a normal (<0.50 mg/L (FEU)) D-dimer result in conjunction with a non-high clinical probability assessment, excludes deep vein thrombosis (DVT) and pulmonary embolism (PE) with high sensitivity.    D-dimer values increase with age and this can make VTE exclusion of an older population difficult. To address this, the [employer] College [employer], based on best available evidence and recent guidelines, recommends that clinicians use age-adjusted D-dimer thresholds in patients greater than 50 years of age with: a) a low probability of PE who do not meet all Pulmonary Embolism Rule Out Criteria, or b) in those with intermediate probability of PE.   The formula for an age-adjusted D-dimer cut-off is \"age/100\".  For example, a 60 year old patient would have an age-adjusted cut-off of 0.60 mg/L (FEU) and an 80 year old 0.80 mg/L (FEU).   BNP (IN-HOUSE) - Normal    Narrative:     Among patients with dyspnea, NT-proBNP is highly sensitive for the detection of acute congestive heart failure. In addition NT-proBNP of <300 pg/ml effectively rules out acute congestive heart failure with 99% negative predictive value.    Results may be falsely decreased if patient taking Biotin.     SINGLE HSTROPONIN T - Normal    Narrative:     High Sensitive Troponin T Reference Range:  <10.0 ng/L- Negative Female for AMI  <15.0 ng/L- Negative Male for AMI  >=10 - Abnormal Female indicating possible myocardial injury.  >=15 - Abnormal Male indicating possible myocardial injury.   Clinicians would have to utilize clinical acumen, EKG, Troponin, and serial changes to determine if it is an Acute Myocardial Infarction or myocardial injury due to an underlying chronic condition.        PROCALCITONIN - Normal    Narrative:     As a Marker for Sepsis (Non-Neonates):    1. <0.5 ng/mL represents a low risk of severe sepsis and/or septic shock.  2. >2 " "ng/mL represents a high risk of severe sepsis and/or septic shock.    As a Marker for Lower Respiratory Tract Infections that require antibiotic therapy:    PCT on Admission    Antibiotic Therapy       6-12 Hrs later    >0.5                Strongly Recommended  >0.25 - <0.5        Recommended   0.1 - 0.25          Discouraged              Remeasure/reassess PCT  <0.1                Strongly Discouraged     Remeasure/reassess PCT    As 28 day mortality risk marker: \"Change in Procalcitonin Result\" (>80% or <=80%) if Day 0 (or Day 1) and Day 4 values are available. Refer to http://www.[facility] [place]ulhampct-calculator.com    Change in PCT <=80%  A decrease of PCT levels below or equal to 80% defines a positive change in PCT test result representing a higher risk for 28-day all-cause mortality of patients diagnosed with severe sepsis for septic shock.    Change in PCT >80%  A decrease of PCT levels of more than 80% defines a negative change in PCT result representing a lower risk for 28-day all-cause mortality of patients diagnosed with severe sepsis or septic shock.      CBC AND DIFFERENTIAL    Narrative:     The following orders were created for panel order CBC & Differential.  Procedure                               Abnormality         Status                     ---------                               -----------         ------                     CBC Auto Differential[[id number]]        Abnormal            Final result                 Please view results for these tests on the individual orders.     Medications   sodium chloride 0.9 % flush 10 mL (has no administration in time range)   dexamethasone (DECADRON) tablet 4 mg (has no administration in time range)   atorvastatin (LIPITOR) tablet 20 mg (has no administration in time range)   albuterol sulfate HFA (PROVENTIL HFA;VENTOLIN HFA;PROAIR HFA) inhaler 2 puff (has no administration in time range)   benzonatate (TESSALON) capsule 100 mg (has no administration in time range) "   gabapentin (NEURONTIN) capsule 300 mg (has no administration in time range)   lisinopril (PRINIVIL,ZESTRIL) tablet 2.5 mg (has no administration in time range)   melatonin tablet 5 mg (has no administration in time range)   sertraline (ZOLOFT) tablet 50 mg (has no administration in time range)   ipratropium (ATROVENT) nebulizer solution 0.5 mg (has no administration in time range)   sodium chloride 0.9 % flush 10 mL (has no administration in time range)   sodium chloride 0.9 % flush 10 mL (has no administration in time range)   sodium chloride 0.9 % infusion 40 mL (has no administration in time range)   heparin (porcine) 5000 UNIT/ML injection 5,000 Units (has no administration in time range)   nitroglycerin (NITROSTAT) SL tablet 0.4 mg (has no administration in time range)   sodium chloride 0.9 % infusion (has no administration in time range)   sennosides-docusate (PERICOLACE) 8.6-50 MG per tablet 2 tablet (has no administration in time range)     And   polyethylene glycol (MIRALAX) packet 17 g (has no administration in time range)     And   bisacodyl (DULCOLAX) EC tablet 5 mg (has no administration in time range)     And   bisacodyl (DULCOLAX) suppository 10 mg (has no administration in time range)   ondansetron (ZOFRAN) tablet 4 mg (has no administration in time range)     Or   ondansetron (ZOFRAN) injection 4 mg (has no administration in time range)   iopamidol (ISOVUE-370) 76 % injection 100 mL (81 mL Intravenous Given 3/19/23 0813)     XR Chest 2 View    Result Date: 3/19/2023  No acute cardiopulmonary abnormality. Electronically signed by:  Thomas Malik M.D.  3/19/2023 3:16 AM Mountain Time    XR Chest 2 View    Result Date: 3/17/2023  Impression: 1. Right costophrenic angle blunting could represent trace pleural fluid or pleural scarring. 2. No acute airspace disease. Chronic linear scarring in the right lung. Electronically Signed: Marleny Chavez  3/17/2023 11:37 AM EDT  Workstation ID: [id number]    CT  Angiogram Chest Pulmonary Embolism    Result Date: 3/19/2023  Impression: 1. No evidence of pulmonary embolus. 2. Patchy groundglass opacities throughout the lungs bilaterally suggesting infectious or inflammatory process or edema. The appearance would be consistent with patient's reported history of Covid infection. 3. Small right pleural effusion. 4. Multiple bilateral subcentimeter pleural nodules, measuring up to 4 mm. Follow-up chest CT in 12 months recommended per Fleischner guidelines. 5. Multiple enlarged lymph nodes, likely reactive given the pulmonary findings. Correlate clinically. Short-term follow-up CT could be performed to document resolution after resolution of clinical symptoms. Electronically Signed: Manny Camacho  3/19/2023 8:30 AM EDT  Workstation ID: [id number]                                         Medical Decision Making  Care was assumed from Dr. Phelan pending CT PE protocol results and disposition.  Work-up thus far has been unremarkable with normal troponin, procalcitonin and BNP.  CBC ruled out leukocytosis but reveals anemia with a hemoglobin of 11.6.  CMP reveals normal kidney function and LFTs.  Dimer elevated at 0.84.  My interpretation of CT reveals no evidence of pulmonary embolus.  Radiologist notes patchy groundglass opacities that are consistent with COVID.  Small right pleural effusion and multiple bilateral subcentimeter pleural nodules measuring up to 4 mm are noted.  Patient was informed of this and advised to follow-up in 12 months.  Upon my reassessment, patient still complains of shortness of breath and does not feel that she has improved.  I reviewed the findings with the patient, who states she lives about an hour away.  She sees Stacy Nance and Jorge for pulmonology due to partial lobectomy.  She has been taking Paxlovid for 2 days already but does not feel that she has been improving.  Given patient's history, she was offered admission for further evaluation and pulmonology  consult.  I considered placing patient in ED observation unit but there are no beds available.  Patient was discussed with  with the hospitalist group, who is agreeable with patient's admission.  Patient has remained hemodynamically stable and is in no acute distress.    Bradycardia: acute illness or injury  COVID-19: acute illness or injury  Shortness of breath: chronic illness or injury  Amount and/or Complexity of Data Reviewed  Labs: ordered. Decision-making details documented in ED Course.  Radiology: ordered. Decision-making details documented in ED Course.  ECG/medicine tests: ordered. Decision-making details documented in ED Course.     Details: EKG interpretation: Normal sinus rhythm, rate 60, no acute ST elevation seen      Risk  Prescription drug management.  Decision regarding hospitalization.          Final diagnoses:   Shortness of breath   COVID-19   Bradycardia       ED Disposition  ED Disposition     ED Disposition   Decision to Admit    Condition   --    Comment   Level of Care: Med/Surg [1]   Diagnosis: COVID-19 [[id number]]   Admitting Physician: DAVIE GUAN [[id number]]   Attending Physician: DAVIE GUAN [[id number]]               No follow-up provider specified.       Medication List      No changes were made to your prescriptions during this visit.          Joseline Ball, APRN  03/19/23 0995

## 2023-03-20 ENCOUNTER — TELEPHONE (OUTPATIENT)
Dept: SURGERY | Facility: CLINIC | Age: 53
End: 2023-03-20
Payer: MEDICARE

## 2023-03-20 LAB
ALBUMIN SERPL-MCNC: 3.5 G/DL (ref 3.5–5.2)
ALBUMIN/GLOB SERPL: 1 G/DL
ALP SERPL-CCNC: 114 U/L (ref 39–117)
ALT SERPL W P-5'-P-CCNC: 14 U/L (ref 1–33)
ANION GAP SERPL CALCULATED.3IONS-SCNC: 11 MMOL/L (ref 5–15)
AST SERPL-CCNC: 16 U/L (ref 1–32)
BASOPHILS # BLD AUTO: 0 10*3/MM3 (ref 0–0.2)
BASOPHILS # BLD AUTO: 0.1 10*3/MM3 (ref 0–0.2)
BASOPHILS NFR BLD AUTO: 0.1 % (ref 0–1.5)
BASOPHILS NFR BLD AUTO: 0.3 % (ref 0–1.5)
BILIRUB SERPL-MCNC: <0.2 MG/DL (ref 0–1.2)
BUN SERPL-MCNC: 12 MG/DL (ref 6–20)
BUN/CREAT SERPL: 17.4 (ref 7–25)
CALCIUM SPEC-SCNC: 9.6 MG/DL (ref 8.6–10.5)
CHLORIDE SERPL-SCNC: 102 MMOL/L (ref 98–107)
CO2 SERPL-SCNC: 23 MMOL/L (ref 22–29)
CREAT SERPL-MCNC: 0.69 MG/DL (ref 0.57–1)
CRP SERPL-MCNC: 0.37 MG/DL (ref 0–0.5)
DEPRECATED RDW RBC AUTO: 50.8 FL (ref 37–54)
DEPRECATED RDW RBC AUTO: 50.8 FL (ref 37–54)
EGFRCR SERPLBLD CKD-EPI 2021: 104.6 ML/MIN/1.73
EOSINOPHIL # BLD AUTO: 0 10*3/MM3 (ref 0–0.4)
EOSINOPHIL # BLD AUTO: 0 10*3/MM3 (ref 0–0.4)
EOSINOPHIL NFR BLD AUTO: 0 % (ref 0.3–6.2)
EOSINOPHIL NFR BLD AUTO: 0 % (ref 0.3–6.2)
ERYTHROCYTE [DISTWIDTH] IN BLOOD BY AUTOMATED COUNT: 17.1 % (ref 12.3–15.4)
ERYTHROCYTE [DISTWIDTH] IN BLOOD BY AUTOMATED COUNT: 17.2 % (ref 12.3–15.4)
ERYTHROCYTE [SEDIMENTATION RATE] IN BLOOD: 61 MM/HR (ref 0–30)
GLOBULIN UR ELPH-MCNC: 3.5 GM/DL
GLUCOSE BLDC GLUCOMTR-MCNC: 171 MG/DL (ref 70–105)
GLUCOSE BLDC GLUCOMTR-MCNC: 193 MG/DL (ref 70–105)
GLUCOSE SERPL-MCNC: 211 MG/DL (ref 65–99)
HCT VFR BLD AUTO: 35.7 % (ref 34–46.6)
HCT VFR BLD AUTO: 38.3 % (ref 34–46.6)
HGB BLD-MCNC: 11.3 G/DL (ref 12–15.9)
HGB BLD-MCNC: 12.1 G/DL (ref 12–15.9)
HOLD SPECIMEN: NORMAL
LYMPHOCYTES # BLD AUTO: 1 10*3/MM3 (ref 0.7–3.1)
LYMPHOCYTES # BLD AUTO: 1.4 10*3/MM3 (ref 0.7–3.1)
LYMPHOCYTES NFR BLD AUTO: 5.9 % (ref 19.6–45.3)
LYMPHOCYTES NFR BLD AUTO: 7.6 % (ref 19.6–45.3)
MAGNESIUM SERPL-MCNC: 1.9 MG/DL (ref 1.6–2.6)
MCH RBC QN AUTO: 25.1 PG (ref 26.6–33)
MCH RBC QN AUTO: 25.1 PG (ref 26.6–33)
MCHC RBC AUTO-ENTMCNC: 31.6 G/DL (ref 31.5–35.7)
MCHC RBC AUTO-ENTMCNC: 31.6 G/DL (ref 31.5–35.7)
MCV RBC AUTO: 79.4 FL (ref 79–97)
MCV RBC AUTO: 79.5 FL (ref 79–97)
MONOCYTES # BLD AUTO: 1 10*3/MM3 (ref 0.1–0.9)
MONOCYTES # BLD AUTO: 1.3 10*3/MM3 (ref 0.1–0.9)
MONOCYTES NFR BLD AUTO: 5.3 % (ref 5–12)
MONOCYTES NFR BLD AUTO: 7.2 % (ref 5–12)
NEUTROPHILS NFR BLD AUTO: 15.1 10*3/MM3 (ref 1.7–7)
NEUTROPHILS NFR BLD AUTO: 16.4 10*3/MM3 (ref 1.7–7)
NEUTROPHILS NFR BLD AUTO: 86.6 % (ref 42.7–76)
NEUTROPHILS NFR BLD AUTO: 87 % (ref 42.7–76)
NRBC BLD AUTO-RTO: 0 /100 WBC (ref 0–0.2)
NRBC BLD AUTO-RTO: 0 /100 WBC (ref 0–0.2)
PHOSPHATE SERPL-MCNC: 3 MG/DL (ref 2.5–4.5)
PLATELET # BLD AUTO: 309 10*3/MM3 (ref 140–450)
PLATELET # BLD AUTO: 330 10*3/MM3 (ref 140–450)
PMV BLD AUTO: 8.7 FL (ref 6–12)
PMV BLD AUTO: 8.8 FL (ref 6–12)
POTASSIUM SERPL-SCNC: 4.1 MMOL/L (ref 3.5–5.2)
PROCALCITONIN SERPL-MCNC: 0.05 NG/ML (ref 0–0.25)
PROT SERPL-MCNC: 7 G/DL (ref 6–8.5)
RBC # BLD AUTO: 4.49 10*6/MM3 (ref 3.77–5.28)
RBC # BLD AUTO: 4.81 10*6/MM3 (ref 3.77–5.28)
SODIUM SERPL-SCNC: 136 MMOL/L (ref 136–145)
TSH SERPL DL<=0.05 MIU/L-ACNC: 0.08 UIU/ML (ref 0.27–4.2)
WBC NRBC COR # BLD: 17.4 10*3/MM3 (ref 3.4–10.8)
WBC NRBC COR # BLD: 18.8 10*3/MM3 (ref 3.4–10.8)

## 2023-03-20 PROCEDURE — 83735 ASSAY OF MAGNESIUM: CPT | Performed by: INTERNAL MEDICINE

## 2023-03-20 PROCEDURE — 94640 AIRWAY INHALATION TREATMENT: CPT

## 2023-03-20 PROCEDURE — 94799 UNLISTED PULMONARY SVC/PX: CPT

## 2023-03-20 PROCEDURE — G0378 HOSPITAL OBSERVATION PER HR: HCPCS

## 2023-03-20 PROCEDURE — 80053 COMPREHEN METABOLIC PANEL: CPT | Performed by: INTERNAL MEDICINE

## 2023-03-20 PROCEDURE — 93010 ELECTROCARDIOGRAM REPORT: CPT | Performed by: INTERNAL MEDICINE

## 2023-03-20 PROCEDURE — 84145 PROCALCITONIN (PCT): CPT | Performed by: INTERNAL MEDICINE

## 2023-03-20 PROCEDURE — 94761 N-INVAS EAR/PLS OXIMETRY MLT: CPT

## 2023-03-20 PROCEDURE — 84443 ASSAY THYROID STIM HORMONE: CPT | Performed by: INTERNAL MEDICINE

## 2023-03-20 PROCEDURE — 36415 COLL VENOUS BLD VENIPUNCTURE: CPT | Performed by: INTERNAL MEDICINE

## 2023-03-20 PROCEDURE — 93005 ELECTROCARDIOGRAM TRACING: CPT | Performed by: INTERNAL MEDICINE

## 2023-03-20 PROCEDURE — 86140 C-REACTIVE PROTEIN: CPT | Performed by: INTERNAL MEDICINE

## 2023-03-20 PROCEDURE — 99232 SBSQ HOSP IP/OBS MODERATE 35: CPT | Performed by: NURSE PRACTITIONER

## 2023-03-20 PROCEDURE — 85025 COMPLETE CBC W/AUTO DIFF WBC: CPT | Performed by: INTERNAL MEDICINE

## 2023-03-20 PROCEDURE — 82962 GLUCOSE BLOOD TEST: CPT

## 2023-03-20 PROCEDURE — 85652 RBC SED RATE AUTOMATED: CPT | Performed by: INTERNAL MEDICINE

## 2023-03-20 PROCEDURE — 84100 ASSAY OF PHOSPHORUS: CPT | Performed by: INTERNAL MEDICINE

## 2023-03-20 RX ORDER — ALBUTEROL SULFATE 90 UG/1
2 AEROSOL, METERED RESPIRATORY (INHALATION)
Status: DISCONTINUED | OUTPATIENT
Start: 2023-03-20 | End: 2023-03-21 | Stop reason: HOSPADM

## 2023-03-20 RX ORDER — ITRACONAZOLE 100 MG/1
200 CAPSULE ORAL 2 TIMES DAILY WITH MEALS
Status: DISCONTINUED | OUTPATIENT
Start: 2023-03-20 | End: 2023-03-21 | Stop reason: HOSPADM

## 2023-03-20 RX ADMIN — GABAPENTIN 300 MG: 300 CAPSULE ORAL at 14:46

## 2023-03-20 RX ADMIN — GABAPENTIN 300 MG: 300 CAPSULE ORAL at 05:08

## 2023-03-20 RX ADMIN — BENZONATATE 100 MG: 100 CAPSULE ORAL at 22:18

## 2023-03-20 RX ADMIN — ALBUTEROL SULFATE 2 PUFF: 108 INHALANT RESPIRATORY (INHALATION) at 10:50

## 2023-03-20 RX ADMIN — ITRACONAZOLE 200 MG: 100 CAPSULE ORAL at 17:16

## 2023-03-20 RX ADMIN — GABAPENTIN 300 MG: 300 CAPSULE ORAL at 22:18

## 2023-03-20 RX ADMIN — ITRACONAZOLE 200 MG: 100 CAPSULE ORAL at 09:07

## 2023-03-20 RX ADMIN — Medication 10 ML: at 22:18

## 2023-03-20 RX ADMIN — SERTRALINE 50 MG: 50 TABLET, FILM COATED ORAL at 09:07

## 2023-03-20 RX ADMIN — ALBUTEROL SULFATE 2 PUFF: 108 INHALANT RESPIRATORY (INHALATION) at 04:11

## 2023-03-20 RX ADMIN — ALBUTEROL SULFATE 2 PUFF: 108 INHALANT RESPIRATORY (INHALATION) at 06:38

## 2023-03-20 RX ADMIN — Medication 10 ML: at 09:11

## 2023-03-20 NOTE — PROGRESS NOTES
Infectious Diseases Progress Note      LOS: 0 days   Patient Care Team:  Marsha Lopez MD as PCP - General (Internal Medicine)  Thomas Zamora MD as Consulting Physician (Cardiology)  Ayla Carroll MD as Surgeon (Thoracic Surgery)    Chief Complaint: Shortness of breath, cough, pleuritic chest pain, sore throat and diaphoresis at admission    Subjective       The patient has been afebrile for the last 24 hours.  The patient is on room air, hemodynamically stable, and is tolerating antimicrobial therapy.  Patient is feeling better today      Review of Systems:   Review of Systems   Constitutional: Positive for fatigue.   HENT: Negative.    Eyes: Negative.    Respiratory: Negative.    Cardiovascular: Positive for chest pain.   Gastrointestinal: Negative.    Endocrine: Negative.    Genitourinary: Negative.    Musculoskeletal: Negative.    Skin: Negative.    Neurological: Negative.    Psychiatric/Behavioral: Negative.    All other systems reviewed and are negative.       Objective     Vital Signs  Temp:  [97.1 °F (36.2 °C)-98 °F (36.7 °C)] 97.1 °F (36.2 °C)  Heart Rate:  [32-74] 65  Resp:  [12-27] 20  BP: (105-124)/(53-65) 109/53    Physical Exam:  Physical Exam  Vitals and nursing note reviewed.   Constitutional:       General: She is not in acute distress.     Appearance: Normal appearance. She is well-developed and normal weight. She is not diaphoretic.   HENT:      Head: Normocephalic and atraumatic.   Eyes:      General: No scleral icterus.     Extraocular Movements: Extraocular movements intact.      Conjunctiva/sclera: Conjunctivae normal.      Pupils: Pupils are equal, round, and reactive to light.   Cardiovascular:      Rate and Rhythm: Normal rate and regular rhythm.      Heart sounds: Normal heart sounds, S1 normal and S2 normal. No murmur heard.  Pulmonary:      Effort: Pulmonary effort is normal. No respiratory distress.      Breath sounds: No stridor. Rales present. No wheezing.    Chest:      Chest wall: No tenderness.   Abdominal:      General: Bowel sounds are normal. There is no distension.      Palpations: Abdomen is soft. There is no mass.      Tenderness: There is no abdominal tenderness. There is no guarding.   Musculoskeletal:         General: No swelling, tenderness or deformity. Normal range of motion.      Cervical back: Neck supple.   Skin:     General: Skin is warm and dry.      Coloration: Skin is not pale.      Findings: No bruising, erythema or rash.   Neurological:      General: No focal deficit present.      Mental Status: She is alert and oriented to person, place, and time. Mental status is at baseline.      Cranial Nerves: No cranial nerve deficit.   Psychiatric:         Mood and Affect: Mood normal.          Results Review:    I have reviewed all clinical data, test, lab, and imaging results.     Radiology  No Radiology Exams Resulted Within Past 24 Hours    Cardiology    Laboratory    Results from last 7 days   Lab Units 03/19/23  0506   WBC 10*3/mm3 5.90   HEMOGLOBIN g/dL 11.6*   HEMATOCRIT % 36.8   PLATELETS 10*3/mm3 270     Results from last 7 days   Lab Units 03/19/23  0506   SODIUM mmol/L 139   POTASSIUM mmol/L 4.3   CHLORIDE mmol/L 106   CO2 mmol/L 23.0   BUN mg/dL 11   CREATININE mg/dL 0.76   GLUCOSE mg/dL 81   ALBUMIN g/dL 3.4*   BILIRUBIN mg/dL 0.2   ALK PHOS U/L 119*   AST (SGOT) U/L 20   ALT (SGPT) U/L 14   CALCIUM mg/dL 9.6                 Microbiology   Microbiology Results (last 10 days)     Procedure Component Value - Date/Time    COVID-19 and FLU A/B PCR - Swab, Nasopharynx [[id number]]  (Abnormal) Collected: 03/17/23 1037    Lab Status: Final result Specimen: Swab from Nasopharynx Updated: 03/17/23 1108     COVID19 Detected     Influenza A PCR Not Detected     Influenza B PCR Not Detected    Narrative:      Fact sheet for providers: https://www.fda.gov/media/673874/download    Fact sheet for patients: https://www.fda.gov/media/925298/download    Test  performed by PCR.  Influenza A and Influenza B negative results should be considered presumptive in samples that have a positive SARS-CoV-2 result.    Competitive inhibition studies showed that SARS-CoV-2 virus, when present at concentrations above 3.6E+04 copies/mL, can inhibit the detection and amplification of influenza A and influenza B virus RNA if present at or below 1.8E+02 copies/mL or 4.9E+02 copies/mL, respectively, and may lead to false negative influenza virus results. If co-infection with influenza A or influenza B virus is suspected in samples with a positive SARS-CoV-2 result, the sample should be re-tested with another FDA cleared, approved, or authorized influenza test, if influenza virus detection would change clinical management.          Medication Review:       Schedule Meds  albuterol sulfate HFA, 2 puff, Inhalation, 4x Daily - RT  atorvastatin, 20 mg, Oral, Daily  gabapentin, 300 mg, Oral, Q8H  heparin (porcine), 5,000 Units, Subcutaneous, Q8H  itraconazole, 200 mg, Oral, Daily With Breakfast  melatonin, 5 mg, Oral, Nightly  senna-docusate sodium, 2 tablet, Oral, BID  sertraline, 50 mg, Oral, Daily  sodium chloride, 10 mL, Intravenous, Q12H        Infusion Meds  sodium chloride, 100 mL/hr, Last Rate: 100 mL/hr (03/19/23 1234)        PRN Meds  •  albuterol sulfate HFA  •  benzonatate  •  senna-docusate sodium **AND** polyethylene glycol **AND** bisacodyl **AND** bisacodyl  •  guaiFENesin-codeine  •  nitroglycerin  •  ondansetron **OR** ondansetron  •  [COMPLETED] Insert Peripheral IV **AND** sodium chloride  •  sodium chloride  •  sodium chloride  •  traMADol        Assessment & Plan       Antimicrobial Therapy   1.  P.o. itraconazole        2.        3.        4.        5.            Assessment     Consulted for second opinion on histoplasmosis treatment.  Patient originally had a VATS procedure with 2 wedge resections due to pulmonary nodules on 2/8/2022.  Pathology showed rare organisms  consistent with histoplasma.  Patient had surveillance CTs with thoracic surgery with noted increased pleuritic pain in January 2023.  Patient had histoplasmosis antibodies positive on 2/7/2023, which is expected having a known histoplasmosis infection.  Current CT does show a subcentimeter subpleural nodule in the left and right upper lobes, as well as subcentimeter nodules in the right upper lobe and right middle lobes  -The patient has no known immunosuppressive condition.  Current CT scan is mostly consistent with COVID-pneumonia.      COVID-19 infection.  Patient is currently on room air and does not appear to be in acute distress.  She complains of diaphoresis and a sore throat but has been afebrile since admission  -CT shows patchy groundglass opacities through both lungs     Type 2 diabetes     Plan     Change p.o. itraconazole to the correct dose of 200 mg twice daily  Histoplasmosis urine antigen is pending-if negative we would recommend taking patient off the p.o. itraconazole  We also recommend repeating the CT of the chest in a month or 2-patient states she is already has a CT scheduled for May  Discontinue p.o. decadron  Continue supportive care  A.m. labs  Case discussed with pulmonary    Patient will need to be in enhanced airborne isolation for 10 days from the first positive COVID screen     Appropriate PPE was used during this assessment    The above note was transcribed for Dr. Cespedes-physical exam and review of systems were performed by him    Cierra Gross, BECKY  03/20/23  15:13 EDT    Note is dictated utilizing voice recognition software/Decad[employer]

## 2023-03-20 NOTE — CONSULTS
Cardiology Consult Note      REQUESTING PHYSICIAN    Catracho Contreras MD    PATIENT IDENTIFICATION  Name: Tessie Conner  Age: 53 y.o.  Sex: female  :  1970  MRN: [id number]             REASON FOR CONSULTATION:  53-year-old female with no known history of ischemic heart disease.  Past medical history includes obstructive sleep apnea on CPAP- Pap discontinued 3 weeks ago per pulmonary for histoplasmosis.  History of dyslipidemia, hypertension, migrainous headaches, anxiety/depression, diabetes mellitus 2, histoplasmosis with prior lobectomy followed by pulmonary    TTE 2022: EF 66-70%, mild RVH    CC:  Bradycardia    HISTORY OF PRESENT ILLNESS:   Patient presented to the emergency department at [facility] 3/19/2023 with complaint of shortness of breath.  She was diagnosed with COVID-19 illness  and was started on outpatient antiviral therapy.  She was having some discomfort in her back that was worsening and she remains short of breath, therefore she came to the ED for further evaluation..  She denies any recent diarrhea, nausea or vomiting.  She was seen in consultation by pulmonary and infectious disease.  During the night she was noted to have drop in heart rate to upper 30s.  She also had heart rate in the mid to upper 40s during the day yesterday.  Cardiology was consulted for further evaluation.    Upon my evaluation today, she is resting comfortably in bed.  She is quite anxious and concerned about her heart rate.  She reports during the night she had loss of bowel function and her sleep which has never happened to her before.  Heart rate currently in the 70s, sinus.  Patient states that her chest hurts to cough.      REVIEW OF SYSTEMS:  Pertinent items are noted in HPI, all other systems reviewed and negative    OBJECTIVE   EKG: Sinus bradycardia 41 bpm    ASSESSMENT  Bradycardia  Known history of obstructive sleep apnea on CPAP-discontinued 3 weeks ago due to  "histoplasmosis  Pulmonary histoplasmosis  Diabetes mellitus 2  Obstructive sleep apnea  COVID-19 pneumonia    PLAN  Check 2D echocardiogram  Patient has been off CPAP x3 weeks which may be contributing to her bradycardia.  Okay to resume CPAP per pulmonary.  She will call family to bring her machine in.  Bradycardia may also be result of COVID-19 infection.  No indication for pacemaker implant.  Patient was reassured.  We will continue to monitor rate closely.  She is on no negative chronotropic medications.        Vital Signs  Visit Vitals  /56 (BP Location: Left arm, Patient Position: Lying)   Pulse 59   Temp 97.2 °F (36.2 °C) (Oral)   Resp 22   Ht 162.6 cm (64\")   Wt 102 kg (225 lb 5 oz)   LMP  (LMP Unknown)   SpO2 100%   BMI 38.67 kg/m²     Oxygen Therapy  SpO2: 100 %  Pulse Oximetry Type: Continuous  Device (Oxygen Therapy): room air  Flowsheet Rows    Flowsheet Row First Filed Value   Admission Height 162.6 cm (64\") Documented at 03/19/2023 0403   Admission Weight 102 kg (225 lb 5 oz) Documented at 03/19/2023 0403        Intake & Output (last 3 days)       03/17 0701  03/18 0700 03/18 0701  03/19 0700 03/19 0701  03/20 0700 03/20 0701  03/21 0700    P.O.   240     Total Intake(mL/kg)   240 (2.4)     Urine (mL/kg/hr)   600 (0.2)     Total Output   600     Net   -360             Urine Unmeasured Occurrence   1 x         Lines, Drains & Airways     Active LDAs     Name Placement date Placement time Site Days    Peripheral IV 03/19/23 0500 Posterior;Right Hand 03/19/23  0500  Hand  1    Peripheral IV 03/19/23 0720 Posterior;Right Forearm 03/19/23  0720  Forearm  1                MEDICAL HISTORY    Past Medical History:   Diagnosis Date   • Abnormal ECG    • Anxiety 2005   • Arthritis    • Asthma 04/17/2020   • At risk for sleep apnea    • Bipolar 1 disorder (HCC)    • Cholelithiasis 10/2022   • Coronary artery disease    • Depression 2005   • Diabetes mellitus (HCC) 2002   • Dyspnea on minimal exertion    • " Frequent UTI    • Heart murmur     FROM CHILDHOOD   • History of anemia     POST PREGNANCY   • Hyperlipidemia 10/04/2016   • Hypertension    • Mass of upper lobe of right lung    • Migraines    • Obesity    • PONV (postoperative nausea and vomiting)    • Shoulder pain, right    • Sleep apnea 22   • Spinal headache    • Visual impairment     WEARS GLASSES        SURGICAL HISTORY    Past Surgical History:   Procedure Laterality Date   • APPENDECTOMY     • CARDIAC CATHETERIZATION N/A 2020    Procedure: Left Heart Cath possible PCI, atherectomy, hemodynamic support;  Surgeon: Thomas Zamora MD;  Location: [facility] INVASIVE LOCATION;  Service: Cardiology;  Laterality: N/A;   • CARDIAC CATHETERIZATION  2020   •  SECTION     • FOOT/TOE TENDON REPAIR Left    • HYSTERECTOMY     • LUNG BIOPSY Right 2020   • LUNG LOBECTOMY Right 2022    pt had partial Right lobectomy and nodule removal   • ROTATOR CUFF REPAIR Left    • THORACOSCOPY VIDEO ASSISTED WITH LOBECTOMY Right 2022    Procedure: BRONCHOSCOPY, THORACOSCOPY VIDEO ASSISTED wedge resection X2, INTERCOSTAL NERVE BLOCK;  Surgeon: Ayla Carroll MD;  Location: [facility];  Service: Thoracic;  Laterality: Right;   • TUBAL ABDOMINAL LIGATION          FAMILY HISTORY    Family History   Problem Relation Age of Onset   • Arthritis Mother    • Cancer Mother    • Depression Mother    • Diabetes Mother    • Early death Mother    • Mental illness Mother    • Anxiety disorder Mother    • Alcohol abuse Father    • Diabetes Father    • Early death Father    • Heart disease Father    • Hyperlipidemia Father    • Hypertension Father         Father   • Vision loss Father    • Heart attack Father    • Heart disease Sister    • Heart disease Brother    • Hypertension Brother    • Cancer Maternal Grandmother    • Drug abuse Brother    • Hypertension Brother    • Heart disease Brother    • Drug abuse Sister    • Heart  "attack Sister    • Hypertension Sister         Sister   • Heart disease Sister    • Heart attack Brother    • Malig Hyperthermia Neg Hx        SOCIAL HISTORY    Social History     Tobacco Use   • Smoking status: Never     Passive exposure: Never   • Smokeless tobacco: Never   Substance Use Topics   • Alcohol use: Yes     Comment: VERY RARE        ALLERGIES    Allergies   Allergen Reactions   • Tylenol [Acetaminophen] Hives and Itching              /56 (BP Location: Left arm, Patient Position: Lying)   Pulse 59   Temp 97.2 °F (36.2 °C) (Oral)   Resp 22   Ht 162.6 cm (64\")   Wt 102 kg (225 lb 5 oz)   LMP  (LMP Unknown)   SpO2 100%   BMI 38.67 kg/m²   Intake/Output last 3 shifts:  I/O last 3 completed shifts:  In: 240 [P.O.:240]  Out: 600 [Urine:600]  Intake/Output this shift:  No intake/output data recorded.    PHYSICAL EXAM:    General: Alert, cooperative, no distress, appears stated age  Head:  Normocephalic, atraumatic, mucous membranes moist  Eyes:  Conjunctivae/corneas clear, EOM's intact     Neck:  Supple,  no adenopathy; no JVD or bruit  Lungs: Clear to auscultation bilaterally, no wheezes, rhonchi or rales are noted  Chest wall: No tenderness  Heart::  Regular rate and rhythm, S1 and S2 normal, no murmur, rub or gallop  Abdomen: Soft, nontender, nondistended, bowel sounds active  Extremities: No cyanosis, clubbing, or edema   Pulses: 2+ and symmetric all extremities  Skin:  No rashes or lesions  Neuro/psych: A&O x3. CN II through XII are grossly intact with appropriate affect      Scheduled Meds:      albuterol sulfate HFA, 2 puff, Inhalation, 4x Daily - RT  atorvastatin, 20 mg, Oral, Daily  dexamethasone, 4 mg, Oral, Daily With Breakfast  gabapentin, 300 mg, Oral, Q8H  heparin (porcine), 5,000 Units, Subcutaneous, Q8H  itraconazole, 200 mg, Oral, Daily With Breakfast  melatonin, 5 mg, Oral, Nightly  senna-docusate sodium, 2 tablet, Oral, BID  sertraline, 50 mg, Oral, Daily  sodium chloride, 10 " mL, Intravenous, Q12H        Continuous Infusions:    sodium chloride, 100 mL/hr, Last Rate: 100 mL/hr (03/19/23 1234)        PRN Meds:    •  albuterol sulfate HFA  •  benzonatate  •  senna-docusate sodium **AND** polyethylene glycol **AND** bisacodyl **AND** bisacodyl  •  guaiFENesin-codeine  •  nitroglycerin  •  ondansetron **OR** ondansetron  •  [COMPLETED] Insert Peripheral IV **AND** sodium chloride  •  sodium chloride  •  sodium chloride  •  traMADol        Results Review:     I reviewed the patient's new clinical results.    CBC    Results from last 7 days   Lab Units 03/19/23  0506   WBC 10*3/mm3 5.90   HEMOGLOBIN g/dL 11.6*   PLATELETS 10*3/mm3 270     Cr Clearance Estimated Creatinine Clearance: 100.6 mL/min (by C-G formula based on SCr of 0.76 mg/dL).  Coag     HbA1C   Lab Results   Component Value Date    HGBA1C 6.0 (H) 02/17/2023    HGBA1C 5.6 10/28/2022    HGBA1C 6.0 (H) 02/15/2022     Blood Glucose   Glucose   Date/Time Value Ref Range Status   03/20/2023 0733 171 (H) 70 - 105 mg/dL Final     Comment:     Serial Number: [id number]:  [id number]     Infection   Results from last 7 days   Lab Units 03/19/23  0506   PROCALCITONIN ng/mL 0.05     CMP   Results from last 7 days   Lab Units 03/19/23  0506   SODIUM mmol/L 139   POTASSIUM mmol/L 4.3   CHLORIDE mmol/L 106   CO2 mmol/L 23.0   BUN mg/dL 11   CREATININE mg/dL 0.76   GLUCOSE mg/dL 81   ALBUMIN g/dL 3.4*   BILIRUBIN mg/dL 0.2   ALK PHOS U/L 119*   AST (SGOT) U/L 20   ALT (SGPT) U/L 14     ABG      UA      ROSALIO  No results found for: POCMETH, POCAMPHET, POCBARBITUR, POCBENZO, POCCOCAINE, POCOPIATES, POCOXYCODO, POCPHENCYC, POCPROPOXY, POCTHC, POCTRICYC  Lysis Labs   Results from last 7 days   Lab Units 03/19/23  0506   HEMOGLOBIN g/dL 11.6*   PLATELETS 10*3/mm3 270   CREATININE mg/dL 0.76     Radiology(recent) XR Chest 2 View    Result Date: 3/19/2023  No acute cardiopulmonary abnormality. Electronically signed by:  Thomas Malik M.D.  3/19/2023  3:16 AM Mountain Time    CT Angiogram Chest Pulmonary Embolism    Result Date: 3/19/2023  Impression: 1. No evidence of pulmonary embolus. 2. Patchy groundglass opacities throughout the lungs bilaterally suggesting infectious or inflammatory process or edema. The appearance would be consistent with patient's reported history of Covid infection. 3. Small right pleural effusion. 4. Multiple bilateral subcentimeter pleural nodules, measuring up to 4 mm. Follow-up chest CT in 12 months recommended per Fleischner guidelines. 5. Multiple enlarged lymph nodes, likely reactive given the pulmonary findings. Correlate clinically. Short-term follow-up CT could be performed to document resolution after resolution of clinical symptoms. Electronically Signed: Manny Janice  3/19/2023 8:30 AM EDT  Workstation ID: [id number]        Results from last 7 days   Lab Units 03/19/23  0506   HSTROP T ng/L 8       Xrays, labs reviewed personally by physician.    ECG/EMG Results (most recent)     Procedure Component Value Units Date/Time    ECG 12 Lead Dyspnea [[id number]] Collected: 03/19/23 0409     Updated: 03/19/23 0410     QT Interval 409 ms     Narrative:      HEART RATE= 59  bpm  RR Interval= 1016  ms  RI Interval= 141  ms  P Horizontal Axis= 13  deg  P Front Axis= 56  deg  QRSD Interval= 97  ms  QT Interval= 409  ms  QRS Axis= 54  deg  T Wave Axis= 27  deg  - OTHERWISE NORMAL ECG -  Sinus bradycardia  Low voltage, precordial leads  Electronically Signed By:   Date and Time of Study: 2023-03-19 04:09:17    ECG 12 Lead Bradycardia [[id number]] Collected: 03/20/23 0331     Updated: 03/20/23 0332     QT Interval 477 ms     Narrative:      HEART RATE= 41  bpm  RR Interval= 1468  ms  RI Interval= 168  ms  P Horizontal Axis= 2  deg  P Front Axis= 22  deg  QRSD Interval= 102  ms  QT Interval= 477  ms  QRS Axis= 56  deg  T Wave Axis= 54  deg  - OTHERWISE NORMAL ECG -  Sinus bradycardia  When compared with ECG of 19-Mar-2023 4:09:17,  Significant  axis, voltage or hypertrophy change  Electronically Signed By:   Date and Time of Study: 2023-03-20 03:31:32            Medication Review:   I have reviewed the patient's current medication list  Scheduled Meds:albuterol sulfate HFA, 2 puff, Inhalation, 4x Daily - RT  atorvastatin, 20 mg, Oral, Daily  dexamethasone, 4 mg, Oral, Daily With Breakfast  gabapentin, 300 mg, Oral, Q8H  heparin (porcine), 5,000 Units, Subcutaneous, Q8H  itraconazole, 200 mg, Oral, Daily With Breakfast  melatonin, 5 mg, Oral, Nightly  senna-docusate sodium, 2 tablet, Oral, BID  sertraline, 50 mg, Oral, Daily  sodium chloride, 10 mL, Intravenous, Q12H      Continuous Infusions:sodium chloride, 100 mL/hr, Last Rate: 100 mL/hr (03/19/23 1234)      PRN Meds:.•  albuterol sulfate HFA  •  benzonatate  •  senna-docusate sodium **AND** polyethylene glycol **AND** bisacodyl **AND** bisacodyl  •  guaiFENesin-codeine  •  nitroglycerin  •  ondansetron **OR** ondansetron  •  [COMPLETED] Insert Peripheral IV **AND** sodium chloride  •  sodium chloride  •  sodium chloride  •  traMADol    Imaging:  Imaging Results (Last 72 Hours)     Procedure Component Value Units Date/Time    CT Angiogram Chest Pulmonary Embolism [[id number]] Collected: 03/19/23 0819     Updated: 03/19/23 0833    Narrative:      CT ANGIOGRAM CHEST PULMONARY EMBOLISM    Date of Exam: 3/19/2023 8:02 AM EDT    Indication: covid, elevated dimer.    Comparison: 11/16/2022    Technique: Axial CT images were obtained of the chest before and after the uneventful intravenous administration of iodinated contrast utilizing pulmonary embolism protocol.  Sagittal and coronal reconstructions were performed.  Automated exposure   control and iterative reconstruction methods were used.     Findings:  There are no pulmonary arterial filling defects evident to suggest PE. Evaluation of the smaller subsegmental branches is somewhat limited due to misregistration/motion. The pulmonary arteries appear normal  caliber. Thoracic aorta is normal caliber.   There is trace fluid in the superior pericardial recess. There is a surgical suture line in the right upper lung suggesting partial resection previously. There is patchy groundglass opacity throughout the lungs bilaterally suggesting infectious or   inflammatory process. This is consistent with patient's reported history of Covid infection. Central airways are grossly patent. There is a 4 mm subpleural nodule in the posterior left upper lobe on image #35 of the axial series. There is a 3 mm   subpleural nodule in the posterior right upper lobe on image #35. Additional 3 mm nodule in the right upper lobe on image #38. 3-4 mm nodule in the right middle lobe on image #76. There is a small right pleural effusion now present. There are prominent   lymph nodes in the right hilum and paratracheal region and subcarinal region which are likely reactive given the pulmonary findings. Borderline prominent axillary lymph nodes are noted on the left. Limited imaging through the upper abdomen demonstrates a   grossly normal adrenal morphology. The thyroid is heterogeneous and lobular which may be due to goitrous change. No aggressive osseous lesions are identified. There are degenerative changes in the spine. Fat-containing posterior left diaphragmatic   hernia again noted.      Impression:      Impression:    1. No evidence of pulmonary embolus.  2. Patchy groundglass opacities throughout the lungs bilaterally suggesting infectious or inflammatory process or edema. The appearance would be consistent with patient's reported history of Covid infection.  3. Small right pleural effusion.  4. Multiple bilateral subcentimeter pleural nodules, measuring up to 4 mm. Follow-up chest CT in 12 months recommended per Fleischner guidelines.  5. Multiple enlarged lymph nodes, likely reactive given the pulmonary findings. Correlate clinically. Short-term follow-up CT could be performed to document  "resolution after resolution of clinical symptoms.    Electronically Signed: Mannyuli Camacho    3/19/2023 8:30 AM EDT    Workstation ID: [id number]    XR Chest 2 View [[id number]] Collected: 03/19/23 0315     Updated: 03/19/23 0517    Narrative:      FRONTAL VIEW OF THE CHEST    CLINICAL INDICATION: Chest pain.    COMPARISON: 3/17/2023.    FINDINGS: No focal consolidation, pleural effusion or pneumothorax. Cardiomediastinal morphology is normal.       Impression:      No acute cardiopulmonary abnormality.    Electronically signed by:  Thomas Malik M.D.    3/19/2023 3:16 AM Mountain Time            BECKY Merchant  03/20/23  08:27 EDT       EMR Dragon/Transcription:   \"Dictated utilizing Dragon dictation\".     Electronically signed by BECKY Merchant, 03/20/23, 8:27 AM EDT.    "

## 2023-03-20 NOTE — PROGRESS NOTES
Daily Progress Note          Assessment    COVID-19 pneumonia  pulmonary histoplasmosis  Restrictive lung disease: Negative connective tissue work-up  Hyperglycemia  Diabetes mellitus  HTN  HLD  ADY     Recommendations:  Treatment discussed with the ID    Patient can resume using CPAP at night  Oxygenation improved: DC dexamethasone  Monitor for bradycardia  Bronchodilatores  Glucose control  Lipitor  DVTprophylaxis                     LOS: 0 days     Subjective     Patient reports her breathing has improved but she had an episode of asymptomatic sinus bradycardia last night    Objective     Vital signs for last 24 hours:  Vitals:    03/20/23 0859 03/20/23 1050 03/20/23 1052 03/20/23 1229   BP: 124/56   109/53   BP Location: Left arm   Left arm   Patient Position: Lying   Lying   Pulse: 74 60 64 65   Resp: 19 18 18 20   Temp: 97.2 °F (36.2 °C)   97.1 °F (36.2 °C)   TempSrc: Oral   Oral   SpO2: 97% 97% 97% 95%   Weight:       Height:           Intake/Output last 3 shifts:  I/O last 3 completed shifts:  In: 240 [P.O.:240]  Out: 600 [Urine:600]  Intake/Output this shift:  I/O this shift:  In: 240 [P.O.:240]  Out: -       Radiology  Imaging Results (Last 24 Hours)     ** No results found for the last 24 hours. **          Labs:  Results from last 7 days   Lab Units 03/19/23  0506   WBC 10*3/mm3 5.90   HEMOGLOBIN g/dL 11.6*   HEMATOCRIT % 36.8   PLATELETS 10*3/mm3 270     Results from last 7 days   Lab Units 03/19/23  0506   SODIUM mmol/L 139   POTASSIUM mmol/L 4.3   CHLORIDE mmol/L 106   CO2 mmol/L 23.0   BUN mg/dL 11   CREATININE mg/dL 0.76   CALCIUM mg/dL 9.6   BILIRUBIN mg/dL 0.2   ALK PHOS U/L 119*   ALT (SGPT) U/L 14   AST (SGOT) U/L 20   GLUCOSE mg/dL 81         Results from last 7 days   Lab Units 03/19/23  0506   ALBUMIN g/dL 3.4*     Results from last 7 days   Lab Units 03/19/23  0506   HSTROP T ng/L 8                           Meds:   SCHEDULE  albuterol sulfate HFA, 2 puff, Inhalation, 4x Daily -  RT  atorvastatin, 20 mg, Oral, Daily  gabapentin, 300 mg, Oral, Q8H  heparin (porcine), 5,000 Units, Subcutaneous, Q8H  itraconazole, 200 mg, Oral, BID With Meals  melatonin, 5 mg, Oral, Nightly  senna-docusate sodium, 2 tablet, Oral, BID  sertraline, 50 mg, Oral, Daily  sodium chloride, 10 mL, Intravenous, Q12H      Infusions  sodium chloride, 100 mL/hr, Last Rate: 100 mL/hr (03/19/23 1234)      PRNs  •  albuterol sulfate HFA  •  benzonatate  •  senna-docusate sodium **AND** polyethylene glycol **AND** bisacodyl **AND** bisacodyl  •  guaiFENesin-codeine  •  nitroglycerin  •  ondansetron **OR** ondansetron  •  [COMPLETED] Insert Peripheral IV **AND** sodium chloride  •  sodium chloride  •  sodium chloride  •  traMADol    Physical Exam:  General Appearance:  Alert   HEENT:  Normocephalic, without obvious abnormality, Conjunctiva/corneas clear,.   Nares normal, no drainage     Neck:  Supple, symmetrical, trachea midline.   Lungs /Chest wall: Good air entry bilaterally with minimal bilateral basal rhonchi, respirations unlabored, symmetrical wall movement.     Heart:  Regular rate and rhythm, S1 S2 normal  Abdomen: Soft, non-tender, no masses, no organomegaly.    Extremities: No edema, no clubbing or cyanosis     ROS  Constitutional: Negative for chills, fever and malaise/fatigue.   HENT: Negative.    Eyes: Negative.    Cardiovascular: Negative.    Respiratory: Positive for cough and shortness of breath.    Skin: Negative.    Musculoskeletal: Negative.    Gastrointestinal: Negative.    Genitourinary: Negative.    Neurological: Negative.    Psychiatric/Behavioral: Negative.      I reviewed the recent clinical results    Much of this encounter note is an electronic transcription/translation of spoken language to printed text using Dragon Software which might include inadvertent errors in transcription.

## 2023-03-20 NOTE — PLAN OF CARE
Goal Outcome Evaluation:  Plan of Care Reviewed With: patient        Progress: improving  Outcome Evaluation: Patient states that she is feeling better, worried about episode of incontinence during the night when heart rate dropped.  Cardio contulted.  Continue monitoring

## 2023-03-20 NOTE — PLAN OF CARE
Goal Outcome Evaluation:  Plan of Care Reviewed With: patient            Early in the shift the patient stated she was feeling better and wanted to leave. It was explained she would be leaving AMA as the Dr on call would not discharge her at night. Patient stated she would then stay until morning.  Through out the night the patient HR has dropped as low as 32 at one point. Dr was notified and new orders were put in. A consult with Cardiology was called in for in the morning.  Patient has no complaints of pain or discomfort Patient is A&O X4 and is able to make her needs known. The call light is with in reach. Will continue with patients care.

## 2023-03-20 NOTE — PROGRESS NOTES
[facility] Medicine Services Daily Progress Note    Patient Name: Tessie Conner  : 1970  MRN: [id number]  Primary Care Physician:  Marsha Lopez MD  Date of admission: 3/19/2023      Subjective      Chief Complaint: Progressively worsening shortness of breath active COVID infection      Patient Reports she wants to go home but is not sure why she was bradycardic overnight.  Patient was woken up and doing an EKG she was still 41 bpm despite being awake.  Cardiology consulted for evaluation.  No a.m. labs available despite orders.  No electrolytes available to review despite orders.  TSH added.    ROS negative except as above      Objective      Vitals:   Temp:  [97.1 °F (36.2 °C)-98 °F (36.7 °C)] 97.1 °F (36.2 °C)  Heart Rate:  [32-74] 65  Resp:  [12-27] 20  BP: (105-124)/(53-65) 109/53    Physical Exam  Vitals reviewed.   Constitutional:       Comments: Awake alert on room air     HENT:      Head: Normocephalic.      Nose: Nose normal.      Mouth/Throat:      Mouth: Mucous membranes are moist.   Cardiovascular:      Rate and Rhythm: Regular rhythm. Bradycardia present.      Pulses: Normal pulses.      Heart sounds: Normal heart sounds.      Comments: Mild bradycardia in the 50s during my evaluation  Pulmonary:      Effort: Pulmonary effort is normal.      Comments: Nearly absent lung sounds right lung due to lobectomy  Abdominal:      General: Abdomen is flat.      Palpations: Abdomen is soft.   Musculoskeletal:         General: Normal range of motion.      Cervical back: Normal range of motion.   Skin:     General: Skin is warm.   Neurological:      General: No focal deficit present.      Mental Status: She is alert and oriented to person, place, and time.   Psychiatric:         Mood and Affect: Mood normal.         Behavior: Behavior normal.             Result Review    Result Review:  I have personally reviewed the results from the time of this admission to 3/20/2023  16:28 EDT and agree with these findings:  [x]  Laboratory  []  Microbiology  []  Radiology  []  EKG/Telemetry   []  Cardiology/Vascular   []  Pathology  []  Old records  []  Other:  Most notable findings include: No labs available despite orders      Assessment & Plan    53-year-old female presents with acute COVID infection and history of histoplasmosis status post lobectomy right lung     Active Hospital Problems:  There are no active hospital problems to display for this patient.     Plan:   Respiratory distress without respiratory failure  Likely due to history of histoplasmosis status post lobectomy with superimposed acute COVID 19 infection.  Patient is taking antiviral therapy outpatient.  We will discontinue Decadron 4 mg daily due to histoplasma infection  Pulmonary consulted she sees Dr. Nance and Dr. Patel  Resume itraconazole for histoplasma     Resume all other medications including for anxiety depression     DVT prophylaxis:  Heparin     CODE STATUS:    Admission Status:  I believe this patient meets observation status.     I discussed the patient's findings and my recommendations with patient.     This patient has been examined wearing appropriate Personal Protective Equipment and discussed with Pulmonary.  03/20/23      Electronically signed by Catracho Contreras MD, 03/20/23, 16:28 EDT.  [facility] Hospitalist Team

## 2023-03-20 NOTE — CASE MANAGEMENT/SOCIAL WORK
Continued Stay Note   [facility]     Patient Name: Tessie Conner  MRN: [id number]  Today's Date: 3/20/2023    Admit Date: 3/19/2023    Plan: needs cm assessment   unable to reach patient on phone   Discharge Plan     Row Name 03/20/23 1735       Plan    Plan needs cm assessment   unable to reach patient on phone           Expected Discharge Date and Time     Expected Discharge Date Expected Discharge Time    Mar 21, 2023             Salena Stark RN   Phone communication or documentation only - no physical contact with patient or family.

## 2023-03-20 NOTE — TELEPHONE ENCOUNTER
shaw, just called. she is inpatient with covid, she read her CT scan and wants to know if we want to see her sooner. she is due to follow up in may with CT chest.  I told her to wait for right now so that the covid infection  can reslove. what would you advise we do?    I spoke to Eileen PENA and she agrees to repeat scan as planned in may. I called and infomred the patient and she states infectious disease came to see her in the hospital between our calls and they agree to rescan in may once covid resolves. Patient agrees. All appts scheduled

## 2023-03-21 ENCOUNTER — TELEPHONE (OUTPATIENT)
Dept: FAMILY MEDICINE CLINIC | Facility: CLINIC | Age: 53
End: 2023-03-21
Payer: MEDICARE

## 2023-03-21 ENCOUNTER — APPOINTMENT (OUTPATIENT)
Dept: RESPIRATORY THERAPY | Facility: HOSPITAL | Age: 53
End: 2023-03-21
Payer: MEDICARE

## 2023-03-21 ENCOUNTER — READMISSION MANAGEMENT (OUTPATIENT)
Dept: CALL CENTER | Facility: HOSPITAL | Age: 53
End: 2023-03-21
Payer: MEDICARE

## 2023-03-21 VITALS
SYSTOLIC BLOOD PRESSURE: 110 MMHG | HEIGHT: 64 IN | OXYGEN SATURATION: 98 % | RESPIRATION RATE: 17 BRPM | WEIGHT: 225.31 LBS | HEART RATE: 52 BPM | TEMPERATURE: 98 F | DIASTOLIC BLOOD PRESSURE: 60 MMHG | BODY MASS INDEX: 38.47 KG/M2

## 2023-03-21 LAB
ALBUMIN SERPL-MCNC: 3.6 G/DL (ref 3.5–5.2)
ALBUMIN/GLOB SERPL: 0.9 G/DL
ALP SERPL-CCNC: 125 U/L (ref 39–117)
ALT SERPL W P-5'-P-CCNC: 17 U/L (ref 1–33)
ANION GAP SERPL CALCULATED.3IONS-SCNC: 10 MMOL/L (ref 5–15)
AST SERPL-CCNC: 18 U/L (ref 1–32)
BILIRUB SERPL-MCNC: <0.2 MG/DL (ref 0–1.2)
BUN SERPL-MCNC: 14 MG/DL (ref 6–20)
BUN/CREAT SERPL: 18.4 (ref 7–25)
CALCIUM SPEC-SCNC: 9.8 MG/DL (ref 8.6–10.5)
CHLORIDE SERPL-SCNC: 104 MMOL/L (ref 98–107)
CO2 SERPL-SCNC: 26 MMOL/L (ref 22–29)
CREAT SERPL-MCNC: 0.76 MG/DL (ref 0.57–1)
EGFRCR SERPLBLD CKD-EPI 2021: 94.4 ML/MIN/1.73
GLOBULIN UR ELPH-MCNC: 3.8 GM/DL
GLUCOSE SERPL-MCNC: 159 MG/DL (ref 65–99)
MAGNESIUM SERPL-MCNC: 2.1 MG/DL (ref 1.6–2.6)
PHOSPHATE SERPL-MCNC: 3.5 MG/DL (ref 2.5–4.5)
POTASSIUM SERPL-SCNC: 4.1 MMOL/L (ref 3.5–5.2)
PROT SERPL-MCNC: 7.4 G/DL (ref 6–8.5)
SODIUM SERPL-SCNC: 140 MMOL/L (ref 136–145)

## 2023-03-21 PROCEDURE — 93005 ELECTROCARDIOGRAM TRACING: CPT | Performed by: INTERNAL MEDICINE

## 2023-03-21 PROCEDURE — 94799 UNLISTED PULMONARY SVC/PX: CPT

## 2023-03-21 PROCEDURE — 93010 ELECTROCARDIOGRAM REPORT: CPT | Performed by: INTERNAL MEDICINE

## 2023-03-21 PROCEDURE — 99232 SBSQ HOSP IP/OBS MODERATE 35: CPT | Performed by: INTERNAL MEDICINE

## 2023-03-21 PROCEDURE — G0378 HOSPITAL OBSERVATION PER HR: HCPCS

## 2023-03-21 RX ORDER — THEOPHYLLINE 300 MG/1
300 TABLET, EXTENDED RELEASE ORAL EVERY 12 HOURS SCHEDULED
Status: DISCONTINUED | OUTPATIENT
Start: 2023-03-21 | End: 2023-03-21 | Stop reason: HOSPADM

## 2023-03-21 RX ORDER — THEOPHYLLINE 300 MG/1
300 TABLET, EXTENDED RELEASE ORAL EVERY 12 HOURS SCHEDULED
Qty: 60 TABLET | Refills: 0 | Status: SHIPPED | OUTPATIENT
Start: 2023-03-21

## 2023-03-21 RX ORDER — ITRACONAZOLE 100 MG/1
200 CAPSULE ORAL 2 TIMES DAILY WITH MEALS
Qty: 80 CAPSULE | Refills: 0 | Status: SHIPPED | OUTPATIENT
Start: 2023-03-21 | End: 2023-04-10

## 2023-03-21 RX ADMIN — ALBUTEROL SULFATE 2 PUFF: 108 INHALANT RESPIRATORY (INHALATION) at 07:58

## 2023-03-21 RX ADMIN — ITRACONAZOLE 200 MG: 100 CAPSULE ORAL at 08:40

## 2023-03-21 RX ADMIN — SERTRALINE 50 MG: 50 TABLET, FILM COATED ORAL at 08:40

## 2023-03-21 RX ADMIN — GABAPENTIN 300 MG: 300 CAPSULE ORAL at 05:16

## 2023-03-21 RX ADMIN — ITRACONAZOLE 200 MG: 100 CAPSULE ORAL at 17:29

## 2023-03-21 RX ADMIN — Medication 10 ML: at 08:42

## 2023-03-21 RX ADMIN — GABAPENTIN 300 MG: 300 CAPSULE ORAL at 14:47

## 2023-03-21 NOTE — TELEPHONE ENCOUNTER
Caller: Tessie Conner    Relationship: Self    Best call back number: [phone]    What is the medical concern/diagnosis: HISTOPLASMOSIS     What specialty or service is being requested: INFECTIOUS MEDICINE     What is the provider, practice or medical service name:     DR. MAYUR EUGENE    What is the office location:     [address] [place] [address]    What is the office phone number: [phone]    Any additional details: REQUESTED A REFERRAL TO INFECTION DISEASE MEDICINE

## 2023-03-21 NOTE — TELEPHONE ENCOUNTER
Caller: Tessie Conner    Relationship to patient: Self    Best call back number: [phone]     Patient is needing: PATIENT IS CALLING STATING THAT SHE IS IN THE HOSPITAL AT [facility] AND DIAGNOSED WITH HISTOPLASMOSIS.  SHE STATES THE HOSPITALIST HAS ADVISED THAT SHE ASK DR. JARAMILLO FOR A REFERRAL TO AN INFECTIOUS DISEASE DOCTOR.  PATIENT STATES SHE PREFERS FOR THE DOCTOR TO BE IN [place] SINCE [place], BUT IT CAN BE IN [place] OR [place].  PLEASE CONTACT PATIENT TO ADVISE ABOUT THE STATUS OF DOING THIS REFERRAL.

## 2023-03-21 NOTE — CASE MANAGEMENT/SOCIAL WORK
Discharge Planning Assessment  [facility]     Patient Name: Tessie Conner  MRN: [id number]  Today's Date: 3/21/2023    Admit Date: 3/19/2023    Plan: Home with family.  Family can transport at discharge,  Pending clinical course.   Discharge Needs Assessment     Row Name 03/21/23 1157       Living Environment    People in Home spouse    Name(s) of People in Home  CLARITA    Current Living Arrangements home    Primary Care Provided by self    Provides Primary Care For no one    Family Caregiver if Needed spouse    Family Caregiver Names  CLARITA    Quality of Family Relationships supportive    Able to Return to Prior Arrangements yes       Resource/Environmental Concerns    Resource/Environmental Concerns none    Transportation Concerns none       Transition Planning    Patient/Family Anticipates Transition to home with family    Patient/Family Anticipated Services at Transition none    Transportation Anticipated family or friend will provide       Discharge Needs Assessment    Readmission Within the Last 30 Days no previous admission in last 30 days    Equipment Currently Used at Home cpap;nebulizer    Concerns to be Addressed care coordination/care conferences;discharge planning    Anticipated Changes Related to Illness other (see comments)    Equipment Needed After Discharge none    Provided Post Acute Provider List? N/A    N/A Provider List Comment denies dc needs               Discharge Plan     Row Name 03/21/23 1158       Plan    Plan Home with family.  Family can transport at discharge,  Pending clinical course.    Patient/Family in Agreement with Plan yes    Plan Comments Spoke with patient on phone due to covid isolation.  Confirmed pcp and pharmacy. Patient lives at home with spouse. Family is able to transport at discharge.DC barriers : cardiology monitoring bradycardia              Continued Care and Services - Admitted Since 3/19/2023    Coordination has not been started for this encounter.        Expected Discharge Date and Time     Expected Discharge Date Expected Discharge Time    Mar 22, 2023          Demographic Summary     Row Name 03/21/23 1157       General Information    Admission Type observation    Arrived From emergency department    Referral Source admission list    Reason for Consult discharge planning    Preferred Language English       Contact Information    Permission Granted to Share Info With                Functional Status     Row Name 03/21/23 1157       Functional Status    Usual Activity Tolerance good    Current Activity Tolerance good       Functional Status, IADL    Medications independent    Meal Preparation independent    Housekeeping independent    Laundry independent    Shopping independent       Mental Status    General Appearance WDL WDL       Mental Status Summary    Recent Changes in Mental Status/Cognitive Functioning no changes                   Patient Forms     Row Name 03/21/23 0949       Patient Forms    Important Message from Medicare (IMM) Delivered  PENA 03/19/23 [place]/PT reg                  Salena Stark RN   Phone communication or documentation only - no physical contact with patient or family.

## 2023-03-21 NOTE — DISCHARGE SUMMARY
[facility] Medicine Services  DISCHARGE SUMMARY    Patient Name: Tessie Conner  : 1970  MRN: [id number]    Discharge condition: Stabilized  Date of Admission: 3/19/2023  Discharge Diagnosis: Histoplasmosis, acute COVID-19 infection, bradycardia  Date of Discharge: 2023  Primary Care Physician: Marsha Lopez MD      Presenting Problem:   Shortness of breath [R06.02]  Bradycardia [R00.1]  COVID-19 [U07.1]    Active and Resolved Hospital Problems:  Active Hospital Problems    Diagnosis POA   • **COVID-19 [U07.1] Yes      Resolved Hospital Problems   No resolved problems to display.         Hospital Course     Hospital Course:  Tessie Conner is a 53 y.o. female with history of histoplasma who presented to the hospital with acute COVID infection and worsening shortness of breath.  Patient was diagnosed with COVID-19 on  and has been struggling with progressive worsening daily.  She is also on itraconazole for histoplasma.  Imaging revealed multiple lymph nodes and nodules.  Patient states she was not aware of these.  She was instructed to follow-up with an infectious disease doctor regularly as well as hematology oncology for repeat imaging and confirmation of resolution of nodules and lymph nodes.  She also follows with Dr. Jorge Nance of pulmonary.  Her shortness of breath had improved but she developed bradycardia.  Her heart rate dropping to the 30s.  Cardiology was consulted and a monitor was placed.  Bradycardia was attributed to her acute COVID infection which is occasionally seen.  Once the monitor was placed and the patient was cleared by all consultants the patient was discharged home in stable condition with stable vitals with instructions to follow-up regarding her abnormal imaging to confirm resolution.  Urine histoplasma test was ordered but was not resulted prior to discharge.  She was instructed to follow-up with her primary family  doctor as well as an infection disease specialist from now on.            Reasons For Change In Medications and Indications for New Medications:      Day of Discharge     Vital Signs:  Temp:  [97.4 °F (36.3 °C)-98 °F (36.7 °C)] 98 °F (36.7 °C)  Heart Rate:  [32-52] 52  Resp:  [16-19] 17  BP: (106-115)/(53-60) 110/60    Physical Exam:  Physical Exam  Vitals reviewed.   Constitutional:       Comments: Son at bedside   HENT:      Head: Normocephalic.      Nose: Nose normal.      Mouth/Throat:      Mouth: Mucous membranes are moist.   Cardiovascular:      Rate and Rhythm: Regular rhythm. Bradycardia present.      Pulses: Normal pulses.      Heart sounds: Normal heart sounds.      Comments: Mild bradycardia in the 50s  Pulmonary:      Effort: Pulmonary effort is normal.      Comments: Severely decreased lung sounds on the right side  Abdominal:      General: Abdomen is flat.      Palpations: Abdomen is soft.   Musculoskeletal:         General: Normal range of motion.      Cervical back: Normal range of motion.   Skin:     General: Skin is warm.   Neurological:      General: No focal deficit present.      Mental Status: She is alert and oriented to person, place, and time.   Psychiatric:         Mood and Affect: Mood normal.         Behavior: Behavior normal.            Pertinent  and/or Most Recent Results     LAB RESULTS:      Lab 03/20/23 2245 03/20/23 1627 03/19/23  0506   WBC 18.80* 17.40* 5.90   HEMOGLOBIN 12.1 11.3* 11.6*   HEMATOCRIT 38.3 35.7 36.8   PLATELETS 330 309 270   NEUTROS ABS 16.40* 15.10* 3.50   LYMPHS ABS 1.40 1.00 1.30   MONOS ABS 1.00* 1.30* 1.00*   EOS ABS 0.00 0.00 0.00   MCV 79.5 79.4 79.9   SED RATE  --  61*  --    CRP  --  0.37  --    PROCALCITONIN  --  0.05 0.05         Lab 03/20/23 2245 03/20/23 1627 03/19/23  0506   SODIUM 140 136 139   POTASSIUM 4.1 4.1 4.3   CHLORIDE 104 102 106   CO2 26.0 23.0 23.0   ANION GAP 10.0 11.0 10.0   BUN 14 12 11   CREATININE 0.76 0.69 0.76   EGFR 94.4 104.6  94.4   GLUCOSE 159* 211* 81   CALCIUM 9.8 9.6 9.6   MAGNESIUM 2.1 1.9  --    PHOSPHORUS 3.5 3.0  --    TSH  --  0.083*  --          Lab 03/20/23  2245 03/20/23  1627 03/19/23  0506   TOTAL PROTEIN 7.4 7.0 7.1   ALBUMIN 3.6 3.5 3.4*   GLOBULIN 3.8 3.5 3.7   ALT (SGPT) 17 14 14   AST (SGOT) 18 16 20   BILIRUBIN <0.2 <0.2 0.2   ALK PHOS 125* 114 119*         Lab 03/19/23  0506   PROBNP 119.5   HSTROP T 8                 Brief Urine Lab Results  (Last result in the past 365 days)      Color   Clarity   Blood   Leuk Est   Nitrite   Protein   CREAT   Urine HCG        10/29/22 0924 Yellow   Cloudy   Large   Small (1+)   Negative   300 mg/dL               Microbiology Results (last 10 days)     Procedure Component Value - Date/Time    COVID-19 and FLU A/B PCR - Swab, Nasopharynx [[id number]]  (Abnormal) Collected: 03/17/23 1037    Lab Status: Final result Specimen: Swab from Nasopharynx Updated: 03/17/23 1108     COVID19 Detected     Influenza A PCR Not Detected     Influenza B PCR Not Detected    Narrative:      Fact sheet for providers: https://www.fda.gov/media/075036/download    Fact sheet for patients: https://www.fda.gov/media/764208/download    Test performed by PCR.  Influenza A and Influenza B negative results should be considered presumptive in samples that have a positive SARS-CoV-2 result.    Competitive inhibition studies showed that SARS-CoV-2 virus, when present at concentrations above 3.6E+04 copies/mL, can inhibit the detection and amplification of influenza A and influenza B virus RNA if present at or below 1.8E+02 copies/mL or 4.9E+02 copies/mL, respectively, and may lead to false negative influenza virus results. If co-infection with influenza A or influenza B virus is suspected in samples with a positive SARS-CoV-2 result, the sample should be re-tested with another FDA cleared, approved, or authorized influenza test, if influenza virus detection would change clinical management.          XR Chest 2  View    Result Date: 3/19/2023  Impression: No acute cardiopulmonary abnormality. Electronically signed by:  Thomas Malik M.D.  3/19/2023 3:16 AM Mountain Time    XR Chest 2 View    Result Date: 3/17/2023  Impression: Impression: 1. Right costophrenic angle blunting could represent trace pleural fluid or pleural scarring. 2. No acute airspace disease. Chronic linear scarring in the right lung. Electronically Signed: Marleny Chavez  3/17/2023 11:37 AM EDT  Workstation ID: [id number]    CT Angiogram Chest Pulmonary Embolism    Result Date: 3/19/2023  Impression: Impression: 1. No evidence of pulmonary embolus. 2. Patchy groundglass opacities throughout the lungs bilaterally suggesting infectious or inflammatory process or edema. The appearance would be consistent with patient's reported history of Covid infection. 3. Small right pleural effusion. 4. Multiple bilateral subcentimeter pleural nodules, measuring up to 4 mm. Follow-up chest CT in 12 months recommended per Fleischner guidelines. 5. Multiple enlarged lymph nodes, likely reactive given the pulmonary findings. Correlate clinically. Short-term follow-up CT could be performed to document resolution after resolution of clinical symptoms. Electronically Signed: Manny Camacho  3/19/2023 8:30 AM EDT  Workstation ID: [id number]              Results for orders placed during the hospital encounter of 12/05/22    Adult Transthoracic Echo Complete W/ Cont if Necessary Per Protocol    Interpretation Summary  •  Left ventricular systolic function is normal. Left ventricular ejection fraction appears to be 66 - 70%.  •  Left ventricular wall thickness is consistent with borderline concentric hypertrophy.  •  The right ventricular cavity is mildly dilated.  •  Estimated right ventricular systolic pressure from tricuspid regurgitation is normal (<35 mmHg).    Transthoracic echocardiography reveals ejection fraction close to 70% and mild left atrial enlargement.  Right ventricle is  mildly dilated with mild RVH with normal RV systolic function.  No effusion.    Electronically signed by Dwaine Marin MD, 12/05/22, 6:24 PM EST.      Labs Pending at Discharge:  Pending Labs     Order Current Status    Histoplasma Ag Ur - Urine, Urine, Clean Catch In process          Procedures Performed           Consults:   Consults     Date and Time Order Name Status Description    3/20/2023  3:09 AM Inpatient Cardiology Consult Completed     3/19/2023  2:45 PM Inpatient Infectious Diseases Consult Completed     3/19/2023  9:11 AM Inpatient Pulmonology Consult Completed     3/19/2023  8:46 AM Hospitalist (on-call MD unless specified)              Discharge Details        Discharge Medications      New Medications      Instructions Start Date   atorvastatin 20 MG tablet  Commonly known as: LIPITOR   TAKE 1 TABLET BY MOUTH EVERY DAY      theophylline 300 MG 12 hr tablet  Commonly known as: THEODUR   300 mg, Oral, Every 12 Hours Scheduled         Changes to Medications      Instructions Start Date   itraconazole 100 MG capsule  Commonly known as: SPORANOX  What changed:   how much to take  when to take this   200 mg, Oral, 2 Times Daily With Meals         Continue These Medications      Instructions Start Date   albuterol sulfate  (90 Base) MCG/ACT inhaler  Commonly known as: PROVENTIL HFA;VENTOLIN HFA;PROAIR HFA   2 puffs, Inhalation, Every 4 Hours PRN      Cholecalciferol 50 MCG (2000 UT) tablet   50 mcg, Oral, Daily      EPINEPHrine 0.3 MG/0.3ML solution auto-injector injection  Commonly known as: EPIPEN   No dose, route, or frequency recorded.      gabapentin 300 MG capsule  Commonly known as: NEURONTIN   300 mg, Oral, Every Night at Bedtime      ipratropium-albuterol 0.5-2.5 mg/3 ml nebulizer  Commonly known as: DUO-NEB   USE 1 VIAL PER NEBULIZER EVERY 4 HOURS AS NEEDED FOR WHEEZING      lisinopril 2.5 MG tablet  Commonly known as: PRINIVIL,ZESTRIL   2.5 mg, Oral, Daily      Melatonin 10 MG  tablet   Oral      Molnupiravir 200 MG capsule  Commonly known as: LAGEVRIO   800 mg, Oral, Every 12 Hours      promethazine-dextromethorphan 6.25-15 MG/5ML syrup  Commonly known as: PROMETHAZINE-DM   5 mL, Oral, Every 6 Hours PRN      Rexulti 0.5 MG tablet  Generic drug: Brexpiprazole   1 tablet, Oral, Daily      sertraline 50 MG tablet  Commonly known as: ZOLOFT   50 mg, Oral, Daily      Spiriva Respimat 1.25 MCG/ACT aerosol solution inhaler  Generic drug: Tiotropium Bromide Monohydrate   2 puffs, Inhalation, Daily - RT         ASK your doctor about these medications      Instructions Start Date   benzonatate 100 MG capsule  Commonly known as: TESSALON   100 mg, Oral, 3 Times Daily PRN             Allergies   Allergen Reactions   • Tylenol [Acetaminophen] Hives and Itching         Discharge Disposition:   Home or Self Care    Diet:  Hospital:  Diet Order   Procedures   • Diet: Regular/House Diet; Texture: Regular Texture (IDDSI 7); Fluid Consistency: Thin (IDDSI 0)         Discharge Activity:         CODE STATUS:  Code Status and Medical Interventions:   Ordered at: 03/19/23 0916     Level Of Support Discussed With:    Patient     Code Status (Patient has no pulse and is not breathing):    CPR (Attempt to Resuscitate)     Medical Interventions (Patient has pulse or is breathing):    Full Support         Future Appointments   Date Time Provider Department Center   3/27/2023 10:10 AM Justo Shannon MD [facility]   5/6/2023 10:00 AM D[facility] CT 3 [facility]   5/17/2023  8:00 AM Shane Medina DO [facility]   5/17/2023 10:15 AM Ayla Carroll MD [facility]           Time spent on Discharge including face to face service:  75 minutes    This patient has been examined wearing appropriate Personal Protective Equipment and discussed with Patient and son as well as . 03/21/23      Signature: Catracho Contreras MD

## 2023-03-21 NOTE — PLAN OF CARE
Goal Outcome Evaluation:  Plan of Care Reviewed With: patient        Progress: no change  Outcome Evaluation: Patient's heart rate continues to drop in the 30's while sleeping, ranges from 40-60 when awake.

## 2023-03-21 NOTE — TELEPHONE ENCOUNTER
Spoke with Shay, he is very concerned that patient has been admitted to [facility] and none of the doctors seeing her know her history or care to read her chart. He stated that he wants Dr. Lopez to come and see her. I explaned that Dr. Lopez is not currently seeing patients at the hospital. I advised him to speak with the hospitalist over her care or a charge nurse with his concerns.

## 2023-03-21 NOTE — PROGRESS NOTES
[facility] Medicine Services Daily Progress Note    Patient Name: Tessie Conner  : 1970  MRN: [id number]  Primary Care Physician:  Marsha Lopez MD  Date of admission: 3/19/2023      Subjective      Chief Complaint: Progressively worsening shortness of breath active COVID infection        Patient Reports she wants to go home but is not sure why she was bradycardic overnight.  Patient was bradycardic while awake as well.  Awaiting cardiology input.     ROS negative except as above    Objective      Vitals:   Temp:  [97.4 °F (36.3 °C)-98 °F (36.7 °C)] 98 °F (36.7 °C)  Heart Rate:  [32-52] 52  Resp:  [16-19] 17  BP: (106-115)/(53-60) 110/60    Physical Exam  Vitals reviewed.   Constitutional:       Comments: Awake alert on room air.  Son at bedside   HENT:      Head: Normocephalic.      Nose: Nose normal.      Mouth/Throat:      Mouth: Mucous membranes are moist.   Cardiovascular:      Rate and Rhythm: Regular rhythm. Bradycardia present.      Pulses: Normal pulses.      Heart sounds: Normal heart sounds.      Comments: Mild bradycardia in the 50s during my evaluation  Pulmonary:      Effort: Pulmonary effort is normal.      Comments: Nearly absent lung sounds right lung due to lobectomy  Abdominal:      General: Abdomen is flat.      Palpations: Abdomen is soft.   Musculoskeletal:         General: Normal range of motion.      Cervical back: Normal range of motion.   Skin:     General: Skin is warm.   Neurological:      General: No focal deficit present.      Mental Status: She is alert and oriented to person, place, and time.   Psychiatric:         Mood and Affect: Mood normal.         Behavior: Behavior normal.    Physical Exam  Vitals reviewed.   Constitutional:       Comments: Awake alert on room air     HENT:      Head: Normocephalic.      Nose: Nose normal.      Mouth/Throat:      Mouth: Mucous membranes are moist.   Cardiovascular:      Rate and Rhythm: Regular rhythm.  Bradycardia present.      Pulses: Normal pulses.      Heart sounds: Normal heart sounds.      Comments: Mild bradycardia in the 50s during my evaluation  Pulmonary:      Effort: Pulmonary effort is normal.      Comments: Nearly absent lung sounds right lung due to lobectomy  Abdominal:      General: Abdomen is flat.      Palpations: Abdomen is soft.   Musculoskeletal:         General: Normal range of motion.      Cervical back: Normal range of motion.   Skin:     General: Skin is warm.   Neurological:      General: No focal deficit present.      Mental Status: She is alert and oriented to person, place, and time.   Psychiatric:         Mood and Affect: Mood normal.         Behavior: Behavior normal.      Result Review    Result Review:  I have personally reviewed the results from the time of this admission to 3/21/2023 15:59 EDT and agree with these findings:  [x]  Laboratory  []  Microbiology  []  Radiology  []  EKG/Telemetry   []  Cardiology/Vascular   []  Pathology  []  Old records  []  Other:  Most notable findings include: Leukocytosis likely steroid-induced      Assessment & Plan    53-year-old female presents with acute COVID infection and history of histoplasmosis status post lobectomy right lung     Active Hospital Problems:  There are no active hospital problems to display for this patient.     Plan:   Respiratory distress without respiratory failure  Likely due to history of histoplasmosis status post lobectomy with superimposed acute COVID 19 infection.  Patient is taking antiviral therapy outpatient.  We will discontinue Decadron 4 mg daily due to histoplasma infection  Pulmonary consulted she sees Dr. Nance and Dr. Patel  Resume itraconazole for histoplasma  Recommend that she follow-up with ID outpatient for histoplasma    US of bradycardia while awake and during the night.  Cardiology input requested     Resume all other medications including for anxiety depression     DVT  prophylaxis:  Heparin     CODE STATUS:    Admission Status:  I believe this patient meets observation status.     I discussed the patient's findings and my recommendations with patient.     This patient has been examined wearing appropriate Personal Protective Equipment and discussed with Pulmonary.    03/21/23      Electronically signed by Catracho Contreras MD, 03/21/23, 15:59 EDT.  [facility]

## 2023-03-21 NOTE — TELEPHONE ENCOUNTER
Please tell him Dr. Nance/Jorge should be able to see her in the hospital and they knows her history as her lung doctor.  Unfortunately I am not able to go to the hospital

## 2023-03-21 NOTE — TELEPHONE ENCOUNTER
"Hub to share:  \"I attempted to call the patient and his phone rang several times and then went to a busy signal. Unable to leave a voicemail. Please tell him Dr. Nance/Jorge should be able to see her in the hospital and they knows her history as her lung doctor.  Unfortunately I am not able to go to the hospital\"  "

## 2023-03-21 NOTE — TELEPHONE ENCOUNTER
HUB TO READ MESSAGE WAS RELAYED TO PATIENTS .     PATIENTS  VERBALIZED UNDERSTANDING.     PATIENTS  STATED HE WOULD STILL LIKE TO SPEAK TO DR. JARAMILLO ON THE PHONE IF AT ALL POSSIBLE TODAY 03.21.23 TO DISCUSS SOME THINGS REGARDING THE PATIENT.     PLEASE CALL.

## 2023-03-21 NOTE — PROGRESS NOTES
Cardiology Progress Note      Admitting Physician:  Catracho Contreras MD   LOS: 0 days       Reason For Followup:  Bradycardia      Subjective:    Interval History:  Seen and examined.  Chart and labs reviewed.  Patient denies any chest pain pressure heaviness or tightness.  She is reporting some lightheadedness.  She reports her lightheadedness began about a week ago which is when she apparently contracted COVID.  She has been having some episodes of bradycardia which is common with COVID.  She also has sleep apnea.  She had her CPAP last night and still had some bradycardia although it was not as profound as the previous night.    The patient is extremely nervous about going home because of her bradycardia.  I have offered her reassurance is that bradycardia is common with COVID infection and that as she gets further away and improves from the illness that the bradycardia is likely going to be less of an issue.  Nonetheless, I will place her on theophylline 300 mg twice daily and have a mobile cardiac telemetry placed at discharge.    Review of Systems:  A complete review of systems was undertaken with the pertinent cardiovascular findings listed in history of present illness and all other systems being negative.     Assessment & Plan    Impressions:  Bradycardia likely secondary to COVID-19    Recommendations:  Theophylline 300 mg twice daily  30-day mobile cardiac telemetry.        Objective:    Medication Review:   Scheduled Meds:albuterol sulfate HFA, 2 puff, Inhalation, 4x Daily - RT  atorvastatin, 20 mg, Oral, Daily  gabapentin, 300 mg, Oral, Q8H  heparin (porcine), 5,000 Units, Subcutaneous, Q8H  itraconazole, 200 mg, Oral, BID With Meals  melatonin, 5 mg, Oral, Nightly  senna-docusate sodium, 2 tablet, Oral, BID  sertraline, 50 mg, Oral, Daily  sodium chloride, 10 mL, Intravenous, Q12H  theophylline, 300 mg, Oral, Q12H      Continuous Infusions:sodium chloride, 100 mL/hr, Last Rate: 100 mL/hr (03/19/23  9349)      PRN Meds:.•  albuterol sulfate HFA  •  benzonatate  •  senna-docusate sodium **AND** polyethylene glycol **AND** bisacodyl **AND** bisacodyl  •  guaiFENesin-codeine  •  nitroglycerin  •  ondansetron **OR** ondansetron  •  [COMPLETED] Insert Peripheral IV **AND** sodium chloride  •  sodium chloride  •  sodium chloride  •  traMADol    Patient Active Problem List   Diagnosis   • Bipolar 1 disorder, depressed, moderate ([facility])   • Lithium adverse reaction   • Vitamin D deficiency disease   • Screening for breast cancer   • Encounter for screening mammogram for malignant neoplasm of breast    • Chest wall pain   • Type 2 diabetes mellitus without complication, without long-term current use of insulin ([facility])   • Morbid obesity ([facility])   • Transition of care performed with sharing of clinical summary   • Anxiety   • Asthma   • Bipolar disorder ([facility])   • Dermatitis   • Extrapyramidal and movement disorder   • Hyperlipidemia   • Posttraumatic stress disorder   • Encounter for general adult medical examination without abnormal findings   • Atypical chest pain   • Abnormal EKG   • Gastroesophageal reflux disease without esophagitis   • Pleurisy   • Diarrhea due to malabsorption   • Acute non-recurrent pansinusitis   • Chest pain   • Unstable angina pectoris ([facility])   • Nabothian cyst   • Coronary artery disease due to calcified coronary lesion   • Encounter for support and coordination of transition of care   • Carbuncle   • Aftercare following joint replacement surgery   • Contusion of right knee   • Primary osteoarthritis   • Hypertension   • Mass of lung   • Spleen enlargement   • Lung nodule < 6cm on CT   • Hospital discharge follow-up   • Dyspnea   • Abnormal laboratory test   • Recurrent UTI   • Dry mouth   • Acute pain of right knee   • GARCIA (dyspnea on exertion)   • Vaginal dryness   • Histoplasmosis   • Lipoma of right lower extremity   • COVID-19         Physical Exam:    General: Alert, cooperative, no distress,  appears stated age  Head:  Normocephalic, atraumatic, mucous membranes moist  Eyes:  Conjunctivae/corneas clear, EOMs intact     Neck:  Supple,  no bruit  Lungs:  Clear to auscultation bilaterally, no wheezes, rhonchi or rales are noted  Chest wall: No tenderness  Heart::  Regular rate and rhythm, S1 and S2 normal, 1/6 holosystolic murmur.  No rub or gallop  Abdomen: Soft, nontender, nondistended, bowel sounds active  Extremities: No cyanosis, clubbing, or edema  Pulses: 2+ and symmetric all extremities  Skin:  No rashes or lesions  Neuro/psych: A&O x3. CN II through XII are grossly intact with appropriate affect    Vital Signs:  Vitals:    03/21/23 0449 03/21/23 0758 03/21/23 0801 03/21/23 1100   BP:    110/60   BP Location:    Left arm   Patient Position:    Lying   Pulse: (!) 38 (!) 41 (!) 46 52   Resp:  18 18 17   Temp:    98 °F (36.7 °C)   TempSrc:    Oral   SpO2:  98% 98% 98%   Weight:       Height:         Wt Readings from Last 1 Encounters:   03/19/23 102 kg (225 lb 5 oz)       Intake/Output Summary (Last 24 hours) at 3/21/2023 1642  Last data filed at 3/21/2023 1022  Gross per 24 hour   Intake 600 ml   Output --   Net 600 ml         Results Review:     CBC    Results from last 7 days   Lab Units 03/20/23  2245 03/20/23  1627 03/19/23  0506   WBC 10*3/mm3 18.80* 17.40* 5.90   HEMOGLOBIN g/dL 12.1 11.3* 11.6*   PLATELETS 10*3/mm3 330 309 270     Cr Clearance Estimated Creatinine Clearance: 100.6 mL/min (by C-G formula based on SCr of 0.76 mg/dL).  Coag     HbA1C   Lab Results   Component Value Date    HGBA1C 6.0 (H) 02/17/2023    HGBA1C 5.6 10/28/2022    HGBA1C 6.0 (H) 02/15/2022     Blood Glucose   Glucose   Date/Time Value Ref Range Status   03/20/2023 1107 193 (H) 70 - 105 mg/dL Final     Comment:     Serial Number: [id number]:  [id number]   03/20/2023 0733 171 (H) 70 - 105 mg/dL Final     Comment:     Serial Number: [id number]:  [id number]     Infection   Results from last 7 days   Lab Units  03/20/23  1627 03/19/23  0506   PROCALCITONIN ng/mL 0.05 0.05     CMP   Results from last 7 days   Lab Units 03/20/23  2245 03/20/23  1627 03/19/23  0506   SODIUM mmol/L 140 136 139   POTASSIUM mmol/L 4.1 4.1 4.3   CHLORIDE mmol/L 104 102 106   CO2 mmol/L 26.0 23.0 23.0   BUN mg/dL 14 12 11   CREATININE mg/dL 0.76 0.69 0.76   GLUCOSE mg/dL 159* 211* 81   ALBUMIN g/dL 3.6 3.5 3.4*   BILIRUBIN mg/dL <0.2 <0.2 0.2   ALK PHOS U/L 125* 114 119*   AST (SGOT) U/L 18 16 20   ALT (SGPT) U/L 17 14 14     ABG      UA      ROSALIO  No results found for: POCMETH, POCAMPHET, POCBARBITUR, POCBENZO, POCCOCAINE, POCOPIATES, POCOXYCODO, POCPHENCYC, POCPROPOXY, POCTHC, POCTRICYC  Lysis Labs   Results from last 7 days   Lab Units 03/20/23 2245 03/20/23  1627 03/19/23  0506   HEMOGLOBIN g/dL 12.1 11.3* 11.6*   PLATELETS 10*3/mm3 330 309 270   CREATININE mg/dL 0.76 0.69 0.76     Radiology(recent) No radiology results for the last day      Results from last 7 days   Lab Units 03/19/23  0506   HSTROP T ng/L 8       Imaging Results (Last 24 Hours)     ** No results found for the last 24 hours. **          Cardiac Studies:  Echo- Results for orders placed during the hospital encounter of 12/05/22    Adult Transthoracic Echo Complete W/ Cont if Necessary Per Protocol    Interpretation Summary  •  Left ventricular systolic function is normal. Left ventricular ejection fraction appears to be 66 - 70%.  •  Left ventricular wall thickness is consistent with borderline concentric hypertrophy.  •  The right ventricular cavity is mildly dilated.  •  Estimated right ventricular systolic pressure from tricuspid regurgitation is normal (<35 mmHg).    Transthoracic echocardiography reveals ejection fraction close to 70% and mild left atrial enlargement.  Right ventricle is mildly dilated with mild RVH with normal RV systolic function.  No effusion.    Electronically signed by Dwaine Marin MD, 12/05/22, 6:24 PM EST.    Stress  [place]-  Cath-        Jose Humphrey, DO  03/21/23  16:42 EDT

## 2023-03-21 NOTE — PROGRESS NOTES
Infectious Diseases Progress Note      LOS: 0 days   Patient Care Team:  Marsha Lopez MD as PCP - General (Internal Medicine)  Thomas Zamora MD as Consulting Physician (Cardiology)  Ayla Carroll MD as Surgeon (Thoracic Surgery)    Chief Complaint: Shortness of breath, cough, pleuritic chest pain, sore throat and diaphoresis at admission    Subjective       The patient has been afebrile for the last 24 hours.  The patient is on room air, hemodynamically stable, and is tolerating antimicrobial therapy.  Patient is feeling better today except for episodes of bradycardia during the night      Review of Systems:   Review of Systems   Constitutional: Positive for fatigue.   HENT: Negative.    Eyes: Negative.    Respiratory: Negative.    Cardiovascular: Positive for chest pain.   Gastrointestinal: Negative.    Endocrine: Negative.    Genitourinary: Negative.    Musculoskeletal: Negative.    Skin: Negative.    Neurological: Negative.    Psychiatric/Behavioral: Negative.    All other systems reviewed and are negative.       Objective     Vital Signs  Temp:  [97.4 °F (36.3 °C)-98 °F (36.7 °C)] 98 °F (36.7 °C)  Heart Rate:  [32-52] 52  Resp:  [16-19] 17  BP: (106-115)/(53-60) 110/60    Physical Exam:  Physical Exam  Vitals and nursing note reviewed.   Constitutional:       General: She is not in acute distress.     Appearance: Normal appearance. She is well-developed and normal weight. She is not diaphoretic.   HENT:      Head: Normocephalic and atraumatic.   Eyes:      General: No scleral icterus.     Extraocular Movements: Extraocular movements intact.      Conjunctiva/sclera: Conjunctivae normal.      Pupils: Pupils are equal, round, and reactive to light.   Cardiovascular:      Rate and Rhythm: Normal rate and regular rhythm.      Heart sounds: Normal heart sounds, S1 normal and S2 normal. No murmur heard.  Pulmonary:      Effort: Pulmonary effort is normal. No respiratory distress.      Breath  sounds: No stridor. Rales present. No wheezing.   Chest:      Chest wall: No tenderness.   Abdominal:      General: Bowel sounds are normal. There is no distension.      Palpations: Abdomen is soft. There is no mass.      Tenderness: There is no abdominal tenderness. There is no guarding.   Musculoskeletal:         General: No swelling, tenderness or deformity. Normal range of motion.      Cervical back: Neck supple.   Skin:     General: Skin is warm and dry.      Coloration: Skin is not pale.      Findings: No bruising, erythema or rash.   Neurological:      General: No focal deficit present.      Mental Status: She is alert and oriented to person, place, and time. Mental status is at baseline.      Cranial Nerves: No cranial nerve deficit.   Psychiatric:         Mood and Affect: Mood normal.          Results Review:    I have reviewed all clinical data, test, lab, and imaging results.     Radiology  No Radiology Exams Resulted Within Past 24 Hours    Cardiology    Laboratory    Results from last 7 days   Lab Units 03/20/23 2245 03/20/23  1627 03/19/23  0506   WBC 10*3/mm3 18.80* 17.40* 5.90   HEMOGLOBIN g/dL 12.1 11.3* 11.6*   HEMATOCRIT % 38.3 35.7 36.8   PLATELETS 10*3/mm3 330 309 270     Results from last 7 days   Lab Units 03/20/23 2245 03/20/23  1627 03/19/23  0506   SODIUM mmol/L 140 136 139   POTASSIUM mmol/L 4.1 4.1 4.3   CHLORIDE mmol/L 104 102 106   CO2 mmol/L 26.0 23.0 23.0   BUN mg/dL 14 12 11   CREATININE mg/dL 0.76 0.69 0.76   GLUCOSE mg/dL 159* 211* 81   ALBUMIN g/dL 3.6 3.5 3.4*   BILIRUBIN mg/dL <0.2 <0.2 0.2   ALK PHOS U/L 125* 114 119*   AST (SGOT) U/L 18 16 20   ALT (SGPT) U/L 17 14 14   CALCIUM mg/dL 9.8 9.6 9.6         Results from last 7 days   Lab Units 03/20/23  1627   SED RATE mm/hr 61*         Microbiology   Microbiology Results (last 10 days)     Procedure Component Value - Date/Time    COVID-19 and FLU A/B PCR - Swab, Nasopharynx [[id number]]  (Abnormal) Collected: 03/17/23 1037     Lab Status: Final result Specimen: Swab from Nasopharynx Updated: 03/17/23 1108     COVID19 Detected     Influenza A PCR Not Detected     Influenza B PCR Not Detected    Narrative:      Fact sheet for providers: https://www.fda.gov/media/086150/download    Fact sheet for patients: https://www.fda.gov/media/343205/download    Test performed by PCR.  Influenza A and Influenza B negative results should be considered presumptive in samples that have a positive SARS-CoV-2 result.    Competitive inhibition studies showed that SARS-CoV-2 virus, when present at concentrations above 3.6E+04 copies/mL, can inhibit the detection and amplification of influenza A and influenza B virus RNA if present at or below 1.8E+02 copies/mL or 4.9E+02 copies/mL, respectively, and may lead to false negative influenza virus results. If co-infection with influenza A or influenza B virus is suspected in samples with a positive SARS-CoV-2 result, the sample should be re-tested with another FDA cleared, approved, or authorized influenza test, if influenza virus detection would change clinical management.          Medication Review:       Schedule Meds  albuterol sulfate HFA, 2 puff, Inhalation, 4x Daily - RT  atorvastatin, 20 mg, Oral, Daily  gabapentin, 300 mg, Oral, Q8H  heparin (porcine), 5,000 Units, Subcutaneous, Q8H  itraconazole, 200 mg, Oral, BID With Meals  melatonin, 5 mg, Oral, Nightly  senna-docusate sodium, 2 tablet, Oral, BID  sertraline, 50 mg, Oral, Daily  sodium chloride, 10 mL, Intravenous, Q12H        Infusion Meds  sodium chloride, 100 mL/hr, Last Rate: 100 mL/hr (03/19/23 1234)        PRN Meds  •  albuterol sulfate HFA  •  benzonatate  •  senna-docusate sodium **AND** polyethylene glycol **AND** bisacodyl **AND** bisacodyl  •  guaiFENesin-codeine  •  nitroglycerin  •  ondansetron **OR** ondansetron  •  [COMPLETED] Insert Peripheral IV **AND** sodium chloride  •  sodium chloride  •  sodium chloride  •   traMADol        Assessment & Plan       Antimicrobial Therapy   1.  P.o. itraconazole        2.        3.        4.        5.            Assessment     Consulted for second opinion on histoplasmosis treatment.  Patient originally had a VATS procedure with 2 wedge resections due to pulmonary nodules on 2/8/2022.  Pathology showed rare organisms consistent with histoplasma.  Patient had surveillance CTs with thoracic surgery with noted increased pleuritic pain in January 2023.  Patient had histoplasmosis antibodies positive on 2/7/2023, which is expected having a known histoplasmosis infection.  Current CT does show a subcentimeter subpleural nodule in the left and right upper lobes, as well as subcentimeter nodules in the right upper lobe and right middle lobes  -The patient has no known immunosuppressive condition.  Current CT scan is mostly consistent with COVID-pneumonia.      COVID-19 infection.  Patient is currently on room air and does not appear to be in acute distress.  She complains of diaphoresis and a sore throat but has been afebrile since admission  -CT shows patchy groundglass opacities through both lungs     Type 2 diabetes    Bradycardia.  Patient is being followed by cardiology service.  Most likely secondary to COVID    Reactive leukocytosis secondary to steroids.  Dexamethasone was discontinued on March 20     Plan     Continue itraconazole 200 mg p.o. twice daily.  Waiting on urine histoplasma antigen  If urine histoplasma antigen is negative then consider stopping the treatment  Recommend to repeat CT scan of the chest in 2 months.  Since the current CT scan of the is abnormal because of the COVID findings.    Continue supportive care  A.m. labs  Case discussed with pulmonary    Patient will need to be in enhanced airborne isolation for 10 days from the first positive COVID screen     Appropriate PPE was used during this assessment        Ingrid Cespedes MD  03/21/23  13:14 EDT    Note is dictated  utilizing voice recognition software/Decadron

## 2023-03-21 NOTE — PLAN OF CARE
Goal Outcome Evaluation:  Plan of Care Reviewed With: patient      Patient stated she is concerned about her low heart rate.  Patient has been wearing her c-pap machine while sleeping through out the shift but her heart rate still dropped down to 32 a few times and stayed in the 30's /low 40's.  The patient is currently up and eating a shaved ice and her HR is currently 44.  Patient requested that the NP for Cardiology not be on the case.   Patient is able to make needs known and the call light is with in reach. Will continue with patients care.

## 2023-03-21 NOTE — TELEPHONE ENCOUNTER
Caller: CLARITA MUHAMMAD    Relationship: Emergency Contact    Best call back number: [phone]    What is the best time to reach you: ANYTIME    Who are you requesting to speak with (clinical staff, provider,  specific staff member): DR JARAMILLO    What was the call regarding: PATIENTS  IS REQUESTING DR JARAMILLO CALL HIM TO DISCUSS THE ORGANIZATION OF THERE PATIENTS CARE, AS HE IS VERY CONCERNED WITH THIS.    Do you require a callback: PLEASE CALL TO DISCUSS.

## 2023-03-21 NOTE — PROGRESS NOTES
Daily Progress Note          Assessment    COVID-19 pneumonia  pulmonary histoplasmosis  Restrictive lung disease: Negative connective tissue work-up  Hyperglycemia  Diabetes mellitus  HTN  HLD  ADY     Recommendations:  Treatment discussed with the ID: if urine histo is negative to stop Itraconazole  Cardiology following for episodes of bradycardia  Respiratory status is stable now  Patient can resume using CPAP at night  Oxygenation improved: off dexamethasone  Bronchodilatores  Glucose control  Lipitor  DVTprophylaxis                     LOS: 0 days     Subjective     Patient reports her breathing has improved but she had an episode of asymptomatic sinus bradycardia    Objective     Vital signs for last 24 hours:  Vitals:    03/21/23 0449 03/21/23 0758 03/21/23 0801 03/21/23 1100   BP:    110/60   BP Location:    Left arm   Patient Position:    Lying   Pulse: (!) 38 (!) 41 (!) 46 52   Resp:  18 18 17   Temp:    98 °F (36.7 °C)   TempSrc:    Oral   SpO2:  98% 98% 98%   Weight:       Height:           Intake/Output last 3 shifts:  I/O last 3 completed shifts:  In: 720 [P.O.:720]  Out: -   Intake/Output this shift:  I/O this shift:  In: 360 [P.O.:360]  Out: -       Radiology  Imaging Results (Last 24 Hours)     ** No results found for the last 24 hours. **          Labs:  Results from last 7 days   Lab Units 03/20/23  2245   WBC 10*3/mm3 18.80*   HEMOGLOBIN g/dL 12.1   HEMATOCRIT % 38.3   PLATELETS 10*3/mm3 330     Results from last 7 days   Lab Units 03/20/23  2245   SODIUM mmol/L 140   POTASSIUM mmol/L 4.1   CHLORIDE mmol/L 104   CO2 mmol/L 26.0   BUN mg/dL 14   CREATININE mg/dL 0.76   CALCIUM mg/dL 9.8   BILIRUBIN mg/dL <0.2   ALK PHOS U/L 125*   ALT (SGPT) U/L 17   AST (SGOT) U/L 18   GLUCOSE mg/dL 159*         Results from last 7 days   Lab Units 03/20/23  2245 03/20/23  1627 03/19/23  0506   ALBUMIN g/dL 3.6 3.5 3.4*     Results from last 7 days   Lab Units 03/19/23  0506   HSTROP T ng/L 8         Results from  last 7 days   Lab Units 03/20/23  2245   MAGNESIUM mg/dL 2.1         Results from last 7 days   Lab Units 03/20/23  1627   TSH uIU/mL 0.083*           Meds:   SCHEDULE  albuterol sulfate HFA, 2 puff, Inhalation, 4x Daily - RT  atorvastatin, 20 mg, Oral, Daily  gabapentin, 300 mg, Oral, Q8H  heparin (porcine), 5,000 Units, Subcutaneous, Q8H  itraconazole, 200 mg, Oral, BID With Meals  melatonin, 5 mg, Oral, Nightly  senna-docusate sodium, 2 tablet, Oral, BID  sertraline, 50 mg, Oral, Daily  sodium chloride, 10 mL, Intravenous, Q12H      Infusions  sodium chloride, 100 mL/hr, Last Rate: 100 mL/hr (03/19/23 1234)      PRNs  •  albuterol sulfate HFA  •  benzonatate  •  senna-docusate sodium **AND** polyethylene glycol **AND** bisacodyl **AND** bisacodyl  •  guaiFENesin-codeine  •  nitroglycerin  •  ondansetron **OR** ondansetron  •  [COMPLETED] Insert Peripheral IV **AND** sodium chloride  •  sodium chloride  •  sodium chloride  •  traMADol    Physical Exam:  General Appearance:  Alert   HEENT:  Normocephalic, without obvious abnormality, Conjunctiva/corneas clear,.   Nares normal, no drainage     Neck:  Supple, symmetrical, trachea midline.   Lungs /Chest wall: Good air entry bilaterally with minimal bilateral basal rhonchi, respirations unlabored, symmetrical wall movement.     Heart:  Rate in 50's when awake, S1 S2 normal  Abdomen: Soft, non-tender, no masses, no organomegaly.    Extremities: No edema, no clubbing or cyanosis     ROS  Constitutional: Negative for chills, fever and malaise/fatigue.   HENT: Negative.    Eyes: Negative.    Cardiovascular: Negative.    Respiratory: Positive for improvement in cough and shortness of breath.    Skin: Negative.    Musculoskeletal: Negative.    Gastrointestinal: Negative.    Genitourinary: Negative.    Neurological: Negative.    Psychiatric/Behavioral: Negative.      I reviewed the recent clinical results    Much of this encounter note is an electronic  transcription/translation of spoken language to printed text using Dragon Software which might include inadvertent errors in transcription.

## 2023-03-22 ENCOUNTER — TRANSITIONAL CARE MANAGEMENT TELEPHONE ENCOUNTER (OUTPATIENT)
Dept: CALL CENTER | Facility: HOSPITAL | Age: 53
End: 2023-03-22
Payer: MEDICARE

## 2023-03-22 ENCOUNTER — TELEPHONE (OUTPATIENT)
Dept: FAMILY MEDICINE CLINIC | Facility: CLINIC | Age: 53
End: 2023-03-22
Payer: MEDICARE

## 2023-03-22 DIAGNOSIS — B39.9 HISTOPLASMOSIS: Primary | ICD-10-CM

## 2023-03-22 LAB — QT INTERVAL: 434 MS

## 2023-03-22 NOTE — TELEPHONE ENCOUNTER
Caller: Tessie Conner    Relationship: Self    Best call back number: [phone]    What form or medical record are you requesting: ALL MEDICAL RECORDS    Who is requesting this form or medical record from you: INFECTIOUS DISEASE    How would you like to receive the form or medical records (pick-up, mail, fax): FAX    If fax, what is the fax number: [phone]    Timeframe paperwork needed: ASAP     PLEASE CALL PATIENT WHEN PAPERWORK IS SENT

## 2023-03-22 NOTE — TELEPHONE ENCOUNTER
I spoke with Tessie and let her know that Dr. Lopez placed her referral for ID and that they should be reaching out to her to get scheduled. She is already scheduled for her hospital follow up with Dr. Lopez.

## 2023-03-22 NOTE — OUTREACH NOTE
Call Center TCM Note    Flowsheet Row Responses   [facility] patient discharged from? [facility]   Does the patient have one of the following disease processes/diagnoses(primary or secondary)? Other   TCM attempt successful? Yes   Call start time 1208   Call end time 1216   Discharge diagnosis COVID-19   Meds reviewed with patient/caregiver? Yes   Is the patient having any side effects they believe may be caused by any medication additions or changes? No   Does the patient have all medications ordered at discharge? Yes   Is the patient taking all medications as directed (includes completed medication regime)? Yes   Comments Hospital d/c f/u appt is on 4/4/23 at 4:15 PM,  cards appt is on 5/9/23   Does the patient have an appointment with their PCP within 7 days of discharge? Yes   DME comments Pt is wearing a heart monitor   Psychosocial issues? No   Did the patient receive a copy of their discharge instructions? Yes   Nursing interventions Reviewed instructions with patient   What is the patient's perception of their health status since discharge? Same  [She's still having dizzy spells and her HR is dipping into the 30's. ]   Is the patient/caregiver able to teach back signs and symptoms related to disease process for when to call PCP? Yes   Is the patient/caregiver able to teach back signs and symptoms related to disease process for when to call 911? Yes   Is the patient/caregiver able to teach back the hierarchy of who to call/visit for symptoms/problems? PCP, Specialist, Home health nurse, Urgent Care, ED, 911 Yes   TCM call completed? Yes   Call end time 1216   Would this patient benefit from a Referral to Amb Social Work? No   Is the patient interested in additional calls from an ambulatory ?  NOTE:  applies to high risk patients requiring additional follow-up. No          Jocelyn Ham RN    3/22/2023, 12:16 EDT

## 2023-03-22 NOTE — TELEPHONE ENCOUNTER
Patient was recently discharged from the hospital and told she needed to get a referral from [facility] to an infectious disease doctor.  Patient called yesterday about it and was told [facility] wouldn't give her a referral.  Patient's  Shay would like to speak with [facility] personally to find out why.  Please call Shay at [phone].

## 2023-03-22 NOTE — TELEPHONE ENCOUNTER
Unaware of the request for ID yesterday- so I never said I would not do that.  [place] does not have any outpt ID clinics, and [facility] does not take [place] Medicaid however [facility] does take her insurance.  Will send referral to [facility] and let the patient and  know

## 2023-03-22 NOTE — TELEPHONE ENCOUNTER
Please let her know I faxed her records to ID, let her know it will more then likely be a couple days before they receive as it was a very large file.

## 2023-03-22 NOTE — CASE MANAGEMENT/SOCIAL WORK
Case Management Discharge Note      Final Note: home    Provided Post Acute Provider List?: N/A  N/A Provider List Comment: denies dc needs    Selected Continued Care - Discharged on 3/21/2023 Admission date: 3/19/2023 - Discharge disposition: Home or Self Care            Transportation Services  Private: Car    Final Discharge Disposition Code: 01 - home or self-care

## 2023-03-22 NOTE — OUTREACH NOTE
Prep Survey    Flowsheet Row Responses   [facility] patient discharged from? [facility]   Is LACE score < 7 ? No   Eligibility [facility]   Date of Admission 03/19/23   Date of Discharge 03/21/23   Discharge Disposition Home or Self Care   Discharge diagnosis COVID-19   Does the patient have one of the following disease processes/diagnoses(primary or secondary)? Other   Does the patient have Home health ordered? No   Is there a DME ordered? No   Prep survey completed? Yes          Emmie PAYNE - Registered Nurse

## 2023-03-23 ENCOUNTER — TELEPHONE (OUTPATIENT)
Dept: CARDIOLOGY | Facility: CLINIC | Age: 53
End: 2023-03-23

## 2023-03-23 LAB
H CAPSUL AG UR QL IA: <0.5
QT INTERVAL: 409 MS
QT INTERVAL: 477 MS

## 2023-03-24 ENCOUNTER — TELEPHONE (OUTPATIENT)
Dept: CARDIOLOGY | Facility: CLINIC | Age: 53
End: 2023-03-24
Payer: MEDICARE

## 2023-03-24 ENCOUNTER — TELEPHONE (OUTPATIENT)
Dept: FAMILY MEDICINE CLINIC | Facility: CLINIC | Age: 53
End: 2023-03-24
Payer: MEDICARE

## 2023-03-24 NOTE — TELEPHONE ENCOUNTER
Instructed pt.  She voiced understanding.        ----- Message -----  From: Guillermina Machuca APRN  Sent: 3/24/2023   9:19 AM EDT  To: AURA Arrington Dr. said she should decrease to one tablet daily.  If she continues to have symptoms, it's OK for her to stop it completely.  --  ----- Message -----  From: Marysol Navarro MA  Sent: 3/24/2023   8:27 AM EDT  To: BECKY Wallace    Please help!  Tessie called because she said once she got  home from the hospital she took the theophylline and it made her very shaky and increased her HR and felt bad all over.  She didn't take it yesterday and doesn't want to take it today but wants to know if there is something that needs to be taken in its place?

## 2023-03-24 NOTE — TELEPHONE ENCOUNTER
Caller: Tessie Conner    Relationship to patient: Self    Best call back number: [phone]    Date of discharge:03/19/2023    Facility discharged from: [facility]    Diagnosis/Symptoms: BRADYCARDIA    PATIENT IS NOT HAPPY WITH HER CARE AT THE HOSPITAL AND DOES NOT KNOW WHAT MEDICATIONS SHE NEEDS TO BE TAKING. TRIED TO WORK PATIENT IN FOR SOONER HOSPITAL FOLLOW UP THAN 4/4 BUT NOTHING AVAILABLE WITH DR JARAMILLO. OFFERED 3/29TH WITH TIERRA MCBRIDE BUT PATIENT DECLINED    TESSIE WOULD LIKE A CALL TO DISCUSS HER MEDICATIONS SHE NEEDS TO BE ON AND IF SHE CAN BE WORKED IN  
  Caller: Tessie Conner    Relationship: Self    Best call back number:    [phone]        What is the best time to reach you: ANYTIME     Who are you requesting to speak with (clinical staff, provider,  specific staff member):DR JARAMILLO     Do you know the name of the person who called:  TESSIE    What was the call regarding:  TESSIE SAYS SHE WOULD LIKE A CALL BACK FROM DR. JARAMILLO REGARDING HER CARE AT [facility]     Do you require a callback: YES  
I spoke with Tessie and she expressed understanding. She also stated that she has a new cardiologist that will continue her care.  
I spoke with patient, she is upset with her cardiologist because she is having side effects from a newly prescribed medication and per her, they are unwilling to help her and told her to call us. Her HR was low, in the 30s, so the discharging cardiologist started her in theophylline. This is causing her to have a rapid heart rate, she is shaky and SOB. She would like to know if she should continue this medication or get switched to a different medication.  
The theophylline was started because of low HR so if she is fast HR now- then can decrease to 1/day and wean to off if still high heart rate. It is also used for COPD so should not be causing short of breathe.  See if she is wearing the cardiac monitor as it will be telling us if any rhythm issues. Also please let her know that I am sorry but on vacation next week is why I can not see her until the 4/4.  
.

## 2023-03-27 ENCOUNTER — OFFICE VISIT (OUTPATIENT)
Dept: SURGERY | Facility: CLINIC | Age: 53
End: 2023-03-27
Payer: MEDICARE

## 2023-03-27 VITALS
SYSTOLIC BLOOD PRESSURE: 109 MMHG | OXYGEN SATURATION: 97 % | RESPIRATION RATE: 18 BRPM | HEIGHT: 64 IN | BODY MASS INDEX: 38.04 KG/M2 | TEMPERATURE: 98.2 F | HEART RATE: 57 BPM | WEIGHT: 222.8 LBS | DIASTOLIC BLOOD PRESSURE: 75 MMHG

## 2023-03-27 DIAGNOSIS — D17.23 LIPOMA OF RIGHT THIGH: Primary | ICD-10-CM

## 2023-03-27 PROCEDURE — 1160F RVW MEDS BY RX/DR IN RCRD: CPT | Performed by: SURGERY

## 2023-03-27 PROCEDURE — 1159F MED LIST DOCD IN RCRD: CPT | Performed by: SURGERY

## 2023-03-27 PROCEDURE — 99204 OFFICE O/P NEW MOD 45 MIN: CPT | Performed by: SURGERY

## 2023-03-27 PROCEDURE — 3074F SYST BP LT 130 MM HG: CPT | Performed by: SURGERY

## 2023-03-27 PROCEDURE — 3078F DIAST BP <80 MM HG: CPT | Performed by: SURGERY

## 2023-03-27 RX ORDER — DOXYCYCLINE 100 MG/1
TABLET ORAL
COMMUNITY
Start: 2023-03-25

## 2023-03-28 NOTE — PROGRESS NOTES
GENERAL SURGERY CONSULTATION NOTE    Consult requested by: Dr. Lopez    Patient Care Team:  Marsha Lopez MD as PCP - General (Internal Medicine)  Thomas Zamora MD as Consulting Physician (Cardiology)  Ayla Carroll MD as Surgeon (Thoracic Surgery)    Reason for consult: Right lateral thigh lipoma    Subjective     Patient is a 53 y.o. female presents with a symptomatic lipoma of her right lateral thigh which has been present for about 5 months or so.  She first noticed it as it started becoming painful.  She reports that when she lies on that side she has significant sharp stabbing pain which prevents her from sleeping and she would like to have this removed.  The area has not gotten any larger, and she is concerned there might be more than 1 area clustered closely together and that area as well.    Review of Systems   Constitutional: Positive for activity change, fatigue and unexpected weight gain.   Respiratory: Positive for cough and shortness of breath.    Cardiovascular: Positive for palpitations.   Endocrine: Positive for polydipsia and polyuria.   Genitourinary: Positive for frequency.   Neurological: Positive for dizziness and light-headedness.        History  Past Medical History:   Diagnosis Date   • Abnormal ECG    • Anxiety 2005   • Arthritis    • Asthma 04/17/2020   • At risk for sleep apnea    • Bipolar 1 disorder (HCC)    • Cholelithiasis 10/2022   • Coronary artery disease    • Depression 2005   • Diabetes mellitus (HCC) 2002   • Dyspnea on minimal exertion    • Frequent UTI    • Heart murmur     FROM CHILDHOOD   • History of anemia     POST PREGNANCY   • Hyperlipidemia 10/04/2016   • Hypertension    • Mass of upper lobe of right lung    • Migraines    • Obesity 1999   • PONV (postoperative nausea and vomiting)    • Shoulder pain, right    • Sleep apnea 2/28/22   • Spinal headache    • Visual impairment 2011    WEARS GLASSES     Past Surgical History:   Procedure Laterality  Date   • APPENDECTOMY     • CARDIAC CATHETERIZATION N/A 2020    Procedure: Left Heart Cath possible PCI, atherectomy, hemodynamic support;  Surgeon: Thomas Zamora MD;  Location: [facility] INVASIVE LOCATION;  Service: Cardiology;  Laterality: N/A;   • CARDIAC CATHETERIZATION  2020   •  SECTION     • FOOT/TOE TENDON REPAIR Left    • HYSTERECTOMY     • LUNG BIOPSY Right 2020   • LUNG LOBECTOMY Right 2022    pt had partial Right lobectomy and nodule removal   • ROTATOR CUFF REPAIR Left    • THORACOSCOPY VIDEO ASSISTED WITH LOBECTOMY Right 2022    Procedure: BRONCHOSCOPY, THORACOSCOPY VIDEO ASSISTED wedge resection X2, INTERCOSTAL NERVE BLOCK;  Surgeon: Ayla Carroll MD;  Location: [facility] OR;  Service: Thoracic;  Laterality: Right;   • TUBAL ABDOMINAL LIGATION       Family History   Problem Relation Age of Onset   • Arthritis Mother    • Cancer Mother    • Depression Mother    • Diabetes Mother    • Early death Mother    • Mental illness Mother    • Anxiety disorder Mother    • Alcohol abuse Father    • Diabetes Father    • Early death Father    • Heart disease Father    • Hyperlipidemia Father    • Hypertension Father         Father   • Vision loss Father    • Heart attack Father    • Heart disease Sister    • Heart disease Brother    • Hypertension Brother    • Cancer Maternal Grandmother    • Drug abuse Brother    • Hypertension Brother    • Heart disease Brother    • Drug abuse Sister    • Heart attack Sister    • Hypertension Sister         Sister   • Heart disease Sister    • Heart attack Brother    • Malig Hyperthermia Neg Hx      Social History     Tobacco Use   • Smoking status: Never     Passive exposure: Never   • Smokeless tobacco: Never   Vaping Use   • Vaping Use: Never used   Substance Use Topics   • Alcohol use: Yes     Comment: VERY RARE   • Drug use: No     (Not in a hospital admission)    Allergies:  Tylenol [acetaminophen]    Objective  "    Vital Signs  /75 (BP Location: Left arm, Patient Position: Sitting, Cuff Size: Large Adult)   Pulse 57   Temp 98.2 °F (36.8 °C) (Infrared)   Resp 18   Ht 162.6 cm (64\")   Wt 101 kg (222 lb 12.8 oz)   SpO2 97%   BMI 38.24 kg/m²      Physical Exam  Vitals reviewed.   Constitutional:       Appearance: She is well-developed. She is obese.   HENT:      Head: Normocephalic and atraumatic.   Eyes:      Pupils: Pupils are equal, round, and reactive to light.   Cardiovascular:      Rate and Rhythm: Normal rate and regular rhythm.   Pulmonary:      Effort: Pulmonary effort is normal.      Breath sounds: Normal breath sounds.   Abdominal:      General: There is no distension.      Palpations: Abdomen is soft.      Tenderness: There is no abdominal tenderness.      Hernia: No hernia is present.   Musculoskeletal:         General: Normal range of motion.      Cervical back: Normal range of motion.      Comments: On the lateral aspect of the patient's right thigh there is an area of firm fibrofatty tissue which measures approximately 4 cm in diameter.  I do not feel a well-defined lipoma in that area, however the texture of the subcutaneous fat in that area does feel more prominent, and may represent a smaller lipoma or fibrofatty deposition.  The patient is tender over the top of this   Lymphadenopathy:      Cervical: No cervical adenopathy.   Skin:     General: Skin is warm and dry.      Findings: No rash.   Neurological:      Mental Status: She is alert and oriented to person, place, and time.         Results Review:   Lab Results (last 24 hours)     ** No results found for the last 24 hours. **        No radiology results for the last day      I reviewed the patient's new imaging results and agree with the interpretation.  I reviewed the patient's other test results and agree with the interpretation    Assessment & Plan   Right thigh lipoma    Discussed with patient that I would like to proceed with a " ultrasound of the right thigh to look for evidence of lipoma.  I would be open to attempting surgical removal of that area fibrofatty tissue even if the lipoma is not evident on ultrasound.  We discussed the risks of surgery which include bleeding, infection, wound healing complications, and continued pain.  Also risk of recurrence.  Right now, the patient is recovering from COVID, and I recommend obtaining cardiac and pulmonary clearances prior to proceeding to the operating room as this is considered to be an elective procedure.  Once we have these clearances, we will proceed with scheduling her for surgery.    I discussed the patients findings and my recommendations with the patient.     Justo Shannon MD  03/28/23  11:28 EDT

## 2023-03-29 ENCOUNTER — TELEPHONE (OUTPATIENT)
Dept: SURGERY | Facility: CLINIC | Age: 53
End: 2023-03-29

## 2023-03-29 ENCOUNTER — PATIENT ROUNDING (BHMG ONLY) (OUTPATIENT)
Dept: SURGERY | Facility: CLINIC | Age: 53
End: 2023-03-29
Payer: MEDICARE

## 2023-03-29 ENCOUNTER — TELEPHONE (OUTPATIENT)
Dept: PAIN MEDICINE | Facility: CLINIC | Age: 53
End: 2023-03-29

## 2023-03-29 NOTE — TELEPHONE ENCOUNTER
Caller: Tessie Conner    Relationship: Self    Best call back number:     Who is your current provider: DR GRIJALVA    Who would you like your new provider to be: WHOEVER IS IN THE OFFICE ON FRIDAYS    What are your reasons for transferring care: THE PATIENT STATED DR GRIJALVA IS GREAT SHE JUST NEEDS TO HAVE APPTS ON FRIDAYS

## 2023-03-29 NOTE — TELEPHONE ENCOUNTER
Dr. Paige since you are the only doctor in the practice that has clinic in [place] on Fridays will you take her as a new patient ?

## 2023-03-29 NOTE — TELEPHONE ENCOUNTER
Caller: Tessie Conner    Relationship to patient: Self    Best call back number: [phone]    Chief complaint: REQUESTING A Friday APPOINTMENT DUE TO BEING OFF WORK     Type of visit: FOLLOW UP     Requested date: 5/19/23    If rescheduling, when is the original appointment: 5/17/23

## 2023-03-30 ENCOUNTER — READMISSION MANAGEMENT (OUTPATIENT)
Dept: CALL CENTER | Facility: HOSPITAL | Age: 53
End: 2023-03-30
Payer: MEDICARE

## 2023-03-30 NOTE — OUTREACH NOTE
Medical Week 2 Survey    Flowsheet Row Responses   [facility] patient discharged from? [facility]   Does the patient have one of the following disease processes/diagnoses(primary or secondary)? Other   Week 2 attempt successful? Yes   Call start time 1105   Discharge diagnosis COVID-19   Call end time 1108   Person spoke with today (if not patient) and relationship SOn with Pt present   Meds reviewed with patient/caregiver? Yes   Is the patient taking all medications as directed (includes completed medication regime)? Yes   Does the patient have a primary care provider?  Yes   Comments regarding PCP 4/14/23   Has the patient kept scheduled appointments due by today? N/A   Has home health visited the patient within 72 hours of discharge? N/A   DME comments  heart monitor   Psychosocial issues? No   Did the patient receive a copy of their discharge instructions? Yes   Nursing interventions Reviewed instructions with patient   What is the patient's perception of their health status since discharge? Improving   Is the patient/caregiver able to teach back signs and symptoms related to disease process for when to call PCP? Yes   Is the patient/caregiver able to teach back signs and symptoms related to disease process for when to call 911? Yes   Is the patient/caregiver able to teach back the hierarchy of who to call/visit for symptoms/problems? PCP, Specialist, Home health nurse, Urgent Care, ED, 911 Yes   Week 2 Call Completed? Yes   Wrap up additional comments Pt is improving now. Did states that she was at another hospital at the begining of week for Pneumonia.           JULIO BRUMFIELD - Registered Nurse

## 2023-03-31 ENCOUNTER — HOSPITAL ENCOUNTER (OUTPATIENT)
Dept: ULTRASOUND IMAGING | Facility: HOSPITAL | Age: 53
Discharge: HOME | End: 2023-03-31
Admitting: SURGERY
Payer: MEDICARE

## 2023-03-31 DIAGNOSIS — D17.23 LIPOMA OF RIGHT THIGH: ICD-10-CM

## 2023-03-31 PROCEDURE — 76882 US LMTD JT/FCL EVL NVASC XTR: CPT

## 2023-04-03 ENCOUNTER — TELEPHONE (OUTPATIENT)
Dept: SURGERY | Facility: CLINIC | Age: 53
End: 2023-04-03
Payer: MEDICARE

## 2023-04-03 NOTE — TELEPHONE ENCOUNTER
Per Dr. Shannon the US did not show a lipoma. He is still willing to do surgery if pt would like.     Left message on machine to call back

## 2023-04-04 ENCOUNTER — TELEPHONE (OUTPATIENT)
Dept: CARDIOLOGY | Facility: CLINIC | Age: 53
End: 2023-04-04

## 2023-04-04 ENCOUNTER — NURSE TRIAGE (OUTPATIENT)
Dept: CALL CENTER | Facility: HOSPITAL | Age: 53
End: 2023-04-04
Payer: MEDICARE

## 2023-04-04 NOTE — TELEPHONE ENCOUNTER
Spoke with pt. Would like to move forward with surgery but needs cardiac/pulm clearance. Cardio- Dr. Grover Caicedo and pulm Dr. Aguilar. Informed iram and let Dr. Shannon know as well.

## 2023-04-04 NOTE — TELEPHONE ENCOUNTER
Caller: Tessie Conner    Relationship to patient: Self    Best call back number: [phone]    Patient is needing: PATIENT TOOK HOLTER MONITOR OFF AFTER TWO WEEKS. SHE STATED SHE WAS GETTING BLISTERS. SHE IS CHECKING TO SEE IF ITS OK IF SHE SENDS IT BACK IN . IT WAS ORDERED FOR 30 DAYS.

## 2023-04-04 NOTE — TELEPHONE ENCOUNTER
"Patient c/o blood-tinged sputum after coughing spell in the morning. Recent hospitalization for Covid and history of RUL lobectomy. Reviewed protocol with patient. Advised to make appointment with PCP. Connected patient with Dr. Lopez's office.    Reason for Disposition  • Cough has been present for > 3 weeks    Additional Information  • Negative: SEVERE difficulty breathing (e.g., struggling for each breath, speaks in single words)  • Negative: [1] Chest pain AND [2] difficulty breathing  • Negative: Bluish (or gray) lips or face now  • Negative: Passed out (i.e., lost consciousness, collapsed and was not responding)  • Negative: Shock suspected (e.g., cold/pale/clammy skin, too weak to stand, low BP, rapid pulse)  • Negative: Difficult to awaken or acting confused (e.g., disoriented, slurred speech)  • Negative: Recent chest injury (i.e., past 24 hours)  • Negative: [1] Coughed up blood AND [2] large amount (example: \"a cup of blood\")  • Negative: Sounds like a life-threatening emergency to the triager  • Negative: [1] MODERATE difficulty breathing (e.g., speaks in phrases, SOB even at rest, pulse 100-120) AND [2] still present when not coughing  • Negative: Chest pain  • Negative: Unclear to triager if the patient is coughing up blood or vomiting blood  • Negative: History of prior \"blood clot\" in leg or lungs (i.e., deep vein thrombosis, pulmonary embolism)  • Negative: History of inherited increased risk of blood clots (e.g., Factor 5 Leiden, Anti-thrombin 3, Protein C or Protein S deficiency, Prothrombin mutation)  • Negative: Pregnant or pregnant in past month  • Negative: Hip or leg fracture (broken bone) in past month (or had cast on leg or ankle in past month)  • Negative: Prolonged travel within the last month  (e.g., long bus trip or plane trip)  • Negative: Bedridden (e.g., nursing home patient, CVA, chronic illness, recovering from surgery)  • Negative: Patient sounds very sick or weak to the " "triager  • Negative: [1] MILD difficulty breathing (e.g., minimal/no SOB at rest, SOB with walking, pulse <100) AND [2] still present when not coughing (Exception: no change from usual, chronic shortness of breath)  • Negative: [1] Coughed up blood AND [2] > 1 tablespoon (15 ml) (Exception: blood-tinged sputum)  • Negative: Fever > 103 F (39.4 C)  • Negative: [1] Fever > 101 F (38.3 C) AND [2] age > 60 years  • Negative: [1] Fever > 100.0 F (37.8 C) AND [2] diabetes mellitus or weak immune system (e.g., HIV positive, cancer chemo, splenectomy, organ transplant, chronic steroids)  • Negative: [1] Has underlying lung disease (e.g., COPD, chronic bronchitis or emphysema) AND [2] sputum has turned yellow or green in color  • Negative: Coughing up magnolia-colored sputum  • Negative: [1] Nasal discharge AND [2] present > 10 days  • Negative: [1] Coughing up yellow or green sputum AND [2] present > 5 days  • Negative: Fever present > 3 days (72 hours)  • Negative: Taking Coumadin (warfarin) or other strong blood thinner, or known bleeding disorder (e.g., thrombocytopenia)    Answer Assessment - Initial Assessment Questions  1. ONSET: \"When did the cough begin?\"       1 month  2. SEVERITY: \"How bad is the cough today?\" \"Did the blood appear after a coughing spell?\"       Coughing spell upon waking in the morning  3. SPUTUM: \"Describe the color of your sputum\" (none, dry cough; clear, white, yellow, green)      Pink   4. HEMOPTYSIS: \"How much blood?\" (flecks, streaks, tablespoons, etc.)      Clear pink sputum  5. DIFFICULTY BREATHING: \"Are you having difficulty breathing?\" If Yes, ask: \"How bad is it?\" (e.g., mild, moderate, severe)     - MILD: No SOB at rest, mild SOB with walking, speaks normally in sentences, can lay down, no retractions, pulse < 100.     - MODERATE: SOB at rest, SOB with minimal exertion and prefers to sit, cannot lie down flat, speaks in phrases, mild retractions, audible wheezing, pulse 100-120.     - " "SEVERE: Very SOB at rest, speaks in single words, struggling to breathe, sitting hunched forward, retractions, pulse > 120       No, not more than baseline  6. FEVER: \"Do you have a fever?\" If Yes, ask: \"What is your temperature, how was it measured, and when did it start?\"      No   7. CARDIAC HISTORY: \"Do you have any history of heart disease?\" (e.g., heart attack, congestive heart failure)       Currently wearing heart monitor, no previous diagnosis  8. LUNG HISTORY: \"Do you have any history of lung disease?\"  (e.g., pulmonary embolus, asthma, emphysema)      RUL removed  9. PE RISK FACTORS: \"Do you have a history of blood clots?\" (or: recent major surgery, recent prolonged travel, bedridden)      No   10. OTHER SYMPTOMS: \"Do you have any other symptoms?\" (e.g., runny nose, wheezing, chest pain)        No   11. PREGNANCY: \"Is there any chance you are pregnant?\" \"When was your last menstrual period?\"        NA  12. TRAVEL: \"Have you traveled out of the country in the last month?\" (e.g., travel history, exposures)        NA    Protocols used: COUGHING UP BLOOD-ADULT-AH    "

## 2023-04-12 ENCOUNTER — TELEPHONE (OUTPATIENT)
Dept: CARDIOLOGY | Facility: CLINIC | Age: 53
End: 2023-04-12

## 2023-04-12 NOTE — TELEPHONE ENCOUNTER
Caller: Tessie Conner    Relationship to patient: Self    Best call back number:[phone]    Patient is needing: PATIENT IS REQUESTING A CALL BACK TO GO OVER HER HOLTER MONITOR RESULTS.

## 2023-04-13 RX ORDER — IPRATROPIUM BROMIDE AND ALBUTEROL SULFATE 2.5; .5 MG/3ML; MG/3ML
SOLUTION RESPIRATORY (INHALATION)
Qty: 360 ML | Refills: 0 | Status: SHIPPED | OUTPATIENT
Start: 2023-04-13

## 2023-04-13 NOTE — TELEPHONE ENCOUNTER
Dr Humphrey recommended an appointment to discuss results- patient states she has a new cardiologist. Advised I could send the results to their office or they could access through care everywhere. She will call her new cardiologist to see which they prefer.

## 2023-04-14 ENCOUNTER — TELEPHONE (OUTPATIENT)
Dept: FAMILY MEDICINE CLINIC | Facility: CLINIC | Age: 53
End: 2023-04-14

## 2023-04-14 NOTE — TELEPHONE ENCOUNTER
Please find out where her thyroid levels were checked and we do not have them. Most likely, she will need to wait and discuss with Jluis at her upcoming appointment.

## 2023-04-14 NOTE — TELEPHONE ENCOUNTER
"Caller: Tessie Conner    Relationship: Self    Best call back number: [phone]    What medication are you requesting: THYROID MEDICATION    What are your current symptoms: LOW THYROID LEVELS    If a prescription is needed, what is your preferred pharmacy and phone number: [id number] - [place], [place] - [address] [facility] - [phone] [facility] [phone]      Additional notes: PATIENT JUST LEFT FOLLOW UP WITH CARDIOLOGIST AND HE ADVISED HER THYROID LEVELS WERE VERY LOW. SHE HAS FOLLOW UP SCHEDULED FOR 4/21/23 WITH ADRIAN BUT WANTS TO KNOW IF SHE CAN START SOME MEDICATION BEFORE HER FOLLOW UP    PLEASE ADVISE    "

## 2023-04-14 NOTE — TELEPHONE ENCOUNTER
Spoke with patient and she said the cardiologist was looking at the lab work patient had done on 3/20/2023 at [facility] in-patient and said her TSH was 0.083 and that could be causing her heart rate to drop to the 30's and causing her to be so tired.  Said the cardiologist also took her off her bp med.  Patient would like to go ahead and get started on a thyroid medication now before seeing you next Friday if you agree to send something in for her.

## 2023-04-18 ENCOUNTER — APPOINTMENT (OUTPATIENT)
Dept: CT IMAGING | Facility: HOSPITAL | Age: 53
End: 2023-04-18
Payer: MEDICARE

## 2023-04-18 ENCOUNTER — HOSPITAL ENCOUNTER (EMERGENCY)
Facility: HOSPITAL | Age: 53
Discharge: HOME | End: 2023-04-19
Attending: EMERGENCY MEDICINE | Admitting: EMERGENCY MEDICINE
Payer: MEDICARE

## 2023-04-18 ENCOUNTER — APPOINTMENT (OUTPATIENT)
Dept: GENERAL RADIOLOGY | Facility: HOSPITAL | Age: 53
End: 2023-04-18
Payer: MEDICARE

## 2023-04-18 DIAGNOSIS — J01.00 ACUTE MAXILLARY SINUSITIS, RECURRENCE NOT SPECIFIED: ICD-10-CM

## 2023-04-18 DIAGNOSIS — R07.89 ATYPICAL CHEST PAIN: Primary | ICD-10-CM

## 2023-04-18 LAB
ALBUMIN SERPL-MCNC: 3.8 G/DL (ref 3.5–5.2)
ALBUMIN/GLOB SERPL: 1.1 G/DL
ALP SERPL-CCNC: 96 U/L (ref 39–117)
ALT SERPL W P-5'-P-CCNC: 17 U/L (ref 1–33)
ANION GAP SERPL CALCULATED.3IONS-SCNC: 9.2 MMOL/L (ref 5–15)
AST SERPL-CCNC: 35 U/L (ref 1–32)
BASOPHILS # BLD AUTO: 0.02 10*3/MM3 (ref 0–0.2)
BASOPHILS NFR BLD AUTO: 0.2 % (ref 0–1.5)
BILIRUB SERPL-MCNC: 0.3 MG/DL (ref 0–1.2)
BILIRUB UR QL STRIP: NEGATIVE
BUN SERPL-MCNC: 13 MG/DL (ref 6–20)
BUN/CREAT SERPL: 16.7 (ref 7–25)
CALCIUM SPEC-SCNC: 9.5 MG/DL (ref 8.6–10.5)
CHLORIDE SERPL-SCNC: 102 MMOL/L (ref 98–107)
CLARITY UR: CLEAR
CO2 SERPL-SCNC: 24.8 MMOL/L (ref 22–29)
COLOR UR: YELLOW
CREAT SERPL-MCNC: 0.78 MG/DL (ref 0.57–1)
DEPRECATED RDW RBC AUTO: 50.5 FL (ref 37–54)
EGFRCR SERPLBLD CKD-EPI 2021: 91.5 ML/MIN/1.73
EOSINOPHIL # BLD AUTO: 0.01 10*3/MM3 (ref 0–0.4)
EOSINOPHIL NFR BLD AUTO: 0.1 % (ref 0.3–6.2)
ERYTHROCYTE [DISTWIDTH] IN BLOOD BY AUTOMATED COUNT: 17.1 % (ref 12.3–15.4)
GLOBULIN UR ELPH-MCNC: 3.4 GM/DL
GLUCOSE SERPL-MCNC: 121 MG/DL (ref 65–99)
GLUCOSE UR STRIP-MCNC: NEGATIVE MG/DL
HCT VFR BLD AUTO: 37.2 % (ref 34–46.6)
HGB BLD-MCNC: 11.5 G/DL (ref 12–15.9)
HGB UR QL STRIP.AUTO: NEGATIVE
IMM GRANULOCYTES # BLD AUTO: 0.03 10*3/MM3 (ref 0–0.05)
IMM GRANULOCYTES NFR BLD AUTO: 0.3 % (ref 0–0.5)
KETONES UR QL STRIP: NEGATIVE
LEUKOCYTE ESTERASE UR QL STRIP.AUTO: ABNORMAL
LYMPHOCYTES # BLD AUTO: 2.21 10*3/MM3 (ref 0.7–3.1)
LYMPHOCYTES NFR BLD AUTO: 24 % (ref 19.6–45.3)
MCH RBC QN AUTO: 25.6 PG (ref 26.6–33)
MCHC RBC AUTO-ENTMCNC: 30.9 G/DL (ref 31.5–35.7)
MCV RBC AUTO: 82.7 FL (ref 79–97)
MONOCYTES # BLD AUTO: 0.71 10*3/MM3 (ref 0.1–0.9)
MONOCYTES NFR BLD AUTO: 7.7 % (ref 5–12)
NEUTROPHILS NFR BLD AUTO: 6.23 10*3/MM3 (ref 1.7–7)
NEUTROPHILS NFR BLD AUTO: 67.7 % (ref 42.7–76)
NITRITE UR QL STRIP: NEGATIVE
NT-PROBNP SERPL-MCNC: 90.5 PG/ML (ref 0–900)
PH UR STRIP.AUTO: 5.5 [PH] (ref 5–8)
PLATELET # BLD AUTO: 259 10*3/MM3 (ref 140–450)
PMV BLD AUTO: 10 FL (ref 6–12)
POTASSIUM SERPL-SCNC: 5.6 MMOL/L (ref 3.5–5.2)
PROT SERPL-MCNC: 7.2 G/DL (ref 6–8.5)
PROT UR QL STRIP: NEGATIVE
RBC # BLD AUTO: 4.5 10*6/MM3 (ref 3.77–5.28)
SODIUM SERPL-SCNC: 136 MMOL/L (ref 136–145)
SP GR UR STRIP: 1.01 (ref 1–1.03)
TROPONIN T SERPL HS-MCNC: 7 NG/L
UROBILINOGEN UR QL STRIP: ABNORMAL
WBC NRBC COR # BLD: 9.21 10*3/MM3 (ref 3.4–10.8)

## 2023-04-18 PROCEDURE — 93005 ELECTROCARDIOGRAM TRACING: CPT | Performed by: NURSE PRACTITIONER

## 2023-04-18 PROCEDURE — 80053 COMPREHEN METABOLIC PANEL: CPT | Performed by: EMERGENCY MEDICINE

## 2023-04-18 PROCEDURE — 96374 THER/PROPH/DIAG INJ IV PUSH: CPT

## 2023-04-18 PROCEDURE — 99284 EMERGENCY DEPT VISIT MOD MDM: CPT

## 2023-04-18 PROCEDURE — 84484 ASSAY OF TROPONIN QUANT: CPT | Performed by: EMERGENCY MEDICINE

## 2023-04-18 PROCEDURE — 81003 URINALYSIS AUTO W/O SCOPE: CPT | Performed by: EMERGENCY MEDICINE

## 2023-04-18 PROCEDURE — 85025 COMPLETE CBC W/AUTO DIFF WBC: CPT | Performed by: EMERGENCY MEDICINE

## 2023-04-18 PROCEDURE — 71046 X-RAY EXAM CHEST 2 VIEWS: CPT

## 2023-04-18 PROCEDURE — 70450 CT HEAD/BRAIN W/O DYE: CPT

## 2023-04-18 PROCEDURE — 83880 ASSAY OF NATRIURETIC PEPTIDE: CPT | Performed by: EMERGENCY MEDICINE

## 2023-04-18 PROCEDURE — 93005 ELECTROCARDIOGRAM TRACING: CPT | Performed by: EMERGENCY MEDICINE

## 2023-04-18 RX ORDER — IBUPROFEN 400 MG/1
400 TABLET ORAL ONCE
Status: COMPLETED | OUTPATIENT
Start: 2023-04-18 | End: 2023-04-18

## 2023-04-18 RX ORDER — FAMOTIDINE 10 MG/ML
20 INJECTION, SOLUTION INTRAVENOUS ONCE
Status: COMPLETED | OUTPATIENT
Start: 2023-04-18 | End: 2023-04-18

## 2023-04-18 RX ADMIN — FAMOTIDINE 20 MG: 10 INJECTION INTRAVENOUS at 21:30

## 2023-04-18 RX ADMIN — IBUPROFEN 400 MG: 400 TABLET ORAL at 21:30

## 2023-04-19 ENCOUNTER — LAB (OUTPATIENT)
Dept: FAMILY MEDICINE CLINIC | Facility: CLINIC | Age: 53
End: 2023-04-19
Payer: MEDICARE

## 2023-04-19 ENCOUNTER — OFFICE VISIT (OUTPATIENT)
Dept: FAMILY MEDICINE CLINIC | Facility: CLINIC | Age: 53
End: 2023-04-19
Payer: MEDICARE

## 2023-04-19 VITALS
DIASTOLIC BLOOD PRESSURE: 50 MMHG | OXYGEN SATURATION: 96 % | WEIGHT: 226 LBS | HEIGHT: 64 IN | BODY MASS INDEX: 38.58 KG/M2 | HEART RATE: 53 BPM | SYSTOLIC BLOOD PRESSURE: 100 MMHG

## 2023-04-19 VITALS
TEMPERATURE: 98 F | HEART RATE: 52 BPM | OXYGEN SATURATION: 97 % | RESPIRATION RATE: 18 BRPM | DIASTOLIC BLOOD PRESSURE: 53 MMHG | SYSTOLIC BLOOD PRESSURE: 111 MMHG

## 2023-04-19 DIAGNOSIS — I97.89 BRADYCARDIA FOLLOWING SURGERY: ICD-10-CM

## 2023-04-19 DIAGNOSIS — R00.1 SINUS BRADYCARDIA: Primary | ICD-10-CM

## 2023-04-19 LAB
ANION GAP SERPL CALCULATED.3IONS-SCNC: 7.9 MMOL/L (ref 5–15)
BUN SERPL-MCNC: 14 MG/DL (ref 6–20)
BUN/CREAT SERPL: 18.7 (ref 7–25)
CALCIUM SPEC-SCNC: 9.5 MG/DL (ref 8.6–10.5)
CHLORIDE SERPL-SCNC: 103 MMOL/L (ref 98–107)
CO2 SERPL-SCNC: 28.1 MMOL/L (ref 22–29)
CREAT SERPL-MCNC: 0.75 MG/DL (ref 0.57–1)
EGFRCR SERPLBLD CKD-EPI 2021: 95.9 ML/MIN/1.73
GEN 5 2HR TROPONIN T REFLEX: 7 NG/L
GLUCOSE SERPL-MCNC: 109 MG/DL (ref 65–99)
MAGNESIUM SERPL-MCNC: 2.3 MG/DL (ref 1.6–2.6)
POTASSIUM SERPL-SCNC: 4 MMOL/L (ref 3.5–5.2)
QT INTERVAL: 430 MS
QT INTERVAL: 438 MS
SODIUM SERPL-SCNC: 139 MMOL/L (ref 136–145)
T3FREE SERPL-MCNC: 2.81 PG/ML (ref 2–4.4)
T4 FREE SERPL-MCNC: 1.33 NG/DL (ref 0.93–1.7)
TROPONIN T DELTA: 0 NG/L
TSH SERPL DL<=0.05 MIU/L-ACNC: 0.74 UIU/ML (ref 0.27–4.2)

## 2023-04-19 PROCEDURE — 84481 FREE ASSAY (FT-3): CPT | Performed by: STUDENT IN AN ORGANIZED HEALTH CARE EDUCATION/TRAINING PROGRAM

## 2023-04-19 PROCEDURE — 3044F HG A1C LEVEL LT 7.0%: CPT | Performed by: STUDENT IN AN ORGANIZED HEALTH CARE EDUCATION/TRAINING PROGRAM

## 2023-04-19 PROCEDURE — 3078F DIAST BP <80 MM HG: CPT | Performed by: STUDENT IN AN ORGANIZED HEALTH CARE EDUCATION/TRAINING PROGRAM

## 2023-04-19 PROCEDURE — 3074F SYST BP LT 130 MM HG: CPT | Performed by: STUDENT IN AN ORGANIZED HEALTH CARE EDUCATION/TRAINING PROGRAM

## 2023-04-19 PROCEDURE — 99214 OFFICE O/P EST MOD 30 MIN: CPT | Performed by: STUDENT IN AN ORGANIZED HEALTH CARE EDUCATION/TRAINING PROGRAM

## 2023-04-19 PROCEDURE — 84443 ASSAY THYROID STIM HORMONE: CPT | Performed by: STUDENT IN AN ORGANIZED HEALTH CARE EDUCATION/TRAINING PROGRAM

## 2023-04-19 PROCEDURE — 84439 ASSAY OF FREE THYROXINE: CPT | Performed by: STUDENT IN AN ORGANIZED HEALTH CARE EDUCATION/TRAINING PROGRAM

## 2023-04-19 PROCEDURE — 83735 ASSAY OF MAGNESIUM: CPT | Performed by: STUDENT IN AN ORGANIZED HEALTH CARE EDUCATION/TRAINING PROGRAM

## 2023-04-19 PROCEDURE — 36415 COLL VENOUS BLD VENIPUNCTURE: CPT | Performed by: STUDENT IN AN ORGANIZED HEALTH CARE EDUCATION/TRAINING PROGRAM

## 2023-04-19 NOTE — DISCHARGE INSTRUCTIONS
Follow-up with your Cardiologist on Friday as scheduled. Take medication as prescribed. Return if problems.

## 2023-04-19 NOTE — PROGRESS NOTES
"Chief Complaint  Chief Complaint   Patient presents with   • Hypertension     CC: Pt states she went to Urgent Care last night and they said everything came back ok, a little bladder infections, they said maybe her thyroid level caused her very low BP and HB with chest pains. She has not seen Dr. Lopez since going to hospital and having labs because she works 10 hour shifts and it is hard to get in.     Subjective        Tessie Conner is a 53 y.o. female who presents to [facility].    History of Present Illness  Tessie is here due to persistent bradycardia and hypotension for the last month.  She had blood work done on 3/20 and her TSH was 0.083.  She last saw cardiology on 4/14 and was told that this could possibly be related to her COVID infection 1 month ago.  She does report 50 pound weight loss which has improved her blood pressure.  She is on lisinopril 2.5 mg daily.  She did not take her blood pressure medication last night.  She went to the ER last night and had a reassuring EKG.  Her delta troponin was 0.  She has completed a Holter monitor within the last month that showed a minimal heart rate of 40, maximal heart rate of 120 with average heart rate of 66.  She had 1% PVCs but otherwise with sinus rhythm.    Objective   /50   Pulse 53   Ht 162.6 cm (64\")   Wt 103 kg (226 lb)   SpO2 96%   BMI 38.79 kg/m²     Estimated body mass index is 38.79 kg/m² as calculated from the following:    Height as of this encounter: 162.6 cm (64\").    Weight as of this encounter: 103 kg (226 lb).     Physical Exam   GEN: In no acute distress,  appearing  CV: Regular rate and rhythm, no murmurs, 2+ peripheral pulses  RESP: Lungs clear to auscultation anteriorly and posteriorly in all lung fields bilaterally.     Result Review :    The following data was reviewed by: Roosevelt Armstrong DO on 04/19/2023:  CMP        3/19/2023    05:06 3/20/2023    16:27 3/20/2023    22:45 " 4/18/2023    21:26 4/18/2023    23:31   CMP   Glucose 81   211   159   121   109     BUN 11   12   14   13   14     Creatinine 0.76   0.69   0.76   0.78   0.75     EGFR 94.4   104.6   94.4   91.5   95.9     Sodium 139   136   140   136   139     Potassium 4.3   4.1   4.1   5.6   4.0     Chloride 106   102   104   102   103     Calcium 9.6   9.6   9.8   9.5   9.5     Total Protein 7.1   7.0   7.4   7.2      Albumin 3.4   3.5   3.6   3.8      Globulin 3.7   3.5   3.8   3.4      Total Bilirubin 0.2   <0.2   <0.2   0.3      Alkaline Phosphatase 119   114   125   96      AST (SGOT) 20   16   18   35      ALT (SGPT) 14   14   17   17      Albumin/Globulin Ratio 0.9   1.0   0.9   1.1      BUN/Creatinine Ratio 14.5   17.4   18.4   16.7   18.7     Anion Gap 10.0   11.0   10.0   9.2   7.9       CBC        3/19/2023    05:06 3/20/2023    16:27 3/20/2023    22:45 4/18/2023    21:26   CBC   WBC 5.90   17.40   18.80   9.21     RBC 4.61   4.49   4.81   4.50     Hemoglobin 11.6   11.3   12.1   11.5     Hematocrit 36.8   35.7   38.3   37.2     MCV 79.9   79.4   79.5   82.7     MCH 25.2   25.1   25.1   25.6     MCHC 31.5   31.6   31.6   30.9     RDW 17.5   17.2   17.1   17.1     Platelets 270   309   330   259             Assessment and Plan     Diagnoses and all orders for this visit:    1. Sinus bradycardia (Primary)  Overall well-appearing today, reassuring EKG within the last 24 hours, did not repeat today.  We will check thyroid levels considering her TSH was low a month ago though this should be causing elevated heart rate and low heart rate.  Check magnesium levels.  Labs reviewed from ED visit last night showing normal potassium and kidney function.  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  Encourage close follow-up with cardiology.  Encourage plenty of fluid intake.  -     Magnesium  -     TSH  -     T4, free  -     T3, free  -     Adult Transthoracic Echo Complete W/ Cont if Necessary Per Protocol;  Future    2. Bradycardia following surgery  -     Adult Transthoracic Echo Complete W/ Cont if Necessary Per Protocol; Future      Follow Up pending above results

## 2023-04-19 NOTE — FSED PROVIDER NOTE
Subjective   History of Present Illness  53 yof complains of headache and chest pain. Pt states the chest pain feels like a bee sting and just last for a few moments. Pt denies shortness of breath, cough, fever, nausea or vomiting. Pt was diagnosed with COVID last month and was hospitalized. The patient reports she has not had any symptoms since being discharge from the hospital. During her hospital stay patient had bradycardia. She was evaluated by cardiology at that time. Pt had follow-up with Cardiology later this week.         Review of Systems   Constitutional: Negative.    HENT: Positive for congestion.    Respiratory: Negative.    Cardiovascular: Positive for chest pain.   Gastrointestinal: Negative.    Genitourinary: Negative.  Negative for dysuria.   Musculoskeletal: Negative.    Neurological: Positive for headaches.   All other systems reviewed and are negative.      Past Medical History:   Diagnosis Date   • Abnormal ECG    • Anxiety 2005   • Arthritis    • Asthma 04/17/2020   • At risk for sleep apnea    • Bipolar 1 disorder    • Cholelithiasis 10/2022   • Coronary artery disease    • Depression 2005   • Diabetes mellitus 2002   • Dyspnea on minimal exertion    • Frequent UTI    • Heart murmur     FROM CHILDHOOD   • History of anemia     POST PREGNANCY   • Hyperlipidemia 10/04/2016   • Hypertension    • Mass of upper lobe of right lung    • Migraines    • Obesity 1999   • PONV (postoperative nausea and vomiting)    • Shoulder pain, right    • Sleep apnea 2/28/22   • Spinal headache    • Visual impairment 2011    WEARS GLASSES       Allergies   Allergen Reactions   • Tylenol [Acetaminophen] Hives and Itching       Past Surgical History:   Procedure Laterality Date   • APPENDECTOMY  2011   • CARDIAC CATHETERIZATION N/A 09/24/2020    Procedure: Left Heart Cath possible PCI, atherectomy, hemodynamic support;  Surgeon: Thomas Zamora MD;  Location: [facility] CATH INVASIVE LOCATION;  Service:  Cardiology;  Laterality: N/A;   • CARDIAC CATHETERIZATION  2020   •  SECTION     • FOOT/TOE TENDON REPAIR Left    • HYSTERECTOMY     • LUNG BIOPSY Right 2020   • LUNG LOBECTOMY Right 2022    pt had partial Right lobectomy and nodule removal   • ROTATOR CUFF REPAIR Left    • THORACOSCOPY VIDEO ASSISTED WITH LOBECTOMY Right 2022    Procedure: BRONCHOSCOPY, THORACOSCOPY VIDEO ASSISTED wedge resection X2, INTERCOSTAL NERVE BLOCK;  Surgeon: Ayla Carroll MD;  Location: [facility] OR;  Service: Thoracic;  Laterality: Right;   • TUBAL ABDOMINAL LIGATION         Family History   Problem Relation Age of Onset   • Arthritis Mother    • Cancer Mother    • Depression Mother    • Diabetes Mother    • Early death Mother    • Mental illness Mother    • Anxiety disorder Mother    • Alcohol abuse Father    • Diabetes Father    • Early death Father    • Heart disease Father    • Hyperlipidemia Father    • Hypertension Father         Father   • Vision loss Father    • Heart attack Father    • Heart disease Sister    • Heart disease Brother    • Hypertension Brother    • Cancer Maternal Grandmother    • Drug abuse Brother    • Hypertension Brother    • Heart disease Brother    • Drug abuse Sister    • Heart attack Sister    • Hypertension Sister         Sister   • Heart disease Sister    • Heart attack Brother    • Malig Hyperthermia Neg Hx        Social History     Socioeconomic History   • Marital status:    Tobacco Use   • Smoking status: Never     Passive exposure: Never   • Smokeless tobacco: Never   Vaping Use   • Vaping Use: Never used   Substance and Sexual Activity   • Alcohol use: Yes     Comment: VERY RARE   • Drug use: No   • Sexual activity: Not Currently     Partners: Male     Birth control/protection: None, Hysterectomy           Objective   Physical Exam  Vitals reviewed.   Constitutional:       Appearance: She is well-developed.   HENT:      Head: Normocephalic and  atraumatic.   Eyes:      Extraocular Movements: Extraocular movements intact.      Pupils: Pupils are equal, round, and reactive to light.   Cardiovascular:      Rate and Rhythm: Normal rate and regular rhythm.      Heart sounds: Normal heart sounds.   Pulmonary:      Breath sounds: Normal breath sounds.   Musculoskeletal:         General: Normal range of motion.      Cervical back: Normal range of motion and neck supple.   Skin:     General: Skin is warm and dry.   Neurological:      General: No focal deficit present.      Mental Status: She is alert and oriented to person, place, and time.      Cranial Nerves: No cranial nerve deficit.         ECG 12 Lead      Date/Time: 4/18/2023 9:08 PM  Performed by: Chelly Gifford MD  Authorized by: Chelly Gifford MD   Interpreted by physician  Comparison: compared with previous ECG from 3/21/2023  Similar to previous ECG  Rhythm: sinus rhythm  Conduction: conduction normal  Clinical impression: non-specific ECG  Comments: Non specific ST changes not changed from previous                 ED Course    Pt started complaining of sharp chest pain when she was taken over to CT, lasted briefly and resolved on its own. EKG obtained at that time was unchanged.                                        Medical Decision Making  Exam without evidence of volume overload so doubt heart failure. EKG without signs of active ischemia. Given the timing of pain to ER presentation,  delta troponin was 0 so doubt NSTEMI. Presentation not consistent with acute PE (Wells low risk PERC negative), pneumothorax (not visualized on chest xr), thoracic aortic dissection, pericarditis, tamponade, pneumonia (no infectious symptoms, clear chest xr), myocarditis ( neg trop). HEART score 3: Pt has follow-up scheduled with Cardiology on Friday of this week.     Acute maxillary sinusitis, recurrence not specified: acute illness or injury  Atypical chest pain: acute illness or injury  Amount and/or  Complexity of Data Reviewed  Labs: ordered.  Radiology: ordered.  ECG/medicine tests: ordered.      Risk  Prescription drug management.          Final diagnoses:   Atypical chest pain   Acute maxillary sinusitis, recurrence not specified       ED Disposition  ED Disposition     ED Disposition   Discharge    Condition   Stable    Comment   --             Marsha Lopez MD  [address]  [facility] [place] [address]  [phone]    On 4/24/2023           Medication List      No changes were made to your prescriptions during this visit.

## 2023-04-21 ENCOUNTER — TELEPHONE (OUTPATIENT)
Dept: FAMILY MEDICINE CLINIC | Facility: CLINIC | Age: 53
End: 2023-04-21
Payer: MEDICARE

## 2023-04-21 NOTE — TELEPHONE ENCOUNTER
----- Message from Roosevelt Armstrong DO sent at 4/19/2023  4:50 PM EDT -----  Please let Tessie know all of her thyroid labs came back normal so no thyroid medication is needed at this time.  Her magnesium level was also in perfect range.  We will see what the echo shows.

## 2023-04-21 NOTE — TELEPHONE ENCOUNTER
Caller: Tessie Conner    Relationship to patient: Self    Best call back number:     Type of visit: HOSP FOLLOW UP    Requested date: 4/28/23    If rescheduling, when is the original appointment: 4/28/23 1 PM    Additional notes: PATIENT HAS A DENTIST APPOINMTENT AT NOON THE SAME DAY WANTS TO KNOW IF HER APPOINTMENT COULD BE EARLIER OR LATER THAT DAY.    PLEASE ADVISE

## 2023-04-24 ENCOUNTER — TELEPHONE (OUTPATIENT)
Dept: PAIN MEDICINE | Facility: CLINIC | Age: 53
End: 2023-04-24
Payer: MEDICARE

## 2023-04-24 ENCOUNTER — OFFICE VISIT (OUTPATIENT)
Dept: PAIN MEDICINE | Facility: CLINIC | Age: 53
End: 2023-04-24
Payer: MEDICARE

## 2023-04-24 VITALS
SYSTOLIC BLOOD PRESSURE: 136 MMHG | DIASTOLIC BLOOD PRESSURE: 82 MMHG | OXYGEN SATURATION: 97 % | HEART RATE: 61 BPM | RESPIRATION RATE: 16 BRPM

## 2023-04-24 DIAGNOSIS — G58.8 INTERCOSTAL NEURALGIA: Primary | ICD-10-CM

## 2023-04-24 DIAGNOSIS — R07.1 CHEST PAIN ON BREATHING: ICD-10-CM

## 2023-04-24 PROCEDURE — 1125F AMNT PAIN NOTED PAIN PRSNT: CPT | Performed by: STUDENT IN AN ORGANIZED HEALTH CARE EDUCATION/TRAINING PROGRAM

## 2023-04-24 PROCEDURE — 1160F RVW MEDS BY RX/DR IN RCRD: CPT | Performed by: STUDENT IN AN ORGANIZED HEALTH CARE EDUCATION/TRAINING PROGRAM

## 2023-04-24 PROCEDURE — 3079F DIAST BP 80-89 MM HG: CPT | Performed by: STUDENT IN AN ORGANIZED HEALTH CARE EDUCATION/TRAINING PROGRAM

## 2023-04-24 PROCEDURE — 3075F SYST BP GE 130 - 139MM HG: CPT | Performed by: STUDENT IN AN ORGANIZED HEALTH CARE EDUCATION/TRAINING PROGRAM

## 2023-04-24 PROCEDURE — 99213 OFFICE O/P EST LOW 20 MIN: CPT | Performed by: STUDENT IN AN ORGANIZED HEALTH CARE EDUCATION/TRAINING PROGRAM

## 2023-04-24 PROCEDURE — 1159F MED LIST DOCD IN RCRD: CPT | Performed by: STUDENT IN AN ORGANIZED HEALTH CARE EDUCATION/TRAINING PROGRAM

## 2023-04-24 NOTE — TELEPHONE ENCOUNTER
Since this pt has been transferred to your care and you havent done any injections yet, please advise! Thank you

## 2023-04-24 NOTE — TELEPHONE ENCOUNTER
SPOKE TO PATIENT SHE IS GOING TO COME IN TODAY TO SEE DR. GRIJALVA AND STAY WITH HIM  NOW THAT HE DOES INJECTIONS ON FRIDAY'S

## 2023-04-24 NOTE — TELEPHONE ENCOUNTER
Caller: Tessie Conner    Relationship to patient: Self    Best call back number: [phone]    Patient is needing:PATIENT WOULD LIKE TO SCHEDULE ANOTHER NERVE BLOCK. HAS TO BE ON A Friday

## 2023-04-24 NOTE — PROGRESS NOTES
Subjective   Tessie Conner is a 53 y.o. female is here for follow up for mid and upper back pain. Patient was last seen on 2/22/23. She had COVID in 3/2023 for which she was hospitalized. She has started having increased back pain since last 2 weeks. She was also found to have drop in HR in 20s and was placed on holter monitor. She is scheduled to have Echo done soon. She does feel light headed when her HR drops.     On last visit:     Right mid/upper back pain is 8/10 on VAS, at maximum is 9/10. Pain is stabbing, sharp in nature. Pain is referred under R breast, R lateral ribs, R shoulder. The pain is intermittent. The pain is improved by intercostal nerve block. The pain worse at night time, deep breathing. She can barely sleep for 2-3 hours. She has tried muscle relaxant, gabapentin, heat, cold without significant relief.    Previous Injection:   1/13/2023- R intercostal nerve block-5-9- 90% Pain relief. Functional improvement- 2 months..       Hx: Referred by Eileen Massey, ALEXIS, APRN for right chest pain and possible intercostal nerve block. 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 She had no pain after surgery up until 6/22.  She has tried gabapentin and Valium without significant pain relief. Scheduled to see Cardiologist for abnormalities in ECG as per patient.     PHQ-9- 19                     SOAPP- 12  [place] back disability scale -27     PMH:   DM-2, HTN,  anxiety, depression (suicde attempt long time back - currently very well controlled), migraines, ADY, history of spinal headache, bipolar 1, frequent UTI, history of histoplasmosis causing granuloma and s/p right upper lobe wedge resection-2/2022, left rotator cuff repair     Current Medications:   Tramadol PRN  Gabapentin 300 mg - has only taken 2 doses so far  Lidoderm 5% patch - insurance didn't cover it.   Aspirin 81 mg  Melatonin      Past Medications:  Tramadol - short  prescription  Oxycodone- short prescription  Temazepam - for sleep  Valium 2 mg- took it one time.   Biomed Cream  Ztildo patches      Past Modalities:  TENS:                                                                          no                                                  Physical Therapy Within The Last 6 Months              no  Psychotherapy                                                            no  Massage Therapy                                                       no     Patient Complains Of:  Uro-Fecal Incontinence          no  Weight Gain/Loss                   no  Fever/Chills                             no  Weakness                               no         Current Outpatient Medications:   •  Cholecalciferol 50 MCG (2000 UT) tablet, Take 1 tablet by mouth Daily., Disp: 30 each, Rfl: 5  •  EPINEPHrine (EPIPEN) 0.3 MG/0.3ML solution auto-injector injection, , Disp: , Rfl:   •  gabapentin (NEURONTIN) 300 MG capsule, Take 1 capsule by mouth every night at bedtime for 90 days., Disp: 90 capsule, Rfl: 0  •  ipratropium-albuterol (DUO-NEB) 0.5-2.5 mg/3 ml nebulizer, USE 1 VIAL PER NEBULIZER EVERY 4 HOURS AS NEEDED FOR WHEEZING, Disp: 360 mL, Rfl: 0  •  Rexulti 0.5 MG tablet, Take 0.5 mg by mouth Daily., Disp: , Rfl:   •  sertraline (ZOLOFT) 50 MG tablet, Take 1 tablet by mouth Daily., Disp: , Rfl:     The following portions of the patient's history were reviewed and updated as appropriate: allergies, current medications, past family history, past medical history, past social history, past surgical history, and problem list.      REVIEW OF PERTINENT MEDICAL DATA    Past Medical History:   Diagnosis Date   • Abnormal ECG    • Anxiety 2005   • Arthritis    • Asthma 04/17/2020   • At risk for sleep apnea    • Bipolar 1 disorder    • Cholelithiasis 10/2022   • Coronary artery disease    • Depression 2005   • Diabetes mellitus 2002   • Dyspnea on minimal exertion    • Frequent UTI    • Heart murmur      FROM CHILDHOOD   • History of anemia     POST PREGNANCY   • Hyperlipidemia 10/04/2016   • Hypertension    • Mass of upper lobe of right lung    • Migraines    • Obesity    • PONV (postoperative nausea and vomiting)    • Shoulder pain, right    • Sleep apnea 22   • Spinal headache    • Visual impairment     WEARS GLASSES     Past Surgical History:   Procedure Laterality Date   • APPENDECTOMY     • CARDIAC CATHETERIZATION N/A 2020    Procedure: Left Heart Cath possible PCI, atherectomy, hemodynamic support;  Surgeon: Thomas Zamroa MD;  Location: [facility] INVASIVE LOCATION;  Service: Cardiology;  Laterality: N/A;   • CARDIAC CATHETERIZATION  2020   •  SECTION     • FOOT/TOE TENDON REPAIR Left    • HYSTERECTOMY     • LUNG BIOPSY Right 2020   • LUNG LOBECTOMY Right 2022    pt had partial Right lobectomy and nodule removal   • ROTATOR CUFF REPAIR Left    • THORACOSCOPY VIDEO ASSISTED WITH LOBECTOMY Right 2022    Procedure: BRONCHOSCOPY, THORACOSCOPY VIDEO ASSISTED wedge resection X2, INTERCOSTAL NERVE BLOCK;  Surgeon: Ayla Carroll MD;  Location: [facility] OR;  Service: Thoracic;  Laterality: Right;   • TUBAL ABDOMINAL LIGATION       Family History   Problem Relation Age of Onset   • Arthritis Mother    • Cancer Mother    • Depression Mother    • Diabetes Mother    • Early death Mother    • Mental illness Mother    • Anxiety disorder Mother    • Alcohol abuse Father    • Diabetes Father    • Early death Father    • Heart disease Father    • Hyperlipidemia Father    • Hypertension Father         Father   • Vision loss Father    • Heart attack Father    • Heart disease Sister    • Drug abuse Sister    • Heart attack Sister    • Hypertension Sister         Sister   • Heart disease Sister    • Heart disease Brother    • Hypertension Brother    • Drug abuse Brother    • Hypertension Brother    • Heart disease Brother    • Heart attack Brother    •  Cancer Maternal Grandmother    • Malig Hyperthermia Neg Hx      Social History     Socioeconomic History   • Marital status:    Tobacco Use   • Smoking status: Never     Passive exposure: Never   • Smokeless tobacco: Never   Vaping Use   • Vaping Use: Never used   Substance and Sexual Activity   • Alcohol use: Yes     Comment: VERY RARE   • Drug use: No   • Sexual activity: Not Currently     Partners: Male     Birth control/protection: None, Hysterectomy         Review of Systems   Musculoskeletal: Positive for arthralgias and back pain.         Vitals:    04/24/23 1459   BP: 136/82   Pulse: 61   Resp: 16   SpO2: 97%   PainSc:   8         Objective   Physical Exam  Chest:       Musculoskeletal:         General: Tenderness present.        Arms:            Imaging Reviewed:  CT chest without contrast  - Postsurgical changes of right again noted with suture line at the right apex  - Increased subpleural opacity along with anterior chest wall on the right middle lobe.  Atelectasis versus pneumonia.  - Stable noncalcified nodules in right middle lobe.     MRI lumbar spine without contrast-6/16/2017  L3-4-mild facet arthropathy with left paracentral disc protrusion causing moderate left neural foraminal narrowing.  L4-5-moderate bilateral facet arthropathy with small posterior disc endplate complex causing mild narrowing of the neural foramina.  L4 S1-mild to moderate bilateral facet arthropathy.         Assessment:    1. Intercostal neuralgia    2. Chest pain on breathing            Plan:   1.  Defer UDS for now.  2. Continue Gabapentin 300 mg qhs (3 months supply - 6/14/23). Side effects discussed with the patient including but not limited to somnolence, dizziness, ataxia, headache, fatigue, blurred vision, cold or flu-like symptoms,delusions, hoarseness, lack or loss of strength, lower back or side pain, swelling of the hands, feet, or lower legs trembling or shaking. Patient understands and agrees with the  plan.  3. ONLY Take Tramadol if in pain 50 mg qhs PRN. (#21 tabs - 2/22/23). Discussed with the patient regarding long-term side effects of opioids including but not limited to opioid induced hormonal suppression, hyperalgesia, fatigue, weight gain, possible opioid induced altered immune system, addiction, tolerance, dependence, risk of hearing loss and elevated risk of myocardial infarction. Proper use and potential life threatening side effects of over use discussed with patient. Patient states understanding of their use and risks.  4. Excellent relief with intercostal nerve block, but pain is returning now. Will repeat R intercostal nerve block ribs 5-9. Patient has pain in the chest on the RIGHT side from rib pain, discussed RIGHT intercostal nerve block at rib 5-9 Discussed the possibility of infection, bleeding, nerve damage, headache, increased pain, pneumothorax. Patient understands and agrees.        RTC for injection and then 3 weeks follow up.     Shane Medina DO  Pain Management   [facility]         INSPECT REPORT    As part of the patient's treatment plan, I may be prescribing controlled substances. The patient has been made aware of appropriate use of such medications, including potential risk of somnolence, limited ability to drive and/or work safely, and the potential for dependence or overdose. It has also been made clear that these medications are for use by this patient only, without concomitant use of alcohol or other substances unless prescribed.     Patient has completed prescribing agreement detailing terms of continued prescribing of controlled substances, including monitoring INSPECT reports, urine drug screening, and pill counts if necessary. The patient is aware that inappropriate use will results in cessation of prescribing such medications.    INSPECT report has been reviewed and scanned into the patient's chart.

## 2023-04-26 ENCOUNTER — PATIENT OUTREACH (OUTPATIENT)
Dept: CASE MANAGEMENT | Facility: OTHER | Age: 53
End: 2023-04-26
Payer: MEDICARE

## 2023-04-26 ENCOUNTER — HOSPITAL ENCOUNTER (OUTPATIENT)
Dept: CARDIOLOGY | Facility: HOSPITAL | Age: 53
Discharge: HOME | End: 2023-04-26
Admitting: STUDENT IN AN ORGANIZED HEALTH CARE EDUCATION/TRAINING PROGRAM
Payer: MEDICARE

## 2023-04-26 VITALS
DIASTOLIC BLOOD PRESSURE: 68 MMHG | HEIGHT: 64 IN | BODY MASS INDEX: 38.58 KG/M2 | WEIGHT: 226 LBS | HEART RATE: 70 BPM | SYSTOLIC BLOOD PRESSURE: 121 MMHG

## 2023-04-26 DIAGNOSIS — R00.1 SINUS BRADYCARDIA: ICD-10-CM

## 2023-04-26 DIAGNOSIS — I97.89 BRADYCARDIA FOLLOWING SURGERY: ICD-10-CM

## 2023-04-26 PROCEDURE — 93306 TTE W/DOPPLER COMPLETE: CPT | Performed by: INTERNAL MEDICINE

## 2023-04-26 PROCEDURE — 93306 TTE W/DOPPLER COMPLETE: CPT

## 2023-04-26 NOTE — OUTREACH NOTE
"AMBULATORY CASE MANAGEMENT NOTE    Name and Relationship of Patient/Support Person: Tessie Conner L - Self    Patient Outreach    Pt discharged from [facility] ED on 4/18/23, seen for CP, sinusitis. RN-ACM outreach call made to pt. Explained role of RN-ACM and reason for call. Pt states \"everythings fine\", she went for echo this am and is following up with cardiology. Reviewed ED AVS with pt. Reviewed SDOH. Pt denies any needs. She reports to have good family support. Pt has seen her PCP and cardiologist post ED visit and has next follow up appts scheduled. No questions per pt. Advised her to call with any needs. Follow up outreach prn.     Adult Patient Profile  Questions/Answers    Flowsheet Row Most Recent Value   Equipment Currently Used at Home bp cuff, cpap   Primary Source of Support/Comfort child(crystal)   People in Home child(crystal), adult  [Adult son]   Current Living Arrangements home        Send Education  Questions/Answers    Flowsheet Row Most Recent Value   Annual Wellness Visit:  Patient Has Completed   Other Patient Education/Resources  24/7 [facility] Nurse Call Line   24/7 Nurse Call Line Education Method Verbal   Advanced Directives: Patient Has      SDOH updated and reviewed with the patient during this program:  Financial Resource Strain: Low Risk    • Difficulty of Paying Living Expenses: Not very hard      Food Insecurity: No Food Insecurity   • Worried About Running Out of Food in the Last Year: Never true   • Ran Out of Food in the Last Year: Never true      Transportation Needs: No Transportation Needs   • Lack of Transportation (Medical): No   • Lack of Transportation (Non-Medical): No       Juanita ESPINOZA  Ambulatory Case Management    4/26/2023, 11:40 EDT  "

## 2023-04-28 ENCOUNTER — TELEPHONE (OUTPATIENT)
Dept: FAMILY MEDICINE CLINIC | Facility: CLINIC | Age: 53
End: 2023-04-28

## 2023-04-28 LAB
BH CV ECHO MEAS - ACS: 1.73 CM
BH CV ECHO MEAS - AO MAX PG: 8.1 MMHG
BH CV ECHO MEAS - AO MEAN PG: 4 MMHG
BH CV ECHO MEAS - AO ROOT DIAM: 3.2 CM
BH CV ECHO MEAS - AO V2 MAX: 142.5 CM/SEC
BH CV ECHO MEAS - AO V2 VTI: 28.2 CM
BH CV ECHO MEAS - AVA(I,D): 2.8 CM2
BH CV ECHO MEAS - EDV(CUBED): 118.8 ML
BH CV ECHO MEAS - EDV(MOD-SP4): 65.5 ML
BH CV ECHO MEAS - EF(MOD-BP): 61.9 %
BH CV ECHO MEAS - EF(MOD-SP4): 52.5 %
BH CV ECHO MEAS - ESV(CUBED): 27.5 ML
BH CV ECHO MEAS - ESV(MOD-SP4): 31.1 ML
BH CV ECHO MEAS - FS: 38.6 %
BH CV ECHO MEAS - IVS/LVPW: 0.92 CM
BH CV ECHO MEAS - IVSD: 0.98 CM
BH CV ECHO MEAS - LA DIMENSION: 3.8 CM
BH CV ECHO MEAS - LV MASS(C)D: 182 GRAMS
BH CV ECHO MEAS - LV MAX PG: 5.4 MMHG
BH CV ECHO MEAS - LV MEAN PG: 2.48 MMHG
BH CV ECHO MEAS - LV V1 MAX: 115.9 CM/SEC
BH CV ECHO MEAS - LV V1 VTI: 25.6 CM
BH CV ECHO MEAS - LVIDD: 4.9 CM
BH CV ECHO MEAS - LVIDS: 3 CM
BH CV ECHO MEAS - LVOT AREA: 3.1 CM2
BH CV ECHO MEAS - LVOT DIAM: 1.99 CM
BH CV ECHO MEAS - LVPWD: 1.07 CM
BH CV ECHO MEAS - MR MAX PG: 67.2 MMHG
BH CV ECHO MEAS - MR MAX VEL: 409 CM/SEC
BH CV ECHO MEAS - MV A MAX VEL: 84.6 CM/SEC
BH CV ECHO MEAS - MV DEC SLOPE: 444.1 CM/SEC2
BH CV ECHO MEAS - MV DEC TIME: 0.23 MSEC
BH CV ECHO MEAS - MV E MAX VEL: 101.4 CM/SEC
BH CV ECHO MEAS - MV E/A: 1.2
BH CV ECHO MEAS - MV MAX PG: 4.8 MMHG
BH CV ECHO MEAS - MV MEAN PG: 1.26 MMHG
BH CV ECHO MEAS - MV V2 VTI: 33.7 CM
BH CV ECHO MEAS - MVA(VTI): 2.35 CM2
BH CV ECHO MEAS - PA ACC TIME: 0.06 SEC
BH CV ECHO MEAS - PA PR(ACCEL): 49.8 MMHG
BH CV ECHO MEAS - PA V2 MAX: 85.8 CM/SEC
BH CV ECHO MEAS - PULM A REVS DUR: 0.1 SEC
BH CV ECHO MEAS - PULM A REVS VEL: 27.6 CM/SEC
BH CV ECHO MEAS - PULM DIAS VEL: 54.8 CM/SEC
BH CV ECHO MEAS - PULM S/D: 1.07
BH CV ECHO MEAS - PULM SYS VEL: 58.4 CM/SEC
BH CV ECHO MEAS - RV MAX PG: 2.14 MMHG
BH CV ECHO MEAS - RV V1 MAX: 73.1 CM/SEC
BH CV ECHO MEAS - RV V1 VTI: 16.4 CM
BH CV ECHO MEAS - RVDD: 3 CM
BH CV ECHO MEAS - SV(LVOT): 79.3 ML
BH CV ECHO MEAS - SV(MOD-SP4): 34.4 ML
BH CV ECHO MEAS - TR MAX PG: 21.9 MMHG
BH CV ECHO MEAS - TR MAX VEL: 232.8 CM/SEC
MAXIMAL PREDICTED HEART RATE: 168 BPM
STRESS TARGET HR: 143 BPM

## 2023-04-28 NOTE — TELEPHONE ENCOUNTER
Caller: Tessie Conner    Relationship: Self    Best call back number: [phone]    What test was performed: ECHOCARDIOGRAM     When was the test performed: 04/26/23     Where was the test performed: [facility]     Additional notes:     PLEASE ADVISE

## 2023-04-28 NOTE — TELEPHONE ENCOUNTER
It looks like Dr. Armstrong ordered this. Please let her know it can take 7-10 business days for echos to be read before we get the results and she will be contact with results once we have them.

## 2023-05-05 ENCOUNTER — OFFICE VISIT (OUTPATIENT)
Dept: FAMILY MEDICINE CLINIC | Facility: CLINIC | Age: 53
End: 2023-05-05
Payer: MEDICARE

## 2023-05-05 VITALS
HEART RATE: 89 BPM | SYSTOLIC BLOOD PRESSURE: 108 MMHG | HEIGHT: 64 IN | OXYGEN SATURATION: 97 % | WEIGHT: 226 LBS | DIASTOLIC BLOOD PRESSURE: 74 MMHG | BODY MASS INDEX: 38.58 KG/M2

## 2023-05-05 DIAGNOSIS — R00.1 BRADYCARDIA: ICD-10-CM

## 2023-05-05 DIAGNOSIS — B39.9 HISTOPLASMOSIS: Primary | ICD-10-CM

## 2023-05-05 RX ORDER — IPRATROPIUM BROMIDE AND ALBUTEROL SULFATE 2.5; .5 MG/3ML; MG/3ML
3 SOLUTION RESPIRATORY (INHALATION)
Qty: 360 ML | Refills: 3 | Status: SHIPPED | OUTPATIENT
Start: 2023-05-05

## 2023-05-05 NOTE — PROGRESS NOTES
Rooming Tab(CC,VS,Pt Hx,Fall Screen)  Chief Complaint   Patient presents with   • Hospital Follow Up Visit     Admitted in March for Histoplasmosis, acute COVID-19 infection, bradycardia       Subjective      The patient presents today for a follow-up.    Weight loss  The patient has lost 50 pounds since her last visit.    COVID-19  The patient went to the hospital in 03/2023 for COVID-19. Her heart rate dropped to 20 bpm. She was checked out for 2 months, and it kept dropping down. The hospital did a heart monitor, and it was normal. Her heart rate is still low at home, and at times, it is still in the 40s and 50s bpm during the day. She has a pulse monitor at home. Her oxygen has been going down to 90 percent. She gets very lightheaded, and Dr. Rosa thought it was her thyroid, so they tested it, and it came back normal. They did an echocardiogram, and it was normal. They are ordering a stress test. She wore the outpatient monitor for 12 days, and it showed a minimum of 40 bpm, and the average was 66 bpm. She was told that when it went down, it did not stay low very long. She is now seeing Dr. Pickett.     Histoplasmosis  The patient has an appointment with the infectious disease doctor on 05/06/2023. She is still on the medications. She is off of the itraconazole.    Hypertension  The patient is not taking anything for her blood pressure anymore. She was taken off of it because her blood pressure decreased down to the 30s diastolic, which was too low. She is really careful. She drinks a lot of water. She uses the DuoNeb nebulizer often. Her lungs have been feeling better. She does eat salt with French fries. She is not being a big salter eater.  She is wearing compression socks during the workday.     Neuropathy  The patient is on gabapentin. She had s nerve block because she was having a lot of pain. She started having pain again. She adds that within 2 days, she was much better. She does not take pain  "medication a lot. She takes gabapentin once a day at bedtime.     Lung nodules  The patient had a CT scan, which showed multiple lung nodules on the right side.       I have reviewed and updated her medications, medical history and problem list during today's office visit.     Patient Care Team:  Marsha Lopez MD as PCP - General (Internal Medicine)  Thomas Zamora MD as Consulting Physician (Cardiology)  Ayla Carroll MD as Surgeon (Thoracic Surgery)  Juanita Shen RN as Ambulatory  (Population Health)    Problem List Tab  Medications Tab  Synopsis Tab  Chart Review Tab  Care Everywhere Tab  Immunizations Tab  Patient History Tab    Social History     Tobacco Use   • Smoking status: Never     Passive exposure: Never   • Smokeless tobacco: Never   Substance Use Topics   • Alcohol use: Yes     Comment: VERY RARE       Review of Systems    Objective     Rooming Tab(CC,VS,Pt Hx,Fall Screen)  /74   Pulse 89   Ht 162.6 cm (64\")   Wt 103 kg (226 lb)   LMP  (LMP Unknown)   SpO2 97%   BMI 38.79 kg/m²     Body mass index is 38.79 kg/m².    Physical Exam  Vitals and nursing note reviewed.   Constitutional:       Appearance: Normal appearance. She is well-developed.   HENT:      Head: Normocephalic and atraumatic.      Right Ear: Tympanic membrane normal.      Left Ear: Tympanic membrane normal.      Nose: No rhinorrhea.      Mouth/Throat:      Pharynx: No posterior oropharyngeal erythema.   Eyes:      Pupils: Pupils are equal, round, and reactive to light.   Cardiovascular:      Rate and Rhythm: Normal rate and regular rhythm.      Pulses: Normal pulses.      Heart sounds: Normal heart sounds. No murmur heard.  Pulmonary:      Effort: Pulmonary effort is normal.      Breath sounds: Normal breath sounds.   Abdominal:      General: Bowel sounds are normal. There is no distension.      Palpations: Abdomen is soft.   Musculoskeletal:         General: No tenderness.      Cervical " back: Normal range of motion and neck supple.   Skin:     Capillary Refill: Capillary refill takes less than 2 seconds.   Neurological:      Mental Status: She is alert and oriented to person, place, and time.   Psychiatric:         Mood and Affect: Mood normal.         Behavior: Behavior normal.          Statin Choice Calculator  Data Reviewed:    XR Chest 2 View    Result Date: 4/18/2023  Impression: No radiographic findings of acute cardiopulmonary abnormality. Electronically Signed: Thomas Jaimessabas  4/18/2023 10:39 PM EDT  Workstation ID: [id number]    CT Head Without Contrast    Result Date: 4/18/2023  Impression: 1. No acute intracranial process. 2. Minor changes small vessel ischemic disease of indeterminate age, presumably mostly chronic. Volume loss. Atherosclerosis. 3. Paranasal sinus disease.  Electronically signed by:  Ernesto Torres M.D.  4/18/2023 9:06 PM Mountain Time    CT Chest Without Contrast Diagnostic    Result Date: 5/6/2023  Impression: Impression: 1. 4 mm or less noncalcified bilateral pulmonary nodules are stable. No new pulmonary nodules are seen. 2. Previously described enlarged mediastinal and right hilar lymph nodes have resolved in the interval. There are no pathologic enlarged lymph nodes by CT criteria. 3. Wedge resection changes in the right upper lobe. 4. Single gallstone is seen. No evidence of cholecystitis. Electronically Signed: Marleny Chavez  5/6/2023 11:16 PM EDT  Workstation ID: [id number]      The data below has been reviewed by Marsha Lopez MD on 05/05/2023.      Lab Results   Component Value Date    BUN 14 04/18/2023    CREATININE 0.75 04/18/2023     04/18/2023    K 4.0 04/18/2023     04/18/2023    CALCIUM 9.5 04/18/2023    ALBUMIN 3.8 04/18/2023    BILITOT 0.3 04/18/2023    ALKPHOS 96 04/18/2023    AST 35 (H) 04/18/2023    ALT 17 04/18/2023    TRIG 89 02/17/2023    HDL 34 (L) 02/17/2023    VLDL 17 02/17/2023    LDL 72 02/17/2023    LDLHDL 2.09  02/17/2023    MICROALBUR <1.2 02/17/2023    WBC 9.21 04/18/2023    RBC 4.50 04/18/2023    HCT 37.2 04/18/2023    MCV 82.7 04/18/2023    MCH 25.6 (L) 04/18/2023    TSH 0.739 04/19/2023    FREET4 1.33 04/19/2023    NIBT81JP 17.0 (L) 02/17/2023      Assessment & Plan   Order Review Tab  Health Maintenance Tab  Patient Plan/Order Tab  Diagnoses and all orders for this visit:    1. Histoplasmosis (Primary)  Comments:  now off meds- to get CT tomorrow    2. Bradycardia  Comments:  slowly imporving- no Prolonged QT  in previous EKG's- could be secondary to sporonox or post COVID syndrome    Other orders  -     ipratropium-albuterol (DUO-NEB) 0.5-2.5 mg/3 ml nebulizer; Take 3 mL by nebulization 4 (Four) Times a Day.  Dispense: 360 mL; Refill: 3  -     Cholecalciferol 50 MCG (2000 UT) tablet; Take 1 tablet by mouth Daily.  Dispense: 90 each; Refill: 3    1. 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  - The patient was advised to drink plenty of water, compression socks, and 1 Gatorade a day.  - The patient was advised to do deep breathing for her lungs.    Wrapup Tab  Return if symptoms worsen or fail to improve.       They were informed of the diagnosis and treatment plan and directed to f/u for any further problems or concerns.          Transcribed from ambient dictation for Marsha Lopez MD by Tessie Millard.  05/05/23   15:47 EDT    Patient or patient representative verbalized consent to the visit recording.  I have personally performed the services described in this document as transcribed by the above individual, and it is both accurate and complete.  Marsha Lopez MD  5/8/2023  21:09 EDT

## 2023-05-06 ENCOUNTER — HOSPITAL ENCOUNTER (OUTPATIENT)
Dept: CT IMAGING | Facility: HOSPITAL | Age: 53
Discharge: HOME | End: 2023-05-06
Payer: MEDICARE

## 2023-05-06 DIAGNOSIS — R91.1 LUNG NODULE: ICD-10-CM

## 2023-05-06 PROCEDURE — 71250 CT THORAX DX C-: CPT

## 2023-05-09 ENCOUNTER — OFFICE VISIT (OUTPATIENT)
Dept: SURGERY | Facility: CLINIC | Age: 53
End: 2023-05-09
Payer: MEDICARE

## 2023-05-09 VITALS
TEMPERATURE: 98.2 F | WEIGHT: 232 LBS | HEIGHT: 64 IN | OXYGEN SATURATION: 99 % | DIASTOLIC BLOOD PRESSURE: 76 MMHG | SYSTOLIC BLOOD PRESSURE: 117 MMHG | BODY MASS INDEX: 39.61 KG/M2 | HEART RATE: 53 BPM

## 2023-05-09 DIAGNOSIS — R91.1 LUNG NODULE: Primary | ICD-10-CM

## 2023-05-09 NOTE — PROGRESS NOTES
"Chief Complaint  Follow up, necrotizing granuloma      Subjective        Tessie Conner presents to [facility]  History of Present Illness    Ms. Conner is a 53-year-old lady who is status post right upper lobe wedge resection by Dr. 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  She has done very well since surgery other than some intercostal neuralgia that improved with intercostal nerve block.  She continues to follow with pain management.    She is a never smoker.  She was hospitalized for COVID in March 2023.  She also had a recent admission to [facility] ED on 4/18/2023 for chest pain and sinusitis.  She has recovered well.  She had an echo performed last month that was unremarkable.  She presents today feeling well with CT chest.      Objective   Vital Signs:  /76 (BP Location: Left arm, Patient Position: Sitting, Cuff Size: Large Adult)   Pulse 53   Temp 98.2 °F (36.8 °C) (Infrared)   Ht 162.6 cm (64\")   Wt 105 kg (232 lb)   SpO2 99%   BMI 39.82 kg/m²   Estimated body mass index is 39.82 kg/m² as calculated from the following:    Height as of this encounter: 162.6 cm (64\").    Weight as of this encounter: 105 kg (232 lb).             Physical Exam  Constitutional:       General: She is not in acute distress.     Appearance: Normal appearance.   HENT:      Head: Normocephalic and atraumatic.   Cardiovascular:      Rate and Rhythm: Normal rate.   Pulmonary:      Effort: Pulmonary effort is normal. No respiratory distress.   Musculoskeletal:         General: Normal range of motion.      Cervical back: Normal range of motion.   Skin:     General: Skin is warm.   Neurological:      General: No focal deficit present.      Mental Status: She is alert.   Psychiatric:         Mood and Affect: Mood normal.              Result Review :           I have independently reviewed the CT of the chest performed on 5/6/2023 which " demonstrates postoperative changes consistent with a right upper lobe wedge resection.  There is a stable 4 mm noncalcified nodule in the right middle lobe.  Other scattered micronodules are without change.  No mediastinal or hilar lymphadenopathy.  This is improved since previous imaging.  No pleural or pericardial effusion.               Assessment and Plan   Diagnoses and all orders for this visit:    1. Lung nodule (Primary)  -     CT Chest Without Contrast; Future    Ms. Conner most recent CT chest demonstrates stable sub-5 mm nodules bilaterally status post right upper lobe wedge resection Dr. Carroll in February 2022.  Pathology was consistent with necrotizing granuloma.  We will continue her surveillance with a CT of the chest in 6 months.  Patient tells me she is scheduled to follow-up with infectious disease at [place] in the near future.  Appreciate their expertise.  We will plan to follow-up with her after her imaging is completed.       I spent 34 minutes caring for Tessie on this date of service. This time includes time spent by me in the following activities:preparing for the visit, reviewing tests, performing a medically appropriate examination and/or evaluation , counseling and educating the patient/family/caregiver, ordering medications, tests, or procedures, referring and communicating with other health care professionals , documenting information in the medical record and care coordination     Follow Up   Return in about 6 months (around 11/9/2023) for Next scheduled follow up.  Patient was given instructions and counseling regarding her condition or for health maintenance advice. Please see specific information pulled into the AVS if appropriate.

## 2023-05-10 RX ORDER — ATORVASTATIN CALCIUM 20 MG/1
TABLET, FILM COATED ORAL
Qty: 90 TABLET | Refills: 1 | Status: SHIPPED | OUTPATIENT
Start: 2023-05-10

## 2023-05-19 ENCOUNTER — HOSPITAL ENCOUNTER (OUTPATIENT)
Dept: PAIN MEDICINE | Facility: HOSPITAL | Age: 53
Discharge: HOME | End: 2023-05-19
Payer: MEDICARE

## 2023-05-19 VITALS
HEART RATE: 52 BPM | BODY MASS INDEX: 38.41 KG/M2 | WEIGHT: 225 LBS | SYSTOLIC BLOOD PRESSURE: 102 MMHG | RESPIRATION RATE: 16 BRPM | HEIGHT: 64 IN | OXYGEN SATURATION: 100 % | DIASTOLIC BLOOD PRESSURE: 64 MMHG | TEMPERATURE: 97.3 F

## 2023-05-19 DIAGNOSIS — G58.8 INTERCOSTAL NEURALGIA: Primary | ICD-10-CM

## 2023-05-19 DIAGNOSIS — R52 PAIN: ICD-10-CM

## 2023-05-19 PROCEDURE — 25010000002 DEXAMETHASONE SODIUM PHOSPHATE 10 MG/ML SOLUTION: Performed by: STUDENT IN AN ORGANIZED HEALTH CARE EDUCATION/TRAINING PROGRAM

## 2023-05-19 PROCEDURE — 25510000001 IOPAMIDOL 41 % SOLUTION: Performed by: STUDENT IN AN ORGANIZED HEALTH CARE EDUCATION/TRAINING PROGRAM

## 2023-05-19 PROCEDURE — 64420 NJX AA&/STRD NTRCOST NRV 1: CPT | Performed by: STUDENT IN AN ORGANIZED HEALTH CARE EDUCATION/TRAINING PROGRAM

## 2023-05-19 PROCEDURE — 77003 FLUOROGUIDE FOR SPINE INJECT: CPT

## 2023-05-19 RX ORDER — DEXAMETHASONE SODIUM PHOSPHATE 10 MG/ML
10 INJECTION, SOLUTION INTRAMUSCULAR; INTRAVENOUS ONCE
Status: COMPLETED | OUTPATIENT
Start: 2023-05-19 | End: 2023-05-19

## 2023-05-19 RX ORDER — LIDOCAINE HYDROCHLORIDE 10 MG/ML
5 INJECTION, SOLUTION EPIDURAL; INFILTRATION; INTRACAUDAL; PERINEURAL ONCE
Status: COMPLETED | OUTPATIENT
Start: 2023-05-19 | End: 2023-05-19

## 2023-05-19 RX ORDER — BUPIVACAINE HYDROCHLORIDE 2.5 MG/ML
10 INJECTION, SOLUTION EPIDURAL; INFILTRATION; INTRACAUDAL ONCE
Status: COMPLETED | OUTPATIENT
Start: 2023-05-19 | End: 2023-05-19

## 2023-05-19 RX ADMIN — IOPAMIDOL 3 ML: 408 INJECTION, SOLUTION INTRATHECAL at 08:33

## 2023-05-19 RX ADMIN — BUPIVACAINE HYDROCHLORIDE 10 ML: 2.5 INJECTION, SOLUTION EPIDURAL; INFILTRATION; INTRACAUDAL; PERINEURAL at 08:34

## 2023-05-19 RX ADMIN — LIDOCAINE HYDROCHLORIDE 5 ML: 10 INJECTION, SOLUTION EPIDURAL; INFILTRATION; INTRACAUDAL; PERINEURAL at 08:27

## 2023-05-19 RX ADMIN — DEXAMETHASONE SODIUM PHOSPHATE 10 MG: 10 INJECTION, SOLUTION INTRAMUSCULAR; INTRAVENOUS at 08:34

## 2023-05-19 NOTE — H&P
H and P reviewed from previous visit and no changes to patient's clinical presentation. Will proceed with procedure as planned. Patient denies DM-2 and being on blood thinners.   Shane Medina DO  Pain Management   [facility]

## 2023-05-19 NOTE — PROCEDURES
PREOPERATIVE DIAGNOSIS:        1.         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     ANESTHESIA:   Local     PROCEDURE IN DETAIL:    After obtaining informed consent from the patient, pre-procedure blood pressure and pulse were stable and recorded in patients clinic chart.   The patient was brought to the procedure room and placed in the prone position on fluoroscopy table. The patient’s back was prepped with antiseptic solution and draped in the usual sterile fashion. The skin over the 6th to 9th rib on the right side was identified under fluoroscopic guidance and infiltrated with 1% lidocaine for local anesthesia via 22 gauge needle. 22 gauge 1.5 inch spinal needle was inserted through the skin under fluoroscopic guidance. The lower border of the 9th rib was touched. The needle was walked off the rib in between the intercostal space.  Confirmation was done by injecting 1 cc of the omnipaque dye. Good spread of the dye was seen in the paola- posterior direction in between the 10th and the 9th rib. 1.5 cc of 0.25% bupivacine with kenalog was injected. The procedure was repeated from 5th to 8th rib. A total of 8 cc of 0.25% bupivacaine with 10 mg dexamethasone was injected. The patient tolerated it very well.       Following the procedure the patient's vital signs were stable. The patient was discharged home in good condition after being given discharge instructions.     COMPLICATIONS None     Shane Medina DO  Pain Management   [facility]

## 2023-05-25 PROBLEM — Z99.89 OBSTRUCTIVE SLEEP APNEA ON CPAP: Status: ACTIVE | Noted: 2023-03-29

## 2023-05-25 PROBLEM — G47.33 OBSTRUCTIVE SLEEP APNEA ON CPAP: Status: ACTIVE | Noted: 2023-03-29

## 2023-05-25 PROBLEM — R94.30 ELEVATED LEFT VENTRICULAR END-DIASTOLIC PRESSURE: Status: ACTIVE | Noted: 2023-04-14

## 2023-05-26 ENCOUNTER — OFFICE VISIT (OUTPATIENT)
Dept: FAMILY MEDICINE CLINIC | Facility: CLINIC | Age: 53
End: 2023-05-26
Payer: MEDICARE

## 2023-05-26 VITALS
OXYGEN SATURATION: 100 % | HEIGHT: 64 IN | WEIGHT: 230 LBS | BODY MASS INDEX: 39.27 KG/M2 | DIASTOLIC BLOOD PRESSURE: 70 MMHG | SYSTOLIC BLOOD PRESSURE: 110 MMHG | HEART RATE: 54 BPM

## 2023-05-26 DIAGNOSIS — R00.1 SINUS BRADYCARDIA: ICD-10-CM

## 2023-05-26 DIAGNOSIS — D64.9 ANEMIA, UNSPECIFIED TYPE: ICD-10-CM

## 2023-05-26 DIAGNOSIS — R42 DIZZINESS: ICD-10-CM

## 2023-05-26 DIAGNOSIS — B39.9 HISTOPLASMOSIS: Primary | ICD-10-CM

## 2023-05-26 DIAGNOSIS — F31.32 BIPOLAR 1 DISORDER, DEPRESSED, MODERATE: ICD-10-CM

## 2023-05-26 PROCEDURE — 3074F SYST BP LT 130 MM HG: CPT | Performed by: STUDENT IN AN ORGANIZED HEALTH CARE EDUCATION/TRAINING PROGRAM

## 2023-05-26 PROCEDURE — 99214 OFFICE O/P EST MOD 30 MIN: CPT | Performed by: STUDENT IN AN ORGANIZED HEALTH CARE EDUCATION/TRAINING PROGRAM

## 2023-05-26 PROCEDURE — 3044F HG A1C LEVEL LT 7.0%: CPT | Performed by: STUDENT IN AN ORGANIZED HEALTH CARE EDUCATION/TRAINING PROGRAM

## 2023-05-26 PROCEDURE — 3078F DIAST BP <80 MM HG: CPT | Performed by: STUDENT IN AN ORGANIZED HEALTH CARE EDUCATION/TRAINING PROGRAM

## 2023-05-26 RX ORDER — ITRACONAZOLE 100 MG/1
CAPSULE ORAL
COMMUNITY
Start: 2023-05-19

## 2023-05-26 RX ORDER — MIDODRINE HYDROCHLORIDE 2.5 MG/1
1 TABLET ORAL DAILY
COMMUNITY
Start: 2023-05-19

## 2023-05-26 NOTE — PROGRESS NOTES
"Chief Complaint  Chief Complaint   Patient presents with   • Establish Care   • Hyperlipidemia     Follow up and review labs from [facility]     Subjective        Tessie Conner is a 53 y.o. female who presents to [facility].    History of Present Illness  Tessie is here to establish care with me as her PCP from another provider in the office.      She is currently on sporonox for histoplasmosis.  Expected duration of course is 4-6 months.  She is seeing an infectious disease specialist at [facility].  She recently had blood work which showed her hemoglobin levels were low and she is wondering if this may be contributing to her poor energy.    She was started on midodrine 2.5 mg once daily by her cardiologist.  She sometimes gets some postural dizziness when going from sitting to standing.      Patient has been erroneously marked as diabetic. Based on the available clinical information, she does not have diabetes and should therefore be excluded from diabetic health maintenance and quality measures for the remainder of the reporting period.  She had gestational diabetes in 2002.  She did have prediabetes and was previously treated with metformin.  No longer on metformin.      Objective   /70   Pulse 54   Ht 162.6 cm (64\")   Wt 104 kg (230 lb)   SpO2 100%   BMI 39.48 kg/m²     Estimated body mass index is 39.48 kg/m² as calculated from the following:    Height as of this encounter: 162.6 cm (64\").    Weight as of this encounter: 104 kg (230 lb).     Physical Exam   GEN: In no acute distress, non toxic appearing  HEENT: Pupils equal and reactive to light, sclera clear.  NEURO: AAO to person, place, and time. CN 2-12 intact grossly.  PSYCH: Affect normal, insight fair      Result Review :              Assessment and Plan     Diagnoses and all orders for this visit:    1. Histoplasmosis (Primary)  Continue Sporanox and follow-up with infectious disease.    2. Sinus " bradycardia  Continue midodrine 2.5 mg daily.  If continued symptoms recommend discussing with cardiology for possibly increasing to twice a day.    3. Anemia, unspecified type  Hemoglobin has been slightly low over the last few months with MCV on the lower end.  We will trial every other day iron supplement to see if this improves her symptoms.    4. Dizziness  See above    5. Bipolar 1 disorder, depressed, moderate  Continue Rexulti and Zoloft.

## 2023-06-01 NOTE — PROGRESS NOTES
Subjective   Tessie Conner is a 53 y.o. female is here for follow up for mid and upper back pain.  Last seen on 4/19/2023 for right-sided intercostal nerve blocks. She had excellent relief except for one spot on right side which hurts with deep breathing.     On last visit:     Right mid/upper back is 6/10 on VAS, maximum 7/10.  Pain is stabbing and sharp in nature.  Referred to right breast, right lateral ribs, right shoulder- completely resolved with nerve block.  Pain is intermittent.  Improved by intercostal nerve block.  Worse with nighttime, deep breathing.    She has tried muscle relaxants gabapentin, heat, cold without significant pain relief.    Previous Injection:   5/19/2023-right intercostal nerve block-5-9- 80% pain relief with functional improvement. Able to sleep better.   1/13/2023- R intercostal nerve block-5-9- 90% Pain relief. Functional improvement- 2 months..       Hx: Referred by Eileen Massey, ALEXIS, APRN for right chest pain and possible intercostal nerve block. 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 She had no pain after surgery up until 6/22.  She has tried gabapentin and Valium without significant pain relief. Scheduled to see Cardiologist for abnormalities in ECG as per patient.     PHQ-9- 19                     SOAPP- 12  [place] back disability scale -27     PMH:   DM-2, HTN,  anxiety, depression (suicde attempt long time back - currently very well controlled), migraines, ADY, history of spinal headache, bipolar 1, frequent UTI, history of histoplasmosis causing granuloma and s/p right upper lobe wedge resection-2/2022, left rotator cuff repair     Current Medications:   Tramadol PRN  Gabapentin 300 mg - has only taken 2 doses so far  Lidoderm 5% patch - insurance didn't cover it.   Aspirin 81 mg  Melatonin      Past Medications:  Tramadol - short prescription  Oxycodone- short prescription  Temazepam - for  sleep  Valium 2 mg- took it one time.   Biomed Cream  Ztildo patches      Past Modalities:  TENS:                                                                          no                                                  Physical Therapy Within The Last 6 Months              no  Psychotherapy                                                            no  Massage Therapy                                                       no     Patient Complains Of:  Uro-Fecal Incontinence          no  Weight Gain/Loss                   no  Fever/Chills                             no  Weakness                               no         Current Outpatient Medications:     atorvastatin (LIPITOR) 20 MG tablet, TAKE 1 TABLET BY MOUTH EVERY DAY, Disp: 90 tablet, Rfl: 1    Cholecalciferol 50 MCG (2000 UT) tablet, Take 1 tablet by mouth Daily., Disp: 90 each, Rfl: 3    EPINEPHrine (EPIPEN) 0.3 MG/0.3ML solution auto-injector injection, , Disp: , Rfl:     gabapentin (NEURONTIN) 300 MG capsule, Take 1 capsule by mouth every night at bedtime for 90 days., Disp: 90 capsule, Rfl: 0    ipratropium-albuterol (DUO-NEB) 0.5-2.5 mg/3 ml nebulizer, Take 3 mL by nebulization 4 (Four) Times a Day., Disp: 360 mL, Rfl: 3    itraconazole (SPORANOX) 100 MG capsule, TAKE 2 CAPSULES 3 TIMES A DAY FOR 3DAYS THEN 2 CAPSULES TWO TIMES DAILY, Disp: , Rfl:     meclizine (ANTIVERT) 12.5 MG tablet, Take 1 tablet by mouth 3 (Three) Times a Day As Needed for Dizziness., Disp: , Rfl:     midodrine (PROAMATINE) 2.5 MG tablet, Take 1 tablet by mouth Daily., Disp: , Rfl:     Rexulti 0.5 MG tablet, Take 0.5 mg by mouth Daily., Disp: , Rfl:     sertraline (ZOLOFT) 50 MG tablet, Take 1 tablet by mouth Daily., Disp: , Rfl:     Spiriva Respimat 1.25 MCG/ACT aerosol solution inhaler, , Disp: , Rfl:     The following portions of the patient's history were reviewed and updated as appropriate: allergies, current medications, past family history, past medical history, past  social history, past surgical history, and problem list.      REVIEW OF PERTINENT MEDICAL DATA    Past Medical History:   Diagnosis Date    Abnormal ECG     Anxiety     Arthritis     Asthma 2020    At risk for sleep apnea     Bipolar 1 disorder     Cholelithiasis 10/2022    Coronary artery disease     Depression     Diabetes mellitus     Dyspnea on minimal exertion     Frequent UTI     Heart murmur     FROM CHILDHOOD    History of anemia     POST PREGNANCY    Hyperlipidemia 10/04/2016    Hypertension     Mass of upper lobe of right lung     Migraines     Obesity     PONV (postoperative nausea and vomiting)     Shoulder pain, right     Sleep apnea 22    Spinal headache     Visual impairment     WEARS GLASSES     Past Surgical History:   Procedure Laterality Date    APPENDECTOMY  2011    CARDIAC CATHETERIZATION N/A 2020    Procedure: Left Heart Cath possible PCI, atherectomy, hemodynamic support;  Surgeon: Thomas Zamora MD;  Location: [facility] INVASIVE LOCATION;  Service: Cardiology;  Laterality: N/A;    CARDIAC CATHETERIZATION  2020     SECTION  2002    FOOT/TOE TENDON REPAIR Left     HYSTERECTOMY      LUNG BIOPSY Right 2020    LUNG LOBECTOMY Right 2022    pt had partial Right lobectomy and nodule removal    ROTATOR CUFF REPAIR Left     THORACOSCOPY VIDEO ASSISTED WITH LOBECTOMY Right 2022    Procedure: BRONCHOSCOPY, THORACOSCOPY VIDEO ASSISTED wedge resection X2, INTERCOSTAL NERVE BLOCK;  Surgeon: Ayla Carroll MD;  Location: [facility] OR;  Service: Thoracic;  Laterality: Right;    TUBAL ABDOMINAL LIGATION       Family History   Problem Relation Age of Onset    Arthritis Mother     Cancer Mother     Depression Mother     Diabetes Mother     Early death Mother     Mental illness Mother     Anxiety disorder Mother     Alcohol abuse Father     Diabetes Father     Early death Father     Heart disease Father     Hyperlipidemia Father      Hypertension Father         Father    Vision loss Father     Heart attack Father     Heart disease Sister     Drug abuse Sister     Heart attack Sister     Hypertension Sister         Sister    Heart disease Sister     Heart disease Brother     Hypertension Brother     Drug abuse Brother     Hypertension Brother     Heart disease Brother     Heart attack Brother     Cancer Maternal Grandmother     Malig Hyperthermia Neg Hx      Social History     Socioeconomic History    Marital status:    Tobacco Use    Smoking status: Never     Passive exposure: Never    Smokeless tobacco: Never   Vaping Use    Vaping Use: Never used   Substance and Sexual Activity    Alcohol use: Yes     Comment: VERY RARE    Drug use: No    Sexual activity: Not Currently     Partners: Male     Birth control/protection: None, Hysterectomy         Review of Systems   Musculoskeletal:  Positive for arthralgias and back pain.       Vitals:    06/05/23 0811   BP: 124/54   Pulse: (!) 48   Resp: 16   SpO2: 97%   PainSc:   6         Objective   Physical Exam  Chest:       Musculoskeletal:         General: Tenderness present.        Arms:          Imaging Reviewed:  CT chest without contrast  - Postsurgical changes of right again noted with suture line at the right apex  - Increased subpleural opacity along with anterior chest wall on the right middle lobe.  Atelectasis versus pneumonia.  - Stable noncalcified nodules in right middle lobe.     MRI lumbar spine without contrast-6/16/2017  L3-4-mild facet arthropathy with left paracentral disc protrusion causing moderate left neural foraminal narrowing.  L4-5-moderate bilateral facet arthropathy with small posterior disc endplate complex causing mild narrowing of the neural foramina.  L4 S1-mild to moderate bilateral facet arthropathy.         Assessment:    1. Intercostal neuralgia    2. Chest pain on breathing            Plan:   1.  Defer UDS for now.  2. Continue Gabapentin 300 mg qhs (3  months supply - 9/3/23). Side effects discussed with the patient including but not limited to somnolence, dizziness, ataxia, headache, fatigue, blurred vision, cold or flu-like symptoms,delusions, hoarseness, lack or loss of strength, lower back or side pain, swelling of the hands, feet, or lower legs trembling or shaking. Patient understands and agrees with the plan.  3. ONLY Take Tramadol if in pain 50 mg qhs PRN. (#21 tabs - 2/22/23). Discussed with the patient regarding long-term side effects of opioids including but not limited to opioid induced hormonal suppression, hyperalgesia, fatigue, weight gain, possible opioid induced altered immune system, addiction, tolerance, dependence, risk of hearing loss and elevated risk of myocardial infarction. Proper use and potential life threatening side effects of over use discussed with patient. Patient states understanding of their use and risks.  4. Excellent relief with intercostal nerve block, but pain is returning now. She had >80% pain relief with 2 intercostal nerve block.  Will proceed with R intercostal nerve block ribs 5-9 RFA. Discussed the possibility of infection, bleeding, nerve damage, headache, increased pain, pneumothorax. Patient understands and agrees.        RTC for injection and then 3 weeks follow up.     Shane Medina DO  Pain Management   [facility]     Tessie Conner reports a pain score of 6.  Given her pain assessment as noted, treatment options were discussed and the following options were decided upon as a follow-up plan to address the patient's pain: continuation of current treatment plan for pain.      INSPECT REPORT    As part of the patient's treatment plan, I may be prescribing controlled substances. The patient has been made aware of appropriate use of such medications, including potential risk of somnolence, limited ability to drive and/or work safely, and the potential for dependence or overdose. It has also been made clear that  these medications are for use by this patient only, without concomitant use of alcohol or other substances unless prescribed.     Patient has completed prescribing agreement detailing terms of continued prescribing of controlled substances, including monitoring INSPECT reports, urine drug screening, and pill counts if necessary. The patient is aware that inappropriate use will results in cessation of prescribing such medications.    INSPECT report has been reviewed and scanned into the patient's chart.

## 2023-06-05 ENCOUNTER — OFFICE VISIT (OUTPATIENT)
Dept: PAIN MEDICINE | Facility: CLINIC | Age: 53
End: 2023-06-05
Payer: MEDICARE

## 2023-06-05 VITALS
RESPIRATION RATE: 16 BRPM | HEART RATE: 48 BPM | DIASTOLIC BLOOD PRESSURE: 54 MMHG | OXYGEN SATURATION: 97 % | SYSTOLIC BLOOD PRESSURE: 124 MMHG

## 2023-06-05 DIAGNOSIS — G58.8 INTERCOSTAL NEURALGIA: Primary | ICD-10-CM

## 2023-06-05 DIAGNOSIS — R07.1 CHEST PAIN ON BREATHING: ICD-10-CM

## 2023-06-05 PROCEDURE — 3074F SYST BP LT 130 MM HG: CPT | Performed by: STUDENT IN AN ORGANIZED HEALTH CARE EDUCATION/TRAINING PROGRAM

## 2023-06-05 PROCEDURE — 1159F MED LIST DOCD IN RCRD: CPT | Performed by: STUDENT IN AN ORGANIZED HEALTH CARE EDUCATION/TRAINING PROGRAM

## 2023-06-05 PROCEDURE — 99214 OFFICE O/P EST MOD 30 MIN: CPT | Performed by: STUDENT IN AN ORGANIZED HEALTH CARE EDUCATION/TRAINING PROGRAM

## 2023-06-05 PROCEDURE — 1160F RVW MEDS BY RX/DR IN RCRD: CPT | Performed by: STUDENT IN AN ORGANIZED HEALTH CARE EDUCATION/TRAINING PROGRAM

## 2023-06-05 PROCEDURE — 1125F AMNT PAIN NOTED PAIN PRSNT: CPT | Performed by: STUDENT IN AN ORGANIZED HEALTH CARE EDUCATION/TRAINING PROGRAM

## 2023-06-05 PROCEDURE — 3078F DIAST BP <80 MM HG: CPT | Performed by: STUDENT IN AN ORGANIZED HEALTH CARE EDUCATION/TRAINING PROGRAM

## 2023-06-05 RX ORDER — MECLIZINE HCL 12.5 MG/1
12.5 TABLET ORAL 3 TIMES DAILY PRN
COMMUNITY

## 2023-06-05 RX ORDER — TIOTROPIUM BROMIDE INHALATION SPRAY 1.56 UG/1
SPRAY, METERED RESPIRATORY (INHALATION)
COMMUNITY
Start: 2023-06-04

## 2023-06-05 RX ORDER — GABAPENTIN 300 MG/1
300 CAPSULE ORAL
Qty: 90 CAPSULE | Refills: 0 | Status: SHIPPED | OUTPATIENT
Start: 2023-06-05 | End: 2023-09-03

## 2023-06-11 ENCOUNTER — APPOINTMENT (OUTPATIENT)
Dept: GENERAL RADIOLOGY | Facility: HOSPITAL | Age: 53
End: 2023-06-11
Payer: MEDICARE

## 2023-06-11 ENCOUNTER — HOSPITAL ENCOUNTER (EMERGENCY)
Facility: HOSPITAL | Age: 53
Discharge: HOME | End: 2023-06-11
Attending: STUDENT IN AN ORGANIZED HEALTH CARE EDUCATION/TRAINING PROGRAM
Payer: MEDICARE

## 2023-06-11 VITALS
OXYGEN SATURATION: 97 % | RESPIRATION RATE: 19 BRPM | TEMPERATURE: 98.2 F | HEART RATE: 51 BPM | WEIGHT: 225 LBS | DIASTOLIC BLOOD PRESSURE: 64 MMHG | BODY MASS INDEX: 38.41 KG/M2 | HEIGHT: 64 IN | SYSTOLIC BLOOD PRESSURE: 114 MMHG

## 2023-06-11 DIAGNOSIS — R91.1 PULMONARY NODULE: ICD-10-CM

## 2023-06-11 DIAGNOSIS — R07.9 CHEST PAIN, UNSPECIFIED TYPE: Primary | ICD-10-CM

## 2023-06-11 LAB
ANION GAP SERPL CALCULATED.3IONS-SCNC: 6.8 MMOL/L (ref 5–15)
BASOPHILS # BLD AUTO: 0.02 10*3/MM3 (ref 0–0.2)
BASOPHILS NFR BLD AUTO: 0.2 % (ref 0–1.5)
BUN SERPL-MCNC: 10 MG/DL (ref 6–20)
BUN/CREAT SERPL: 12.3 (ref 7–25)
CALCIUM SPEC-SCNC: 8.9 MG/DL (ref 8.6–10.5)
CHLORIDE SERPL-SCNC: 105 MMOL/L (ref 98–107)
CO2 SERPL-SCNC: 26.2 MMOL/L (ref 22–29)
CREAT SERPL-MCNC: 0.81 MG/DL (ref 0.57–1)
D DIMER PPP FEU-MCNC: 0.28 MCGFEU/ML (ref 0–0.52)
DEPRECATED RDW RBC AUTO: 50.4 FL (ref 37–54)
EGFRCR SERPLBLD CKD-EPI 2021: 87.5 ML/MIN/1.73
EOSINOPHIL # BLD AUTO: 0.01 10*3/MM3 (ref 0–0.4)
EOSINOPHIL NFR BLD AUTO: 0.1 % (ref 0.3–6.2)
ERYTHROCYTE [DISTWIDTH] IN BLOOD BY AUTOMATED COUNT: 16.4 % (ref 12.3–15.4)
GLUCOSE SERPL-MCNC: 126 MG/DL (ref 65–99)
HCT VFR BLD AUTO: 35.4 % (ref 34–46.6)
HGB BLD-MCNC: 10.8 G/DL (ref 12–15.9)
IMM GRANULOCYTES # BLD AUTO: 0.03 10*3/MM3 (ref 0–0.05)
IMM GRANULOCYTES NFR BLD AUTO: 0.3 % (ref 0–0.5)
LYMPHOCYTES # BLD AUTO: 2.16 10*3/MM3 (ref 0.7–3.1)
LYMPHOCYTES NFR BLD AUTO: 23.9 % (ref 19.6–45.3)
MCH RBC QN AUTO: 25.4 PG (ref 26.6–33)
MCHC RBC AUTO-ENTMCNC: 30.5 G/DL (ref 31.5–35.7)
MCV RBC AUTO: 83.3 FL (ref 79–97)
MONOCYTES # BLD AUTO: 0.77 10*3/MM3 (ref 0.1–0.9)
MONOCYTES NFR BLD AUTO: 8.5 % (ref 5–12)
NEUTROPHILS NFR BLD AUTO: 6.03 10*3/MM3 (ref 1.7–7)
NEUTROPHILS NFR BLD AUTO: 67 % (ref 42.7–76)
PLATELET # BLD AUTO: 250 10*3/MM3 (ref 140–450)
PMV BLD AUTO: 10 FL (ref 6–12)
POTASSIUM SERPL-SCNC: 4 MMOL/L (ref 3.5–5.2)
RBC # BLD AUTO: 4.25 10*6/MM3 (ref 3.77–5.28)
SODIUM SERPL-SCNC: 138 MMOL/L (ref 136–145)
TROPONIN T SERPL HS-MCNC: <6 NG/L
WBC NRBC COR # BLD: 9.02 10*3/MM3 (ref 3.4–10.8)

## 2023-06-11 PROCEDURE — 84484 ASSAY OF TROPONIN QUANT: CPT | Performed by: STUDENT IN AN ORGANIZED HEALTH CARE EDUCATION/TRAINING PROGRAM

## 2023-06-11 PROCEDURE — 25010000002 KETOROLAC TROMETHAMINE PER 15 MG: Performed by: STUDENT IN AN ORGANIZED HEALTH CARE EDUCATION/TRAINING PROGRAM

## 2023-06-11 PROCEDURE — 93005 ELECTROCARDIOGRAM TRACING: CPT | Performed by: STUDENT IN AN ORGANIZED HEALTH CARE EDUCATION/TRAINING PROGRAM

## 2023-06-11 PROCEDURE — 99283 EMERGENCY DEPT VISIT LOW MDM: CPT

## 2023-06-11 PROCEDURE — 71046 X-RAY EXAM CHEST 2 VIEWS: CPT

## 2023-06-11 PROCEDURE — 85379 FIBRIN DEGRADATION QUANT: CPT | Performed by: STUDENT IN AN ORGANIZED HEALTH CARE EDUCATION/TRAINING PROGRAM

## 2023-06-11 PROCEDURE — 36415 COLL VENOUS BLD VENIPUNCTURE: CPT

## 2023-06-11 PROCEDURE — 96374 THER/PROPH/DIAG INJ IV PUSH: CPT

## 2023-06-11 PROCEDURE — 80048 BASIC METABOLIC PNL TOTAL CA: CPT | Performed by: STUDENT IN AN ORGANIZED HEALTH CARE EDUCATION/TRAINING PROGRAM

## 2023-06-11 PROCEDURE — 85025 COMPLETE CBC W/AUTO DIFF WBC: CPT | Performed by: STUDENT IN AN ORGANIZED HEALTH CARE EDUCATION/TRAINING PROGRAM

## 2023-06-11 RX ORDER — GABAPENTIN 300 MG/1
300 CAPSULE ORAL 2 TIMES DAILY PRN
Qty: 20 CAPSULE | Refills: 0 | Status: SHIPPED | OUTPATIENT
Start: 2023-06-11 | End: 2023-06-21

## 2023-06-11 RX ORDER — KETOROLAC TROMETHAMINE 15 MG/ML
15 INJECTION, SOLUTION INTRAMUSCULAR; INTRAVENOUS ONCE
Status: COMPLETED | OUTPATIENT
Start: 2023-06-11 | End: 2023-06-11

## 2023-06-11 RX ORDER — SODIUM CHLORIDE 0.9 % (FLUSH) 0.9 %
10 SYRINGE (ML) INJECTION AS NEEDED
Status: DISCONTINUED | OUTPATIENT
Start: 2023-06-11 | End: 2023-06-11 | Stop reason: HOSPADM

## 2023-06-11 RX ADMIN — KETOROLAC TROMETHAMINE 15 MG: 15 INJECTION, SOLUTION INTRAMUSCULAR; INTRAVENOUS at 05:06

## 2023-06-11 NOTE — DISCHARGE INSTRUCTIONS
You have been seen and evaluated for your chest wall pain. Your blood work, heart enzymes, EKG, and chest xray were all normal. Please see your primary care doctor and/or cardiology for re-evaluation of your symptoms. You were given medications which made you feel better.    Return to the ER immediately for severe or worsening symptoms or for the development of any new worrisome symptoms including but not limited to shortness of breath, changing chest pain, worsening chest pain, lightheadedness, fainting, palpitations, nausea, vomiting, sweating without an obvious cause, or bloody/black stools.

## 2023-06-11 NOTE — FSED PROVIDER NOTE
Subjective   History of Present Illness  Patient is a 53-year-old female presents emergency department secondary to chest wall pain.  Patient has a history of abnormal EKG, bradycardia, hypotension on midodrine, bipolar, CAD, diabetes, migraines, histoplasmosis status post right upper lung resection in 2021.  Patient follows with pain management, sees Dr. Medina frequently for right intercostal nerve block secondary to chronic lung pain that she experiences.  Patient was seen on 6/5/2023 for her last nerve blocks, but she states they have not been helpful.  She reports pain with deep inspiration, no trauma or injury to the chest.  She denies any left-sided chest discomfort, no radiation pain down her arm, into the back or into the jaw.  She denies any lightheadedness, dizziness, nausea, vomiting.  Patient states she also has a history of PE after her lung surgery, but she is not currently on any anticoagulation.    Review of Systems   Constitutional:  Negative for activity change and fever.   HENT:  Negative for congestion, rhinorrhea and sore throat.    Respiratory:  Negative for cough and shortness of breath.    Cardiovascular:  Positive for chest pain.   Gastrointestinal:  Negative for abdominal pain, nausea and vomiting.   Genitourinary:  Negative for dysuria.   Musculoskeletal:  Negative for back pain and myalgias.   Skin:  Negative for rash.   Neurological:  Negative for dizziness, light-headedness and headaches.   Psychiatric/Behavioral:  Negative for confusion.      Past Medical History:   Diagnosis Date    Abnormal ECG     Anxiety 2005    Arthritis     Asthma 04/17/2020    At risk for sleep apnea     Bipolar 1 disorder     Cholelithiasis 10/2022    Coronary artery disease     Depression 2005    Diabetes mellitus 2002    Dyspnea on minimal exertion     Frequent UTI     Heart murmur     FROM CHILDHOOD    History of anemia     POST PREGNANCY    Hyperlipidemia 10/04/2016    Hypertension     Mass of upper lobe of  right lung     Migraines     Obesity     PONV (postoperative nausea and vomiting)     Shoulder pain, right     Sleep apnea 22    Spinal headache     Visual impairment     WEARS GLASSES       Allergies   Allergen Reactions    Tylenol [Acetaminophen] Hives and Itching       Past Surgical History:   Procedure Laterality Date    APPENDECTOMY  2011    CARDIAC CATHETERIZATION N/A 2020    Procedure: Left Heart Cath possible PCI, atherectomy, hemodynamic support;  Surgeon: Thomas Zamora MD;  Location: [facility] CATH INVASIVE LOCATION;  Service: Cardiology;  Laterality: N/A;    CARDIAC CATHETERIZATION  2020     SECTION  2002    FOOT/TOE TENDON REPAIR Left     HYSTERECTOMY      LUNG BIOPSY Right 2020    LUNG LOBECTOMY Right 2022    pt had partial Right lobectomy and nodule removal    ROTATOR CUFF REPAIR Left     THORACOSCOPY VIDEO ASSISTED WITH LOBECTOMY Right 2022    Procedure: BRONCHOSCOPY, THORACOSCOPY VIDEO ASSISTED wedge resection X2, INTERCOSTAL NERVE BLOCK;  Surgeon: Ayla Carroll MD;  Location: [facility] OR;  Service: Thoracic;  Laterality: Right;    TUBAL ABDOMINAL LIGATION         Family History   Problem Relation Age of Onset    Arthritis Mother     Cancer Mother     Depression Mother     Diabetes Mother     Early death Mother     Mental illness Mother     Anxiety disorder Mother     Alcohol abuse Father     Diabetes Father     Early death Father     Heart disease Father     Hyperlipidemia Father     Hypertension Father         Father    Vision loss Father     Heart attack Father     Heart disease Sister     Drug abuse Sister     Heart attack Sister     Hypertension Sister         Sister    Heart disease Sister     Heart disease Brother     Hypertension Brother     Drug abuse Brother     Hypertension Brother     Heart disease Brother     Heart attack Brother     Cancer Maternal Grandmother     Malig Hyperthermia Neg Hx        Social History      Socioeconomic History    Marital status:    Tobacco Use    Smoking status: Never     Passive exposure: Never    Smokeless tobacco: Never   Vaping Use    Vaping Use: Never used   Substance and Sexual Activity    Alcohol use: Yes     Comment: VERY RARE    Drug use: No    Sexual activity: Not Currently     Partners: Male     Birth control/protection: None, Hysterectomy           Objective   Physical Exam  Vitals and nursing note reviewed.   Constitutional:       General: She is not in acute distress.     Appearance: Normal appearance. She is not ill-appearing.   HENT:      Head: Normocephalic and atraumatic.      Right Ear: Tympanic membrane normal.      Left Ear: Tympanic membrane normal.      Nose: Nose normal. No congestion.      Mouth/Throat:      Mouth: Mucous membranes are moist.      Pharynx: No oropharyngeal exudate.   Eyes:      Conjunctiva/sclera: Conjunctivae normal.   Cardiovascular:      Rate and Rhythm: Normal rate and regular rhythm.      Heart sounds: No murmur heard.  Pulmonary:      Effort: Pulmonary effort is normal.      Breath sounds: No wheezing, rhonchi or rales.   Abdominal:      General: Abdomen is flat.      Palpations: Abdomen is soft.      Tenderness: There is no abdominal tenderness. There is no guarding or rebound.   Musculoskeletal:         General: No swelling or tenderness. Normal range of motion.      Cervical back: Normal range of motion and neck supple.   Skin:     General: Skin is warm and dry.      Findings: No rash.   Neurological:      General: No focal deficit present.      Mental Status: She is alert and oriented to person, place, and time.       ECG 12 Lead      Date/Time: 6/11/2023 4:30 AM  Performed by: Jacqui Alexis DO  Authorized by: Jacqui Alexis DO   Interpreted by physician  Comparison: compared with previous ECG   Similar to previous ECG  Rhythm: sinus bradycardia  Rate: bradycardic  BPM: 47  QRS axis: normal  ST Segments: ST segments normal  T  depression: V2 and V3  Clinical impression: non-specific ECG  Comments: Sinus bradycardia rate 47, normal axis, normal intervals, nonspecific T wave changes, unchanged from previous.  No ST elevation.             ED Course  ED Course as of 06/11/23 0543   Sun Jun 11, 2023   0442 WBC: 9.02 [CO]   0442 Hemoglobin(!): 10.8 [CO]   0448 D-Dimer, Quant: 0.28 [CO]   0500 HS Troponin T: <6 [CO]      ED Course User Index  [CO] Jacqui Alexis DO                      HEART Score: 2                      Medical Decision Making  Patient is a 53-year-old female presents emergency department for right-sided chest wall pain.  HPI as listed above.  On arrival she is afebrile, hemodynamically stable.  Patient is bradycardic, but has a history of bradycardia when looking at previous EKGs and previous notes.  Patient underwent a intercostal nerve block on 6/5 due to chronic right-sided chest wall pain.  Physical examination is overall unremarkable.  Patient placed on cardiac monitor, IV established.  EKG is sinus bradycardia, nonspecific T wave changes, but this is unchanged when compared to previous.  Chest x-ray shows 2 pulmonary nodules, but no obvious infiltrates, effusions.  Patient's troponin is less than 6, D-dimer is not elevated and no CTA is needed.  No negation for repeat troponin as patient's pain has been ongoing for the last few days.  No leukocytosis, stable hemoglobin.  Patient likely with neuralgia, will be discharged home with gabapentin to trial for discomfort.  Patient was instructed to follow-up with her primary care physician, she has a pain management appointment next week.  Patient was discharged home in stable condition.    Problems Addressed:  Chest pain, unspecified type: complicated acute illness or injury  Pulmonary nodule: complicated acute illness or injury    Amount and/or Complexity of Data Reviewed  Labs: ordered. Decision-making details documented in ED Course.  Radiology: ordered and independent  interpretation performed.  ECG/medicine tests: ordered and independent interpretation performed.    Risk  Prescription drug management.        Final diagnoses:   Chest pain, unspecified type   Pulmonary nodule       ED Disposition  ED Disposition       ED Disposition   Discharge    Condition   Stable    Comment   --               Roosevelt Armstrong, DO  [address] [facility] [address]  [place] [address] [phone]    Schedule an appointment as soon as possible for a visit in 1 week      [place] [id number] [facility] [address] [phone]    As needed, If symptoms worsen         Medication List        Changed      * gabapentin 300 MG capsule  Commonly known as: NEURONTIN  Take 1 capsule by mouth every night at bedtime for 90 days.  What changed: Another medication with the same name was added. Make sure you understand how and when to take each.     * gabapentin 300 MG capsule  Commonly known as: NEURONTIN  Take 1 capsule by mouth 2 (Two) Times a Day As Needed (as need to left sided chest wall pain) for up to 10 days.  What changed: You were already taking a medication with the same name, and this prescription was added. Make sure you understand how and when to take each.           * This list has 2 medication(s) that are the same as other medications prescribed for you. Read the directions carefully, and ask your doctor or other care provider to review them with you.                   Where to Get Your Medications        These medications were sent to [facility]/pharmacy #[id number] - [place], [place] - [address] [place] [address] - [phone]  - [phone] [address] [place] [address]      Hours: 24-hours Phone: [phone]   gabapentin 300 MG capsule

## 2023-06-12 ENCOUNTER — PATIENT OUTREACH (OUTPATIENT)
Dept: CASE MANAGEMENT | Facility: OTHER | Age: 53
End: 2023-06-12
Payer: MEDICARE

## 2023-06-12 ENCOUNTER — TELEPHONE (OUTPATIENT)
Dept: PAIN MEDICINE | Facility: CLINIC | Age: 53
End: 2023-06-12
Payer: MEDICARE

## 2023-06-12 LAB — QT INTERVAL: 458 MS

## 2023-06-12 NOTE — OUTREACH NOTE
AMBULATORY CASE MANAGEMENT NOTE    Name and Relationship of Patient/Support Person: Tessie Conner - Self    Patient Outreach    Pt discharged from [facility] ED on 6/11/23, seen for chest wall pain. RN-ACM outreach call made to pt, spoke very briefly. Explained role of RN-ACM and reason for call. Pt reports continued pain. Reviewed ED AVS with pt, encouraged follow up with PCP and/or cardiology, pt states she will call for follow up. Unable to further outreach, pt ended call. Follow up outreach prn.     Juanita ESPINOZA  Ambulatory Case Management    6/12/2023, 15:42 EDT

## 2023-06-12 NOTE — TELEPHONE ENCOUNTER
Caller: MALACHI MUHAMMAD     Relationship to patient: PATIENT     Best call back number: [phone]    Chief complaint: RFA    Type of visit: RFA PROCEDURE     Requested date: N/A     If rescheduling, when is the original appointment: SCHEDULED 06/23/23     Additional notes:PATIENT IS SCHEDULED FOR OUTPATIENT SX FOR HER RIGHT THIGH 06/22/23.   PATIENT WANTS TO KNOW IF SHE CAN STILL HAVE RFA PROCEDURE 06/23/23?  PATIENT STATES SHE IS IN A GREAT DEAL OF PAIN -UNDER RIGHT CHEST AREA, UNDER ARM PIT GOING BACK TO RIGHT LUNG.  NO LONGER IN ONE SPOT, PAIN IS ALL OVER AGAIN.

## 2023-06-16 ENCOUNTER — HOSPITAL ENCOUNTER (OUTPATIENT)
Dept: CARDIOLOGY | Facility: HOSPITAL | Age: 53
Discharge: HOME | End: 2023-06-16
Payer: MEDICARE

## 2023-06-16 ENCOUNTER — LAB (OUTPATIENT)
Dept: LAB | Facility: HOSPITAL | Age: 53
End: 2023-06-16
Payer: MEDICARE

## 2023-06-16 ENCOUNTER — TELEPHONE (OUTPATIENT)
Dept: PAIN MEDICINE | Facility: CLINIC | Age: 53
End: 2023-06-16

## 2023-06-16 ENCOUNTER — HOSPITAL ENCOUNTER (OUTPATIENT)
Dept: GENERAL RADIOLOGY | Facility: HOSPITAL | Age: 53
Discharge: HOME | End: 2023-06-16
Payer: MEDICARE

## 2023-06-16 DIAGNOSIS — D17.23 LIPOMA OF RIGHT THIGH: ICD-10-CM

## 2023-06-16 LAB
ALBUMIN SERPL-MCNC: 4.1 G/DL (ref 3.5–5.2)
ALBUMIN/GLOB SERPL: 1.2 G/DL
ALP SERPL-CCNC: 126 U/L (ref 39–117)
ALT SERPL W P-5'-P-CCNC: 17 U/L (ref 1–33)
ANION GAP SERPL CALCULATED.3IONS-SCNC: 9.6 MMOL/L (ref 5–15)
AST SERPL-CCNC: 12 U/L (ref 1–32)
BASOPHILS # BLD AUTO: 0.02 10*3/MM3 (ref 0–0.2)
BASOPHILS NFR BLD AUTO: 0.2 % (ref 0–1.5)
BILIRUB SERPL-MCNC: 0.3 MG/DL (ref 0–1.2)
BUN SERPL-MCNC: 11 MG/DL (ref 6–20)
BUN/CREAT SERPL: 13.1 (ref 7–25)
CALCIUM SPEC-SCNC: 10.3 MG/DL (ref 8.6–10.5)
CHLORIDE SERPL-SCNC: 105 MMOL/L (ref 98–107)
CO2 SERPL-SCNC: 28.4 MMOL/L (ref 22–29)
CREAT SERPL-MCNC: 0.84 MG/DL (ref 0.57–1)
DEPRECATED RDW RBC AUTO: 45.2 FL (ref 37–54)
EGFRCR SERPLBLD CKD-EPI 2021: 83.7 ML/MIN/1.73
EOSINOPHIL # BLD AUTO: 0.01 10*3/MM3 (ref 0–0.4)
EOSINOPHIL NFR BLD AUTO: 0.1 % (ref 0.3–6.2)
ERYTHROCYTE [DISTWIDTH] IN BLOOD BY AUTOMATED COUNT: 15.7 % (ref 12.3–15.4)
GLOBULIN UR ELPH-MCNC: 3.3 GM/DL
GLUCOSE SERPL-MCNC: 121 MG/DL (ref 65–99)
HCT VFR BLD AUTO: 37.7 % (ref 34–46.6)
HGB BLD-MCNC: 12.1 G/DL (ref 12–15.9)
IMM GRANULOCYTES # BLD AUTO: 0.06 10*3/MM3 (ref 0–0.05)
IMM GRANULOCYTES NFR BLD AUTO: 0.6 % (ref 0–0.5)
LYMPHOCYTES # BLD AUTO: 1.55 10*3/MM3 (ref 0.7–3.1)
LYMPHOCYTES NFR BLD AUTO: 16.7 % (ref 19.6–45.3)
MCH RBC QN AUTO: 25.9 PG (ref 26.6–33)
MCHC RBC AUTO-ENTMCNC: 32.1 G/DL (ref 31.5–35.7)
MCV RBC AUTO: 80.7 FL (ref 79–97)
MONOCYTES # BLD AUTO: 0.54 10*3/MM3 (ref 0.1–0.9)
MONOCYTES NFR BLD AUTO: 5.8 % (ref 5–12)
NEUTROPHILS NFR BLD AUTO: 7.09 10*3/MM3 (ref 1.7–7)
NEUTROPHILS NFR BLD AUTO: 76.6 % (ref 42.7–76)
NRBC BLD AUTO-RTO: 0 /100 WBC (ref 0–0.2)
PLATELET # BLD AUTO: 293 10*3/MM3 (ref 140–450)
PMV BLD AUTO: 11 FL (ref 6–12)
POTASSIUM SERPL-SCNC: 4.1 MMOL/L (ref 3.5–5.2)
PROT SERPL-MCNC: 7.4 G/DL (ref 6–8.5)
QT INTERVAL: 430 MS
RBC # BLD AUTO: 4.67 10*6/MM3 (ref 3.77–5.28)
SODIUM SERPL-SCNC: 143 MMOL/L (ref 136–145)
WBC NRBC COR # BLD: 9.27 10*3/MM3 (ref 3.4–10.8)

## 2023-06-16 PROCEDURE — 36415 COLL VENOUS BLD VENIPUNCTURE: CPT

## 2023-06-16 PROCEDURE — 93005 ELECTROCARDIOGRAM TRACING: CPT | Performed by: SURGERY

## 2023-06-16 PROCEDURE — 85025 COMPLETE CBC W/AUTO DIFF WBC: CPT

## 2023-06-16 PROCEDURE — 80053 COMPREHEN METABOLIC PANEL: CPT

## 2023-06-16 PROCEDURE — 71046 X-RAY EXAM CHEST 2 VIEWS: CPT

## 2023-06-16 NOTE — TELEPHONE ENCOUNTER
I would recommend doubling on Tramadol, so she may take 2 tablets of Tramadol at a time. I can send some more if she would like. If that doesn't work, I can send Norco temporarily.     Shane Medina DO  Pain Management   [facility]

## 2023-06-16 NOTE — TELEPHONE ENCOUNTER
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

## 2023-06-16 NOTE — TELEPHONE ENCOUNTER
Caller: Tessie Conner    Relationship to patient: Self    Best call back number: [phone]    Patient is needing: THE PATIENT WOULD LIKE TO KNOW IF SHE CAN TAKE 2 TRAMADOL OR NORCO AND SHE WOULD LIKE TO KNOW IF SHE CAN STILL TAKE THE TRAMADOL SO CLOSE TO HER LIPOMA EXCISION,THIGH ON 6/22/23.  PLEASE CALL THE PATIENT AND ADVISE.

## 2023-06-16 NOTE — TELEPHONE ENCOUNTER
Lmom  okay for hub to get further questions and concerns that patient has every time I call her she has her answering machine on.

## 2023-06-16 NOTE — TELEPHONE ENCOUNTER
Caller: Tessie Conner    Relationship to patient: Self    Best call back number: [phone]    Patient is needing: PATIENT WAS RETURNING A CALL FROM THE MEDICAL ASSISTANT. I RELAYED THE MESSAGE TO THE PATIENT BUT SHE HAS FURTHER QUESTIONS AND CONCERNS THAT SHE WOULD LIKE TO DISCUSS WITH THE MEDICAL ASSISTANT. I ATTEMPTED TO WARM TRANSFER CALL TO THE CLINICAL LINE BUT RECEIVED NO ANSWER. THANK YOU!

## 2023-06-22 PROBLEM — D17.23 LIPOMA OF RIGHT LOWER EXTREMITY: Status: RESOLVED | Noted: 2023-03-03 | Resolved: 2023-06-22

## 2023-06-23 PROBLEM — I49.5 SICK SINUS SYNDROME: Status: ACTIVE | Noted: 2023-06-23

## 2023-06-23 PROBLEM — J96.01 ACUTE HYPOXEMIC RESPIRATORY FAILURE: Status: ACTIVE | Noted: 2023-06-23

## 2023-07-13 PROBLEM — Z95.0 PACEMAKER: Status: ACTIVE | Noted: 2023-07-13

## 2023-08-01 ENCOUNTER — OFFICE VISIT (OUTPATIENT)
Dept: CARDIOLOGY | Facility: CLINIC | Age: 53
End: 2023-08-01
Payer: MEDICARE

## 2023-08-01 VITALS
WEIGHT: 235 LBS | HEART RATE: 67 BPM | SYSTOLIC BLOOD PRESSURE: 104 MMHG | DIASTOLIC BLOOD PRESSURE: 41 MMHG | OXYGEN SATURATION: 97 % | HEIGHT: 64 IN | BODY MASS INDEX: 40.12 KG/M2

## 2023-08-01 DIAGNOSIS — Z99.89 OBSTRUCTIVE SLEEP APNEA ON CPAP: ICD-10-CM

## 2023-08-01 DIAGNOSIS — G47.33 OBSTRUCTIVE SLEEP APNEA ON CPAP: ICD-10-CM

## 2023-08-01 DIAGNOSIS — I49.5 SICK SINUS SYNDROME: Primary | ICD-10-CM

## 2023-08-01 DIAGNOSIS — Z95.0 PACEMAKER: ICD-10-CM

## 2023-08-01 PROBLEM — R09.89 LABILE BLOOD PRESSURE: Status: ACTIVE | Noted: 2023-08-01

## 2023-08-01 PROCEDURE — 1160F RVW MEDS BY RX/DR IN RCRD: CPT | Performed by: INTERNAL MEDICINE

## 2023-08-01 PROCEDURE — 3074F SYST BP LT 130 MM HG: CPT | Performed by: INTERNAL MEDICINE

## 2023-08-01 PROCEDURE — 99213 OFFICE O/P EST LOW 20 MIN: CPT | Performed by: INTERNAL MEDICINE

## 2023-08-01 PROCEDURE — 93000 ELECTROCARDIOGRAM COMPLETE: CPT | Performed by: INTERNAL MEDICINE

## 2023-08-01 PROCEDURE — 1159F MED LIST DOCD IN RCRD: CPT | Performed by: INTERNAL MEDICINE

## 2023-08-01 PROCEDURE — 3078F DIAST BP <80 MM HG: CPT | Performed by: INTERNAL MEDICINE

## 2023-08-01 RX ORDER — LISINOPRIL 5 MG/1
5 TABLET ORAL DAILY
COMMUNITY
Start: 2023-07-24

## 2023-08-09 ENCOUNTER — TELEPHONE (OUTPATIENT)
Dept: CARDIOLOGY | Facility: CLINIC | Age: 53
End: 2023-08-09
Payer: MEDICARE

## 2023-08-09 ENCOUNTER — APPOINTMENT (OUTPATIENT)
Dept: GENERAL RADIOLOGY | Facility: HOSPITAL | Age: 53
End: 2023-08-09
Payer: MEDICARE

## 2023-08-09 ENCOUNTER — HOSPITAL ENCOUNTER (EMERGENCY)
Facility: HOSPITAL | Age: 53
Discharge: HOME | End: 2023-08-09
Attending: EMERGENCY MEDICINE | Admitting: EMERGENCY MEDICINE
Payer: MEDICARE

## 2023-08-09 VITALS
DIASTOLIC BLOOD PRESSURE: 60 MMHG | HEIGHT: 64 IN | RESPIRATION RATE: 16 BRPM | OXYGEN SATURATION: 95 % | WEIGHT: 235 LBS | SYSTOLIC BLOOD PRESSURE: 130 MMHG | TEMPERATURE: 98.6 F | BODY MASS INDEX: 40.12 KG/M2 | HEART RATE: 60 BPM

## 2023-08-09 DIAGNOSIS — R07.9 CHEST PAIN, UNSPECIFIED TYPE: Primary | ICD-10-CM

## 2023-08-09 LAB
ALBUMIN SERPL-MCNC: 3.7 G/DL (ref 3.5–5.2)
ALBUMIN/GLOB SERPL: 1.3 G/DL
ALP SERPL-CCNC: 122 U/L (ref 39–117)
ALT SERPL W P-5'-P-CCNC: 16 U/L (ref 1–33)
ANION GAP SERPL CALCULATED.3IONS-SCNC: 9 MMOL/L (ref 5–15)
APTT PPP: 25.5 SECONDS (ref 24–31)
AST SERPL-CCNC: 17 U/L (ref 1–32)
BASOPHILS # BLD AUTO: 0 10*3/MM3 (ref 0–0.2)
BASOPHILS NFR BLD AUTO: 0.5 % (ref 0–1.5)
BILIRUB SERPL-MCNC: 0.3 MG/DL (ref 0–1.2)
BUN SERPL-MCNC: 10 MG/DL (ref 6–20)
BUN/CREAT SERPL: 13.3 (ref 7–25)
CALCIUM SPEC-SCNC: 9.2 MG/DL (ref 8.6–10.5)
CHLORIDE SERPL-SCNC: 106 MMOL/L (ref 98–107)
CO2 SERPL-SCNC: 27 MMOL/L (ref 22–29)
CREAT SERPL-MCNC: 0.75 MG/DL (ref 0.57–1)
DEPRECATED RDW RBC AUTO: 52.1 FL (ref 37–54)
EGFRCR SERPLBLD CKD-EPI 2021: 95.9 ML/MIN/1.73
EOSINOPHIL # BLD AUTO: 0 10*3/MM3 (ref 0–0.4)
EOSINOPHIL NFR BLD AUTO: 0.5 % (ref 0.3–6.2)
ERYTHROCYTE [DISTWIDTH] IN BLOOD BY AUTOMATED COUNT: 17.9 % (ref 12.3–15.4)
GEN 5 2HR TROPONIN T REFLEX: 6 NG/L
GLOBULIN UR ELPH-MCNC: 2.8 GM/DL
GLUCOSE SERPL-MCNC: 92 MG/DL (ref 65–99)
HCT VFR BLD AUTO: 33.9 % (ref 34–46.6)
HGB BLD-MCNC: 10.9 G/DL (ref 12–15.9)
INR PPP: 0.95 (ref 0.93–1.1)
LYMPHOCYTES # BLD AUTO: 1.7 10*3/MM3 (ref 0.7–3.1)
LYMPHOCYTES NFR BLD AUTO: 18.5 % (ref 19.6–45.3)
MCH RBC QN AUTO: 25.8 PG (ref 26.6–33)
MCHC RBC AUTO-ENTMCNC: 32.2 G/DL (ref 31.5–35.7)
MCV RBC AUTO: 80.2 FL (ref 79–97)
MONOCYTES # BLD AUTO: 0.7 10*3/MM3 (ref 0.1–0.9)
MONOCYTES NFR BLD AUTO: 7.9 % (ref 5–12)
NEUTROPHILS NFR BLD AUTO: 6.9 10*3/MM3 (ref 1.7–7)
NEUTROPHILS NFR BLD AUTO: 72.6 % (ref 42.7–76)
NRBC BLD AUTO-RTO: 0.2 /100 WBC (ref 0–0.2)
NT-PROBNP SERPL-MCNC: 548 PG/ML (ref 0–900)
PLATELET # BLD AUTO: 213 10*3/MM3 (ref 140–450)
PMV BLD AUTO: 8.7 FL (ref 6–12)
POTASSIUM SERPL-SCNC: 3.7 MMOL/L (ref 3.5–5.2)
PROT SERPL-MCNC: 6.5 G/DL (ref 6–8.5)
PROTHROMBIN TIME: 10.2 SECONDS (ref 9.6–11.7)
RBC # BLD AUTO: 4.22 10*6/MM3 (ref 3.77–5.28)
SODIUM SERPL-SCNC: 142 MMOL/L (ref 136–145)
TROPONIN T DELTA: -1 NG/L
TROPONIN T SERPL HS-MCNC: 7 NG/L
WBC NRBC COR # BLD: 9.4 10*3/MM3 (ref 3.4–10.8)

## 2023-08-09 PROCEDURE — 36415 COLL VENOUS BLD VENIPUNCTURE: CPT

## 2023-08-09 PROCEDURE — 99284 EMERGENCY DEPT VISIT MOD MDM: CPT

## 2023-08-09 PROCEDURE — 85610 PROTHROMBIN TIME: CPT

## 2023-08-09 PROCEDURE — 83880 ASSAY OF NATRIURETIC PEPTIDE: CPT

## 2023-08-09 PROCEDURE — 84484 ASSAY OF TROPONIN QUANT: CPT

## 2023-08-09 PROCEDURE — 93005 ELECTROCARDIOGRAM TRACING: CPT

## 2023-08-09 PROCEDURE — 85025 COMPLETE CBC W/AUTO DIFF WBC: CPT

## 2023-08-09 PROCEDURE — 93005 ELECTROCARDIOGRAM TRACING: CPT | Performed by: EMERGENCY MEDICINE

## 2023-08-09 PROCEDURE — 85730 THROMBOPLASTIN TIME PARTIAL: CPT

## 2023-08-09 PROCEDURE — 80053 COMPREHEN METABOLIC PANEL: CPT

## 2023-08-09 PROCEDURE — 71045 X-RAY EXAM CHEST 1 VIEW: CPT

## 2023-08-09 RX ORDER — SODIUM CHLORIDE 0.9 % (FLUSH) 0.9 %
10 SYRINGE (ML) INJECTION AS NEEDED
Status: DISCONTINUED | OUTPATIENT
Start: 2023-08-09 | End: 2023-08-09 | Stop reason: HOSPADM

## 2023-08-09 NOTE — ED PROVIDER NOTES
Subjective   History of Present Illness  Patient is a pleasant 53-year-old obese  female with a history of partial right lung lobectomy who presents to the emergency room with complaints of a sharp pain in her left anterior chest that started earlier today.  Patient had a pacemaker placed 6/20 by  but is otherwise been feeling okay up until today.  Earlier, she had 3-4 brief episodes of sharp pain in the left anterior chest under her breast with very mild dizziness.  She decided to go to town and while she was driving experienced sharp pain closer to her shoulder that lasted about 10 minutes.  Patient states that she is always short of air due to the partial lobectomy but does not note an increase in her symptoms.  She denies leg swelling, nausea, vomiting and diaphoresis.  She recently had a stress test and echocardiogram through [facility] prior to pacemaker placement.  She takes baby aspirin, lisinopril, resultingly, sertraline, gabapentin, itraconazole, vitamin D3 on a daily basis.  She denies use of drugs, alcohol and tobacco.    PCP: [facility]    Review of Systems   Constitutional:  Negative for appetite change and fever.   HENT:  Negative for congestion and rhinorrhea.    Respiratory:  Positive for chest tightness and shortness of breath.    Cardiovascular:  Positive for chest pain. Negative for leg swelling.   Gastrointestinal:  Negative for abdominal pain, nausea and vomiting.   Genitourinary:  Negative for dysuria and urgency.   Musculoskeletal:  Negative for back pain.   Neurological:  Positive for dizziness. Negative for syncope.   Psychiatric/Behavioral:  Negative for confusion. The patient is not nervous/anxious.    All other systems reviewed and are negative.    Past Medical History:   Diagnosis Date    Abnormal ECG     Anemia     Anesthesia complication 2003    cardiac arrest  with hysterectomy    Anxiety 2005    Arthritis     Asthma 04/17/2020    allergy    Bipolar 1 disorder      Bradycardia     Chest pain     due to scarring from lung surgery      Cholelithiasis 10/2022    Coronary artery disease     very minimal    Depression     Diabetes mellitus     borderline   resolved    Dyspnea on minimal exertion     Frequent UTI     Heart murmur     FROM CHILDHOOD    Histoplasmosis     History of anemia     POST PREGNANCY    Hyperlipidemia     Hypotension     Mass of upper lobe of right lung     Migraines     hx    Obesity     PONV (postoperative nausea and vomiting)     Sleep apnea 22    no machine    Spinal headache     Vertigo     Visual impairment     WEARS GLASSES    Vitamin D deficiency        Allergies   Allergen Reactions    Tylenol [Acetaminophen] Hives and Itching       Past Surgical History:   Procedure Laterality Date    APPENDECTOMY      CARDIAC CATHETERIZATION N/A 2020    Procedure: Left Heart Cath possible PCI, atherectomy, hemodynamic support;  Surgeon: Thomas Zamora MD;  Location: [facility] CATH INVASIVE LOCATION;  Service: Cardiology;  Laterality: N/A;    CARDIAC CATHETERIZATION  2020    CARDIAC ELECTROPHYSIOLOGY PROCEDURE N/A 2023    Procedure: Pacemaker DC new, [place] aware;  Surgeon: Rachel Espinoza MD;  Location: [facility] CATH INVASIVE LOCATION;  Service: Cardiovascular;  Laterality: N/A;     SECTION      FOOT/TOE TENDON REPAIR Left     HYSTERECTOMY      LUNG BIOPSY Right 2020    LUNG LOBECTOMY Right 2022    pt had partial Right lobectomy and nodule removal    MASS EXCISION Right 2023    Procedure: LIPOMA EXCISION,THIGH;  Surgeon: Justo Shannon MD;  Location: [facility] MAIN OR;  Service: General;  Laterality: Right;    ROTATOR CUFF REPAIR Left     THORACOSCOPY VIDEO ASSISTED WITH LOBECTOMY Right 2022    Procedure: BRONCHOSCOPY, THORACOSCOPY VIDEO ASSISTED wedge resection X2, INTERCOSTAL NERVE BLOCK;  Surgeon: Ayla Carroll MD;  Location: [facility] MAIN OR;  Service: Thoracic;   Laterality: Right;    TUBAL ABDOMINAL LIGATION  2002       Family History   Problem Relation Age of Onset    Arthritis Mother     Cancer Mother     Depression Mother     Diabetes Mother     Early death Mother     Mental illness Mother     Anxiety disorder Mother     Alcohol abuse Father     Diabetes Father     Early death Father     Heart disease Father     Hyperlipidemia Father     Hypertension Father         Father    Vision loss Father     Heart attack Father     Heart disease Sister     Drug abuse Sister     Heart attack Sister     Hypertension Sister         Sister    Heart disease Sister     Heart disease Brother     Hypertension Brother     Drug abuse Brother     Hypertension Brother     Heart disease Brother     Heart attack Brother     Cancer Maternal Grandmother     Malig Hyperthermia Neg Hx        Social History     Socioeconomic History    Marital status:    Tobacco Use    Smoking status: Never     Passive exposure: Never    Smokeless tobacco: Never   Vaping Use    Vaping Use: Never used   Substance and Sexual Activity    Alcohol use: Not Currently     Comment: VERY RARE    Drug use: No    Sexual activity: Defer     Birth control/protection: None           Objective   Physical Exam  Vitals and nursing note reviewed.   Constitutional:       General: She is awake. She is not in acute distress.     Appearance: Normal appearance. She is well-developed. She is obese. She is not diaphoretic.   HENT:      Head: Normocephalic and atraumatic.      Right Ear: Tympanic membrane, ear canal and external ear normal.      Left Ear: Tympanic membrane, ear canal and external ear normal.   Eyes:      Extraocular Movements: Extraocular movements intact.      Pupils: Pupils are equal, round, and reactive to light.   Cardiovascular:      Rate and Rhythm: Normal rate and regular rhythm.      Pulses: Normal pulses.      Heart sounds: Normal heart sounds. No murmur heard.  Pulmonary:      Effort: Pulmonary effort is  "normal.      Breath sounds: Examination of the right-lower field reveals decreased breath sounds. Decreased breath sounds present.      Comments: Breath sounds absent in right lower quadrant. S/p partial lobectomy  Abdominal:      General: Bowel sounds are normal.      Palpations: Abdomen is soft.   Musculoskeletal:         General: Normal range of motion.      Cervical back: Normal range of motion and neck supple.      Right lower leg: No edema.      Left lower leg: No edema.   Skin:     General: Skin is warm and dry.      Capillary Refill: Capillary refill takes less than 2 seconds.   Neurological:      General: No focal deficit present.      Mental Status: She is alert and oriented to person, place, and time. Mental status is at baseline.      GCS: GCS eye subscore is 4. GCS verbal subscore is 5. GCS motor subscore is 6.      Cranial Nerves: Cranial nerves 2-12 are intact.      Sensory: Sensation is intact.      Motor: Motor function is intact.      Deep Tendon Reflexes: Reflexes are normal and symmetric.   Psychiatric:         Mood and Affect: Mood normal.         Behavior: Behavior normal. Behavior is cooperative.       Procedures           ED Course  /60   Pulse 60   Temp 98.6 øF (37 øC)   Resp 16   Ht 162.6 cm (64\")   Wt 107 kg (235 lb)   LMP  (LMP Unknown)   SpO2 95%   BMI 40.34 kg/mý     .edlabs.edmeds  XR Chest 1 View    Result Date: 8/9/2023  Impression: Stable cardiac enlargement. No acute chest finding. Electronically Signed: Marleny Chavez  8/9/2023 3:23 PM EDT  Workstation ID: [id number]                                          Medical Decision Making  Problems Addressed:  Chest pain, unspecified type: complicated acute illness or injury    Amount and/or Complexity of Data Reviewed  Labs: ordered. Decision-making details documented in ED Course.  Radiology: ordered. Decision-making details documented in ED Course.  ECG/medicine tests: ordered.    Patient is a pleasant 53-year-old obese "  female with a history of partial right lower lobectomy who presents the emergency room with complaints of chest pain that started earlier today.  Exam reveals normal S1/S2 without clicks or murmurs.  No JVD or leg swelling.  Lungs clear to auscultation in all fields except the right lower lobe, where she has no lung sounds due to partial lobectomy.  Abdomen found to be soft and nontender with normal bowel sounds throughout.  No CVA tenderness.  Pupils PERRLA.  GCS 15.  Initial differentials include non-STEMI, ACS, pleurisy, anxiety, pneumonia.  This is not a complete list.    IV was established and labs were obtained.  Patient received the above examination.  Dimer was considered but not completed at this time because patient is neither tachypneic or hypoxic.  CBC without leukocytosis.  Mild anemia noted of 10.9 is baseline for the patient.  CMP is unremarkable with normal LFTs and kidney function.  Troponin negative x 2.  BNP is within normal limits.  My interpretation of chest x-ray reveals no pneumothorax, infiltrates or nodules.  This is concurrent with radiologist interpretation.  Upon chart review it was noted that patient had an echocardiogram in April which was found to be normal.  Upon reassessment, patient denies current chest pain.  Results were discussed with the patient and she was offered overnight observation stay for further cardiac evaluation but patient has declined at this time and states she is ready to go home.  Patient will follow-up with her primary care provider and cardiologist for further evaluation.  She has remained hemodynamically stable and return if her symptoms worsen.  She was able to ambulate upright steadily without assistance upon discharge.    Patient is aware that discharge does not mean that nothing is wrong but it indicates no emergency is present and they must continue care with follow-up as given below or physician of their choice.    This document is intended for  medical expert use only.  Reading of this document by patients and/or patient's family without participating medical staff guidance may result in misinterpretation and unintended morbidity.  Any interpretation of such data is the responsibility of the patient and/or family member responsible for the patient in concert with their primary or specialist providers, not to be left for sources of online search as such as [employer], [employer] or similar queries.  Relying on these approaches to knowledge may result in misinterpretation, misguided goals of care and even death should patient or family members try recommendations outside of the realm of professional medical care in a supervised inpatient environment.    This medical document was created using Dragon dictation system. Some errors in speech recognition may occur.      Final diagnoses:   Chest pain, unspecified type       ED Disposition  ED Disposition       ED Disposition   Discharge    Condition   Stable    Comment   --               Marsha Lopez MD  [address] [place] [address] [address]  [place] [address] [phone]               Medication List        Changed      Cholecalciferol 50 MCG (2000 UT) tablet  Take 1 tablet by mouth Daily.  What changed: when to take this                 Joseline Ball, APRN  08/10/23 0105

## 2023-08-09 NOTE — TELEPHONE ENCOUNTER
Phone pt. She is currently in ED. Having sharp stabbing pains at PM incision site typically in the AM after waking. Will f/u on ED visit.

## 2023-08-09 NOTE — TELEPHONE ENCOUNTER
Caller: Tessie Conner     Relationship:SELF    Best call back number: [phone]    What is your medical concern? SHARP STABBING CHEST PAIN. I DID ADVISE TO GO TO THE ER. PLEASE FOLLOW UP TO DISCUSS FURTHER.      How long has this issue been going on? TO     Is your provider already aware of this issue? NO    Have you been treated for this issue? NO

## 2023-08-09 NOTE — DISCHARGE INSTRUCTIONS
Continue taking previously prescribed medications as directed.  Rest.  Avoid aggravating substances such as caffeine or coffee.  Practice deep breathing exercises and good stress management techniques.    Follow-up with your primary care provider for further evaluation and management.      Return to the ER for any worsening signs.

## 2023-08-10 ENCOUNTER — TELEPHONE (OUTPATIENT)
Dept: CARDIOLOGY | Facility: CLINIC | Age: 53
End: 2023-08-10

## 2023-08-10 LAB — QT INTERVAL: 417 MS

## 2023-08-10 NOTE — TELEPHONE ENCOUNTER
Patient checked out in ED and discharged. No immediate concerns per hospital note. Will f/u with patient today.

## 2023-08-10 NOTE — TELEPHONE ENCOUNTER
Caller: Tessie Conner    Relationship to patient: Self    Best call back number: [phone]    Chief complaint: ED REQUESTED PT BE SEEN BY CARDIOLOGIST     Type of visit: FOLLOW UP    Requested date: ASAP         Additional notes:

## 2023-09-14 ENCOUNTER — APPOINTMENT (OUTPATIENT)
Dept: GENERAL RADIOLOGY | Facility: HOSPITAL | Age: 53
End: 2023-09-14
Payer: MEDICARE

## 2023-09-14 ENCOUNTER — HOSPITAL ENCOUNTER (EMERGENCY)
Facility: HOSPITAL | Age: 53
Discharge: HOME | End: 2023-09-14
Attending: EMERGENCY MEDICINE
Payer: MEDICARE

## 2023-09-14 VITALS
HEART RATE: 60 BPM | DIASTOLIC BLOOD PRESSURE: 71 MMHG | TEMPERATURE: 98.3 F | WEIGHT: 230 LBS | HEIGHT: 65 IN | RESPIRATION RATE: 20 BRPM | BODY MASS INDEX: 38.32 KG/M2 | SYSTOLIC BLOOD PRESSURE: 151 MMHG | OXYGEN SATURATION: 92 %

## 2023-09-14 DIAGNOSIS — R53.83 FATIGUE, UNSPECIFIED TYPE: Primary | ICD-10-CM

## 2023-09-14 LAB
ALBUMIN SERPL-MCNC: 4 G/DL (ref 3.5–5.2)
ALBUMIN/GLOB SERPL: 1.4 G/DL
ALP SERPL-CCNC: 113 U/L (ref 39–117)
ALT SERPL W P-5'-P-CCNC: 20 U/L (ref 1–33)
ANION GAP SERPL CALCULATED.3IONS-SCNC: 8.8 MMOL/L (ref 5–15)
AST SERPL-CCNC: 29 U/L (ref 1–32)
BASOPHILS # BLD AUTO: 0.02 10*3/MM3 (ref 0–0.2)
BASOPHILS NFR BLD AUTO: 0.2 % (ref 0–1.5)
BILIRUB SERPL-MCNC: 0.4 MG/DL (ref 0–1.2)
BUN SERPL-MCNC: 15 MG/DL (ref 6–20)
BUN/CREAT SERPL: 19.7 (ref 7–25)
CALCIUM SPEC-SCNC: 9.8 MG/DL (ref 8.6–10.5)
CHLORIDE SERPL-SCNC: 103 MMOL/L (ref 98–107)
CO2 SERPL-SCNC: 27.2 MMOL/L (ref 22–29)
CREAT SERPL-MCNC: 0.76 MG/DL (ref 0.57–1)
D DIMER PPP FEU-MCNC: 0.22 MCGFEU/ML (ref 0–0.52)
DEPRECATED RDW RBC AUTO: 55.2 FL (ref 37–54)
EGFRCR SERPLBLD CKD-EPI 2021: 94.4 ML/MIN/1.73
EOSINOPHIL # BLD AUTO: 0.03 10*3/MM3 (ref 0–0.4)
EOSINOPHIL NFR BLD AUTO: 0.3 % (ref 0.3–6.2)
ERYTHROCYTE [DISTWIDTH] IN BLOOD BY AUTOMATED COUNT: 17.9 % (ref 12.3–15.4)
FLUAV SUBTYP SPEC NAA+PROBE: NOT DETECTED
FLUBV RNA ISLT QL NAA+PROBE: NOT DETECTED
GLOBULIN UR ELPH-MCNC: 2.9 GM/DL
GLUCOSE SERPL-MCNC: 92 MG/DL (ref 65–99)
HCT VFR BLD AUTO: 41.4 % (ref 34–46.6)
HGB BLD-MCNC: 12.2 G/DL (ref 12–15.9)
IMM GRANULOCYTES # BLD AUTO: 0.02 10*3/MM3 (ref 0–0.05)
IMM GRANULOCYTES NFR BLD AUTO: 0.2 % (ref 0–0.5)
LYMPHOCYTES # BLD AUTO: 1.48 10*3/MM3 (ref 0.7–3.1)
LYMPHOCYTES NFR BLD AUTO: 15.7 % (ref 19.6–45.3)
MAGNESIUM SERPL-MCNC: 2.3 MG/DL (ref 1.6–2.6)
MCH RBC QN AUTO: 24.9 PG (ref 26.6–33)
MCHC RBC AUTO-ENTMCNC: 29.5 G/DL (ref 31.5–35.7)
MCV RBC AUTO: 84.5 FL (ref 79–97)
MONOCYTES # BLD AUTO: 0.74 10*3/MM3 (ref 0.1–0.9)
MONOCYTES NFR BLD AUTO: 7.8 % (ref 5–12)
NEUTROPHILS NFR BLD AUTO: 7.14 10*3/MM3 (ref 1.7–7)
NEUTROPHILS NFR BLD AUTO: 75.8 % (ref 42.7–76)
PLATELET # BLD AUTO: 225 10*3/MM3 (ref 140–450)
PMV BLD AUTO: 11.4 FL (ref 6–12)
POTASSIUM SERPL-SCNC: 4.2 MMOL/L (ref 3.5–5.2)
PROT SERPL-MCNC: 6.9 G/DL (ref 6–8.5)
QT INTERVAL: 451 MS
QTC INTERVAL: 451 MS
RBC # BLD AUTO: 4.9 10*6/MM3 (ref 3.77–5.28)
SARS-COV-2 RNA RESP QL NAA+PROBE: NOT DETECTED
SODIUM SERPL-SCNC: 139 MMOL/L (ref 136–145)
TROPONIN T SERPL HS-MCNC: 7 NG/L
WBC NRBC COR # BLD: 9.43 10*3/MM3 (ref 3.4–10.8)

## 2023-09-14 PROCEDURE — 80053 COMPREHEN METABOLIC PANEL: CPT | Performed by: NURSE PRACTITIONER

## 2023-09-14 PROCEDURE — 36415 COLL VENOUS BLD VENIPUNCTURE: CPT

## 2023-09-14 PROCEDURE — 87636 SARSCOV2 & INF A&B AMP PRB: CPT | Performed by: NURSE PRACTITIONER

## 2023-09-14 PROCEDURE — 99284 EMERGENCY DEPT VISIT MOD MDM: CPT

## 2023-09-14 PROCEDURE — 83735 ASSAY OF MAGNESIUM: CPT | Performed by: NURSE PRACTITIONER

## 2023-09-14 PROCEDURE — 93005 ELECTROCARDIOGRAM TRACING: CPT | Performed by: NURSE PRACTITIONER

## 2023-09-14 PROCEDURE — 85025 COMPLETE CBC W/AUTO DIFF WBC: CPT | Performed by: NURSE PRACTITIONER

## 2023-09-14 PROCEDURE — 71045 X-RAY EXAM CHEST 1 VIEW: CPT

## 2023-09-14 PROCEDURE — 85379 FIBRIN DEGRADATION QUANT: CPT | Performed by: NURSE PRACTITIONER

## 2023-09-14 PROCEDURE — 84484 ASSAY OF TROPONIN QUANT: CPT | Performed by: NURSE PRACTITIONER

## 2023-09-14 RX ORDER — SODIUM CHLORIDE 0.9 % (FLUSH) 0.9 %
10 SYRINGE (ML) INJECTION AS NEEDED
Status: DISCONTINUED | OUTPATIENT
Start: 2023-09-14 | End: 2023-09-14 | Stop reason: HOSPADM

## 2023-09-14 NOTE — FSED PROVIDER NOTE
Subjective   History of Present Illness  The patient is a 53-year-old female who presents to the ER with fatigue, extreme weakness that started last night.  Patient reports that her legs feel like Jell-O, patient denies any actual pain in her legs.  Patient denies any shortness of breath, chest pain, nausea, vomiting, diarrhea.  Patient reports she is just extremely tired and felt as though she was actually falling asleep while driving to work.  Patient reports she did have a pacemaker placed in June, had COVID in March, and has also had a portion of a lung removed due to histoplasmosis.    History provided by:  Patient   used: No      Review of Systems   Constitutional:  Positive for fatigue. Negative for fever.   Respiratory:  Negative for cough, chest tightness and shortness of breath.    Cardiovascular:  Negative for chest pain and palpitations.   Gastrointestinal:  Negative for abdominal pain, diarrhea, nausea and vomiting.     Past Medical History:   Diagnosis Date    Abnormal ECG     Anemia     Anesthesia complication 2003    cardiac arrest  with hysterectomy    Anxiety 2005    Arthritis     Asthma 04/17/2020    allergy    Bipolar 1 disorder     Bradycardia     Chest pain     due to scarring from lung surgery  2/21    Cholelithiasis 10/2022    Coronary artery disease     very minimal    Depression 2005    Diabetes mellitus 2002    borderline   resolved    Dyspnea on minimal exertion     Frequent UTI     Heart murmur     FROM CHILDHOOD    Histoplasmosis     History of anemia     POST PREGNANCY    Hyperlipidemia     Hypotension     Mass of upper lobe of right lung     Migraines     hx    Obesity 1999    PONV (postoperative nausea and vomiting)     Sleep apnea 2/28/22    no machine    Spinal headache     Vertigo     Visual impairment 2011    WEARS GLASSES    Vitamin D deficiency        Allergies   Allergen Reactions    Tylenol [Acetaminophen] Hives and Itching       Past Surgical History:    Procedure Laterality Date    APPENDECTOMY      CARDIAC CATHETERIZATION N/A 2020    Procedure: Left Heart Cath possible PCI, atherectomy, hemodynamic support;  Surgeon: Thomas Zamora MD;  Location: [facility] CATH INVASIVE LOCATION;  Service: Cardiology;  Laterality: N/A;    CARDIAC CATHETERIZATION  2020    CARDIAC ELECTROPHYSIOLOGY PROCEDURE N/A 2023    Procedure: Pacemaker DC new, [place] aware;  Surgeon: Rachel Espinoza MD;  Location: [facility] CATH INVASIVE LOCATION;  Service: Cardiovascular;  Laterality: N/A;     SECTION      FOOT/TOE TENDON REPAIR Left     HYSTERECTOMY      LUNG BIOPSY Right 2020    LUNG LOBECTOMY Right 2022    pt had partial Right lobectomy and nodule removal    MASS EXCISION Right 2023    Procedure: LIPOMA EXCISION,THIGH;  Surgeon: Justo Shannon MD;  Location: [facility] MAIN OR;  Service: General;  Laterality: Right;    ROTATOR CUFF REPAIR Left     THORACOSCOPY VIDEO ASSISTED WITH LOBECTOMY Right 2022    Procedure: BRONCHOSCOPY, THORACOSCOPY VIDEO ASSISTED wedge resection X2, INTERCOSTAL NERVE BLOCK;  Surgeon: Ayla Carroll MD;  Location: [facility] MAIN OR;  Service: Thoracic;  Laterality: Right;    TUBAL ABDOMINAL LIGATION         Family History   Problem Relation Age of Onset    Arthritis Mother     Cancer Mother     Depression Mother     Diabetes Mother     Early death Mother     Mental illness Mother     Anxiety disorder Mother     Alcohol abuse Father     Diabetes Father     Early death Father     Heart disease Father     Hyperlipidemia Father     Hypertension Father         Father    Vision loss Father     Heart attack Father     Heart disease Sister     Drug abuse Sister     Heart attack Sister     Hypertension Sister         Sister    Heart disease Sister     Heart disease Brother     Hypertension Brother     Drug abuse Brother     Hypertension Brother     Heart disease Brother     Heart attack Brother     Cancer  Maternal Grandmother     [place] Hyperthermia Neg Hx        Social History     Socioeconomic History    Marital status:    Tobacco Use    Smoking status: Never     Passive exposure: Never    Smokeless tobacco: Never   Vaping Use    Vaping Use: Never used   Substance and Sexual Activity    Alcohol use: Not Currently     Comment: VERY RARE    Drug use: No    Sexual activity: Defer     Birth control/protection: None           Objective   Physical Exam  Vitals and nursing note reviewed.   Constitutional:       Appearance: Normal appearance. She is obese.   HENT:      Head: Normocephalic.      Right Ear: Tympanic membrane normal.      Left Ear: Tympanic membrane normal.      Nose: Nose normal.      Mouth/Throat:      Mouth: Mucous membranes are moist.   Eyes:      Extraocular Movements: Extraocular movements intact.      Conjunctiva/sclera: Conjunctivae normal.      Pupils: Pupils are equal, round, and reactive to light.   Cardiovascular:      Pulses: Normal pulses.      Heart sounds: Normal heart sounds.      Comments: Patient has pacemaker that was placed in June,   Pulmonary:      Effort: Pulmonary effort is normal.      Breath sounds: Normal breath sounds and air entry.      Comments: Patient has COVID in March   Abdominal:      General: Bowel sounds are normal.      Palpations: Abdomen is soft.   Musculoskeletal:         General: Normal range of motion.      Cervical back: Normal range of motion and neck supple.      Right lower leg: No edema.      Left lower leg: No edema.   Skin:     General: Skin is warm and dry.   Neurological:      General: No focal deficit present.      Mental Status: She is alert and oriented to person, place, and time.   Psychiatric:         Mood and Affect: Mood normal.         Behavior: Behavior normal. Behavior is cooperative.       Procedures           ED Course  ED Course as of 09/14/23 1058   Thu Sep 14, 2023   1008 EKG shows a paced rhythm.  The rate is 60 bpm.  There is no acute  ST segment or T wave changes.  No ectopy.  Normal intervals. [KZ]   1010 XR CHEST 1 VW     Date of Exam: 9/14/2023 9:43 AM EDT     Indication: fatigue,     Comparison: 8/9/2023     Findings:  Dual-lead left subclavian cardiac stimulator device again noted. There is linear scarring in the right upper lung. Cardiomegaly is noted. Pulmonary vascularity appears within normal limits. No pneumothorax or large effusion identified. Left base is   obscured by overlying cardiomegaly.     IMPRESSION:  Impression:  Cardiomegaly appears stable from prior. No acute cardiopulmonary findings.   [DS]   1045 HS Troponin T: 7 [DS]   1046 BUN: 15 [DS]   1046 Creatinine: 0.76 [DS]   1046 Sodium: 139 [DS]   1046 Potassium: 4.2 [DS]   1046 WBC: 9.43 [DS]   1046 Hemoglobin: 12.2 [DS]   1046 Hematocrit: 41.4 [DS]   1046 D-Dimer, Quant: 0.22 [DS]   1046 Magnesium: 2.3 [DS]   1046 COVID19: Not Detected [DS]   1046 Influenza A PCR: Not Detected [DS]   1046 Influenza B PCR: Not Detected [DS]      ED Course User Index  [DS] Estephania Hough APRN  [KZ] Manny Flores MD                                           Medical Decision Making  The patient is a 53-year-old female who presents to the ER with fatigue, extreme weakness that started last night.  Patient reports that her legs feel like Jell-O, patient denies any actual pain in her legs.  Patient denies any shortness of breath, chest pain, nausea, vomiting, diarrhea.  Patient reports she is just extremely tired and felt as though she was actually falling asleep while driving to work.  Patient reports she did have a pacemaker placed in June, had COVID in March, and has also had a portion of a lung removed due to histoplasmosis.  Patient in no acute distress on exam, denies any pain on exam, reports just feels extremely tired. Patient advised all testing today is unremarkable, and to follow up with PCP for further testing.       Amount and/or Complexity of Data Reviewed  Labs: ordered.  Decision-making details documented in ED Course.  Radiology: ordered. Decision-making details documented in ED Course.  ECG/medicine tests:  Decision-making details documented in ED Course.    Risk  Prescription drug management.        Final diagnoses:   Fatigue, unspecified type       ED Disposition  ED Disposition       ED Disposition   Discharge    Condition   Stable    Comment   --               Marsha Lopez MD  [address] [facility] [address]  [place] [facility]  [phone]    In 1 week  call and schedule follow up appointment         Medication List        Changed      Cholecalciferol 50 MCG (2000 UT) tablet  Take 1 tablet by mouth Daily.  What changed: when to take this

## 2023-09-14 NOTE — DISCHARGE INSTRUCTIONS
Return for any new or worsening symptoms.    Make sure you are drinking plenty of fluids    Call and get a follow-up appointment with your primary care for further evaluation and treatment.  You should call today to get that appointment.    Tylenol/Motrin as needed for pain/fever

## 2023-09-23 ENCOUNTER — APPOINTMENT (OUTPATIENT)
Dept: GENERAL RADIOLOGY | Facility: HOSPITAL | Age: 53
End: 2023-09-23
Payer: MEDICARE

## 2023-09-23 ENCOUNTER — HOSPITAL ENCOUNTER (EMERGENCY)
Facility: HOSPITAL | Age: 53
Discharge: HOME | End: 2023-09-23
Attending: EMERGENCY MEDICINE
Payer: MEDICARE

## 2023-09-23 ENCOUNTER — APPOINTMENT (OUTPATIENT)
Dept: CT IMAGING | Facility: HOSPITAL | Age: 53
End: 2023-09-23
Payer: MEDICARE

## 2023-09-23 VITALS
RESPIRATION RATE: 18 BRPM | HEART RATE: 62 BPM | OXYGEN SATURATION: 97 % | BODY MASS INDEX: 38.98 KG/M2 | SYSTOLIC BLOOD PRESSURE: 114 MMHG | TEMPERATURE: 98.1 F | HEIGHT: 63 IN | DIASTOLIC BLOOD PRESSURE: 65 MMHG | WEIGHT: 220 LBS

## 2023-09-23 DIAGNOSIS — R42 DIZZINESS: Primary | ICD-10-CM

## 2023-09-23 LAB
ALBUMIN SERPL-MCNC: 4.1 G/DL (ref 3.5–5.2)
ALBUMIN/GLOB SERPL: 1.3 G/DL
ALP SERPL-CCNC: 143 U/L (ref 39–117)
ALT SERPL W P-5'-P-CCNC: 24 U/L (ref 1–33)
ANION GAP SERPL CALCULATED.3IONS-SCNC: 10 MMOL/L (ref 5–15)
APTT PPP: 29.9 SECONDS (ref 61–76.5)
AST SERPL-CCNC: 23 U/L (ref 1–32)
BASOPHILS # BLD AUTO: 0.1 10*3/MM3 (ref 0–0.2)
BASOPHILS NFR BLD AUTO: 0.6 % (ref 0–1.5)
BILIRUB SERPL-MCNC: 0.4 MG/DL (ref 0–1.2)
BUN SERPL-MCNC: 22 MG/DL (ref 6–20)
BUN/CREAT SERPL: 23.7 (ref 7–25)
CALCIUM SPEC-SCNC: 10.3 MG/DL (ref 8.6–10.5)
CHLORIDE SERPL-SCNC: 104 MMOL/L (ref 98–107)
CO2 SERPL-SCNC: 23 MMOL/L (ref 22–29)
CREAT SERPL-MCNC: 0.93 MG/DL (ref 0.57–1)
DEPRECATED RDW RBC AUTO: 56.9 FL (ref 37–54)
EGFRCR SERPLBLD CKD-EPI 2021: 74.1 ML/MIN/1.73
EOSINOPHIL # BLD AUTO: 0.1 10*3/MM3 (ref 0–0.4)
EOSINOPHIL NFR BLD AUTO: 0.5 % (ref 0.3–6.2)
ERYTHROCYTE [DISTWIDTH] IN BLOOD BY AUTOMATED COUNT: 19.2 % (ref 12.3–15.4)
GLOBULIN UR ELPH-MCNC: 3.2 GM/DL
GLUCOSE SERPL-MCNC: 100 MG/DL (ref 65–99)
HCT VFR BLD AUTO: 41.6 % (ref 34–46.6)
HGB BLD-MCNC: 13.5 G/DL (ref 12–15.9)
INR PPP: 0.99 (ref 0.93–1.1)
LYMPHOCYTES # BLD AUTO: 2.3 10*3/MM3 (ref 0.7–3.1)
LYMPHOCYTES NFR BLD AUTO: 16.1 % (ref 19.6–45.3)
MCH RBC QN AUTO: 26 PG (ref 26.6–33)
MCHC RBC AUTO-ENTMCNC: 32.5 G/DL (ref 31.5–35.7)
MCV RBC AUTO: 80 FL (ref 79–97)
MONOCYTES # BLD AUTO: 1.1 10*3/MM3 (ref 0.1–0.9)
MONOCYTES NFR BLD AUTO: 7.8 % (ref 5–12)
NEUTROPHILS NFR BLD AUTO: 10.8 10*3/MM3 (ref 1.7–7)
NEUTROPHILS NFR BLD AUTO: 75 % (ref 42.7–76)
NRBC BLD AUTO-RTO: 0 /100 WBC (ref 0–0.2)
PLATELET # BLD AUTO: 258 10*3/MM3 (ref 140–450)
PMV BLD AUTO: 9.5 FL (ref 6–12)
POTASSIUM SERPL-SCNC: 4.4 MMOL/L (ref 3.5–5.2)
PROT SERPL-MCNC: 7.3 G/DL (ref 6–8.5)
PROTHROMBIN TIME: 10.6 SECONDS (ref 9.6–11.7)
RBC # BLD AUTO: 5.2 10*6/MM3 (ref 3.77–5.28)
SODIUM SERPL-SCNC: 137 MMOL/L (ref 136–145)
TROPONIN T SERPL HS-MCNC: <6 NG/L
WBC NRBC COR # BLD: 14.4 10*3/MM3 (ref 3.4–10.8)

## 2023-09-23 PROCEDURE — 71045 X-RAY EXAM CHEST 1 VIEW: CPT

## 2023-09-23 PROCEDURE — 84484 ASSAY OF TROPONIN QUANT: CPT | Performed by: EMERGENCY MEDICINE

## 2023-09-23 PROCEDURE — 93005 ELECTROCARDIOGRAM TRACING: CPT | Performed by: EMERGENCY MEDICINE

## 2023-09-23 PROCEDURE — 85025 COMPLETE CBC W/AUTO DIFF WBC: CPT | Performed by: EMERGENCY MEDICINE

## 2023-09-23 PROCEDURE — 93005 ELECTROCARDIOGRAM TRACING: CPT

## 2023-09-23 PROCEDURE — 80053 COMPREHEN METABOLIC PANEL: CPT | Performed by: EMERGENCY MEDICINE

## 2023-09-23 PROCEDURE — 85610 PROTHROMBIN TIME: CPT | Performed by: EMERGENCY MEDICINE

## 2023-09-23 PROCEDURE — 85730 THROMBOPLASTIN TIME PARTIAL: CPT | Performed by: EMERGENCY MEDICINE

## 2023-09-23 PROCEDURE — 99284 EMERGENCY DEPT VISIT MOD MDM: CPT

## 2023-09-23 PROCEDURE — 70450 CT HEAD/BRAIN W/O DYE: CPT

## 2023-09-23 RX ORDER — SODIUM CHLORIDE 0.9 % (FLUSH) 0.9 %
10 SYRINGE (ML) INJECTION AS NEEDED
Status: DISCONTINUED | OUTPATIENT
Start: 2023-09-23 | End: 2023-09-23 | Stop reason: HOSPADM

## 2023-09-23 RX ADMIN — SODIUM CHLORIDE 500 ML: 9 INJECTION, SOLUTION INTRAVENOUS at 19:01

## 2023-09-23 NOTE — ED PROVIDER NOTES
Subjective   History of Present Illness  Chief complaint: Dizziness    53-year-old female presents with dizziness.  Patient states symptoms started this morning.  She states she has been dizzy all day.  She describes it as both lightheaded feeling and room spinning.  She states she had a near syncopal episode at one point.  She never actually lost consciousness.  She denies any chest pain or shortness of breath.  She has had no vomiting or diarrhea.  She denies any recent illness.    History provided by:  Patient    Review of Systems   Constitutional:  Negative for fever.   HENT:  Negative for congestion.    Respiratory:  Negative for cough and shortness of breath.    Cardiovascular:  Negative for chest pain.   Gastrointestinal:  Negative for abdominal pain, diarrhea and vomiting.   Genitourinary:  Negative for dysuria.   Musculoskeletal:  Negative for back pain.   Neurological:  Positive for dizziness and light-headedness. Negative for seizures, syncope and headaches.   Psychiatric/Behavioral:  Negative for confusion.      Past Medical History:   Diagnosis Date    Abnormal ECG     Anemia     Anesthesia complication 2003    cardiac arrest  with hysterectomy    Anxiety 2005    Arthritis     Asthma 04/17/2020    allergy    Bipolar 1 disorder     Bradycardia     Chest pain     due to scarring from lung surgery  2/21    Cholelithiasis 10/2022    Coronary artery disease     very minimal    Depression 2005    Diabetes mellitus 2002    borderline   resolved    Dyspnea on minimal exertion     Frequent UTI     Heart murmur     FROM CHILDHOOD    Histoplasmosis     History of anemia     POST PREGNANCY    Hyperlipidemia     Hypotension     Mass of upper lobe of right lung     Migraines     hx    Obesity 1999    PONV (postoperative nausea and vomiting)     Sleep apnea 2/28/22    no machine    Spinal headache     Vertigo     Visual impairment 2011    WEARS GLASSES    Vitamin D deficiency        Allergies   Allergen Reactions     Tylenol [Acetaminophen] Hives and Itching       Past Surgical History:   Procedure Laterality Date    APPENDECTOMY      CARDIAC CATHETERIZATION N/A 2020    Procedure: Left Heart Cath possible PCI, atherectomy, hemodynamic support;  Surgeon: Thomas Zamora MD;  Location: [facility] CATH INVASIVE LOCATION;  Service: Cardiology;  Laterality: N/A;    CARDIAC CATHETERIZATION  2020    CARDIAC ELECTROPHYSIOLOGY PROCEDURE N/A 2023    Procedure: Pacemaker DC new, [place] aware;  Surgeon: Rachel Espinoza MD;  Location: [facility] CATH INVASIVE LOCATION;  Service: Cardiovascular;  Laterality: N/A;     SECTION      FOOT/TOE TENDON REPAIR Left     HYSTERECTOMY      LUNG BIOPSY Right 2020    LUNG LOBECTOMY Right 2022    pt had partial Right lobectomy and nodule removal    MASS EXCISION Right 2023    Procedure: LIPOMA EXCISION,THIGH;  Surgeon: Justo Shannon MD;  Location: [facility] MAIN OR;  Service: General;  Laterality: Right;    ROTATOR CUFF REPAIR Left     THORACOSCOPY VIDEO ASSISTED WITH LOBECTOMY Right 2022    Procedure: BRONCHOSCOPY, THORACOSCOPY VIDEO ASSISTED wedge resection X2, INTERCOSTAL NERVE BLOCK;  Surgeon: Ayla Carroll MD;  Location: [facility] MAIN OR;  Service: Thoracic;  Laterality: Right;    TUBAL ABDOMINAL LIGATION         Family History   Problem Relation Age of Onset    Arthritis Mother     Cancer Mother     Depression Mother     Diabetes Mother     Early death Mother     Mental illness Mother     Anxiety disorder Mother     Alcohol abuse Father     Diabetes Father     Early death Father     Heart disease Father     Hyperlipidemia Father     Hypertension Father         Father    Vision loss Father     Heart attack Father     Heart disease Sister     Drug abuse Sister     Heart attack Sister     Hypertension Sister         Sister    Heart disease Sister     Heart disease Brother     Hypertension Brother     Drug abuse Brother     Hypertension  "Brother     Heart disease Brother     Heart attack Brother     Cancer Maternal Grandmother     [place] Hyperthermia Neg Hx        Social History     Socioeconomic History    Marital status:    Tobacco Use    Smoking status: Never     Passive exposure: Never    Smokeless tobacco: Never   Vaping Use    Vaping Use: Never used   Substance and Sexual Activity    Alcohol use: Not Currently     Comment: VERY RARE    Drug use: No    Sexual activity: Defer     Birth control/protection: None       /64   Pulse 60   Temp 98 °F (36.7 °C) (Oral)   Resp 18   Ht 160 cm (63\")   Wt 99.8 kg (220 lb)   LMP  (LMP Unknown)   SpO2 97%   BMI 38.97 kg/m²       Objective   Physical Exam  Vitals and nursing note reviewed.   Constitutional:       Appearance: Normal appearance.   HENT:      Head: Normocephalic and atraumatic.      Mouth/Throat:      Mouth: Mucous membranes are moist.   Cardiovascular:      Rate and Rhythm: Normal rate and regular rhythm.      Heart sounds: Normal heart sounds.   Pulmonary:      Effort: Pulmonary effort is normal. No respiratory distress.      Breath sounds: Normal breath sounds.   Abdominal:      General: Bowel sounds are normal.      Palpations: Abdomen is soft.      Tenderness: There is no abdominal tenderness.   Musculoskeletal:      Right lower leg: No edema.      Left lower leg: No edema.   Skin:     General: Skin is warm and dry.   Neurological:      General: No focal deficit present.      Mental Status: She is alert and oriented to person, place, and time.       Procedures           ED Course      My interpretation of EKG shows atrial paced rhythm, rate of 60, no ST elevation     Results for orders placed or performed during the hospital encounter of 09/23/23   Comprehensive Metabolic Panel    Specimen: Blood   Result Value Ref Range    Glucose 100 (H) 65 - 99 mg/dL    BUN 22 (H) 6 - 20 mg/dL    Creatinine 0.93 0.57 - 1.00 mg/dL    Sodium 137 136 - 145 mmol/L    Potassium 4.4 3.5 - 5.2 " mmol/L    Chloride 104 98 - 107 mmol/L    CO2 23.0 22.0 - 29.0 mmol/L    Calcium 10.3 8.6 - 10.5 mg/dL    Total Protein 7.3 6.0 - 8.5 g/dL    Albumin 4.1 3.5 - 5.2 g/dL    ALT (SGPT) 24 1 - 33 U/L    AST (SGOT) 23 1 - 32 U/L    Alkaline Phosphatase 143 (H) 39 - 117 U/L    Total Bilirubin 0.4 0.0 - 1.2 mg/dL    Globulin 3.2 gm/dL    A/G Ratio 1.3 g/dL    BUN/Creatinine Ratio 23.7 7.0 - 25.0    Anion Gap 10.0 5.0 - 15.0 mmol/L    eGFR 74.1 >60.0 mL/min/1.73   aPTT    Specimen: Blood   Result Value Ref Range    PTT 29.9 (L) 61.0 - 76.5 seconds   Protime-INR    Specimen: Blood   Result Value Ref Range    Protime 10.6 9.6 - 11.7 Seconds    INR 0.99 0.93 - 1.10   Single High Sensitivity Troponin T    Specimen: Blood   Result Value Ref Range    HS Troponin T <6 <10 ng/L   CBC Auto Differential    Specimen: Blood   Result Value Ref Range    WBC 14.40 (H) 3.40 - 10.80 10*3/mm3    RBC 5.20 3.77 - 5.28 10*6/mm3    Hemoglobin 13.5 12.0 - 15.9 g/dL    Hematocrit 41.6 34.0 - 46.6 %    MCV 80.0 79.0 - 97.0 fL    MCH 26.0 (L) 26.6 - 33.0 pg    MCHC 32.5 31.5 - 35.7 g/dL    RDW 19.2 (H) 12.3 - 15.4 %    RDW-SD 56.9 (H) 37.0 - 54.0 fl    MPV 9.5 6.0 - 12.0 fL    Platelets 258 140 - 450 10*3/mm3    Neutrophil % 75.0 42.7 - 76.0 %    Lymphocyte % 16.1 (L) 19.6 - 45.3 %    Monocyte % 7.8 5.0 - 12.0 %    Eosinophil % 0.5 0.3 - 6.2 %    Basophil % 0.6 0.0 - 1.5 %    Neutrophils, Absolute 10.80 (H) 1.70 - 7.00 10*3/mm3    Lymphocytes, Absolute 2.30 0.70 - 3.10 10*3/mm3    Monocytes, Absolute 1.10 (H) 0.10 - 0.90 10*3/mm3    Eosinophils, Absolute 0.10 0.00 - 0.40 10*3/mm3    Basophils, Absolute 0.10 0.00 - 0.20 10*3/mm3    nRBC 0.0 0.0 - 0.2 /100 WBC   ECG 12 Lead Other; dizzy   Result Value Ref Range    QT Interval 428 ms    QTC Interval 428 ms     CT Head Without Contrast    Result Date: 9/23/2023  Impression: No evidence of hemorrhage, mass effect or midline shift. No acute process identified. Electronically Signed: Courtney Gold MD   9/23/2023 8:15 PM EDT  Workstation ID: [id number]    XR Chest 1 View    Result Date: 9/23/2023  Impression: No acute cardiopulmonary process. Electronically Signed: Oscar Dukes MD  9/23/2023 7:01 PM EDT  Workstation ID: [id number]                                   Medical Decision Making  Amount and/or Complexity of Data Reviewed  Labs: ordered.  Radiology: ordered.  ECG/medicine tests: ordered.    Risk  Prescription drug management.      Patient had the above evaluation.  Results were discussed with the patient.  CT head shows no acute intracranial abnormality.  My interpretation of chest x-ray shows no infiltrate or effusion.  EKG shows no acute ischemia.  Troponin is negative.  White blood cell count is a little elevated at 14.4.  CMP is unremarkable.  Orthostatic vital signs were slightly positive.  Patient was given a 500 cc bolus.  She has remained well-appearing in the emergency room.  She will be discharged and will follow-up with her primary doctor.      Final diagnoses:   Dizziness       ED Disposition  ED Disposition       ED Disposition   Discharge    Condition   Stable    Comment   --               Marsha Lopez MD  [address] [facility] [address] [place] [address] [phone]    Call in 2 days           Medication List        Changed      Cholecalciferol 50 MCG (2000 UT) tablet  Take 1 tablet by mouth Daily.  What changed: when to take this                 Wood Mendiola MD  09/23/23 4916

## 2023-09-24 LAB
QT INTERVAL: 428 MS
QTC INTERVAL: 428 MS

## 2023-09-25 ENCOUNTER — PATIENT OUTREACH (OUTPATIENT)
Dept: CASE MANAGEMENT | Facility: OTHER | Age: 53
End: 2023-09-25

## 2023-09-25 NOTE — OUTREACH NOTE
AMBULATORY CASE MANAGEMENT NOTE    Name and Relationship of Patient/Support Person: Dalila Tessie L - Self    Patient Outreach    Pt discharged from [facility] ED on 9/23/23, seen for dizziness. RN-ACM outreach call made to pt. Explained role of RN-ACM and reason for call. Pt reports no change in symptoms, states she has called her PCP and has follow up appt scheduled for Wednesday. Reviewed ED AVS with pt. Reviewed SDOH. No needs or questions per pt. Advised her to call with any. Follow up outreach prn.     Send Education  Questions/Answers      Flowsheet Row Most Recent Value   Annual Wellness Visit:  Patient Has Completed   Other Patient Education/Resources  24/7 [facility] Nurse Call Line   24/7 Nurse Call Line Education Method Verbal   Advanced Directives: Patient Has          SDOH updated and reviewed with the patient during this program:  Financial Resource Strain: Low Risk     Difficulty of Paying Living Expenses: Not very hard      Food Insecurity: No Food Insecurity    Worried About Running Out of Food in the Last Year: Never true    Ran Out of Food in the Last Year: Never true      Transportation Needs: No Transportation Needs    Lack of Transportation (Medical): No    Lack of Transportation (Non-Medical): No         Juanita ESPINOZA  Ambulatory Case Management    9/25/2023, 11:16 EDT

## 2023-09-27 NOTE — PROGRESS NOTES
Subjective   Tessie Conner is a 53 y.o. female is here for follow up for mid and upper back pain.  Last seen on 6/5/2023. She was scheduled for intercostal RFA, but unable to do so as she had pacemaker implanted recently. Her R sided mid back has increased since last visit.    On last visit:     Right sided mid/upper back pain is 5/10 on VAS, maximum 7/10.  Describes pain as sharp and stabbing.  Referred to right breast, right lateral ribs, right shoulder-completely resolved with nerve block previously  Pain is intermittent.  Improved with intercostal nerve blocks.  Worse with nighttime and deep breathing.  She has tried muscle relaxants, gabapentin, heat, cold without significant pain relief.      Previous Injection:   5/19/2023-right intercostal nerve block-5-9- 80% pain relief with functional improvement. Able to sleep better- 2 months relief.    1/13/2023- R intercostal nerve block-5-9- 90% Pain relief. Functional improvement- 2 months..       Hx: Referred by Eileen Massey, ALEXIS, APRN for right chest pain and possible intercostal nerve block. 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 She had no pain after surgery up until 6/22.  She has tried gabapentin and Valium without significant pain relief. Scheduled to see Cardiologist for abnormalities in ECG as per patient.     PHQ-9- 19                     SOAPP- 12  [place] back disability scale -27     PMH:   DM-2, HTN,  anxiety, depression (suicde attempt long time back - currently very well controlled), migraines, ADY, history of spinal headache, bipolar 1, frequent UTI, history of histoplasmosis causing granuloma and s/p right upper lobe wedge resection-2/2022, left rotator cuff repair, s/p pacemaker for bradycardia.      Current Medications:   Tramadol PRN  Gabapentin 300 mg BID  Lidoderm 5% patch - insurance didn't cover it.   Aspirin 81 mg  Melatonin      Past Medications:  Tramadol -  short prescription  Oxycodone- short prescription  Temazepam - for sleep  Valium 2 mg- took it one time.   Biomed Cream  Ztildo patches      Past Modalities:  TENS:                                                                          no                                                  Physical Therapy Within The Last 6 Months              no  Psychotherapy                                                            no  Massage Therapy                                                       no     Patient Complains Of:  Uro-Fecal Incontinence          no  Weight Gain/Loss                   no  Fever/Chills                             no  Weakness                               no         Current Outpatient Medications:     Arnuity Ellipta 200 MCG/ACT, INHALE 1 PUFF BY MOUTH INTO THE LUNGS DAILY., Disp: , Rfl:     Cholecalciferol 50 MCG (2000 UT) tablet, Take 1 tablet by mouth Daily. (Patient taking differently: Take 1 tablet by mouth Every Night.), Disp: 90 each, Rfl: 3    EPINEPHrine (EPIPEN) 0.3 MG/0.3ML solution auto-injector injection, Inject 0.3 mL into the appropriate muscle as directed by prescriber As Needed., Disp: , Rfl:     FeroSul 325 (65 Fe) MG tablet, Take  by mouth., Disp: , Rfl:     gabapentin (NEURONTIN) 300 MG capsule, 1 capsule 1 (One) Time., Disp: , Rfl:     Gemtesa 75 MG tablet, Take 1 tablet by mouth Daily., Disp: , Rfl:     ipratropium-albuterol (DUO-NEB) 0.5-2.5 mg/3 ml nebulizer, Take 3 mL by nebulization 4 (Four) Times a Day., Disp: 360 mL, Rfl: 3    IRON, FERROUS GLUCONATE, PO, Take 36 mg by mouth Every Other Day. Gummies  none preop, Disp: , Rfl:     itraconazole (SPORANOX) 100 MG capsule, Take 2 capsules by mouth 2 (Two) Times a Day., Disp: , Rfl:     lisinopril (PRINIVIL,ZESTRIL) 5 MG tablet, Take 1 tablet by mouth Daily., Disp: , Rfl:     meclizine (ANTIVERT) 12.5 MG tablet, Take 1 tablet by mouth 3 (Three) Times a Day As Needed for Dizziness. Take preop, Disp: , Rfl:     midodrine  (PROAMATINE) 2.5 MG tablet, Take 2 tablets by mouth 2 (Two) Times a Day. Take preop, Disp: , Rfl:     ondansetron (ZOFRAN) 4 MG tablet, Take 1 tablet by mouth Every 6 (Six) Hours As Needed for Nausea or Vomiting., Disp: 30 tablet, Rfl: 0    Rexulti 0.5 MG tablet, Take 0.5 mg by mouth Every Night., Disp: , Rfl:     sertraline (ZOLOFT) 50 MG tablet, Take 1 tablet by mouth Every Night., Disp: , Rfl:     Spiriva Respimat 1.25 MCG/ACT aerosol solution inhaler, Inhale 2 puffs Daily As Needed. Use preop if needed bring, Disp: , Rfl:     The following portions of the patient's history were reviewed and updated as appropriate: allergies, current medications, past family history, past medical history, past social history, past surgical history, and problem list.      REVIEW OF PERTINENT MEDICAL DATA    Past Medical History:   Diagnosis Date    Abnormal ECG     Anemia     Anesthesia complication 2003    cardiac arrest  with hysterectomy    Anxiety 2005    Arthritis     Asthma 04/17/2020    allergy    Bipolar 1 disorder     Bradycardia     Chest pain     due to scarring from lung surgery  2/21    Cholelithiasis 10/2022    Coronary artery disease     very minimal    Depression 2005    Diabetes mellitus 2002    borderline   resolved    Dyspnea on minimal exertion     Frequent UTI     Heart murmur     FROM CHILDHOOD    Histoplasmosis     History of anemia     POST PREGNANCY    Hyperlipidemia     Hypotension     Mass of upper lobe of right lung     Migraines     hx    Obesity 1999    PONV (postoperative nausea and vomiting)     Sleep apnea 2/28/22    no machine    Spinal headache     Vertigo     Visual impairment 2011    WEARS GLASSES    Vitamin D deficiency      Past Surgical History:   Procedure Laterality Date    APPENDECTOMY  2011    CARDIAC CATHETERIZATION N/A 09/24/2020    Procedure: Left Heart Cath possible PCI, atherectomy, hemodynamic support;  Surgeon: Thomas Zamora MD;  Location: [facility] CATH INVASIVE LOCATION;   Service: Cardiology;  Laterality: N/A;    CARDIAC CATHETERIZATION  2020    CARDIAC ELECTROPHYSIOLOGY PROCEDURE N/A 2023    Procedure: Pacemaker DC new, [place] aware;  Surgeon: Rachel Espinoza MD;  Location: [facility] CATH INVASIVE LOCATION;  Service: Cardiovascular;  Laterality: N/A;     SECTION      FOOT/TOE TENDON REPAIR Left     HYSTERECTOMY      LUNG BIOPSY Right 2020    LUNG LOBECTOMY Right 2022    pt had partial Right lobectomy and nodule removal    MASS EXCISION Right 2023    Procedure: LIPOMA EXCISION,THIGH;  Surgeon: Justo Shannon MD;  Location: [facility] MAIN OR;  Service: General;  Laterality: Right;    ROTATOR CUFF REPAIR Left     THORACOSCOPY VIDEO ASSISTED WITH LOBECTOMY Right 2022    Procedure: BRONCHOSCOPY, THORACOSCOPY VIDEO ASSISTED wedge resection X2, INTERCOSTAL NERVE BLOCK;  Surgeon: Ayla Carroll MD;  Location: [facility] MAIN OR;  Service: Thoracic;  Laterality: Right;    TUBAL ABDOMINAL LIGATION       Family History   Problem Relation Age of Onset    Arthritis Mother     Cancer Mother     Depression Mother     Diabetes Mother     Early death Mother     Mental illness Mother     Anxiety disorder Mother     Alcohol abuse Father     Diabetes Father     Early death Father     Heart disease Father     Hyperlipidemia Father     Hypertension Father         Father    Vision loss Father     Heart attack Father     Heart disease Sister     Drug abuse Sister     Heart attack Sister     Hypertension Sister         Sister    Heart disease Sister     Heart disease Brother     Hypertension Brother     Drug abuse Brother     Hypertension Brother     Heart disease Brother     Heart attack Brother     Cancer Maternal Grandmother     Malig Hyperthermia Neg Hx      Social History     Socioeconomic History    Marital status:    Tobacco Use    Smoking status: Never     Passive exposure: Never    Smokeless tobacco: Never   Vaping Use    Vaping Use: Never  used   Substance and Sexual Activity    Alcohol use: Not Currently     Comment: VERY RARE    Drug use: No    Sexual activity: Defer     Birth control/protection: None         Review of Systems   Musculoskeletal:  Positive for arthralgias and back pain.       Vitals:    09/28/23 1519   BP: 104/55   Pulse: 60   Resp: 16   SpO2: 97%   PainSc:   6         Objective   Physical Exam  Chest:       Musculoskeletal:         General: Tenderness present.        Arms:          Imaging Reviewed:  CT chest without contrast  - Postsurgical changes of right again noted with suture line at the right apex  - Increased subpleural opacity along with anterior chest wall on the right middle lobe.  Atelectasis versus pneumonia.  - Stable noncalcified nodules in right middle lobe.     MRI lumbar spine without contrast-6/16/2017  L3-4-mild facet arthropathy with left paracentral disc protrusion causing moderate left neural foraminal narrowing.  L4-5-moderate bilateral facet arthropathy with small posterior disc endplate complex causing mild narrowing of the neural foramina.  L4 S1-mild to moderate bilateral facet arthropathy.         Assessment:    1. Intercostal neuralgia    2. Chest pain on breathing              Plan:   1.  Defer UDS for now.  2. Continue Gabapentin 300 mg qhs (3 months supply - 9/3/23). Side effects discussed with the patient including but not limited to somnolence, dizziness, ataxia, headache, fatigue, blurred vision, cold or flu-like symptoms,delusions, hoarseness, lack or loss of strength, lower back or side pain, swelling of the hands, feet, or lower legs trembling or shaking. Patient understands and agrees with the plan.  3. ONLY Take Tramadol if in pain 50 mg qhs PRN. (#21 tabs - 2/22/23). Discussed with the patient regarding long-term side effects of opioids including but not limited to opioid induced hormonal suppression, hyperalgesia, fatigue, weight gain, possible opioid induced altered immune system,  addiction, tolerance, dependence, risk of hearing loss and elevated risk of myocardial infarction. Proper use and potential life threatening side effects of over use discussed with patient. Patient states understanding of their use and risks.  4. Excellent relief with intercostal nerve block, but pain is returning now. She had >80% pain relief with 2 intercostal nerve block.  Will proceed with repeating R intercostal nerve block ribs 5-9.  Discussed the possibility of infection, bleeding, nerve damage, headache, increased pain, pneumothorax. Patient understands and agrees. Holding off on RFA due to proximity of pacemaker near ablation. She may consider this if intercostal N block doesn't last long enough.        RTC for injection and then 3 weeks follow up.     Shane Medina DO  Pain Management   [facility]     Tessie Conner reports a pain score of 6.  Given her pain assessment as noted, treatment options were discussed and the following options were decided upon as a follow-up plan to address the patient's pain: continuation of current treatment plan for pain.      INSPECT REPORT    As part of the patient's treatment plan, I may be prescribing controlled substances. The patient has been made aware of appropriate use of such medications, including potential risk of somnolence, limited ability to drive and/or work safely, and the potential for dependence or overdose. It has also been made clear that these medications are for use by this patient only, without concomitant use of alcohol or other substances unless prescribed.     Patient has completed prescribing agreement detailing terms of continued prescribing of controlled substances, including monitoring INSPECT reports, urine drug screening, and pill counts if necessary. The patient is aware that inappropriate use will results in cessation of prescribing such medications.    INSPECT report has been reviewed and scanned into the patient's chart.

## 2023-09-28 ENCOUNTER — OFFICE VISIT (OUTPATIENT)
Dept: PAIN MEDICINE | Facility: CLINIC | Age: 53
End: 2023-09-28
Payer: MEDICARE

## 2023-09-28 VITALS
OXYGEN SATURATION: 97 % | HEART RATE: 60 BPM | SYSTOLIC BLOOD PRESSURE: 104 MMHG | DIASTOLIC BLOOD PRESSURE: 55 MMHG | RESPIRATION RATE: 16 BRPM

## 2023-09-28 DIAGNOSIS — R07.1 CHEST PAIN ON BREATHING: ICD-10-CM

## 2023-09-28 DIAGNOSIS — G58.8 INTERCOSTAL NEURALGIA: Primary | ICD-10-CM

## 2023-09-28 RX ORDER — VIBEGRON 75 MG/1
75 TABLET, FILM COATED ORAL DAILY
COMMUNITY
Start: 2023-08-28

## 2023-09-28 RX ORDER — FERROUS SULFATE 325(65) MG
TABLET ORAL
COMMUNITY
Start: 2023-08-22

## 2023-09-28 RX ORDER — FLUTICASONE FUROATE 200 UG/1
POWDER RESPIRATORY (INHALATION)
COMMUNITY

## 2023-09-29 ENCOUNTER — TELEPHONE (OUTPATIENT)
Dept: CARDIOLOGY | Facility: CLINIC | Age: 53
End: 2023-09-29
Payer: MEDICARE

## 2023-09-29 NOTE — TELEPHONE ENCOUNTER
Caller: Tessie Conner    Relationship: Self    Best call back number: [phone]     What is the best time to reach you: ANYTIME    Who are you requesting to speak with (clinical staff, provider,  specific staff member): NURSE    Do you know the name of the person who called: NA    What was the call regarding: PT IS ASKING FOR CALL BACK AND STATES HER BP HAS BEEN BOTTOMING OUT AND ITS MAKING HER DIZZY AND SHE HAS TROUBLE STANDING - PT REACHED OUT TO PCP BUT ISNT HEARING ANYTHING BACK -     Is it okay if the provider responds through MyChart: CALL BACK PLEASE

## 2023-09-29 NOTE — TELEPHONE ENCOUNTER
Have patient increase midodrine to 5 mg 3 times daily.  Discontinue lisinopril.  Have patient monitor BP over the weekend and call Monday with update.

## 2023-09-29 NOTE — TELEPHONE ENCOUNTER
Spoke with patient. States she went to the ER on 9/23/23 for dizziness, fatigue.  Her blood pressure has been dropping. Her lisinopril was stopped after her pacemaker was placed in June this year. She states her b/p was 104/44 when she went to pain management 2 days ago and yesterday was 100/55 HR has been around 53-59. She did get fluids at the hospital that helped increase her b/p but states she has been drinking fluids frequently since. They started her on meclizine for the dizziness but it is not helping. She is most concerned about how fatigued she feels and the bp.      Scan on 9/23/2023 1816 by [facility], [place]: ECG 12-LEAD                      HEART RATE= 60  bpm  RR Interval= 1000  ms  OH Interval= 173  ms  P Horizontal Axis= -21  deg  P Front Axis=   deg  QRSD Interval= 97  ms  QT Interval= 428  ms  QTcB= 428  ms  QRS Axis= 33  deg  T Wave Axis= 27  deg  - ABNORMAL ECG -  Atrial-paced rhythm  Low voltage, precordial leads  When compared with ECG of 14-Sep-2023 10:03:06,  No significant change  Electronically Signed By: Wood Mendiola (Ab) 24-Sep-2023 12:44:39  Date and Time of Study: 2023-09-23 18:16:29        Signed    Electronically signed by Wood Mendiola MD on 9/24/23 at 1244 EDT

## 2023-10-02 RX ORDER — MIDODRINE HYDROCHLORIDE 2.5 MG/1
5 TABLET ORAL 2 TIMES DAILY
Qty: 120 TABLET | Refills: 1 | Status: SHIPPED | OUTPATIENT
Start: 2023-10-02

## 2023-10-03 ENCOUNTER — TELEPHONE (OUTPATIENT)
Dept: PAIN MEDICINE | Facility: CLINIC | Age: 53
End: 2023-10-03
Payer: MEDICARE

## 2023-10-03 NOTE — TELEPHONE ENCOUNTER
MICHAEL PT, UNABLE TO KEEP APPT DUE TO NEW JOB AND NOT ABLE TO TAKE OFF FOR 9 WEEKS. PATIENT WILL CALL BACK TO RESCHEDULE

## 2023-10-03 NOTE — TELEPHONE ENCOUNTER
Caller: Tessie Conner    Relationship to patient: Self    Best call back number: [phone]    Patient is needing: PATIENT WOULD LIKE TO CANCEL HER HOSPITAL PROCEDURE SCHEDULED WITH DR GRIJALVA ON 11-17-23. PLEASE CALL TO DISCUSS. OKAY TO LEAVE A VOICEMAIL IF NEEDED.

## 2023-11-04 ENCOUNTER — HOSPITAL ENCOUNTER (OUTPATIENT)
Dept: CT IMAGING | Facility: HOSPITAL | Age: 53
Discharge: HOME | End: 2023-11-04
Admitting: NURSE PRACTITIONER
Payer: MEDICARE

## 2023-11-04 DIAGNOSIS — R91.1 LUNG NODULE: ICD-10-CM

## 2023-11-04 PROCEDURE — 71250 CT THORAX DX C-: CPT

## 2023-11-10 ENCOUNTER — CLINICAL SUPPORT (OUTPATIENT)
Dept: SURGERY | Facility: CLINIC | Age: 53
End: 2023-11-10
Payer: MEDICARE

## 2023-11-10 DIAGNOSIS — R91.1 LUNG NODULE: Primary | ICD-10-CM

## 2023-11-10 PROCEDURE — 99212 OFFICE O/P EST SF 10 MIN: CPT | Performed by: NURSE PRACTITIONER

## 2023-11-10 NOTE — PROGRESS NOTES
"Chief Complaint  Follow up, necrotizing granuloma    You have chosen to receive care through a telephone visit. Do you consent to use a telephone visit for your medical care today? Yes      Subjective        Tessie Conner presents to [facility]  History of Present Illness    Ms. Conner is a 53-year-old lady who is status post right upper lobe wedge resection by Dr. 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  She has done very well since surgery other than some intercostal neuralgia that improved with intercostal nerve block.  She does not complain of this today.  She continues to follow with pain management.    She is a never smoker.  She was hospitalized for COVID in March 2023.  She also had an admission to [facility] ED on 4/18/2023 for chest pain and sinusitis.  She has recovered well.  She started a new job.  She denies new onset shortness of breath.  She presents today in her baseline state of health with CT chest.      Objective   Vital Signs:  There were no vitals taken for this visit.  Estimated body mass index is 38.97 kg/m² as calculated from the following:    Height as of 9/23/23: 160 cm (63\").    Weight as of 9/23/23: 99.8 kg (220 lb).         Physical Exam     Deferred secondary to phone visit    Result Review :    CT chest 11/4/2023: Postoperative changes as before from right upper lobe wedge resection.  No new consolidation or lung nodule.  Stable 3 mm nodule in the right middle lobe and a stable 4 mm nodule in the left upper lobe.  No mediastinal or hilar lymphadenopathy.       CT chest 5/6/2023: postoperative changes consistent with a right upper lobe wedge resection.  There is a stable 4 mm noncalcified nodule in the right middle lobe.  Other scattered micronodules are without change.  No mediastinal or hilar lymphadenopathy.  This is improved since previous imaging.  No pleural or pericardial effusion.    All " imaging reviewed independently.           Assessment and Plan   Diagnoses and all orders for this visit:    1. Lung nodule (Primary)  -     CT Chest Without Contrast; Future      Ms. Conner most recent CT chest demonstrates stable sub-5 mm nodules bilaterally status post right upper lobe wedge resection Dr. Carroll in February 2022.  Pathology was consistent with necrotizing granuloma.  We will continue her surveillance with a CT of the chest in 1 year.  Continue to follow-up with ID at [place] as directed.         I spent 14 minutes caring for Tessie on this date of service. This time includes time spent by me in the following activities:preparing for the visit, reviewing tests, performing a medically appropriate examination and/or evaluation , counseling and educating the patient/family/caregiver, ordering medications, tests, or procedures, referring and communicating with other health care professionals , documenting information in the medical record and care coordination     Follow Up   Return in about 1 year (around 11/10/2024) for Next scheduled follow up.  Patient was given instructions and counseling regarding her condition or for health maintenance advice. Please see specific information pulled into the AVS if appropriate.

## 2023-11-16 ENCOUNTER — HOSPITAL ENCOUNTER (OUTPATIENT)
Facility: HOSPITAL | Age: 53
Setting detail: OBSERVATION
Discharge: HOME | End: 2023-11-17
Attending: EMERGENCY MEDICINE | Admitting: HOSPITALIST
Payer: MEDICARE

## 2023-11-16 ENCOUNTER — APPOINTMENT (OUTPATIENT)
Dept: CT IMAGING | Facility: HOSPITAL | Age: 53
End: 2023-11-16
Payer: MEDICARE

## 2023-11-16 ENCOUNTER — APPOINTMENT (OUTPATIENT)
Dept: GENERAL RADIOLOGY | Facility: HOSPITAL | Age: 53
End: 2023-11-16
Payer: MEDICARE

## 2023-11-16 DIAGNOSIS — M79.651 RIGHT THIGH PAIN: Primary | ICD-10-CM

## 2023-11-16 DIAGNOSIS — J06.9 URI, ACUTE: ICD-10-CM

## 2023-11-16 DIAGNOSIS — L02.419 THIGH ABSCESS: ICD-10-CM

## 2023-11-16 PROBLEM — R22.41 MASS OF RIGHT THIGH: Status: ACTIVE | Noted: 2023-11-16

## 2023-11-16 PROBLEM — F31.32 BIPOLAR 1 DISORDER, DEPRESSED, MODERATE: Chronic | Status: ACTIVE | Noted: 2019-11-20

## 2023-11-16 LAB
ANION GAP SERPL CALCULATED.3IONS-SCNC: 7.1 MMOL/L (ref 5–15)
BASOPHILS # BLD AUTO: 0.03 10*3/MM3 (ref 0–0.2)
BASOPHILS NFR BLD AUTO: 0.2 % (ref 0–1.5)
BUN SERPL-MCNC: 15 MG/DL (ref 6–20)
BUN/CREAT SERPL: 23.8 (ref 7–25)
CALCIUM SPEC-SCNC: 9.3 MG/DL (ref 8.6–10.5)
CHLORIDE SERPL-SCNC: 103 MMOL/L (ref 98–107)
CK SERPL-CCNC: 100 U/L (ref 20–180)
CO2 SERPL-SCNC: 27.9 MMOL/L (ref 22–29)
CREAT SERPL-MCNC: 0.63 MG/DL (ref 0.57–1)
CRP SERPL-MCNC: 0.78 MG/DL (ref 0–0.5)
D-LACTATE SERPL-SCNC: 0.9 MMOL/L (ref 0.5–2)
DEPRECATED RDW RBC AUTO: 59.6 FL (ref 37–54)
EGFRCR SERPLBLD CKD-EPI 2021: 106.9 ML/MIN/1.73
EOSINOPHIL # BLD AUTO: 0.07 10*3/MM3 (ref 0–0.4)
EOSINOPHIL NFR BLD AUTO: 0.5 % (ref 0.3–6.2)
ERYTHROCYTE [DISTWIDTH] IN BLOOD BY AUTOMATED COUNT: 19.3 % (ref 12.3–15.4)
FLUAV SUBTYP SPEC NAA+PROBE: NOT DETECTED
FLUBV RNA ISLT QL NAA+PROBE: NOT DETECTED
GLUCOSE BLDC GLUCOMTR-MCNC: 177 MG/DL (ref 70–105)
GLUCOSE SERPL-MCNC: 112 MG/DL (ref 65–99)
HCT VFR BLD AUTO: 39.3 % (ref 34–46.6)
HGB BLD-MCNC: 12.1 G/DL (ref 12–15.9)
IMM GRANULOCYTES # BLD AUTO: 0.13 10*3/MM3 (ref 0–0.05)
IMM GRANULOCYTES NFR BLD AUTO: 0.9 % (ref 0–0.5)
LYMPHOCYTES # BLD AUTO: 2.08 10*3/MM3 (ref 0.7–3.1)
LYMPHOCYTES NFR BLD AUTO: 14.5 % (ref 19.6–45.3)
MCH RBC QN AUTO: 26.9 PG (ref 26.6–33)
MCHC RBC AUTO-ENTMCNC: 30.8 G/DL (ref 31.5–35.7)
MCV RBC AUTO: 87.3 FL (ref 79–97)
MONOCYTES # BLD AUTO: 0.78 10*3/MM3 (ref 0.1–0.9)
MONOCYTES NFR BLD AUTO: 5.4 % (ref 5–12)
NEUTROPHILS NFR BLD AUTO: 11.23 10*3/MM3 (ref 1.7–7)
NEUTROPHILS NFR BLD AUTO: 78.5 % (ref 42.7–76)
PLATELET # BLD AUTO: 254 10*3/MM3 (ref 140–450)
PMV BLD AUTO: 9.9 FL (ref 6–12)
POTASSIUM SERPL-SCNC: 3.8 MMOL/L (ref 3.5–5.2)
RBC # BLD AUTO: 4.5 10*6/MM3 (ref 3.77–5.28)
SARS-COV-2 RNA RESP QL NAA+PROBE: NOT DETECTED
SODIUM SERPL-SCNC: 138 MMOL/L (ref 136–145)
STREP A PCR: NOT DETECTED
WBC NRBC COR # BLD AUTO: 14.32 10*3/MM3 (ref 3.4–10.8)

## 2023-11-16 PROCEDURE — 96367 TX/PROPH/DG ADDL SEQ IV INF: CPT

## 2023-11-16 PROCEDURE — 82550 ASSAY OF CK (CPK): CPT | Performed by: EMERGENCY MEDICINE

## 2023-11-16 PROCEDURE — 83605 ASSAY OF LACTIC ACID: CPT | Performed by: EMERGENCY MEDICINE

## 2023-11-16 PROCEDURE — 82948 REAGENT STRIP/BLOOD GLUCOSE: CPT

## 2023-11-16 PROCEDURE — 25010000002 CEFTRIAXONE PER 250 MG: Performed by: PHYSICIAN ASSISTANT

## 2023-11-16 PROCEDURE — 73701 CT LOWER EXTREMITY W/DYE: CPT

## 2023-11-16 PROCEDURE — 80048 BASIC METABOLIC PNL TOTAL CA: CPT | Performed by: EMERGENCY MEDICINE

## 2023-11-16 PROCEDURE — 87636 SARSCOV2 & INF A&B AMP PRB: CPT

## 2023-11-16 PROCEDURE — G0378 HOSPITAL OBSERVATION PER HR: HCPCS

## 2023-11-16 PROCEDURE — 99285 EMERGENCY DEPT VISIT HI MDM: CPT

## 2023-11-16 PROCEDURE — 25510000001 IOPAMIDOL PER 1 ML: Performed by: EMERGENCY MEDICINE

## 2023-11-16 PROCEDURE — 87040 BLOOD CULTURE FOR BACTERIA: CPT | Performed by: EMERGENCY MEDICINE

## 2023-11-16 PROCEDURE — 85025 COMPLETE CBC W/AUTO DIFF WBC: CPT | Performed by: EMERGENCY MEDICINE

## 2023-11-16 PROCEDURE — 71045 X-RAY EXAM CHEST 1 VIEW: CPT

## 2023-11-16 PROCEDURE — 86140 C-REACTIVE PROTEIN: CPT | Performed by: EMERGENCY MEDICINE

## 2023-11-16 PROCEDURE — 87641 MR-STAPH DNA AMP PROBE: CPT | Performed by: PHYSICIAN ASSISTANT

## 2023-11-16 PROCEDURE — 99284 EMERGENCY DEPT VISIT MOD MDM: CPT | Performed by: EMERGENCY MEDICINE

## 2023-11-16 PROCEDURE — 25010000002 VANCOMYCIN HCL IN NACL 1.75-0.9 GM/500ML-% SOLUTION: Performed by: PHYSICIAN ASSISTANT

## 2023-11-16 PROCEDURE — 25010000002 PIPERACILLIN SOD-TAZOBACTAM PER 1 G: Performed by: EMERGENCY MEDICINE

## 2023-11-16 PROCEDURE — 94799 UNLISTED PULMONARY SVC/PX: CPT

## 2023-11-16 PROCEDURE — 96365 THER/PROPH/DIAG IV INF INIT: CPT

## 2023-11-16 PROCEDURE — 36415 COLL VENOUS BLD VENIPUNCTURE: CPT

## 2023-11-16 PROCEDURE — 87651 STREP A DNA AMP PROBE: CPT

## 2023-11-16 RX ORDER — AMOXICILLIN 250 MG
2 CAPSULE ORAL 2 TIMES DAILY
Status: DISCONTINUED | OUTPATIENT
Start: 2023-11-16 | End: 2023-11-17 | Stop reason: HOSPADM

## 2023-11-16 RX ORDER — BISACODYL 5 MG/1
5 TABLET, DELAYED RELEASE ORAL DAILY PRN
Status: DISCONTINUED | OUTPATIENT
Start: 2023-11-16 | End: 2023-11-17 | Stop reason: HOSPADM

## 2023-11-16 RX ORDER — GABAPENTIN 300 MG/1
300 CAPSULE ORAL ONCE
Status: COMPLETED | OUTPATIENT
Start: 2023-11-16 | End: 2023-11-16

## 2023-11-16 RX ORDER — SODIUM CHLORIDE 0.9 % (FLUSH) 0.9 %
10 SYRINGE (ML) INJECTION AS NEEDED
Status: DISCONTINUED | OUTPATIENT
Start: 2023-11-16 | End: 2023-11-17 | Stop reason: HOSPADM

## 2023-11-16 RX ORDER — ALUMINA, MAGNESIA, AND SIMETHICONE 2400; 2400; 240 MG/30ML; MG/30ML; MG/30ML
15 SUSPENSION ORAL EVERY 6 HOURS PRN
Status: DISCONTINUED | OUTPATIENT
Start: 2023-11-16 | End: 2023-11-16

## 2023-11-16 RX ORDER — FERROUS SULFATE 324(65)MG
324 TABLET, DELAYED RELEASE (ENTERIC COATED) ORAL
Status: DISCONTINUED | OUTPATIENT
Start: 2023-11-17 | End: 2023-11-17 | Stop reason: HOSPADM

## 2023-11-16 RX ORDER — CHOLECALCIFEROL (VITAMIN D3) 125 MCG
5 CAPSULE ORAL NIGHTLY PRN
Status: DISCONTINUED | OUTPATIENT
Start: 2023-11-16 | End: 2023-11-17 | Stop reason: HOSPADM

## 2023-11-16 RX ORDER — ONDANSETRON 4 MG/1
4 TABLET, FILM COATED ORAL EVERY 6 HOURS PRN
Status: DISCONTINUED | OUTPATIENT
Start: 2023-11-16 | End: 2023-11-17 | Stop reason: HOSPADM

## 2023-11-16 RX ORDER — ONDANSETRON 2 MG/ML
4 INJECTION INTRAMUSCULAR; INTRAVENOUS EVERY 6 HOURS PRN
Status: DISCONTINUED | OUTPATIENT
Start: 2023-11-16 | End: 2023-11-17 | Stop reason: HOSPADM

## 2023-11-16 RX ORDER — ITRACONAZOLE 100 MG/1
200 CAPSULE ORAL 2 TIMES DAILY
Status: DISCONTINUED | OUTPATIENT
Start: 2023-11-16 | End: 2023-11-17 | Stop reason: HOSPADM

## 2023-11-16 RX ORDER — BUDESONIDE 0.5 MG/2ML
0.5 INHALANT ORAL
Status: DISCONTINUED | OUTPATIENT
Start: 2023-11-16 | End: 2023-11-17 | Stop reason: HOSPADM

## 2023-11-16 RX ORDER — SODIUM CHLORIDE 9 MG/ML
40 INJECTION, SOLUTION INTRAVENOUS AS NEEDED
Status: DISCONTINUED | OUTPATIENT
Start: 2023-11-16 | End: 2023-11-17 | Stop reason: HOSPADM

## 2023-11-16 RX ORDER — POLYETHYLENE GLYCOL 3350 17 G/17G
17 POWDER, FOR SOLUTION ORAL DAILY PRN
Status: DISCONTINUED | OUTPATIENT
Start: 2023-11-16 | End: 2023-11-17 | Stop reason: HOSPADM

## 2023-11-16 RX ORDER — VANCOMYCIN 1.75 GRAM/500 ML IN 0.9 % SODIUM CHLORIDE INTRAVENOUS
1750 ONCE
Status: COMPLETED | OUTPATIENT
Start: 2023-11-16 | End: 2023-11-17

## 2023-11-16 RX ORDER — IPRATROPIUM BROMIDE AND ALBUTEROL SULFATE 2.5; .5 MG/3ML; MG/3ML
3 SOLUTION RESPIRATORY (INHALATION) EVERY 6 HOURS PRN
Status: DISCONTINUED | OUTPATIENT
Start: 2023-11-16 | End: 2023-11-17 | Stop reason: HOSPADM

## 2023-11-16 RX ORDER — ASPIRIN 81 MG/1
81 TABLET ORAL DAILY
COMMUNITY

## 2023-11-16 RX ORDER — SODIUM CHLORIDE 0.9 % (FLUSH) 0.9 %
10 SYRINGE (ML) INJECTION EVERY 12 HOURS SCHEDULED
Status: DISCONTINUED | OUTPATIENT
Start: 2023-11-16 | End: 2023-11-17 | Stop reason: HOSPADM

## 2023-11-16 RX ORDER — BISACODYL 10 MG
10 SUPPOSITORY, RECTAL RECTAL DAILY PRN
Status: DISCONTINUED | OUTPATIENT
Start: 2023-11-16 | End: 2023-11-17 | Stop reason: HOSPADM

## 2023-11-16 RX ADMIN — CEFTRIAXONE 2000 MG: 2 INJECTION, POWDER, FOR SOLUTION INTRAMUSCULAR; INTRAVENOUS at 22:02

## 2023-11-16 RX ADMIN — PIPERACILLIN AND TAZOBACTAM 3.38 G: 3; .375 INJECTION, POWDER, LYOPHILIZED, FOR SOLUTION INTRAVENOUS at 18:00

## 2023-11-16 RX ADMIN — GABAPENTIN 300 MG: 300 CAPSULE ORAL at 22:39

## 2023-11-16 RX ADMIN — Medication 1750 MG: at 22:36

## 2023-11-16 RX ADMIN — SERTRALINE 50 MG: 50 TABLET, FILM COATED ORAL at 22:39

## 2023-11-16 RX ADMIN — ITRACONAZOLE 200 MG: 100 CAPSULE ORAL at 22:38

## 2023-11-16 RX ADMIN — Medication 10 ML: at 22:40

## 2023-11-16 RX ADMIN — IOPAMIDOL 100 ML: 755 INJECTION, SOLUTION INTRAVENOUS at 16:48

## 2023-11-16 NOTE — FSED PROVIDER NOTE
Subjective   History of Present Illness  Patient with complaint of right thigh pain for sevearl months which has worsened over past couple of days. Pain to thigh awoke patient from sleep last night. No swelling, numbness, tingling or weakness. Patient also complaining of sore throat, cough, congestion and headache. No ill contacts. No rash. No trauma to thigh.         Review of Systems   HENT:  Positive for congestion and sore throat.    Respiratory:  Positive for cough.    Musculoskeletal:  Positive for myalgias.   All other systems reviewed and are negative.      Past Medical History:   Diagnosis Date    Abnormal ECG     Anemia     Anesthesia complication 2003    cardiac arrest  with hysterectomy    Anxiety 2005    Arthritis     Asthma 04/17/2020    allergy    Bipolar 1 disorder     Bradycardia     Chest pain     due to scarring from lung surgery  2/21    Chest pain 09/24/2020    Cholelithiasis 10/2022    Coronary artery disease     very minimal    Depression 2005    Diabetes mellitus 2002    borderline   resolved    Dyspnea on minimal exertion     Frequent UTI     Gastroesophageal reflux disease without esophagitis 07/15/2020    Heart murmur     FROM CHILDHOOD    Histoplasmosis     History of anemia     POST PREGNANCY    Hyperlipidemia     Hyperlipidemia 10/04/2016    Hypotension     Mass of upper lobe of right lung     Migraines     hx    Obesity 1999    PONV (postoperative nausea and vomiting)     Sleep apnea 2/28/22    no machine    Spinal headache     Vertigo     Visual impairment 2011    WEARS GLASSES    Vitamin D deficiency        Allergies   Allergen Reactions    Tylenol [Acetaminophen] Hives and Itching       Past Surgical History:   Procedure Laterality Date    APPENDECTOMY  2011    CARDIAC CATHETERIZATION N/A 09/24/2020    Procedure: Left Heart Cath possible PCI, atherectomy, hemodynamic support;  Surgeon: Thomas Zamora MD;  Location: [facility] CATH INVASIVE LOCATION;  Service: Cardiology;   Laterality: N/A;    CARDIAC CATHETERIZATION  2020    CARDIAC ELECTROPHYSIOLOGY PROCEDURE N/A 2023    Procedure: Pacemaker DC new, [place] aware;  Surgeon: Rachel Espinoza MD;  Location: [facility] CATH INVASIVE LOCATION;  Service: Cardiovascular;  Laterality: N/A;     SECTION      FOOT/TOE TENDON REPAIR Left     HYSTERECTOMY      LUNG BIOPSY Right 2020    LUNG LOBECTOMY Right 2022    pt had partial Right lobectomy and nodule removal    MASS EXCISION Right 2023    Procedure: LIPOMA EXCISION,THIGH;  Surgeon: Justo Shannon MD;  Location: [facility] MAIN OR;  Service: General;  Laterality: Right;    ROTATOR CUFF REPAIR Left     THORACOSCOPY VIDEO ASSISTED WITH LOBECTOMY Right 2022    Procedure: BRONCHOSCOPY, THORACOSCOPY VIDEO ASSISTED wedge resection X2, INTERCOSTAL NERVE BLOCK;  Surgeon: Ayla Carroll MD;  Location: [facility] MAIN OR;  Service: Thoracic;  Laterality: Right;    TUBAL ABDOMINAL LIGATION         Family History   Problem Relation Age of Onset    Arthritis Mother     Cancer Mother     Depression Mother     Diabetes Mother     Early death Mother     Mental illness Mother     Anxiety disorder Mother     Alcohol abuse Father     Diabetes Father     Early death Father     Heart disease Father     Hyperlipidemia Father     Hypertension Father         Father    Vision loss Father     Heart attack Father     Heart disease Sister     Drug abuse Sister     Heart attack Sister     Hypertension Sister         Sister    Heart disease Sister     Heart disease Brother     Hypertension Brother     Drug abuse Brother     Hypertension Brother     Heart disease Brother     Heart attack Brother     Cancer Maternal Grandmother     Malig Hyperthermia Neg Hx        Social History     Socioeconomic History    Marital status:    Tobacco Use    Smoking status: Never     Passive exposure: Never    Smokeless tobacco: Never   Vaping Use    Vaping Use: Never used   Substance  and Sexual Activity    Alcohol use: Not Currently     Comment: VERY RARE    Drug use: No    Sexual activity: Defer     Birth control/protection: None           Objective   Physical Exam  Vitals and nursing note reviewed.   Constitutional:       Appearance: She is well-developed.   Skin:     General: Skin is warm and dry.      Comments: Well healed scar along posterior lateral aspect of right thigh. No induration or fluctulance. No skin erythema.    Exquisite tenderness along anterior aspect of right thigh to muscle. No fluctulance or induration or skin erythema. No crepitus appreciated.    Neurological:      Mental Status: She is alert.         Procedures           ED Course  ED Course as of 11/16/23 1749   Thu Nov 16, 2023   1748 D/w hospitalist. D/w patient. Ortho consult for thigh abscess.  [DP]      ED Course User Index  [DP] Monty Milan MD                                           Medical Decision Making  Concern for thigh strain, myositis, uri, thigh abscess. D/w patient. Agree with plan.    Problems Addressed:  Right thigh pain: complicated acute illness or injury  Thigh abscess: complicated acute illness or injury  URI, acute: complicated acute illness or injury    Amount and/or Complexity of Data Reviewed  Labs: ordered.  Radiology: ordered.    Risk  Prescription drug management.  Decision regarding hospitalization.        Final diagnoses:   Right thigh pain   Thigh abscess   URI, acute       ED Disposition  ED Disposition       ED Disposition   Decision to Admit    Condition   --    Comment   Level of Care: Med/Surg [1]   Admitting Physician: MELISA BARTHOLOMEW [[id number]]   Attending Physician: MELISA BARTHOLOMEW [[id number]]   Patient Class: Observation [104]                 No follow-up provider specified.       Medication List      No changes were made to your prescriptions during this visit.

## 2023-11-17 ENCOUNTER — READMISSION MANAGEMENT (OUTPATIENT)
Dept: CALL CENTER | Facility: HOSPITAL | Age: 53
End: 2023-11-17
Payer: MEDICARE

## 2023-11-17 ENCOUNTER — APPOINTMENT (OUTPATIENT)
Dept: INTERVENTIONAL RADIOLOGY/VASCULAR | Facility: HOSPITAL | Age: 53
End: 2023-11-17
Payer: MEDICARE

## 2023-11-17 VITALS
RESPIRATION RATE: 17 BRPM | OXYGEN SATURATION: 96 % | WEIGHT: 232.2 LBS | HEART RATE: 63 BPM | DIASTOLIC BLOOD PRESSURE: 75 MMHG | SYSTOLIC BLOOD PRESSURE: 127 MMHG | BODY MASS INDEX: 39.64 KG/M2 | HEIGHT: 64 IN | TEMPERATURE: 97.6 F

## 2023-11-17 PROBLEM — E66.01 MORBID OBESITY: Chronic | Status: RESOLVED | Noted: 2020-04-03 | Resolved: 2023-11-17

## 2023-11-17 PROBLEM — E11.9 TYPE 2 DIABETES MELLITUS WITHOUT COMPLICATION, WITHOUT LONG-TERM CURRENT USE OF INSULIN: Chronic | Status: RESOLVED | Noted: 2020-04-03 | Resolved: 2023-11-17

## 2023-11-17 PROBLEM — I49.5 SICK SINUS SYNDROME: Status: RESOLVED | Noted: 2023-06-23 | Resolved: 2023-11-17

## 2023-11-17 PROBLEM — R22.41 MASS OF RIGHT THIGH: Status: RESOLVED | Noted: 2023-11-16 | Resolved: 2023-11-17

## 2023-11-17 LAB
ANION GAP SERPL CALCULATED.3IONS-SCNC: 8 MMOL/L (ref 5–15)
APTT PPP: 26.3 SECONDS (ref 24–31)
BASOPHILS # BLD AUTO: 0 10*3/MM3 (ref 0–0.2)
BASOPHILS NFR BLD AUTO: 0.3 % (ref 0–1.5)
BUN SERPL-MCNC: 13 MG/DL (ref 6–20)
BUN/CREAT SERPL: 18.6 (ref 7–25)
CALCIUM SPEC-SCNC: 9.4 MG/DL (ref 8.6–10.5)
CHLORIDE SERPL-SCNC: 107 MMOL/L (ref 98–107)
CO2 SERPL-SCNC: 29 MMOL/L (ref 22–29)
CREAT SERPL-MCNC: 0.7 MG/DL (ref 0.57–1)
CRP SERPL-MCNC: 0.87 MG/DL (ref 0–0.5)
DEPRECATED RDW RBC AUTO: 56.9 FL (ref 37–54)
EGFRCR SERPLBLD CKD-EPI 2021: 104.2 ML/MIN/1.73
EOSINOPHIL # BLD AUTO: 0 10*3/MM3 (ref 0–0.4)
EOSINOPHIL NFR BLD AUTO: 0.4 % (ref 0.3–6.2)
ERYTHROCYTE [DISTWIDTH] IN BLOOD BY AUTOMATED COUNT: 19.5 % (ref 12.3–15.4)
GLUCOSE SERPL-MCNC: 127 MG/DL (ref 65–99)
HCT VFR BLD AUTO: 35.5 % (ref 34–46.6)
HGB BLD-MCNC: 11.5 G/DL (ref 12–15.9)
INR PPP: <0.93 (ref 0.93–1.1)
LYMPHOCYTES # BLD AUTO: 2.1 10*3/MM3 (ref 0.7–3.1)
LYMPHOCYTES NFR BLD AUTO: 17.6 % (ref 19.6–45.3)
MCH RBC QN AUTO: 27.2 PG (ref 26.6–33)
MCHC RBC AUTO-ENTMCNC: 32.3 G/DL (ref 31.5–35.7)
MCV RBC AUTO: 84.4 FL (ref 79–97)
MONOCYTES # BLD AUTO: 0.6 10*3/MM3 (ref 0.1–0.9)
MONOCYTES NFR BLD AUTO: 5.5 % (ref 5–12)
MRSA DNA SPEC QL NAA+PROBE: NORMAL
NEUTROPHILS NFR BLD AUTO: 76.2 % (ref 42.7–76)
NEUTROPHILS NFR BLD AUTO: 9 10*3/MM3 (ref 1.7–7)
NRBC BLD AUTO-RTO: 0 /100 WBC (ref 0–0.2)
PLATELET # BLD AUTO: 229 10*3/MM3 (ref 140–450)
PMV BLD AUTO: 8.2 FL (ref 6–12)
POTASSIUM SERPL-SCNC: 4.2 MMOL/L (ref 3.5–5.2)
PROTHROMBIN TIME: 9.9 SECONDS (ref 9.6–11.7)
RBC # BLD AUTO: 4.21 10*6/MM3 (ref 3.77–5.28)
SODIUM SERPL-SCNC: 144 MMOL/L (ref 136–145)
WBC NRBC COR # BLD AUTO: 11.8 10*3/MM3 (ref 3.4–10.8)

## 2023-11-17 PROCEDURE — 94664 DEMO&/EVAL PT USE INHALER: CPT

## 2023-11-17 PROCEDURE — 85610 PROTHROMBIN TIME: CPT | Performed by: PHYSICIAN ASSISTANT

## 2023-11-17 PROCEDURE — 80048 BASIC METABOLIC PNL TOTAL CA: CPT | Performed by: PHYSICIAN ASSISTANT

## 2023-11-17 PROCEDURE — 25810000003 SODIUM CHLORIDE 0.9 % SOLUTION 250 ML FLEX CONT: Performed by: PHYSICIAN ASSISTANT

## 2023-11-17 PROCEDURE — G0378 HOSPITAL OBSERVATION PER HR: HCPCS

## 2023-11-17 PROCEDURE — 86140 C-REACTIVE PROTEIN: CPT | Performed by: INTERNAL MEDICINE

## 2023-11-17 PROCEDURE — 94640 AIRWAY INHALATION TREATMENT: CPT

## 2023-11-17 PROCEDURE — 87070 CULTURE OTHR SPECIMN AEROBIC: CPT | Performed by: RADIOLOGY

## 2023-11-17 PROCEDURE — 85730 THROMBOPLASTIN TIME PARTIAL: CPT | Performed by: PHYSICIAN ASSISTANT

## 2023-11-17 PROCEDURE — 99204 OFFICE O/P NEW MOD 45 MIN: CPT | Performed by: SURGERY

## 2023-11-17 PROCEDURE — 94799 UNLISTED PULMONARY SVC/PX: CPT

## 2023-11-17 PROCEDURE — 94761 N-INVAS EAR/PLS OXIMETRY MLT: CPT

## 2023-11-17 PROCEDURE — 76942 ECHO GUIDE FOR BIOPSY: CPT

## 2023-11-17 PROCEDURE — 25010000002 LIDOCAINE 1 % SOLUTION: Performed by: RADIOLOGY

## 2023-11-17 PROCEDURE — 25010000002 VANCOMYCIN HCL 1.25 G RECONSTITUTED SOLUTION 1 EACH VIAL: Performed by: PHYSICIAN ASSISTANT

## 2023-11-17 PROCEDURE — 87205 SMEAR GRAM STAIN: CPT | Performed by: RADIOLOGY

## 2023-11-17 PROCEDURE — 85025 COMPLETE CBC W/AUTO DIFF WBC: CPT | Performed by: PHYSICIAN ASSISTANT

## 2023-11-17 RX ORDER — LIDOCAINE HYDROCHLORIDE 10 MG/ML
INJECTION, SOLUTION INFILTRATION; PERINEURAL AS NEEDED
Status: COMPLETED | OUTPATIENT
Start: 2023-11-17 | End: 2023-11-17

## 2023-11-17 RX ORDER — OXYCODONE HYDROCHLORIDE 5 MG/1
5 TABLET ORAL EVERY 8 HOURS PRN
Status: DISCONTINUED | OUTPATIENT
Start: 2023-11-17 | End: 2023-11-17 | Stop reason: HOSPADM

## 2023-11-17 RX ORDER — CEPHALEXIN 500 MG/1
500 CAPSULE ORAL 3 TIMES DAILY
Qty: 15 CAPSULE | Refills: 0 | Status: SHIPPED | OUTPATIENT
Start: 2023-11-17

## 2023-11-17 RX ORDER — GABAPENTIN 300 MG/1
300 CAPSULE ORAL 3 TIMES DAILY
Status: DISCONTINUED | OUTPATIENT
Start: 2023-11-17 | End: 2023-11-17 | Stop reason: HOSPADM

## 2023-11-17 RX ORDER — TRAMADOL HYDROCHLORIDE 50 MG/1
50 TABLET ORAL EVERY 12 HOURS PRN
Status: DISCONTINUED | OUTPATIENT
Start: 2023-11-17 | End: 2023-11-17 | Stop reason: HOSPADM

## 2023-11-17 RX ADMIN — GABAPENTIN 300 MG: 300 CAPSULE ORAL at 08:31

## 2023-11-17 RX ADMIN — LIDOCAINE HYDROCHLORIDE 5 ML: 10 INJECTION, SOLUTION INFILTRATION; PERINEURAL at 10:41

## 2023-11-17 RX ADMIN — FERROUS SULFATE TAB EC 324 MG (65 MG FE EQUIVALENT) 324 MG: 324 (65 FE) TABLET DELAYED RESPONSE at 08:31

## 2023-11-17 RX ADMIN — TRAMADOL HYDROCHLORIDE 50 MG: 50 TABLET, COATED ORAL at 08:31

## 2023-11-17 RX ADMIN — BUDESONIDE 0.5 MG: 0.5 INHALANT RESPIRATORY (INHALATION) at 07:57

## 2023-11-17 RX ADMIN — VANCOMYCIN HYDROCHLORIDE 1250 MG: 1.25 INJECTION, POWDER, LYOPHILIZED, FOR SOLUTION INTRAVENOUS at 09:34

## 2023-11-17 RX ADMIN — LIDOCAINE HYDROCHLORIDE 5 ML: 10 INJECTION, SOLUTION INFILTRATION; PERINEURAL at 10:43

## 2023-11-17 RX ADMIN — Medication 10 ML: at 08:31

## 2023-11-17 RX ADMIN — ITRACONAZOLE 200 MG: 100 CAPSULE ORAL at 08:31

## 2023-11-17 NOTE — NURSING NOTE
Pt c/o severe pain, and wondering about plan. MD informed and orders put in, PRN meds given and pt kept up to date on plan and providers consulted. IR consulted for possible abscess drainage. Pt stating pain meds not effective and still in severe pain, MD notified, ice pack given in the mean time and PRN meds adjusted. Pt spouse extremely unhappy about pt not been treated, stating that he was under the impression that this was an emergency and yet no treatment done so far, this nurse explained process and all the doctors on board currently and plan for drainage sometimes. Informed pt that nurse will ask MD for additional plan. At this time, IR also called stating they will be coming to get the pt, pt satisfied with that, and MRI questions done. Pt currently on her way done to IR at this time.

## 2023-11-17 NOTE — CONSULTS
GENERAL SURGERY CONSULTATION NOTE    Consult requested by: Dr. Lopez    Patient Care Team:  Marsha Lopez MD as PCP - General (Internal Medicine)  Ayla Carroll MD as Surgeon (Thoracic Surgery)  Rachel Espinoza MD as Consulting Physician (Cardiology)    Reason for consult: Sebaceous cyst    Subjective     Patient is a 53 y.o. female presents with excruciating burning pain in the anterior aspect of her right thigh.  The patient is known to me as she previously has undergone a bilateral posterior thigh lipoma excision back in the summer.  A CT scan of the leg was obtained which demonstrated a cyst on the posterior lateral aspect of the patient's right thigh.  The patient was taken to interventional radiology today where she underwent incision and drainage of a sebaceous cyst on the right thigh.     Review of Systems   Neurological:         Burning pain anterior thigh        History  Past Medical History:   Diagnosis Date    Abnormal ECG     Anemia     Anesthesia complication 2003    cardiac arrest  with hysterectomy    Anxiety 2005    Arthritis     Asthma 04/17/2020    allergy    Bipolar 1 disorder     Bradycardia     Chest pain     due to scarring from lung surgery  2/21    Chest pain 09/24/2020    Cholelithiasis 10/2022    Coronary artery disease     very minimal    Depression 2005    Diabetes mellitus 2002    borderline   resolved    Dyspnea on minimal exertion     Frequent UTI     Gastroesophageal reflux disease without esophagitis 07/15/2020    Heart murmur     FROM CHILDHOOD    Histoplasmosis     History of anemia     POST PREGNANCY    Hyperlipidemia     Hyperlipidemia 10/04/2016    Hypotension     Mass of upper lobe of right lung     Migraines     hx    Obesity 1999    PONV (postoperative nausea and vomiting)     Sleep apnea 2/28/22    no machine    Spinal headache     Vertigo     Visual impairment 2011    WEARS GLASSES    Vitamin D deficiency      Past Surgical History:   Procedure Laterality  Date    APPENDECTOMY      CARDIAC CATHETERIZATION N/A 2020    Procedure: Left Heart Cath possible PCI, atherectomy, hemodynamic support;  Surgeon: Thomas Zamora MD;  Location: [facility] CATH INVASIVE LOCATION;  Service: Cardiology;  Laterality: N/A;    CARDIAC CATHETERIZATION  2020    CARDIAC ELECTROPHYSIOLOGY PROCEDURE N/A 2023    Procedure: Pacemaker DC new, [place] aware;  Surgeon: Rachel Espinoza MD;  Location: [facility] CATH INVASIVE LOCATION;  Service: Cardiovascular;  Laterality: N/A;     SECTION      FOOT/TOE TENDON REPAIR Left     HYSTERECTOMY      LUNG BIOPSY Right 2020    LUNG LOBECTOMY Right 2022    pt had partial Right lobectomy and nodule removal    MASS EXCISION Right 2023    Procedure: LIPOMA EXCISION,THIGH;  Surgeon: Justo Shannon MD;  Location: [facility] MAIN OR;  Service: General;  Laterality: Right;    ROTATOR CUFF REPAIR Left     THORACOSCOPY VIDEO ASSISTED WITH LOBECTOMY Right 2022    Procedure: BRONCHOSCOPY, THORACOSCOPY VIDEO ASSISTED wedge resection X2, INTERCOSTAL NERVE BLOCK;  Surgeon: Ayla Carroll MD;  Location: [facility] MAIN OR;  Service: Thoracic;  Laterality: Right;    TUBAL ABDOMINAL LIGATION       Family History   Problem Relation Age of Onset    Arthritis Mother     Cancer Mother     Depression Mother     Diabetes Mother     Early death Mother     Mental illness Mother     Anxiety disorder Mother     Alcohol abuse Father     Diabetes Father     Early death Father     Heart disease Father     Hyperlipidemia Father     Hypertension Father         Father    Vision loss Father     Heart attack Father     Heart disease Sister     Drug abuse Sister     Heart attack Sister     Hypertension Sister         Sister    Heart disease Sister     Heart disease Brother     Hypertension Brother     Drug abuse Brother     Hypertension Brother     Heart disease Brother     Heart attack Brother     Cancer Maternal Grandmother      "Malig Hyperthermia Neg Hx      Social History     Tobacco Use    Smoking status: Never     Passive exposure: Never    Smokeless tobacco: Never   Vaping Use    Vaping Use: Never used   Substance Use Topics    Alcohol use: Not Currently     Comment: VERY RARE    Drug use: No     Medications Prior to Admission   Medication Sig Dispense Refill Last Dose    Arnuity Ellipta 200 MCG/ACT INHALE 1 PUFF BY MOUTH INTO THE LUNGS DAILY.       aspirin 81 MG EC tablet Take 1 tablet by mouth Daily.       Cholecalciferol 50 MCG (2000 UT) tablet Take 1 tablet by mouth Daily. (Patient taking differently: Take 1 tablet by mouth Every Night.) 90 each 3     EPINEPHrine (EPIPEN) 0.3 MG/0.3ML solution auto-injector injection Inject 0.3 mL into the appropriate muscle as directed by prescriber As Needed.       FeroSul 325 (65 Fe) MG tablet Take 1 tablet by mouth 2 (Two) Times a Day.       gabapentin (NEURONTIN) 300 MG capsule Take 1 capsule by mouth 1 (One) Time.       Gemtesa 75 MG tablet Take 1 tablet by mouth Daily.       ipratropium-albuterol (DUO-NEB) 0.5-2.5 mg/3 ml nebulizer Take 3 mL by nebulization 4 (Four) Times a Day. 360 mL 3     itraconazole (SPORANOX) 100 MG capsule Take 2 capsules by mouth 2 (Two) Times a Day.       Rexulti 0.5 MG tablet Take 0.5 mg by mouth Every Night.       sertraline (ZOLOFT) 50 MG tablet Take 1 tablet by mouth Every Night.        Allergies:  Tylenol [acetaminophen]    Objective     Vital Signs  /75 (BP Location: Left arm, Patient Position: Lying)   Pulse 63   Temp 97.6 °F (36.4 °C) (Oral)   Resp 17   Ht 162.6 cm (64\")   Wt 105 kg (232 lb 3.2 oz)   SpO2 96%   BMI 39.86 kg/m²      Physical Exam  Vitals reviewed.   Constitutional:       Appearance: She is well-developed.   HENT:      Head: Normocephalic and atraumatic.   Eyes:      Pupils: Pupils are equal, round, and reactive to light.   Cardiovascular:      Rate and Rhythm: Normal rate and regular rhythm.   Pulmonary:      Effort: Pulmonary " effort is normal.      Breath sounds: Normal breath sounds.   Abdominal:      General: There is no distension.      Palpations: Abdomen is soft.      Tenderness: There is no abdominal tenderness.      Hernia: No hernia is present.   Musculoskeletal:         General: Normal range of motion.      Cervical back: Normal range of motion.   Lymphadenopathy:      Cervical: No cervical adenopathy.   Skin:     General: Skin is warm and dry.      Findings: No rash.      Comments: Remote from the prior lipoma excision site the patient has a pinpoint opening without any significant erythema or induration.   Neurological:      Mental Status: She is alert and oriented to person, place, and time.         Results Review:   Lab Results (last 24 hours)       Procedure Component Value Units Date/Time    Body Fluid Culture - Body Fluid, Thigh, Right [[id number]] Collected: 11/17/23 1043    Specimen: Body Fluid from Thigh, Right Updated: 11/17/23 1223     Gram Stain Few (2+) WBCs per low power field      No organisms seen    Basic Metabolic Panel [[id number]]  (Abnormal) Collected: 11/17/23 0409    Specimen: Blood Updated: 11/17/23 0508     Glucose 127 mg/dL      BUN 13 mg/dL      Creatinine 0.70 mg/dL      Sodium 144 mmol/L      Potassium 4.2 mmol/L      Chloride 107 mmol/L      CO2 29.0 mmol/L      Calcium 9.4 mg/dL      BUN/Creatinine Ratio 18.6     Anion Gap 8.0 mmol/L      eGFR 104.2 mL/min/1.73     Narrative:      GFR Normal >60  Chronic Kidney Disease <60  Kidney Failure <15      C-reactive Protein [[id number]]  (Abnormal) Collected: 11/17/23 0409    Specimen: Blood Updated: 11/17/23 0508     C-Reactive Protein 0.87 mg/dL     Protime-INR [[id number]]  (Abnormal) Collected: 11/17/23 0409    Specimen: Blood Updated: 11/17/23 0457     Protime 9.9 Seconds      INR <0.93    aPTT [[id number]]  (Normal) Collected: 11/17/23 0409    Specimen: Blood Updated: 11/17/23 0457     PTT 26.3 seconds     CBC Auto Differential [[id number]]  (Abnormal)  Collected: 11/17/23 0409    Specimen: Blood Updated: 11/17/23 0441     WBC 11.80 10*3/mm3      RBC 4.21 10*6/mm3      Hemoglobin 11.5 g/dL      Hematocrit 35.5 %      MCV 84.4 fL      MCH 27.2 pg      MCHC 32.3 g/dL      RDW 19.5 %      RDW-SD 56.9 fl      MPV 8.2 fL      Platelets 229 10*3/mm3      Neutrophil % 76.2 %      Lymphocyte % 17.6 %      Monocyte % 5.5 %      Eosinophil % 0.4 %      Basophil % 0.3 %      Neutrophils, Absolute 9.00 10*3/mm3      Lymphocytes, Absolute 2.10 10*3/mm3      Monocytes, Absolute 0.60 10*3/mm3      Eosinophils, Absolute 0.00 10*3/mm3      Basophils, Absolute 0.00 10*3/mm3      nRBC 0.0 /100 WBC     MRSA Screen, PCR (Inpatient) - Swab, Nares [[id number]]  (Normal) Collected: 11/16/23 2243    Specimen: Swab from Nares Updated: 11/17/23 0110     MRSA PCR No MRSA Detected    Narrative:      The negative predictive value of this diagnostic test is high and should only be used to consider de-escalating anti-MRSA therapy. A positive result may indicate colonization with MRSA and must be correlated clinically.    C-reactive Protein [[id number]]  (Abnormal) Collected: 11/16/23 1547    Specimen: Blood Updated: 11/16/23 2206     C-Reactive Protein 0.78 mg/dL     CK [[id number]]  (Normal) Collected: 11/16/23 1547    Specimen: Blood Updated: 11/16/23 2206     Creatine Kinase 100 U/L     POC Glucose Once [[id number]]  (Abnormal) Collected: 11/16/23 2033    Specimen: Blood Updated: 11/16/23 2034     Glucose 177 mg/dL      Comment: Serial Number: [id number]:  [id number]       Lactic Acid, Plasma [[id number]]  (Normal) Collected: 11/16/23 1753    Specimen: Blood Updated: 11/16/23 1815     Lactate 0.9 mmol/L     Blood Culture - Blood, Arm, Right [[id number]] Collected: 11/16/23 1753    Specimen: Blood from Arm, Right Updated: 11/16/23 1758    Blood Culture - Blood, Wrist, Left [[id number]] Collected: 11/16/23 1753    Specimen: Blood from Wrist, Left Updated: 11/16/23 1756    Basic Metabolic Panel  [[id number]]  (Abnormal) Collected: 11/16/23 1547    Specimen: Blood Updated: 11/16/23 1608     Glucose 112 mg/dL      BUN 15 mg/dL      Creatinine 0.63 mg/dL      Sodium 138 mmol/L      Potassium 3.8 mmol/L      Chloride 103 mmol/L      CO2 27.9 mmol/L      Calcium 9.3 mg/dL      BUN/Creatinine Ratio 23.8     Anion Gap 7.1 mmol/L      eGFR 106.9 mL/min/1.73     Narrative:      GFR Normal >60  Chronic Kidney Disease <60  Kidney Failure <15      CBC & Differential [[id number]]  (Abnormal) Collected: 11/16/23 1547    Specimen: Blood Updated: 11/16/23 1550    Narrative:      The following orders were created for panel order CBC & Differential.  Procedure                               Abnormality         Status                     ---------                               -----------         ------                     CBC Auto Differential[[id number]]        Abnormal            Final result                 Please view results for these tests on the individual orders.    CBC Auto Differential [[id number]]  (Abnormal) Collected: 11/16/23 1547    Specimen: Blood Updated: 11/16/23 1550     WBC 14.32 10*3/mm3      RBC 4.50 10*6/mm3      Hemoglobin 12.1 g/dL      Hematocrit 39.3 %      MCV 87.3 fL      MCH 26.9 pg      MCHC 30.8 g/dL      RDW 19.3 %      RDW-SD 59.6 fl      MPV 9.9 fL      Platelets 254 10*3/mm3      Neutrophil % 78.5 %      Lymphocyte % 14.5 %      Monocyte % 5.4 %      Eosinophil % 0.5 %      Basophil % 0.2 %      Immature Grans % 0.9 %      Neutrophils, Absolute 11.23 10*3/mm3      Lymphocytes, Absolute 2.08 10*3/mm3      Monocytes, Absolute 0.78 10*3/mm3      Eosinophils, Absolute 0.07 10*3/mm3      Basophils, Absolute 0.03 10*3/mm3      Immature Grans, Absolute 0.13 10*3/mm3     COVID-19 and FLU A/B PCR, 1 HR TAT - Swab, Nasopharynx [[id number]]  (Normal) Collected: 11/16/23 1447    Specimen: Swab from Nasopharynx Updated: 11/16/23 1510     COVID19 Not Detected     Influenza A PCR Not Detected     Influenza B  PCR Not Detected    Narrative:      Fact sheet for providers: https://www.fda.gov/media/405311/download    Fact sheet for patients: https://www.fda.gov/media/532486/download    Test performed by PCR.    Rapid Strep A Screen - Swab, Throat [[id number]]  (Normal) Collected: 11/16/23 1447    Specimen: Swab from Throat Updated: 11/16/23 1505     STREP A PCR Not Detected          IR Puncture asp/abs/hema/cyst    Result Date: 11/17/2023  1. Successful I&D of sebaceous cyst in the right thigh yielding 20 mL of sebum.  Recommend further evaluation and management with dermatology. Electronically Signed: Dioni Noguera MD  11/17/2023 1:25 PM EST  Workstation ID: [id number]    XR Chest 1 View    Result Date: 11/16/2023  Impression: Stable cardiomegaly with a dual-chambered pacemaker in place. No active cardiopulmonary disease. Electronically Signed: Sunil Hyman DO  11/16/2023 9:23 PM EST  Workstation ID: [id number]    CT Lower Extremity Right With Contrast    Result Date: 11/16/2023  Impression: 1.There is a bilobed or 2 adjacent fluid circumscribed masses in the subcutaneous fat of the in the posterolateral aspect of the mid to distal thigh. It measures approximately 3.2 x 2.2 x 4.3 cm. It has Hounsfield units of 16. This could be a complex fluid collection or could represent a hematoma or other complex fluid collection. There is no soft tissue air. 2.There is moderate hip joint space narrowing and moderate patellofemoral joint space narrowing of the lateral patellar femoral joint. No joint effusion. 3.No acute osseous abnormality. Electronically Signed: Sunil Hyman DO  11/16/2023 5:01 PM EST  Workstation ID: [id number]       I reviewed the patient's new imaging results and agree with the interpretation.  I reviewed the patient's other test results and agree with the interpretation    Assessment & Plan     Active Problems:    Bipolar 1 disorder, depressed, moderate    Histoplasmosis  Sebaceous cyst    Patient is now status post  incision and drainage of sebaceous cyst from interventional radiology.  Appreciate assistance.  Continue antibiotics per primary.  Follow-up cultures.  Recommend warm compresses to the area.  May follow-up with me for excision of the sac of the sebaceous cyst as an outpatient.  With regards to the pain on the anterior aspect of the patient's thigh, this is unrelated to her incidental findings of a sebaceous cyst.  We discussed that the location of this sebaceous cyst is not in a location where it would cause impingement of nerves of the right lower extremity and she should undergo further work-up to delineate the source of her right anterior thigh discomfort    I discussed the patients findings and my recommendations with the patient and her .     Justo Shannon MD  11/17/23  14:37 EST

## 2023-11-17 NOTE — OUTREACH NOTE
Prep Survey      Flowsheet Row Responses   [facility] patient discharged from? [facility]   Is LACE score < 7 ? No   Eligibility [facility]   Date of Admission 11/16/23   Date of Discharge 11/17/23   Discharge diagnosis Right thigh mass   Does the patient have one of the following disease processes/diagnoses(primary or secondary)? Other   Does the patient have Home health ordered? No   Is there a DME ordered? No   Prep survey completed? Yes            ADITYA VOGT - Registered Nurse

## 2023-11-17 NOTE — NURSING NOTE
Orthopedic Surgery Bryant Batista consult called. Bryant Batista said he will be visit patient this afternoon or evening. He is going to order MRI for patient.

## 2023-11-17 NOTE — SIGNIFICANT NOTE
Dr. Lopez had reached out to me as she sees her own patients here [facility] and will see the patient in the morning and follow her care.  Hospitalist team will sign off at this time.    Electronically signed by EVETTE Martinez, 11/16/23, 10:09 PM EST.

## 2023-11-17 NOTE — DISCHARGE SUMMARY
Discharge Summary      Name: Tessie Conner ADMIT: 2023   : 1970  PROVIDER: Marsha Lopez MD    MRN: [id number] LOS: 0 days   AGE/SEX: 53 y.o. female  ROOM: [facility] hospital/     Date of Service: 2023                        Date of Discharge:       2023    Discharge Diagnosis:      Right thigh mass    Active Problems:    Bipolar 1 disorder, depressed, moderate    Type 2 diabetes mellitus without complication, without long-term current use of insulin    Morbid obesity    Histoplasmosis    Sick sinus syndrome      Overview: Added automatically from request for surgery [id number]      Presenting Problem/History of Present Illness     Active Hospital Problems    Diagnosis  POA    Sick sinus syndrome [I49.5]  Yes    Histoplasmosis [B39.9]  Yes    Morbid obesity [E66.01]  Yes    Type 2 diabetes mellitus without complication, without long-term current use of insulin [E11.9]  Yes    Bipolar 1 disorder, depressed, moderate [F31.32]  Yes      Resolved Hospital Problems    Diagnosis Date Resolved POA    **Mass of right thigh [R22.41] 2023 Yes       Hospital Course     Patient is a 53 y.o. female presented with severe pain right lateral anterior thigh. States pain on and off for 1 month, but severe that day. Describes as burning deep pain. Denies fever. Was seen at [facility] and had CT thigh showing cystic multilobe mass and was admitted for possible abscess. Was transferred to [facility] for observation admission. Originally was consulted to ortho and hospitalist. Her CRP was 0.8, Lactic 0.9 WBC 11-14. Blood cultures obtained.  Started on vancomycin and rocephin. MRI ordered but pt has pacemaker so patient underwent ID with US guided and was non infected sebaceous cyst and able to relieve pressure onto the nerve with expression.  Patient did well with procedure, fluid sent for culture and will be sent home on 5 days of keflex to assure no secondary infection.  Recommend follow up with general  surgery as outpatient if not totally resolved.    Her home meds were not changed. She has tramadol at home to use for pain relief if needed.  Will watch sugar closely      Procedures Performed:     US guided ID right thigh mass            Consults:      Consults       Date and Time Order Name Status Description    11/17/2023  8:01 AM Inpatient General Surgery Consult      11/16/2023  9:21 PM Inpatient Family Practice Consult      11/16/2023  5:09 PM Ortho (on-call MD unless specified)      11/16/2023  5:09 PM Hospitalist (on-call MD unless specified)                Pertinent Test Results:          Lab Results (last 48 hours)       Procedure Component Value Units Date/Time    Body Fluid Culture - Body Fluid, Thigh, Right [[id number]] Collected: 11/17/23 1043    Specimen: Body Fluid from Thigh, Right Updated: 11/17/23 1223     Gram Stain Few (2+) WBCs per low power field      No organisms seen    Basic Metabolic Panel [[id number]]  (Abnormal) Collected: 11/17/23 0409    Specimen: Blood Updated: 11/17/23 0508     Glucose 127 mg/dL      BUN 13 mg/dL      Creatinine 0.70 mg/dL      Sodium 144 mmol/L      Potassium 4.2 mmol/L      Chloride 107 mmol/L      CO2 29.0 mmol/L      Calcium 9.4 mg/dL      BUN/Creatinine Ratio 18.6     Anion Gap 8.0 mmol/L      eGFR 104.2 mL/min/1.73     Narrative:      GFR Normal >60  Chronic Kidney Disease <60  Kidney Failure <15      C-reactive Protein [[id number]]  (Abnormal) Collected: 11/17/23 0409    Specimen: Blood Updated: 11/17/23 0508     C-Reactive Protein 0.87 mg/dL     Protime-INR [[id number]]  (Abnormal) Collected: 11/17/23 0409    Specimen: Blood Updated: 11/17/23 0457     Protime 9.9 Seconds      INR <0.93    aPTT [[id number]]  (Normal) Collected: 11/17/23 0409    Specimen: Blood Updated: 11/17/23 0457     PTT 26.3 seconds     CBC Auto Differential [[id number]]  (Abnormal) Collected: 11/17/23 0409    Specimen: Blood Updated: 11/17/23 0441     WBC 11.80 10*3/mm3      RBC 4.21 10*6/mm3       Hemoglobin 11.5 g/dL      Hematocrit 35.5 %      MCV 84.4 fL      MCH 27.2 pg      MCHC 32.3 g/dL      RDW 19.5 %      RDW-SD 56.9 fl      MPV 8.2 fL      Platelets 229 10*3/mm3      Neutrophil % 76.2 %      Lymphocyte % 17.6 %      Monocyte % 5.5 %      Eosinophil % 0.4 %      Basophil % 0.3 %      Neutrophils, Absolute 9.00 10*3/mm3      Lymphocytes, Absolute 2.10 10*3/mm3      Monocytes, Absolute 0.60 10*3/mm3      Eosinophils, Absolute 0.00 10*3/mm3      Basophils, Absolute 0.00 10*3/mm3      nRBC 0.0 /100 WBC     MRSA Screen, PCR (Inpatient) - Swab, Nares [[id number]]  (Normal) Collected: 11/16/23 2243    Specimen: Swab from Nares Updated: 11/17/23 0110     MRSA PCR No MRSA Detected    Narrative:      The negative predictive value of this diagnostic test is high and should only be used to consider de-escalating anti-MRSA therapy. A positive result may indicate colonization with MRSA and must be correlated clinically.    C-reactive Protein [[id number]]  (Abnormal) Collected: 11/16/23 1547    Specimen: Blood Updated: 11/16/23 2206     C-Reactive Protein 0.78 mg/dL     CK [[id number]]  (Normal) Collected: 11/16/23 1547    Specimen: Blood Updated: 11/16/23 2206     Creatine Kinase 100 U/L     POC Glucose Once [[id number]]  (Abnormal) Collected: 11/16/23 2033    Specimen: Blood Updated: 11/16/23 2034     Glucose 177 mg/dL      Comment: Serial Number: [id number]:  [id number]       Lactic Acid, Plasma [[id number]]  (Normal) Collected: 11/16/23 1753    Specimen: Blood Updated: 11/16/23 1815     Lactate 0.9 mmol/L     Blood Culture - Blood, Arm, Right [[id number]] Collected: 11/16/23 1753    Specimen: Blood from Arm, Right Updated: 11/16/23 1758    Blood Culture - Blood, Wrist, Left [[id number]] Collected: 11/16/23 1753    Specimen: Blood from Wrist, Left Updated: 11/16/23 1756    Basic Metabolic Panel [[id number]]  (Abnormal) Collected: 11/16/23 1547    Specimen: Blood Updated: 11/16/23 1608     Glucose 112 mg/dL       BUN 15 mg/dL      Creatinine 0.63 mg/dL      Sodium 138 mmol/L      Potassium 3.8 mmol/L      Chloride 103 mmol/L      CO2 27.9 mmol/L      Calcium 9.3 mg/dL      BUN/Creatinine Ratio 23.8     Anion Gap 7.1 mmol/L      eGFR 106.9 mL/min/1.73     Narrative:      GFR Normal >60  Chronic Kidney Disease <60  Kidney Failure <15      CBC & Differential [[id number]]  (Abnormal) Collected: 11/16/23 1547    Specimen: Blood Updated: 11/16/23 1550    Narrative:      The following orders were created for panel order CBC & Differential.  Procedure                               Abnormality         Status                     ---------                               -----------         ------                     CBC Auto Differential[[id number]]        Abnormal            Final result                 Please view results for these tests on the individual orders.    CBC Auto Differential [[id number]]  (Abnormal) Collected: 11/16/23 1547    Specimen: Blood Updated: 11/16/23 1550     WBC 14.32 10*3/mm3      RBC 4.50 10*6/mm3      Hemoglobin 12.1 g/dL      Hematocrit 39.3 %      MCV 87.3 fL      MCH 26.9 pg      MCHC 30.8 g/dL      RDW 19.3 %      RDW-SD 59.6 fl      MPV 9.9 fL      Platelets 254 10*3/mm3      Neutrophil % 78.5 %      Lymphocyte % 14.5 %      Monocyte % 5.4 %      Eosinophil % 0.5 %      Basophil % 0.2 %      Immature Grans % 0.9 %      Neutrophils, Absolute 11.23 10*3/mm3      Lymphocytes, Absolute 2.08 10*3/mm3      Monocytes, Absolute 0.78 10*3/mm3      Eosinophils, Absolute 0.07 10*3/mm3      Basophils, Absolute 0.03 10*3/mm3      Immature Grans, Absolute 0.13 10*3/mm3     COVID-19 and FLU A/B PCR, 1 HR TAT - Swab, Nasopharynx [[id number]]  (Normal) Collected: 11/16/23 1447    Specimen: Swab from Nasopharynx Updated: 11/16/23 1510     COVID19 Not Detected     Influenza A PCR Not Detected     Influenza B PCR Not Detected    Narrative:      Fact sheet for providers: https://www.fda.gov/media/443307/download    Fact sheet  for patients: https://www.fda.gov/media/282696/download    Test performed by PCR.    Rapid Strep A Screen - Swab, Throat [[id number]]  (Normal) Collected: 11/16/23 1447    Specimen: Swab from Throat Updated: 11/16/23 1505     STREP A PCR Not Detected          Imaging Results (Last 7 Days)       Procedure Component Value Units Date/Time    IR Puncture asp/abs/larissa/cyst [[id number]] Collected: 11/17/23 1323     Updated: 11/17/23 1327    Narrative:      DATE OF EXAM:  11/17/2023 10:24 AM EST    PROCEDURE:  IR PUNCTURE ASP/ABS/HEMA/CYST    INDICATIONS:  right thigh fluid collection    COMPARISON:  No Comparisons Available    FLUOROSCOPIC TIME:  None    PHYSICIAN MONITORED CONSCIOUS SEDATION TIME:  None minutes    TECHNIQUE:   A detailed explanation of the procedure, including the risks, benefits, and alternatives was provided. A preprocedure timeout was performed. The right posterior thigh was ultrasounded, demonstrating a complex fluid collection. The area was prepped and   draped in the usual sterile fashion. The skin was anesthetized with 1% lidocaine. Next under ultrasound guidance an 18-gauge needle was advanced into the lesion. A total of 1 mL of CT bone was aspirated. However no more fluid could be aspirated.    Next a skin incision was made into the collection. A total of approximately 20 mL of sebum was then expressed from the sebaceous cyst. A sterile bandage was placed. The patient tolerated procedure well without immediate complication.    FINDINGS:  See above      Impression:        1. Successful I&D of sebaceous cyst in the right thigh yielding 20 mL of sebum.  Recommend further evaluation and management with dermatology.      Electronically Signed: Dioni Noguera MD    11/17/2023 1:25 PM EST    Workstation ID: [id number]    XR Chest 1 View [[id number]] Collected: 11/16/23 2121     Updated: 11/16/23 2125    Narrative:      XR CHEST 1 VW    Date of Exam: 11/16/2023 9:00 PM EST    Indication: preop    Comparison:  Chest CT dated 11/4/2023, single view chest dated 9/23/2023    Findings:  Suture line from partial right upper lobe resection is again noted. There is stable cardiomegaly with a dual chambered pacemaker in place. The left costophrenic angle is obscured from overlying breast shadow. A small left effusion cannot be excluded   remainder of the lungs are clear. No right pleural effusion. No pneumothorax. The trachea is midline. Pulmonary vascularity is within normal limits. Visualized bony structures are intact.      Impression:      Impression:  Stable cardiomegaly with a dual-chambered pacemaker in place. No active cardiopulmonary disease.    Electronically Signed: Sunil Hyman DO    11/16/2023 9:23 PM EST    Workstation ID: [id number]    CT Lower Extremity Right With Contrast [[id number]] Collected: 11/16/23 1649     Updated: 11/16/23 1703    Narrative:      CT LOWER EXTREMITY RIGHT W CONTRAST    Date of Exam: 11/16/2023 4:32 PM EST    Indication: exquisite pain to right thigh for several weeks; questionable myositis.    Comparison: CT abdomen pelvis dated 11/16/2022    Technique: Axial CT images were obtained of the right lower extremity after the uneventful intravenous administration of iodinated contrast.  Sagittal and coronal reconstructions were performed.  Automated exposure control and iterative reconstruction   methods were used.      Findings:  The right iliac wing, right hip and femur and right hemipelvis are intact. No fractures or aggressive focal osseous lesions. There is moderate hip joint space narrowing. No significant bony hypertrophy. Intramuscular fat planes are preserved. A hip joint   effusion is not seen. Within the posterolateral aspect of the subcutaneous fat in the mid to distal thigh there is a bilobed appearing circumscribed mass which may be complex fluid collection, it has Hounsfield units of 16. It is bilobed or possibly 2   adjacent cysts morphologically similar collections. It measures  approximately 3.2 x 2.2 x 4.3 cm. This is superficial. The intramuscular fat planes and soft tissues in the remainder of the thigh are unremarkable. No other fluid collections or focal   opacities. No soft tissue air. There is a moderate patellofemoral joint space narrowing of the lateral patellar femoral joint. No joint effusion..      Impression:      Impression:  1.There is a bilobed or 2 adjacent fluid circumscribed masses in the subcutaneous fat of the in the posterolateral aspect of the mid to distal thigh. It measures approximately 3.2 x 2.2 x 4.3 cm. It has Hounsfield units of 16. This could be a complex   fluid collection or could represent a hematoma or other complex fluid collection. There is no soft tissue air.  2.There is moderate hip joint space narrowing and moderate patellofemoral joint space narrowing of the lateral patellar femoral joint. No joint effusion.  3.No acute osseous abnormality.        Electronically Signed: Sunil Hyman DO    11/16/2023 5:01 PM EST    Workstation ID: [id number]          SCANNED - TELEMETRY     Final Result      SCANNED - TELEMETRY     Final Result      SCANNED - TELEMETRY     Final Result      ECG 12 Lead Pre-Op / Pre-Procedure    (Results Pending)       Condition on Discharge:     stable  Vital Signs     Temp:  [97.3 °F (36.3 °C)-98 °F (36.7 °C)] 97.6 °F (36.4 °C)  Heart Rate:  [60-65] 63  Resp:  [10-18] 17  BP: (112-162)/(45-81) 127/75    Physical Exam:     HEENT: mild PND  Lungs: CTA  Heart: RRR  Abd: Soft, NT/ND, Bowel sounds positive  Ext: tender lateral mid thigh, no redness or warmth  Neuro: CN grossly intact, no focal deficits      Discharge Disposition     Home or Self Care    Discharge Medications        Discharge Medications        New Medications        Instructions Start Date   cephalexin 500 MG capsule  Commonly known as: Keflex   500 mg, Oral, 3 Times Daily             Changes to Medications        Instructions Start Date   Cholecalciferol 50 MCG (2000  UT) tablet  What changed: when to take this   50 mcg, Oral, Daily      ipratropium-albuterol 0.5-2.5 mg/3 ml nebulizer  Commonly known as: DUO-NEB  What changed: additional instructions   3 mL, Nebulization, 4 Times Daily - RT             Continue These Medications        Instructions Start Date   Arnuity Ellipta 200 MCG/ACT  Generic drug: Fluticasone Furoate   INHALE 1 PUFF BY MOUTH INTO THE LUNGS DAILY.      FeroSul 325 (65 Fe) MG tablet  Generic drug: ferrous sulfate   325 mg, Oral, 2 Times Daily      itraconazole 100 MG capsule  Commonly known as: SPORANOX   200 mg, Oral, 2 Times Daily      sertraline 50 MG tablet  Commonly known as: ZOLOFT   50 mg, Oral, Nightly             Stop These Medications      gabapentin 300 MG capsule  Commonly known as: NEURONTIN            ASK your doctor about these medications        Instructions Start Date   aspirin 81 MG EC tablet   81 mg, Oral, Daily      EPINEPHrine 0.3 MG/0.3ML solution auto-injector injection  Commonly known as: EPIPEN   0.3 mg, Intramuscular, As Needed      Gemtesa 75 MG tablet  Generic drug: Vibegron   75 mg, Oral, Daily      Rexulti 0.5 MG tablet  Generic drug: Brexpiprazole   1 tablet, Oral, Nightly               Discharge Diet:      Diabetic diet    Activity at Discharge:      As tolerated    Follow-up Appointments     Dr. Lopez in 2 weeks  Dr. Shannon as needed    Test Results Pending at Discharge     Pending Labs       Order Current Status    Blood Culture - Blood, Arm, Right In process    Blood Culture - Blood, Wrist, Left In process    Body Fluid Culture - Body Fluid, Thigh, Right Preliminary result              Time Spent:      35 minutes        Marsha Lopez MD  [facility]  11/17/2023  13:58 EST

## 2023-11-17 NOTE — PLAN OF CARE
Goal Outcome Evaluation:  Plan of Care Reviewed With: patient        Problem: Adult Inpatient Plan of Care  Goal: Plan of Care Review  Outcome: Ongoing, Progressing  Flowsheets (Taken 11/17/2023 5820)  Progress: no change  Plan of Care Reviewed With: patient     Progress: no change

## 2023-11-17 NOTE — PROGRESS NOTES
"Pharmacy Antimicrobial Dosing Service    Subjective:  Tessie Conner is a 53 y.o.female admitted with right thigh pain. Pharmacy has been consulted to dose Vancomycin for possible skin and soft tissue infection.      Assessment/Plan    1. Day #1 Vancomycin: Goal -600 mcg*h/mL. Vancomycin 1750mg (24.2mg/kg AdjBW) loading dose followed by 1250mg (17.3mg/kg AdjBW) q12h maintenance dose. Will plan to collect levels after the 3rd and before the 4th total doses    2. Day #1 Ceftriaxone: 2g IV q24h    Will continue to monitor drug levels, renal function, culture and sensitivities, and patient clinical status.       Objective:  Relevant clinical data and objective history reviewed:  162.6 cm (64\")   99.8 kg (220 lb)   Ideal body weight: 54.7 kg (120 lb 9.5 oz)  Adjusted ideal body weight: 72.7 kg (160 lb 5.7 oz)  Body mass index is 37.76 kg/m².        Results from last 7 days   Lab Units 11/16/23  1547   CREATININE mg/dL 0.63     Estimated Creatinine Clearance: 119.9 mL/min (by C-G formula based on SCr of 0.63 mg/dL).  No intake/output data recorded.    Results from last 7 days   Lab Units 11/16/23  1547   WBC 10*3/mm3 14.32*     Temperature    11/16/23 1444 11/16/23 1911 11/16/23 2036   Temp: 97.3 °F (36.3 °C) 98 °F (36.7 °C) 97.7 °F (36.5 °C)     Baseline culture/source/susceptibility:  Microbiology Results (last 10 days)       Procedure Component Value - Date/Time    Rapid Strep A Screen - Swab, Throat [[id number]]  (Normal) Collected: 11/16/23 1447    Lab Status: Final result Specimen: Swab from Throat Updated: 11/16/23 1505     STREP A PCR Not Detected    COVID-19 and FLU A/B PCR, 1 HR TAT - Swab, Nasopharynx [[id number]]  (Normal) Collected: 11/16/23 1447    Lab Status: Final result Specimen: Swab from Nasopharynx Updated: 11/16/23 1510     COVID19 Not Detected     Influenza A PCR Not Detected     Influenza B PCR Not Detected    Narrative:      Fact sheet for providers: " https://www.fda.gov/media/133339/download    Fact sheet for patients: https://www.fda.gov/media/976063/download    Test performed by PCR.            Bjorn Mitchell RPH  11/16/23 21:17 EST

## 2023-11-17 NOTE — H&P
[facility] Medicine Services  History & Physical    Patient Name: Tsesie Conner  : 1970  MRN: [id number]  Primary Care Physician:  Marsha Lopez MD  Date of admission: 2023  Date and Time of Service: 2023 at 06:09 EST      Subjective      Chief Complaint: right thigh pain    History of Present Illness: Tessie Conner is a 53 y.o. female with a CMH of anxiety who presented to [facility] on 2023 with right thigh pain.  Patient states that she had what she was told this is cyst in her right lateral thigh area and was surgically removed in the remote past.  Patient states that she started to have burning sensation pain on the anterior lateral aspect of her thigh that was not in the same area as the cyst as mentioned previously.  Patient denies any fever, nausea or vomiting.  Patient denies any rashes or bruising or recent trauma.    In the ED, BMP largely unremarkable for acute findings.  Initial lactic normal.  White blood cell count of 14.3 otherwise unremarkable CBC.  Blood cultures x2 pending.  Rapid strep negative, COVID swab negative.  CT lower extremity right with contrast shows there is a bilobed or 2 adjacent fluid circumscribed masses in the subcutaneous fat of the posterior lateral aspect of the mid to distal thigh.  It measures approximately 3.2 x 2.2 x 4.3 cm.  Has a Hounsfield units of 16.  This could be a complex fluid collection or represent a hematoma or other complex fluid collection.  There is no soft tissue air.  No acute osseous abnormality.  Patient is afebrile, heart rate 60, blood pressure 160s over 80s and on room air oxygen 94% SPO2.  Patient given Zosyn in ED.  Patient to be transferred from freestanding ED to [facility] for admission.      Review of Systems   Constitutional:  Negative for chills, fatigue and fever.   HENT:  Negative for congestion, sore throat, tinnitus and trouble swallowing.    Eyes:  Negative for  photophobia, discharge and visual disturbance.   Respiratory:  Negative for cough and shortness of breath.    Cardiovascular:  Negative for chest pain and leg swelling.   Gastrointestinal:  Negative for abdominal pain, diarrhea, nausea and vomiting.   Genitourinary:  Negative for dysuria, flank pain and urgency.   Musculoskeletal:  Negative for arthralgias and myalgias.        Right thigh pain   Skin:  Negative for rash.   Neurological:  Negative for dizziness and headaches.   Psychiatric/Behavioral:  Negative for confusion.        Personal History     Past Medical History:   Diagnosis Date    Abnormal ECG     Anemia     Anesthesia complication 2003    cardiac arrest  with hysterectomy    Anxiety 2005    Arthritis     Asthma 04/17/2020    allergy    Bipolar 1 disorder     Bradycardia     Chest pain     due to scarring from lung surgery  2/21    Chest pain 09/24/2020    Cholelithiasis 10/2022    Coronary artery disease     very minimal    Depression 2005    Diabetes mellitus 2002    borderline   resolved    Dyspnea on minimal exertion     Frequent UTI     Gastroesophageal reflux disease without esophagitis 07/15/2020    Heart murmur     FROM CHILDHOOD    Histoplasmosis     History of anemia     POST PREGNANCY    Hyperlipidemia     Hyperlipidemia 10/04/2016    Hypotension     Mass of upper lobe of right lung     Migraines     hx    Obesity 1999    PONV (postoperative nausea and vomiting)     Sleep apnea 2/28/22    no machine    Spinal headache     Vertigo     Visual impairment 2011    WEARS GLASSES    Vitamin D deficiency        Past Surgical History:   Procedure Laterality Date    APPENDECTOMY  2011    CARDIAC CATHETERIZATION N/A 09/24/2020    Procedure: Left Heart Cath possible PCI, atherectomy, hemodynamic support;  Surgeon: Thomas Zamora MD;  Location: [facility] CATH INVASIVE LOCATION;  Service: Cardiology;  Laterality: N/A;    CARDIAC CATHETERIZATION  9/24/2020    CARDIAC ELECTROPHYSIOLOGY PROCEDURE N/A  2023    Procedure: Pacemaker DC new, [place] aware;  Surgeon: Rachel Espinoza MD;  Location: [facility] CATH INVASIVE LOCATION;  Service: Cardiovascular;  Laterality: N/A;     SECTION      FOOT/TOE TENDON REPAIR Left     HYSTERECTOMY      LUNG BIOPSY Right 2020    LUNG LOBECTOMY Right 2022    pt had partial Right lobectomy and nodule removal    MASS EXCISION Right 2023    Procedure: LIPOMA EXCISION,THIGH;  Surgeon: Justo Shannon MD;  Location: [facility] MAIN OR;  Service: General;  Laterality: Right;    ROTATOR CUFF REPAIR Left     THORACOSCOPY VIDEO ASSISTED WITH LOBECTOMY Right 2022    Procedure: BRONCHOSCOPY, THORACOSCOPY VIDEO ASSISTED wedge resection X2, INTERCOSTAL NERVE BLOCK;  Surgeon: Ayla Carroll MD;  Location: [facility] MAIN OR;  Service: Thoracic;  Laterality: Right;    TUBAL ABDOMINAL LIGATION         Family History: family history includes Alcohol abuse in her father; Anxiety disorder in her mother; Arthritis in her mother; Cancer in her maternal grandmother and mother; Depression in her mother; Diabetes in her father and mother; Drug abuse in her brother and sister; Early death in her father and mother; Heart attack in her brother, father, and sister; Heart disease in her brother, brother, father, sister, and sister; Hyperlipidemia in her father; Hypertension in her brother, brother, father, and sister; Mental illness in her mother; Vision loss in her father. Otherwise pertinent FHx was reviewed and not pertinent to current issue.    Social History:  reports that she has never smoked. She has never been exposed to tobacco smoke. She has never used smokeless tobacco. She reports that she does not currently use alcohol. She reports that she does not use drugs.    Home Medications:  Prior to Admission Medications       Prescriptions Last Dose Informant Patient Reported? Taking?    Arnuity Ellipta 200 MCG/ACT   Yes No    INHALE 1 PUFF BY MOUTH INTO THE LUNGS  DAILY.    Cholecalciferol 50 MCG (2000 UT) tablet   No No    Take 1 tablet by mouth Daily.    Patient taking differently:  Take 1 tablet by mouth Every Night.    EPINEPHrine (EPIPEN) 0.3 MG/0.3ML solution auto-injector injection   Yes No    Inject 0.3 mL into the appropriate muscle as directed by prescriber As Needed.    FeroSul 325 (65 Fe) MG tablet   Yes No    Take  by mouth.    gabapentin (NEURONTIN) 300 MG capsule   Yes No    1 capsule 1 (One) Time.    Gemtesa 75 MG tablet   Yes No    Take 1 tablet by mouth Daily.    ipratropium-albuterol (DUO-NEB) 0.5-2.5 mg/3 ml nebulizer   No No    Take 3 mL by nebulization 4 (Four) Times a Day.    IRON, FERROUS GLUCONATE, PO   Yes No    Take 36 mg by mouth Every Other Day. Gummies  none preop    itraconazole (SPORANOX) 100 MG capsule   Yes No    Take 2 capsules by mouth 2 (Two) Times a Day.    meclizine (ANTIVERT) 12.5 MG tablet   Yes No    Take 1 tablet by mouth 3 (Three) Times a Day As Needed for Dizziness. Take preop    midodrine (PROAMATINE) 2.5 MG tablet   No No    Take 2 tablets by mouth 2 (Two) Times a Day. Take preop    ondansetron (ZOFRAN) 4 MG tablet   No No    Take 1 tablet by mouth Every 6 (Six) Hours As Needed for Nausea or Vomiting.    Rexulti 0.5 MG tablet   Yes No    Take 0.5 mg by mouth Every Night.    sertraline (ZOLOFT) 50 MG tablet   Yes No    Take 1 tablet by mouth Every Night.    Spiriva Respimat 1.25 MCG/ACT aerosol solution inhaler   Yes No    Inhale 2 puffs Daily As Needed. Use preop if needed bring              Allergies:  Allergies   Allergen Reactions    Tylenol [Acetaminophen] Hives and Itching       Objective      Vitals:   Temp:  [97.3 °F (36.3 °C)-98 °F (36.7 °C)] 97.5 °F (36.4 °C)  Heart Rate:  [60-65] 60  Resp:  [10-18] 15  BP: (112-162)/(45-81) 123/55  Body mass index is 39.86 kg/m².  Physical Exam  Vitals and nursing note reviewed.   Constitutional:       General: She is not in acute distress.     Appearance: Normal appearance. She is  well-developed. She is not diaphoretic.   HENT:      Head: Normocephalic and atraumatic.      Right Ear: External ear normal.      Left Ear: External ear normal.      Nose: Nose normal.      Mouth/Throat:      Mouth: Mucous membranes are moist.   Eyes:      Extraocular Movements: Extraocular movements intact.      Conjunctiva/sclera: Conjunctivae normal.      Pupils: Pupils are equal, round, and reactive to light.   Cardiovascular:      Rate and Rhythm: Normal rate and regular rhythm.      Pulses: Normal pulses.      Heart sounds: Normal heart sounds.      Comments: S1, S2 audible.  Pulmonary:      Effort: Pulmonary effort is normal. No respiratory distress.      Breath sounds: Normal breath sounds. No wheezing, rhonchi or rales.      Comments: On room air.  Abdominal:      General: Bowel sounds are normal. There is no distension.      Palpations: Abdomen is soft.      Tenderness: There is no abdominal tenderness. There is no guarding or rebound.   Musculoskeletal:         General: Tenderness (right thigh pain) present. No swelling, deformity or signs of injury. Normal range of motion.      Cervical back: Normal range of motion.      Comments: Patient has no erythema or open wounds of right anterior lateral thigh.  Patient does have a palpable lump on her distal right lateral thigh however patient states she has had that since she was a kid and no recent change in size and is not the location of her pain.  No palpable mass of thigh as described by CT scan above.  Patient has great range of motion of right knee and hip appropriately.  Exam somewhat limited due to body habitus   Skin:     General: Skin is warm.      Capillary Refill: Capillary refill takes less than 2 seconds.      Findings: No erythema or rash.   Neurological:      General: No focal deficit present.      Mental Status: She is alert and oriented to person, place, and time.      Cranial Nerves: No cranial nerve deficit.   Psychiatric:         Mood and  Affect: Mood normal.         Behavior: Behavior normal.         Diagnostic Data:  Lab Results (last 24 hours)       Procedure Component Value Units Date/Time    Basic Metabolic Panel [[id number]]  (Abnormal) Collected: 11/17/23 0409    Specimen: Blood Updated: 11/17/23 0508     Glucose 127 mg/dL      BUN 13 mg/dL      Creatinine 0.70 mg/dL      Sodium 144 mmol/L      Potassium 4.2 mmol/L      Chloride 107 mmol/L      CO2 29.0 mmol/L      Calcium 9.4 mg/dL      BUN/Creatinine Ratio 18.6     Anion Gap 8.0 mmol/L      eGFR 104.2 mL/min/1.73     Narrative:      GFR Normal >60  Chronic Kidney Disease <60  Kidney Failure <15      C-reactive Protein [[id number]]  (Abnormal) Collected: 11/17/23 0409    Specimen: Blood Updated: 11/17/23 0508     C-Reactive Protein 0.87 mg/dL     Protime-INR [[id number]]  (Abnormal) Collected: 11/17/23 0409    Specimen: Blood Updated: 11/17/23 0457     Protime 9.9 Seconds      INR <0.93    aPTT [[id number]]  (Normal) Collected: 11/17/23 0409    Specimen: Blood Updated: 11/17/23 0457     PTT 26.3 seconds     CBC Auto Differential [[id number]]  (Abnormal) Collected: 11/17/23 0409    Specimen: Blood Updated: 11/17/23 0441     WBC 11.80 10*3/mm3      RBC 4.21 10*6/mm3      Hemoglobin 11.5 g/dL      Hematocrit 35.5 %      MCV 84.4 fL      MCH 27.2 pg      MCHC 32.3 g/dL      RDW 19.5 %      RDW-SD 56.9 fl      MPV 8.2 fL      Platelets 229 10*3/mm3      Neutrophil % 76.2 %      Lymphocyte % 17.6 %      Monocyte % 5.5 %      Eosinophil % 0.4 %      Basophil % 0.3 %      Neutrophils, Absolute 9.00 10*3/mm3      Lymphocytes, Absolute 2.10 10*3/mm3      Monocytes, Absolute 0.60 10*3/mm3      Eosinophils, Absolute 0.00 10*3/mm3      Basophils, Absolute 0.00 10*3/mm3      nRBC 0.0 /100 WBC     MRSA Screen, PCR (Inpatient) - Swab, Nares [[id number]]  (Normal) Collected: 11/16/23 2243    Specimen: Swab from Nares Updated: 11/17/23 0110     MRSA PCR No MRSA Detected    Narrative:      The negative predictive  value of this diagnostic test is high and should only be used to consider de-escalating anti-MRSA therapy. A positive result may indicate colonization with MRSA and must be correlated clinically.    C-reactive Protein [[id number]]  (Abnormal) Collected: 11/16/23 1547    Specimen: Blood Updated: 11/16/23 2206     C-Reactive Protein 0.78 mg/dL     CK [[id number]]  (Normal) Collected: 11/16/23 1547    Specimen: Blood Updated: 11/16/23 2206     Creatine Kinase 100 U/L     POC Glucose Once [[id number]]  (Abnormal) Collected: 11/16/23 2033    Specimen: Blood Updated: 11/16/23 2034     Glucose 177 mg/dL      Comment: Serial Number: [id number]:  [id number]       Lactic Acid, Plasma [[id number]]  (Normal) Collected: 11/16/23 1753    Specimen: Blood Updated: 11/16/23 1815     Lactate 0.9 mmol/L     Blood Culture - Blood, Arm, Right [[id number]] Collected: 11/16/23 1753    Specimen: Blood from Arm, Right Updated: 11/16/23 1758    Blood Culture - Blood, Wrist, Left [[id number]] Collected: 11/16/23 1753    Specimen: Blood from Wrist, Left Updated: 11/16/23 1756    Basic Metabolic Panel [[id number]]  (Abnormal) Collected: 11/16/23 1547    Specimen: Blood Updated: 11/16/23 1608     Glucose 112 mg/dL      BUN 15 mg/dL      Creatinine 0.63 mg/dL      Sodium 138 mmol/L      Potassium 3.8 mmol/L      Chloride 103 mmol/L      CO2 27.9 mmol/L      Calcium 9.3 mg/dL      BUN/Creatinine Ratio 23.8     Anion Gap 7.1 mmol/L      eGFR 106.9 mL/min/1.73     Narrative:      GFR Normal >60  Chronic Kidney Disease <60  Kidney Failure <15      CBC & Differential [[id number]]  (Abnormal) Collected: 11/16/23 1547    Specimen: Blood Updated: 11/16/23 1550    Narrative:      The following orders were created for panel order CBC & Differential.  Procedure                               Abnormality         Status                     ---------                               -----------         ------                     CBC Auto Differential[[id number]]         Abnormal            Final result                 Please view results for these tests on the individual orders.    CBC Auto Differential [[id number]]  (Abnormal) Collected: 11/16/23 1547    Specimen: Blood Updated: 11/16/23 1550     WBC 14.32 10*3/mm3      RBC 4.50 10*6/mm3      Hemoglobin 12.1 g/dL      Hematocrit 39.3 %      MCV 87.3 fL      MCH 26.9 pg      MCHC 30.8 g/dL      RDW 19.3 %      RDW-SD 59.6 fl      MPV 9.9 fL      Platelets 254 10*3/mm3      Neutrophil % 78.5 %      Lymphocyte % 14.5 %      Monocyte % 5.4 %      Eosinophil % 0.5 %      Basophil % 0.2 %      Immature Grans % 0.9 %      Neutrophils, Absolute 11.23 10*3/mm3      Lymphocytes, Absolute 2.08 10*3/mm3      Monocytes, Absolute 0.78 10*3/mm3      Eosinophils, Absolute 0.07 10*3/mm3      Basophils, Absolute 0.03 10*3/mm3      Immature Grans, Absolute 0.13 10*3/mm3     COVID-19 and FLU A/B PCR, 1 HR TAT - Swab, Nasopharynx [[id number]]  (Normal) Collected: 11/16/23 1447    Specimen: Swab from Nasopharynx Updated: 11/16/23 1510     COVID19 Not Detected     Influenza A PCR Not Detected     Influenza B PCR Not Detected    Narrative:      Fact sheet for providers: https://www.fda.gov/media/651574/download    Fact sheet for patients: https://www.fda.gov/media/243632/download    Test performed by PCR.    Rapid Strep A Screen - Swab, Throat [[id number]]  (Normal) Collected: 11/16/23 1447    Specimen: Swab from Throat Updated: 11/16/23 1505     STREP A PCR Not Detected             Imaging Results (Last 24 Hours)       Procedure Component Value Units Date/Time    XR Chest 1 View [[id number]] Collected: 11/16/23 2121     Updated: 11/16/23 2125    Narrative:      XR CHEST 1 VW    Date of Exam: 11/16/2023 9:00 PM EST    Indication: preop    Comparison: Chest CT dated 11/4/2023, single view chest dated 9/23/2023    Findings:  Suture line from partial right upper lobe resection is again noted. There is stable cardiomegaly with a dual chambered pacemaker in  place. The left costophrenic angle is obscured from overlying breast shadow. A small left effusion cannot be excluded   remainder of the lungs are clear. No right pleural effusion. No pneumothorax. The trachea is midline. Pulmonary vascularity is within normal limits. Visualized bony structures are intact.      Impression:      Impression:  Stable cardiomegaly with a dual-chambered pacemaker in place. No active cardiopulmonary disease.    Electronically Signed: Sunil HymanDO    11/16/2023 9:23 PM EST    Workstation ID: [id number]    CT Lower Extremity Right With Contrast [[id number]] Collected: 11/16/23 1649     Updated: 11/16/23 1703    Narrative:      CT LOWER EXTREMITY RIGHT W CONTRAST    Date of Exam: 11/16/2023 4:32 PM EST    Indication: exquisite pain to right thigh for several weeks; questionable myositis.    Comparison: CT abdomen pelvis dated 11/16/2022    Technique: Axial CT images were obtained of the right lower extremity after the uneventful intravenous administration of iodinated contrast.  Sagittal and coronal reconstructions were performed.  Automated exposure control and iterative reconstruction   methods were used.      Findings:  The right iliac wing, right hip and femur and right hemipelvis are intact. No fractures or aggressive focal osseous lesions. There is moderate hip joint space narrowing. No significant bony hypertrophy. Intramuscular fat planes are preserved. A hip joint   effusion is not seen. Within the posterolateral aspect of the subcutaneous fat in the mid to distal thigh there is a bilobed appearing circumscribed mass which may be complex fluid collection, it has Hounsfield units of 16. It is bilobed or possibly 2   adjacent cysts morphologically similar collections. It measures approximately 3.2 x 2.2 x 4.3 cm. This is superficial. The intramuscular fat planes and soft tissues in the remainder of the thigh are unremarkable. No other fluid collections or focal   opacities. No soft  tissue air. There is a moderate patellofemoral joint space narrowing of the lateral patellar femoral joint. No joint effusion..      Impression:      Impression:  1.There is a bilobed or 2 adjacent fluid circumscribed masses in the subcutaneous fat of the in the posterolateral aspect of the mid to distal thigh. It measures approximately 3.2 x 2.2 x 4.3 cm. It has Hounsfield units of 16. This could be a complex   fluid collection or could represent a hematoma or other complex fluid collection. There is no soft tissue air.  2.There is moderate hip joint space narrowing and moderate patellofemoral joint space narrowing of the lateral patellar femoral joint. No joint effusion.  3.No acute osseous abnormality.        Electronically Signed: Sunil Hyman DO    11/16/2023 5:01 PM EST    Workstation ID: [id number]              Assessment & Plan        This is a 53 y.o. female with: right thigh pain and mass, abscess vs hematoma    Active and Resolved Problems  Active Hospital Problems    Diagnosis  POA    **Mass of right thigh [R22.41]  Yes    Morbid obesity [E66.01]  Yes    Bipolar 1 disorder, depressed, moderate [F31.32]  Yes      Resolved Hospital Problems   No resolved problems to display.       Right thigh mass, leukocytosis  -infection vs hematoma  -  Initial lactic normal. White blood cell count of 14.3   -  Blood cultures x2 pending.  Rapid strep negative, COVID swab negative.    - CT lower extremity right with contrast shows there is a bilobed or 2 adjacent fluid circumscribed masses in the subcutaneous fat of the posterior lateral aspect of the mid to distal thigh.  It measures approximately 3.2 x 2.2 x 4.3 cm.  Has a Hounsfield units of 16.  This could be a complex fluid collection or represent a hematoma or other complex fluid collection.  There is no soft tissue air.  No acute osseous abnormality.    -Patient is afebrile, heart rate 60, blood pressure 160s over 80s and on room air oxygen 94% SPO2.   -Patient  given Zosyn in ED.   - [facility].  -Orthopedic surgery consult  -Vancomycin and Rocephin ordered  -MRSA probe ordered  -Check preop labs PT/INR, PTT, EKG and chest x-ray  -hold aspirin for now  -Follow cultures    History of histoplasmosis  -Currently on itraconazole, continue    Asthma,chronic  -Continue home inhalers    Neuropathy  -Continue home Neurontin    Anxiety/depression/bipolar  -chronic, continue home zoloft    Obesity, BMI 37  -Encourage lifestyle modifications      DVT prophylaxis:SCDs  Mechanical DVT prophylaxis orders are present.    The patient desires to be as follows:    CODE STATUS:  full code  Code Status (Patient has no pulse and is not breathing): CPR (Attempt to Resuscitate)  Medical Interventions (Patient has pulse or is breathing): Full Support          Admission Status:  I believe this patient meets observation status.    Expected Length of Stay: 2 days    PDMP and Medication Dispenses via Sidebar reviewed and consistent with patient reported medications.    I discussed the patient's findings and my recommendations with patient.      Signature: Electronically signed by EVETTE Martinez, 11/16/23, 9:32 PM EST.        This document has been electronically signed by EVETTE Martinez on November 17, 2023 06:09 EST   [facility] Hospitalist Team

## 2023-11-17 NOTE — PLAN OF CARE
Goal Outcome Evaluation:  Plan of Care Reviewed With: patient        Progress: no change  Outcome Evaluation: Pt discharging

## 2023-11-17 NOTE — PROGRESS NOTES
PROGRESS NOTE      Name: Tessie Conner ADMIT: 2023   : 1970  PROVIDER: Marsha Lopez MD    MRN: [id number] LOS: 0 days   AGE/SEX: 53 y.o. female  ROOM: [facility]/     Date of Service: 2023                           CHIEF COMPLIANTS / REASON FOR FOLLOW UP        Right leg pain    Subjective:      53 year old female with lipoma removed right leg 2023. Was having burning pain at that time, had improved, but now worse again.  More anterior from incision of lipoma. Will awake screaming with pain.No fever, no warmth, redness of skin. CT with bilobed cystic lesion. Describes as burning point specific pain, no radiating pain- worse with pressure. Also congestion for 2 days and increased bruising,        Review of Systems:       Right thigh pain, congestion, sore throat,   No pain with walking, no fever         ASSESSMENT:                                                                            Principal Problem:    Mass of right thigh  Active Problems:    Bipolar 1 disorder, depressed, moderate    Type 2 diabetes mellitus without complication, without long-term current use of insulin    Morbid obesity    Histoplasmosis    Sick sinus syndrome      Overview: Added automatically from request for surgery [id number]             PLAN:                                                                            Right thigh mass: In soft tissue, not bone or muscle so no need for ortho consult.  Will see if IR can drain and send fluid for culture to assure no infection.  LA normal CRP 0.8, WBC 11-14. Currently on abx IV and blood cultures pending, Gen surgery removed lipoma on same thigh more posterior and superior to current lesion.  Will consult Dr. Shannon as he removed the lipoma.  Hopeful for drainage/removal today and can discharge later today if procedure performed.  Will increase gabapentin, and have prn pain meds for pain control.  leukocytosis: Could be secondary to mild infection- but her  "usual is 8-15.  Will keep on IV abx until can get fluid sample.  Elevated sugar: Will check A1c today  Bipolar: Stable on current medications  Sick sinus syndrome: S/P pacemaker placement 6/23. Op note states MRI conditional. Will hold MRI at this time and try for US guided ID instead.  Histoplasmosis: on sporanox and followed by ID    Time Spent: 35 minutes       OBJECTIVE                                                                        Exam:  /50 (BP Location: Left arm, Patient Position: Lying)   Pulse 65   Temp 98 °F (36.7 °C) (Oral)   Resp 17   Ht 162.6 cm (64\")   Wt 105 kg (232 lb 3.2 oz)   LMP  (LMP Unknown)   SpO2 95%   BMI 39.86 kg/m²   Intake/Output last 3 shifts:  I/O last 3 completed shifts:  In: 820 [P.O.:120; IV Piggyback:700]  Out: -   Intake/Output this shift:  No intake/output data recorded.    General Appearance:  Alert, cooperative, no distress, appears stated age  HEENT: mild congestion nares  Neck:  Supple, no adenopathy;      Lungs:   CTA B, no wheezing, rhonchi or rales  Heart:  Ns1s2, no m/g/r   Abdomen:   Soft, nontender, no masses, no hepatomegaly, no splenomegaly  Extremities:  right lateral/anterior mid thigh with palpable lesion causing pain. No warmth or redness or induration    Neurologic:   Alert and oriented, no focal deficits    Scheduled Meds:budesonide, 0.5 mg, Nebulization, BID - RT  cefTRIAXone, 2,000 mg, Intravenous, Q24H  ferrous sulfate, 324 mg, Oral, Daily With Breakfast  itraconazole, 200 mg, Oral, BID  senna-docusate sodium, 2 tablet, Oral, BID  sertraline, 50 mg, Oral, Nightly  sodium chloride, 10 mL, Intravenous, Q12H  vancomycin, 1,250 mg, Intravenous, Q12H      Continuous Infusions:Pharmacy to dose vancomycin,       PRN Meds:  senna-docusate sodium **AND** polyethylene glycol **AND** bisacodyl **AND** bisacodyl    Calcium Replacement - Follow Nurse / BPA Driven Protocol    ipratropium-albuterol    Magnesium Standard Dose Replacement - Follow Nurse / " BPA Driven Protocol    melatonin    ondansetron **OR** ondansetron    Pharmacy to dose vancomycin    Phosphorus Replacement - Follow Nurse / BPA Driven Protocol    Potassium Replacement - Follow Nurse / BPA Driven Protocol    sodium chloride    sodium chloride         Data Review:                                                                              Lab Results (last 24 hours)       Procedure Component Value Units Date/Time    Basic Metabolic Panel [[id number]]  (Abnormal) Collected: 11/17/23 0409    Specimen: Blood Updated: 11/17/23 0508     Glucose 127 mg/dL      BUN 13 mg/dL      Creatinine 0.70 mg/dL      Sodium 144 mmol/L      Potassium 4.2 mmol/L      Chloride 107 mmol/L      CO2 29.0 mmol/L      Calcium 9.4 mg/dL      BUN/Creatinine Ratio 18.6     Anion Gap 8.0 mmol/L      eGFR 104.2 mL/min/1.73     Narrative:      GFR Normal >60  Chronic Kidney Disease <60  Kidney Failure <15      C-reactive Protein [[id number]]  (Abnormal) Collected: 11/17/23 0409    Specimen: Blood Updated: 11/17/23 0508     C-Reactive Protein 0.87 mg/dL     Protime-INR [[id number]]  (Abnormal) Collected: 11/17/23 0409    Specimen: Blood Updated: 11/17/23 0457     Protime 9.9 Seconds      INR <0.93    aPTT [[id number]]  (Normal) Collected: 11/17/23 0409    Specimen: Blood Updated: 11/17/23 0457     PTT 26.3 seconds     CBC Auto Differential [[id number]]  (Abnormal) Collected: 11/17/23 0409    Specimen: Blood Updated: 11/17/23 0441     WBC 11.80 10*3/mm3      RBC 4.21 10*6/mm3      Hemoglobin 11.5 g/dL      Hematocrit 35.5 %      MCV 84.4 fL      MCH 27.2 pg      MCHC 32.3 g/dL      RDW 19.5 %      RDW-SD 56.9 fl      MPV 8.2 fL      Platelets 229 10*3/mm3      Neutrophil % 76.2 %      Lymphocyte % 17.6 %      Monocyte % 5.5 %      Eosinophil % 0.4 %      Basophil % 0.3 %      Neutrophils, Absolute 9.00 10*3/mm3      Lymphocytes, Absolute 2.10 10*3/mm3      Monocytes, Absolute 0.60 10*3/mm3      Eosinophils, Absolute 0.00 10*3/mm3       Basophils, Absolute 0.00 10*3/mm3      nRBC 0.0 /100 WBC     MRSA Screen, PCR (Inpatient) - Swab, Nares [[id number]]  (Normal) Collected: 11/16/23 2243    Specimen: Swab from Nares Updated: 11/17/23 0110     MRSA PCR No MRSA Detected    Narrative:      The negative predictive value of this diagnostic test is high and should only be used to consider de-escalating anti-MRSA therapy. A positive result may indicate colonization with MRSA and must be correlated clinically.    C-reactive Protein [[id number]]  (Abnormal) Collected: 11/16/23 1547    Specimen: Blood Updated: 11/16/23 2206     C-Reactive Protein 0.78 mg/dL     CK [[id number]]  (Normal) Collected: 11/16/23 1547    Specimen: Blood Updated: 11/16/23 2206     Creatine Kinase 100 U/L     POC Glucose Once [[id number]]  (Abnormal) Collected: 11/16/23 2033    Specimen: Blood Updated: 11/16/23 2034     Glucose 177 mg/dL      Comment: Serial Number: [id number]:  [id number]       Lactic Acid, Plasma [[id number]]  (Normal) Collected: 11/16/23 1753    Specimen: Blood Updated: 11/16/23 1815     Lactate 0.9 mmol/L     Blood Culture - Blood, Arm, Right [[id number]] Collected: 11/16/23 1753    Specimen: Blood from Arm, Right Updated: 11/16/23 1758    Blood Culture - Blood, Wrist, Left [[id number]] Collected: 11/16/23 1753    Specimen: Blood from Wrist, Left Updated: 11/16/23 1756    Basic Metabolic Panel [[id number]]  (Abnormal) Collected: 11/16/23 1547    Specimen: Blood Updated: 11/16/23 1608     Glucose 112 mg/dL      BUN 15 mg/dL      Creatinine 0.63 mg/dL      Sodium 138 mmol/L      Potassium 3.8 mmol/L      Chloride 103 mmol/L      CO2 27.9 mmol/L      Calcium 9.3 mg/dL      BUN/Creatinine Ratio 23.8     Anion Gap 7.1 mmol/L      eGFR 106.9 mL/min/1.73     Narrative:      GFR Normal >60  Chronic Kidney Disease <60  Kidney Failure <15      CBC & Differential [[id number]]  (Abnormal) Collected: 11/16/23 1547    Specimen: Blood Updated: 11/16/23 1550    Narrative:      The  following orders were created for panel order CBC & Differential.  Procedure                               Abnormality         Status                     ---------                               -----------         ------                     CBC Auto Differential[[id number]]        Abnormal            Final result                 Please view results for these tests on the individual orders.    CBC Auto Differential [[id number]]  (Abnormal) Collected: 11/16/23 1547    Specimen: Blood Updated: 11/16/23 1550     WBC 14.32 10*3/mm3      RBC 4.50 10*6/mm3      Hemoglobin 12.1 g/dL      Hematocrit 39.3 %      MCV 87.3 fL      MCH 26.9 pg      MCHC 30.8 g/dL      RDW 19.3 %      RDW-SD 59.6 fl      MPV 9.9 fL      Platelets 254 10*3/mm3      Neutrophil % 78.5 %      Lymphocyte % 14.5 %      Monocyte % 5.4 %      Eosinophil % 0.5 %      Basophil % 0.2 %      Immature Grans % 0.9 %      Neutrophils, Absolute 11.23 10*3/mm3      Lymphocytes, Absolute 2.08 10*3/mm3      Monocytes, Absolute 0.78 10*3/mm3      Eosinophils, Absolute 0.07 10*3/mm3      Basophils, Absolute 0.03 10*3/mm3      Immature Grans, Absolute 0.13 10*3/mm3     COVID-19 and FLU A/B PCR, 1 HR TAT - Swab, Nasopharynx [[id number]]  (Normal) Collected: 11/16/23 1447    Specimen: Swab from Nasopharynx Updated: 11/16/23 1510     COVID19 Not Detected     Influenza A PCR Not Detected     Influenza B PCR Not Detected    Narrative:      Fact sheet for providers: https://www.fda.gov/media/762409/download    Fact sheet for patients: https://www.fda.gov/media/786356/download    Test performed by PCR.    Rapid Strep A Screen - Swab, Throat [[id number]]  (Normal) Collected: 11/16/23 1447    Specimen: Swab from Throat Updated: 11/16/23 1505     STREP A PCR Not Detected                 Imaging:                                                                             Imaging Results (Last 24 Hours)       Procedure Component Value Units Date/Time    XR Chest 1 View [[id number]]  Collected: 11/16/23 2121     Updated: 11/16/23 2125    Narrative:      XR CHEST 1 VW    Date of Exam: 11/16/2023 9:00 PM EST    Indication: preop    Comparison: Chest CT dated 11/4/2023, single view chest dated 9/23/2023    Findings:  Suture line from partial right upper lobe resection is again noted. There is stable cardiomegaly with a dual chambered pacemaker in place. The left costophrenic angle is obscured from overlying breast shadow. A small left effusion cannot be excluded   remainder of the lungs are clear. No right pleural effusion. No pneumothorax. The trachea is midline. Pulmonary vascularity is within normal limits. Visualized bony structures are intact.      Impression:      Impression:  Stable cardiomegaly with a dual-chambered pacemaker in place. No active cardiopulmonary disease.    Electronically Signed: Sunil Hyman DO    11/16/2023 9:23 PM EST    Workstation ID: [id number]    CT Lower Extremity Right With Contrast [[id number]] Collected: 11/16/23 1649     Updated: 11/16/23 1703    Narrative:      CT LOWER EXTREMITY RIGHT W CONTRAST    Date of Exam: 11/16/2023 4:32 PM EST    Indication: exquisite pain to right thigh for several weeks; questionable myositis.    Comparison: CT abdomen pelvis dated 11/16/2022    Technique: Axial CT images were obtained of the right lower extremity after the uneventful intravenous administration of iodinated contrast.  Sagittal and coronal reconstructions were performed.  Automated exposure control and iterative reconstruction   methods were used.      Findings:  The right iliac wing, right hip and femur and right hemipelvis are intact. No fractures or aggressive focal osseous lesions. There is moderate hip joint space narrowing. No significant bony hypertrophy. Intramuscular fat planes are preserved. A hip joint   effusion is not seen. Within the posterolateral aspect of the subcutaneous fat in the mid to distal thigh there is a bilobed appearing circumscribed mass  which may be complex fluid collection, it has Hounsfield units of 16. It is bilobed or possibly 2   adjacent cysts morphologically similar collections. It measures approximately 3.2 x 2.2 x 4.3 cm. This is superficial. The intramuscular fat planes and soft tissues in the remainder of the thigh are unremarkable. No other fluid collections or focal   opacities. No soft tissue air. There is a moderate patellofemoral joint space narrowing of the lateral patellar femoral joint. No joint effusion..      Impression:      Impression:  1.There is a bilobed or 2 adjacent fluid circumscribed masses in the subcutaneous fat of the in the posterolateral aspect of the mid to distal thigh. It measures approximately 3.2 x 2.2 x 4.3 cm. It has Hounsfield units of 16. This could be a complex   fluid collection or could represent a hematoma or other complex fluid collection. There is no soft tissue air.  2.There is moderate hip joint space narrowing and moderate patellofemoral joint space narrowing of the lateral patellar femoral joint. No joint effusion.  3.No acute osseous abnormality.        Electronically Signed: Sunil Hyman DO    11/16/2023 5:01 PM EST    Workstation ID: [id number]          SCANNED - TELEMETRY     Final Result      SCANNED - TELEMETRY     Final Result      ECG 12 Lead Pre-Op / Pre-Procedure    (Results Pending)            Marsha Lopez MD  [facility]  11/17/2023  08:01 EST

## 2023-11-18 NOTE — CASE MANAGEMENT/SOCIAL WORK
Case Management Discharge Note    Transportation Services  Private: Car    Final Discharge Disposition Code: 01 - home or self-care    Continued Stay Note   [facility]     Patient Name: Tessie Conner  MRN: [id number]  Today's Date: 11/17/2023    Admit Date: 11/16/2023    Plan: home with spouse   Discharge Plan       Row Name 11/17/23 1906       Plan    Plan home with spouse    Patient/Family in Agreement with Plan yes    Plan Comments Met with patient at bedside prior to dc.  IADL.  Denies discharge needs.  Verified PCP.                  Expected Discharge Date and Time       Expected Discharge Date Expected Discharge Time    Nov 17, 2023             Met with patient in room.    Maintained distance greater than six feet and spent less than 15 minutes in the room.     Eileen Mansfield RN     Office Phone [phone]  Office Cell [phone]

## 2023-11-21 ENCOUNTER — READMISSION MANAGEMENT (OUTPATIENT)
Dept: CALL CENTER | Facility: HOSPITAL | Age: 53
End: 2023-11-21
Payer: MEDICARE

## 2023-11-21 LAB
BACTERIA SPEC AEROBE CULT: NORMAL
BACTERIA SPEC AEROBE CULT: NORMAL

## 2023-11-21 NOTE — OUTREACH NOTE
Medical Week 1 Survey      Flowsheet Row Responses   [facility] facility patient discharged from? [facility]   Does the patient have one of the following disease processes/diagnoses(primary or secondary)? Other   Week 1 attempt successful? No   Unsuccessful attempts Attempt 1            Corinne BRUNO - Registered Nurse

## 2023-11-22 LAB
BACTERIA FLD CULT: NORMAL
GRAM STN SPEC: NORMAL
GRAM STN SPEC: NORMAL

## 2023-11-27 ENCOUNTER — TRANSCRIBE ORDERS (OUTPATIENT)
Dept: ADMINISTRATIVE | Facility: HOSPITAL | Age: 53
End: 2023-11-27
Payer: MEDICAID

## 2023-11-27 DIAGNOSIS — M79.604 RIGHT LEG PAIN: Primary | ICD-10-CM

## 2023-11-27 DIAGNOSIS — M79.604 PAIN IN RIGHT LEG: Primary | ICD-10-CM

## 2023-11-28 ENCOUNTER — READMISSION MANAGEMENT (OUTPATIENT)
Dept: CALL CENTER | Facility: HOSPITAL | Age: 53
End: 2023-11-28
Payer: MEDICAID

## 2023-11-28 NOTE — OUTREACH NOTE
Medical Week 2 Survey      Flowsheet Row Responses   [facility] patient discharged from? [facility]   Does the patient have one of the following disease processes/diagnoses(primary or secondary)? Other   Week 2 attempt successful? Yes   Call start time 1221   Discharge diagnosis Right thigh mass   Call end time 1224   Meds reviewed with patient/caregiver? Yes   Is the patient having any side effects they believe may be caused by any medication additions or changes? No   Does the patient have all medications ordered at discharge? Yes   Is the patient taking all medications as directed (includes completed medication regime)? Yes   Medication comments has finished antibiotic   Comments regarding appointments Orthopedic follow up 11/28/23   Does the patient have a primary care provider?  Yes   Does the patient have an appointment with their PCP within 7 days of discharge? No   What is preventing the patient from scheduling follow up appointments within 7 days of discharge? Haven't had time   Nursing Interventions Advised patient to make appointment, Educated patient on importance of making appointment   Has the patient kept scheduled appointments due by today? Yes   Has home health visited the patient within 72 hours of discharge? N/A   Psychosocial issues? No   Did the patient receive a copy of their discharge instructions? Yes   Nursing interventions Reviewed instructions with patient   What is the patient's perception of their health status since discharge? Same  [Pain not improved, had injection at ortho ofifice today.]   Is the patient/caregiver able to teach back signs and symptoms related to disease process for when to call PCP? Yes   Is the patient/caregiver able to teach back signs and symptoms related to disease process for when to call 911? Yes   Is the patient/caregiver able to teach back the hierarchy of who to call/visit for symptoms/problems? PCP, Specialist, Home health nurse, Urgent Care, ED, 911 Yes    Week 2 Call Completed? Yes   Is the patient interested in additional calls from an ambulatory ? No   Would this patient benefit from a Referral to [facility] Social Work? No   Call end time 1224            Calhoun TORIE - Registered Nurse

## 2023-12-01 NOTE — TELEPHONE ENCOUNTER
Rx Refill Note  Requested Prescriptions      No prescriptions requested or ordered in this encounter      Last office visit with prescribing clinician: 8/1/2023   Last telemedicine visit with prescribing clinician: Visit date not found   Next office visit with prescribing clinician: 2/6/2024                             Clary Reyes MA  12/01/23, 10:04 EST

## 2023-12-02 ENCOUNTER — HOSPITAL ENCOUNTER (OUTPATIENT)
Dept: GENERAL RADIOLOGY | Facility: HOSPITAL | Age: 53
Discharge: HOME | End: 2023-12-02
Admitting: INTERNAL MEDICINE
Payer: MEDICARE

## 2023-12-02 ENCOUNTER — TRANSCRIBE ORDERS (OUTPATIENT)
Dept: ADMINISTRATIVE | Facility: HOSPITAL | Age: 53
End: 2023-12-02
Payer: MEDICARE

## 2023-12-02 DIAGNOSIS — M54.10 RADICULAR PAIN OF RIGHT LOWER EXTREMITY: Primary | ICD-10-CM

## 2023-12-02 DIAGNOSIS — M79.604 RIGHT LEG PAIN: Primary | ICD-10-CM

## 2023-12-02 DIAGNOSIS — M54.10 RADICULAR PAIN OF RIGHT LOWER EXTREMITY: ICD-10-CM

## 2023-12-02 PROCEDURE — 72100 X-RAY EXAM L-S SPINE 2/3 VWS: CPT

## 2023-12-04 ENCOUNTER — TELEPHONE (OUTPATIENT)
Dept: PAIN MEDICINE | Facility: CLINIC | Age: 53
End: 2023-12-04
Payer: MEDICARE

## 2023-12-04 NOTE — TELEPHONE ENCOUNTER
Caller: Tessie Conner    Relationship to patient: Self    Best call back number:     Chief complaint: NERVE BLOCK     Type of visit: PROCEDURE    Additional notes:PATIENT IS WAITING TO HEAR BACK ABOUT A NERVE BLOCK

## 2023-12-06 ENCOUNTER — READMISSION MANAGEMENT (OUTPATIENT)
Dept: CALL CENTER | Facility: HOSPITAL | Age: 53
End: 2023-12-06
Payer: MEDICARE

## 2023-12-06 ENCOUNTER — TELEPHONE (OUTPATIENT)
Dept: CARDIOLOGY | Facility: CLINIC | Age: 53
End: 2023-12-06
Payer: MEDICARE

## 2023-12-06 DIAGNOSIS — I95.0 IDIOPATHIC HYPOTENSION: Primary | ICD-10-CM

## 2023-12-06 NOTE — TELEPHONE ENCOUNTER
Called Pt to get information on why her PCP took her off he Rx Midodrine 2.5 MG tablets on 11/16/23 we received a refill request but the pt hasn't been on the medication since taking off the Rx. I LVM for pt to return our call,

## 2023-12-06 NOTE — TELEPHONE ENCOUNTER
Caller: Tessie Conner    Relationship: Self    Best call back number: [phone]    What is the best time to reach you: BETWEEN 12:30PM-1PM OR AFTER 4:32PM    Who are you requesting to speak with (clinical staff, provider,  specific staff member): GWENDOLYN    Do you know the name of the person who called: GWENDOLYN    What was the call regarding: PT IS RETURNING A CALL TO GWENDOLYN. SHE SAID THAT HER PCP TOOK HER OFF OF THE MEDICATION BECAUSE HER BLOOD PRESSURE WAS GETTING BACK TO THE NORMAL RANGE. SHE SAID THAT SINCE SHE'S STOPPED TAKING IT HER BLOOD PRESSURE HAS BEEN FLUCTUATING.    Is it okay if the provider responds through MyChart: NO

## 2023-12-06 NOTE — OUTREACH NOTE
Medical Week 3 Survey      Flowsheet Row Responses   [facility] facility patient discharged from? [facility]   Does the patient have one of the following disease processes/diagnoses(primary or secondary)? Other   Week 3 attempt successful? No   Unsuccessful attempts Attempt 1            Antoinette PAYNE - Registered Nurse

## 2023-12-08 RX ORDER — MIDODRINE HYDROCHLORIDE 2.5 MG/1
2.5 TABLET ORAL
Qty: 90 TABLET | Refills: 2 | Status: SHIPPED | OUTPATIENT
Start: 2023-12-08 | End: 2024-12-07

## 2023-12-08 NOTE — TELEPHONE ENCOUNTER
Pt is asking can we send in a Rx for Midodrine HCL 2.5 MG tablet due to her bp dropping. Pt stated her PCP took her off Due to her BP staying in a spot so she no longer needed the RX.

## 2023-12-18 ENCOUNTER — TRANSCRIBE ORDERS (OUTPATIENT)
Dept: ADMINISTRATIVE | Facility: HOSPITAL | Age: 53
End: 2023-12-18
Payer: MEDICARE

## 2023-12-18 DIAGNOSIS — E03.9 HYPOTHYROIDISM, UNSPECIFIED TYPE: Primary | ICD-10-CM

## 2023-12-19 ENCOUNTER — HOSPITAL ENCOUNTER (OUTPATIENT)
Dept: GENERAL RADIOLOGY | Facility: HOSPITAL | Age: 53
Discharge: HOME | End: 2023-12-19
Payer: MEDICARE

## 2023-12-19 ENCOUNTER — HOSPITAL ENCOUNTER (OUTPATIENT)
Dept: CARDIOLOGY | Facility: HOSPITAL | Age: 53
Discharge: HOME | End: 2023-12-19
Payer: MEDICARE

## 2023-12-19 ENCOUNTER — LAB (OUTPATIENT)
Dept: LAB | Facility: HOSPITAL | Age: 53
End: 2023-12-19
Payer: MEDICARE

## 2023-12-19 ENCOUNTER — OFFICE VISIT (OUTPATIENT)
Dept: PODIATRY | Facility: CLINIC | Age: 53
End: 2023-12-19
Payer: MEDICARE

## 2023-12-19 ENCOUNTER — TELEPHONE (OUTPATIENT)
Dept: CARDIOLOGY | Facility: CLINIC | Age: 53
End: 2023-12-19
Payer: MEDICARE

## 2023-12-19 VITALS — WEIGHT: 232 LBS | BODY MASS INDEX: 39.61 KG/M2 | RESPIRATION RATE: 16 BRPM | HEIGHT: 64 IN

## 2023-12-19 DIAGNOSIS — S82.851A CLOSED DISPLACED TRIMALLEOLAR FRACTURE OF LOWER LEG, RIGHT, INITIAL ENCOUNTER: ICD-10-CM

## 2023-12-19 DIAGNOSIS — M25.571 ACUTE RIGHT ANKLE PAIN: Primary | ICD-10-CM

## 2023-12-19 LAB
ANION GAP SERPL CALCULATED.3IONS-SCNC: 13.1 MMOL/L (ref 5–15)
BUN SERPL-MCNC: 16 MG/DL (ref 6–20)
BUN/CREAT SERPL: 27.1 (ref 7–25)
CALCIUM SPEC-SCNC: 10.3 MG/DL (ref 8.6–10.5)
CHLORIDE SERPL-SCNC: 100 MMOL/L (ref 98–107)
CO2 SERPL-SCNC: 23.9 MMOL/L (ref 22–29)
CREAT SERPL-MCNC: 0.59 MG/DL (ref 0.57–1)
DEPRECATED RDW RBC AUTO: 55.3 FL (ref 37–54)
EGFRCR SERPLBLD CKD-EPI 2021: 108.6 ML/MIN/1.73
ERYTHROCYTE [DISTWIDTH] IN BLOOD BY AUTOMATED COUNT: 17.9 % (ref 12.3–15.4)
GLUCOSE SERPL-MCNC: 160 MG/DL (ref 65–99)
HCT VFR BLD AUTO: 42.8 % (ref 34–46.6)
HGB BLD-MCNC: 14.1 G/DL (ref 12–15.9)
MCH RBC QN AUTO: 28.3 PG (ref 26.6–33)
MCHC RBC AUTO-ENTMCNC: 32.9 G/DL (ref 31.5–35.7)
MCV RBC AUTO: 85.9 FL (ref 79–97)
PLATELET # BLD AUTO: 205 10*3/MM3 (ref 140–450)
PMV BLD AUTO: 10.7 FL (ref 6–12)
POTASSIUM SERPL-SCNC: 4.7 MMOL/L (ref 3.5–5.2)
QT INTERVAL: 350 MS
QTC INTERVAL: 368 MS
RBC # BLD AUTO: 4.98 10*6/MM3 (ref 3.77–5.28)
SODIUM SERPL-SCNC: 137 MMOL/L (ref 136–145)
WBC NRBC COR # BLD AUTO: 17.4 10*3/MM3 (ref 3.4–10.8)

## 2023-12-19 PROCEDURE — 85027 COMPLETE CBC AUTOMATED: CPT

## 2023-12-19 PROCEDURE — 71046 X-RAY EXAM CHEST 2 VIEWS: CPT

## 2023-12-19 PROCEDURE — 80048 BASIC METABOLIC PNL TOTAL CA: CPT

## 2023-12-19 PROCEDURE — 93005 ELECTROCARDIOGRAM TRACING: CPT | Performed by: PODIATRIST

## 2023-12-19 PROCEDURE — 36415 COLL VENOUS BLD VENIPUNCTURE: CPT

## 2023-12-19 RX ORDER — GABAPENTIN 600 MG/1
600 TABLET ORAL DAILY
COMMUNITY

## 2023-12-19 RX ORDER — OXYCODONE HYDROCHLORIDE 10 MG/1
10 TABLET ORAL EVERY 4 HOURS PRN
Status: ON HOLD | COMMUNITY
End: 2023-12-22 | Stop reason: SDUPTHER

## 2023-12-19 RX ORDER — LURASIDONE HYDROCHLORIDE 40 MG/1
20 TABLET, FILM COATED ORAL DAILY
COMMUNITY

## 2023-12-19 NOTE — TELEPHONE ENCOUNTER
FACILITY: [facility]  DR:   PHONE: [phone]  FAX: [phone]  PROCEDURE: (R) ankle fracture ORIF  SCHEDULED: 12/22/23  MEDS TO HOLD: No anticoagulants

## 2023-12-19 NOTE — H&P (VIEW-ONLY)
12/19/2023  Foot and Ankle Surgery - New Patient   Provider: Dr. Felipe Beck DPM  Location: [facility]    Subjective:  Tessie Conner is a 53 y.o. female.     Chief Complaint   Patient presents with    Right Ankle - Fracture    Establish Care     ELMER Lopez MD 12/15/2023       HPI: The patient is a 53-year-old female who presents to the clinic for a right ankle fracture.    She reports she sustained an injury to her right ankle on 12/17/2023 at 3:00 AM. She states she was attempting to get back up in bed and took a step and her leg did not go with her. She notes she presented to [facility] where x-rays were obtained and she was placed in a boot. The patient states she was given tramadol and was sent home. She notes she was informed they wanted her in a wheelchair; however, they do not want her to put any pressure on her ankle secondary to multiple fractures. She reports she has not been able to use the bathroom or do anything. The patient states she is trying to sit and use a female urinal from a chair. She notes she feels her ankle pop in and out. She reports she works for [employer] and is on the phone. She states she is weak in her leg. She notes she is attending physical therapy. She states she lives with her ; however, he travels. She notes she has a 52-year-old son that lives with her and he will be there to help her. She reports she has a partial lung and a pacemaker. She states she is being treated for diabetes. She notes her last A1c was 6.7 percent. She reports her primary care physician is Dr. Reis. She states her cardiologist is Dr. Lopez. She denies any chest or heart pain, chest pain, or shortness of breath. She inquires how soon she can proceed with surgery. She notes she is unable to wash her hair. She inquires if oxycodone is the best for pain.    Allergies   Allergen Reactions    Tylenol [Acetaminophen] Hives and Itching       Past Medical History:   Diagnosis Date     Abnormal ECG     Anemia     Anesthesia complication     cardiac arrest  with hysterectomy    Anxiety 2005    Arthritis     Asthma 2020    allergy    Bipolar 1 disorder     Bradycardia     Chest pain     due to scarring from lung surgery      Chest pain 2020    Cholelithiasis 10/2022    Coronary artery disease     very minimal    Depression 2005    Diabetes mellitus 2002    borderline   resolved    Dyspnea on minimal exertion     Frequent UTI     Gastroesophageal reflux disease without esophagitis 07/15/2020    Heart murmur     FROM CHILDHOOD    Histoplasmosis     History of anemia     POST PREGNANCY    Hyperlipidemia     Hyperlipidemia 10/04/2016    Hypotension     Mass of upper lobe of right lung     Migraines     hx    Obesity     PONV (postoperative nausea and vomiting)     Sleep apnea 22    no machine    Spinal headache     Vertigo     Visual impairment     WEARS GLASSES    Vitamin D deficiency        Past Surgical History:   Procedure Laterality Date    APPENDECTOMY      CARDIAC CATHETERIZATION N/A 2020    Procedure: Left Heart Cath possible PCI, atherectomy, hemodynamic support;  Surgeon: Thomas Zamora MD;  Location: [facility] CATH INVASIVE LOCATION;  Service: Cardiology;  Laterality: N/A;    CARDIAC CATHETERIZATION  2020    CARDIAC ELECTROPHYSIOLOGY PROCEDURE N/A 2023    Procedure: Pacemaker DC new, [place] aware;  Surgeon: Rachel Espinoza MD;  Location: [facility] CATH INVASIVE LOCATION;  Service: Cardiovascular;  Laterality: N/A;     SECTION      FOOT/TOE TENDON REPAIR Left     HYSTERECTOMY      LUNG BIOPSY Right 2020    LUNG LOBECTOMY Right 2022    pt had partial Right lobectomy and nodule removal    MASS EXCISION Right 2023    Procedure: LIPOMA EXCISION,THIGH;  Surgeon: Justo Shannon MD;  Location: [facility] MAIN OR;  Service: General;  Laterality: Right;    ROTATOR CUFF REPAIR Left     THORACOSCOPY VIDEO  ASSISTED WITH LOBECTOMY Right 02/08/2022    Procedure: BRONCHOSCOPY, THORACOSCOPY VIDEO ASSISTED wedge resection X2, INTERCOSTAL NERVE BLOCK;  Surgeon: Ayla Carroll MD;  Location: [facility] OR;  Service: Thoracic;  Laterality: Right;    TUBAL ABDOMINAL LIGATION  2002       Family History   Problem Relation Age of Onset    Arthritis Mother     Cancer Mother     Depression Mother     Diabetes Mother     Early death Mother     Mental illness Mother     Anxiety disorder Mother     Alcohol abuse Father     Diabetes Father     Early death Father     Heart disease Father     Hyperlipidemia Father     Hypertension Father         Father    Vision loss Father     Heart attack Father     Heart disease Sister     Drug abuse Sister     Heart attack Sister     Hypertension Sister         Sister    Heart disease Sister     Heart disease Brother     Hypertension Brother     Drug abuse Brother     Hypertension Brother     Heart disease Brother     Heart attack Brother     Cancer Maternal Grandmother     Malig Hyperthermia Neg Hx        Social History     Socioeconomic History    Marital status:    Tobacco Use    Smoking status: Never     Passive exposure: Never    Smokeless tobacco: Never   Vaping Use    Vaping Use: Never used   Substance and Sexual Activity    Alcohol use: Not Currently     Comment: VERY RARE    Drug use: No    Sexual activity: Defer     Birth control/protection: None        Current Outpatient Medications on File Prior to Visit   Medication Sig Dispense Refill    Arnuity Ellipta 200 MCG/ACT INHALE 1 PUFF BY MOUTH INTO THE LUNGS DAILY.      aspirin 81 MG EC tablet Take 1 tablet by mouth Daily.      cephalexin (Keflex) 500 MG capsule Take 1 capsule by mouth 3 (Three) Times a Day. 15 capsule 0    Cholecalciferol 50 MCG (2000 UT) tablet Take 1 tablet by mouth Daily. (Patient taking differently: Take 1 tablet by mouth Every Night.) 90 each 3    EPINEPHrine (EPIPEN) 0.3 MG/0.3ML solution auto-injector  "injection Inject 0.3 mL into the appropriate muscle as directed by prescriber As Needed.      FeroSul 325 (65 Fe) MG tablet Take 1 tablet by mouth 2 (Two) Times a Day.      Gemtesa 75 MG tablet Take 1 tablet by mouth Daily.      ipratropium-albuterol (DUO-NEB) 0.5-2.5 mg/3 ml nebulizer Take 3 mL by nebulization 4 (Four) Times a Day. 360 mL 3    itraconazole (SPORANOX) 100 MG capsule Take 2 capsules by mouth 2 (Two) Times a Day.      midodrine (PROAMATINE) 2.5 MG tablet Take 1 tablet by mouth 3 (Three) Times a Day Before Meals. 90 tablet 2    Rexulti 0.5 MG tablet Take 0.5 mg by mouth Every Night.      sertraline (ZOLOFT) 50 MG tablet Take 1 tablet by mouth Every Night.       No current facility-administered medications on file prior to visit.       Review of Systems:  General: Denies fever, chills, fatigue, and weakness.  Eyes: Denies vision loss, blurry vision, and excessive redness.  ENT: Denies hearing issues and difficulty swallowing.  Cardiovascular: Denies palpitations, chest pain, or syncopal episodes.  Respiratory: Denies shortness of breath, wheezing, and coughing.  GI: Denies abdominal pain, nausea, and vomiting.   : Denies frequency, hematuria, and urgency.  Musculoskeletal: Denies muscle cramps, joint pains, and stiffness.  Derm: Denies rash, open wounds, or suspicious lesions.  Neuro: Denies headaches, numbness, loss of coordination, and tremors.  Psych: Denies anxiety and depression.  Endocrine: Denies temperature intolerance and changes in appetite.  Heme: Denies bleeding disorders or abnormal bruising.     Objective   Resp 16   Ht 162.6 cm (64\")   Wt 105 kg (232 lb)   LMP  (LMP Unknown)   BMI 39.82 kg/m²     Foot/Ankle Exam    GENERAL  Orientation:  AAOx3  Affect:  appropriate    VASCULAR     Right Foot Vascularity   Normal vascular exam    Dorsalis pedis:  2+  Posterior tibial:  2+  Skin temperature:  warm  Edema grading:  None  CFT:  < 3 seconds  Pedal hair growth:  Present  Varicosities:  " none     Left Foot Vascularity   Normal vascular exam    Dorsalis pedis:  2+  Posterior tibial:  2+  Skin temperature:  warm  Edema grading:  None  CFT:  < 3 seconds  Pedal hair growth:  Present  Varicosities:  none     NEUROLOGIC     Right Foot Neurologic   Light touch sensation: normal  Hot/Cold sensation: normal  Achilles reflex:  2+     Left Foot Neurologic   Light touch sensation: normal  Hot/Cold sensation:  normal  Achilles reflex:  2+    MUSCULOSKELETAL     Right Foot Musculoskeletal   Arch:  Normal     Left Foot Musculoskeletal   Arch:  Normal    MUSCLE STRENGTH     Right Foot Muscle Strength   Normal strength    Foot dorsiflexion:  5  Foot plantar flexion:  5  Foot inversion:  5  Foot eversion:  5     Left Foot Muscle Strength   Normal strength    Foot dorsiflexion:  5  Foot plantar flexion:  5  Foot inversion:  5  Foot eversion:  5    DERMATOLOGIC      Right Foot Dermatologic   Skin  Right foot skin is intact.   Nails comment:  Nails 1-5     Left Foot Dermatologic   Skin  Left foot skin is intact.   Nails comment:  Nails 1-5    TESTS     Right Foot Tests   Anterior drawer: negative  Varus tilt: negative     Left Foot Tests   Anterior drawer: negative  Varus tilt: negative     Right foot additional comments: Discomfort, swelling, and ecchymosis involving the right ankle. No gross deformity by visual inspection. No proximal fibular pain. Range of motion, muscle strength and instability testing deferred secondary to guarding.      Assessment & Plan   Diagnoses and all orders for this visit:    1. Acute right ankle pain (Primary)    2. Closed displaced trimalleolar fracture of lower leg, right, initial encounter  -     XR Chest 2 View; Future  -     ECG 12 Lead; Future  -     Case Request; Standing  -     CBC (No Diff); Future  -     Basic Metabolic Panel; Future  -     ceFAZolin (ANCEF) 2,000 mg in sodium chloride 0.9 % 100 mL IVPB  -     Case Request    Other orders  -     Follow Anesthesia Guidelines /  Protocol; Future  -     Follow Anesthesia Guidelines / Protocol; Standing  -     Verify / Perform Chlorhexidine Skin Prep; Standing  -     Verify / Perform Chlorhexidine Skin Prep if Indicated (If Not Already Completed); Standing  -     Obtain Informed Consent; Future  -     Provide NPO Instructions to Patient; Future  -     Chlorhexidine Skin Prep; Future      Patient is a 53-year-old female that has sustained an injury involving her right ankle.  This occurred over the weekend she reported to [facility].  Imaging was obtained she was apparently closed reduced and placed into a cam boot and referred to me.  Imaging was independently reviewed showing displaced trimalleolar ankle fracture.  Improved alignment was noted after reduction but for some reason she was placed into a boot not a splint.  I reviewed the imaging, diagnosis, and treatment options with the patient at length.  Patient will require open reduction internal fixation for definitive management of this issue.  We reviewed the procedure, risk, goals, and recovery.  We will plan for surgery later this week.  I have placed her into a below the knee splint today for added stability.  I would like her to remain off weightbearing.  We did discuss perioperative risk and her extensive medical history.  I do recommend clearance from primary care and cardiology.  Patient is to call with any additional issues or concerns.  She does have pain medication.  We did review proper use and effects.  Patient is to call with any additional issues or concerns.    Greater than 45 minutes was spent before, during, and after evaluation for patient care.    Orders Placed This Encounter   Procedures    XR Chest 2 View     Standing Status:   Future     Number of Occurrences:   1     Standing Expiration Date:   12/19/2024     Order Specific Question:   Reason for Exam:     Answer:   Preop     Order Specific Question:   Release to patient     Answer:   Routine Release  [[id number]]    CBC (No Diff)     Standing Status:   Future     Number of Occurrences:   1     Standing Expiration Date:   12/19/2024     Order Specific Question:   Release to patient     Answer:   Routine Release [[id number]]    Basic Metabolic Panel     Standing Status:   Future     Number of Occurrences:   1     Standing Expiration Date:   12/19/2024     Order Specific Question:   Release to patient     Answer:   Routine Release [[id number]]    Obtain Informed Consent     Standing Status:   Future     Order Specific Question:   Informed Consent Given For     Answer:   Open reduction internal fixation of right ankle fractures    Provide NPO Instructions to Patient     Standing Status:   Future    Chlorhexidine Skin Prep     Chlorhexidine Skin Prep and Instructions For All Patients Having A Procedure Requiring an Outward Incision if Not Allergic. If Allergic, Give Antibacterial Skin Wipes and Instructions. Do Not Use For Facial Cases or on Any Mucus Membranes.     Standing Status:   Future    ECG 12 Lead     Standing Status:   Future     Number of Occurrences:   1     Standing Expiration Date:   12/19/2024     Order Specific Question:   Reason for Exam:     Answer:   Preop     Order Specific Question:   Release to patient     Answer:   Routine Release [[id number]]        Note is dictated utilizing voice recognition software. Unfortunately this leads to occasional typographical errors. I apologize in advance if the situation occurs. If questions occur please do not hesitate to call our office.    Transcribed from ambient dictation for TORIE Beck DPM by Deidra Anguiano.  12/19/23   10:38EST    Patient or patient representative verbalized consent to the visit recording.  I have personally performed the services described in this document as transcribed by the above individual, and it is both accurate and complete.

## 2023-12-19 NOTE — PROGRESS NOTES
12/19/2023  Foot and Ankle Surgery - New Patient   Provider: Dr. Felipe Beck DPM  Location: [facility]    Subjective:  Tessie Conner is a 53 y.o. female.     Chief Complaint   Patient presents with    Right Ankle - Fracture    Establish Care     ELMER Lopez MD 12/15/2023       HPI: The patient is a 53-year-old female who presents to the clinic for a right ankle fracture.    She reports she sustained an injury to her right ankle on 12/17/2023 at 3:00 AM. She states she was attempting to get back up in bed and took a step and her leg did not go with her. She notes she presented to [facility] where x-rays were obtained and she was placed in a boot. The patient states she was given tramadol and was sent home. She notes she was informed they wanted her in a wheelchair; however, they do not want her to put any pressure on her ankle secondary to multiple fractures. She reports she has not been able to use the bathroom or do anything. The patient states she is trying to sit and use a female urinal from a chair. She notes she feels her ankle pop in and out. She reports she works for [email or web address] and is on the phone. She states she is weak in her leg. She notes she is attending physical therapy. She states she lives with her ; however, he travels. She notes she has a 52-year-old son that lives with her and he will be there to help her. She reports she has a partial lung and a pacemaker. She states she is being treated for diabetes. She notes her last A1c was 6.7 percent. She reports her primary care physician is Dr. Reis. She states her cardiologist is Dr. Lopez. She denies any chest or heart pain, chest pain, or shortness of breath. She inquires how soon she can proceed with surgery. She notes she is unable to wash her hair. She inquires if oxycodone is the best for pain.    Allergies   Allergen Reactions    Tylenol [Acetaminophen] Hives and Itching       Past Medical History:   Diagnosis Date     Abnormal ECG     Anemia     Anesthesia complication     cardiac arrest  with hysterectomy    Anxiety 2005    Arthritis     Asthma 2020    allergy    Bipolar 1 disorder     Bradycardia     Chest pain     due to scarring from lung surgery      Chest pain 2020    Cholelithiasis 10/2022    Coronary artery disease     very minimal    Depression 2005    Diabetes mellitus 2002    borderline   resolved    Dyspnea on minimal exertion     Frequent UTI     Gastroesophageal reflux disease without esophagitis 07/15/2020    Heart murmur     FROM CHILDHOOD    Histoplasmosis     History of anemia     POST PREGNANCY    Hyperlipidemia     Hyperlipidemia 10/04/2016    Hypotension     Mass of upper lobe of right lung     Migraines     hx    Obesity     PONV (postoperative nausea and vomiting)     Sleep apnea 22    no machine    Spinal headache     Vertigo     Visual impairment     WEARS GLASSES    Vitamin D deficiency        Past Surgical History:   Procedure Laterality Date    APPENDECTOMY      CARDIAC CATHETERIZATION N/A 2020    Procedure: Left Heart Cath possible PCI, atherectomy, hemodynamic support;  Surgeon: Thomas Zamora MD;  Location: [facility] CATH INVASIVE LOCATION;  Service: Cardiology;  Laterality: N/A;    CARDIAC CATHETERIZATION  2020    CARDIAC ELECTROPHYSIOLOGY PROCEDURE N/A 2023    Procedure: Pacemaker DC new, [place] aware;  Surgeon: Rachel Espinoza MD;  Location: [facility] CATH INVASIVE LOCATION;  Service: Cardiovascular;  Laterality: N/A;     SECTION      FOOT/TOE TENDON REPAIR Left     HYSTERECTOMY      LUNG BIOPSY Right 2020    LUNG LOBECTOMY Right 2022    pt had partial Right lobectomy and nodule removal    MASS EXCISION Right 2023    Procedure: LIPOMA EXCISION,THIGH;  Surgeon: Justo Shannon MD;  Location: [facility] MAIN OR;  Service: General;  Laterality: Right;    ROTATOR CUFF REPAIR Left     THORACOSCOPY VIDEO  ASSISTED WITH LOBECTOMY Right 02/08/2022    Procedure: BRONCHOSCOPY, THORACOSCOPY VIDEO ASSISTED wedge resection X2, INTERCOSTAL NERVE BLOCK;  Surgeon: Ayla Carroll MD;  Location: [facility] OR;  Service: Thoracic;  Laterality: Right;    TUBAL ABDOMINAL LIGATION  2002       Family History   Problem Relation Age of Onset    Arthritis Mother     Cancer Mother     Depression Mother     Diabetes Mother     Early death Mother     Mental illness Mother     Anxiety disorder Mother     Alcohol abuse Father     Diabetes Father     Early death Father     Heart disease Father     Hyperlipidemia Father     Hypertension Father         Father    Vision loss Father     Heart attack Father     Heart disease Sister     Drug abuse Sister     Heart attack Sister     Hypertension Sister         Sister    Heart disease Sister     Heart disease Brother     Hypertension Brother     Drug abuse Brother     Hypertension Brother     Heart disease Brother     Heart attack Brother     Cancer Maternal Grandmother     Malig Hyperthermia Neg Hx        Social History     Socioeconomic History    Marital status:    Tobacco Use    Smoking status: Never     Passive exposure: Never    Smokeless tobacco: Never   Vaping Use    Vaping Use: Never used   Substance and Sexual Activity    Alcohol use: Not Currently     Comment: VERY RARE    Drug use: No    Sexual activity: Defer     Birth control/protection: None        Current Outpatient Medications on File Prior to Visit   Medication Sig Dispense Refill    Arnuity Ellipta 200 MCG/ACT INHALE 1 PUFF BY MOUTH INTO THE LUNGS DAILY.      aspirin 81 MG EC tablet Take 1 tablet by mouth Daily.      cephalexin (Keflex) 500 MG capsule Take 1 capsule by mouth 3 (Three) Times a Day. 15 capsule 0    Cholecalciferol 50 MCG (2000 UT) tablet Take 1 tablet by mouth Daily. (Patient taking differently: Take 1 tablet by mouth Every Night.) 90 each 3    EPINEPHrine (EPIPEN) 0.3 MG/0.3ML solution auto-injector  "injection Inject 0.3 mL into the appropriate muscle as directed by prescriber As Needed.      FeroSul 325 (65 Fe) MG tablet Take 1 tablet by mouth 2 (Two) Times a Day.      Gemtesa 75 MG tablet Take 1 tablet by mouth Daily.      ipratropium-albuterol (DUO-NEB) 0.5-2.5 mg/3 ml nebulizer Take 3 mL by nebulization 4 (Four) Times a Day. 360 mL 3    itraconazole (SPORANOX) 100 MG capsule Take 2 capsules by mouth 2 (Two) Times a Day.      midodrine (PROAMATINE) 2.5 MG tablet Take 1 tablet by mouth 3 (Three) Times a Day Before Meals. 90 tablet 2    Rexulti 0.5 MG tablet Take 0.5 mg by mouth Every Night.      sertraline (ZOLOFT) 50 MG tablet Take 1 tablet by mouth Every Night.       No current facility-administered medications on file prior to visit.       Review of Systems:  General: Denies fever, chills, fatigue, and weakness.  Eyes: Denies vision loss, blurry vision, and excessive redness.  ENT: Denies hearing issues and difficulty swallowing.  Cardiovascular: Denies palpitations, chest pain, or syncopal episodes.  Respiratory: Denies shortness of breath, wheezing, and coughing.  GI: Denies abdominal pain, nausea, and vomiting.   : Denies frequency, hematuria, and urgency.  Musculoskeletal: Denies muscle cramps, joint pains, and stiffness.  Derm: Denies rash, open wounds, or suspicious lesions.  Neuro: Denies headaches, numbness, loss of coordination, and tremors.  Psych: Denies anxiety and depression.  Endocrine: Denies temperature intolerance and changes in appetite.  Heme: Denies bleeding disorders or abnormal bruising.     Objective   Resp 16   Ht 162.6 cm (64\")   Wt 105 kg (232 lb)   LMP  (LMP Unknown)   BMI 39.82 kg/m²     Foot/Ankle Exam    GENERAL  Orientation:  AAOx3  Affect:  appropriate    VASCULAR     Right Foot Vascularity   Normal vascular exam    Dorsalis pedis:  2+  Posterior tibial:  2+  Skin temperature:  warm  Edema grading:  None  CFT:  < 3 seconds  Pedal hair growth:  Present  Varicosities:  " none     Left Foot Vascularity   Normal vascular exam    Dorsalis pedis:  2+  Posterior tibial:  2+  Skin temperature:  warm  Edema grading:  None  CFT:  < 3 seconds  Pedal hair growth:  Present  Varicosities:  none     NEUROLOGIC     Right Foot Neurologic   Light touch sensation: normal  Hot/Cold sensation: normal  Achilles reflex:  2+     Left Foot Neurologic   Light touch sensation: normal  Hot/Cold sensation:  normal  Achilles reflex:  2+    MUSCULOSKELETAL     Right Foot Musculoskeletal   Arch:  Normal     Left Foot Musculoskeletal   Arch:  Normal    MUSCLE STRENGTH     Right Foot Muscle Strength   Normal strength    Foot dorsiflexion:  5  Foot plantar flexion:  5  Foot inversion:  5  Foot eversion:  5     Left Foot Muscle Strength   Normal strength    Foot dorsiflexion:  5  Foot plantar flexion:  5  Foot inversion:  5  Foot eversion:  5    DERMATOLOGIC      Right Foot Dermatologic   Skin  Right foot skin is intact.   Nails comment:  Nails 1-5     Left Foot Dermatologic   Skin  Left foot skin is intact.   Nails comment:  Nails 1-5    TESTS     Right Foot Tests   Anterior drawer: negative  Varus tilt: negative     Left Foot Tests   Anterior drawer: negative  Varus tilt: negative     Right foot additional comments: Discomfort, swelling, and ecchymosis involving the right ankle. No gross deformity by visual inspection. No proximal fibular pain. Range of motion, muscle strength and instability testing deferred secondary to guarding.      Assessment & Plan   Diagnoses and all orders for this visit:    1. Acute right ankle pain (Primary)    2. Closed displaced trimalleolar fracture of lower leg, right, initial encounter  -     XR Chest 2 View; Future  -     ECG 12 Lead; Future  -     Case Request; Standing  -     CBC (No Diff); Future  -     Basic Metabolic Panel; Future  -     ceFAZolin (ANCEF) 2,000 mg in sodium chloride 0.9 % 100 mL IVPB  -     Case Request    Other orders  -     Follow Anesthesia Guidelines /  Protocol; Future  -     Follow Anesthesia Guidelines / Protocol; Standing  -     Verify / Perform Chlorhexidine Skin Prep; Standing  -     Verify / Perform Chlorhexidine Skin Prep if Indicated (If Not Already Completed); Standing  -     Obtain Informed Consent; Future  -     Provide NPO Instructions to Patient; Future  -     Chlorhexidine Skin Prep; Future      Patient is a 53-year-old female that has sustained an injury involving her right ankle.  This occurred over the weekend she reported to [facility].  Imaging was obtained she was apparently closed reduced and placed into a cam boot and referred to me.  Imaging was independently reviewed showing displaced trimalleolar ankle fracture.  Improved alignment was noted after reduction but for some reason she was placed into a boot not a splint.  I reviewed the imaging, diagnosis, and treatment options with the patient at length.  Patient will require open reduction internal fixation for definitive management of this issue.  We reviewed the procedure, risk, goals, and recovery.  We will plan for surgery later this week.  I have placed her into a below the knee splint today for added stability.  I would like her to remain off weightbearing.  We did discuss perioperative risk and her extensive medical history.  I do recommend clearance from primary care and cardiology.  Patient is to call with any additional issues or concerns.  She does have pain medication.  We did review proper use and effects.  Patient is to call with any additional issues or concerns.    Greater than 45 minutes was spent before, during, and after evaluation for patient care.    Orders Placed This Encounter   Procedures    XR Chest 2 View     Standing Status:   Future     Number of Occurrences:   1     Standing Expiration Date:   12/19/2024     Order Specific Question:   Reason for Exam:     Answer:   Preop     Order Specific Question:   Release to patient     Answer:   Routine Release  [[id number]]    CBC (No Diff)     Standing Status:   Future     Number of Occurrences:   1     Standing Expiration Date:   12/19/2024     Order Specific Question:   Release to patient     Answer:   Routine Release [[id number]]    Basic Metabolic Panel     Standing Status:   Future     Number of Occurrences:   1     Standing Expiration Date:   12/19/2024     Order Specific Question:   Release to patient     Answer:   Routine Release [[id number]]    Obtain Informed Consent     Standing Status:   Future     Order Specific Question:   Informed Consent Given For     Answer:   Open reduction internal fixation of right ankle fractures    Provide NPO Instructions to Patient     Standing Status:   Future    Chlorhexidine Skin Prep     Chlorhexidine Skin Prep and Instructions For All Patients Having A Procedure Requiring an Outward Incision if Not Allergic. If Allergic, Give Antibacterial Skin Wipes and Instructions. Do Not Use For Facial Cases or on Any Mucus Membranes.     Standing Status:   Future    ECG 12 Lead     Standing Status:   Future     Number of Occurrences:   1     Standing Expiration Date:   12/19/2024     Order Specific Question:   Reason for Exam:     Answer:   Preop     Order Specific Question:   Release to patient     Answer:   Routine Release [[id number]]        Note is dictated utilizing voice recognition software. Unfortunately this leads to occasional typographical errors. I apologize in advance if the situation occurs. If questions occur please do not hesitate to call our office.    Transcribed from ambient dictation for TORIE Beck DPM by Deidra Anguiano.  12/19/23   10:38EST    Patient or patient representative verbalized consent to the visit recording.  I have personally performed the services described in this document as transcribed by the above individual, and it is both accurate and complete.

## 2023-12-20 ENCOUNTER — PATIENT ROUNDING (BHMG ONLY) (OUTPATIENT)
Dept: ORTHOPEDIC SURGERY | Facility: CLINIC | Age: 53
End: 2023-12-20
Payer: MEDICARE

## 2023-12-20 NOTE — PROGRESS NOTES
A My-Chart message has been sent to the patient for PATIENT ROUNDING with [facility]

## 2023-12-21 ENCOUNTER — ANESTHESIA EVENT (OUTPATIENT)
Dept: PERIOP | Facility: HOSPITAL | Age: 53
End: 2023-12-21
Payer: MEDICARE

## 2023-12-21 NOTE — ANESTHESIA PREPROCEDURE EVALUATION
Anesthesia Evaluation     history of anesthetic complications:   NPO Solid Status: > 8 hours  NPO Liquid Status: > 8 hours           Airway   Mallampati: I  TM distance: >3 FB  Neck ROM: full  No difficulty expected  Dental - normal exam     Pulmonary - normal exam   (+) asthma,shortness of breath, sleep apnea    ROS comment: Tessie Conner is a 54-year-old female patient who has autonomic dysfunction, no history of obstructive CAD on September 2020, normal EF, is admitted to the hospital due to weakness and dizziness.  Patient complains of constant fatigue, dizziness and lack of energy, is also experiencing low blood pressure and she is on midodrine.  However she does have chronic bradycardia heart rate in the 40s.  She was evaluated by different cardiology group before and she was told she might need a pacemaker.  Cardiovascular - normal exam    (+) pacemaker pacemaker, hypertension, valvular problems/murmurs, CAD, angina, GARCIA, hyperlipidemia    ROS comment: Echocardiogram Findings    Left Ventricle Left ventricular systolic function is normal. Left ventricular ejection fraction appears to be 61 - 65%.     Normal left ventricular cavity size and wall thickness noted.  Right Ventricle Normal right ventricular cavity size and systolic function noted.  Left Atrium Normal left atrial size and volume noted.  Right Atrium Normal right atrial cavity size noted.  Aortic Valve The aortic valve is structurally normal with no regurgitation or stenosis present.  Mitral Valve The mitral valve is grossly normal in structure. No significant mitral valve regurgitation is present.  Tricuspid Valve The tricuspid valve is grossly normal in structure. Trace tricuspid valve regurgitation is present. Estimated right ventricular systolic pressure from tricuspid regurgitation is normal (<35 mmHg).  Pulmonic Valve The pulmonic valve is not well visualized.  Greater Vessels No dilation of the aortic root is present.  Pericardium There  "is no evidence of pericardial effu  2023        Neuro/Psych  (+) headaches, dizziness/light headedness, psychiatric history Schizophrenia  GI/Hepatic/Renal/Endo    (+) obesity, morbid obesity, GERD, diabetes mellitus    Musculoskeletal     Abdominal  - normal exam    Bowel sounds: normal.   Substance History      OB/GYN          Other   arthritis,       Other Comment: History Comments History Comments  PONV (postoperative nausea and vomiting)  Spinal headache   Bipolar 1 disorder  Anxiety   Depression  Obesity   Visual impairment WEARS GLASSES Asthma allergy  Mass of upper lobe of right lung  Heart murmur FROM CHILDHOOD  History of anemia POST PREGNANCY Arthritis   Migraines hx Frequent UTI   Dyspnea on minimal exertion  Sleep apnea no machine  Abnormal ECG  Coronary artery disease very minimal  Diabetes mellitus borderline   resolved Cholelithiasis   Vitamin D deficiency  Chest pain due to scarring from lung surgery    Hypotension  Histoplasmosis   Anemia  Vertigo   Anesthesia complication cardiac arrest  with hysterectomy Bradycardia   Hyperlipidemia  Hyperlipidemia   Gastroesophageal reflux disease without esophagitis  Chest pain     Surgical History  Current as of 23 1528  HYSTERECTOMY  SECTION  TUBAL ABDOMINAL LIGATION FOOT/TOE TENDON REPAIR  ROTATOR CUFF REPAIR CARDIAC CATHETERIZATION  APPENDECTOMY LUNG BIOPSY  THORACOSCOPY VIDEO ASSISTED WITH LOBECTOMY LUNG LOBECTOMY  CARDIAC CATHETERIZATION MASS EXCISION  CARDIAC ELECTROPHYSIOLOGY PROCEDURE       ROS/Med Hx Other: NORMAL CORONARIES BY CATH   NORMAL EF 2023  PACEMAKER 2023  R LOBECTOMY HISTO 2022  \"CARDIAC ARREST\" HYSTERECTOMY . PATIENT HAS NO RECALL. REPORTS HAVING  LOW SAT POSTOP       Phys Exam Other: CROWN #[id number]            Anesthesia Plan    ASA 3     general with block     intravenous induction     Anesthetic plan, risks, benefits, and alternatives have been provided, discussed and informed consent has been obtained with: " patient.  Pre-procedure education provided  Plan discussed with CRNA.    CODE STATUS:

## 2023-12-22 ENCOUNTER — HOSPITAL ENCOUNTER (OUTPATIENT)
Facility: HOSPITAL | Age: 53
Setting detail: HOSPITAL OUTPATIENT SURGERY
Discharge: HOME | End: 2023-12-22
Attending: PODIATRIST | Admitting: PODIATRIST
Payer: MEDICARE

## 2023-12-22 ENCOUNTER — APPOINTMENT (OUTPATIENT)
Dept: GENERAL RADIOLOGY | Facility: HOSPITAL | Age: 53
End: 2023-12-22
Payer: MEDICARE

## 2023-12-22 ENCOUNTER — ANESTHESIA (OUTPATIENT)
Dept: PERIOP | Facility: HOSPITAL | Age: 53
End: 2023-12-22
Payer: MEDICARE

## 2023-12-22 VITALS
OXYGEN SATURATION: 95 % | TEMPERATURE: 98 F | DIASTOLIC BLOOD PRESSURE: 61 MMHG | SYSTOLIC BLOOD PRESSURE: 126 MMHG | HEART RATE: 63 BPM | RESPIRATION RATE: 16 BRPM

## 2023-12-22 DIAGNOSIS — S82.851A CLOSED DISPLACED TRIMALLEOLAR FRACTURE OF LOWER LEG, RIGHT, INITIAL ENCOUNTER: ICD-10-CM

## 2023-12-22 LAB
GLUCOSE BLDC GLUCOMTR-MCNC: 117 MG/DL (ref 70–105)
GLUCOSE BLDC GLUCOMTR-MCNC: 149 MG/DL (ref 70–105)

## 2023-12-22 PROCEDURE — C1713 ANCHOR/SCREW BN/BN,TIS/BN: HCPCS | Performed by: PODIATRIST

## 2023-12-22 PROCEDURE — 25010000002 FENTANYL CITRATE (PF) 50 MCG/ML SOLUTION: Performed by: ANESTHESIOLOGY

## 2023-12-22 PROCEDURE — 25810000003 LACTATED RINGERS PER 1000 ML

## 2023-12-22 PROCEDURE — 25010000002 DEXAMETHASONE PER 1 MG: Performed by: ANESTHESIOLOGY

## 2023-12-22 PROCEDURE — 73600 X-RAY EXAM OF ANKLE: CPT

## 2023-12-22 PROCEDURE — 76000 FLUOROSCOPY <1 HR PHYS/QHP: CPT

## 2023-12-22 PROCEDURE — 25010000002 ROPIVACAINE PER 1 MG: Performed by: ANESTHESIOLOGY

## 2023-12-22 PROCEDURE — 25810000003 LACTATED RINGERS PER 1000 ML: Performed by: ANESTHESIOLOGY

## 2023-12-22 PROCEDURE — 25010000002 FENTANYL CITRATE (PF) 100 MCG/2ML SOLUTION

## 2023-12-22 PROCEDURE — 25010000002 SUGAMMADEX 200 MG/2ML SOLUTION

## 2023-12-22 PROCEDURE — 82948 REAGENT STRIP/BLOOD GLUCOSE: CPT

## 2023-12-22 PROCEDURE — 25010000002 ONDANSETRON PER 1 MG

## 2023-12-22 PROCEDURE — 25010000002 PROPOFOL 10 MG/ML EMULSION

## 2023-12-22 PROCEDURE — 27829 TREAT LOWER LEG JOINT: CPT | Performed by: PODIATRIST

## 2023-12-22 PROCEDURE — 25010000002 PROPOFOL 200 MG/20ML EMULSION

## 2023-12-22 PROCEDURE — 25010000002 CEFAZOLIN PER 500 MG: Performed by: PODIATRIST

## 2023-12-22 PROCEDURE — 27822 TREATMENT OF ANKLE FRACTURE: CPT | Performed by: PODIATRIST

## 2023-12-22 PROCEDURE — 25010000002 MIDAZOLAM PER 1 MG: Performed by: ANESTHESIOLOGY

## 2023-12-22 DEVICE — 3.5 X 14 MM R3CON LOCKING PLATE SCREW
Type: IMPLANTABLE DEVICE | Site: ANKLE | Status: FUNCTIONAL
Brand: GORILLA PLATING SYSTEM

## 2023-12-22 DEVICE — 3.5 X 14 MM R3CON NON-LOCKING PLATE SCREW
Type: IMPLANTABLE DEVICE | Site: ANKLE | Status: FUNCTIONAL
Brand: GORILLA PLATING SYSTEM

## 2023-12-22 DEVICE — MINI MONSTER 4.0 X 48MM HEADED SCREW, SHORT THREAD
Type: IMPLANTABLE DEVICE | Status: FUNCTIONAL
Brand: MONSTER SCREW SYSTEM

## 2023-12-22 DEVICE — 4.2 X 50 MM R3CON NON-LOCKING PLATE SCREW
Type: IMPLANTABLE DEVICE | Site: ANKLE | Status: FUNCTIONAL
Brand: GORILLA PLATING SYSTEM

## 2023-12-22 DEVICE — GORILLA, ANKLE FX, ANATOMICAL FIBULAR PLATE R, 09-HOLE
Type: IMPLANTABLE DEVICE | Site: ANKLE | Status: FUNCTIONAL
Brand: GORILLA PLATING SYSTEM

## 2023-12-22 DEVICE — 3.5 X 12 MM R3CON LOCKING PLATE SCREW
Type: IMPLANTABLE DEVICE | Site: ANKLE | Status: FUNCTIONAL
Brand: GORILLA PLATING SYSTEM

## 2023-12-22 DEVICE — 3.5 X 20 MM R3CON NON-LOCKING PLATE SCREW
Type: IMPLANTABLE DEVICE | Site: ANKLE | Status: FUNCTIONAL
Brand: GORILLA PLATING SYSTEM

## 2023-12-22 DEVICE — 3.5 X 12 MM R3CON NON-LOCKING PLATE SCREW
Type: IMPLANTABLE DEVICE | Site: ANKLE | Status: FUNCTIONAL
Brand: GORILLA PLATING SYSTEM

## 2023-12-22 RX ORDER — SODIUM CHLORIDE, SODIUM LACTATE, POTASSIUM CHLORIDE, CALCIUM CHLORIDE 600; 310; 30; 20 MG/100ML; MG/100ML; MG/100ML; MG/100ML
INJECTION, SOLUTION INTRAVENOUS CONTINUOUS PRN
Status: DISCONTINUED | OUTPATIENT
Start: 2023-12-22 | End: 2023-12-22 | Stop reason: SURG

## 2023-12-22 RX ORDER — ALBUTEROL SULFATE 2.5 MG/3ML
2.5 SOLUTION RESPIRATORY (INHALATION) ONCE AS NEEDED
Status: DISCONTINUED | OUTPATIENT
Start: 2023-12-22 | End: 2023-12-22 | Stop reason: HOSPADM

## 2023-12-22 RX ORDER — ONDANSETRON 2 MG/ML
4 INJECTION INTRAMUSCULAR; INTRAVENOUS ONCE AS NEEDED
Status: DISCONTINUED | OUTPATIENT
Start: 2023-12-22 | End: 2023-12-22 | Stop reason: HOSPADM

## 2023-12-22 RX ORDER — DIPHENHYDRAMINE HYDROCHLORIDE 50 MG/ML
12.5 INJECTION INTRAMUSCULAR; INTRAVENOUS
Status: DISCONTINUED | OUTPATIENT
Start: 2023-12-22 | End: 2023-12-22 | Stop reason: HOSPADM

## 2023-12-22 RX ORDER — SODIUM CHLORIDE 0.9 % (FLUSH) 0.9 %
10 SYRINGE (ML) INJECTION AS NEEDED
Status: DISCONTINUED | OUTPATIENT
Start: 2023-12-22 | End: 2023-12-22 | Stop reason: HOSPADM

## 2023-12-22 RX ORDER — DEXAMETHASONE SODIUM PHOSPHATE 4 MG/ML
INJECTION, SOLUTION INTRA-ARTICULAR; INTRALESIONAL; INTRAMUSCULAR; INTRAVENOUS; SOFT TISSUE
Status: COMPLETED | OUTPATIENT
Start: 2023-12-22 | End: 2023-12-22

## 2023-12-22 RX ORDER — LIDOCAINE HYDROCHLORIDE 10 MG/ML
0.5 INJECTION, SOLUTION INFILTRATION; PERINEURAL ONCE AS NEEDED
Status: DISCONTINUED | OUTPATIENT
Start: 2023-12-22 | End: 2023-12-22 | Stop reason: HOSPADM

## 2023-12-22 RX ORDER — HYDRALAZINE HYDROCHLORIDE 20 MG/ML
5 INJECTION INTRAMUSCULAR; INTRAVENOUS
Status: DISCONTINUED | OUTPATIENT
Start: 2023-12-22 | End: 2023-12-22 | Stop reason: HOSPADM

## 2023-12-22 RX ORDER — ROCURONIUM BROMIDE 10 MG/ML
INJECTION, SOLUTION INTRAVENOUS AS NEEDED
Status: DISCONTINUED | OUTPATIENT
Start: 2023-12-22 | End: 2023-12-22 | Stop reason: SURG

## 2023-12-22 RX ORDER — MIDAZOLAM HYDROCHLORIDE 1 MG/ML
INJECTION INTRAMUSCULAR; INTRAVENOUS
Status: COMPLETED | OUTPATIENT
Start: 2023-12-22 | End: 2023-12-22

## 2023-12-22 RX ORDER — ROPIVACAINE HYDROCHLORIDE 5 MG/ML
INJECTION, SOLUTION EPIDURAL; INFILTRATION; PERINEURAL
Status: COMPLETED | OUTPATIENT
Start: 2023-12-22 | End: 2023-12-22

## 2023-12-22 RX ORDER — OXYCODONE HYDROCHLORIDE 10 MG/1
10 TABLET ORAL EVERY 6 HOURS PRN
Qty: 28 TABLET | Refills: 0 | Status: SHIPPED | OUTPATIENT
Start: 2023-12-22

## 2023-12-22 RX ORDER — LIDOCAINE HYDROCHLORIDE 20 MG/ML
INJECTION, SOLUTION EPIDURAL; INFILTRATION; INTRACAUDAL; PERINEURAL AS NEEDED
Status: DISCONTINUED | OUTPATIENT
Start: 2023-12-22 | End: 2023-12-22 | Stop reason: SURG

## 2023-12-22 RX ORDER — SODIUM CHLORIDE, SODIUM LACTATE, POTASSIUM CHLORIDE, CALCIUM CHLORIDE 600; 310; 30; 20 MG/100ML; MG/100ML; MG/100ML; MG/100ML
1000 INJECTION, SOLUTION INTRAVENOUS CONTINUOUS
Status: DISCONTINUED | OUTPATIENT
Start: 2023-12-22 | End: 2023-12-22 | Stop reason: HOSPADM

## 2023-12-22 RX ORDER — NALOXONE HCL 0.4 MG/ML
0.4 VIAL (ML) INJECTION AS NEEDED
Status: DISCONTINUED | OUTPATIENT
Start: 2023-12-22 | End: 2023-12-22 | Stop reason: HOSPADM

## 2023-12-22 RX ORDER — DEXAMETHASONE SODIUM PHOSPHATE 4 MG/ML
INJECTION, SOLUTION INTRA-ARTICULAR; INTRALESIONAL; INTRAMUSCULAR; INTRAVENOUS; SOFT TISSUE AS NEEDED
Status: DISCONTINUED | OUTPATIENT
Start: 2023-12-22 | End: 2023-12-22 | Stop reason: SURG

## 2023-12-22 RX ORDER — FENTANYL CITRATE 50 UG/ML
50 INJECTION, SOLUTION INTRAMUSCULAR; INTRAVENOUS
Status: DISCONTINUED | OUTPATIENT
Start: 2023-12-22 | End: 2023-12-22 | Stop reason: HOSPADM

## 2023-12-22 RX ORDER — FENTANYL CITRATE 50 UG/ML
INJECTION, SOLUTION INTRAMUSCULAR; INTRAVENOUS AS NEEDED
Status: DISCONTINUED | OUTPATIENT
Start: 2023-12-22 | End: 2023-12-22 | Stop reason: SURG

## 2023-12-22 RX ORDER — PROPOFOL 10 MG/ML
INJECTION, EMULSION INTRAVENOUS AS NEEDED
Status: DISCONTINUED | OUTPATIENT
Start: 2023-12-22 | End: 2023-12-22 | Stop reason: SURG

## 2023-12-22 RX ORDER — ONDANSETRON 2 MG/ML
INJECTION INTRAMUSCULAR; INTRAVENOUS AS NEEDED
Status: DISCONTINUED | OUTPATIENT
Start: 2023-12-22 | End: 2023-12-22 | Stop reason: SURG

## 2023-12-22 RX ORDER — LABETALOL HYDROCHLORIDE 5 MG/ML
5 INJECTION, SOLUTION INTRAVENOUS
Status: DISCONTINUED | OUTPATIENT
Start: 2023-12-22 | End: 2023-12-22 | Stop reason: HOSPADM

## 2023-12-22 RX ORDER — FENTANYL CITRATE 50 UG/ML
INJECTION, SOLUTION INTRAMUSCULAR; INTRAVENOUS
Status: COMPLETED | OUTPATIENT
Start: 2023-12-22 | End: 2023-12-22

## 2023-12-22 RX ORDER — FENTANYL CITRATE 50 UG/ML
25 INJECTION, SOLUTION INTRAMUSCULAR; INTRAVENOUS
Status: DISCONTINUED | OUTPATIENT
Start: 2023-12-22 | End: 2023-12-22 | Stop reason: HOSPADM

## 2023-12-22 RX ADMIN — MIDAZOLAM 2 MG: 1 INJECTION INTRAMUSCULAR; INTRAVENOUS at 10:35

## 2023-12-22 RX ADMIN — ONDANSETRON 4 MG: 2 INJECTION INTRAMUSCULAR; INTRAVENOUS at 12:18

## 2023-12-22 RX ADMIN — SUGAMMADEX 200 MG: 100 INJECTION, SOLUTION INTRAVENOUS at 12:38

## 2023-12-22 RX ADMIN — ROCURONIUM BROMIDE 10 MG: 10 INJECTION, SOLUTION INTRAVENOUS at 12:13

## 2023-12-22 RX ADMIN — SODIUM CHLORIDE, SODIUM LACTATE, POTASSIUM CHLORIDE, AND CALCIUM CHLORIDE: .6; .31; .03; .02 INJECTION, SOLUTION INTRAVENOUS at 11:15

## 2023-12-22 RX ADMIN — FENTANYL CITRATE 25 MCG: 50 INJECTION, SOLUTION INTRAMUSCULAR; INTRAVENOUS at 11:58

## 2023-12-22 RX ADMIN — DEXAMETHASONE SODIUM PHOSPHATE 4 MG: 4 INJECTION, SOLUTION INTRA-ARTICULAR; INTRALESIONAL; INTRAMUSCULAR; INTRAVENOUS; SOFT TISSUE at 10:40

## 2023-12-22 RX ADMIN — DEXAMETHASONE SODIUM PHOSPHATE 4 MG: 4 INJECTION, SOLUTION INTRA-ARTICULAR; INTRALESIONAL; INTRAMUSCULAR; INTRAVENOUS; SOFT TISSUE at 11:48

## 2023-12-22 RX ADMIN — ROPIVACAINE HYDROCHLORIDE 30 ML: 5 INJECTION EPIDURAL; INFILTRATION; PERINEURAL at 10:35

## 2023-12-22 RX ADMIN — ROPIVACAINE HYDROCHLORIDE 30 ML: 5 INJECTION, SOLUTION EPIDURAL; INFILTRATION; PERINEURAL at 10:40

## 2023-12-22 RX ADMIN — PROPOFOL INJECTABLE EMULSION 125 MCG/KG/MIN: 10 INJECTION, EMULSION INTRAVENOUS at 11:25

## 2023-12-22 RX ADMIN — SODIUM CHLORIDE, POTASSIUM CHLORIDE, SODIUM LACTATE AND CALCIUM CHLORIDE 1000 ML: 600; 310; 30; 20 INJECTION, SOLUTION INTRAVENOUS at 10:10

## 2023-12-22 RX ADMIN — DEXAMETHASONE SODIUM PHOSPHATE 4 MG: 4 INJECTION, SOLUTION INTRAMUSCULAR; INTRAVENOUS at 10:35

## 2023-12-22 RX ADMIN — LIDOCAINE HYDROCHLORIDE 40 MG: 20 INJECTION, SOLUTION EPIDURAL; INFILTRATION; INTRACAUDAL; PERINEURAL at 11:23

## 2023-12-22 RX ADMIN — CEFAZOLIN 2 G: 2 INJECTION, POWDER, FOR SOLUTION INTRAMUSCULAR; INTRAVENOUS at 11:29

## 2023-12-22 RX ADMIN — ROCURONIUM BROMIDE 50 MG: 10 INJECTION, SOLUTION INTRAVENOUS at 11:23

## 2023-12-22 RX ADMIN — FENTANYL CITRATE 50 MCG: 50 INJECTION, SOLUTION INTRAMUSCULAR; INTRAVENOUS at 12:46

## 2023-12-22 RX ADMIN — FENTANYL CITRATE 100 MCG: 50 INJECTION, SOLUTION INTRAMUSCULAR; INTRAVENOUS at 10:35

## 2023-12-22 RX ADMIN — PROPOFOL 200 MG: 10 INJECTION, EMULSION INTRAVENOUS at 11:23

## 2023-12-22 RX ADMIN — FENTANYL CITRATE 25 MCG: 50 INJECTION, SOLUTION INTRAMUSCULAR; INTRAVENOUS at 12:26

## 2023-12-22 NOTE — OP NOTE
Operative Note   Foot and Ankle Surgery   Provider: Dr. Felipe Beck   Location: [facility]      Procedure:  1.  Open reduction and internal fixation of trimalleolar fracture without posterior lip fixation, right  2.  Open syndesmotic repair, right    Pre-operative Diagnosis:   1.  Closed, displaced trimalleolar ankle fracture, right    Post-operative Diagnosis:   1.  Closed, displaced trimalleolar ankle fracture, right  2.  Syndesmotic disruption, right    Surgeon: Felipe Beck    Assistant: None    Anesthesia: General with block    Implants: [place] 9 hole distal fibular plate with 3.5 locking and nonlocking screws; 4.0 mm cannulated screws x 2; 4.2 mm syndesmotic cortical screw    Findings: Moderate instability across the syndesmosis with medial gapping with intraoperative stress testing.  Well reduced posterior malleolus fragment after reduction of primary fractures    Specimen: None    Blood Loss: Less than 5cc    Complications: None    Post Op Plan: Discharge home.  Strict nonweightbearing activity.  Follow-up with me in 2 weeks    Summary:    Patient is a 53-year-old female that has been seen in office after injury involving her right ankle.  Patient describes a fall and initially followed up at [facility].  Closed reduction was performed and patient was discharged in a cam boot and instructed on outpatient follow-up.  Patient was seen in office and has significant swelling and instability involving the ankle.  Early ecchymosis is also noted without fracture blisters.  I discussed the imaging and treatment options with the patient.  Patient requires open reduction and internal fixation for definitive management.  Patient will require medical and cardiac clearance prior to surgery given her medical history.  She understands that she will require nonweightbearing and decreased overall activity after surgery.  We did discuss risks of numbness, chronic pain, and posttraumatic  arthritis.    Procedure, risks, complications, and goals were discussed with the patient at bedside.  Risks include but are not limited to infection, complications from anesthesia (including death), chronic pain or numbness, hematoma/seroma, deep vein thrombosis, wound complications, and potential for additional surgical procedures.  Patient understands and elects to proceed with surgery at this time. Informed consent was obtained before proceeding to the operating suite.  All questions were answered to the patient's satisfaction. No guarantees or assurances were given or implied.    Procedure:    Patient was brought to the operating room and placed on the operative table in a supine position.  A pneumatic tourniquet was placed about the patient's right thigh.  The right lower extremity was scrubbed prepped and draped in usual sterile fashion.  A formal timeout was conducted prior skin incision.  The limb was elevated and exsanguinated and the pneumatic tourniquet was inflated to 300 mmHg.      Attention was then directed to the lateral aspect of the ankle.  A linear longitudinal incision was performed over the distal midline of the fibula.  The incision was opened in layers to the level of the fracture.  Soft tissue was preserved throughout the procedure.  The fracture hematoma was evacuated and irrigated with normal saline.  The fracture was manipulated and maintained in reduction.  Definitive fixation was achieved utilizing a 3.5 mm interfragmentary screw from anterior to posterior.  The fixation was then neutralized with a 9 hole plate with locking and nonlocking 3.5 millimeter screws of various lengths.  Imaging was performed throughout the fixation showing adequate reduction of the ankle mortise.  Reduction of the medial and posterior malleolus fragments was also noted.    Attention was then directed to the medial malleolus.  A linear longitudinal incision was performed at this level.  Dissection was performed  to the level of the periosteum.  Disruption of the medial periosteum was noted at the fracture site.  Again the fracture hematoma was evacuated.  Reduction was achieved with a tenaculum and definitive fixation was achieved utilizing two 4.0 mm cannulated screws from a distal to proximal orientation across the fracture.    AP and lateral imaging was performed showing excellent reduction and internal fixation of the lateral medial malleolus fragments.  The posterior malleolus fragment appeared well reduced and quite stable and did not require fixation.  Intraoperative stress testing to the syndesmosis was performed showing instability across the syndesmosis with medial gapping.  The decision was made to proceed with syndesmotic fixation.  A large periarticular clamp was placed across the medial and lateral malleoli.  The ankle was maximally dorsiflexed and the clamp was tensioned allowing for reduction.  Fixation was achieved utilizing a 4.2 mm fully threaded screw from lateral to medial through the plate.  Final imaging was performed showing excellent fracture reduction and internal fixation.    The wounds were irrigated with copious amounts of normal saline.  Deep structures were closed with a 2-0 Vicryl in a simple interrupted manner.  The subcutaneous tissues were closed with 3-0 Vicryl and the skin was repaired with skin staples.  Incision sites were dressed with Xeroform and sterile compressive dressings.  The tourniquet was released and prompt hyperemic response was noted to all digits of the right foot.  The limb was placed into a well-padded posterior splint.  Patient tolerated the procedure and anesthesia well.  She was transferred from the operating room to the recovery room with vital signs stable and neurovascular status unchanged to the right lower extremity.      Dr. Felipe Beck, DPM  [facility]  [phone]    Note is dictated utilizing voice recognition software. Unfortunately this  leads to occasional typographical errors. I apologize in advance if the situation occurs. If questions occur please do not hesitate to call our office.

## 2023-12-22 NOTE — ANESTHESIA PROCEDURE NOTES
Peripheral Block      Patient location during procedure: pre-op  Start time: 12/22/2023 10:35 AM  Stop time: 12/22/2023 10:40 AM  Reason for block: at surgeon's request and post-op pain management  Performed by  Anesthesiologist: Vaughn Rogel MD  Preanesthetic Checklist  Completed: patient identified, IV checked, site marked, risks and benefits discussed, surgical consent, monitors and equipment checked, pre-op evaluation and timeout performed  Prep:  Pt Position: supine  Sterile barriers:cap, gloves, sterile barriers, partial drape and mask  Prep: ChloraPrep  Patient monitoring: blood pressure monitoring, continuous pulse oximetry and EKG  Procedure    Sedation: yes  Performed under: local infiltration  Guidance:ultrasound guided    ULTRASOUND INTERPRETATION.  Using ultrasound guidance a 20 G gauge needle was placed in close proximity to the sciatic nerve, at which point, under ultrasound guidance anesthetic was injected in the area of the nerve and spread of the anesthesia was seen on ultrasound in close proximity thereto.  There were no abnormalities seen on ultrasound; a digital image was taken; and the patient tolerated the procedure with no complications. Images:still images obtained, printed/placed on chart    Laterality:right  Block Type:popliteal and sciatic  Injection Technique:single-shot  Needle Type:echogenic  Needle Gauge:20 G  Resistance on Injection: none  Sedation medications used: midazolam (VERSED) injection - Intravenous   2 mg - 12/22/2023 10:35:00 AM  fentaNYL citrate (PF) (SUBLIMAZE) injection - Intravenous   100 mcg - 12/22/2023 10:35:00 AM  Medications Used: dexamethasone (DECADRON) injection - Injection   4 mg - 12/22/2023 10:35:00 AM  ropivacaine (NAROPIN) 0.5 % injection - Perineural   30 mL - 12/22/2023 10:35:00 AM      Post Assessment  Injection Assessment: negative aspiration for heme, no paresthesia on injection and incremental injection  Patient Tolerance:comfortable throughout  block  Complications:no  Additional Notes  PT SEDATED YET AWAKE WITH MEANINGFUL CONVERSATION THROUGHOUT NO PAIN OR PARESTHESIA WITH NEEDLE PLACEMENT/INJECTION US VERIFICATION NEEDLE LOCATION MEDICATION DISBURSEMENT. US GUIDANCE X1 WITH EASE.

## 2023-12-22 NOTE — ANESTHESIA PROCEDURE NOTES
Airway  Date/Time: 12/22/2023 11:25 AM  End Time:12/22/2023 11:25 AM    General Information and Staff    Patient location during procedure: OR  Anesthesiologist: Vaughn Rogel MD    Indications and Patient Condition  Indications for airway management: airway protection    Preoxygenated: yes  MILS maintained throughout  Mask difficulty assessment: 1 - vent by mask    Final Airway Details  Final airway type: endotracheal airway      Successful airway: ETT     Successful intubation technique: video laryngoscopy  Endotracheal tube insertion site: oral  Blade: [place]  Blade size: 4  ETT size (mm): 7.0  Cormack-Lehane Classification: grade I - full view of glottis  Placement verified by: chest auscultation   Measured from: teeth  ETT/EBT  to teeth (cm): 21  Number of attempts at approach: 1  Assessment: lips, teeth, and gum same as pre-op and atraumatic intubation    Additional Comments  X1 USING [place] 3. POSETCO2. BS=BS. GAUZE BITE BLOCK

## 2023-12-22 NOTE — ANESTHESIA PROCEDURE NOTES
Peripheral Block      Patient location during procedure: pre-op  Start time: 12/22/2023 10:40 AM  Stop time: 12/22/2023 10:45 AM  Reason for block: at surgeon's request and post-op pain management  Performed by  Anesthesiologist: Vaughn Rogel MD  Preanesthetic Checklist  Completed: patient identified, IV checked, site marked, risks and benefits discussed, surgical consent, monitors and equipment checked, pre-op evaluation and timeout performed  Prep:  Pt Position: supine  Sterile barriers:cap, gloves, sterile barriers, mask, partial drape, alcohol skin prep and washed/disinfected hands  Prep: ChloraPrep  Patient monitoring: blood pressure monitoring, continuous pulse oximetry and EKG  Procedure  Performed under: local infiltration  Guidance:ultrasound guided    ULTRASOUND INTERPRETATION.  Using ultrasound guidance a 20 G gauge needle was placed in close proximity to the femoral nerve, at which point, under ultrasound guidance anesthetic was injected in the area of the nerve and spread of the anesthesia was seen on ultrasound in close proximity thereto.  There were no abnormalities seen on ultrasound; a digital image was taken; and the patient tolerated the procedure with no complications. Images:still images obtained, printed/placed on chart    Laterality:right  Block Type:adductor canal block  Injection Technique:single-shot  Needle Type:echogenic  Needle Gauge:20 G  Resistance on Injection: none    Medications Used: dexamethasone (DECADRON) injection - Injection   4 mg - 12/22/2023 10:40:00 AM  ropivacaine (NAROPIN) 0.5 % injection - Perineural   30 mL - 12/22/2023 10:40:00 AM      Post Assessment  Injection Assessment: negative aspiration for heme, no paresthesia on injection and incremental injection  Patient Tolerance:comfortable throughout block  Complications:no  Additional Notes  PT SEDATED YET AWAKE WITH MEANINGFUL CONVERSATION THROUGHOUT NO PAIN OR PARESTHESIA WITH NEEDLE PLACEMENT/INJECTION US  VERIFICATION NEEDLE LOCATION MEDICATION DISBURSEMENT. US GUIDANCE X1 WITH EASE

## 2023-12-25 NOTE — ANESTHESIA POSTPROCEDURE EVALUATION
Patient: Tessie Conner    Procedure Summary       Date: 12/22/23 Room / Location: [facility] OR 09 / [facility] MAIN OR    Anesthesia Start: 1115 Anesthesia Stop: 1248    Procedure: ANKLE OPEN REDUCTION INTERNAL FIXATION (Right: Ankle) Diagnosis:       Closed displaced trimalleolar fracture of lower leg, right, initial encounter      (Closed displaced trimalleolar fracture of lower leg, right, initial encounter [S82.851N])    Surgeons: TORIE Beck DPM Provider: Vaughn Rogel MD    Anesthesia Type: general ASA Status: 3            Anesthesia Type: general    Vitals  Vitals Value Taken Time   /50 12/22/23 1329   Temp 98 °F (36.7 °C) 12/22/23 1328   Pulse 64 12/22/23 1331   Resp 16 12/22/23 1328   SpO2 98 % 12/22/23 1331   Vitals shown include unfiled device data.        Post Anesthesia Care and Evaluation    Patient location during evaluation: PACU  Patient participation: complete - patient participated  Level of consciousness: awake  Pain scale: See nurse's notes for pain score.  Pain management: adequate    Airway patency: patent  Anesthetic complications: No anesthetic complications  PONV Status: none  Cardiovascular status: acceptable  Respiratory status: acceptable and spontaneous ventilation  Hydration status: acceptable    Comments: Patient seen and examined postoperatively; vital signs stable; SpO2 greater than or equal to 90%; cardiopulmonary status stable; nausea/vomiting adequately controlled; pain adequately controlled; no apparent anesthesia complications; patient discharged from anesthesia care when discharge criteria were met

## 2024-01-02 ENCOUNTER — OFFICE VISIT (OUTPATIENT)
Dept: PODIATRY | Facility: CLINIC | Age: 54
End: 2024-01-02
Payer: MEDICARE

## 2024-01-02 ENCOUNTER — TELEPHONE (OUTPATIENT)
Dept: PODIATRY | Facility: CLINIC | Age: 54
End: 2024-01-02

## 2024-01-02 VITALS — WEIGHT: 232 LBS | BODY MASS INDEX: 39.61 KG/M2 | HEIGHT: 64 IN | RESPIRATION RATE: 16 BRPM

## 2024-01-02 DIAGNOSIS — S82.851A CLOSED DISPLACED TRIMALLEOLAR FRACTURE OF LOWER LEG, RIGHT, INITIAL ENCOUNTER: ICD-10-CM

## 2024-01-02 DIAGNOSIS — M25.571 ACUTE RIGHT ANKLE PAIN: Primary | ICD-10-CM

## 2024-01-02 RX ORDER — HYDROCODONE BITARTRATE AND ACETAMINOPHEN 7.5; 325 MG/1; MG/1
1 TABLET ORAL EVERY 8 HOURS PRN
Qty: 20 TABLET | Refills: 0 | Status: SHIPPED | OUTPATIENT
Start: 2024-01-02 | End: 2024-01-03

## 2024-01-02 RX ORDER — FERROUS SULFATE 325(65) MG
1 TABLET ORAL 2 TIMES DAILY
Qty: 180 TABLET | Refills: 1 | OUTPATIENT
Start: 2024-01-02

## 2024-01-02 NOTE — TELEPHONE ENCOUNTER
Provider: DANNY    Caller: PATIENT    Pharmacy: [facility] [phone]    Phone Number: [phone]    Reason for Call: PATIENT STATES SHE CAN'T TAKE THE NORCO THAT WAS SENT IN FOR HER BECAUSE SHE IS ALLERGIC TO TYLENOL. SHE IS ASKING IF SOMETHING DIFFERENT CAN BE SENT IN FOR HER. SHE IS ALMOST OUT OF MEDICATION.

## 2024-01-02 NOTE — PROGRESS NOTES
01/02/2024  Foot and Ankle Surgery - Established Patient/Follow-up  Provider: Dr. Felipe Beck DPM  Location: [facility]    Subjective:  Tessie Conner is a 53 y.o. female.     Chief Complaint   Patient presents with    Right Ankle - Post-op     12/22/2023 Dr Beck   ANKLE OPEN REDUCTION INTERNAL FIXATION    Follow-up     ELMER Lopez MD 11/01/2023       HPI: The patient is a 53-year-old female who presents to the clinic 2 weeks status post right ankle fracture repair.    She reports she is doing well. She states she has a walker at home. The patient notes she has been experiencing a burning sensation in her right ankle. She inquires how long it will be until she can begin physical therapy. She requests a refill of pain medication, but states she has Aleve at home.    Allergies   Allergen Reactions    Tylenol [Acetaminophen] Hives and Itching       Current Outpatient Medications on File Prior to Visit   Medication Sig Dispense Refill    Arnuity Ellipta 200 MCG/ACT INHALE 1 PUFF BY MOUTH INTO THE LUNGS DAILY.      aspirin 81 MG EC tablet Take 1 tablet by mouth Daily.      Cholecalciferol 50 MCG (2000 UT) tablet Take 1 tablet by mouth Daily. (Patient taking differently: Take 1 tablet by mouth Every Night.) 90 each 3    EPINEPHrine (EPIPEN) 0.3 MG/0.3ML solution auto-injector injection Inject 0.3 mL into the appropriate muscle as directed by prescriber As Needed.      FeroSul 325 (65 Fe) MG tablet Take 1 tablet by mouth 2 (Two) Times a Day.      gabapentin (NEURONTIN) 600 MG tablet Take 1 tablet by mouth Daily.      Gemtesa 75 MG tablet Take 1 tablet by mouth Daily.      ipratropium-albuterol (DUO-NEB) 0.5-2.5 mg/3 ml nebulizer Take 3 mL by nebulization 4 (Four) Times a Day. 360 mL 3    itraconazole (SPORANOX) 100 MG capsule Take 2 capsules by mouth 2 (Two) Times a Day.      lurasidone (LATUDA) 40 MG tablet tablet Take 0.5 tablets by mouth Daily.      Rexulti 0.5 MG tablet Take 0.5 mg by mouth Every  "Night.      sertraline (ZOLOFT) 50 MG tablet Take 1 tablet by mouth Every Night.      [DISCONTINUED] oxyCODONE (ROXICODONE) 10 MG tablet Take 1 tablet by mouth Every 6 (Six) Hours As Needed for Moderate Pain. 28 tablet 0    cephalexin (Keflex) 500 MG capsule Take 1 capsule by mouth 3 (Three) Times a Day. (Patient not taking: Reported on 1/2/2024) 15 capsule 0    midodrine (PROAMATINE) 2.5 MG tablet Take 1 tablet by mouth 3 (Three) Times a Day Before Meals. (Patient not taking: Reported on 1/2/2024) 90 tablet 2     No current facility-administered medications on file prior to visit.       Objective   Resp 16   Ht 162.6 cm (64\")   Wt 105 kg (232 lb)   LMP  (LMP Unknown)   BMI 39.82 kg/m²     Foot/Ankle Exam    GENERAL  Orientation:  AAOx3  Affect:  appropriate    VASCULAR     Right Foot Vascularity   Normal vascular exam    Dorsalis pedis:  2+  Posterior tibial:  2+  Skin temperature:  warm  Edema grading:  None  CFT:  < 3 seconds  Pedal hair growth:  Present  Varicosities:  none     Left Foot Vascularity   Normal vascular exam    Dorsalis pedis:  2+  Posterior tibial:  2+  Skin temperature:  warm  Edema grading:  None  CFT:  < 3 seconds  Pedal hair growth:  Present  Varicosities:  none     NEUROLOGIC     Right Foot Neurologic   Light touch sensation: normal  Hot/Cold sensation: normal  Achilles reflex:  2+     Left Foot Neurologic   Light touch sensation: normal  Hot/Cold sensation:  normal  Achilles reflex:  2+    MUSCULOSKELETAL     Right Foot Musculoskeletal   Arch:  Normal     Left Foot Musculoskeletal   Arch:  Normal    MUSCLE STRENGTH     Right Foot Muscle Strength   Normal strength    Foot dorsiflexion:  5  Foot plantar flexion:  5  Foot inversion:  5  Foot eversion:  5     Left Foot Muscle Strength   Normal strength    Foot dorsiflexion:  5  Foot plantar flexion:  5  Foot inversion:  5  Foot eversion:  5    DERMATOLOGIC      Right Foot Dermatologic   Skin  Right foot skin is intact.   Nails comment:  " Nails 1-5     Left Foot Dermatologic   Skin  Left foot skin is intact.   Nails comment:  Nails 1-5    TESTS     Right Foot Tests   Anterior drawer: negative  Varus tilt: negative     Left Foot Tests   Anterior drawer: negative  Varus tilt: negative     Right foot additional comments: Discomfort, swelling, and ecchymosis involving the right ankle. No gross deformity by visual inspection. No proximal fibular pain. Range of motion, muscle strength and instability testing deferred secondary to guarding.    01/02/2024  Incision sites are dry and stable with intact staples. No evidence of dehiscence or infection. Moderate resolving ecchymosis to the lower extremity. Rectus alignment of the ankle with limitation with range of motion, muscle strength secondary to guarding.      Assessment & Plan   Diagnoses and all orders for this visit:    1. Acute right ankle pain (Primary)    2. Closed displaced trimalleolar fracture of lower leg, right, initial encounter    Other orders  -     Discontinue: HYDROcodone-acetaminophen (Norco) 7.5-325 MG per tablet; Take 1 tablet by mouth Every 8 (Eight) Hours As Needed for Moderate Pain.  Dispense: 20 tablet; Refill: 0  -     oxyCODONE (Roxicodone) 5 MG immediate release tablet; Take 1 tablet by mouth Every 8 (Eight) Hours As Needed for Moderate Pain.  Dispense: 20 tablet; Refill: 0        Patient presents to the office today for a follow-up 2 weeks status post right ankle fracture repair. No results were obtained or interpreted today. Discussed the surgical procedure and hardware with the patient in detail. On physical examination, the incision sites are dry and stable with intact staples. There is moderate resolving ecchymosis to the lower extremity. Staples were removed today and Steri-Strips were applied. She may shower; however, she should avoid submerging the right ankle at this time. Will place her in a tall boot today and advised she may partially bear weight in the boot with her  walker as needed for transfers. Discussed that there may be discomfort with weight-bearing while transferring and burning, numbness, cramping, and spasms are normal during the healing process. Recommended beginning gentle range-of-motion manual therapy exercises. Explained she may begin physical therapy in approximately 2 weeks if necessary. Prescribed 20 tablets of pain medication to be taken at primarily at night before bed. The patient will return to the office in 2 weeks for re-evaluation and repeat x-rays.    No orders of the defined types were placed in this encounter.         Note is dictated utilizing voice recognition software. Unfortunately this leads to occasional typographical errors. I apologize in advance if the situation occurs. If questions occur please do not hesitate to call our office.    Transcribed from ambient dictation for TORIE Beck DPM by Daja Lu.  01/02/24   12:43 EST    Patient or patient representative verbalized consent to the visit recording.  I have personally performed the services described in this document as transcribed by the above individual, and it is both accurate and complete.

## 2024-01-03 RX ORDER — OXYCODONE HYDROCHLORIDE 5 MG/1
5 TABLET ORAL EVERY 8 HOURS PRN
Qty: 20 TABLET | Refills: 0 | Status: SHIPPED | OUTPATIENT
Start: 2024-01-03

## 2024-01-08 ENCOUNTER — TELEPHONE (OUTPATIENT)
Dept: PODIATRY | Facility: CLINIC | Age: 54
End: 2024-01-08
Payer: MEDICARE

## 2024-01-08 NOTE — TELEPHONE ENCOUNTER
Patient had surgery with you on 12/22/2023 for a right ankle ORIF. Patients PCP has ordered PT to help build her upper strength. [facility] is requesting all restrictions on her ankle before they will start PT

## 2024-01-08 NOTE — TELEPHONE ENCOUNTER
Letter typed and faxed to [facility] per patient request

## 2024-01-10 ENCOUNTER — TELEPHONE (OUTPATIENT)
Dept: ORTHOPEDIC SURGERY | Facility: CLINIC | Age: 54
End: 2024-01-10
Payer: MEDICARE

## 2024-01-10 NOTE — TELEPHONE ENCOUNTER
Samantha with [facility] called and stated the notes she got from us state partial protected weight bearing on right side with walker but PCP is wanting to do core and arm strengthening. Samantha is needing to know if patient can start PT prior to the 16th re-eval. Please call Samantha to clarify at [phone]. Ask for Samantha or Joyce

## 2024-01-11 ENCOUNTER — HOSPITAL ENCOUNTER (OUTPATIENT)
Dept: MRI IMAGING | Facility: HOSPITAL | Age: 54
Discharge: HOME | End: 2024-01-11
Payer: MEDICAID

## 2024-01-15 ENCOUNTER — HOSPITAL ENCOUNTER (OUTPATIENT)
Dept: ULTRASOUND IMAGING | Facility: HOSPITAL | Age: 54
Discharge: HOME | End: 2024-01-15
Payer: MEDICAID

## 2024-01-16 ENCOUNTER — OFFICE VISIT (OUTPATIENT)
Dept: PODIATRY | Facility: CLINIC | Age: 54
End: 2024-01-16
Payer: MEDICARE

## 2024-01-16 VITALS — WEIGHT: 232 LBS | HEIGHT: 64 IN | RESPIRATION RATE: 20 BRPM | BODY MASS INDEX: 39.61 KG/M2

## 2024-01-16 DIAGNOSIS — S82.851D: ICD-10-CM

## 2024-01-16 DIAGNOSIS — M25.571 ACUTE RIGHT ANKLE PAIN: Primary | ICD-10-CM

## 2024-01-16 RX ORDER — TRAMADOL HYDROCHLORIDE 50 MG/1
TABLET ORAL
COMMUNITY
Start: 2023-12-17

## 2024-01-16 NOTE — PROGRESS NOTES
01/16/2024  Foot and Ankle Surgery - Established Patient/Follow-up  Provider: Dr. Felipe Beck DPM  Location: [facility]    Subjective:  Tessie Conner is a 54 y.o. female.     Chief Complaint   Patient presents with    Right Ankle - Follow-up     12/22/2023 Dr Beck   ANKLE OPEN REDUCTION INTERNAL FIXATION    Follow-up     ELMER Lopez MD 11/1/2023       HPI: Tessie Conner is a 54-year-old female who presents to the office today for a follow-up approximately 4 weeks status post right ankle surgery.    The patient reports significant improvement in her right ankle. She notes some ecchymosis and bruising. She has been non-weightbearing on her right ankle. She has been removing the boot this week. The patient is scheduled to begin physical therapy on 01/19/2024. She has not taken any pain medication.    Allergies   Allergen Reactions    Tylenol [Acetaminophen] Hives and Itching       Current Outpatient Medications on File Prior to Visit   Medication Sig Dispense Refill    Arnuity Ellipta 200 MCG/ACT INHALE 1 PUFF BY MOUTH INTO THE LUNGS DAILY.      EPINEPHrine (EPIPEN) 0.3 MG/0.3ML solution auto-injector injection Inject 0.3 mL into the appropriate muscle as directed by prescriber As Needed.      FeroSul 325 (65 Fe) MG tablet Take 1 tablet by mouth 2 (Two) Times a Day.      gabapentin (NEURONTIN) 600 MG tablet Take 1 tablet by mouth Daily.      Gemtesa 75 MG tablet Take 1 tablet by mouth Daily.      ipratropium-albuterol (DUO-NEB) 0.5-2.5 mg/3 ml nebulizer Take 3 mL by nebulization 4 (Four) Times a Day. 360 mL 3    itraconazole (SPORANOX) 100 MG capsule Take 2 capsules by mouth 2 (Two) Times a Day.      metFORMIN (GLUCOPHAGE) 500 MG tablet Take 1 tablet by mouth Every 12 (Twelve) Hours.      oxyCODONE (Roxicodone) 5 MG immediate release tablet Take 1 tablet by mouth Every 8 (Eight) Hours As Needed for Moderate Pain. 20 tablet 0    sertraline (ZOLOFT) 50 MG tablet Take 1 tablet by mouth Every Night.    "   traMADol (ULTRAM) 50 MG tablet TAKE 1 TABLET BY MOUTH EVERY 4 HOURS AS NEEDED FOR PAIN FOR 3 DAYS      aspirin 81 MG EC tablet Take 1 tablet by mouth Daily.      cephalexin (Keflex) 500 MG capsule Take 1 capsule by mouth 3 (Three) Times a Day. (Patient not taking: Reported on 1/2/2024) 15 capsule 0    Cholecalciferol 50 MCG (2000 UT) tablet Take 1 tablet by mouth Daily. (Patient taking differently: Take 1 tablet by mouth Every Night.) 90 each 3    midodrine (PROAMATINE) 2.5 MG tablet Take 1 tablet by mouth 3 (Three) Times a Day Before Meals. (Patient not taking: Reported on 1/2/2024) 90 tablet 2    Rexulti 0.5 MG tablet Take 0.5 mg by mouth Every Night.       No current facility-administered medications on file prior to visit.       Objective   Resp 20   Ht 162.6 cm (64\")   Wt 105 kg (232 lb)   LMP  (LMP Unknown)   BMI 39.82 kg/m²     Foot/Ankle Exam  GENERAL  Orientation:  AAOx3  Affect:  appropriate    VASCULAR     Right Foot Vascularity   Normal vascular exam    Dorsalis pedis:  2+  Posterior tibial:  2+  Skin temperature:  warm  Edema grading:  None  CFT:  < 3 seconds  Pedal hair growth:  Present  Varicosities:  none     Left Foot Vascularity   Normal vascular exam    Dorsalis pedis:  2+  Posterior tibial:  2+  Skin temperature:  warm  Edema grading:  None  CFT:  < 3 seconds  Pedal hair growth:  Present  Varicosities:  none     NEUROLOGIC     Right Foot Neurologic   Light touch sensation: normal  Hot/Cold sensation: normal  Achilles reflex:  2+     Left Foot Neurologic   Light touch sensation: normal  Hot/Cold sensation:  normal  Achilles reflex:  2+    MUSCULOSKELETAL     Right Foot Musculoskeletal   Arch:  Normal     Left Foot Musculoskeletal   Arch:  Normal    MUSCLE STRENGTH     Right Foot Muscle Strength   Normal strength    Foot dorsiflexion:  5  Foot plantar flexion:  5  Foot inversion:  5  Foot eversion:  5     Left Foot Muscle Strength   Normal strength    Foot dorsiflexion:  5  Foot plantar " flexion:  5  Foot inversion:  5  Foot eversion:  5    DERMATOLOGIC      Right Foot Dermatologic   Skin  Right foot skin is intact.   Nails comment:  Nails 1-5     Left Foot Dermatologic   Skin  Left foot skin is intact.   Nails comment:  Nails 1-5    TESTS     Right Foot Tests   Anterior drawer: negative  Varus tilt: negative     Left Foot Tests   Anterior drawer: negative  Varus tilt: negative     Right foot additional comments: Discomfort, swelling, and ecchymosis involving the right ankle. No gross deformity by visual inspection. No proximal fibular pain. Range of motion, muscle strength and instability testing deferred secondary to guarding.    01/02/2024  Incision sites are dry and stable with intact staples. No evidence of dehiscence or infection. Moderate resolving ecchymosis to the lower extremity. Rectus alignment of the ankle with limitation with range of motion, muscle strength secondary to guarding.    01/16/2024: Swelling has decreased. Range of motion has improved but remains somewhat limited. No progressive deformity or further issues.    Assessment & Plan   Diagnoses and all orders for this visit:    1. Acute right ankle pain (Primary)    2. Closed displaced trimalleolar fracture with routine healing, right  -     XR Ankle 3+ View Right      The patient is a 54-year-old female who presents to the office today for a follow-up approximately 4 weeks status post right ankle surgery. X-rays of the right ankle, taken today, were independently reviewed revealing the hardware is stable and where it needs to be. The fractures are very early in the process of healing. I explained that the bruising she is experiencing is related to soft tissue damage. I recommend gentle range of motion manual therapy exercises. I advised the patient to wear the boot when she is up and moving around. She may remove the boot when she is sleeping and resting. I explained that it will take months for her fractures to stabilize. We  discussed physical therapy in 2 weeks. I advised the patient to continue taking her pain medication at night. The patient will return to the office in 4 weeks for reevaluation and repeat x-rays.    Orders Placed This Encounter   Procedures    XR Ankle 3+ View Right     Order Specific Question:   Reason for Exam:     Answer:   ANKLE OPEN REDUCTION INTERNAL FIXATION 12/22 rm 14 non-wb     Order Specific Question:   Release to patient     Answer:   Routine Release [[id number]]          Note is dictated utilizing voice recognition software. Unfortunately this leads to occasional typographical errors. I apologize in advance if the situation occurs. If questions occur please do not hesitate to call our office.    Transcribed from ambient dictation for TORIE Beck DPM by Deidra Anguiano.  01/16/24   13:17 EST    Patient or patient representative verbalized consent to the visit recording.  I have personally performed the services described in this document as transcribed by the above individual, and it is both accurate and complete.    Answers submitted by the patient for this visit:  Primary Reason for Visit (Submitted on 1/16/2024)  What is the primary reason for your visit?: Lower Extremity Injury  Lower Extremity Injury Questionnaire (Submitted on 1/16/2024)  Chief Complaint: Lower extremity pain  Incident occurred: more than 1 week ago  Incident location: at home  Injury mechanism: a fall  Pain location: right ankle  Pain quality: aching  Pain - numeric: 3/10  Pain course: intermittent  tingling: No  inability to bear weight: Yes  loss of motion: Yes  loss of sensation: No  muscle weakness: Yes  Foreign body present: no foreign bodies  Aggravated by: movement

## 2024-01-18 ENCOUNTER — APPOINTMENT (OUTPATIENT)
Dept: GENERAL RADIOLOGY | Facility: HOSPITAL | Age: 54
End: 2024-01-18
Payer: MEDICARE

## 2024-01-18 ENCOUNTER — HOSPITAL ENCOUNTER (EMERGENCY)
Facility: HOSPITAL | Age: 54
Discharge: HOME | End: 2024-01-18
Attending: EMERGENCY MEDICINE | Admitting: EMERGENCY MEDICINE
Payer: MEDICARE

## 2024-01-18 VITALS
BODY MASS INDEX: 39.61 KG/M2 | SYSTOLIC BLOOD PRESSURE: 140 MMHG | HEART RATE: 88 BPM | RESPIRATION RATE: 18 BRPM | WEIGHT: 232 LBS | DIASTOLIC BLOOD PRESSURE: 69 MMHG | HEIGHT: 64 IN | OXYGEN SATURATION: 96 % | TEMPERATURE: 98.1 F

## 2024-01-18 DIAGNOSIS — J45.901 EXACERBATION OF ASTHMA, UNSPECIFIED ASTHMA SEVERITY, UNSPECIFIED WHETHER PERSISTENT: Primary | ICD-10-CM

## 2024-01-18 DIAGNOSIS — J02.9 VIRAL PHARYNGITIS: ICD-10-CM

## 2024-01-18 LAB
ANION GAP SERPL CALCULATED.3IONS-SCNC: 10 MMOL/L (ref 5–15)
BASOPHILS # BLD AUTO: 0 10*3/MM3 (ref 0–0.2)
BASOPHILS NFR BLD AUTO: 0.2 % (ref 0–1.5)
BUN SERPL-MCNC: 11 MG/DL (ref 6–20)
BUN/CREAT SERPL: 19.6 (ref 7–25)
CALCIUM SPEC-SCNC: 9.5 MG/DL (ref 8.6–10.5)
CHLORIDE SERPL-SCNC: 108 MMOL/L (ref 98–107)
CO2 SERPL-SCNC: 26 MMOL/L (ref 22–29)
CREAT SERPL-MCNC: 0.56 MG/DL (ref 0.57–1)
DEPRECATED RDW RBC AUTO: 64.8 FL (ref 37–54)
EGFRCR SERPLBLD CKD-EPI 2021: 109.3 ML/MIN/1.73
EOSINOPHIL # BLD AUTO: 0 10*3/MM3 (ref 0–0.4)
EOSINOPHIL NFR BLD AUTO: 0.3 % (ref 0.3–6.2)
ERYTHROCYTE [DISTWIDTH] IN BLOOD BY AUTOMATED COUNT: 19.9 % (ref 12.3–15.4)
FLUAV RNA RESP QL NAA+PROBE: NOT DETECTED
FLUBV RNA RESP QL NAA+PROBE: NOT DETECTED
GLUCOSE SERPL-MCNC: 123 MG/DL (ref 65–99)
HCT VFR BLD AUTO: 37.9 % (ref 34–46.6)
HGB BLD-MCNC: 12.6 G/DL (ref 12–15.9)
LYMPHOCYTES # BLD AUTO: 1.4 10*3/MM3 (ref 0.7–3.1)
LYMPHOCYTES NFR BLD AUTO: 14.7 % (ref 19.6–45.3)
MCH RBC QN AUTO: 31.2 PG (ref 26.6–33)
MCHC RBC AUTO-ENTMCNC: 33.2 G/DL (ref 31.5–35.7)
MCV RBC AUTO: 93.8 FL (ref 79–97)
MONOCYTES # BLD AUTO: 0.8 10*3/MM3 (ref 0.1–0.9)
MONOCYTES NFR BLD AUTO: 8.6 % (ref 5–12)
NEUTROPHILS NFR BLD AUTO: 7 10*3/MM3 (ref 1.7–7)
NEUTROPHILS NFR BLD AUTO: 76.2 % (ref 42.7–76)
NRBC BLD AUTO-RTO: 0.2 /100 WBC (ref 0–0.2)
NT-PROBNP SERPL-MCNC: 548.7 PG/ML (ref 0–900)
PLATELET # BLD AUTO: 189 10*3/MM3 (ref 140–450)
PMV BLD AUTO: 8 FL (ref 6–12)
POTASSIUM SERPL-SCNC: 3.7 MMOL/L (ref 3.5–5.2)
RBC # BLD AUTO: 4.04 10*6/MM3 (ref 3.77–5.28)
RSV RNA NPH QL NAA+NON-PROBE: NOT DETECTED
SARS-COV-2 RNA RESP QL NAA+PROBE: NOT DETECTED
SODIUM SERPL-SCNC: 144 MMOL/L (ref 136–145)
TROPONIN T SERPL HS-MCNC: 13 NG/L
WBC NRBC COR # BLD AUTO: 9.2 10*3/MM3 (ref 3.4–10.8)

## 2024-01-18 PROCEDURE — 96375 TX/PRO/DX INJ NEW DRUG ADDON: CPT

## 2024-01-18 PROCEDURE — 25010000002 METHYLPREDNISOLONE PER 125 MG: Performed by: EMERGENCY MEDICINE

## 2024-01-18 PROCEDURE — 94664 DEMO&/EVAL PT USE INHALER: CPT

## 2024-01-18 PROCEDURE — 85025 COMPLETE CBC W/AUTO DIFF WBC: CPT | Performed by: EMERGENCY MEDICINE

## 2024-01-18 PROCEDURE — 87637 SARSCOV2&INF A&B&RSV AMP PRB: CPT | Performed by: EMERGENCY MEDICINE

## 2024-01-18 PROCEDURE — 94640 AIRWAY INHALATION TREATMENT: CPT

## 2024-01-18 PROCEDURE — 84484 ASSAY OF TROPONIN QUANT: CPT | Performed by: EMERGENCY MEDICINE

## 2024-01-18 PROCEDURE — 93005 ELECTROCARDIOGRAM TRACING: CPT | Performed by: EMERGENCY MEDICINE

## 2024-01-18 PROCEDURE — 71045 X-RAY EXAM CHEST 1 VIEW: CPT

## 2024-01-18 PROCEDURE — 80048 BASIC METABOLIC PNL TOTAL CA: CPT | Performed by: EMERGENCY MEDICINE

## 2024-01-18 PROCEDURE — 96374 THER/PROPH/DIAG INJ IV PUSH: CPT

## 2024-01-18 PROCEDURE — 99284 EMERGENCY DEPT VISIT MOD MDM: CPT

## 2024-01-18 PROCEDURE — 94799 UNLISTED PULMONARY SVC/PX: CPT

## 2024-01-18 PROCEDURE — 94761 N-INVAS EAR/PLS OXIMETRY MLT: CPT

## 2024-01-18 PROCEDURE — 83880 ASSAY OF NATRIURETIC PEPTIDE: CPT | Performed by: EMERGENCY MEDICINE

## 2024-01-18 PROCEDURE — 25010000002 KETOROLAC TROMETHAMINE PER 15 MG: Performed by: EMERGENCY MEDICINE

## 2024-01-18 RX ORDER — METHYLPREDNISOLONE SODIUM SUCCINATE 125 MG/2ML
125 INJECTION, POWDER, LYOPHILIZED, FOR SOLUTION INTRAMUSCULAR; INTRAVENOUS ONCE
Status: COMPLETED | OUTPATIENT
Start: 2024-01-18 | End: 2024-01-18

## 2024-01-18 RX ORDER — SODIUM CHLORIDE 0.9 % (FLUSH) 0.9 %
10 SYRINGE (ML) INJECTION AS NEEDED
Status: DISCONTINUED | OUTPATIENT
Start: 2024-01-18 | End: 2024-01-18 | Stop reason: HOSPADM

## 2024-01-18 RX ORDER — KETOROLAC TROMETHAMINE 30 MG/ML
15 INJECTION, SOLUTION INTRAMUSCULAR; INTRAVENOUS ONCE
Status: COMPLETED | OUTPATIENT
Start: 2024-01-18 | End: 2024-01-18

## 2024-01-18 RX ORDER — PREDNISONE 50 MG/1
50 TABLET ORAL DAILY
Qty: 4 TABLET | Refills: 0 | Status: SHIPPED | OUTPATIENT
Start: 2024-01-18

## 2024-01-18 RX ORDER — ALBUTEROL SULFATE 2.5 MG/3ML
2.5 SOLUTION RESPIRATORY (INHALATION) ONCE
Status: COMPLETED | OUTPATIENT
Start: 2024-01-18 | End: 2024-01-18

## 2024-01-18 RX ADMIN — KETOROLAC TROMETHAMINE 15 MG: 30 INJECTION, SOLUTION INTRAMUSCULAR; INTRAVENOUS at 05:36

## 2024-01-18 RX ADMIN — ALBUTEROL SULFATE 2.5 MG: 2.5 SOLUTION RESPIRATORY (INHALATION) at 06:21

## 2024-01-18 RX ADMIN — METHYLPREDNISOLONE SODIUM SUCCINATE 125 MG: 125 INJECTION, POWDER, FOR SOLUTION INTRAMUSCULAR; INTRAVENOUS at 05:15

## 2024-01-18 NOTE — ED PROVIDER NOTES
Subjective   History of Present Illness  54-year-old female presents stating she has had sore throat cough congestion tonight.  She reports no fevers or chills.  She reports some associated nausea but no vomiting or diarrhea or abdominal pain or chest pain.  She reports no known ill contacts.  She states she felt short of breath due to her cough.  She reports no sputum production and no hemoptysis.  Review of Systems    Past Medical History:   Diagnosis Date    Abnormal ECG     Anemia     Anesthesia complication 2003    cardiac arrest  with hysterectomy    Anxiety 2005    Arthritis     Asthma 04/17/2020    allergy    Bipolar 1 disorder     Bradycardia     Chest pain     due to scarring from lung surgery  2/21    Chest pain 09/24/2020    Cholelithiasis 10/2022    Coronary artery disease     very minimal    Depression 2005    Diabetes mellitus 2002    borderline   resolved    Dyspnea on minimal exertion     Frequent UTI     Gastroesophageal reflux disease without esophagitis 07/15/2020    Heart murmur     FROM CHILDHOOD    Histoplasmosis     History of anemia     POST PREGNANCY    Hyperlipidemia     Hyperlipidemia 10/04/2016    Hypotension     Mass of upper lobe of right lung     Migraines     hx    Obesity 1999    PONV (postoperative nausea and vomiting)     Sleep apnea 2/28/22    no machine    Spinal headache     Vertigo     Visual impairment 2011    WEARS GLASSES    Vitamin D deficiency        Allergies   Allergen Reactions    Tylenol [Acetaminophen] Hives and Itching       Past Surgical History:   Procedure Laterality Date    ANKLE OPEN REDUCTION INTERNAL FIXATION Right 12/22/2023    Procedure: ANKLE OPEN REDUCTION INTERNAL FIXATION;  Surgeon: TORIE Beck DPM;  Location: [facility];  Service: Podiatry;  Laterality: Right;    APPENDECTOMY  2011    CARDIAC CATHETERIZATION N/A 09/24/2020    Procedure: Left Heart Cath possible PCI, atherectomy, hemodynamic support;  Surgeon: Thomas Zamora MD;   Location: [facility] CATH INVASIVE LOCATION;  Service: Cardiology;  Laterality: N/A;    CARDIAC CATHETERIZATION  2020    CARDIAC ELECTROPHYSIOLOGY PROCEDURE N/A 2023    Procedure: Pacemaker DC new, [place] aware;  Surgeon: Rachel Espinoza MD;  Location: [facility] CATH INVASIVE LOCATION;  Service: Cardiovascular;  Laterality: N/A;     SECTION      FOOT/TOE TENDON REPAIR Left     HYSTERECTOMY      LUNG BIOPSY Right 2020    LUNG LOBECTOMY Right 2022    pt had partial Right lobectomy and nodule removal    MASS EXCISION Right 2023    Procedure: LIPOMA EXCISION,THIGH;  Surgeon: Justo Shannon MD;  Location: [facility] MAIN OR;  Service: General;  Laterality: Right;    ROTATOR CUFF REPAIR Left     THORACOSCOPY VIDEO ASSISTED WITH LOBECTOMY Right 2022    Procedure: BRONCHOSCOPY, THORACOSCOPY VIDEO ASSISTED wedge resection X2, INTERCOSTAL NERVE BLOCK;  Surgeon: Ayla Carroll MD;  Location: [facility] MAIN OR;  Service: Thoracic;  Laterality: Right;    TUBAL ABDOMINAL LIGATION         Family History   Problem Relation Age of Onset    Arthritis Mother     Cancer Mother     Depression Mother     Diabetes Mother     Early death Mother     Mental illness Mother     Anxiety disorder Mother     Alcohol abuse Father     Diabetes Father     Early death Father     Heart disease Father     Hyperlipidemia Father     Hypertension Father         Father    Vision loss Father     Heart attack Father     Heart disease Sister     Drug abuse Sister     Heart attack Sister     Hypertension Sister         Sister    Heart disease Sister     Heart disease Brother     Hypertension Brother     Drug abuse Brother     Hypertension Brother     Heart disease Brother     Heart attack Brother     Cancer Maternal Grandmother     Malig Hyperthermia Neg Hx        Social History     Socioeconomic History    Marital status:    Tobacco Use    Smoking status: Never     Passive exposure: Never    Smokeless  tobacco: Never   Vaping Use    Vaping Use: Never used   Substance and Sexual Activity    Alcohol use: Not Currently     Comment: VERY RARE    Drug use: No    Sexual activity: Not Currently     Partners: Male     Birth control/protection: None, Hysterectomy       Prior to Admission medications    Medication Sig Start Date End Date Taking? Authorizing Provider   Arnuity Ellipta 200 MCG/ACT INHALE 1 PUFF BY MOUTH INTO THE LUNGS DAILY.    Marko Abdullahi MD   aspirin 81 MG EC tablet Take 1 tablet by mouth Daily.    Marko Abdullahi MD   cephalexin (Keflex) 500 MG capsule Take 1 capsule by mouth 3 (Three) Times a Day.  Patient not taking: Reported on 1/2/2024 11/17/23   Marsha Lopez MD   Cholecalciferol 50 MCG (2000 UT) tablet Take 1 tablet by mouth Daily.  Patient taking differently: Take 1 tablet by mouth Every Night. 5/5/23   Marsha Lopez MD   EPINEPHrine (EPIPEN) 0.3 MG/0.3ML solution auto-injector injection Inject 0.3 mL into the appropriate muscle as directed by prescriber As Needed. 10/28/22   Marko Abdullahi MD   FeroSul 325 (65 Fe) MG tablet Take 1 tablet by mouth 2 (Two) Times a Day. 8/22/23   Marko Abdullahi MD   gabapentin (NEURONTIN) 600 MG tablet Take 1 tablet by mouth Daily.    Marko Abdullahi MD   Gemtesa 75 MG tablet Take 1 tablet by mouth Daily. 8/28/23   Marko Abdullahi MD   ipratropium-albuterol (DUO-NEB) 0.5-2.5 mg/3 ml nebulizer Take 3 mL by nebulization 4 (Four) Times a Day. 5/5/23   Marsha Lopez MD   itraconazole (SPORANOX) 100 MG capsule Take 2 capsules by mouth 2 (Two) Times a Day.    Marko Abdullahi MD   metFORMIN (GLUCOPHAGE) 500 MG tablet Take 1 tablet by mouth Every 12 (Twelve) Hours. 1/5/24   Marko Abdullahi MD   midodrine (PROAMATINE) 2.5 MG tablet Take 1 tablet by mouth 3 (Three) Times a Day Before Meals.  Patient not taking: Reported on 1/2/2024 12/8/23 12/7/24  ChemRachel jerome MD   oxyCODONE (Roxicodone) 5 MG  "immediate release tablet Take 1 tablet by mouth Every 8 (Eight) Hours As Needed for Moderate Pain. 1/3/24   TORIE Beck DPM   Rexulti 0.5 MG tablet Take 0.5 mg by mouth Every Night. 12/8/22   Marko Abdullahi MD   sertraline (ZOLOFT) 50 MG tablet Take 1 tablet by mouth Every Night. 1/10/23   Marko Abdullahi MD   traMADol (ULTRAM) 50 MG tablet TAKE 1 TABLET BY MOUTH EVERY 4 HOURS AS NEEDED FOR PAIN FOR 3 DAYS 12/17/23   Marko Abdullahi MD     /72   Pulse 74   Temp 98.1 °F (36.7 °C) (Oral)   Resp 16   Ht 162.6 cm (64\")   Wt 105 kg (232 lb)   LMP  (LMP Unknown)   SpO2 100%   BMI 39.82 kg/m²       Objective   Physical Exam  General: Obese female in no acute distress, awake alert and pleasant  Eyes: sclera nonicteric  HEENT: Mucous membranes moist, no mucosal swelling, no tonsillar swelling or exudate, mild peritonsillar erythema  Neck: Supple, no nuchal rigidity, no lymphadenopathy, no stridor, voice clear  Respirations: Faint expiratory wheeze, occasional bronchospastic cough, respirations nonlabored  Heart regular rate and rhythm, no murmurs rubs or gallops,   Abdomen soft nontender nondistended,   Extremities no clubbing cyanosis or edema, calves are symmetric and nontender  Neuro cranial nerves grossly intact, no focal limb deficits  Psych oriented, pleasant affect  Skin no rash, brisk cap refill  Procedures           ED Course      Results for orders placed or performed during the hospital encounter of 01/18/24   COVID-19, FLU A/B, RSV PCR 1 HR TAT - Swab, Nasopharynx    Specimen: Nasopharynx; Swab   Result Value Ref Range    COVID19 Not Detected Not Detected - Ref. Range    Influenza A PCR Not Detected Not Detected    Influenza B PCR Not Detected Not Detected    RSV, PCR Not Detected Not Detected   High Sensitivity Troponin T    Specimen: Blood   Result Value Ref Range    HS Troponin T 13 <14 ng/L   Basic Metabolic Panel    Specimen: Blood   Result Value Ref Range    Glucose " 123 (H) 65 - 99 mg/dL    BUN 11 6 - 20 mg/dL    Creatinine 0.56 (L) 0.57 - 1.00 mg/dL    Sodium 144 136 - 145 mmol/L    Potassium 3.7 3.5 - 5.2 mmol/L    Chloride 108 (H) 98 - 107 mmol/L    CO2 26.0 22.0 - 29.0 mmol/L    Calcium 9.5 8.6 - 10.5 mg/dL    BUN/Creatinine Ratio 19.6 7.0 - 25.0    Anion Gap 10.0 5.0 - 15.0 mmol/L    eGFR 109.3 >60.0 mL/min/1.73   CBC Auto Differential    Specimen: Blood   Result Value Ref Range    WBC 9.20 3.40 - 10.80 10*3/mm3    RBC 4.04 3.77 - 5.28 10*6/mm3    Hemoglobin 12.6 12.0 - 15.9 g/dL    Hematocrit 37.9 34.0 - 46.6 %    MCV 93.8 79.0 - 97.0 fL    MCH 31.2 26.6 - 33.0 pg    MCHC 33.2 31.5 - 35.7 g/dL    RDW 19.9 (H) 12.3 - 15.4 %    RDW-SD 64.8 (H) 37.0 - 54.0 fl    MPV 8.0 6.0 - 12.0 fL    Platelets 189 140 - 450 10*3/mm3    Neutrophil % 76.2 (H) 42.7 - 76.0 %    Lymphocyte % 14.7 (L) 19.6 - 45.3 %    Monocyte % 8.6 5.0 - 12.0 %    Eosinophil % 0.3 0.3 - 6.2 %    Basophil % 0.2 0.0 - 1.5 %    Neutrophils, Absolute 7.00 1.70 - 7.00 10*3/mm3    Lymphocytes, Absolute 1.40 0.70 - 3.10 10*3/mm3    Monocytes, Absolute 0.80 0.10 - 0.90 10*3/mm3    Eosinophils, Absolute 0.00 0.00 - 0.40 10*3/mm3    Basophils, Absolute 0.00 0.00 - 0.20 10*3/mm3    nRBC 0.2 0.0 - 0.2 /100 WBC   BNP    Specimen: Blood   Result Value Ref Range    proBNP 548.7 0.0 - 900.0 pg/mL   ECG 12 Lead Dyspnea   Result Value Ref Range    QT Interval 354 ms    QTC Interval 383 ms                                              Medical Decision Making  Patient presents with cough sore throat and dyspnea differential diagnose including pneumonia, COVID, pneumothorax, acute coronary syndrome, pulmonary embolus, congestive heart failure    Patient has no signs of DVT, pulmonary embolus felt to be unlikely based on patient's exam and presentation.  Viral respiratory infection with asthma exacerbation favored.  EKG shows no ischemic abnormality, high-sensitivity troponin and BNP normal, x-ray showing no evidence of pneumonia.   COVID and flu RSV screen negative.  Patient symptoms are noncardiac.  She was ordered nebulizer treatment and steroid and was resting comfortably reexamination.  Oxygen saturation in the upper 90s on room air.  She is in no respiratory distress.  She has no apparent airway problems.  We discussed some management of his sore throat as well as her cough.  She was prescribed prednisone for her asthma exacerbation and she will continue her home nebulizers.  She is given warning signs for return and discharged in good condition.  Patient is agreeable to the plan.    Problems Addressed:  Exacerbation of asthma, unspecified asthma severity, unspecified whether persistent: complicated acute illness or injury  Viral pharyngitis: complicated acute illness or injury    Amount and/or Complexity of Data Reviewed  Labs: ordered. Decision-making details documented in ED Course.  Radiology: ordered and independent interpretation performed.     Details: My independent interpretation of chest x-ray image no apparent pneumonia or acute congestive failure when compared to previous chest x-ray.  Radiology report noted.  Patient has no signs of volume overload or congestive failure on exam.  BNP normal.  ECG/medicine tests: ordered and independent interpretation performed.     Details: My EKG interpretation sinus rhythm, rate of 70, nonspecific T wave abnormalities in the lateral leads that are not significantly changed from previous EKG in the records    Risk  Prescription drug management.        Final diagnoses:   Exacerbation of asthma, unspecified asthma severity, unspecified whether persistent   Viral pharyngitis       ED Disposition  ED Disposition       ED Disposition   Discharge    Condition   Stable    Comment   --               Marsha Lopez MD  [address] [place] [address] Miguel A Alanis  [place] [address] [phone]    Schedule an appointment as soon as possible for a visit in 1 day           Medication List        New  "Prescriptions      predniSONE 50 MG tablet  Commonly known as: DELTASONE  Take 1 tablet by mouth Daily.            Changed      Cholecalciferol 50 MCG (2000 UT) tablet  Take 1 tablet by mouth Daily.  What changed: when to take this               Where to Get Your Medications        These medications were sent to [facility] #[id number] - [place] - [address] AT [place] [address] [facility] - [phone] [facility] [phone]   [address], [place] [address]      Phone: [phone]   predniSONE 50 MG tablet            Mauro Mendiola MD  01/18/24 0618       Mauro Mendiola MD  01/18/24 0643    "

## 2024-01-18 NOTE — DISCHARGE INSTRUCTIONS
Rest, drink plenty of fluids, consider throat lozenges, Motrin for discomfort.  Prednisone as well as your nebulizers for asthma exacerbation.  Return for increased shortness of breath, increased pain, high fever, vomiting or any other concerns

## 2024-01-19 LAB
QT INTERVAL: 354 MS
QTC INTERVAL: 383 MS

## 2024-02-13 ENCOUNTER — OFFICE VISIT (OUTPATIENT)
Dept: PODIATRY | Facility: CLINIC | Age: 54
End: 2024-02-13
Payer: MEDICARE

## 2024-02-13 VITALS
HEIGHT: 64 IN | HEART RATE: 95 BPM | WEIGHT: 233 LBS | RESPIRATION RATE: 16 BRPM | OXYGEN SATURATION: 100 % | BODY MASS INDEX: 39.78 KG/M2

## 2024-02-13 DIAGNOSIS — M25.571 ACUTE RIGHT ANKLE PAIN: Primary | ICD-10-CM

## 2024-02-13 DIAGNOSIS — S82.851D: ICD-10-CM

## 2024-03-13 ENCOUNTER — TELEPHONE (OUTPATIENT)
Dept: PODIATRY | Facility: CLINIC | Age: 54
End: 2024-03-13
Payer: MEDICARE

## 2024-03-13 NOTE — TELEPHONE ENCOUNTER
Patient called to state she will be in [place] for the next 6 months but will come back to [facility] for Dr Beck doctor appointments.  She was asking about physical therapy.  I told her to call the physical therapist she was seeing here in [place] and see if she can send her pictures of exercises to perform.  She is calling PT.

## 2024-03-26 ENCOUNTER — TELEPHONE (OUTPATIENT)
Dept: PODIATRY | Facility: CLINIC | Age: 54
End: 2024-03-26
Payer: MEDICARE

## 2024-03-26 NOTE — TELEPHONE ENCOUNTER
Patient called in asking if she is able to come out of the ankle brace and begin walking around the house. I spoke with Dr Beck medical assistant Nidia, and she told me to explain to patient to keep the ankle brace on until her next follow up with Dr Beck 4/1/24. Patient voiced understanding.

## 2024-03-28 ENCOUNTER — OFFICE VISIT (OUTPATIENT)
Dept: PODIATRY | Facility: CLINIC | Age: 54
End: 2024-03-28
Payer: MEDICARE

## 2024-03-28 VITALS
BODY MASS INDEX: 41.48 KG/M2 | RESPIRATION RATE: 20 BRPM | HEART RATE: 92 BPM | HEIGHT: 64 IN | WEIGHT: 243 LBS | OXYGEN SATURATION: 97 %

## 2024-03-28 DIAGNOSIS — S82.851D: Primary | ICD-10-CM

## 2024-03-28 DIAGNOSIS — E66.01 MORBID OBESITY WITH BMI OF 40.0-44.9, ADULT: ICD-10-CM

## 2024-03-28 RX ORDER — BLOOD SUGAR DIAGNOSTIC
STRIP MISCELLANEOUS
COMMUNITY
Start: 2024-03-13

## 2024-03-28 RX ORDER — PREGABALIN 100 MG/1
1 CAPSULE ORAL EVERY 12 HOURS SCHEDULED
COMMUNITY
Start: 2024-03-26

## 2024-03-28 RX ORDER — OMEPRAZOLE 20 MG/1
1 CAPSULE, DELAYED RELEASE ORAL DAILY
COMMUNITY
Start: 2024-02-12

## 2024-03-28 NOTE — PROGRESS NOTES
03/28/2024  Foot and Ankle Surgery - Established Patient/Follow-up  Provider: Dr. Felipe Beck DPM  Location: [facility]    Subjective:  Tessie Conner is a 54 y.o. female.     Chief Complaint   Patient presents with    Right Ankle - Follow-up, Fracture     Closed displaced trimalleolar fx     Follow-up     ANGELINA Lopez md 03/26/2024       HPI: The patient is a 54-year-old female who presents to the clinic for follow-up on her right ankle.      Patient noticed a knot on her right ankle with walking. Denies any pain. However, she would notice the knot increasing in size. Her right ankle will swell whenever she wears her boot. She has tried hot compress with no relief. Ice compress makes it feel better. Patient is doing physical therapy and doing some movement exercises. She has difficulty taking steps but has been walking more. Patient has been wearing a brace to help with ambulation. She mentioned having a treadmill at home but is not yet ready to use it. Patient noticed some numbness on her big toe.     Patient is recently unemployed.      Allergies   Allergen Reactions    Tylenol [Acetaminophen] Hives and Itching       Current Outpatient Medications on File Prior to Visit   Medication Sig Dispense Refill    Accu-Chek Guide test strip TEST BLOOD SUGAR ONCE DAILY      Arnuity Ellipta 200 MCG/ACT INHALE 1 PUFF BY MOUTH INTO THE LUNGS DAILY.      EPINEPHrine (EPIPEN) 0.3 MG/0.3ML solution auto-injector injection Inject 0.3 mL into the appropriate muscle as directed by prescriber As Needed.      FeroSul 325 (65 Fe) MG tablet Take 1 tablet by mouth 2 (Two) Times a Day.      Gemtesa 75 MG tablet Take 1 tablet by mouth Daily.      ipratropium-albuterol (DUO-NEB) 0.5-2.5 mg/3 ml nebulizer Take 3 mL by nebulization 4 (Four) Times a Day. 360 mL 3    itraconazole (SPORANOX) 100 MG capsule Take 2 capsules by mouth 2 (Two) Times a Day.      metFORMIN (GLUCOPHAGE) 500 MG tablet Take 1 tablet by mouth Every 12  "(Twelve) Hours.      omeprazole (priLOSEC) 20 MG capsule Take 1 capsule by mouth Daily.      pregabalin (LYRICA) 100 MG capsule Take 1 capsule by mouth Every 12 (Twelve) Hours.      sertraline (ZOLOFT) 50 MG tablet Take 1 tablet by mouth Every Night.      SITagliptin (JANUVIA) 100 MG tablet Take 1 tablet by mouth Daily.      gabapentin (NEURONTIN) 600 MG tablet Take 1 tablet by mouth Daily. (Patient not taking: Reported on 3/28/2024)      oxyCODONE (Roxicodone) 5 MG immediate release tablet Take 1 tablet by mouth Every 8 (Eight) Hours As Needed for Moderate Pain. (Patient not taking: Reported on 3/28/2024) 20 tablet 0    predniSONE (DELTASONE) 50 MG tablet Take 1 tablet by mouth Daily. (Patient not taking: Reported on 3/28/2024) 4 tablet 0    traMADol (ULTRAM) 50 MG tablet TAKE 1 TABLET BY MOUTH EVERY 4 HOURS AS NEEDED FOR PAIN FOR 3 DAYS (Patient not taking: Reported on 3/28/2024)       No current facility-administered medications on file prior to visit.       Objective   Pulse 92   Resp 20   Ht 162.6 cm (64\")   Wt 110 kg (243 lb)   LMP  (LMP Unknown)   SpO2 97%   BMI 41.71 kg/m²     Foot/Ankle Exam     Right foot additional comments: Continued improvement with less swelling. Range of motion has improved. No proximal fibular pain. No progressive deformity or instability.      Assessment & Plan   Diagnoses and all orders for this visit:    1. Closed displaced trimalleolar fracture with routine healing, right (Primary)  -     XR Ankle 3+ View Right  -     Ambulatory Referral to Physical Therapy Evaluate and treat    2. Morbid obesity with BMI of 40.0-44.9, adult      Patient returns for follow-up regarding her right ankle.  She has made improvements from last exam.  She has continued to wear the lace up ankle brace.  She has been performing physical therapy exercises.  She continues to remain limited and have a limp.  Imaging was reviewed showing interval signs of healing and stable orientation of the ankle " honey.  I have asked that she proceed with physical therapy.  I would like her to gradually discontinue the use of the lace up ankle brace and return to a regular supportive tennis shoe.  I do feel that she needs to avoid high-impact activity and uneven terrain.  She is to continue to normalize activity to baseline.  I would like to see her in 3 months for reevaluation and imaging.  She is to call with any additional issues or concerns.  Greater than 20 minutes spent before, during, and after evaluation for patient care.    Orders Placed This Encounter   Procedures    XR Ankle 3+ View Right     Order Specific Question:   Reason for Exam:     Answer:   mass on lower right leg , closed displaced trimalleolar fx . room 10 .wb     Order Specific Question:   Does this patient have a diabetic monitoring/medication delivering device on?     Answer:   Yes     Order Specific Question:   Release to patient     Answer:   Routine Release [[id number]]    Ambulatory Referral to Physical Therapy Evaluate and treat     Referral Priority:   Routine     Referral Type:   Physical Therapy     Referral Reason:   Specialty Services Required     Referral Location:   [facility]     Requested Specialty:   Physical Therapy     Number of Visits Requested:   1          Note is dictated utilizing voice recognition software. Unfortunately this leads to occasional typographical errors. I apologize in advance if the situation occurs. If questions occur please do not hesitate to call our office.    Transcribed from ambient dictation for TORIE Beck DPM by Lucila Sifuentes.  03/28/24   13:45 EDT    Patient or patient representative verbalized consent to the visit recording.  I have personally performed the services described in this document as transcribed by the above individual, and it is both accurate and complete.

## 2024-04-03 ENCOUNTER — OFFICE VISIT (OUTPATIENT)
Dept: CARDIOLOGY | Facility: CLINIC | Age: 54
End: 2024-04-03
Payer: MEDICARE

## 2024-04-03 ENCOUNTER — CLINICAL SUPPORT NO REQUIREMENTS (OUTPATIENT)
Dept: CARDIOLOGY | Facility: CLINIC | Age: 54
End: 2024-04-03
Payer: MEDICARE

## 2024-04-03 VITALS
DIASTOLIC BLOOD PRESSURE: 71 MMHG | WEIGHT: 239.5 LBS | OXYGEN SATURATION: 98 % | HEIGHT: 64 IN | BODY MASS INDEX: 40.89 KG/M2 | HEART RATE: 71 BPM | SYSTOLIC BLOOD PRESSURE: 125 MMHG

## 2024-04-03 DIAGNOSIS — R00.1 BRADYCARDIA: Primary | ICD-10-CM

## 2024-04-03 DIAGNOSIS — I49.5 SICK SINUS SYNDROME: ICD-10-CM

## 2024-04-03 DIAGNOSIS — Z95.0 PACEMAKER: ICD-10-CM

## 2024-04-03 NOTE — PROGRESS NOTES
Progress note      Name: Tessie Conner ADMIT: (Not on file)   : 1970  PCP: Marsha Lopez MD    MRN: [id number] LOS: 0 days   AGE/SEX: 54 y.o. female  ROOM: Room/bed info not found     Chief Complaint   Patient presents with    Follow-up       Subjective       History of present illness  Tessie Conner is a 54-year-old female patient who has autonomic dysfunction, no history of CAD, has sick sinus syndrome with chronic bradycardia heart rate in the 40s resulting in dizziness. Patient received a dual-chamber pacemaker on 2023 and she is here today for follow-up.   She is doing well, denies any chest pain, no shortness of breath, her dizziness and fatigue got better after pacemaker implant.    Past Medical History:   Diagnosis Date    Abnormal ECG     Anemia     Anesthesia complication     cardiac arrest  with hysterectomy    Anxiety 2005    Arthritis     Asthma 2020    allergy    Bipolar 1 disorder     Bradycardia     Chest pain     due to scarring from lung surgery      Chest pain 2020    Cholelithiasis 10/2022    Coronary artery disease     very minimal    Depression 2005    Diabetes mellitus 2002    borderline   resolved    Dyspnea on minimal exertion     Frequent UTI     Gastroesophageal reflux disease without esophagitis 07/15/2020    Heart murmur     FROM CHILDHOOD    Histoplasmosis     History of anemia     POST PREGNANCY    Hyperlipidemia     Hyperlipidemia 10/04/2016    Hypertension     Hypotension     Mass of upper lobe of right lung     Migraines     hx    Obesity     PONV (postoperative nausea and vomiting)     Sleep apnea 22    no machine    Spinal headache     Vertigo     Visual impairment     WEARS GLASSES    Vitamin D deficiency      Past Surgical History:   Procedure Laterality Date    ANKLE OPEN REDUCTION INTERNAL FIXATION Right 2023    Procedure: ANKLE OPEN REDUCTION INTERNAL FIXATION;  Surgeon: TORIE Beck DPM;  Location: [facility]  MAIN OR;  Service: Podiatry;  Laterality: Right;    APPENDECTOMY      CARDIAC CATHETERIZATION N/A 2020    Procedure: Left Heart Cath possible PCI, atherectomy, hemodynamic support;  Surgeon: Thomas Zamora MD;  Location: [facility] CATH INVASIVE LOCATION;  Service: Cardiology;  Laterality: N/A;    CARDIAC CATHETERIZATION  2020    CARDIAC ELECTROPHYSIOLOGY PROCEDURE N/A 2023    Procedure: Pacemaker DC new, [place] aware;  Surgeon: Rachel Espinoza MD;  Location: [facility] CATH INVASIVE LOCATION;  Service: Cardiovascular;  Laterality: N/A;     SECTION      FOOT/TOE TENDON REPAIR Left     HYSTERECTOMY      LUNG BIOPSY Right 2020    LUNG LOBECTOMY Right 2022    pt had partial Right lobectomy and nodule removal    MASS EXCISION Right 2023    Procedure: LIPOMA EXCISION,THIGH;  Surgeon: Justo Shannon MD;  Location: [facility] MAIN OR;  Service: General;  Laterality: Right;    ROTATOR CUFF REPAIR Left     THORACOSCOPY VIDEO ASSISTED WITH LOBECTOMY Right 2022    Procedure: BRONCHOSCOPY, THORACOSCOPY VIDEO ASSISTED wedge resection X2, INTERCOSTAL NERVE BLOCK;  Surgeon: Ayla Carroll MD;  Location: [facility] MAIN OR;  Service: Thoracic;  Laterality: Right;    TUBAL ABDOMINAL LIGATION       Family History   Problem Relation Age of Onset    Arthritis Mother     Cancer Mother     Depression Mother     Diabetes Mother     Early death Mother     Mental illness Mother     Anxiety disorder Mother     Alcohol abuse Father     Diabetes Father     Early death Father     Heart disease Father     Hyperlipidemia Father     Hypertension Father         Father    Vision loss Father     Heart attack Father     Heart disease Sister     Drug abuse Sister     Heart attack Sister     Hypertension Sister         Sister    Heart disease Sister     Heart disease Brother     Hypertension Brother     Heart attack Brother     Drug abuse Brother     Hypertension Brother     Heart  disease Brother     Heart attack Brother     Cancer Maternal Grandmother     Malig Hyperthermia Neg Hx      Social History     Tobacco Use    Smoking status: Never     Passive exposure: Never    Smokeless tobacco: Never   Vaping Use    Vaping status: Never Used   Substance Use Topics    Alcohol use: Never     Comment: VERY RARE    Drug use: Never       Current Outpatient Medications:     Accu-Chek Guide test strip, TEST BLOOD SUGAR ONCE DAILY, Disp: , Rfl:     Arnuity Ellipta 200 MCG/ACT, INHALE 1 PUFF BY MOUTH INTO THE LUNGS DAILY., Disp: , Rfl:     EPINEPHrine (EPIPEN) 0.3 MG/0.3ML solution auto-injector injection, Inject 0.3 mL into the appropriate muscle as directed by prescriber As Needed., Disp: , Rfl:     FeroSul 325 (65 Fe) MG tablet, Take 1 tablet by mouth 2 (Two) Times a Day., Disp: , Rfl:     Gemtesa 75 MG tablet, Take 1 tablet by mouth Daily., Disp: , Rfl:     ipratropium-albuterol (DUO-NEB) 0.5-2.5 mg/3 ml nebulizer, Take 3 mL by nebulization 4 (Four) Times a Day., Disp: 360 mL, Rfl: 3    metFORMIN (GLUCOPHAGE) 500 MG tablet, Take 1 tablet by mouth Every 12 (Twelve) Hours., Disp: , Rfl:     omeprazole (priLOSEC) 20 MG capsule, Take 1 capsule by mouth Daily., Disp: , Rfl:     pregabalin (LYRICA) 100 MG capsule, Take 1 capsule by mouth Every 12 (Twelve) Hours., Disp: , Rfl:     sertraline (ZOLOFT) 50 MG tablet, Take 1 tablet by mouth Every Night., Disp: , Rfl:     SITagliptin (JANUVIA) 100 MG tablet, Take 1 tablet by mouth Daily., Disp: , Rfl:     gabapentin (NEURONTIN) 600 MG tablet, Take 1 tablet by mouth Daily. (Patient not taking: Reported on 4/3/2024), Disp: , Rfl:     itraconazole (SPORANOX) 100 MG capsule, Take 2 capsules by mouth 2 (Two) Times a Day. (Patient not taking: Reported on 4/3/2024), Disp: , Rfl:     oxyCODONE (Roxicodone) 5 MG immediate release tablet, Take 1 tablet by mouth Every 8 (Eight) Hours As Needed for Moderate Pain. (Patient not taking: Reported on 4/3/2024), Disp: 20 tablet,  Rfl: 0    predniSONE (DELTASONE) 50 MG tablet, Take 1 tablet by mouth Daily. (Patient not taking: Reported on 4/3/2024), Disp: 4 tablet, Rfl: 0    traMADol (ULTRAM) 50 MG tablet, TAKE 1 TABLET BY MOUTH EVERY 4 HOURS AS NEEDED FOR PAIN FOR 3 DAYS (Patient not taking: Reported on 4/3/2024), Disp: , Rfl:   Allergies:  Tylenol [acetaminophen]      Physical Exam  VITALS REVIEWED    General:      well developed, in no acute distress.    Head:      normocephalic and atraumatic.    Eyes:      PERRL/EOM intact, conjunctiva and sclera clear with out nystagmus.    Neck:      no masses, thyromegaly,  trachea central with normal respiratory effort and PMI displaced laterally  Lungs:      Clear to auscultation bilaterally  Heart:       Regular rate and rhythm  Msk:      no deformity or scoliosis noted of thoracic or lumbar spine.    Pulses:      pulses normal in all 4 extremities.    Extremities:       No lower extremity edema  Neurologic:      no focal deficits.   alert oriented x3  Skin:      intact without lesions or rashes.    Psych:      alert and cooperative; normal mood and affect; normal attention span and concentration.      Result Review :               Pertinent cardiac workup    EKG 4/18/2023 sinus bradycardia 51 bpm.  Echo 4/26/2023 ejection fraction 60 to 65%  Heart cath 9/24/2020, no significant CAD.      Procedures        Assessment and Plan      Tessie Conner is a 54-year-old female patient who has no CAD, has autonomic dysfunction, chronic sinus bradycardia, for which she received a dual-chamber pacemaker on 6/30/2023.  She is doing very well, denies any significant dizzy spells, no lower extremity edema no syncopal episodes.  Device interrogation showed about 50% atrial pacing, threshold in the atrium had gone up some but sensing and impedance are within range.  Her blood pressure is also within range and she is not on any blood pressure medications.  For now we will continue to monitor we will see her in 6  months for follow-up.    Diagnoses and all orders for this visit:    1. Bradycardia (Primary)    2. Pacemaker    3. Sick sinus syndrome  Overview:  Added automatically from request for surgery [id number]             Return in about 6 months (around 10/3/2024).  Patient was given instructions and counseling regarding her condition or for health maintenance advice. Please see specific information pulled into the AVS if appropriate.

## 2024-04-26 ENCOUNTER — TELEPHONE (OUTPATIENT)
Dept: CARDIOLOGY | Facility: CLINIC | Age: 54
End: 2024-04-26
Payer: MEDICARE

## 2024-04-26 NOTE — TELEPHONE ENCOUNTER
Caller: Tessie Conner    Relationship to patient: Self    Best call back number:  [phone]    Patient is needing:PATIENT CHECKING TO SEE IF ITS OK FOR HER TO USE TANNING BEDS WITH HER PACEMAKER

## 2024-04-26 NOTE — TELEPHONE ENCOUNTER
Called pt back and advised per Natalya GUALLPA  it is ok for her to get in tanning bed with PM pt verbalized understanding

## 2024-04-28 ENCOUNTER — APPOINTMENT (OUTPATIENT)
Dept: GENERAL RADIOLOGY | Facility: HOSPITAL | Age: 54
End: 2024-04-28
Payer: MEDICARE

## 2024-04-28 ENCOUNTER — HOSPITAL ENCOUNTER (OUTPATIENT)
Facility: HOSPITAL | Age: 54
Discharge: HOME | End: 2024-04-28
Attending: EMERGENCY MEDICINE | Admitting: EMERGENCY MEDICINE
Payer: MEDICARE

## 2024-04-28 VITALS
DIASTOLIC BLOOD PRESSURE: 78 MMHG | HEIGHT: 64 IN | BODY MASS INDEX: 40.12 KG/M2 | OXYGEN SATURATION: 94 % | RESPIRATION RATE: 16 BRPM | WEIGHT: 235 LBS | SYSTOLIC BLOOD PRESSURE: 120 MMHG | TEMPERATURE: 97.6 F | HEART RATE: 76 BPM

## 2024-04-28 DIAGNOSIS — M25.571 RIGHT ANKLE PAIN, UNSPECIFIED CHRONICITY: Primary | ICD-10-CM

## 2024-04-28 PROCEDURE — G0463 HOSPITAL OUTPT CLINIC VISIT: HCPCS | Performed by: NURSE PRACTITIONER

## 2024-04-28 PROCEDURE — 73630 X-RAY EXAM OF FOOT: CPT

## 2024-04-28 PROCEDURE — 99213 OFFICE O/P EST LOW 20 MIN: CPT | Performed by: NURSE PRACTITIONER

## 2024-04-28 PROCEDURE — 73610 X-RAY EXAM OF ANKLE: CPT

## 2024-04-28 RX ORDER — MELOXICAM 15 MG/1
15 TABLET ORAL DAILY
Qty: 14 TABLET | Refills: 0 | Status: SHIPPED | OUTPATIENT
Start: 2024-04-28 | End: 2024-05-12

## 2024-04-28 NOTE — DISCHARGE INSTRUCTIONS
Follow-up with primary care for further evaluation and treatment as needed.    Call Dr. Beck for follow-up appointment and reevaluation, call tomorrow for for appointment.  Motrin as needed for pain/fevers, do not take motrin with the meloxicam     Make sure patient is drinking plenty of fluids.    Ice and elevate to help with pain and swelling.     Return for any new or worsening symptoms.

## 2024-04-28 NOTE — FSED PROVIDER NOTE
Subjective   History of Present Illness  The patient is a 54-year-old female who presents to the ER with right ankle and foot pain, patient denies any injury.  Patient reports she did have her right screws and plates placed back in 12/2023.  Patient reporting pain toward the back of her heel.    History provided by:  Patient   used: No        Review of Systems   Musculoskeletal:         Patient with right foot, right ankle pain.  Patient reports she has increased pain in the right heel area.       Past Medical History:   Diagnosis Date    Abnormal ECG     Anemia     Anesthesia complication 2003    cardiac arrest  with hysterectomy    Anxiety 2005    Arthritis     Asthma 04/17/2020    allergy    Bipolar 1 disorder     Bradycardia     Chest pain     due to scarring from lung surgery  2/21    Chest pain 09/24/2020    Cholelithiasis 10/2022    Coronary artery disease     very minimal    Depression 2005    Diabetes mellitus 2002    borderline   resolved    Dyspnea on minimal exertion     Frequent UTI     Gastroesophageal reflux disease without esophagitis 07/15/2020    Heart murmur     FROM CHILDHOOD    Histoplasmosis     History of anemia     POST PREGNANCY    Hyperlipidemia     Hyperlipidemia 10/04/2016    Hypertension     Hypotension     Mass of upper lobe of right lung     Migraines     hx    Obesity 1999    PONV (postoperative nausea and vomiting)     Sleep apnea 2/28/22    no machine    Spinal headache     Vertigo     Visual impairment 2011    WEARS GLASSES    Vitamin D deficiency        Allergies   Allergen Reactions    Tylenol [Acetaminophen] Hives and Itching       Past Surgical History:   Procedure Laterality Date    ANKLE OPEN REDUCTION INTERNAL FIXATION Right 12/22/2023    Procedure: ANKLE OPEN REDUCTION INTERNAL FIXATION;  Surgeon: TORIE Beck DPM;  Location: [facility];  Service: Podiatry;  Laterality: Right;    APPENDECTOMY  2011    CARDIAC CATHETERIZATION N/A 09/24/2020     Procedure: Left Heart Cath possible PCI, atherectomy, hemodynamic support;  Surgeon: Thomas Zamora MD;  Location: [facility] CATH INVASIVE LOCATION;  Service: Cardiology;  Laterality: N/A;    CARDIAC CATHETERIZATION  2020    CARDIAC ELECTROPHYSIOLOGY PROCEDURE N/A 2023    Procedure: Pacemaker DC new, [place] aware;  Surgeon: Rachel Espinoza MD;  Location: [facility] CATH INVASIVE LOCATION;  Service: Cardiovascular;  Laterality: N/A;     SECTION      FOOT/TOE TENDON REPAIR Left     HYSTERECTOMY      LUNG BIOPSY Right 2020    LUNG LOBECTOMY Right 2022    pt had partial Right lobectomy and nodule removal    MASS EXCISION Right 2023    Procedure: LIPOMA EXCISION,THIGH;  Surgeon: Justo Shannon MD;  Location: [facility] MAIN OR;  Service: General;  Laterality: Right;    ROTATOR CUFF REPAIR Left     THORACOSCOPY VIDEO ASSISTED WITH LOBECTOMY Right 2022    Procedure: BRONCHOSCOPY, THORACOSCOPY VIDEO ASSISTED wedge resection X2, INTERCOSTAL NERVE BLOCK;  Surgeon: Ayla Carroll MD;  Location: [facility] MAIN OR;  Service: Thoracic;  Laterality: Right;    TUBAL ABDOMINAL LIGATION         Family History   Problem Relation Age of Onset    Arthritis Mother     Cancer Mother     Depression Mother     Diabetes Mother     Early death Mother     Mental illness Mother     Anxiety disorder Mother     Alcohol abuse Father     Diabetes Father     Early death Father     Heart disease Father     Hyperlipidemia Father     Hypertension Father         Father    Vision loss Father     Heart attack Father     Heart disease Sister     Drug abuse Sister     Heart attack Sister     Hypertension Sister         Sister    Heart disease Sister     Heart disease Brother     Hypertension Brother     Heart attack Brother     Drug abuse Brother     Hypertension Brother     Heart disease Brother     Heart attack Brother     Cancer Maternal Grandmother     Malig Hyperthermia Neg Hx        Social  History     Socioeconomic History    Marital status:    Tobacco Use    Smoking status: Never     Passive exposure: Never    Smokeless tobacco: Never   Vaping Use    Vaping status: Never Used   Substance and Sexual Activity    Alcohol use: Never     Comment: VERY RARE    Drug use: Never    Sexual activity: Not Currently     Partners: Male     Birth control/protection: None, Hysterectomy           Objective   Physical Exam  Vitals and nursing note reviewed.   Constitutional:       Appearance: Normal appearance.   Pulmonary:      Effort: Pulmonary effort is normal.      Breath sounds: Normal breath sounds.   Abdominal:      General: Bowel sounds are normal.      Palpations: Abdomen is soft.   Musculoskeletal:         General: Normal range of motion.        Feet:    Feet:      Comments: Patient reports pain in the right heel area.  Patient denies any injury or trauma.  Patient has had previous surgery on this foot back in December 2023.  Patient has full range of motion, flexion and extension, of the right foot and right ankle.  Patient with pedal and posttibial pulses noted.  Patient with cap refill less than 3 seconds.  Skin:     General: Skin is warm and dry.   Neurological:      General: No focal deficit present.      Mental Status: She is alert and oriented to person, place, and time.   Psychiatric:         Mood and Affect: Mood normal.         Behavior: Behavior normal. Behavior is cooperative.         Procedures           ED Course  ED Course as of 04/28/24 1415   Sun Apr 28, 2024   1238 XR FOOT 3+ VW RIGHT     Date of Exam: 4/28/2024 12:14 PM EDT     Indication: Right foot pain     Comparison: Right ankle radiographs dated 2/13/2024     Findings:  There is partial visualization of lateral fibular sideplate and screw fixation and medial malleolus hardware. There is no evidence of acute fracture of the right foot. There is normal alignment at the Lisfranc joint. There is decreased bone   mineralization.  There is mild medial soft tissue swelling adjacent to the first metatarsophalangeal joint. There is a small plantar calcaneal heel spur.     IMPRESSION:  Impression:  1. No acute osseous abnormality of the right foot.  2. Mild soft tissue swelling along the medial aspect of the forefoot.   [DS]   1239 XR FOOT 3+ VW RIGHT     Date of Exam: 4/28/2024 12:14 PM EDT     Indication: Right foot pain     Comparison: Right ankle radiographs dated 2/13/2024     Findings:  There is partial visualization of lateral fibular sideplate and screw fixation and medial malleolus hardware. There is no evidence of acute fracture of the right foot. There is normal alignment at the Lisfranc joint. There is decreased bone   mineralization. There is mild medial soft tissue swelling adjacent to the first metatarsophalangeal joint. There is a small plantar calcaneal heel spur.     IMPRESSION:  Impression:  1. No acute osseous abnormality of the right foot.  2. Mild soft tissue swelling along the medial aspect of the forefoot.   [DS]      ED Course User Index  [DS] Estephania Hough APRN                                           Medical Decision Making  Patient reports pain in the right heel area.  Patient denies any injury or trauma.  Patient has had previous surgery on this foot back in December 2023.  Patient has full range of motion, flexion and extension, of the right foot and right ankle.  Patient with pedal and posttibial pulses noted.  Patient with cap refill less than 3 seconds.    X-ray of the right foot and right ankle are unremarkable at this time.    Advised patient to follow-up with Dr. Beck who did her surgery.  Will place patient on meloxicam at this time, for her pain.  Advised patient she cannot take Motrin, ibuprofen or naproxen if she is taking the meloxicam.  Advised patient to ice and elevate to help with pain and swelling.    Amount and/or Complexity of Data Reviewed  Radiology: ordered. Decision-making details  "documented in ED Course.    Risk  Prescription drug management.        Final diagnoses:   Right ankle pain, unspecified chronicity       ED Disposition  ED Disposition       ED Disposition   Discharge    Condition   Stable    Comment   --               Marsha Lopez MD  [address] [place] [address]  [place] [address] [phone]    Schedule an appointment as soon as possible for a visit in 1 week      TORIE Beck DPM  [address] [place] [address] [phone]    Schedule an appointment as soon as possible for a visit in 1 week  Call for follow-up appointment.         Medication List        New Prescriptions      meloxicam 15 MG tablet  Commonly known as: MOBIC  Take 1 tablet by mouth Daily for 14 days.               Where to Get Your Medications        These medications were sent to [facility]/pharmacy #[id number] - [place] - [address] [place] [address] [place] - [phone]  - [phone] FX  [address], [place] [address]      Phone: [phone]   meloxicam 15 MG tablet         "

## 2024-04-29 ENCOUNTER — TELEPHONE (OUTPATIENT)
Dept: PODIATRY | Facility: CLINIC | Age: 54
End: 2024-04-29
Payer: MEDICARE

## 2024-04-29 NOTE — TELEPHONE ENCOUNTER
Caller: Tessie Conner    Relationship to patient: Self    Best call back number: [phone]    Patient is needing: PATIENT WAS SEEN IN ED 04/28/24 WITH RIGHT FOOT PAIN - DISCHARGE SHOWS FOLLOW UP IN 5 DAYS - NO AVAILABILITY-PLEASE REACH OUT AND ADVISE

## 2024-05-02 ENCOUNTER — OFFICE VISIT (OUTPATIENT)
Dept: PODIATRY | Facility: CLINIC | Age: 54
End: 2024-05-02
Payer: MEDICARE

## 2024-05-02 VITALS
OXYGEN SATURATION: 97 % | RESPIRATION RATE: 20 BRPM | HEART RATE: 71 BPM | BODY MASS INDEX: 40.12 KG/M2 | HEIGHT: 64 IN | WEIGHT: 235 LBS

## 2024-05-02 DIAGNOSIS — S82.851D: ICD-10-CM

## 2024-05-02 DIAGNOSIS — M72.2 PLANTAR FASCIITIS, RIGHT: ICD-10-CM

## 2024-05-02 DIAGNOSIS — E66.01 MORBID OBESITY WITH BMI OF 40.0-44.9, ADULT: ICD-10-CM

## 2024-05-02 DIAGNOSIS — M21.861 ACQUIRED POSTERIOR EQUINUS, RIGHT: ICD-10-CM

## 2024-05-02 DIAGNOSIS — M79.671 RIGHT FOOT PAIN: Primary | ICD-10-CM

## 2024-05-02 RX ORDER — TRIAMCINOLONE ACETONIDE 40 MG/ML
20 INJECTION, SUSPENSION INTRA-ARTICULAR; INTRAMUSCULAR ONCE
Status: COMPLETED | OUTPATIENT
Start: 2024-05-02 | End: 2024-05-02

## 2024-05-02 RX ADMIN — TRIAMCINOLONE ACETONIDE 20 MG: 40 INJECTION, SUSPENSION INTRA-ARTICULAR; INTRAMUSCULAR at 09:56

## 2024-05-02 NOTE — PROGRESS NOTES
05/02/2024  Foot and Ankle Surgery - Established Patient/Follow-up  Provider: Dr. Felipe Beck DPM  Location: [facility]    Subjective:  Tessie Conner is a 54 y.o. female.     Chief Complaint   Patient presents with    Right Ankle - Follow-up, Pain    Right Foot - Follow-up, Pain    Follow-up     ANGELINA Lopez last pcp visit 04/01/2024       History of Present Illness  The patient presents for evaluation of foot pain.    The patient reports experiencing pain in the posterior aspect of her foot, which was previously diagnosed as a bone spur. She expresses concern about the possibility of stabilization for the loosening of her syndesmotic screw, which she finds bothersome. Despite this, she notes an overall improvement in her ambulation. She has ceased physical therapy and initiated a regimen of cycling at [place]. Currently, she is not experiencing any pain in the syndesmotic screw. She queries the necessity of the stabilization screw in the future. The pain intensifies when she stands on hard surfaces, intensifies with movement.      Allergies   Allergen Reactions    Tylenol [Acetaminophen] Hives and Itching       Current Outpatient Medications on File Prior to Visit   Medication Sig Dispense Refill    Accu-Chek Guide test strip TEST BLOOD SUGAR ONCE DAILY      Arnuity Ellipta 200 MCG/ACT INHALE 1 PUFF BY MOUTH INTO THE LUNGS DAILY.      EPINEPHrine (EPIPEN) 0.3 MG/0.3ML solution auto-injector injection Inject 0.3 mL into the appropriate muscle as directed by prescriber As Needed.      FeroSul 325 (65 Fe) MG tablet Take 1 tablet by mouth 2 (Two) Times a Day.      Gemtesa 75 MG tablet Take 1 tablet by mouth Daily.      ipratropium-albuterol (DUO-NEB) 0.5-2.5 mg/3 ml nebulizer Take 3 mL by nebulization 4 (Four) Times a Day. 360 mL 3    itraconazole (SPORANOX) 100 MG capsule Take 2 capsules by mouth 2 (Two) Times a Day.      metFORMIN (GLUCOPHAGE) 500 MG tablet Take 1 tablet by mouth Every 12  "(Twelve) Hours.      omeprazole (priLOSEC) 20 MG capsule Take 1 capsule by mouth Daily.      pregabalin (LYRICA) 100 MG capsule Take 1 capsule by mouth Every 12 (Twelve) Hours.      sertraline (ZOLOFT) 50 MG tablet Take 1 tablet by mouth Every Night.      SITagliptin (JANUVIA) 100 MG tablet Take 1 tablet by mouth Daily.      gabapentin (NEURONTIN) 600 MG tablet Take 1 tablet by mouth Daily. (Patient not taking: Reported on 5/2/2024)      meloxicam (MOBIC) 15 MG tablet Take 1 tablet by mouth Daily for 14 days. (Patient not taking: Reported on 5/2/2024) 14 tablet 0    oxyCODONE (Roxicodone) 5 MG immediate release tablet Take 1 tablet by mouth Every 8 (Eight) Hours As Needed for Moderate Pain. (Patient not taking: Reported on 5/2/2024) 20 tablet 0    predniSONE (DELTASONE) 50 MG tablet Take 1 tablet by mouth Daily. (Patient not taking: Reported on 5/2/2024) 4 tablet 0    traMADol (ULTRAM) 50 MG tablet  (Patient not taking: Reported on 5/2/2024)       No current facility-administered medications on file prior to visit.       Objective   Pulse 71   Resp 20   Ht 162.6 cm (64\")   Wt 107 kg (235 lb)   LMP  (LMP Unknown)   SpO2 97%   BMI 40.34 kg/m²     Foot/Ankle Exam  Physical Exam  GENERAL  Orientation:  AAOx3  Affect:  appropriate     VASCULAR      Right Foot Vascularity   Normal vascular exam    Dorsalis pedis:  2+  Posterior tibial:  2+  Skin temperature:  warm  Edema grading:  None  CFT:  < 3 seconds  Pedal hair growth:  Present  Varicosities:  none      Left Foot Vascularity   Normal vascular exam    Dorsalis pedis:  2+  Posterior tibial:  2+  Skin temperature:  warm  Edema grading:  None  CFT:  < 3 seconds  Pedal hair growth:  Present  Varicosities:  none     NEUROLOGIC      Right Foot Neurologic   Light touch sensation: normal  Hot/Cold sensation: normal  Achilles reflex:  2+      Left Foot Neurologic   Light touch sensation: normal  Hot/Cold sensation:  normal  Achilles reflex:  2+     MUSCULOSKELETAL      " Right Foot Musculoskeletal   Arch:  Normal      Left Foot Musculoskeletal   Arch:  Normal     MUSCLE STRENGTH      Right Foot Muscle Strength   Normal strength    Foot dorsiflexion:  5  Foot plantar flexion:  5  Foot inversion:  5  Foot eversion:  5      Left Foot Muscle Strength   Normal strength    Foot dorsiflexion:  5  Foot plantar flexion:  5  Foot inversion:  5  Foot eversion:  5     DERMATOLOGIC       Right Foot Dermatologic   Skin  Right foot skin is intact.   Nails comment:  Nails 1-5      Left Foot Dermatologic   Skin  Left foot skin is intact.   Nails comment:  Nails 1-5     TESTS      Right Foot Tests   Anterior drawer: negative  Varus tilt: negative      Left Foot Tests   Anterior drawer: negative  Varus tilt: negative     Right foot additional comments: Discomfort, swelling, and ecchymosis involving the right ankle. No gross deformity by visual inspection. No proximal fibular pain. Range of motion, muscle strength and instability testing deferred secondary to guarding.     01/02/2024  Incision sites are dry and stable with intact staples. No evidence of dehiscence or infection. Moderate resolving ecchymosis to the lower extremity. Rectus alignment of the ankle with limitation with range of motion, muscle strength secondary to guarding.     01/16/2024: Swelling has decreased. Range of motion has improved but remains somewhat limited. No progressive deformity or further issues.     02/13/2024  Continued swelling involving the perimalleolar region. Range of motion has improved. Ecchymosis has continued to dissipate.    5/2/2024: Discomfort with palpation involving the plantar medial calcaneal tuberosity region.  No gross deformity or instability.  No significant swelling involving the ankle.  Prominent improvement in range of motion.  No ankle pain.  Moderate equinus contracture with knee extended and flexed.      Results  Imaging  X-ray of the foot shows a little bit of loosening of the syndesmotic  screw.    Assessment & Plan   Diagnoses and all orders for this visit:    1. Right foot pain (Primary)    2. Closed displaced trimalleolar fracture with routine healing, right    3. Plantar fasciitis, right  -     Injection Tendon or Ligament  -     triamcinolone acetonide (KENALOG-40) injection 20 mg    4. Morbid obesity with BMI of 40.0-44.9, adult    5. Acquired posterior equinus, right      Assessment & Plan    A comprehensive discussion was held with the patient regarding various treatment options, including the administration of a steroid injection to alleviate inflammation and associated pain. Additionally, the patient was advised to consider the use of over-the-counter arch supports and to engage in stretching exercises.    Plantar Fascia Steroid Injection: Right    Consent and time out was performed before proceeding with the procedure.  The area of maximal tenderness was palpated near the plantar medial calcaneal tuberosity of right foot.  The area was cleansed with alcohol.  Under ultrasound guidance, the plantar fascia was visualized and a solution totaling 1.5 mL was injected about the origin of the plantar fascia.  The solution contained 0.5 mL of 1% lidocaine plain, 0.5 mL of 0.5% Marcaine plain, and 0.5 mL of Kenalog.  After the injection, the patient noted immediate pain relief.  Mild compression was placed at the injection site followed by a sterile bandage.  The patient tolerated the injection well without complication.    Reviewed proper basic stretching and manual therapy exercises along with appropriate shoes and activity.  Discussed proper use and/or avoidance of OTC anti-inflammatories.  Patient is to call with any additional issues or concerns.  Greater than 20 minutes was spent before, during, and after evaluation for patient care.    Follow-up  The patient is scheduled for a follow-up visit in 2 months, during which an x-ray of the ankle will be obtained to ensure no further backing out or  changes in the ankle.         Patient or patient representative verbalized consent for the use of Ambient Listening during the visit with  TORIE Beck DPM for chart documentation. 5/2/2024  18:41 EDT    TORIE Beck DPM

## 2024-05-13 ENCOUNTER — HOSPITAL ENCOUNTER (OUTPATIENT)
Facility: HOSPITAL | Age: 54
Setting detail: OBSERVATION
Discharge: HOME | End: 2024-05-14
Attending: EMERGENCY MEDICINE | Admitting: INTERNAL MEDICINE
Payer: MEDICARE

## 2024-05-13 ENCOUNTER — APPOINTMENT (OUTPATIENT)
Dept: CT IMAGING | Facility: HOSPITAL | Age: 54
End: 2024-05-13
Payer: MEDICARE

## 2024-05-13 ENCOUNTER — APPOINTMENT (OUTPATIENT)
Dept: GENERAL RADIOLOGY | Facility: HOSPITAL | Age: 54
End: 2024-05-13
Payer: MEDICARE

## 2024-05-13 DIAGNOSIS — J18.9 PNEUMONIA DUE TO INFECTIOUS ORGANISM, UNSPECIFIED LATERALITY, UNSPECIFIED PART OF LUNG: ICD-10-CM

## 2024-05-13 DIAGNOSIS — R07.9 CHEST PAIN, UNSPECIFIED TYPE: Primary | ICD-10-CM

## 2024-05-13 LAB
ALBUMIN SERPL-MCNC: 4.2 G/DL (ref 3.5–5.2)
ALBUMIN/GLOB SERPL: 1.4 G/DL
ALP SERPL-CCNC: 139 U/L (ref 39–117)
ALT SERPL W P-5'-P-CCNC: 14 U/L (ref 1–33)
ANION GAP SERPL CALCULATED.3IONS-SCNC: 11 MMOL/L (ref 5–15)
ANION GAP SERPL CALCULATED.3IONS-SCNC: 14 MMOL/L (ref 5–15)
APTT PPP: <20 SECONDS (ref 61–76.5)
AST SERPL-CCNC: 17 U/L (ref 1–32)
BASOPHILS # BLD AUTO: 0.02 10*3/MM3 (ref 0–0.2)
BASOPHILS # BLD AUTO: 0.03 10*3/MM3 (ref 0–0.2)
BASOPHILS NFR BLD AUTO: 0.2 % (ref 0–1.5)
BASOPHILS NFR BLD AUTO: 0.3 % (ref 0–1.5)
BILIRUB SERPL-MCNC: 0.3 MG/DL (ref 0–1.2)
BUN SERPL-MCNC: 11 MG/DL (ref 6–20)
BUN SERPL-MCNC: 12 MG/DL (ref 6–20)
BUN/CREAT SERPL: 14.8 (ref 7–25)
BUN/CREAT SERPL: 14.9 (ref 7–25)
CALCIUM SPEC-SCNC: 9.4 MG/DL (ref 8.6–10.5)
CALCIUM SPEC-SCNC: 9.8 MG/DL (ref 8.6–10.5)
CHLORIDE SERPL-SCNC: 103 MMOL/L (ref 98–107)
CHLORIDE SERPL-SCNC: 104 MMOL/L (ref 98–107)
CO2 SERPL-SCNC: 25 MMOL/L (ref 22–29)
CO2 SERPL-SCNC: 27 MMOL/L (ref 22–29)
CREAT SERPL-MCNC: 0.74 MG/DL (ref 0.57–1)
CREAT SERPL-MCNC: 0.81 MG/DL (ref 0.57–1)
D DIMER PPP FEU-MCNC: 0.96 MG/L (FEU) (ref 0–0.53)
DEPRECATED RDW RBC AUTO: 46.5 FL (ref 37–54)
DEPRECATED RDW RBC AUTO: 47.6 FL (ref 37–54)
EGFRCR SERPLBLD CKD-EPI 2021: 86.9 ML/MIN/1.73
EGFRCR SERPLBLD CKD-EPI 2021: 96.9 ML/MIN/1.73
EOSINOPHIL # BLD AUTO: 0 10*3/MM3 (ref 0–0.4)
EOSINOPHIL # BLD AUTO: 0.01 10*3/MM3 (ref 0–0.4)
EOSINOPHIL NFR BLD AUTO: 0 % (ref 0.3–6.2)
EOSINOPHIL NFR BLD AUTO: 0.1 % (ref 0.3–6.2)
ERYTHROCYTE [DISTWIDTH] IN BLOOD BY AUTOMATED COUNT: 14.6 % (ref 12.3–15.4)
ERYTHROCYTE [DISTWIDTH] IN BLOOD BY AUTOMATED COUNT: 14.6 % (ref 12.3–15.4)
FLUAV RNA RESP QL NAA+PROBE: NOT DETECTED
FLUBV RNA RESP QL NAA+PROBE: NOT DETECTED
GEN 5 2HR TROPONIN T REFLEX: 32 NG/L
GLOBULIN UR ELPH-MCNC: 3 GM/DL
GLUCOSE BLDC GLUCOMTR-MCNC: 109 MG/DL (ref 70–105)
GLUCOSE SERPL-MCNC: 104 MG/DL (ref 65–99)
GLUCOSE SERPL-MCNC: 82 MG/DL (ref 65–99)
HCT VFR BLD AUTO: 42.1 % (ref 34–46.6)
HCT VFR BLD AUTO: 42.8 % (ref 34–46.6)
HGB BLD-MCNC: 12.6 G/DL (ref 12–15.9)
HGB BLD-MCNC: 13.1 G/DL (ref 12–15.9)
HOLD SPECIMEN: NORMAL
IMM GRANULOCYTES # BLD AUTO: 0.04 10*3/MM3 (ref 0–0.05)
IMM GRANULOCYTES # BLD AUTO: 0.05 10*3/MM3 (ref 0–0.05)
IMM GRANULOCYTES NFR BLD AUTO: 0.4 % (ref 0–0.5)
IMM GRANULOCYTES NFR BLD AUTO: 0.6 % (ref 0–0.5)
INR PPP: 1 (ref 0.93–1.1)
LYMPHOCYTES # BLD AUTO: 1.45 10*3/MM3 (ref 0.7–3.1)
LYMPHOCYTES # BLD AUTO: 1.53 10*3/MM3 (ref 0.7–3.1)
LYMPHOCYTES NFR BLD AUTO: 15.8 % (ref 19.6–45.3)
LYMPHOCYTES NFR BLD AUTO: 16.8 % (ref 19.6–45.3)
MCH RBC QN AUTO: 26.5 PG (ref 26.6–33)
MCH RBC QN AUTO: 27 PG (ref 26.6–33)
MCHC RBC AUTO-ENTMCNC: 29.9 G/DL (ref 31.5–35.7)
MCHC RBC AUTO-ENTMCNC: 30.6 G/DL (ref 31.5–35.7)
MCV RBC AUTO: 88.1 FL (ref 79–97)
MCV RBC AUTO: 88.6 FL (ref 79–97)
MONOCYTES # BLD AUTO: 0.72 10*3/MM3 (ref 0.1–0.9)
MONOCYTES # BLD AUTO: 0.77 10*3/MM3 (ref 0.1–0.9)
MONOCYTES NFR BLD AUTO: 8 % (ref 5–12)
MONOCYTES NFR BLD AUTO: 8.4 % (ref 5–12)
NEUTROPHILS NFR BLD AUTO: 6.37 10*3/MM3 (ref 1.7–7)
NEUTROPHILS NFR BLD AUTO: 7.3 10*3/MM3 (ref 1.7–7)
NEUTROPHILS NFR BLD AUTO: 74 % (ref 42.7–76)
NEUTROPHILS NFR BLD AUTO: 75.4 % (ref 42.7–76)
NRBC BLD AUTO-RTO: 0 /100 WBC (ref 0–0.2)
NRBC BLD AUTO-RTO: 0 /100 WBC (ref 0–0.2)
NT-PROBNP SERPL-MCNC: 295.2 PG/ML (ref 0–900)
PLATELET # BLD AUTO: 218 10*3/MM3 (ref 140–450)
PLATELET # BLD AUTO: 219 10*3/MM3 (ref 140–450)
PMV BLD AUTO: 9.8 FL (ref 6–12)
PMV BLD AUTO: 9.8 FL (ref 6–12)
POTASSIUM SERPL-SCNC: 4 MMOL/L (ref 3.5–5.2)
POTASSIUM SERPL-SCNC: 4 MMOL/L (ref 3.5–5.2)
PROT SERPL-MCNC: 7.2 G/DL (ref 6–8.5)
PROTHROMBIN TIME: 10.9 SECONDS (ref 9.6–11.7)
RBC # BLD AUTO: 4.75 10*6/MM3 (ref 3.77–5.28)
RBC # BLD AUTO: 4.86 10*6/MM3 (ref 3.77–5.28)
RSV RNA RESP QL NAA+PROBE: NOT DETECTED
SARS-COV-2 RNA RESP QL NAA+PROBE: NOT DETECTED
SODIUM SERPL-SCNC: 142 MMOL/L (ref 136–145)
SODIUM SERPL-SCNC: 142 MMOL/L (ref 136–145)
TROPONIN T DELTA: -10 NG/L
TROPONIN T SERPL HS-MCNC: 29 NG/L
TROPONIN T SERPL HS-MCNC: 42 NG/L
WBC NRBC COR # BLD AUTO: 8.61 10*3/MM3 (ref 3.4–10.8)
WBC NRBC COR # BLD AUTO: 9.68 10*3/MM3 (ref 3.4–10.8)

## 2024-05-13 PROCEDURE — G0378 HOSPITAL OBSERVATION PER HR: HCPCS

## 2024-05-13 PROCEDURE — 96375 TX/PRO/DX INJ NEW DRUG ADDON: CPT

## 2024-05-13 PROCEDURE — 70450 CT HEAD/BRAIN W/O DYE: CPT

## 2024-05-13 PROCEDURE — 25510000001 IOPAMIDOL PER 1 ML: Performed by: EMERGENCY MEDICINE

## 2024-05-13 PROCEDURE — 25010000002 PROCHLORPERAZINE 10 MG/2ML SOLUTION: Performed by: EMERGENCY MEDICINE

## 2024-05-13 PROCEDURE — 99285 EMERGENCY DEPT VISIT HI MDM: CPT

## 2024-05-13 PROCEDURE — 94761 N-INVAS EAR/PLS OXIMETRY MLT: CPT

## 2024-05-13 PROCEDURE — 94640 AIRWAY INHALATION TREATMENT: CPT

## 2024-05-13 PROCEDURE — 85379 FIBRIN DEGRADATION QUANT: CPT | Performed by: EMERGENCY MEDICINE

## 2024-05-13 PROCEDURE — 87637 SARSCOV2&INF A&B&RSV AMP PRB: CPT | Performed by: EMERGENCY MEDICINE

## 2024-05-13 PROCEDURE — 25010000002 DIPHENHYDRAMINE PER 50 MG: Performed by: EMERGENCY MEDICINE

## 2024-05-13 PROCEDURE — 85025 COMPLETE CBC W/AUTO DIFF WBC: CPT | Performed by: INTERNAL MEDICINE

## 2024-05-13 PROCEDURE — 94799 UNLISTED PULMONARY SVC/PX: CPT

## 2024-05-13 PROCEDURE — 84484 ASSAY OF TROPONIN QUANT: CPT | Performed by: EMERGENCY MEDICINE

## 2024-05-13 PROCEDURE — 84484 ASSAY OF TROPONIN QUANT: CPT | Performed by: INTERNAL MEDICINE

## 2024-05-13 PROCEDURE — 93005 ELECTROCARDIOGRAM TRACING: CPT

## 2024-05-13 PROCEDURE — 93005 ELECTROCARDIOGRAM TRACING: CPT | Performed by: EMERGENCY MEDICINE

## 2024-05-13 PROCEDURE — 85730 THROMBOPLASTIN TIME PARTIAL: CPT | Performed by: EMERGENCY MEDICINE

## 2024-05-13 PROCEDURE — 87040 BLOOD CULTURE FOR BACTERIA: CPT | Performed by: EMERGENCY MEDICINE

## 2024-05-13 PROCEDURE — 85610 PROTHROMBIN TIME: CPT | Performed by: EMERGENCY MEDICINE

## 2024-05-13 PROCEDURE — 80053 COMPREHEN METABOLIC PANEL: CPT | Performed by: EMERGENCY MEDICINE

## 2024-05-13 PROCEDURE — 36415 COLL VENOUS BLD VENIPUNCTURE: CPT

## 2024-05-13 PROCEDURE — 25010000002 CEFTRIAXONE PER 250 MG: Performed by: EMERGENCY MEDICINE

## 2024-05-13 PROCEDURE — 85025 COMPLETE CBC W/AUTO DIFF WBC: CPT | Performed by: EMERGENCY MEDICINE

## 2024-05-13 PROCEDURE — 83880 ASSAY OF NATRIURETIC PEPTIDE: CPT | Performed by: EMERGENCY MEDICINE

## 2024-05-13 PROCEDURE — 71275 CT ANGIOGRAPHY CHEST: CPT

## 2024-05-13 PROCEDURE — 96365 THER/PROPH/DIAG IV INF INIT: CPT

## 2024-05-13 PROCEDURE — 71045 X-RAY EXAM CHEST 1 VIEW: CPT

## 2024-05-13 PROCEDURE — 82948 REAGENT STRIP/BLOOD GLUCOSE: CPT

## 2024-05-13 RX ORDER — MELATONIN
1000 DAILY
COMMUNITY

## 2024-05-13 RX ORDER — POLYETHYLENE GLYCOL 3350 17 G/17G
17 POWDER, FOR SOLUTION ORAL DAILY PRN
Status: DISCONTINUED | OUTPATIENT
Start: 2024-05-13 | End: 2024-05-14 | Stop reason: HOSPADM

## 2024-05-13 RX ORDER — DIPHENHYDRAMINE HYDROCHLORIDE 50 MG/ML
25 INJECTION INTRAMUSCULAR; INTRAVENOUS ONCE
Status: COMPLETED | OUTPATIENT
Start: 2024-05-13 | End: 2024-05-13

## 2024-05-13 RX ORDER — METFORMIN HYDROCHLORIDE 500 MG/1
1000 TABLET, EXTENDED RELEASE ORAL
COMMUNITY

## 2024-05-13 RX ORDER — IPRATROPIUM BROMIDE AND ALBUTEROL SULFATE 2.5; .5 MG/3ML; MG/3ML
3 SOLUTION RESPIRATORY (INHALATION) 4 TIMES DAILY PRN
COMMUNITY

## 2024-05-13 RX ORDER — ENOXAPARIN SODIUM 100 MG/ML
40 INJECTION SUBCUTANEOUS DAILY
Status: DISCONTINUED | OUTPATIENT
Start: 2024-05-13 | End: 2024-05-14 | Stop reason: HOSPADM

## 2024-05-13 RX ORDER — LURASIDONE HYDROCHLORIDE 20 MG/1
20 TABLET, FILM COATED ORAL NIGHTLY
Status: DISCONTINUED | OUTPATIENT
Start: 2024-05-13 | End: 2024-05-14 | Stop reason: HOSPADM

## 2024-05-13 RX ORDER — BUDESONIDE 0.5 MG/2ML
0.5 INHALANT ORAL
Status: DISCONTINUED | OUTPATIENT
Start: 2024-05-13 | End: 2024-05-14 | Stop reason: HOSPADM

## 2024-05-13 RX ORDER — BISACODYL 10 MG
10 SUPPOSITORY, RECTAL RECTAL DAILY PRN
Status: DISCONTINUED | OUTPATIENT
Start: 2024-05-13 | End: 2024-05-14 | Stop reason: HOSPADM

## 2024-05-13 RX ORDER — SODIUM CHLORIDE 9 MG/ML
40 INJECTION, SOLUTION INTRAVENOUS AS NEEDED
Status: DISCONTINUED | OUTPATIENT
Start: 2024-05-13 | End: 2024-05-14 | Stop reason: HOSPADM

## 2024-05-13 RX ORDER — SODIUM CHLORIDE 0.9 % (FLUSH) 0.9 %
10 SYRINGE (ML) INJECTION AS NEEDED
Status: DISCONTINUED | OUTPATIENT
Start: 2024-05-13 | End: 2024-05-14 | Stop reason: HOSPADM

## 2024-05-13 RX ORDER — BISACODYL 5 MG/1
5 TABLET, DELAYED RELEASE ORAL DAILY PRN
Status: DISCONTINUED | OUTPATIENT
Start: 2024-05-13 | End: 2024-05-14 | Stop reason: HOSPADM

## 2024-05-13 RX ORDER — IPRATROPIUM BROMIDE AND ALBUTEROL SULFATE 2.5; .5 MG/3ML; MG/3ML
3 SOLUTION RESPIRATORY (INHALATION) 4 TIMES DAILY PRN
Status: DISCONTINUED | OUTPATIENT
Start: 2024-05-13 | End: 2024-05-14 | Stop reason: HOSPADM

## 2024-05-13 RX ORDER — MELATONIN
1000 DAILY
Status: DISCONTINUED | OUTPATIENT
Start: 2024-05-14 | End: 2024-05-14 | Stop reason: HOSPADM

## 2024-05-13 RX ORDER — ASPIRIN 81 MG/1
324 TABLET, CHEWABLE ORAL ONCE
Status: COMPLETED | OUTPATIENT
Start: 2024-05-13 | End: 2024-05-13

## 2024-05-13 RX ORDER — LURASIDONE HYDROCHLORIDE 20 MG/1
20 TABLET, FILM COATED ORAL NIGHTLY
COMMUNITY

## 2024-05-13 RX ORDER — PROCHLORPERAZINE EDISYLATE 5 MG/ML
5 INJECTION INTRAMUSCULAR; INTRAVENOUS ONCE
Status: COMPLETED | OUTPATIENT
Start: 2024-05-13 | End: 2024-05-13

## 2024-05-13 RX ORDER — NITROGLYCERIN 0.4 MG/1
0.4 TABLET SUBLINGUAL
Status: DISCONTINUED | OUTPATIENT
Start: 2024-05-13 | End: 2024-05-14 | Stop reason: HOSPADM

## 2024-05-13 RX ORDER — PANTOPRAZOLE SODIUM 40 MG/1
40 TABLET, DELAYED RELEASE ORAL
Status: DISCONTINUED | OUTPATIENT
Start: 2024-05-14 | End: 2024-05-14 | Stop reason: HOSPADM

## 2024-05-13 RX ORDER — AZELASTINE 1 MG/ML
1 SPRAY, METERED NASAL 2 TIMES DAILY
Status: DISCONTINUED | OUTPATIENT
Start: 2024-05-13 | End: 2024-05-14 | Stop reason: HOSPADM

## 2024-05-13 RX ORDER — CETIRIZINE HYDROCHLORIDE 10 MG/1
10 TABLET ORAL DAILY
Status: DISCONTINUED | OUTPATIENT
Start: 2024-05-13 | End: 2024-05-14 | Stop reason: HOSPADM

## 2024-05-13 RX ORDER — METFORMIN HYDROCHLORIDE 500 MG/1
1000 TABLET, EXTENDED RELEASE ORAL
Status: DISCONTINUED | OUTPATIENT
Start: 2024-05-14 | End: 2024-05-14 | Stop reason: HOSPADM

## 2024-05-13 RX ORDER — OXYBUTYNIN CHLORIDE 10 MG/1
10 TABLET, EXTENDED RELEASE ORAL DAILY
Status: DISCONTINUED | OUTPATIENT
Start: 2024-05-14 | End: 2024-05-14 | Stop reason: HOSPADM

## 2024-05-13 RX ORDER — AMOXICILLIN 250 MG
2 CAPSULE ORAL 2 TIMES DAILY PRN
Status: DISCONTINUED | OUTPATIENT
Start: 2024-05-13 | End: 2024-05-14 | Stop reason: HOSPADM

## 2024-05-13 RX ORDER — FERROUS SULFATE 324(65)MG
324 TABLET, DELAYED RELEASE (ENTERIC COATED) ORAL
Status: DISCONTINUED | OUTPATIENT
Start: 2024-05-14 | End: 2024-05-14 | Stop reason: HOSPADM

## 2024-05-13 RX ORDER — SODIUM CHLORIDE 0.9 % (FLUSH) 0.9 %
10 SYRINGE (ML) INJECTION EVERY 12 HOURS SCHEDULED
Status: DISCONTINUED | OUTPATIENT
Start: 2024-05-13 | End: 2024-05-14 | Stop reason: HOSPADM

## 2024-05-13 RX ORDER — IBUPROFEN 400 MG/1
400 TABLET ORAL EVERY 6 HOURS PRN
Status: DISCONTINUED | OUTPATIENT
Start: 2024-05-13 | End: 2024-05-14 | Stop reason: HOSPADM

## 2024-05-13 RX ADMIN — CEFTRIAXONE 2000 MG: 2 INJECTION, POWDER, FOR SOLUTION INTRAMUSCULAR; INTRAVENOUS at 15:53

## 2024-05-13 RX ADMIN — DIPHENHYDRAMINE HYDROCHLORIDE 25 MG: 50 INJECTION, SOLUTION INTRAMUSCULAR; INTRAVENOUS at 10:11

## 2024-05-13 RX ADMIN — ASPIRIN 81 MG CHEWABLE TABLET 324 MG: 81 TABLET CHEWABLE at 15:53

## 2024-05-13 RX ADMIN — Medication 10 ML: at 22:05

## 2024-05-13 RX ADMIN — PROCHLORPERAZINE EDISYLATE 5 MG: 5 INJECTION INTRAMUSCULAR; INTRAVENOUS at 10:11

## 2024-05-13 RX ADMIN — IOPAMIDOL 100 ML: 755 INJECTION, SOLUTION INTRAVENOUS at 11:15

## 2024-05-13 RX ADMIN — LURASIDONE HYDROCHLORIDE 20 MG: 20 TABLET, FILM COATED ORAL at 22:04

## 2024-05-13 RX ADMIN — SERTRALINE HYDROCHLORIDE 50 MG: 50 TABLET ORAL at 22:04

## 2024-05-13 RX ADMIN — CETIRIZINE HYDROCHLORIDE 10 MG: 10 TABLET, FILM COATED ORAL at 22:04

## 2024-05-13 RX ADMIN — IPRATROPIUM BROMIDE AND ALBUTEROL SULFATE 3 ML: .5; 3 SOLUTION RESPIRATORY (INHALATION) at 22:28

## 2024-05-13 NOTE — ED PROVIDER NOTES
"Subjective   History of Present Illness  Chief complaint: Patient presents with chest pain to the left side of her chest into her axilla.  It has been constant for 3 days.  She states that prior to this she started with a headache sore throat runny nose and cough.  She is remains with a headache that feels \"like bad pressure\".  She has not had fever that she knows of.  She does have a history of pacemaker.  She had a partial lung removed secondary to histoplasmosis many years ago.    Context:    Duration:    Timing:    Severity: Severe    Associated Symptoms:        PCP:  LMP:      Review of Systems   Constitutional:  Negative for fever.   HENT:  Positive for congestion.    Respiratory:  Positive for cough.    Cardiovascular:  Positive for chest pain.   Gastrointestinal:  Negative for abdominal pain.   Genitourinary: Negative.    Musculoskeletal: Negative.    Neurological: Negative.        Past Medical History:   Diagnosis Date    Abnormal ECG     Anemia     Anesthesia complication 2003    cardiac arrest  with hysterectomy    Anxiety 2005    Arthritis     Asthma 04/17/2020    allergy    Bipolar 1 disorder     Bradycardia     Chest pain     due to scarring from lung surgery  2/21    Chest pain 09/24/2020    Cholelithiasis 10/2022    Coronary artery disease     very minimal    Depression 2005    Diabetes mellitus 2002    borderline   resolved    Dyspnea on minimal exertion     Frequent UTI     Gastroesophageal reflux disease without esophagitis 07/15/2020    Heart murmur     FROM CHILDHOOD    Histoplasmosis     History of anemia     POST PREGNANCY    Hyperlipidemia     Hyperlipidemia 10/04/2016    Hypertension     Hypotension     Mass of upper lobe of right lung     Migraines     hx    Obesity 1999    PONV (postoperative nausea and vomiting)     Sleep apnea 2/28/22    no machine    Spinal headache     Vertigo     Visual impairment 2011    WEARS GLASSES    Vitamin D deficiency        Allergies   Allergen Reactions    " Tylenol [Acetaminophen] Hives and Itching       Past Surgical History:   Procedure Laterality Date    ANKLE OPEN REDUCTION INTERNAL FIXATION Right 2023    Procedure: ANKLE OPEN REDUCTION INTERNAL FIXATION;  Surgeon: TORIE Beck DPM;  Location: [facility] MAIN OR;  Service: Podiatry;  Laterality: Right;    APPENDECTOMY      CARDIAC CATHETERIZATION N/A 2020    Procedure: Left Heart Cath possible PCI, atherectomy, hemodynamic support;  Surgeon: Thomas Zamora MD;  Location: [facility] CATH INVASIVE LOCATION;  Service: Cardiology;  Laterality: N/A;    CARDIAC CATHETERIZATION  2020    CARDIAC ELECTROPHYSIOLOGY PROCEDURE N/A 2023    Procedure: Pacemaker DC new, Conifer aware;  Surgeon: Rachel Espinoza MD;  Location: [facility] CATH INVASIVE LOCATION;  Service: Cardiovascular;  Laterality: N/A;     SECTION      FOOT/TOE TENDON REPAIR Left     HYSTERECTOMY      LUNG BIOPSY Right 2020    LUNG LOBECTOMY Right 2022    pt had partial Right lobectomy and nodule removal    MASS EXCISION Right 2023    Procedure: LIPOMA EXCISION,THIGH;  Surgeon: Justo Shannon MD;  Location: [facility] MAIN OR;  Service: General;  Laterality: Right;    ROTATOR CUFF REPAIR Left     THORACOSCOPY VIDEO ASSISTED WITH LOBECTOMY Right 2022    Procedure: BRONCHOSCOPY, THORACOSCOPY VIDEO ASSISTED wedge resection X2, INTERCOSTAL NERVE BLOCK;  Surgeon: Ayla Carroll MD;  Location: [facility] MAIN OR;  Service: Thoracic;  Laterality: Right;    TUBAL ABDOMINAL LIGATION         Family History   Problem Relation Age of Onset    Arthritis Mother     Cancer Mother     Depression Mother     Diabetes Mother     Early death Mother     Mental illness Mother     Anxiety disorder Mother     Alcohol abuse Father     Diabetes Father     Early death Father     Heart disease Father     Hyperlipidemia Father     Hypertension Father         Father    Vision loss Father     Heart attack Father     Heart  disease Sister     Drug abuse Sister     Heart attack Sister     Hypertension Sister         Sister    Heart disease Sister     Heart disease Brother     Hypertension Brother     Heart attack Brother     Drug abuse Brother     Hypertension Brother     Heart disease Brother     Heart attack Brother     Cancer Maternal Grandmother     Malig Hyperthermia Neg Hx        Social History     Socioeconomic History    Marital status:    Tobacco Use    Smoking status: Never     Passive exposure: Never    Smokeless tobacco: Never   Vaping Use    Vaping status: Never Used   Substance and Sexual Activity    Alcohol use: Never     Comment: VERY RARE    Drug use: Never    Sexual activity: Not Currently     Partners: Male     Birth control/protection: None, Hysterectomy           Objective   Physical Exam  Vitals and nursing note reviewed.   Constitutional:       Appearance: She is well-developed.   HENT:      Head: Normocephalic and atraumatic.   Cardiovascular:      Rate and Rhythm: Normal rate and regular rhythm.      Heart sounds: Normal heart sounds.   Pulmonary:      Effort: Pulmonary effort is normal.      Breath sounds: Normal breath sounds.   Abdominal:      Palpations: Abdomen is soft.      Tenderness: There is no abdominal tenderness.   Skin:     General: Skin is warm and dry.   Neurological:      General: No focal deficit present.      Mental Status: She is alert and oriented to person, place, and time.   Psychiatric:         Mood and Affect: Mood normal.         Behavior: Behavior normal.         Procedures           ED Course                                 CT Angiogram Chest Pulmonary Embolism    Result Date: 5/13/2024  Impression: 1.No definite evidence for pulm embolus. 2.New groundglass changes right middle lobe that might relate to a nonspecific pneumonitis or developing fibrosis. There are some groundglass changes in the lower lobes as well that might relate to respiratory motion, nonspecific pneumonitis  or some atelectasis. 3.Pulmonary nodular densities as noted with interval enlargement of right middle lobe pulmonary nodule. A follow-up CT in 6 months recommended to reassess. Patient has a history of histoplasmosis. 4.Cholelithiasis. Electronically Signed: Tony Dunbar MD  5/13/2024 11:35 AM EDT  Workstation ID: [id number]    CT Head Without Contrast    Result Date: 5/13/2024  Impression: 1.No acute intracranial abnormality. 2.Hyperostosis frontalis. 3.Evidence for some right ethmoid sinus disease. Electronically Signed: Tony Dunbar MD  5/13/2024 11:22 AM EDT  Workstation ID: [id number]    XR Chest 1 View    Result Date: 5/13/2024  Impression: No acute process. Electronically Signed: Jocelyn Robles MD  5/13/2024 9:02 AM EDT  Workstation ID: [id number]               Medical Decision Making  Was seen evaluated for the above problem    Frontal diagnosis includes was not limited to PE, ACS, pneumonia, pneumothorax    Patient's EKG shows a paced rhythm.  It is stable.  Interpreted by myself.  And reviewed with prior EKG.  She did have elevated troponin which is new 42.  Normal renal function and elevated D-dimer.  She had an infectious picture.  She was sent for CT head and chest with the headache and elevated D-dimer.  No intracerebral hemorrhage was present.  Her chest CT did not show any PE.  There is areas of pneumonitis present.  With the infectious picture she was provided antibiotics.  Will be admitted for further workup.  I spoke with Dr. Demetri Gamboa.  She would like Dr. Sukhi Menjivar notified.  Page was placed.  Patient verbalized understanding of admission.    Amount and/or Complexity of Data Reviewed  Labs: ordered. Decision-making details documented in ED Course.     Details: Labs reviewed  Radiology: ordered and independent interpretation performed.     Details: CT reviewed myself above  ECG/medicine tests: ordered and independent interpretation performed.    Risk  OTC drugs.  Prescription drug  management.  Decision regarding hospitalization.        Final diagnoses:   None   Chest pain  Pneumonia    ED Disposition  ED Disposition       None            No follow-up provider specified.       Medication List        ASK your doctor about these medications      meloxicam 15 MG tablet  Commonly known as: MOBIC  Take 1 tablet by mouth Daily for 14 days.  Ask about: Should I take this medication?                 Adrian Cabrera, DO  05/13/24 1333       Adrian Cabrera, DO  05/13/24 1451

## 2024-05-13 NOTE — CASE MANAGEMENT/SOCIAL WORK
Discharge Planning Assessment  [facility]     Patient Name: Tessie Conner  MRN: [id number]  Today's Date: 5/13/2024    Admit Date: 5/13/2024    Plan: Return Home   Discharge Needs Assessment       Row Name 05/13/24 1729       Living Environment    People in Home spouse;child(crystal), dependent    Name(s) of People in Home Spouse-Shay. Son-Tristan    Current Living Arrangements home    Potentially Unsafe Housing Conditions none    In the past 12 months has the electric, gas, oil, or water company threatened to shut off services in your home? No    Primary Care Provided by self    Provides Primary Care For no one    Family Caregiver if Needed spouse;child(crystal), adult    Quality of Family Relationships helpful;involved;supportive    Able to Return to Prior Arrangements yes       Resource/Environmental Concerns    Resource/Environmental Concerns none    Transportation Concerns none  son       Transition Planning    Patient/Family Anticipates Transition to home;home with family    Patient/Family Anticipated Services at Transition none    Transportation Anticipated family or friend will provide  [place]       Discharge Needs Assessment    Equipment Currently Used at Home none    Concerns to be Addressed no discharge needs identified                   Discharge Plan       Row Name 05/13/24 1734       Plan    Plan Return Home    Plan Comments Spoke wtih patient  at bedside, IADL's,  Spouse and son available to assist if needed. Confirmed PCP and Pharmacy. Denies transportation or medication coverage issuess.DC Barrier: IVAB's, Cardiology consult                  Continued Care and Services - Admitted Since 5/13/2024    No active coordination exists for this encounter.       Expected Discharge Date and Time       Expected Discharge Date Expected Discharge Time    May 14, 2024            Demographic Summary       Row Name 05/13/24 1729       General Information    Admission Type observation    Arrived From home    Required  Notices Provided Observation Status Notice    Referral Source patient    Reason for Consult discharge planning    Preferred Language English                   Functional Status       Row Name 05/13/24 1729       Functional Status    Usual Activity Tolerance good    Current Activity Tolerance good       Functional Status, IADL    Medications independent    Meal Preparation independent    Housekeeping independent    Laundry independent       Mental Status    General Appearance WDL WDL                     Patient Forms       Row Name 05/13/24 1728       Patient Forms    Patient Observation Letter Delivered  5/13 Registration           Met with patient in room wearing PPE: mask, face shield/goggles, gloves, gown.      Maintained distance greater than six feet and spent less than 15 minutes in the room.     Aliza Brito RN

## 2024-05-13 NOTE — Clinical Note
Level of Care: Telemetry [5]   Diagnosis: Chest pain [240507]   Admitting Physician: MAY JARAMILLO [[id number]]   Attending Physician: MAY JARAMILLO [[id number]]

## 2024-05-13 NOTE — H&P
History and Physical      Name: Tessie Conner ADMIT: 2024   : 1970  PROVIDER: Marsha Lopez MD    MRN: [id number] LOS: 0 days   AGE/SEX: 54 y.o. female  ROOM: [facility]     Date of Service: 2024                        Chief Complaint:       Left sided chest pain    History of Present Illness:       54 year old female presents with 3 days of left sided chest pain.  Started Friday night with squeezing CP with dyspnea that would radiate to left axilla and wax and wane. Sates continued on Saturday but thought was from her allergy clear congestion. Started with cough on , but this morning the pressure CP worsened so came to  [facility].  Today with more cough and sweats and chills without documented fever.      Review of Systems:         Chest pain left sides, clear nasal congestion, non productive cough, dyspnea    Past medical history, surgical history, social history, family history, allergies and all medications reviewed and agreed.      Past History/Allergies?Social History:     Past Medical History:   Diagnosis Date    Abnormal ECG     Anemia     Anesthesia complication     cardiac arrest  with hysterectomy    Anxiety 2005    Arthritis     Asthma 2020    allergy    Bipolar 1 disorder     Bradycardia     Chest pain     due to scarring from lung surgery      Chest pain 2020    Cholelithiasis 10/2022    Coronary artery disease     very minimal    Depression 2005    Diabetes mellitus 2002    borderline   resolved    Dyspnea on minimal exertion     Frequent UTI     Gastroesophageal reflux disease without esophagitis 07/15/2020    Heart murmur     FROM CHILDHOOD    Histoplasmosis     History of anemia     POST PREGNANCY    Hyperlipidemia     Hyperlipidemia 10/04/2016    Hypertension     Hypotension     Mass of upper lobe of right lung     Migraines     hx    Obesity     PONV (postoperative nausea and vomiting)     Sleep apnea 22    no machine    Spinal  headache     Vertigo     Visual impairment     WEARS GLASSES    Vitamin D deficiency          Past Surgical History :     Past Surgical History:   Procedure Laterality Date    ANKLE OPEN REDUCTION INTERNAL FIXATION Right 2023    Procedure: ANKLE OPEN REDUCTION INTERNAL FIXATION;  Surgeon: TORIE Beck DPM;  Location: [facility] MAIN OR;  Service: Podiatry;  Laterality: Right;    APPENDECTOMY      CARDIAC CATHETERIZATION N/A 2020    Procedure: Left Heart Cath possible PCI, atherectomy, hemodynamic support;  Surgeon: Thomas Zamora MD;  Location: [facility] CATH INVASIVE LOCATION;  Service: Cardiology;  Laterality: N/A;    CARDIAC CATHETERIZATION  2020    CARDIAC ELECTROPHYSIOLOGY PROCEDURE N/A 2023    Procedure: Pacemaker DC new, [place] aware;  Surgeon: Rachel Espinoza MD;  Location: [facility] CATH INVASIVE LOCATION;  Service: Cardiovascular;  Laterality: N/A;     SECTION      FOOT/TOE TENDON REPAIR Left     HYSTERECTOMY      LUNG BIOPSY Right 2020    LUNG LOBECTOMY Right 2022    pt had partial Right lobectomy and nodule removal    MASS EXCISION Right 2023    Procedure: LIPOMA EXCISION,THIGH;  Surgeon: Justo Shannon MD;  Location: [facility] MAIN OR;  Service: General;  Laterality: Right;    ROTATOR CUFF REPAIR Left     THORACOSCOPY VIDEO ASSISTED WITH LOBECTOMY Right 2022    Procedure: BRONCHOSCOPY, THORACOSCOPY VIDEO ASSISTED wedge resection X2, INTERCOSTAL NERVE BLOCK;  Surgeon: Ayla Carroll MD;  Location: [facility] MAIN OR;  Service: Thoracic;  Laterality: Right;    TUBAL ABDOMINAL LIGATION            Family History:     Family History   Problem Relation Age of Onset    Arthritis Mother     Cancer Mother     Depression Mother     Diabetes Mother     Early death Mother     Mental illness Mother     Anxiety disorder Mother     Alcohol abuse Father     Diabetes Father     Early death Father     Heart disease Father     Hyperlipidemia  Father     Hypertension Father         Father    Vision loss Father     Heart attack Father     Heart disease Sister     Drug abuse Sister     Heart attack Sister     Hypertension Sister         Sister    Heart disease Sister     Heart disease Brother     Hypertension Brother     Heart attack Brother     Drug abuse Brother     Hypertension Brother     Heart disease Brother     Heart attack Brother     Cancer Maternal Grandmother     Malig Hyperthermia Neg Hx           Social History:     Social History     Tobacco Use    Smoking status: Never     Passive exposure: Never    Smokeless tobacco: Never   Substance Use Topics    Alcohol use: Never     Comment: VERY RARE          Allergies:     Allergies   Allergen Reactions    Tylenol [Acetaminophen] Hives and Itching    Pregabalin Rash         Home meds:     (Not in a hospital admission)        Scheduled meds:   azelastine, 1 spray, Each Nare, BID  budesonide, 0.5 mg, Nebulization, BID - RT  [START ON 2024] cefTRIAXone, 2,000 mg, Intravenous, Q24H  cetirizine, 10 mg, Oral, Daily  [START ON 2024] cholecalciferol, 1,000 Units, Oral, Daily  enoxaparin, 40 mg, Subcutaneous, Daily  [START ON 2024] ferrous sulfate, 324 mg, Oral, Daily With Breakfast  [START ON 2024] linagliptin, 5 mg, Oral, Daily  Lurasidone HCl, 20 mg, Oral, Nightly  [START ON 2024] metFORMIN ER, 1,000 mg, Oral, Daily With Breakfast  [START ON 2024] oxybutynin XL, 10 mg, Oral, Daily  [START ON 2024] pantoprazole, 40 mg, Oral, Q AM  sertraline, 50 mg, Oral, Nightly  sodium chloride, 10 mL, Intravenous, Q12H          Objectives:     tMax 24 hrs: Temp (24hrs), Av.5 °F (36.9 °C), Min:98.4 °F (36.9 °C), Max:98.6 °F (37 °C)      Vitals Ranges:   Temp:  [98.4 °F (36.9 °C)-98.6 °F (37 °C)] 98.4 °F (36.9 °C)  Heart Rate:  [60-73] 63  Resp:  [16-19] 16  BP: ()/(43-78) 123/61    Intake and Output Last 3 Shifts:   I/O last 3 completed shifts:  In: 100 [IV Piggyback:100]  Out:  "-       Exam:     /61 (BP Location: Left arm, Patient Position: Lying)   Pulse 63   Temp 98.4 °F (36.9 °C) (Oral)   Resp 16   Ht 165.1 cm (65\")   Wt 104 kg (229 lb 8 oz)   LMP  (LMP Unknown)   SpO2 94%   BMI 38.19 kg/m²     HEENT: PND,    Neck: Supple without adenopathy.  Lungs: CTA- no wheezing or rales  Heart: RRR without significant murmur  Abd: Soft, NT, ND, BS positive, no hepatosplenomegaly  Ext: no swelling  Neuro: CN grossly intact, no focal deficits        Data Review:       Lab Results (last 24 hours)       Procedure Component Value Units Date/Time    Basic Metabolic Panel [[id number]]  (Abnormal) Collected: 05/13/24 1816    Specimen: Blood Updated: 05/13/24 1851     Glucose 104 mg/dL      BUN 12 mg/dL      Creatinine 0.81 mg/dL      Sodium 142 mmol/L      Potassium 4.0 mmol/L      Chloride 103 mmol/L      CO2 25.0 mmol/L      Calcium 9.4 mg/dL      BUN/Creatinine Ratio 14.8     Anion Gap 14.0 mmol/L      eGFR 86.9 mL/min/1.73     Narrative:      GFR Normal >60  Chronic Kidney Disease <60  Kidney Failure <15      High Sensitivity Troponin T [[id number]]  (Abnormal) Collected: 05/13/24 1816    Specimen: Blood Updated: 05/13/24 1851     HS Troponin T 29 ng/L     Narrative:      High Sensitive Troponin T Reference Range:  <14.0 ng/L- Negative Female for AMI  <22.0 ng/L- Negative Male for AMI  >=14 - Abnormal Female indicating possible myocardial injury.  >=22 - Abnormal Male indicating possible myocardial injury.   Clinicians would have to utilize clinical acumen, EKG, Troponin, and serial changes to determine if it is an Acute Myocardial Infarction or myocardial injury due to an underlying chronic condition.         CBC & Differential [[id number]]  (Abnormal) Collected: 05/13/24 1816    Specimen: Blood Updated: 05/13/24 1829    Narrative:      The following orders were created for panel order CBC & Differential.  Procedure                               Abnormality         Status                   "   ---------                               -----------         ------                     CBC Auto Differential[[id number]]        Abnormal            Final result                 Please view results for these tests on the individual orders.    CBC Auto Differential [[id number]]  (Abnormal) Collected: 05/13/24 1816    Specimen: Blood Updated: 05/13/24 1829     WBC 9.68 10*3/mm3      RBC 4.75 10*6/mm3      Hemoglobin 12.6 g/dL      Hematocrit 42.1 %      MCV 88.6 fL      MCH 26.5 pg      MCHC 29.9 g/dL      RDW 14.6 %      RDW-SD 47.6 fl      MPV 9.8 fL      Platelets 219 10*3/mm3      Neutrophil % 75.4 %      Lymphocyte % 15.8 %      Monocyte % 8.0 %      Eosinophil % 0.1 %      Basophil % 0.3 %      Immature Grans % 0.4 %      Neutrophils, Absolute 7.30 10*3/mm3      Lymphocytes, Absolute 1.53 10*3/mm3      Monocytes, Absolute 0.77 10*3/mm3      Eosinophils, Absolute 0.01 10*3/mm3      Basophils, Absolute 0.03 10*3/mm3      Immature Grans, Absolute 0.04 10*3/mm3      nRBC 0.0 /100 WBC     POC Glucose Once [[id number]]  (Abnormal) Collected: 05/13/24 1725    Specimen: Blood Updated: 05/13/24 1726     Glucose 109 mg/dL      Comment: Serial Number: [id number]:  [id number]       High Sensitivity Troponin T 2Hr [[id number]]  (Abnormal) Collected: 05/13/24 1634    Specimen: Blood Updated: 05/13/24 1659     HS Troponin T 32 ng/L      Troponin T Delta -10 ng/L     Narrative:      High Sensitive Troponin T Reference Range:  <14.0 ng/L- Negative Female for AMI  <22.0 ng/L- Negative Male for AMI  >=14 - Abnormal Female indicating possible myocardial injury.  >=22 - Abnormal Male indicating possible myocardial injury.   Clinicians would have to utilize clinical acumen, EKG, Troponin, and serial changes to determine if it is an Acute Myocardial Infarction or myocardial injury due to an underlying chronic condition.         Blood Culture - Blood, Arm, Left [[id number]] Collected: 05/13/24 1531    Specimen: Blood from Arm, Left  Updated: 05/13/24 1535    Blood Culture - Blood, Arm, Right [[id number]] Collected: 05/13/24 1514    Specimen: Blood from Arm, Right Updated: 05/13/24 1535    BNP [[id number]]  (Normal) Collected: 05/13/24 0930    Specimen: Blood from Arm, Left Updated: 05/13/24 1002     proBNP 295.2 pg/mL     Narrative:      This assay is used as an aid in the diagnosis of individuals suspected of having heart failure. It can be used as an aid in the diagnosis of acute decompensated heart failure (ADHF) in patients presenting with signs and symptoms of ADHF to the emergency department (ED). In addition, NT-proBNP of <300 pg/mL indicates ADHF is not likely.    Age Range Result Interpretation  NT-proBNP Concentration (pg/mL:      <50             Positive            >450                   Gray                 300-450                    Negative             <300    50-75           Positive            >900                  Gray                300-900                  Negative            <300      >75             Positive            >1800                  Gray                300-1800                  Negative            <300    High Sensitivity Troponin T [[id number]]  (Abnormal) Collected: 05/13/24 0930    Specimen: Blood from Arm, Left Updated: 05/13/24 1002     HS Troponin T 42 ng/L     Narrative:      High Sensitive Troponin T Reference Range:  <14.0 ng/L- Negative Female for AMI  <22.0 ng/L- Negative Male for AMI  >=14 - Abnormal Female indicating possible myocardial injury.  >=22 - Abnormal Male indicating possible myocardial injury.   Clinicians would have to utilize clinical acumen, EKG, Troponin, and serial changes to determine if it is an Acute Myocardial Infarction or myocardial injury due to an underlying chronic condition.         Comprehensive Metabolic Panel [[id number]]  (Abnormal) Collected: 05/13/24 0930    Specimen: Blood from Arm, Left Updated: 05/13/24 0958     Glucose 82 mg/dL      BUN 11 mg/dL      Creatinine 0.74  mg/dL      Sodium 142 mmol/L      Potassium 4.0 mmol/L      Chloride 104 mmol/L      CO2 27.0 mmol/L      Calcium 9.8 mg/dL      Total Protein 7.2 g/dL      Albumin 4.2 g/dL      ALT (SGPT) 14 U/L      AST (SGOT) 17 U/L      Alkaline Phosphatase 139 U/L      Total Bilirubin 0.3 mg/dL      Globulin 3.0 gm/dL      A/G Ratio 1.4 g/dL      BUN/Creatinine Ratio 14.9     Anion Gap 11.0 mmol/L      eGFR 96.9 mL/min/1.73     Narrative:      GFR Normal >60  Chronic Kidney Disease <60  Kidney Failure <15      COVID-19, FLU A/B, RSV PCR 1 HR TAT - Swab, Nasopharynx [[id number]]  (Normal) Collected: 05/13/24 0843    Specimen: Swab from Nasopharynx Updated: 05/13/24 0939     COVID19 Not Detected     Influenza A PCR Not Detected     Influenza B PCR Not Detected     RSV, PCR Not Detected    Narrative:      Fact sheet for providers: https://www.fda.gov/media/924357/download    Fact sheet for patients: https://www.fda.gov/media/220906/download    Test performed by PCR.    CBC & Differential [[id number]]  (Abnormal) Collected: 05/13/24 0930    Specimen: Blood from Arm, Left Updated: 05/13/24 0938    Narrative:      The following orders were created for panel order CBC & Differential.  Procedure                               Abnormality         Status                     ---------                               -----------         ------                     CBC Auto Differential[[id number]]        Abnormal            Final result                 Please view results for these tests on the individual orders.    CBC Auto Differential [[id number]]  (Abnormal) Collected: 05/13/24 0930    Specimen: Blood from Arm, Left Updated: 05/13/24 0938     WBC 8.61 10*3/mm3      RBC 4.86 10*6/mm3      Hemoglobin 13.1 g/dL      Hematocrit 42.8 %      MCV 88.1 fL      MCH 27.0 pg      MCHC 30.6 g/dL      RDW 14.6 %      RDW-SD 46.5 fl      MPV 9.8 fL      Platelets 218 10*3/mm3      Neutrophil % 74.0 %      Lymphocyte % 16.8 %      Monocyte % 8.4 %       "Eosinophil % 0.0 %      Basophil % 0.2 %      Immature Grans % 0.6 %      Neutrophils, Absolute 6.37 10*3/mm3      Lymphocytes, Absolute 1.45 10*3/mm3      Monocytes, Absolute 0.72 10*3/mm3      Eosinophils, Absolute 0.00 10*3/mm3      Basophils, Absolute 0.02 10*3/mm3      Immature Grans, Absolute 0.05 10*3/mm3      nRBC 0.0 /100 WBC     Protime-INR [[id number]]  (Normal) Collected: 05/13/24 0844    Specimen: Blood from Arm, Left Updated: 05/13/24 0926     Protime 10.9 Seconds      INR 1.00    aPTT [[id number]]  (Abnormal) Collected: 05/13/24 0844    Specimen: Blood from Arm, Left Updated: 05/13/24 0926     PTT <20.0 seconds     D-dimer, Quantitative [[id number]]  (Abnormal) Collected: 05/13/24 0844    Specimen: Blood from Arm, Left Updated: 05/13/24 0926     D-Dimer, Quantitative 0.96 mg/L (FEU)     Narrative:      According to the assay 's published package insert, a normal (<0.50 mg/L (FEU)) D-dimer result in conjunction with a non-high clinical probability assessment, excludes deep vein thrombosis (DVT) and pulmonary embolism (PE) with high sensitivity.    D-dimer values increase with age and this can make VTE exclusion of an older population difficult. To address this, the [employer], based on best available evidence and recent guidelines, recommends that clinicians use age-adjusted D-dimer thresholds in patients greater than 50 years of age with: a) a low probability of PE who do not meet all Pulmonary Embolism Rule Out Criteria, or b) in those with intermediate probability of PE.   The formula for an age-adjusted D-dimer cut-off is \"age/100\".  For example, a 60 year old patient would have an age-adjusted cut-off of 0.60 mg/L (FEU) and an 80 year old 0.80 mg/L (FEU).    Extra Tubes [[id number]] Collected: 05/13/24 0844    Specimen: Blood, Venous Line Updated: 05/13/24 0902    Narrative:      The following orders were created for panel order Extra Tubes.  Procedure                   "             Abnormality         Status                     ---------                               -----------         ------                     [facility][[id number]]                                   Final result                 Please view results for these tests on the individual orders.    [facility] [[id number]] Collected: 05/13/24 0844    Specimen: Blood Updated: 05/13/24 0902     Extra Tube Hold for add-ons.     Comment: Auto resulted.                     Imaging:      Imaging Results (Last 24 Hours)       Procedure Component Value Units Date/Time    CT Angiogram Chest Pulmonary Embolism [[id number]] Collected: 05/13/24 1122     Updated: 05/13/24 1137    Narrative:      CT ANGIOGRAM CHEST PULMONARY EMBOLISM    Date of Exam: 5/13/2024 11:01 AM EDT    Indication: cp.    Comparison: November 4, 2023    Technique: Axial CT images were obtained of the chest after the uneventful intravenous administration of iodinated contrast utilizing pulmonary embolism protocol.  Sagittal and coronal reconstructions were performed.  Automated exposure control and   iterative reconstruction methods were used.      Findings:  There is no definite evidence for pulmonary embolus. A cardiac pacemaker device is present.    On evaluation of lung windows, there is some scarring in the right upper lobe which could relate to previous surgery. There are some groundglass changes more peripherally in the right middle lobe which seems like a change from the prior study and could   reflect a nonspecific pneumonitis or some developing fibrosis. There are some groundglass changes in the lower lobes that may relate to respiratory motion, pneumonitis, or atelectasis. There is a pulmonary nodule along the minor fissure measuring 6.2 mm   on image 68 that could relate to intrapulmonary lymph node. There is an additional pulmonary nodule in the right middle lobe measuring around 5.9 mm best seen on image 86 which seems larger as compared to  prior study. This previously measured 3.4 mm.   There additionally is a stable pleural-based pulmonary nodular density in the left upper lobe measuring 4.5 mm on image 40.    Lower slices through the upper abdomen reveal a gallstone in the gallbladder.      Impression:      Impression:  1.No definite evidence for pulm embolus.  2.New groundglass changes right middle lobe that might relate to a nonspecific pneumonitis or developing fibrosis. There are some groundglass changes in the lower lobes as well that might relate to respiratory motion, nonspecific pneumonitis or some   atelectasis.  3.Pulmonary nodular densities as noted with interval enlargement of right middle lobe pulmonary nodule. A follow-up CT in 6 months recommended to reassess. Patient has a history of histoplasmosis.  4.Cholelithiasis.        Electronically Signed: Tony Dunbar MD    5/13/2024 11:35 AM EDT    Workstation ID: [id number]    CT Head Without Contrast [[id number]] Collected: 05/13/24 1120     Updated: 05/13/24 1124    Narrative:      CT HEAD WO CONTRAST    Date of Exam: 5/13/2024 11:01 AM EDT    Indication: headache.    Comparison: September 23, 2023    Technique: Axial CT images were obtained of the head without contrast administration.  Coronal reconstructions were performed.  Automated exposure control and iterative reconstruction methods were used.      Findings:  The ventricles are not dilated. There is no underlying hemorrhage. There is no midline shift. There are no abnormal extra-axial fluid collections. There is mucosal thickening involving posterior right ethmoid air cells. The mastoids seem clear. There is   hyperostosis frontalis.      Impression:      Impression:  1.No acute intracranial abnormality.  2.Hyperostosis frontalis.  3.Evidence for some right ethmoid sinus disease.      Electronically Signed: Tony Dunbar MD    5/13/2024 11:22 AM EDT    Workstation ID: [id number]    XR Chest 1 View [[id number]] Collected: 05/13/24 0900      Updated: 05/13/24 0904    Narrative:      XR CHEST 1 VW    Date of Exam: 5/13/2024 8:54 AM EDT    Indication: cp    Comparison: 1/18/2024.    Findings:  There is stable cardiomegaly. Left transvenous pacemaker is again noted. There is unchanged moderate elevation of the right diaphragm. There are no definite acute infiltrates or effusions.      Impression:      Impression:  No acute process.      Electronically Signed: Jocelyn Robles MD    5/13/2024 9:02 AM EDT    Workstation ID: [id number]          ECG 12 Lead Chest Pain   Preliminary Result   HEART RATE= 60  bpm   RR Interval= 1001  ms   MT Interval= 157  ms   P Horizontal Axis= -26  deg   P Front Axis=   deg   QRSD Interval= 101  ms   QT Interval= 406  ms   QTcB= 406  ms   QRS Axis= 32  deg   T Wave Axis= 87  deg   - ABNORMAL ECG -   Atrial-paced rhythm   Abnormal T, consider ischemia, anterior leads   Electronically Signed By:    Date and Time of Study: 2024-05-13 08:21:44      C      Assessment:      Principal Problem:    Chest pain  Active Problems:    Asthma    Dyspnea          Plan:     Chest pain: Patient admitted for observation with elevated troponin at 42 and sequential with decreasing levels at 26. Her last cardiac cath was normal in 2020 after abnormal stress test and last echo was 2023.  With 3 days of symptoms and mildly elevated troponin, will schedule stress test in am however with recent ankle surgery, will need adenosine. Cardiology consulted  URI: CT chest with mild ground glass area in middle lobe and lower lobes. No signs of infection on WBC, vitals but will give rocephin and check blood cultures. O2 good on Room air. Mn's ordered prn. Will start nasal spray/zyrtec  D-dimer slightly elevated- Ct negative.  Bipolar:  weaned down on latuda/zoloft and changing to vraylar per psychiatry  Bradycardia: s/p pacer. Followed by EP cardiology and doing well    Anticipate to discharge in am if stress test is negative.      Marsha Lopez,  MD [facility], [facility]  5/13/2024  21:38 EDT

## 2024-05-14 ENCOUNTER — APPOINTMENT (OUTPATIENT)
Dept: NUCLEAR MEDICINE | Facility: HOSPITAL | Age: 54
End: 2024-05-14
Payer: MEDICARE

## 2024-05-14 VITALS
OXYGEN SATURATION: 95 % | TEMPERATURE: 98.5 F | BODY MASS INDEX: 38.24 KG/M2 | RESPIRATION RATE: 20 BRPM | SYSTOLIC BLOOD PRESSURE: 129 MMHG | HEIGHT: 65 IN | HEART RATE: 78 BPM | WEIGHT: 229.5 LBS | DIASTOLIC BLOOD PRESSURE: 65 MMHG

## 2024-05-14 PROBLEM — E11.9 TYPE 2 DIABETES MELLITUS WITHOUT COMPLICATION: Status: ACTIVE | Noted: 2020-04-03

## 2024-05-14 PROBLEM — J06.9 UPPER RESPIRATORY INFECTION WITH COUGH AND CONGESTION: Status: ACTIVE | Noted: 2024-05-14

## 2024-05-14 LAB
ANION GAP SERPL CALCULATED.3IONS-SCNC: 9 MMOL/L (ref 5–15)
BASOPHILS # BLD AUTO: 0.03 10*3/MM3 (ref 0–0.2)
BASOPHILS NFR BLD AUTO: 0.3 % (ref 0–1.5)
BH CV REST NUCLEAR ISOTOPE DOSE: 11 MCI
BH CV STRESS BP STAGE 1: NORMAL
BH CV STRESS COMMENTS STAGE 1: NORMAL
BH CV STRESS DOSE REGADENOSON STAGE 1: 0.4
BH CV STRESS DURATION MIN STAGE 1: 0
BH CV STRESS DURATION SEC STAGE 1: 10
BH CV STRESS GRADE STAGE 1: 10
BH CV STRESS HR STAGE 1: 88
BH CV STRESS METS STAGE 1: 5
BH CV STRESS NUCLEAR ISOTOPE DOSE: 33 MCI
BH CV STRESS PROTOCOL 1: NORMAL
BH CV STRESS RECOVERY BP: NORMAL MMHG
BH CV STRESS RECOVERY HR: 83 BPM
BH CV STRESS SPEED STAGE 1: 1.7
BH CV STRESS STAGE 1: 1
BUN SERPL-MCNC: 13 MG/DL (ref 6–20)
BUN/CREAT SERPL: 14.8 (ref 7–25)
CALCIUM SPEC-SCNC: 9.6 MG/DL (ref 8.6–10.5)
CHLORIDE SERPL-SCNC: 104 MMOL/L (ref 98–107)
CO2 SERPL-SCNC: 29 MMOL/L (ref 22–29)
CREAT SERPL-MCNC: 0.88 MG/DL (ref 0.57–1)
DEPRECATED RDW RBC AUTO: 47.5 FL (ref 37–54)
EGFRCR SERPLBLD CKD-EPI 2021: 78.7 ML/MIN/1.73
EOSINOPHIL # BLD AUTO: 0.01 10*3/MM3 (ref 0–0.4)
EOSINOPHIL NFR BLD AUTO: 0.1 % (ref 0.3–6.2)
ERYTHROCYTE [DISTWIDTH] IN BLOOD BY AUTOMATED COUNT: 14.6 % (ref 12.3–15.4)
GLUCOSE SERPL-MCNC: 102 MG/DL (ref 65–99)
HCT VFR BLD AUTO: 40.6 % (ref 34–46.6)
HGB BLD-MCNC: 11.9 G/DL (ref 12–15.9)
IMM GRANULOCYTES # BLD AUTO: 0.04 10*3/MM3 (ref 0–0.05)
IMM GRANULOCYTES NFR BLD AUTO: 0.4 % (ref 0–0.5)
LV EF NUC BP: 77 %
LYMPHOCYTES # BLD AUTO: 1.56 10*3/MM3 (ref 0.7–3.1)
LYMPHOCYTES NFR BLD AUTO: 16.1 % (ref 19.6–45.3)
MAXIMAL PREDICTED HEART RATE: 167 BPM
MCH RBC QN AUTO: 26 PG (ref 26.6–33)
MCHC RBC AUTO-ENTMCNC: 29.3 G/DL (ref 31.5–35.7)
MCV RBC AUTO: 88.8 FL (ref 79–97)
MONOCYTES # BLD AUTO: 0.86 10*3/MM3 (ref 0.1–0.9)
MONOCYTES NFR BLD AUTO: 8.9 % (ref 5–12)
NEUTROPHILS NFR BLD AUTO: 7.18 10*3/MM3 (ref 1.7–7)
NEUTROPHILS NFR BLD AUTO: 74.2 % (ref 42.7–76)
NRBC BLD AUTO-RTO: 0 /100 WBC (ref 0–0.2)
PERCENT MAX PREDICTED HR: 53.29 %
PLATELET # BLD AUTO: 218 10*3/MM3 (ref 140–450)
PMV BLD AUTO: 9.8 FL (ref 6–12)
POTASSIUM SERPL-SCNC: 4.6 MMOL/L (ref 3.5–5.2)
QT INTERVAL: 406 MS
QT INTERVAL: 424 MS
QTC INTERVAL: 406 MS
QTC INTERVAL: 424 MS
RBC # BLD AUTO: 4.57 10*6/MM3 (ref 3.77–5.28)
SODIUM SERPL-SCNC: 142 MMOL/L (ref 136–145)
STRESS BASELINE BP: NORMAL MMHG
STRESS BASELINE HR: 69 BPM
STRESS PERCENT HR: 63 %
STRESS POST PEAK BP: NORMAL MMHG
STRESS POST PEAK HR: 89 BPM
STRESS TARGET HR: 142 BPM
TROPONIN T SERPL HS-MCNC: 29 NG/L
WBC NRBC COR # BLD AUTO: 9.68 10*3/MM3 (ref 3.4–10.8)

## 2024-05-14 PROCEDURE — 99213 OFFICE O/P EST LOW 20 MIN: CPT | Performed by: NURSE PRACTITIONER

## 2024-05-14 PROCEDURE — 78452 HT MUSCLE IMAGE SPECT MULT: CPT | Performed by: INTERNAL MEDICINE

## 2024-05-14 PROCEDURE — G0378 HOSPITAL OBSERVATION PER HR: HCPCS

## 2024-05-14 PROCEDURE — 96366 THER/PROPH/DIAG IV INF ADDON: CPT

## 2024-05-14 PROCEDURE — 80048 BASIC METABOLIC PNL TOTAL CA: CPT | Performed by: INTERNAL MEDICINE

## 2024-05-14 PROCEDURE — 0 TECHNETIUM TETROFOSMIN KIT: Performed by: INTERNAL MEDICINE

## 2024-05-14 PROCEDURE — 78452 HT MUSCLE IMAGE SPECT MULT: CPT

## 2024-05-14 PROCEDURE — 93017 CV STRESS TEST TRACING ONLY: CPT

## 2024-05-14 PROCEDURE — A9502 TC99M TETROFOSMIN: HCPCS | Performed by: INTERNAL MEDICINE

## 2024-05-14 PROCEDURE — 93018 CV STRESS TEST I&R ONLY: CPT | Performed by: INTERNAL MEDICINE

## 2024-05-14 PROCEDURE — 25010000002 REGADENOSON 0.4 MG/5ML SOLUTION: Performed by: INTERNAL MEDICINE

## 2024-05-14 PROCEDURE — 93005 ELECTROCARDIOGRAM TRACING: CPT | Performed by: INTERNAL MEDICINE

## 2024-05-14 PROCEDURE — 25010000002 CEFTRIAXONE PER 250 MG: Performed by: INTERNAL MEDICINE

## 2024-05-14 PROCEDURE — 84484 ASSAY OF TROPONIN QUANT: CPT | Performed by: INTERNAL MEDICINE

## 2024-05-14 PROCEDURE — 85025 COMPLETE CBC W/AUTO DIFF WBC: CPT | Performed by: INTERNAL MEDICINE

## 2024-05-14 RX ORDER — AZELASTINE 1 MG/ML
1 SPRAY, METERED NASAL 2 TIMES DAILY
Qty: 1 EACH | Refills: 12 | Status: SHIPPED | OUTPATIENT
Start: 2024-05-14

## 2024-05-14 RX ORDER — REGADENOSON 0.08 MG/ML
0.4 INJECTION, SOLUTION INTRAVENOUS
Status: COMPLETED | OUTPATIENT
Start: 2024-05-14 | End: 2024-05-14

## 2024-05-14 RX ORDER — CEFDINIR 300 MG/1
300 CAPSULE ORAL 2 TIMES DAILY
Qty: 18 CAPSULE | Refills: 0 | Status: SHIPPED | OUTPATIENT
Start: 2024-05-14 | End: 2024-05-23

## 2024-05-14 RX ORDER — CETIRIZINE HYDROCHLORIDE 10 MG/1
10 TABLET ORAL DAILY
Qty: 30 TABLET | Refills: 1 | Status: SHIPPED | OUTPATIENT
Start: 2024-05-15

## 2024-05-14 RX ADMIN — AZELASTINE HYDROCHLORIDE 1 SPRAY: 137 SPRAY, METERED NASAL at 09:35

## 2024-05-14 RX ADMIN — TETROFOSMIN 1 DOSE: 1.38 INJECTION, POWDER, LYOPHILIZED, FOR SOLUTION INTRAVENOUS at 09:59

## 2024-05-14 RX ADMIN — Medication 1000 UNITS: at 09:35

## 2024-05-14 RX ADMIN — OXYBUTYNIN CHLORIDE 10 MG: 10 TABLET, EXTENDED RELEASE ORAL at 09:35

## 2024-05-14 RX ADMIN — REGADENOSON 0.4 MG: 0.08 INJECTION, SOLUTION INTRAVENOUS at 11:19

## 2024-05-14 RX ADMIN — TETROFOSMIN 1 DOSE: 1.38 INJECTION, POWDER, LYOPHILIZED, FOR SOLUTION INTRAVENOUS at 11:19

## 2024-05-14 RX ADMIN — CETIRIZINE HYDROCHLORIDE 10 MG: 10 TABLET, FILM COATED ORAL at 09:35

## 2024-05-14 RX ADMIN — PANTOPRAZOLE SODIUM 40 MG: 40 TABLET, DELAYED RELEASE ORAL at 05:34

## 2024-05-14 RX ADMIN — METFORMIN HYDROCHLORIDE 1000 MG: 500 TABLET, EXTENDED RELEASE ORAL at 09:35

## 2024-05-14 RX ADMIN — LINAGLIPTIN 5 MG: 5 TABLET, FILM COATED ORAL at 09:35

## 2024-05-14 RX ADMIN — FERROUS SULFATE TAB EC 324 MG (65 MG FE EQUIVALENT) 324 MG: 324 (65 FE) TABLET DELAYED RESPONSE at 09:35

## 2024-05-14 RX ADMIN — Medication 10 ML: at 09:36

## 2024-05-14 RX ADMIN — CEFTRIAXONE 2000 MG: 2 INJECTION, POWDER, FOR SOLUTION INTRAMUSCULAR; INTRAVENOUS at 09:35

## 2024-05-14 NOTE — DISCHARGE SUMMARY
Discharge Summary      Name: Tessie Conner ADMIT: 2024   : 1970  PROVIDER: Marsha Lopez MD    MRN: [id number] LOS: 0 days   AGE/SEX: 54 y.o. female  ROOM: [facility]     Date of Service: 2024                        Date of Discharge:       2024    Discharge Diagnosis:          Principal Problem:    Chest pain  Active Problems:    Type 2 diabetes mellitus without complication    Asthma    Dyspnea    Upper respiratory infection with cough and congestion      Presenting Problem/History of Present Illness     Active Hospital Problems    Diagnosis  POA    **Chest pain [R07.9]  Yes    Upper respiratory infection with cough and congestion [J06.9]  Yes    Dyspnea [R06.00]  Yes    Asthma [J45.909]  Yes    Type 2 diabetes mellitus without complication [E11.9]  Yes      Resolved Hospital Problems   No resolved problems to display.       Hospital Course     Patient is a 54 y.o. female presented with left sided chest pain radiating to left axilla that wax and wanes for last 3 days. Had GARCIA, but then developed non productive cough, chills/sweats and congestion as well. She has history of histoplasmosis, S/P lobe resection and underlying asthma/allergies that she initially attributed the discomfort to.  Was found to have an elevated troponin at 42- with serial decrease to 29. Her D dimer was slightly elevated, and CTA showed no PE, however had new ground glass area in RML and minimal in lower lobes. Was treat for respitory infection, while having cardiac concerns ruled out.Blood cultures NGTD, WBC normal and covid/Rsv/flu negative. Also showed right sinus disease in her CT head.    Patient did well overnight with no continued chest pain.  Had nuclear stress test  was negative. Had cardiology consult as well. Her last echo was 2023. She was continued on her home medications (or formulary equivalents) with maintaining good sugar control. Will discharge patient home on complete course of abx  for sinusitis and URI.  Also will send decongestant. Encourage fluids and rest.      Procedures Performed:     Stress test           Consults:      Consults       Date and Time Order Name Status Description    5/13/2024  4:38 PM Inpatient Cardiology Consult Completed     5/13/2024  1:38 PM Cardiology (on-call MD unless specified)      5/13/2024  1:10 PM Family Medicine Consult                Pertinent Test Results:      Stress test negative  Blood cultures pending  Negative Covid/RSV/Flu   troponin 42-29    Lab Results (last 48 hours)       Procedure Component Value Units Date/Time    High Sensitivity Troponin T [[id number]]  (Abnormal) Collected: 05/14/24 0452    Specimen: Blood Updated: 05/14/24 0527     HS Troponin T 29 ng/L     Narrative:      High Sensitive Troponin T Reference Range:  <14.0 ng/L- Negative Female for AMI  <22.0 ng/L- Negative Male for AMI  >=14 - Abnormal Female indicating possible myocardial injury.  >=22 - Abnormal Male indicating possible myocardial injury.   Clinicians would have to utilize clinical acumen, EKG, Troponin, and serial changes to determine if it is an Acute Myocardial Infarction or myocardial injury due to an underlying chronic condition.         Basic Metabolic Panel [[id number]]  (Abnormal) Collected: 05/14/24 0452    Specimen: Blood Updated: 05/14/24 0525     Glucose 102 mg/dL      BUN 13 mg/dL      Creatinine 0.88 mg/dL      Sodium 142 mmol/L      Potassium 4.6 mmol/L      Chloride 104 mmol/L      CO2 29.0 mmol/L      Calcium 9.6 mg/dL      BUN/Creatinine Ratio 14.8     Anion Gap 9.0 mmol/L      eGFR 78.7 mL/min/1.73     Narrative:      GFR Normal >60  Chronic Kidney Disease <60  Kidney Failure <15      CBC & Differential [[id number]]  (Abnormal) Collected: 05/14/24 0452    Specimen: Blood Updated: 05/14/24 0510    Narrative:      The following orders were created for panel order CBC & Differential.  Procedure                               Abnormality         Status                      ---------                               -----------         ------                     CBC Auto Differential[[id number]]        Abnormal            Final result                 Please view results for these tests on the individual orders.    CBC Auto Differential [[id number]]  (Abnormal) Collected: 05/14/24 0452    Specimen: Blood Updated: 05/14/24 0510     WBC 9.68 10*3/mm3      RBC 4.57 10*6/mm3      Hemoglobin 11.9 g/dL      Hematocrit 40.6 %      MCV 88.8 fL      MCH 26.0 pg      MCHC 29.3 g/dL      RDW 14.6 %      RDW-SD 47.5 fl      MPV 9.8 fL      Platelets 218 10*3/mm3      Neutrophil % 74.2 %      Lymphocyte % 16.1 %      Monocyte % 8.9 %      Eosinophil % 0.1 %      Basophil % 0.3 %      Immature Grans % 0.4 %      Neutrophils, Absolute 7.18 10*3/mm3      Lymphocytes, Absolute 1.56 10*3/mm3      Monocytes, Absolute 0.86 10*3/mm3      Eosinophils, Absolute 0.01 10*3/mm3      Basophils, Absolute 0.03 10*3/mm3      Immature Grans, Absolute 0.04 10*3/mm3      nRBC 0.0 /100 WBC     Basic Metabolic Panel [[id number]]  (Abnormal) Collected: 05/13/24 1816    Specimen: Blood Updated: 05/13/24 1851     Glucose 104 mg/dL      BUN 12 mg/dL      Creatinine 0.81 mg/dL      Sodium 142 mmol/L      Potassium 4.0 mmol/L      Chloride 103 mmol/L      CO2 25.0 mmol/L      Calcium 9.4 mg/dL      BUN/Creatinine Ratio 14.8     Anion Gap 14.0 mmol/L      eGFR 86.9 mL/min/1.73     Narrative:      GFR Normal >60  Chronic Kidney Disease <60  Kidney Failure <15      High Sensitivity Troponin T [[id number]]  (Abnormal) Collected: 05/13/24 1816    Specimen: Blood Updated: 05/13/24 1851     HS Troponin T 29 ng/L     Narrative:      High Sensitive Troponin T Reference Range:  <14.0 ng/L- Negative Female for AMI  <22.0 ng/L- Negative Male for AMI  >=14 - Abnormal Female indicating possible myocardial injury.  >=22 - Abnormal Male indicating possible myocardial injury.   Clinicians would have to utilize clinical acumen, EKG,  Troponin, and serial changes to determine if it is an Acute Myocardial Infarction or myocardial injury due to an underlying chronic condition.         CBC & Differential [[id number]]  (Abnormal) Collected: 05/13/24 1816    Specimen: Blood Updated: 05/13/24 1829    Narrative:      The following orders were created for panel order CBC & Differential.  Procedure                               Abnormality         Status                     ---------                               -----------         ------                     CBC Auto Differential[[id number]]        Abnormal            Final result                 Please view results for these tests on the individual orders.    CBC Auto Differential [[id number]]  (Abnormal) Collected: 05/13/24 1816    Specimen: Blood Updated: 05/13/24 1829     WBC 9.68 10*3/mm3      RBC 4.75 10*6/mm3      Hemoglobin 12.6 g/dL      Hematocrit 42.1 %      MCV 88.6 fL      MCH 26.5 pg      MCHC 29.9 g/dL      RDW 14.6 %      RDW-SD 47.6 fl      MPV 9.8 fL      Platelets 219 10*3/mm3      Neutrophil % 75.4 %      Lymphocyte % 15.8 %      Monocyte % 8.0 %      Eosinophil % 0.1 %      Basophil % 0.3 %      Immature Grans % 0.4 %      Neutrophils, Absolute 7.30 10*3/mm3      Lymphocytes, Absolute 1.53 10*3/mm3      Monocytes, Absolute 0.77 10*3/mm3      Eosinophils, Absolute 0.01 10*3/mm3      Basophils, Absolute 0.03 10*3/mm3      Immature Grans, Absolute 0.04 10*3/mm3      nRBC 0.0 /100 WBC     POC Glucose Once [[id number]]  (Abnormal) Collected: 05/13/24 1725    Specimen: Blood Updated: 05/13/24 1726     Glucose 109 mg/dL      Comment: Serial Number: [id number]:  [id number]       High Sensitivity Troponin T 2Hr [[id number]]  (Abnormal) Collected: 05/13/24 1634    Specimen: Blood Updated: 05/13/24 1659     HS Troponin T 32 ng/L      Troponin T Delta -10 ng/L     Narrative:      High Sensitive Troponin T Reference Range:  <14.0 ng/L- Negative Female for AMI  <22.0 ng/L- Negative Male for  AMI  >=14 - Abnormal Female indicating possible myocardial injury.  >=22 - Abnormal Male indicating possible myocardial injury.   Clinicians would have to utilize clinical acumen, EKG, Troponin, and serial changes to determine if it is an Acute Myocardial Infarction or myocardial injury due to an underlying chronic condition.         Blood Culture - Blood, Arm, Left [[id number]] Collected: 05/13/24 1531    Specimen: Blood from Arm, Left Updated: 05/13/24 1535    Blood Culture - Blood, Arm, Right [[id number]] Collected: 05/13/24 1514    Specimen: Blood from Arm, Right Updated: 05/13/24 1535    BNP [[id number]]  (Normal) Collected: 05/13/24 0930    Specimen: Blood from Arm, Left Updated: 05/13/24 1002     proBNP 295.2 pg/mL     Narrative:      This assay is used as an aid in the diagnosis of individuals suspected of having heart failure. It can be used as an aid in the diagnosis of acute decompensated heart failure (ADHF) in patients presenting with signs and symptoms of ADHF to the emergency department (ED). In addition, NT-proBNP of <300 pg/mL indicates ADHF is not likely.    Age Range Result Interpretation  NT-proBNP Concentration (pg/mL:      <50             Positive            >450                   Gray                 300-450                    Negative             <300    50-75           Positive            >900                  Gray                300-900                  Negative            <300      >75             Positive            >1800                  Gray                300-1800                  Negative            <300    High Sensitivity Troponin T [[id number]]  (Abnormal) Collected: 05/13/24 0930    Specimen: Blood from Arm, Left Updated: 05/13/24 1002     HS Troponin T 42 ng/L     Narrative:      High Sensitive Troponin T Reference Range:  <14.0 ng/L- Negative Female for AMI  <22.0 ng/L- Negative Male for AMI  >=14 - Abnormal Female indicating possible myocardial injury.  >=22 - Abnormal Male  indicating possible myocardial injury.   Clinicians would have to utilize clinical acumen, EKG, Troponin, and serial changes to determine if it is an Acute Myocardial Infarction or myocardial injury due to an underlying chronic condition.         Comprehensive Metabolic Panel [[id number]]  (Abnormal) Collected: 05/13/24 0930    Specimen: Blood from Arm, Left Updated: 05/13/24 0958     Glucose 82 mg/dL      BUN 11 mg/dL      Creatinine 0.74 mg/dL      Sodium 142 mmol/L      Potassium 4.0 mmol/L      Chloride 104 mmol/L      CO2 27.0 mmol/L      Calcium 9.8 mg/dL      Total Protein 7.2 g/dL      Albumin 4.2 g/dL      ALT (SGPT) 14 U/L      AST (SGOT) 17 U/L      Alkaline Phosphatase 139 U/L      Total Bilirubin 0.3 mg/dL      Globulin 3.0 gm/dL      A/G Ratio 1.4 g/dL      BUN/Creatinine Ratio 14.9     Anion Gap 11.0 mmol/L      eGFR 96.9 mL/min/1.73     Narrative:      GFR Normal >60  Chronic Kidney Disease <60  Kidney Failure <15      COVID-19, FLU A/B, RSV PCR 1 HR TAT - Swab, Nasopharynx [[id number]]  (Normal) Collected: 05/13/24 0843    Specimen: Swab from Nasopharynx Updated: 05/13/24 0939     COVID19 Not Detected     Influenza A PCR Not Detected     Influenza B PCR Not Detected     RSV, PCR Not Detected    Narrative:      Fact sheet for providers: https://www.fda.gov/media/622367/download    Fact sheet for patients: https://www.fda.gov/media/229820/download    Test performed by PCR.    CBC & Differential [[id number]]  (Abnormal) Collected: 05/13/24 0930    Specimen: Blood from Arm, Left Updated: 05/13/24 0938    Narrative:      The following orders were created for panel order CBC & Differential.  Procedure                               Abnormality         Status                     ---------                               -----------         ------                     CBC Auto Differential[[id number]]        Abnormal            Final result                 Please view results for these tests on the individual  orders.    CBC Auto Differential [[id number]]  (Abnormal) Collected: 05/13/24 0930    Specimen: Blood from Arm, Left Updated: 05/13/24 0938     WBC 8.61 10*3/mm3      RBC 4.86 10*6/mm3      Hemoglobin 13.1 g/dL      Hematocrit 42.8 %      MCV 88.1 fL      MCH 27.0 pg      MCHC 30.6 g/dL      RDW 14.6 %      RDW-SD 46.5 fl      MPV 9.8 fL      Platelets 218 10*3/mm3      Neutrophil % 74.0 %      Lymphocyte % 16.8 %      Monocyte % 8.4 %      Eosinophil % 0.0 %      Basophil % 0.2 %      Immature Grans % 0.6 %      Neutrophils, Absolute 6.37 10*3/mm3      Lymphocytes, Absolute 1.45 10*3/mm3      Monocytes, Absolute 0.72 10*3/mm3      Eosinophils, Absolute 0.00 10*3/mm3      Basophils, Absolute 0.02 10*3/mm3      Immature Grans, Absolute 0.05 10*3/mm3      nRBC 0.0 /100 WBC     Protime-INR [[id number]]  (Normal) Collected: 05/13/24 0844    Specimen: Blood from Arm, Left Updated: 05/13/24 0926     Protime 10.9 Seconds      INR 1.00    aPTT [[id number]]  (Abnormal) Collected: 05/13/24 0844    Specimen: Blood from Arm, Left Updated: 05/13/24 0926     PTT <20.0 seconds     D-dimer, Quantitative [[id number]]  (Abnormal) Collected: 05/13/24 0844    Specimen: Blood from Arm, Left Updated: 05/13/24 0926     D-Dimer, Quantitative 0.96 mg/L (FEU)     Narrative:      According to the assay 's published package insert, a normal (<0.50 mg/L (FEU)) D-dimer result in conjunction with a non-high clinical probability assessment, excludes deep vein thrombosis (DVT) and pulmonary embolism (PE) with high sensitivity.    D-dimer values increase with age and this can make VTE exclusion of an older population difficult. To address this, the [employer], based on best available evidence and recent guidelines, recommends that clinicians use age-adjusted D-dimer thresholds in patients greater than 50 years of age with: a) a low probability of PE who do not meet all Pulmonary Embolism Rule Out Criteria, or b) in those  "with intermediate probability of PE.   The formula for an age-adjusted D-dimer cut-off is \"age/100\".  For example, a 60 year old patient would have an age-adjusted cut-off of 0.60 mg/L (FEU) and an 80 year old 0.80 mg/L (FEU).    Extra Tubes [[id number]] Collected: 05/13/24 0844    Specimen: Blood, Venous Line Updated: 05/13/24 0902    Narrative:      The following orders were created for panel order Extra Tubes.  Procedure                               Abnormality         Status                     ---------                               -----------         ------                     Gold Top - SST[[id number]]                                   Final result                 Please view results for these tests on the individual orders.    Gold Top - SST [[id number]] Collected: 05/13/24 0844    Specimen: Blood Updated: 05/13/24 0902     Extra Tube Hold for add-ons.     Comment: Auto resulted.             Imaging Results (Last 7 Days)       Procedure Component Value Units Date/Time    CT Angiogram Chest Pulmonary Embolism [[id number]] Collected: 05/13/24 1122     Updated: 05/13/24 1137    Narrative:      CT ANGIOGRAM CHEST PULMONARY EMBOLISM    Date of Exam: 5/13/2024 11:01 AM EDT    Indication: cp.    Comparison: November 4, 2023    Technique: Axial CT images were obtained of the chest after the uneventful intravenous administration of iodinated contrast utilizing pulmonary embolism protocol.  Sagittal and coronal reconstructions were performed.  Automated exposure control and   iterative reconstruction methods were used.      Findings:  There is no definite evidence for pulmonary embolus. A cardiac pacemaker device is present.    On evaluation of lung windows, there is some scarring in the right upper lobe which could relate to previous surgery. There are some groundglass changes more peripherally in the right middle lobe which seems like a change from the prior study and could   reflect a nonspecific pneumonitis or some " developing fibrosis. There are some groundglass changes in the lower lobes that may relate to respiratory motion, pneumonitis, or atelectasis. There is a pulmonary nodule along the minor fissure measuring 6.2 mm   on image 68 that could relate to intrapulmonary lymph node. There is an additional pulmonary nodule in the right middle lobe measuring around 5.9 mm best seen on image 86 which seems larger as compared to prior study. This previously measured 3.4 mm.   There additionally is a stable pleural-based pulmonary nodular density in the left upper lobe measuring 4.5 mm on image 40.    Lower slices through the upper abdomen reveal a gallstone in the gallbladder.      Impression:      Impression:  1.No definite evidence for pulm embolus.  2.New groundglass changes right middle lobe that might relate to a nonspecific pneumonitis or developing fibrosis. There are some groundglass changes in the lower lobes as well that might relate to respiratory motion, nonspecific pneumonitis or some   atelectasis.  3.Pulmonary nodular densities as noted with interval enlargement of right middle lobe pulmonary nodule. A follow-up CT in 6 months recommended to reassess. Patient has a history of histoplasmosis.  4.Cholelithiasis.        Electronically Signed: Tony Dunbar MD    5/13/2024 11:35 AM EDT    Workstation ID: [id number]    CT Head Without Contrast [[id number]] Collected: 05/13/24 1120     Updated: 05/13/24 1124    Narrative:      CT HEAD WO CONTRAST    Date of Exam: 5/13/2024 11:01 AM EDT    Indication: headache.    Comparison: September 23, 2023    Technique: Axial CT images were obtained of the head without contrast administration.  Coronal reconstructions were performed.  Automated exposure control and iterative reconstruction methods were used.      Findings:  The ventricles are not dilated. There is no underlying hemorrhage. There is no midline shift. There are no abnormal extra-axial fluid collections. There is mucosal  thickening involving posterior right ethmoid air cells. The mastoids seem clear. There is   hyperostosis frontalis.      Impression:      Impression:  1.No acute intracranial abnormality.  2.Hyperostosis frontalis.  3.Evidence for some right ethmoid sinus disease.      Electronically Signed: Tony Dunbar MD    5/13/2024 11:22 AM EDT    Workstation ID: [id number]    XR Chest 1 View [[id number]] Collected: 05/13/24 0900     Updated: 05/13/24 0904    Narrative:      XR CHEST 1 VW    Date of Exam: 5/13/2024 8:54 AM EDT    Indication: cp    Comparison: 1/18/2024.    Findings:  There is stable cardiomegaly. Left transvenous pacemaker is again noted. There is unchanged moderate elevation of the right diaphragm. There are no definite acute infiltrates or effusions.      Impression:      Impression:  No acute process.      Electronically Signed: Jocelyn Robles MD    5/13/2024 9:02 AM EDT    Workstation ID: [id number]          ECG 12 Lead Chest Pain   Preliminary Result   HEART RATE= 60  bpm   RR Interval= 1001  ms   VT Interval= 168  ms   P Horizontal Axis= -13  deg   P Front Axis=   deg   QRSD Interval= 99  ms   QT Interval= 424  ms   QTcB= 424  ms   QRS Axis= 56  deg   T Wave Axis= 80  deg   - ABNORMAL ECG -   Atrial-paced rhythm   Electronically Signed By:    Date and Time of Study: 2024-05-14 05:40:22      ECG 12 Lead Chest Pain   Final Result   HEART RATE= 60  bpm   RR Interval= 1001  ms   VT Interval= 157  ms   P Horizontal Axis= -26  deg   P Front Axis=   deg   QRSD Interval= 101  ms   QT Interval= 406  ms   QTcB= 406  ms   QRS Axis= 32  deg   T Wave Axis= 87  deg   - ABNORMAL ECG -   Atrial-paced rhythm   Abnormal T, consider ischemia, anterior leads   When compared with ECG of 18-Jan-2024 4:37:42,   Significant change in rhythm   Electronically Signed By: Adrian Cabrera (DICK) 14-May-2024 06:16:51   Date and Time of Study: 2024-05-13 08:21:44          Condition on Discharge:     stable  Vital Signs     Temp:  [97.9 °F  (36.6 °C)-98.5 °F (36.9 °C)] 98.5 °F (36.9 °C)  Heart Rate:  [60-78] 78  Resp:  [14-20] 20  BP: ()/(44-78) 129/65    Physical Exam:     HEENT: PND  Neck: shoddy LAD  Lungs: CTA  Heart: RRR  Abd: Soft, NT/ND, Bowel sounds positive  Ext: no significant findings  Neuro: CN grossly intact, no focal deficits      Discharge Disposition   To home  Home or Self Care    Discharge Medications        Discharge Medications        New Medications        Instructions Start Date   azelastine 0.1 % nasal spray  Commonly known as: ASTELIN   1 spray, Nasal, 2 Times Daily, Use in each nostril as directed      cefdinir 300 MG capsule  Commonly known as: OMNICEF   300 mg, Oral, 2 Times Daily      cetirizine 10 MG tablet  Commonly known as: zyrTEC   10 mg, Oral, Daily   Start Date: May 15, 2024            Continue These Medications        Instructions Start Date   Arnuity Ellipta 200 MCG/ACT  Generic drug: Fluticasone Furoate   Take 1 Act by mouth Daily.      cholecalciferol 25 MCG (1000 UT) tablet  Commonly known as: VITAMIN D3   1,000 Units, Oral, Daily      FeroSul 325 (65 Fe) MG tablet  Generic drug: ferrous sulfate   325 mg, Oral, Daily With Breakfast      Gemtesa 75 MG tablet  Generic drug: Vibegron   75 mg, Oral, Daily      ipratropium-albuterol 0.5-2.5 mg/3 ml nebulizer  Commonly known as: DUO-NEB   3 mL, Nebulization, 4 Times Daily PRN      Lurasidone HCl 20 MG tablet tablet  Commonly known as: LATUDA   20 mg, Oral, Nightly      metFORMIN  MG 24 hr tablet  Commonly known as: GLUCOPHAGE-XR   1,000 mg, Oral, Daily With Breakfast      omeprazole 20 MG capsule  Commonly known as: priLOSEC   1 capsule, Oral, Nightly      sertraline 50 MG tablet  Commonly known as: ZOLOFT   50 mg, Oral, Nightly      SITagliptin 100 MG tablet  Commonly known as: JANUVIA   100 mg, Oral, Daily               Discharge Diet:      No concentrated sweets    Activity at Discharge:      As tolerated    Follow-up Appointments     Dr. Lopez 1  week    Test Results Pending at Discharge     Pending Labs       Order Current Status    Blood Culture - Blood, Arm, Left In process    Blood Culture - Blood, Arm, Right In process              Time Spent:      35 minutes        Marsha Lopez MD  [facility]  5/14/2024  14:51 EDT

## 2024-05-14 NOTE — CONSULTS
[facility] [place] [facility]   CONSULT NOTE    Referring Provider: No att. providers found    Reason for Consultation: chest pain      Patient Care Team:  Marsha Lopez MD as PCP - General (Internal Medicine)  Rachel Espinoza MD as Consulting Physician (Cardiology)  Ayla Carroll MD as Surgeon (Thoracic Surgery)      Chief complaint: chest pain    History of present illness:  Tessie Conner is a 54 y.o. female with past medical history of dual chamber [place] [employer] pacemaker, asthma, borderline diabetes, GERD, hypertension, hyperlipidemia, histoplasmosis s/p lung resection who presented to the ED with left sided chest pain.    Patient states she started having chest pain in the upper left chest near her pacemaker and extending into her left axilla on Friday night. Over the weekend, she developed cough and sinus congestion. Her chest pain never completely went away therefore she presented to the ED on Monday.  Patient denies shortness of breath, dizziness, lightheadedness, or edema.    Workup in ED reveals elevated serial troponins 42, 32, 29 x2, proBNP 295, D-Dimer 0.96, CBC unremarkable  CT Chest negative for PE, new groundglass changes in RML that might relate to nonspecific pneumonitis or developing fibrosis.  CT Head some right ethmoid sinus disease.    Review of Systems   Constitutional: Negative for fever and malaise/fatigue.   HENT:  Positive for congestion.    Cardiovascular:  Positive for chest pain. Negative for dyspnea on exertion, irregular heartbeat, leg swelling and palpitations.   Respiratory:  Positive for cough. Negative for shortness of breath.    All other systems reviewed and are negative.      History  Past Medical History:   Diagnosis Date    Abnormal ECG     Anemia     Anesthesia complication 2003    cardiac arrest  with hysterectomy    Anxiety 2005    Arthritis     Asthma 04/17/2020    allergy    Bipolar 1 disorder     Bradycardia     Chest pain     due  to scarring from lung surgery      Chest pain 2020    Cholelithiasis 10/2022    Coronary artery disease     very minimal    Depression     Diabetes mellitus     borderline   resolved    Dyspnea on minimal exertion     Frequent UTI     Gastroesophageal reflux disease without esophagitis 07/15/2020    Heart murmur     FROM CHILDHOOD    Histoplasmosis     History of anemia     POST PREGNANCY    Hyperlipidemia     Hyperlipidemia 10/04/2016    Hypertension     Hypotension     Mass of upper lobe of right lung     Migraines     hx    Obesity     PONV (postoperative nausea and vomiting)     Sleep apnea 22    no machine    Spinal headache     Vertigo     Visual impairment     WEARS GLASSES    Vitamin D deficiency        Past Surgical History:   Procedure Laterality Date    ANKLE OPEN REDUCTION INTERNAL FIXATION Right 2023    Procedure: ANKLE OPEN REDUCTION INTERNAL FIXATION;  Surgeon: TORIE Beck DPM;  Location: [facility] MAIN OR;  Service: Podiatry;  Laterality: Right;    APPENDECTOMY      CARDIAC CATHETERIZATION N/A 2020    Procedure: Left Heart Cath possible PCI, atherectomy, hemodynamic support;  Surgeon: Thomas Zamora MD;  Location: [facility] CATH INVASIVE LOCATION;  Service: Cardiology;  Laterality: N/A;    CARDIAC CATHETERIZATION  2020    CARDIAC ELECTROPHYSIOLOGY PROCEDURE N/A 2023    Procedure: Pacemaker DC new, [place] aware;  Surgeon: Rachel Espinoza MD;  Location: [facility] CATH INVASIVE LOCATION;  Service: Cardiovascular;  Laterality: N/A;     SECTION      FOOT/TOE TENDON REPAIR Left     HYSTERECTOMY      LUNG BIOPSY Right 2020    LUNG LOBECTOMY Right 2022    pt had partial Right lobectomy and nodule removal    MASS EXCISION Right 2023    Procedure: LIPOMA EXCISION,THIGH;  Surgeon: Justo Shannon MD;  Location: [facility] MAIN OR;  Service: General;  Laterality: Right;    ROTATOR CUFF REPAIR Left     THORACOSCOPY  VIDEO ASSISTED WITH LOBECTOMY Right 02/08/2022    Procedure: BRONCHOSCOPY, THORACOSCOPY VIDEO ASSISTED wedge resection X2, INTERCOSTAL NERVE BLOCK;  Surgeon: Ayla Carroll MD;  Location: [facility];  Service: Thoracic;  Laterality: Right;    TUBAL ABDOMINAL LIGATION  2002       Family History   Problem Relation Age of Onset    Arthritis Mother     Cancer Mother     Depression Mother     Diabetes Mother     Early death Mother     Mental illness Mother     Anxiety disorder Mother     Alcohol abuse Father     Diabetes Father     Early death Father     Heart disease Father     Hyperlipidemia Father     Hypertension Father         Father    Vision loss Father     Heart attack Father     Heart disease Sister     Drug abuse Sister     Heart attack Sister     Hypertension Sister         Sister    Heart disease Sister     Heart disease Brother     Hypertension Brother     Heart attack Brother     Drug abuse Brother     Hypertension Brother     Heart disease Brother     Heart attack Brother     Cancer Maternal Grandmother     Malig Hyperthermia Neg Hx        Social History     Tobacco Use    Smoking status: Never     Passive exposure: Never    Smokeless tobacco: Never   Vaping Use    Vaping status: Never Used   Substance Use Topics    Alcohol use: Never     Comment: VERY RARE    Drug use: Never        (Not in a hospital admission)        Tylenol [acetaminophen] and Pregabalin    Scheduled Meds:  azelastine, 1 spray, Each Nare, BID  budesonide, 0.5 mg, Nebulization, BID - RT  cefTRIAXone, 2,000 mg, Intravenous, Q24H  cetirizine, 10 mg, Oral, Daily  cholecalciferol, 1,000 Units, Oral, Daily  enoxaparin, 40 mg, Subcutaneous, Daily  ferrous sulfate, 324 mg, Oral, Daily With Breakfast  linagliptin, 5 mg, Oral, Daily  Lurasidone HCl, 20 mg, Oral, Nightly  metFORMIN ER, 1,000 mg, Oral, Daily With Breakfast  oxybutynin XL, 10 mg, Oral, Daily  pantoprazole, 40 mg, Oral, Q AM  sertraline, 50 mg, Oral, Nightly  sodium chloride,  "10 mL, Intravenous, Q12H        Continuous Infusions:       PRN Meds:    senna-docusate sodium **AND** polyethylene glycol **AND** bisacodyl **AND** bisacodyl    ibuprofen    ipratropium-albuterol    nitroglycerin    [COMPLETED] Insert Peripheral IV **AND** sodium chloride    sodium chloride    sodium chloride      VITAL SIGNS  Vitals:    05/13/24 2229 05/13/24 2234 05/14/24 0313 05/14/24 0751   BP:   105/56 112/62   BP Location:   Left arm Left arm   Patient Position:   Lying Lying   Pulse: 67 69 60 60   Resp: 16 16 15 14   Temp:   98 °F (36.7 °C) 98 °F (36.7 °C)   TempSrc:   Oral Oral   SpO2: 98% 98% 93% 97%   Weight:       Height:           Flowsheet Rows      Flowsheet Row First Filed Value   Admission Height 165.1 cm (65\") Documented at 05/13/2024 0809   Admission Weight 104 kg (229 lb 8 oz) Documented at 05/13/2024 0813             TELEMETRY: atrial paced    Physical Exam:  Vitals reviewed.   Constitutional:       General: Awake.      Appearance: Not in distress. Obese.   Eyes:      Pupils: Pupils are equal, round, and reactive to light.   Pulmonary:      Effort: Pulmonary effort is normal.      Breath sounds: Examination of the right-lower field reveals decreased breath sounds. Decreased breath sounds present.   Cardiovascular:      Normal rate. Regular rhythm. Normal S1. Normal S2.    Pulses:     Intact distal pulses.   Edema:     Peripheral edema absent.   Abdominal:      General: Bowel sounds are normal.   Musculoskeletal: Normal range of motion. Skin:     General: Skin is warm and dry.   Neurological:      Mental Status: Alert and oriented to person, place and time.   Psychiatric:         Behavior: Behavior is cooperative.            LAB RESULTS (LAST 7 DAYS)    CMP   Results from last 7 days   Lab Units 05/14/24  0452 05/13/24  1816 05/13/24  0930   SODIUM mmol/L 142 142 142   POTASSIUM mmol/L 4.6 4.0 4.0   CHLORIDE mmol/L 104 103 104   CO2 mmol/L 29.0 25.0 27.0   BUN mg/dL 13 12 11   CREATININE mg/dL " 0.88 0.81 0.74   GLUCOSE mg/dL 102* 104* 82   ALBUMIN g/dL  --   --  4.2   BILIRUBIN mg/dL  --   --  0.3   ALK PHOS U/L  --   --  139*   AST (SGOT) U/L  --   --  17   ALT (SGPT) U/L  --   --  14       BMP  Results from last 7 days   Lab Units 05/14/24 0452 05/13/24 1816 05/13/24  0930   SODIUM mmol/L 142 142 142   POTASSIUM mmol/L 4.6 4.0 4.0   CHLORIDE mmol/L 104 103 104   CO2 mmol/L 29.0 25.0 27.0   BUN mg/dL 13 12 11   CREATININE mg/dL 0.88 0.81 0.74   GLUCOSE mg/dL 102* 104* 82       CBC  Results from last 7 days   Lab Units 05/14/24 0452 05/13/24 1816 05/13/24  0930   WBC 10*3/mm3 9.68 9.68 8.61   RBC 10*6/mm3 4.57 4.75 4.86   HEMOGLOBIN g/dL 11.9* 12.6 13.1   HEMATOCRIT % 40.6 42.1 42.8   MCV fL 88.8 88.6 88.1   PLATELETS 10*3/mm3 218 219 218       ProBNP        TROPONIN  Results from last 7 days   Lab Units 05/14/24  0452   HSTROP T ng/L 29*       Creatinine Clearance  Estimated Creatinine Clearance: 88.5 mL/min (by C-G formula based on SCr of 0.88 mg/dL).      Radiology  CT Angiogram Chest Pulmonary Embolism    Result Date: 5/13/2024  Impression: 1.No definite evidence for pulm embolus. 2.New groundglass changes right middle lobe that might relate to a nonspecific pneumonitis or developing fibrosis. There are some groundglass changes in the lower lobes as well that might relate to respiratory motion, nonspecific pneumonitis or some atelectasis. 3.Pulmonary nodular densities as noted with interval enlargement of right middle lobe pulmonary nodule. A follow-up CT in 6 months recommended to reassess. Patient has a history of histoplasmosis. 4.Cholelithiasis. Electronically Signed: Tony Dunbar MD  5/13/2024 11:35 AM EDT  Workstation ID: [id number]    CT Head Without Contrast    Result Date: 5/13/2024  Impression: 1.No acute intracranial abnormality. 2.Hyperostosis frontalis. 3.Evidence for some right ethmoid sinus disease. Electronically Signed: Tony Dunbar MD  5/13/2024 11:22 AM EDT  Workstation ID:  [id number]    XR Chest 1 View    Result Date: 5/13/2024  Impression: No acute process. Electronically Signed: Jocelyn Robles MD  5/13/2024 9:02 AM EDT  Workstation ID: [id number]     EKG    I personally viewed and interpreted the patient's EKG/Telemetry data:  ECG 12 Lead Chest Pain   Preliminary Result   HEART RATE= 60  bpm   RR Interval= 1001  ms   PA Interval= 168  ms   P Horizontal Axis= -13  deg   P Front Axis=   deg   QRSD Interval= 99  ms   QT Interval= 424  ms   QTcB= 424  ms   QRS Axis= 56  deg   T Wave Axis= 80  deg   - ABNORMAL ECG -   Atrial-paced rhythm   Electronically Signed By:    Date and Time of Study: 2024-05-14 05:40:22      ECG 12 Lead Chest Pain   Final Result   HEART RATE= 60  bpm   RR Interval= 1001  ms   PA Interval= 157  ms   P Horizontal Axis= -26  deg   P Front Axis=   deg   QRSD Interval= 101  ms   QT Interval= 406  ms   QTcB= 406  ms   QRS Axis= 32  deg   T Wave Axis= 87  deg   - ABNORMAL ECG -   Atrial-paced rhythm   Abnormal T, consider ischemia, anterior leads   When compared with ECG of 18-Jan-2024 4:37:42,   Significant change in rhythm   Electronically Signed By: Adrian Cabrera (DICK) 14-May-2024 06:16:51   Date and Time of Study: 2024-05-13 08:21:44          ECHOCARDIOGRAM:  Results for orders placed during the hospital encounter of 04/26/23    Adult Transthoracic Echo Complete W/ Cont if Necessary Per Protocol    Interpretation Summary    Left ventricular systolic function is normal. Left ventricular ejection fraction appears to be 61 - 65%.    Estimated right ventricular systolic pressure from tricuspid regurgitation is normal (<35 mmHg).      STRESS MYOVIEW:  Results for orders placed during the hospital encounter of 05/29/20    Stress Test With Myocardial Perfusion One Day    Interpretation Summary  · Small area of reversible ischemia involving the lateral wall in the apical segment  · Left ventricular ejection fraction is normal (Calculated EF = 70%).  · Impressions are  consistent with a low risk study.    Electronically signed by BECKY Merchant, 05/29/20, 12:46 PM.      CARDIAC CATHETERIZATION:  Results for orders placed during the hospital encounter of 09/24/20    Cardiac Catheterization/Vascular Study    Narrative   Thomas Zamora MD, PhD  [facility] cardiology  Date 9-    Procedure  1.  Left heart catheterization with coronary angiography and left ventriculography in ORTIZ position    Indication  Unstable angina with abnormal EKG    After informed consent the patient was brought to the catheterization lab sterilely prepped and draped in usual fashion with exposure the right groin for right common femoral arterial access via micropuncture modified Seldinger technique.  6 [place] sheath was placed to the right common femoral artery.  An 035 guidewire was advanced to the level of the aortic valve followed by diagnostic JL4 and JR4 6 [place] catheters for selective right and left coronary angiography.  The JR4 was used across aortic valve followed by LVEDP measurement, hand-injection of 8 cc for left ventriculography in ORTIZ position and pullback assessment of the transaortic valve gradient.  With no obstructive coronary disease all catheters and equipment were removed and the sheath flushed with heparinized saline.  Final angiography of the right common femoral artery revealed widely patent vessel with normal bifurcation and 6 [place] Angio-Seal was used to close the vessel with immediate hemostasis and maintenance of distal pulses    Complications none  Contrast usage 85 cc  Moderate conscious sedation time of 35 minutes, IV Versed and fentanyl administered by registered nurse with complete ECG pulse oximetry and hemodynamic running throughout the entire the case was used with complete observation by myself    Patient left the Cath Lab chest pain-free hemodynamically electrically stable alert talking to staff neurologically gross intact  bilaterally    Findings  1.  Open aortic pressure 117/51  2.  Closing pressure unchanged  3.  Mildly increased LVEDP at 18 mmHg  4.  Normal LV systolic function EF of 60% with no regional abnormality  5.  No significant transaortic valve gradient    Angiography  1.  Left main long giving rise to LAD and nondominant circumflex.  No angiographic disease of the left main  2.  LAD is a medium caliber vessel giving off numerous septal perforators as well as 2 diagonal branches.  The second diagonal branch essentially reaches the apex and dual LAD physiology and the continuation LAD tapers dramatically to around 1 to 1.5 mm in diameter at the apex.  There is only mild luminal irregularities in the LAD and diagonal branch with ALISON-3 flow throughout.  3.  The circumflex nondominant with one obtuse marginal branch and continuation circumflex.  There is no angiographic disease of the circumflex distribution and ALISON-3 flow throughout  4.  The RCA is a large-caliber dominant vessel with PDA and PLV distally.  There is no angiographic disease of the RCA PLV or PDA branches with ALISON-3 flow throughout    Conclusions and recommendations  1.  Normal epicardial coronary anatomy with suggestions of microvascular dysfunction as well as small distal vessel size  2.  Continue max medical therapy, antianginals, long-acting nitrates and ACE inhibitor's for microvascular dysfunction  3.  With patient symptomatology and nighttime awakenings would suggest sleep study as outpatient next  #4 consider outpatient ambulatory ECG monitoring for heart rate rule out any tachyarrhythmias  5.  Primary prevention goals for CAD and comorbidities    It is a pleasure to be involved in her cardiovascular care.  Please call with any questions or concerns  Thomas Zamora MD, PhD      OTHER:         Assessment & Plan       Chest pain    Type 2 diabetes mellitus without complication    Asthma    Dyspnea    Upper respiratory infection with cough and  congestion  S/P dual-chamber [employer] pacemaker    PLAN    Chest pain  -HS troponin elevated 42, 32, 29 x2  -left chest and axilla  -D-Dimer elevated, CT chest negative for PE  -Nuclear stress test ordered  -elevated troponin possibly demand ischemia     URI w/cough and congestion  Asthma  -negative for COVID or RSV  -evidence of possible pneumonitis in right lung  -on Rocephin IV  -continue home medications    Pacemaker  -dual-chamber [place] [employer]  -functioning appropriately  -pacemaker site normal looking    Type 2 diabetes  -controlled with medication  -managed by PCP    I discussed the patients findings and my recommendations with patient and nurse.  Further recommendations per Dr. Espinoza.    BECKY Duque  05/14/24  09:05 EDT  Electronically signed by BECKY Duque, 05/14/24, 9:25 AM EDT.      Addendum:  Nuclear stress test with no signs of ischemia.  Low risk study. Normal ECG stress test.    Electronically signed by BECKY Duque, 05/14/24, 12:46 PM EDT.

## 2024-05-18 LAB
BACTERIA SPEC AEROBE CULT: NORMAL
BACTERIA SPEC AEROBE CULT: NORMAL

## 2024-05-26 LAB
QT INTERVAL: 424 MS
QTC INTERVAL: 424 MS

## 2024-06-12 ENCOUNTER — TELEPHONE (OUTPATIENT)
Dept: SURGERY | Facility: CLINIC | Age: 54
End: 2024-06-12
Payer: MEDICARE

## 2024-06-12 DIAGNOSIS — B39.9 HISTOPLASMOSIS: Primary | ICD-10-CM

## 2024-06-12 NOTE — TELEPHONE ENCOUNTER
Called patient to discuss results of CT chest 05/13/2024 that was performed when she was admitted to the hospital with rhinovirus last month.  She is s/p RUL wedge resection by Dr. 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 She follows with infectious disease at [place] and has been on Itraconazole in the past.  She is scheduled to see ID in August 2024.      CT angiogram chest 5/13/2024:  1.No definite evidence for pulm embolus.  2.New groundglass changes right middle lobe that might relate to a nonspecific pneumonitis or developing fibrosis. There are some groundglass changes in the lower lobes as well that might relate to respiratory motion, nonspecific pneumonitis or some   atelectasis.  3.Pulmonary nodular densities as noted with interval enlargement of right middle lobe pulmonary nodule, previously 3 mm now 6 mm. A follow-up CT in 6 months recommended to reassess. Patient has a history of histoplasmosis.  4.Cholelithiasis.     She has recovered from her acute illness last month, but reports her health has declined since her diagnosis of histoplasmosis in 2022. Patient is very nervous about these results and wished to discussed next steps via phone.  I recommend short interval CT chest to keep an eye on the 7 mm nodules and explained that they are currently too small for PET imaging or biopsy.  CT chest has been ordered for August 2024 and she will follow-up with Dr. Carroll at that time.     Eileen Massey, BECKY  Thoracic Surgery  [phone]

## 2024-06-12 NOTE — TELEPHONE ENCOUNTER
I addressed pt concerns regarding CT angiogram results. I was able to schedule pt for a VV tomorrow with BECKY Massey. Pt was agreeable and satisfied    DB 2:03  6/12/2024

## 2024-07-08 RX ORDER — CHOLECALCIFEROL (VITAMIN D3) 125 MCG
1 CAPSULE ORAL DAILY
Qty: 90 TABLET | Refills: 3 | OUTPATIENT
Start: 2024-07-08

## 2024-07-09 ENCOUNTER — APPOINTMENT (OUTPATIENT)
Dept: GENERAL RADIOLOGY | Facility: HOSPITAL | Age: 54
End: 2024-07-09
Payer: MEDICARE

## 2024-07-09 ENCOUNTER — HOSPITAL ENCOUNTER (OUTPATIENT)
Facility: HOSPITAL | Age: 54
Discharge: HOME | End: 2024-07-09
Attending: EMERGENCY MEDICINE | Admitting: EMERGENCY MEDICINE
Payer: MEDICARE

## 2024-07-09 ENCOUNTER — TELEPHONE (OUTPATIENT)
Dept: PODIATRY | Facility: CLINIC | Age: 54
End: 2024-07-09
Payer: MEDICARE

## 2024-07-09 ENCOUNTER — HOSPITAL ENCOUNTER (EMERGENCY)
Facility: HOSPITAL | Age: 54
Discharge: LEFT BEFORE DISCHARGE | End: 2024-07-09
Attending: EMERGENCY MEDICINE
Payer: MEDICARE

## 2024-07-09 VITALS
BODY MASS INDEX: 39.38 KG/M2 | WEIGHT: 222.22 LBS | RESPIRATION RATE: 18 BRPM | HEIGHT: 63 IN | SYSTOLIC BLOOD PRESSURE: 121 MMHG | DIASTOLIC BLOOD PRESSURE: 80 MMHG | HEART RATE: 72 BPM | OXYGEN SATURATION: 96 % | TEMPERATURE: 98.4 F

## 2024-07-09 VITALS
OXYGEN SATURATION: 98 % | HEIGHT: 63 IN | RESPIRATION RATE: 18 BRPM | TEMPERATURE: 98 F | DIASTOLIC BLOOD PRESSURE: 60 MMHG | BODY MASS INDEX: 39.28 KG/M2 | SYSTOLIC BLOOD PRESSURE: 116 MMHG | WEIGHT: 221.7 LBS | HEART RATE: 70 BPM

## 2024-07-09 DIAGNOSIS — S93.401A SPRAIN OF RIGHT ANKLE, UNSPECIFIED LIGAMENT, INITIAL ENCOUNTER: Primary | ICD-10-CM

## 2024-07-09 PROCEDURE — 99211 OFF/OP EST MAY X REQ PHY/QHP: CPT

## 2024-07-09 PROCEDURE — 73610 X-RAY EXAM OF ANKLE: CPT

## 2024-07-09 PROCEDURE — G0463 HOSPITAL OUTPT CLINIC VISIT: HCPCS | Performed by: NURSE PRACTITIONER

## 2024-07-09 PROCEDURE — 73630 X-RAY EXAM OF FOOT: CPT

## 2024-07-09 NOTE — FSED PROVIDER NOTE
Subjective   History of Present Illness  The patient is a 54-year-old female who presents to the ER with right ankle pain after she twisted it a couple of days ago walking down hill.    History provided by:  Patient   used: No        Review of Systems   Musculoskeletal:         Right ankle pain and swelling.       Past Medical History:   Diagnosis Date    Abnormal ECG     Anemia     Anesthesia complication 2003    cardiac arrest  with hysterectomy    Anxiety 2005    Arthritis     Asthma 04/17/2020    allergy    Bipolar 1 disorder     Bradycardia     Chest pain     due to scarring from lung surgery  2/21    Chest pain 09/24/2020    Cholelithiasis 10/2022    Coronary artery disease     very minimal    Depression 2005    Diabetes mellitus 2002    borderline   resolved    Dyspnea on minimal exertion     Frequent UTI     Gastroesophageal reflux disease without esophagitis 07/15/2020    Heart murmur     FROM CHILDHOOD    Histoplasmosis     History of anemia     POST PREGNANCY    Hyperlipidemia     Hyperlipidemia 10/04/2016    Hypertension     Hypotension     Mass of upper lobe of right lung     Migraines     hx    Obesity 1999    PONV (postoperative nausea and vomiting)     Sleep apnea 2/28/22    no machine    Spinal headache     Vertigo     Visual impairment 2011    WEARS GLASSES    Vitamin D deficiency        Allergies   Allergen Reactions    Tylenol [Acetaminophen] Hives and Itching    Pregabalin Rash       Past Surgical History:   Procedure Laterality Date    ANKLE OPEN REDUCTION INTERNAL FIXATION Right 12/22/2023    Procedure: ANKLE OPEN REDUCTION INTERNAL FIXATION;  Surgeon: TORIE Beck DPM;  Location: [facility] OR;  Service: Podiatry;  Laterality: Right;    APPENDECTOMY  2011    CARDIAC CATHETERIZATION N/A 09/24/2020    Procedure: Left Heart Cath possible PCI, atherectomy, hemodynamic support;  Surgeon: Thomas Zamora MD;  Location: [facility] CATH INVASIVE LOCATION;  Service:  Cardiology;  Laterality: N/A;    CARDIAC CATHETERIZATION  2020    CARDIAC ELECTROPHYSIOLOGY PROCEDURE N/A 2023    Procedure: Pacemaker DC new, [place] aware;  Surgeon: Rachel Espinoza MD;  Location: [facility] CATH INVASIVE LOCATION;  Service: Cardiovascular;  Laterality: N/A;     SECTION      FOOT/TOE TENDON REPAIR Left     HYSTERECTOMY      LUNG BIOPSY Right 2020    LUNG LOBECTOMY Right 2022    pt had partial Right lobectomy and nodule removal    MASS EXCISION Right 2023    Procedure: LIPOMA EXCISION,THIGH;  Surgeon: Justo Shannon MD;  Location: [facility] MAIN OR;  Service: General;  Laterality: Right;    ROTATOR CUFF REPAIR Left     THORACOSCOPY VIDEO ASSISTED WITH LOBECTOMY Right 2022    Procedure: BRONCHOSCOPY, THORACOSCOPY VIDEO ASSISTED wedge resection X2, INTERCOSTAL NERVE BLOCK;  Surgeon: Ayla Carroll MD;  Location: [facility] MAIN OR;  Service: Thoracic;  Laterality: Right;    TUBAL ABDOMINAL LIGATION         Family History   Problem Relation Age of Onset    Arthritis Mother     Cancer Mother     Depression Mother     Diabetes Mother     Early death Mother     Mental illness Mother     Anxiety disorder Mother     Alcohol abuse Father     Diabetes Father     Early death Father     Heart disease Father     Hyperlipidemia Father     Hypertension Father         Father    Vision loss Father     Heart attack Father     Heart disease Sister     Drug abuse Sister     Heart attack Sister     Hypertension Sister         Sister    Heart disease Sister     Heart disease Brother     Hypertension Brother     Heart attack Brother     Drug abuse Brother     Hypertension Brother     Heart disease Brother     Heart attack Brother     Cancer Maternal Grandmother     Malig Hyperthermia Neg Hx        Social History     Socioeconomic History    Marital status:    Tobacco Use    Smoking status: Never     Passive exposure: Never    Smokeless tobacco: Never   Vaping Use     Vaping status: Never Used   Substance and Sexual Activity    Alcohol use: Never     Comment: VERY RARE    Drug use: Never    Sexual activity: Not Currently     Partners: Male     Birth control/protection: None, Hysterectomy           Objective   Physical Exam  Vitals and nursing note reviewed.   Constitutional:       Appearance: Normal appearance.   Cardiovascular:      Pulses: Normal pulses.      Heart sounds: Normal heart sounds.   Pulmonary:      Effort: Pulmonary effort is normal.      Breath sounds: Normal breath sounds.   Abdominal:      General: Bowel sounds are normal.      Palpations: Abdomen is soft.   Musculoskeletal:         General: Swelling and tenderness present.        Feet:    Feet:      Comments: Patient complains of right ankle pain and swelling after she twisted her right ankle 2 days ago.  Patient with positive pedal and posttibial pulses noted.  Cap refill less than 3 seconds.  Patient with full ROM of the right ankle, but it is painful.  Skin:     General: Skin is warm and dry.   Neurological:      General: No focal deficit present.      Mental Status: She is alert and oriented to person, place, and time.   Psychiatric:         Mood and Affect: Mood normal.         Behavior: Behavior normal. Behavior is cooperative.         Procedures           ED Course  ED Course as of 07/09/24 1929 Tue Jul 09, 2024   1912 XR ANKLE 3+ VW RIGHT     Date of Exam: 7/9/2024 6:46 PM EDT     Indication: injury, pain     Comparison: 4/28/2024     Findings: Prior open reduction internal fixation of the ankle fracture. The talus is intact. The calcaneus is intact. Calcaneal spur. The tarsal and visualized metatarsal bones are intact.     IMPRESSION:  No acute fracture or dislocation. Postop changes.   [DS]      ED Course User Index  [DS] Estephania Hough APRN                                           Medical Decision Making  The patient is a 54-year-old female who presents to the ER with right ankle pain  after she twisted it a couple of days ago walking down hill.    Patient complains of right ankle pain and swelling after she twisted her right ankle 2 days ago.  Patient with positive pedal and posttibial pulses noted.  Cap refill less than 3 seconds.  Patient with full ROM of the right ankle, but it is painful.    X-ray shows no acute fracture or dislocation.  Patient does have hardware noted from open reduction internal fixation noted    Advised patient that if she continues to have pain she needs to follow-up with orthopedics.      Problems Addressed:  Sprain of right ankle, unspecified ligament, initial encounter: complicated acute illness or injury    Amount and/or Complexity of Data Reviewed  Radiology: ordered. Decision-making details documented in ED Course.    Risk  OTC drugs.        Final diagnoses:   Sprain of right ankle, unspecified ligament, initial encounter       ED Disposition  ED Disposition       ED Disposition   Discharge    Condition   Stable    Comment   --               Marsha Lopez MD  [address] [place] [address] [place] [id number] [phone]    In 1 week  As needed, If symptoms worsen         Medication List      No changes were made to your prescriptions during this visit.

## 2024-07-09 NOTE — ED PROVIDER NOTES
Subjective     Provider in Triage Note  Patient is a 54-year-old obese female who broke her right ankle last year and then twisted the ankle today and has been having pain since that time she states she is able to walk on it but it pops every time she walks    Due to significant overcrowding in the emergency department patient was initially seen and evaluated in triage.  Provider in triage recommended patient placement in the treatment area to initiate therapy and movement to an ER bed as soon as possible.    History of Present Illness    Review of Systems    Past Medical History:   Diagnosis Date    Abnormal ECG     Anemia     Anesthesia complication 2003    cardiac arrest  with hysterectomy    Anxiety 2005    Arthritis     Asthma 04/17/2020    allergy    Bipolar 1 disorder     Bradycardia     Chest pain     due to scarring from lung surgery  2/21    Chest pain 09/24/2020    Cholelithiasis 10/2022    Coronary artery disease     very minimal    Depression 2005    Diabetes mellitus 2002    borderline   resolved    Dyspnea on minimal exertion     Frequent UTI     Gastroesophageal reflux disease without esophagitis 07/15/2020    Heart murmur     FROM CHILDHOOD    Histoplasmosis     History of anemia     POST PREGNANCY    Hyperlipidemia     Hyperlipidemia 10/04/2016    Hypertension     Hypotension     Mass of upper lobe of right lung     Migraines     hx    Obesity 1999    PONV (postoperative nausea and vomiting)     Sleep apnea 2/28/22    no machine    Spinal headache     Vertigo     Visual impairment 2011    WEARS GLASSES    Vitamin D deficiency        Allergies   Allergen Reactions    Tylenol [Acetaminophen] Hives and Itching    Pregabalin Rash       Past Surgical History:   Procedure Laterality Date    ANKLE OPEN REDUCTION INTERNAL FIXATION Right 12/22/2023    Procedure: ANKLE OPEN REDUCTION INTERNAL FIXATION;  Surgeon: TORIE Beck DPM;  Location: [facility] MAIN OR;  Service: Podiatry;  Laterality: Right;     APPENDECTOMY      CARDIAC CATHETERIZATION N/A 2020    Procedure: Left Heart Cath possible PCI, atherectomy, hemodynamic support;  Surgeon: Thomas Zamora MD;  Location: [facility] CATH INVASIVE LOCATION;  Service: Cardiology;  Laterality: N/A;    CARDIAC CATHETERIZATION  2020    CARDIAC ELECTROPHYSIOLOGY PROCEDURE N/A 2023    Procedure: Pacemaker DC new, [place] aware;  Surgeon: Rachel Espinoza MD;  Location: [facility] CATH INVASIVE LOCATION;  Service: Cardiovascular;  Laterality: N/A;     SECTION      FOOT/TOE TENDON REPAIR Left     HYSTERECTOMY      LUNG BIOPSY Right 2020    LUNG LOBECTOMY Right 2022    pt had partial Right lobectomy and nodule removal    MASS EXCISION Right 2023    Procedure: LIPOMA EXCISION,THIGH;  Surgeon: Justo Shannon MD;  Location: [facility] MAIN OR;  Service: General;  Laterality: Right;    ROTATOR CUFF REPAIR Left     THORACOSCOPY VIDEO ASSISTED WITH LOBECTOMY Right 2022    Procedure: BRONCHOSCOPY, THORACOSCOPY VIDEO ASSISTED wedge resection X2, INTERCOSTAL NERVE BLOCK;  Surgeon: Ayla Carroll MD;  Location: [facility] MAIN OR;  Service: Thoracic;  Laterality: Right;    TUBAL ABDOMINAL LIGATION         Family History   Problem Relation Age of Onset    Arthritis Mother     Cancer Mother     Depression Mother     Diabetes Mother     Early death Mother     Mental illness Mother     Anxiety disorder Mother     Alcohol abuse Father     Diabetes Father     Early death Father     Heart disease Father     Hyperlipidemia Father     Hypertension Father         Father    Vision loss Father     Heart attack Father     Heart disease Sister     Drug abuse Sister     Heart attack Sister     Hypertension Sister         Sister    Heart disease Sister     Heart disease Brother     Hypertension Brother     Heart attack Brother     Drug abuse Brother     Hypertension Brother     Heart disease Brother     Heart attack Brother     Cancer  Maternal Grandmother     Malig Hyperthermia Neg Hx        Social History     Socioeconomic History    Marital status:    Tobacco Use    Smoking status: Never     Passive exposure: Never    Smokeless tobacco: Never   Vaping Use    Vaping status: Never Used   Substance and Sexual Activity    Alcohol use: Never     Comment: VERY RARE    Drug use: Never    Sexual activity: Not Currently     Partners: Male     Birth control/protection: None, Hysterectomy           Objective   Physical Exam    Procedures           ED Course                                             Medical Decision Making  Amount and/or Complexity of Data Reviewed  Radiology: ordered.        Final diagnoses:   None       ED Disposition  ED Disposition       None            No follow-up provider specified.       Medication List      No changes were made to your prescriptions during this visit.

## 2024-07-09 NOTE — TELEPHONE ENCOUNTER
Caller: Tessie Conner    Relationship to patient: Self    Best call back number: [phone] (home)     Patient is needing: PATIENT STATES HER FOOT IS HURTING MORE AND SHE WANTS TO SEE DR DELGADO SOONER THAN 7/30/24

## 2024-07-09 NOTE — DISCHARGE INSTRUCTIONS
You can also follow-up with your orthopedic if you continue have pain.     Ice and elevate to help with pain and swelling.     Tylenol/Motrin as needed for pain/fevers    Make sure patient is drinking plenty of fluids.    Return for any new or worsening symptoms.

## 2024-07-11 ENCOUNTER — OFFICE VISIT (OUTPATIENT)
Dept: PODIATRY | Facility: CLINIC | Age: 54
End: 2024-07-11
Payer: MEDICARE

## 2024-07-11 VITALS — BODY MASS INDEX: 39.16 KG/M2 | OXYGEN SATURATION: 96 % | HEART RATE: 71 BPM | HEIGHT: 63 IN | WEIGHT: 221 LBS

## 2024-07-11 DIAGNOSIS — S82.851S: ICD-10-CM

## 2024-07-11 DIAGNOSIS — M25.571 ACUTE RIGHT ANKLE PAIN: Primary | ICD-10-CM

## 2024-07-11 DIAGNOSIS — M72.2 PLANTAR FASCIITIS, RIGHT: ICD-10-CM

## 2024-07-11 DIAGNOSIS — E66.01 MORBID OBESITY WITH BMI OF 40.0-44.9, ADULT: ICD-10-CM

## 2024-07-11 RX ORDER — SEMAGLUTIDE 0.68 MG/ML
INJECTION, SOLUTION SUBCUTANEOUS
COMMUNITY
Start: 2024-06-29

## 2024-07-11 RX ORDER — CARIPRAZINE 1.5 MG/1
1.5 CAPSULE, GELATIN COATED ORAL
COMMUNITY
Start: 2024-06-29

## 2024-07-12 NOTE — PROGRESS NOTES
07/11/2024  Foot and Ankle Surgery - Established Patient/Follow-up  Provider: Dr. Felipe Beck DPM  Location: [facility]    Subjective:  Tessie Conner is a 54 y.o. female.     Chief Complaint   Patient presents with    Right Ankle - Pain       History of Present Illness  The patient presents for evaluation of ankle injury.    The patient sought medical attention at an urgent care facility where radiographic imaging was performed, revealing no abnormalities. She has been actively trying to lose weight, resulting in a weight loss of 21 pounds over the past 2 months. Her exercise regimen includes 1.5 hours of exercise daily, bicycling for 30 minutes, and an hour of walking. However, for the past week, she has been unable to engage in physical exercise.      Allergies   Allergen Reactions    Tylenol [Acetaminophen] Hives and Itching    Pregabalin Rash       Current Outpatient Medications on File Prior to Visit   Medication Sig Dispense Refill    Arnuity Ellipta 200 MCG/ACT Take 1 Act by mouth Daily.      azelastine (ASTELIN) 0.1 % nasal spray 1 spray into the nostril(s) as directed by provider 2 (Two) Times a Day. Use in each nostril as directed 1 each 12    cetirizine (zyrTEC) 10 MG tablet Take 1 tablet by mouth Daily. 30 tablet 1    Cholecalciferol 25 MCG (1000 UT) tablet Take 1 tablet by mouth Daily.      FeroSul 325 (65 Fe) MG tablet Take 1 tablet by mouth Daily With Breakfast.      Gemtesa 75 MG tablet Take 1 tablet by mouth Daily.      ipratropium-albuterol (DUO-NEB) 0.5-2.5 mg/3 ml nebulizer Take 3 mL by nebulization 4 (Four) Times a Day As Needed for Wheezing.      Lurasidone HCl (LATUDA) 20 MG tablet tablet Take 1 tablet by mouth Every Night.      metFORMIN ER (GLUCOPHAGE-XR) 500 MG 24 hr tablet Take 2 tablets by mouth Daily With Breakfast.      omeprazole (priLOSEC) 20 MG capsule Take 1 capsule by mouth Every Night.      Ozempic, 0.25 or 0.5 MG/DOSE, 2 MG/3ML solution pen-injector INJECT 0.5  "MILLIGRAMS SUBCUTANEOUSLY ONCE PER WEEK FOR 28 DAYS      sertraline (ZOLOFT) 50 MG tablet Take 1 tablet by mouth Every Night.      SITagliptin (JANUVIA) 100 MG tablet Take 1 tablet by mouth Daily.      Vraylar 1.5 MG capsule capsule Take 1 capsule by mouth every night at bedtime.       No current facility-administered medications on file prior to visit.       Objective   Pulse 71   Ht 160 cm (63\")   Wt 100 kg (221 lb)   LMP  (LMP Unknown)   SpO2 96%   BMI 39.15 kg/m²     Foot/Ankle Exam  Physical Exam  GENERAL  Orientation:  AAOx3  Affect:  appropriate     VASCULAR      Right Foot Vascularity   Normal vascular exam    Dorsalis pedis:  2+  Posterior tibial:  2+  Skin temperature:  warm  Edema grading:  None  CFT:  < 3 seconds  Pedal hair growth:  Present  Varicosities:  none      Left Foot Vascularity   Normal vascular exam    Dorsalis pedis:  2+  Posterior tibial:  2+  Skin temperature:  warm  Edema grading:  None  CFT:  < 3 seconds  Pedal hair growth:  Present  Varicosities:  none     NEUROLOGIC      Right Foot Neurologic   Light touch sensation: normal  Hot/Cold sensation: normal  Achilles reflex:  2+      Left Foot Neurologic   Light touch sensation: normal  Hot/Cold sensation:  normal  Achilles reflex:  2+     MUSCULOSKELETAL      Right Foot Musculoskeletal   Arch:  Normal      Left Foot Musculoskeletal   Arch:  Normal     MUSCLE STRENGTH      Right Foot Muscle Strength   Normal strength    Foot dorsiflexion:  5  Foot plantar flexion:  5  Foot inversion:  5  Foot eversion:  5      Left Foot Muscle Strength   Normal strength    Foot dorsiflexion:  5  Foot plantar flexion:  5  Foot inversion:  5  Foot eversion:  5     DERMATOLOGIC       Right Foot Dermatologic   Skin  Right foot skin is intact.   Nails comment:  Nails 1-5      Left Foot Dermatologic   Skin  Left foot skin is intact.   Nails comment:  Nails 1-5     TESTS      Right Foot Tests   Anterior drawer: negative  Varus tilt: negative      Left Foot " Tests   Anterior drawer: negative  Varus tilt: negative     Right foot additional comments: Discomfort, swelling, and ecchymosis involving the right ankle. No gross deformity by visual inspection. No proximal fibular pain. Range of motion, muscle strength and instability testing deferred secondary to guarding.     01/02/2024  Incision sites are dry and stable with intact staples. No evidence of dehiscence or infection. Moderate resolving ecchymosis to the lower extremity. Rectus alignment of the ankle with limitation with range of motion, muscle strength secondary to guarding.     01/16/2024: Swelling has decreased. Range of motion has improved but remains somewhat limited. No progressive deformity or further issues.     02/13/2024  Continued swelling involving the perimalleolar region. Range of motion has improved. Ecchymosis has continued to dissipate.     5/2/2024: Discomfort with palpation involving the plantar medial calcaneal tuberosity region.  No gross deformity or instability.  No significant swelling involving the ankle.  Prominent improvement in range of motion.  No ankle pain.  Moderate equinus contracture with knee extended and flexed.    7/11/24: Mild to moderate swelling involving the perimalleolar region.  No gross deformity or instability.  No other complicating features.  Less discomfort involving the plantar aspect of the right heel      Results  Imaging  X-rays of the ankle show no abnormalities.    Assessment & Plan   Diagnoses and all orders for this visit:    1. Acute right ankle pain (Primary)    2. Closed displaced trimalleolar fracture of lower leg, right, sequela    3. Plantar fasciitis, right    4. Morbid obesity with BMI of 40.0-44.9, adult      Assessment & Plan    The patient's ankle is exhibiting stiffness, and the soft tissues have normalized to some extent. The patient was counseled to avoid high-impact activities, such as walking on uneven terrain or sprains. The use of a lace-up  ankle brace or a compression sleeve was suggested as the most suitable option. A low-impact exercise regimen was recommended.Reviewed proper basic stretching and manual therapy exercises along with appropriate shoes and activity.  Discussed proper use and/or avoidance of OTC anti-inflammatories.  Patient is to call with any additional issues or concerns.  Greater than 20 minutes was spent before, during, and after evaluation for patient care.    Follow-up  A follow-up appointment is scheduled for 2 months from now.             Patient or patient representative verbalized consent for the use of Ambient Listening during the visit with  TORIE Beck DPM for chart documentation. 7/12/2024  06:59 EDT    TORIE Beck DPM

## 2024-07-22 ENCOUNTER — TELEPHONE (OUTPATIENT)
Dept: CARDIOLOGY | Facility: CLINIC | Age: 54
End: 2024-07-22
Payer: MEDICARE

## 2024-07-22 ENCOUNTER — TELEPHONE (OUTPATIENT)
Dept: FAMILY MEDICINE CLINIC | Facility: CLINIC | Age: 54
End: 2024-07-22
Payer: MEDICARE

## 2024-07-22 NOTE — TELEPHONE ENCOUNTER
Patient: Chetna Lopez Date: 2024   : 1933    89 year old female      OUTPATIENT WOUND CARE PROGRESS NOTE    Supervising Wound Care / Hyperbaric Medicine Physician: Not Applicable  Consulting Provider:  SHANEL Yun  Date of Consultation/Last Comprehensive Exam:  24  Referring  Provider:  Suraj Orlando MD     SUBJECTIVE:    Chief Complaint:  \"BLE\"     Wound/Ulcer Present:  Traumatic ulcer    Additional Wound Category: None     Maximum Baseline Ambulatory Status: Unable to assess    History of Present Illness:  This is a 89 year old female with a medical hx of  HFrEF, CKD, DM II and frequent falls. She was admitted 23 for failure to thrive at home.    Wound care consulted for RLE wound.    The patient states it started around rosa when her legs were more swollen. Continues to be painful. She states she doesn't apply anything at home except for \"pain patches\" that she got from her neighbor (reports they dont have medicine in them). Upon research it is called a \"Super Patch\" and has no medication associated.     The patient does complain of pain to her legs which has been ongoing.     States her swelling as improved.    Interval history 2024  Seen today for wound care follow up and epifix application  Has a complaint of left great toe pain-denies any trauma      Current Treatment Regimen:  Dressing:   see below    Frequency:  Daily  Three times a week   Changed by:  Wound care clinic nurse    Review of Systems:  Pertinent items are noted in HPI (history of present illness).    Past Medical History:   Diagnosis Date    Anemia of chronic disease 2023    Anemia of chronic renal failure, stage 3 (moderate)  (CMD) 2015    2..  H/H 12.6/38.1    CHF (congestive heart failure), NYHA class I, Hx acute on chronic, combined (CMD) 2023    Chronic combined systolic and diastolic congestive heart failure  (CMD) 2023    Chronic combined systolic and diastolic 
Ok to send macrobid Bid #[id number] to [place] [facility]   and let pt know   thanks  
Sent   
CHF, cardiomyopathy: 04/04/22ECHO:  LVEF 55%, grade 1 DDMod incr.LVHNormal RV size& SF.[id number] mmHg.11/8/23ECHO:Mod incr LV chamber size. Sev decr LVSF.Indet diastolic function.LVEF; 26 %.. Mod MV ,TV regurgitation.PASP; 54 mmHg.  11/9/23Stress:  LVEF 21%,multiple large areas of non -eversible defects of severe severity ant-apical, apical-lat, and infer    Chronic pain of both shoulders 12/18/2015    Chronic stable angina (CMD) 09/24/2021    CKD (chronic kidney disease) stage 3, GFR 30-59 ml/min  (CMD) 03/30/2015    CKD (chronic kidney disease) stage 3, GFR 30-59 ml/min  (CMD) 03/30/2015 12/24/2023 creatinine 1.8 GFR 27    CKD (chronic kidney disease) stage 3-4 03/30/2015 12/24/2023 creatinine 1.8-2 GFR 23-27    Complex sleep apnea syndrome 11/25/2015    Coronary artery disease 06/29/2022    Chronic combined systolic and diastolic CHF, cardiomyopathy: 04/04/22ECHO:  LVEF 55%, grade 1 DDMod incr.LVHNormal RV size& SF.[id number] mmHg.11/8/23ECHO:Mod incr LV chamber size. Sev decr LVSF.Indet diastolic function.LVEF; 26 %.. Mod MV ,TV regurgitation.PASP; 54 mmHg.  11/9/23Stress:  LVEF 21%,multiple large areas of non -eversible defects of severe severity ant-apical, apical-lat, and infer    Degenerative disc disease, lumbar     old fractures noted at L3, L4, L5     Diverticula, colon     seen on colonoscopy 1/2012 Dr. Parker at [facility]    Essential (primary) hypertension     Essential hypertension 03/21/2014    GERD (gastroesophageal reflux disease)     Glaucoma, open-angle     Surgery 11/13/14.,  Left eye blindness and right eye only with peripheral vision    Gout     questionable diagnosis-being worked up    Hiatal hernia 05/2019    Seen incidentally on CT     History of 2019 novel coronavirus disease (COVID-19), 12/30/2023 01/10/2024    History of hypothyroidism     Hx levothyroxine in past, most recent labs NL off medication; will continue to folllow    Hx Herpes zoster conjunctivitis 01/31/2018    Hx 
Kidney stone     Right, 4 mm, non-obstructive; calcium oxylate stone    Hx of non-ST elevation myocardial infarction (NSTEMI) 06/28/2022    Hypertension 03/21/2014    Hypertrophic cardiomyopathy  (CMD) 08/10/2017    Chronic combined systolic and diastolic CHF, cardiomyopathy: 04/04/22ECHO:  LVEF 55%, grade 1 DDMod incr.LVHNormal RV size& SF.[id number] mmHg.11/8/23ECHO:Mod incr LV chamber size. Sev decr LVSF.Indet diastolic function.LVEF; 26 %.. Mod MV ,TV regurgitation.PASP; 54 mmHg.  11/9/23Stress:  LVEF 21%,multiple large areas of non -eversible defects of severe severity ant-apical, apical-lat, and infer    Hypothyroidism 2015    Hx levothyroxine in past, most recent labs NL off medication; will continue to folllow    Impaired mobility and activities of daily living 09/24/2021    Incidental adrenal cortical adenoma 2016 and 2019    unchanged    Ischemic dilated cardiomyopathy  (CMD) 11/21/2023    Chronic combined systolic and diastolic CHF, cardiomyopathy: 04/04/22ECHO:  LVEF 55%, grade 1 DDMod incr.LVHNormal RV size& SF.[id number] mmHg.11/8/23ECHO:Mod incr LV chamber size. Sev decr LVSF.Indet diastolic function.LVEF; 26 %.. Mod MV ,TV regurgitation.PASP; 54 mmHg.  11/9/23Stress:  LVEF 21%,multiple large areas of non -eversible defects of severe severity ant-apical, apical-lat, and infer    LVH (left ventricular hypertrophy) due to hypertensive disease 2015    3/16/18: LVEF:66 %.Consider hypertrophic CMP.Gr I/IV DD .No sign valve abn.1/28/19ECHO:LVEF, 67 %.Severely increased LV wall thickness.Gr I/IV DD.    Morbid obesity  (CMD)     BMI 46    Myocardial infarction  (CMD)     Obesity, morbid, BMI 40.0-49.9  (CMD)     BMI 46    Obstructive sleep apnea hypopnea, severe 2015    on CPAP  Auto CPAP 4-11    Osteoarthritis 2015    Noted in right shoulder    PAD (peripheral artery disease) (CMD) 04/21/2022    Pancreatitis (CMD)     Right leg claudication (CMD) 04/21/2022    Suspected Hypertropic Cardiomyopathy 
(CMS/HCC)     3/16/18: LVEF:66 %.Consider hypertrophic CMP.Gr I/IV DD .No sign valve abn.1/28/19ECHO:LVEF, 67 %.Severely increased LV wall thickness.Gr I/IV DD.    Type 2 diabetes mellitus with background retinopathy and macular edema  (CMD)     +nephropathy, PVD and Neuropathy.  Uncontrolled. Last HbA1c 12/20/18:8.4    Type 2 diabetes mellitus with peripheral neuropathy  (CMD)     12/19/2023 A1c 8.4    Varicose veins of bilateral lower extremities with pain 08/11/2021    Venous insufficiency 12/19/2023    Visual impairment due to glaucoma. Left eye blindness and right eye only with peripheral vision     Surgery 11/13/14.Left eye blindness and right eye only with peripheral vision    Vitamin D deficiency 2016     Past Surgical History:   Procedure Laterality Date    Cholecystectomy      Cystoscopy,ureteroscopy,lithotripsy Right 05/01/2017    Echocardiogram  01/28/2019    Echocardiogram  04/04/2022    Eye surgery      \"shunt\" placed in right eye (open angle glaucoma)    Hysterectomy      Stress test  04/04/2022     Social History     Socioeconomic History    Marital status:      Spouse name: Not on file    Number of children: Not on file    Years of education: Not on file    Highest education level: Not on file   Occupational History    Not on file   Tobacco Use    Smoking status: Never     Passive exposure: Never    Smokeless tobacco: Never   Vaping Use    Vaping status: never used   Substance and Sexual Activity    Alcohol use: No     Alcohol/week: 0.0 standard drinks of alcohol    Drug use: No     Types: Marijuana     Comment: In the 1970's    Sexual activity: Not on file   Other Topics Concern    Not on file   Social History Narrative    SOCIAL HISTORY    .  7 children.  Lives alone in [place]..  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    Because of her disability, including visual impairment.  Patient has home services including RN, just started on 
1/25/19, and home health aide 6 times a week.    Never Smoker    · Alcohol use: No    · Drug use: No        Comment: In the 1970's    Advanced directives: State DO NOT RESUSCITATE signed 1/11/19, confirmed by the patient as of 1/26./19.    [facility]#[place],GRISEL Daughter [phone]    [facility], [place] Patient [phone]    MARTHA STAHL [phone]    PMH:    Chronic combined systolic and diastolic CHF, cardiomyopathy: 04/04/22ECHO:  LVEF 55%, grade 1 DDMod incr.LVHNormal RV size& SF.[id number] mmHg.11/8/23ECHO:Mod incr LV chamber size. Sev decr LVSF.Indet diastolic function.LVEF; 26 %.. Mod MV ,TV regurgitation.PASP; 54 mmHg.    11/9/23Stress:  LVEF 21%,multiple large areas of non -eversible defects of severe severity ant-apical, apical-lat, and inferolat territory.  No ischemia was seen.              Social Determinants of Health     Financial Resource Strain: Low Risk  (1/5/2024)    Financial Resource Strain     Unable to Get: None   Food Insecurity: No Food Insecurity (5/6/2024)    Received from [facility]    Hunger Vital Sign     Worried About Running Out of Food in the Last Year: Never true     Ran Out of Food in the Last Year: Never true   Transportation Needs: No Transportation Needs (5/6/2024)    Received from [facility]    PRAPARE - Transportation     Lack of Transportation (Medical): No     Lack of Transportation (Non-Medical): No   Physical Activity: Not on file   Stress: Not on file   Social Connections: Feeling Socially Integrated (1/22/2024)    OASIS : Social Isolation     Frequency of experiencing loneliness or isolation: Never   Interpersonal Safety: Low Risk  (1/5/2024)    Interpersonal Safety     How often physically hurt: Never     How often insulted or talked down to: Never     How often threatened with harm: Never     How often scream or curse at: Never     Family History   Adopted: Yes   Problem Relation Age of Onset    Cancer, Lung Mother     Cancer Sister  
   Myocardial Infarction Brother 50    Heart disease Brother        Current Outpatient Medications   Medication Sig    NON FORMULARY Take 1 capsule by mouth daily.    pantoprazole (PROTONIX) 40 MG tablet Take 1 tablet by mouth daily (before breakfast). (Patient not taking: Reported on 6/25/2024)    clopidogrel (Plavix) 75 MG tablet Take 1 tablet by mouth daily.    mirabegron ER (Myrbetriq) 25 MG 24 hour tablet Take 1 tablet by mouth daily.    furosemide (LASIX) 20 MG tablet Take 1 tablet by mouth 2 times daily. (Patient not taking: Reported on 6/25/2024)    blood glucose (Prodigy No Coding Blood Gluc) test strip TO USE TO TEST BLOOD SUGARS 3 TIMES DAILY.    Insulin Pen Needle (B-D U/F PEN NEEDLE 5/16\") 31G X 8 MM Misc Use to inject insulin once daily    nitroGLYCERIN (Nitrostat) 0.4 MG sublingual tablet Place 1 tablet under the tongue every 5 minutes as needed for Chest pain. (Patient not taking: Reported on 6/25/2024)    NON FORMULARY Take by mouth daily. RED GRAPE POWDER    HYDROcodone-acetaminophen (NORCO) 7.5-325 MG per tablet Take 1 tablet by mouth every 8 hours as needed for Pain. (Patient not taking: Reported on 6/25/2024)    albuterol (ProAir HFA) 108 (90 Base) MCG/ACT inhaler Inhale 2 puffs into the lungs every 4 hours as needed for Shortness of Breath or Wheezing. (Patient not taking: Reported on 6/25/2024)    ferrous sulfate 324 (65 Fe) MG EC tablet Take 1 tablet by mouth daily (with breakfast).    Sennosides 8.6 MG Cap Take 8.6 mg by mouth daily as needed (Take 1-2 capsules daily as needed for constipation.).    naLOXone (NARCAN) 4 MG/0.1ML nasal liquid Spray the content of 1 device into 1 nostril. Call 911. May repeat with 2nd device in alternate nostril if no response in 2-3 minutes. (Patient not taking: Reported on 6/25/2024)    lidocaine (XYLOCAINE) 5 % ointment Apply topically as needed for Pain.    sodium hypochlorite (DAKIN'S) 0.0625 % (1/8 strength) irrigation solution Moist gauze dressing daily 
   dakins 0.0625 % topical solution Dakin's solution moist gauze dressing daily    guaiFENesin (MUCINEX PO) Take 1 Dose by mouth as needed.    insulin glargine 100 UNIT/ML pen-injector Inject 10 Units into the skin nightly. Prime 2 units before each dose.    gabapentin (NEURONTIN) 100 MG capsule Take 1 capsule by mouth in the morning and 1 capsule at noon and 1 capsule in the evening.    empagliflozin (JARDIANCE) 10 MG tablet Take 1 tablet by mouth daily.    carvedilol (COREG) 3.125 MG tablet Take 1 tablet by mouth in the morning and 1 tablet in the evening. Take with meals.    fluocinolone acetonide (DERMOTIC OIL) 0.01 % otic oil Place 4 drops into both ears daily as needed (dry ear canals.  do not use more than 14 consecutive days).    brimonidine (ALPHAGAN) 0.2 % ophthalmic solution Place 1 drop in both eyes 3 (three) times daily    dorzolamide (TRUSOPT) 2 % ophthalmic solution Place 1 drop in both eyes 3 (three) times daily IN THE MORNING AT NOON AND IN THE EVENING SEPERATE BY 5 MINS    Prodigy Twist Top Lancets 28G Misc USE AS DIRECTED BY PACKING INSTRUCTIONS, TO USE TO TEST BLOOD SUGARS 3 TIMES DAILY.    travoprost, benzalkonium free, (TRAVATAN Z) 0.004 % ophthalmic solution Place 1 drop into both eyes nightly.    acetaminophen (TYLENOL) 500 MG tablet Take 1 tablet by mouth every 4 hours as needed for Pain or Fever. Max 6 tabs in 1 day (Patient not taking: Reported on 6/25/2024)    polyethylene glycol (MIRALAX) 17 GM/SCOOP powder Take 17 g by mouth daily. Stir and dissolve powder in any 4 to 8 ounces of beverage, then drink.    Emollient (Aquaphor) ointment Apply 1 application. topically 3 times daily.    Disposable Gloves (NITRILE EXAM GLOVES MEDIUM) Misc 2 Units daily. Aides to wear gloves when assisting patient.     Current Facility-Administered Medications   Medication    lidocaine 2% urethral (UROJET) 2 % jelly 10 mL        ALLERGIES:  Dextromethorphan    OBJECTIVE:  Vital Signs:  Visit Vitals  /59 
(BP Location: RFA - Right forearm, Patient Position: Sitting)   Pulse 95   Temp 97.6 °F (36.4 °C) (Temporal)   Resp 16                 Physical Exam:  General appearance: Appears stated age, Alert, in no distress, and cooperative  Head:   normocephalic without obvious abnormality  Pulmonary: normal respiratory effort    BLE with 1+ edema  Feet are warm, toes are more cool. no ischemia. Monophasic DP/PT bilaterally, PTs are very faint 5/28/24    R medial supramalleolar wound debrided of thin layer of biofilm to granular base. + contraction. .  No signs of infection. Improved drainage, no odor. No maceration  Epifix applied     No open wound, erythema or warmth to L hallux    LLE healed     7/22/24 5/6/24 RLE     LLE          4/29/24 R medial lower leg    L posterior ankle       4/8/24 RLE     L posterior leg     L lateral leg      3/22            3/4/24         2/21/24 Right medial lower leg Post-debridement       02/21/24 Right medial lower leg Pre-debridement       02/21/24 Left lateral lower leg 02/21/24 2/16 2/12/24       L lateral ankle with a small opening with slough      1/26/24 Pre debridement      post debridement        1/19/24 1/26      Wound Bed Quality:  See above       Colette-wound Quality:  See above    Additional Descriptors:  See above    Wound Measurements Per Flowsheet:       Wound Leg Right Medial (Active)   Wound Length (cm) 2.9 cm 07/22/24 1459   Wound Width (cm) 3 cm 07/22/24 1459   Wound Depth (cm) 0.1 cm 07/22/24 1459   Wound Surface Area (cm^2) 8.7 cm^2 07/22/24 1459   Wound Volume (cm^3) 0.87 cm^3 07/22/24 1459   Number of days: 178       Wound Leg Left Posterior;Inferior/Lower (Active)   Wound Length (cm) 0 cm 06/03/24 1439   Wound Width (cm) 0 cm 06/03/24 1439   Wound Depth (cm) 0 cm 06/03/24 1439   Wound Surface Area (cm^2) 0 cm^2 06/03/24 1439   Wound Volume (cm^3) 0 cm^3 06/03/24 1439   Number of days: 105     Cellular and Tissue Based Products:  Dermal 
Replacement - EpiFix Application    EpiFix is a minimally manipulated, dehydrated, cellular amniotic membrane allograft that preserves and delivers multiple extracellular matrix proteins, growth factors, cytokines, and other specialty proteins present in amniotic tissue to help regenerate soft tissue.    Location:  RLE    Description of Procedure:  After time out, and informed consent obtained, EpiFix was applied to the RLE  after standard wound bed preparation. There were no signs of infection and the wound bed was prepared with appropriate method of debridement.   Hemostasis achieved with localized pressure. EpiFix was cut to fit the wound bed, applied to the wound bed, and then secured with Steri-Strips and covered to maintain a moist wound environment.  A non-adherent primary dressing was placed, and an appropriate secondary dressing as determined by the wound type was applied.   Patient tolerated the procedure well. No blood loss.     Complications:  None      Procedure was Performed by:  Nurse Practitioner SHANEL Yun     One 2x3cm graft utilized 100%    Laboratory assessments reviewed:  Hemoglobin A1C (%)   Date Value   07/11/2024 6.6 (H)   04/03/2024 6.9 (H)     TSH (mcUnits/mL)   Date Value   03/01/2023 3.251       No results found for: \"PAB\"   Albumin (g/dL)   Date Value   01/13/2024 2.1 (L)   01/12/2024 2.0 (L)       Ejection Fraction   Date Value Ref Range Status   04/17/2024 21 % Final       Wt Readings from Last 4 Encounters:   06/25/24 100.7 kg (222 lb)   04/18/24 100.7 kg (222 lb)   03/26/24 100.7 kg (222 lb)   03/14/24 97.5 kg (215 lb)     There were no vitals filed for this visit.  Estimated body mass index is 40.6 kg/m² as calculated from the following:    Height as of 6/25/24: 5' 2\" (1.575 m).    Weight as of 6/25/24: 100.7 kg (222 lb).    WBC (K/mcL)   Date Value   03/14/2024 4.6   01/13/2024 6.0   01/12/2024 6.5            Culture results:  Specimen Description (no units)   Date Value 
  2018 THROAT   2018 URINE, CLEAN CATCH/MIDSTREAM   2017 URINE, CLEAN CATCH/MIDSTREAM   2017 URINE, CLEAN CATCH/MIDSTREAM    CULTURE (no units)   Date Value   2018 NO BETA HEMOLYTIC STREPTOCOCCI ISOLATED   2018     60,000 ,000 CFU/mL MULTIPLE ORGANISMS ISOLATED WITH NO PREDOMINANT TYPE, CONSISTENT WITH CONTAMINATION. CONSIDER RECOLLECTION.   2017     <10,000 CFU/mL MIXED BACTERIAL IGNACIA WITH NO PREDOMINATING TYPE   2017     >100,000 CFU/mL MULTIPLE ORGANISMS ISOLATED WITH NO PREDOMINANT TYPE, CONSISTENT WITH CONTAMINATION. CONSIDER RECOLLECTION.        Diagnostic Assessments Reviewed:  No new update    NICHOLAS 24  IMPRESSION:    1. Resting ankle-brachial indices of 0.96 on the right and 0.83 on the  left.  2. Resting toe brachial indices are 0.24 on the right and 0.35 on the left.      RLE Arterial duplex 1/15/24  IMPRESSION:   Patent right lower extremity inflow. Mild degradation of spectral Doppler waveforms in the outflow could be due to decreased vascular compliance. No definite outflow stenosis. Degraded two-vessel posterior tibial and anterior tibial runoff suggesting moderate runoff disease.      Nutritional Assessment: Prealbumin and/or Albumin reviewed    Wound treatment goals are palliative:  No    DIAGNOSES:  RLE wound and left lateral leg ulcer  Venous insufficiency   Diabetes mellitus      Medical Decision Makin year old female admitted 24 for failure to thrive.  The patient presented to the hospital and was evaluated for right lower leg traumatic ulcers status post fall.  She does now have home assistance with the [employer] program.  Care assistant was present during visit today instructed on how to perform wound dressings for the patient.    24 - seen for follow up. Reports her caretaker hasn't been doing the dressing changse and her family has been trying. Going forth she will come to the clinic for dressings changes. Given the amount 
of remaining slough, I think she would benefit from a daily moist dressing change. We will try to accommodate that and family/caretakers will fill in the gaps.  Awaiting vascular consult to be made.  Venous US completed today, awaiting results.  If no improvements, may need biopsy.    2/16/24 Wound seems to be making forward progress slowly. Likely will need continued serial debridement. Start enzymatic honey dressing to the right lower leg to assist with debridement. Await recommendations per vascular team in regards to arterial and venous studies.    02/21/24 - Wound appears to be making forward progress. More granular tissue at base post debridement. Continue Meidhoney, but switch to Meidhoney Alginate.     3/4/24 - R medial lower leg debrided of loose necrotic tissue but limited due to pain. Will switch back to medihoney gel as I think that helped loosen the slough more. Seeing vascular next week, await their plans.    4/29/24- RLE improved,  epifix #1 applied today.     5/6/24 - RLE jamia, will hold Epifix today due to patient not feeling well and recommended she go to ED.     5/13/24 - RLE Epifix #2 applied today, improving. LLE improving. Increased edema likely from increased sodium intake from soup    5/20/24 - RLE Epifix #3 applied today, wound continues to contract. LLE nearly healed.   Needs to focus on lower leg elevation due to not being able to tolerate tetras. I dont think she would tolerate wraps either     5/28/24 - RLE Epifix #4 applied, continues to improve. Will trial gentle cotton/coban wrap to RLE  States she has compression stockings at home she will bring next visit to try to her left leg   6/3/24 Epifix #5 applied, continues to improve. LLE healed.  6/10/24- Epifix #6 applied  6/17/24- Epifiex #7 applied to RLE.   6/24/24 - Epifix #8 applied to RLE, continue to improve   7/1/24 Epifix #9 applied to RLE, continue to improve   7/8/24 Epifix #10 to RLE applied   7/15/24 Epifix #11 applied 
to RLE, continues to improve. Has a total of 17 approved     7/22/24 - Epifix #12 applied to RLE.  Unclear why her left hallux is painful. She will [facility] her podiatrist through [facility] if it continues.    Local wound care:  - wash with mild soap and water, pat dry   - Epifix and veil to RLE weekly.   - Cover with dry secondary dressing   - gentle cotton/coban to RLE 2-3x/wk  - Moisturizer to LLE. Pt own compression stocking      Vascular:  - RLE arterial duplex noted above   - Venous insufficiency US completed   - ABIs 2/26/24 noted above, 0.96 on the right and 0.83 on the left.  - following with Dr. Lazo   - CTA cancelled due to renal function. Cards recommending conservative cares    Offloading:  - Avoid pressure to wounds   - Elevate BLE    Nutrition:  - Encourage protein intake   - Encourage glycemic control to promote wound healing.   Hemoglobin A1C (%)   Date Value   07/11/2024 6.6 (H)     Infection:  - No acute signs of wound infection      Follow up next wk    Plan of Care:  Advanced Wound Care Recommendations: See above   Percent Wound Closure from consult:  See above  Care plan to augment wound closure: Not applicable.       All questions were answered.    SHANEL Yun    
Discharged

## 2024-07-23 NOTE — TELEPHONE ENCOUNTER
Called patient to advise of this information, no answer, LMOM requesting that the patient call me back.

## 2024-07-23 NOTE — TELEPHONE ENCOUNTER
Called patient back to advise of this information and she stated that today her BP was 123/72 & pulse of 66. She states that those symptoms only happened that one time and she has been feeling fine ever since then. I advised patient to keep an eye on her symptoms and how she feels and if the sx return then give us a call back. Patient voiced her full understanding.

## 2024-07-23 NOTE — TELEPHONE ENCOUNTER
She is not on any antihypertensives.  I asked her to recheck her blood pressure the next couple days, if it still low then Natalya can write her midodrine.

## 2024-07-26 ENCOUNTER — HOSPITAL ENCOUNTER (OUTPATIENT)
Dept: NEUROLOGY | Facility: HOSPITAL | Age: 54
Discharge: HOME | End: 2024-07-26
Payer: MEDICARE

## 2024-07-26 ENCOUNTER — HOSPITAL ENCOUNTER (OUTPATIENT)
Facility: HOSPITAL | Age: 54
Discharge: HOME | End: 2024-07-26
Attending: EMERGENCY MEDICINE | Admitting: EMERGENCY MEDICINE
Payer: MEDICARE

## 2024-07-26 ENCOUNTER — NURSE TRIAGE (OUTPATIENT)
Dept: CALL CENTER | Facility: HOSPITAL | Age: 54
End: 2024-07-26
Payer: MEDICARE

## 2024-07-26 ENCOUNTER — HOSPITAL ENCOUNTER (OUTPATIENT)
Dept: CT IMAGING | Facility: HOSPITAL | Age: 54
Discharge: HOME | End: 2024-07-26
Payer: MEDICARE

## 2024-07-26 VITALS
OXYGEN SATURATION: 98 % | HEART RATE: 66 BPM | RESPIRATION RATE: 16 BRPM | SYSTOLIC BLOOD PRESSURE: 132 MMHG | HEIGHT: 63 IN | BODY MASS INDEX: 38.34 KG/M2 | DIASTOLIC BLOOD PRESSURE: 72 MMHG | TEMPERATURE: 98 F | WEIGHT: 216.4 LBS

## 2024-07-26 DIAGNOSIS — M79.604 PAIN IN RIGHT LEG: ICD-10-CM

## 2024-07-26 DIAGNOSIS — N34.2 URETHRITIS: Primary | ICD-10-CM

## 2024-07-26 DIAGNOSIS — B39.9 HISTOPLASMOSIS: ICD-10-CM

## 2024-07-26 LAB
AMPHET+METHAMPHET UR QL: NEGATIVE
AMPHETAMINES UR QL: NEGATIVE
BARBITURATES UR QL SCN: NEGATIVE
BENZODIAZ UR QL SCN: NEGATIVE
BILIRUB UR QL STRIP: NEGATIVE
BUPRENORPHINE SERPL-MCNC: NEGATIVE NG/ML
CANNABINOIDS SERPL QL: NEGATIVE
CLARITY UR: ABNORMAL
COCAINE UR QL: NEGATIVE
COLOR UR: YELLOW
GLUCOSE UR STRIP-MCNC: NEGATIVE MG/DL
HGB UR QL STRIP.AUTO: ABNORMAL
KETONES UR QL STRIP: NEGATIVE
LEUKOCYTE ESTERASE UR QL STRIP.AUTO: ABNORMAL
METHADONE UR QL SCN: NEGATIVE
NITRITE UR QL STRIP: POSITIVE
OPIATES UR QL: NEGATIVE
OXYCODONE UR QL SCN: NEGATIVE
PCP UR QL SCN: NEGATIVE
PH UR STRIP.AUTO: 5.5 [PH] (ref 5–8)
PROT UR QL STRIP: ABNORMAL
SP GR UR STRIP: >=1.03 (ref 1–1.03)
TRICYCLICS UR QL SCN: NEGATIVE
UROBILINOGEN UR QL STRIP: ABNORMAL

## 2024-07-26 PROCEDURE — G0463 HOSPITAL OUTPT CLINIC VISIT: HCPCS | Performed by: EMERGENCY MEDICINE

## 2024-07-26 PROCEDURE — 95907 NVR CNDJ TST 1-2 STUDIES: CPT

## 2024-07-26 PROCEDURE — 80306 DRUG TEST PRSMV INSTRMNT: CPT | Performed by: EMERGENCY MEDICINE

## 2024-07-26 PROCEDURE — 81003 URINALYSIS AUTO W/O SCOPE: CPT | Performed by: EMERGENCY MEDICINE

## 2024-07-26 PROCEDURE — 99213 OFFICE O/P EST LOW 20 MIN: CPT | Performed by: EMERGENCY MEDICINE

## 2024-07-26 PROCEDURE — 95886 MUSC TEST DONE W/N TEST COMP: CPT

## 2024-07-26 PROCEDURE — 71250 CT THORAX DX C-: CPT

## 2024-07-26 RX ORDER — PHENAZOPYRIDINE HYDROCHLORIDE 200 MG/1
200 TABLET, FILM COATED ORAL 3 TIMES DAILY PRN
Qty: 6 TABLET | Refills: 0 | Status: SHIPPED | OUTPATIENT
Start: 2024-07-26 | End: 2024-07-28

## 2024-07-26 RX ORDER — CEFUROXIME AXETIL 500 MG/1
500 TABLET ORAL 2 TIMES DAILY
Qty: 14 TABLET | Refills: 0 | Status: SHIPPED | OUTPATIENT
Start: 2024-07-26 | End: 2024-08-02

## 2024-07-26 RX ORDER — NITROFURANTOIN 25; 75 MG/1; MG/1
100 CAPSULE ORAL 2 TIMES DAILY
Qty: 14 CAPSULE | Refills: 0 | Status: SHIPPED | OUTPATIENT
Start: 2024-07-26 | End: 2024-08-02

## 2024-07-26 NOTE — FSED PROVIDER NOTE
Subjective   History of Present Illness  Patient with complaint of painful urination and urinary frequency. Ongoing for several days. No pattern to symptoms. No precipitating or lrieeving factors. No abdominal pain, back pain. No other complaints.     History provided by:  Patient   used: No        Review of Systems   All other systems reviewed and are negative.      Past Medical History:   Diagnosis Date    Abnormal ECG     Anemia     Anesthesia complication 2003    cardiac arrest  with hysterectomy    Anxiety 2005    Arthritis     Asthma 04/17/2020    allergy    Bipolar 1 disorder     Bradycardia     Chest pain     due to scarring from lung surgery  2/21    Chest pain 09/24/2020    Cholelithiasis 10/2022    Coronary artery disease     very minimal    Depression 2005    Diabetes mellitus 2002    borderline   resolved    Dyspnea on minimal exertion     Frequent UTI     Gastroesophageal reflux disease without esophagitis 07/15/2020    Heart murmur     FROM CHILDHOOD    Histoplasmosis     History of anemia     POST PREGNANCY    Hyperlipidemia     Hyperlipidemia 10/04/2016    Hypertension     Hypotension     Mass of upper lobe of right lung     Migraines     hx    Obesity 1999    PONV (postoperative nausea and vomiting)     Sleep apnea 2/28/22    no machine    Spinal headache     Vertigo     Visual impairment 2011    WEARS GLASSES    Vitamin D deficiency        Allergies   Allergen Reactions    Tylenol [Acetaminophen] Hives and Itching    Pregabalin Rash       Past Surgical History:   Procedure Laterality Date    ANKLE OPEN REDUCTION INTERNAL FIXATION Right 12/22/2023    Procedure: ANKLE OPEN REDUCTION INTERNAL FIXATION;  Surgeon: TORIE Beck DPM;  Location: [facility] MAIN OR;  Service: Podiatry;  Laterality: Right;    APPENDECTOMY  2011    CARDIAC CATHETERIZATION N/A 09/24/2020    Procedure: Left Heart Cath possible PCI, atherectomy, hemodynamic support;  Surgeon: Thomas Zamora MD;   [facility];  Service: Cardiology;  Laterality: N/A;    CARDIAC CATHETERIZATION  2020    CARDIAC ELECTROPHYSIOLOGY PROCEDURE N/A 2023    Procedure: Pacemaker DC new, Dixon aware;  Surgeon: Rachel Espinoza MD;  [facility];  Service: Cardiovascular;  Laterality: N/A;     SECTION      FOOT/TOE TENDON REPAIR Left     HYSTERECTOMY      LUNG BIOPSY Right 2020    LUNG LOBECTOMY Right 2022    pt had partial Right lobectomy and nodule removal    MASS EXCISION Right 2023    Procedure: LIPOMA EXCISION,THIGH;  Surgeon: Justo Shannon MD;  [facility];  Service: General;  Laterality: Right;    ROTATOR CUFF REPAIR Left     THORACOSCOPY VIDEO ASSISTED WITH LOBECTOMY Right 2022    Procedure: BRONCHOSCOPY, THORACOSCOPY VIDEO ASSISTED wedge resection X2, INTERCOSTAL NERVE BLOCK;  Surgeon: Ayla Carroll MD;  Location: [facility] MAIN OR;  Service: Thoracic;  Laterality: Right;    TUBAL ABDOMINAL LIGATION         Family History   Problem Relation Age of Onset    Arthritis Mother     Cancer Mother     Depression Mother     Diabetes Mother     Early death Mother     Mental illness Mother     Anxiety disorder Mother     Alcohol abuse Father     Diabetes Father     Early death Father     Heart disease Father     Hyperlipidemia Father     Hypertension Father         Father    Vision loss Father     Heart attack Father     Heart disease Sister     Drug abuse Sister     Heart attack Sister     Hypertension Sister         Sister    Heart disease Sister     Heart disease Brother     Hypertension Brother     Heart attack Brother     Drug abuse Brother     Hypertension Brother     Heart disease Brother     Heart attack Brother     Cancer Maternal Grandmother     Malig Hyperthermia Neg Hx        Social History     Socioeconomic History    Marital status:    Tobacco Use    Smoking status: Never     Passive  exposure: Never    Smokeless tobacco: Never   Vaping Use    Vaping status: Never Used   Substance and Sexual Activity    Alcohol use: Never     Comment: VERY RARE    Drug use: Never    Sexual activity: Not Currently     Partners: Male     Birth control/protection: None, Hysterectomy           Objective   Physical Exam  Vitals and nursing note reviewed.   Constitutional:       Appearance: Normal appearance.   Abdominal:      General: Abdomen is flat.      Palpations: Abdomen is soft.   Skin:     General: Skin is warm.      Capillary Refill: Capillary refill takes less than 2 seconds.   Neurological:      General: No focal deficit present.      Mental Status: She is alert and oriented to person, place, and time.   Psychiatric:         Mood and Affect: Mood normal.         Behavior: Behavior normal.         Procedures           ED Course                                           Medical Decision Making  Concern for uti. D/w patient. Agree with plan.    21:05 Patient spoke with nurse and would like a different antibiotic prescribed as she believes her previous antibiotic prescription is causing auditory hallucinations. Will prescribe macrobid.  MD Leah    Problems Addressed:  Urethritis: complicated acute illness or injury    Risk  Prescription drug management.        Final diagnoses:   Urethritis       ED Disposition  ED Disposition       ED Disposition   Discharge    Condition   Stable    Comment   --               Reilly Diaz DO  [address] [address]  [phone]    Schedule an appointment as soon as possible for a visit            Medication List        New Prescriptions      cefuroxime 500 MG tablet  Commonly known as: CEFTIN  Take 1 tablet by mouth 2 (Two) Times a Day for 7 days.     nitrofurantoin (macrocrystal-monohydrate) 100 MG capsule  Commonly known as: MACROBID  Take 1 capsule by mouth 2 (Two) Times a Day for 7 days.     phenazopyridine 200 MG  "tablet  Commonly known as: PYRIDIUM  Take 1 tablet by mouth 3 (Three) Times a Day As Needed for Dysuria for up to 2 days.               Where to Get Your Medications        These medications were sent to [facility]/pharmacy #[id number] - [place] - [address] [facility] [place] [facility] - [phone]  - [phone]   [address], [place] [address]      Phone: [phone]   cefuroxime 500 MG tablet  nitrofurantoin (macrocrystal-monohydrate) 100 MG capsule  phenazopyridine 200 MG tablet         "

## 2024-07-27 NOTE — TELEPHONE ENCOUNTER
Reason for Disposition   [1] Caller has medicine question about med NOT prescribed by PCP AND [2] triager unable to answer question (e.g., compatibility with other med, storage)    Additional Information   Negative: [1] Intentional drug overdose AND [2] suicidal thoughts or ideas   Negative: Drug overdose and triager unable to answer question   Negative: Caller requesting a renewal or refill of a medicine patient is currently taking   Negative: Caller requesting information unrelated to medicine   Negative: Caller requesting information about COVID-19 Vaccine   Negative: Caller requesting information about Emergency Contraception   Negative: Caller requesting information about Combined Birth Control Pills   Negative: Caller requesting information about Progestin Birth Control Pills   Negative: Caller requesting information about Post-Op pain or medicines   Negative: Caller requesting a prescription antibiotic (such as Penicillin) for Strep throat and has a positive culture result   Negative: Caller requesting a prescription anti-viral med (such as Tamiflu) and has influenza (flu) symptoms   Negative: Immunization reaction suspected   Negative: Rash while taking a medicine or within 3 days of stopping it   Negative: [1] Asthma and [2] having symptoms of asthma (cough, wheezing, etc.)   Negative: [1] Symptom of illness (e.g., headache, abdominal pain, earache, vomiting) AND [2] more than mild   Negative: Breastfeeding questions about mother's medicines and diet   Negative: MORE THAN A DOUBLE DOSE of a prescription or over-the-counter (OTC) drug   Negative: [1] DOUBLE DOSE (an extra dose or lesser amount) of prescription drug AND [2] any symptoms (e.g., dizziness, nausea, pain, sleepiness)   Negative: [1] DOUBLE DOSE (an extra dose or lesser amount) of over-the-counter (OTC) drug AND [2] any symptoms (e.g., dizziness, nausea, pain, sleepiness)   Negative: Took another person's prescription drug   Negative: [1] DOUBLE  "DOSE (an extra dose or lesser amount) of prescription drug AND [2] NO symptoms  (Exception: A double dose of antibiotics.)   Negative: Diabetes drug error or overdose (e.g., took wrong type of insulin or took extra dose)   Negative: [1] Prescription not at pharmacy AND [2] was prescribed by PCP recently (Exception: Triager has access to EMR and prescription is recorded there. Go to Home Care and confirm for pharmacy.)   Negative: [1] Pharmacy calling with prescription question AND [2] triager unable to answer question   Negative: [1] Caller has URGENT medicine question about med that PCP or specialist prescribed AND [2] triager unable to answer question   Negative: Medicine patch causing local rash or itching    Answer Assessment - Initial Assessment Questions  1. NAME of MEDICINE: \"What medicine(s) are you calling about?\"      cefuroxime · phenazopyridine  2. QUESTION: \"What is your question?\" (e.g., double dose of medicine, side effect)      Can medication cause hallucinations and chilles  3. PRESCRIBER: \"Who prescribed the medicine?\" Reason: if prescribed by specialist, call should be referred to that group.      ED [facility]  4. SYMPTOMS: \"Do you have any symptoms?\" If Yes, ask: \"What symptoms are you having?\"  \"How bad are the symptoms (e.g., mild, moderate, severe)      Chills, diarrhea and hallucinations    Protocols used: Medication Question Call-ADULT-AH    "

## 2024-07-31 ENCOUNTER — TELEPHONE (OUTPATIENT)
Dept: SURGERY | Facility: CLINIC | Age: 54
End: 2024-07-31
Payer: MEDICARE

## 2024-07-31 NOTE — TELEPHONE ENCOUNTER
Caller: Tessie Conner    Relationship: Self    Best call back number: [phone]    What is the best time to reach you: ANY    Who are you requesting to speak with (clinical staff, provider,  specific staff member): CLINICAL    Do you know the name of the person who called: NA    What was the call regarding:PT CALLING IN REQUESTING CALLBACK REGARDING CT RESULTS. THANKYOU

## 2024-08-01 ENCOUNTER — TELEPHONE (OUTPATIENT)
Dept: SURGERY | Facility: CLINIC | Age: 54
End: 2024-08-01
Payer: MEDICARE

## 2024-08-01 NOTE — TELEPHONE ENCOUNTER
Patient called our office wanting to discuss results of CT chest from 7/26/2024.  She is not scheduled to see Dr. Carroll until September 18, 2024.  She has seen pulmonary and infectious disease at [facility].    No significant change on CT chest from 7/26/2024 compared to imaging performed in May 2024.  However, the right middle lobe nodule measures 6 mm, which is increased from 3 mm in November 2023.  Discussed with her again today that this nodule is currently too small to characterize on PET scan or with biopsy and agree with continued monitoring.    She reports her pulmonary doctor at [facility] has ordered a CT of the chest scheduled at the end of September.  We will have her follow-up with Dr. Carroll in the [place] office in early October for further recommendations.    BECKY Baez  Thoracic Surgery  [phone]

## 2024-08-01 NOTE — TELEPHONE ENCOUNTER
I notified pt that APRLURDES Eileen Massey will be reaching out to her at some point during the day. Pt was agreeable and understood    DB 7:53  8/1/2024      Caller: Tessie Conner    Relationship: Self    Best call back number: [phone]    What is the best time to reach you: ANY    Who are you requesting to speak with (clinical staff, provider,  specific staff member): CLINICAL    Do you know the name of the person who called: NA    What was the call regarding:PT CALLING IN REQUESTING CALLBACK REGARDING CT RESULTS. THANKYOU

## 2024-08-06 ENCOUNTER — TRANSCRIBE ORDERS (OUTPATIENT)
Dept: ADMINISTRATIVE | Facility: HOSPITAL | Age: 54
End: 2024-08-06
Payer: MEDICARE

## 2024-08-06 ENCOUNTER — TELEPHONE (OUTPATIENT)
Dept: CARDIOLOGY | Facility: CLINIC | Age: 54
End: 2024-08-06
Payer: MEDICARE

## 2024-08-06 DIAGNOSIS — R91.8 LUNG NODULES: Primary | ICD-10-CM

## 2024-08-06 NOTE — TELEPHONE ENCOUNTER
Spoke patient she feels fine now will monitor BP and HR patient stated she held her breath while bending over pt verbalized understanding feels fine now and will call us in a week with readings will go to ER if she gets worse and make a f/u appt

## 2024-08-06 NOTE — TELEPHONE ENCOUNTER
Caller: Tessie Conner    Relationship: Self    Best call back number: [phone]    What is the best time to reach you: ANYTIME    Who are you requesting to speak with (clinical staff, provider,  specific staff member): CLINICAL        What was the call regarding: PT WAS FINE UNTIL SHE BENT OVER TO PLUG IN CORD. SHE BECAUSE DIZZY, , SOB AND TIRED.BP WAS 80/52.  NOW SHE JUST FEELS TIRED.  SHE HAS PACEMAKER. DOES SHE NEED TO BE SEEN, IN  OFFICE? WHAT COULD HAVE CAUSED THIS? SHE IS VERY CONCERNED. PLEASE CALL TO ADVISE PT

## 2024-08-09 PROBLEM — J96.01 ACUTE HYPOXEMIC RESPIRATORY FAILURE: Status: RESOLVED | Noted: 2023-06-23 | Resolved: 2024-08-09

## 2024-08-09 PROBLEM — Z76.89 ENCOUNTER FOR SUPPORT AND COORDINATION OF TRANSITION OF CARE: Status: RESOLVED | Noted: 2020-10-25 | Resolved: 2024-08-09

## 2024-08-09 PROBLEM — J01.40 ACUTE NON-RECURRENT PANSINUSITIS: Status: RESOLVED | Noted: 2020-09-03 | Resolved: 2024-08-09

## 2024-08-09 PROBLEM — Z78.9 TRANSITION OF CARE PERFORMED WITH SHARING OF CLINICAL SUMMARY: Status: RESOLVED | Noted: 2020-04-16 | Resolved: 2024-08-09

## 2024-08-09 PROBLEM — Z09 HOSPITAL DISCHARGE FOLLOW-UP: Status: RESOLVED | Noted: 2022-02-14 | Resolved: 2024-08-09

## 2024-08-09 PROBLEM — J06.9 UPPER RESPIRATORY INFECTION WITH COUGH AND CONGESTION: Status: RESOLVED | Noted: 2024-05-14 | Resolved: 2024-08-09

## 2024-08-09 PROBLEM — R89.9 ABNORMAL LABORATORY TEST: Status: RESOLVED | Noted: 2022-08-03 | Resolved: 2024-08-09

## 2024-08-09 PROBLEM — L02.93 CARBUNCLE: Status: RESOLVED | Noted: 2020-11-03 | Resolved: 2024-08-09

## 2024-08-09 PROBLEM — R06.00 DYSPNEA: Status: RESOLVED | Noted: 2022-04-05 | Resolved: 2024-08-09

## 2024-08-09 PROBLEM — R07.9 CHEST PAIN: Status: RESOLVED | Noted: 2020-09-24 | Resolved: 2024-08-09

## 2024-08-09 PROBLEM — N88.8 NABOTHIAN CYST: Status: RESOLVED | Noted: 2020-10-17 | Resolved: 2024-08-09

## 2024-08-09 PROBLEM — IMO0001 TRANSITION OF CARE PERFORMED WITH SHARING OF CLINICAL SUMMARY: Status: RESOLVED | Noted: 2020-04-16 | Resolved: 2024-08-09

## 2024-08-09 PROBLEM — U07.1 COVID-19: Status: RESOLVED | Noted: 2023-03-19 | Resolved: 2024-08-09

## 2024-08-09 PROBLEM — M25.561 ACUTE PAIN OF RIGHT KNEE: Status: RESOLVED | Noted: 2022-11-08 | Resolved: 2024-08-09

## 2024-08-12 ENCOUNTER — LAB (OUTPATIENT)
Dept: FAMILY MEDICINE CLINIC | Facility: CLINIC | Age: 54
End: 2024-08-12
Payer: MEDICARE

## 2024-08-12 ENCOUNTER — OFFICE VISIT (OUTPATIENT)
Dept: FAMILY MEDICINE CLINIC | Facility: CLINIC | Age: 54
End: 2024-08-12
Payer: MEDICARE

## 2024-08-12 VITALS
HEIGHT: 63 IN | OXYGEN SATURATION: 96 % | BODY MASS INDEX: 37.88 KG/M2 | DIASTOLIC BLOOD PRESSURE: 72 MMHG | RESPIRATION RATE: 16 BRPM | WEIGHT: 213.8 LBS | HEART RATE: 60 BPM | SYSTOLIC BLOOD PRESSURE: 122 MMHG

## 2024-08-12 DIAGNOSIS — M25.641 MORNING JOINT STIFFNESS OF RIGHT HAND: ICD-10-CM

## 2024-08-12 DIAGNOSIS — Z95.0 PACEMAKER: ICD-10-CM

## 2024-08-12 DIAGNOSIS — E78.00 PURE HYPERCHOLESTEROLEMIA: ICD-10-CM

## 2024-08-12 DIAGNOSIS — E11.42 WELL CONTROLLED TYPE 2 DIABETES MELLITUS WITH PERIPHERAL NEUROPATHY: ICD-10-CM

## 2024-08-12 DIAGNOSIS — M25.642 MORNING JOINT STIFFNESS OF LEFT HAND: ICD-10-CM

## 2024-08-12 DIAGNOSIS — I49.5 SICK SINUS SYNDROME: ICD-10-CM

## 2024-08-12 DIAGNOSIS — E11.42 WELL CONTROLLED TYPE 2 DIABETES MELLITUS WITH PERIPHERAL NEUROPATHY: Primary | ICD-10-CM

## 2024-08-12 PROBLEM — I10 HYPERTENSION: Status: RESOLVED | Noted: 2021-11-12 | Resolved: 2024-08-12

## 2024-08-12 PROBLEM — Z12.31 ENCOUNTER FOR SCREENING MAMMOGRAM FOR MALIGNANT NEOPLASM OF BREAST: Status: RESOLVED | Noted: 2019-11-24 | Resolved: 2024-08-12

## 2024-08-12 PROBLEM — S82.851A: Status: RESOLVED | Noted: 2023-12-19 | Resolved: 2024-08-12

## 2024-08-12 PROBLEM — R00.1 BRADYCARDIA: Status: RESOLVED | Noted: 2023-05-05 | Resolved: 2024-08-12

## 2024-08-12 PROBLEM — Z12.39 SCREENING FOR BREAST CANCER: Status: RESOLVED | Noted: 2019-11-24 | Resolved: 2024-08-12

## 2024-08-12 PROBLEM — S80.01XA CONTUSION OF RIGHT KNEE: Status: RESOLVED | Noted: 2021-08-27 | Resolved: 2024-08-12

## 2024-08-12 PROBLEM — E11.9 TYPE 2 DIABETES MELLITUS WITHOUT COMPLICATION: Status: RESOLVED | Noted: 2020-04-03 | Resolved: 2024-08-12

## 2024-08-12 LAB
ANION GAP SERPL CALCULATED.3IONS-SCNC: 9 MMOL/L (ref 5–15)
BASOPHILS # BLD AUTO: 0.02 10*3/MM3 (ref 0–0.2)
BASOPHILS NFR BLD AUTO: 0.3 % (ref 0–1.5)
BUN SERPL-MCNC: 6 MG/DL (ref 6–20)
BUN/CREAT SERPL: 7.3 (ref 7–25)
CALCIUM SPEC-SCNC: 10.5 MG/DL (ref 8.6–10.5)
CHLORIDE SERPL-SCNC: 101 MMOL/L (ref 98–107)
CHOLEST SERPL-MCNC: 211 MG/DL (ref 0–200)
CHROMATIN AB SERPL-ACNC: 18 IU/ML (ref 0–14)
CO2 SERPL-SCNC: 27 MMOL/L (ref 22–29)
CREAT SERPL-MCNC: 0.82 MG/DL (ref 0.57–1)
DEPRECATED RDW RBC AUTO: 45.7 FL (ref 37–54)
EGFRCR SERPLBLD CKD-EPI 2021: 85.7 ML/MIN/1.73
EOSINOPHIL # BLD AUTO: 0.01 10*3/MM3 (ref 0–0.4)
EOSINOPHIL NFR BLD AUTO: 0.1 % (ref 0.3–6.2)
ERYTHROCYTE [DISTWIDTH] IN BLOOD BY AUTOMATED COUNT: 14.5 % (ref 12.3–15.4)
GLUCOSE SERPL-MCNC: 82 MG/DL (ref 65–99)
HBA1C MFR BLD: 5.2 % (ref 4.8–5.6)
HCT VFR BLD AUTO: 41.2 % (ref 34–46.6)
HDLC SERPL-MCNC: 41 MG/DL (ref 40–60)
HGB BLD-MCNC: 13.3 G/DL (ref 12–15.9)
IMM GRANULOCYTES # BLD AUTO: 0.03 10*3/MM3 (ref 0–0.05)
IMM GRANULOCYTES NFR BLD AUTO: 0.4 % (ref 0–0.5)
LDLC SERPL CALC-MCNC: 141 MG/DL (ref 0–100)
LDLC/HDLC SERPL: 3.36 {RATIO}
LYMPHOCYTES # BLD AUTO: 1.41 10*3/MM3 (ref 0.7–3.1)
LYMPHOCYTES NFR BLD AUTO: 20.6 % (ref 19.6–45.3)
MCH RBC QN AUTO: 27.9 PG (ref 26.6–33)
MCHC RBC AUTO-ENTMCNC: 32.3 G/DL (ref 31.5–35.7)
MCV RBC AUTO: 86.4 FL (ref 79–97)
MONOCYTES # BLD AUTO: 0.68 10*3/MM3 (ref 0.1–0.9)
MONOCYTES NFR BLD AUTO: 10 % (ref 5–12)
NEUTROPHILS NFR BLD AUTO: 4.68 10*3/MM3 (ref 1.7–7)
NEUTROPHILS NFR BLD AUTO: 68.6 % (ref 42.7–76)
NRBC BLD AUTO-RTO: 0 /100 WBC (ref 0–0.2)
PLATELET # BLD AUTO: 215 10*3/MM3 (ref 140–450)
PMV BLD AUTO: 11.2 FL (ref 6–12)
POTASSIUM SERPL-SCNC: 4 MMOL/L (ref 3.5–5.2)
RBC # BLD AUTO: 4.77 10*6/MM3 (ref 3.77–5.28)
SODIUM SERPL-SCNC: 137 MMOL/L (ref 136–145)
TRIGL SERPL-MCNC: 162 MG/DL (ref 0–150)
VLDLC SERPL-MCNC: 29 MG/DL (ref 5–40)
WBC NRBC COR # BLD AUTO: 6.83 10*3/MM3 (ref 3.4–10.8)

## 2024-08-12 PROCEDURE — 86200 CCP ANTIBODY: CPT | Performed by: STUDENT IN AN ORGANIZED HEALTH CARE EDUCATION/TRAINING PROGRAM

## 2024-08-12 PROCEDURE — 83036 HEMOGLOBIN GLYCOSYLATED A1C: CPT | Performed by: STUDENT IN AN ORGANIZED HEALTH CARE EDUCATION/TRAINING PROGRAM

## 2024-08-12 PROCEDURE — 1159F MED LIST DOCD IN RCRD: CPT | Performed by: STUDENT IN AN ORGANIZED HEALTH CARE EDUCATION/TRAINING PROGRAM

## 2024-08-12 PROCEDURE — 1125F AMNT PAIN NOTED PAIN PRSNT: CPT | Performed by: STUDENT IN AN ORGANIZED HEALTH CARE EDUCATION/TRAINING PROGRAM

## 2024-08-12 PROCEDURE — 36415 COLL VENOUS BLD VENIPUNCTURE: CPT

## 2024-08-12 PROCEDURE — 99215 OFFICE O/P EST HI 40 MIN: CPT | Performed by: STUDENT IN AN ORGANIZED HEALTH CARE EDUCATION/TRAINING PROGRAM

## 2024-08-12 PROCEDURE — 85025 COMPLETE CBC W/AUTO DIFF WBC: CPT | Performed by: STUDENT IN AN ORGANIZED HEALTH CARE EDUCATION/TRAINING PROGRAM

## 2024-08-12 PROCEDURE — 80061 LIPID PANEL: CPT | Performed by: STUDENT IN AN ORGANIZED HEALTH CARE EDUCATION/TRAINING PROGRAM

## 2024-08-12 PROCEDURE — 80048 BASIC METABOLIC PNL TOTAL CA: CPT | Performed by: STUDENT IN AN ORGANIZED HEALTH CARE EDUCATION/TRAINING PROGRAM

## 2024-08-12 PROCEDURE — G2212 PROLONG OUTPT/OFFICE VIS: HCPCS | Performed by: STUDENT IN AN ORGANIZED HEALTH CARE EDUCATION/TRAINING PROGRAM

## 2024-08-12 PROCEDURE — 86431 RHEUMATOID FACTOR QUANT: CPT | Performed by: STUDENT IN AN ORGANIZED HEALTH CARE EDUCATION/TRAINING PROGRAM

## 2024-08-12 PROCEDURE — 1160F RVW MEDS BY RX/DR IN RCRD: CPT | Performed by: STUDENT IN AN ORGANIZED HEALTH CARE EDUCATION/TRAINING PROGRAM

## 2024-08-12 RX ORDER — ALBUTEROL SULFATE 90 UG/1
2 AEROSOL, METERED RESPIRATORY (INHALATION) EVERY 4 HOURS PRN
Qty: 18 G | Refills: 2 | Status: SHIPPED | OUTPATIENT
Start: 2024-08-12

## 2024-08-12 RX ORDER — BUDESONIDE, GLYCOPYRROLATE, AND FORMOTEROL FUMARATE 160; 9; 4.8 UG/1; UG/1; UG/1
2 AEROSOL, METERED RESPIRATORY (INHALATION) 2 TIMES DAILY
COMMUNITY

## 2024-08-12 RX ORDER — ALBUTEROL SULFATE 90 UG/1
2 AEROSOL, METERED RESPIRATORY (INHALATION) EVERY 4 HOURS PRN
COMMUNITY
End: 2024-08-12 | Stop reason: SDUPTHER

## 2024-08-12 NOTE — PROGRESS NOTES
"Chief Complaint  Chief Complaint   Patient presents with    Establish Care       Subjective        Tessie Conner is a 54 y.o. female who presents to [facility].  History of Present Illness    Tessie is a 54-year-old female here for chronic condition management.      Histoplasmosis  S/p lung resection, s/p antifungal (finished January 2024)  Follows with ID at [facility] lung nodule  Discovered November 2023, increased from 3mm  --> 6mm on repeat scan May 2024, stable in July 2024. Follows with pulmonology and thoracic surgery.    Sick sinus syndrome s/p pacemaker  Follows with cardiology, no problems since receiving pacemaker    T2DM  Checks AM sugars - typically 90s -low 100s    COPD, s/p lobectomy  Follows with pulmonology  Started on new inhaler last week (Breztri) due to uncontrolled symptoms        Acute concerns  Tingling feet  Bilateral feet, just for the last week  Intermittent, typically at night      Bilateral hand pain  Typically with waking up, lasts ~2 hours  Present in entire hand, worse in fingers  Achy and stiffness   Denies stiffness  No pain in arm/neck          Objective   /72   Pulse 60   Resp 16   Ht 160 cm (63\")   Wt 97 kg (213 lb 12.8 oz)   SpO2 96%   BMI 37.87 kg/m²     Estimated body mass index is 37.87 kg/m² as calculated from the following:    Height as of this encounter: 160 cm (63\").    Weight as of this encounter: 97 kg (213 lb 12.8 oz).     Physical Exam   GEN: In no acute distress, non toxic appearing  HEENT: EOMI. Mucous membranes moist. Oropharynx without erythema or exudate.   CV: Regular rate and rhythm, no murmurs, 2+ peripheral pulses, No extremity edema.   RESP: Lungs clear to auscultation anteriorly and posteriorly in all lung fields bilaterally.  SKIN: No rashes  MSK: No joint erythema, deformity, or effusion. Good ROM in upper and lower extremities.  Right ankle with mild swelling which is chronic per patient since surgery " last year.  Diabetic foot exam is normal bilaterally other than no sensation in the plantar surface of her right great toe (also chronic since her surgery last year).  NEURO: AAO to person, place, and time. CN 2-12 intact grossly.   PSYCH: Affect normal, insight fair     PHQ-2 Depression Screening  Little interest or pleasure in doing things? 0-->not at all   Feeling down, depressed, or hopeless? 3-->nearly every day   PHQ-2 Total Score 9      Result Review :    The following data was reviewed by: Reilly Diaz DO on 08/12/2024:  Common labs          1/18/2024    05:05 5/13/2024    09:30 5/13/2024    18:16 5/14/2024    04:52   Common Labs   Glucose 123  82  104  102    BUN 11  11  12  13    Creatinine 0.56  0.74  0.81  0.88    Sodium 144  142  142  142    Potassium 3.7  4.0  4.0  4.6    Chloride 108  104  103  104    Calcium 9.5  9.8  9.4  9.6    Albumin  4.2      Total Bilirubin  0.3      Alkaline Phosphatase  139      AST (SGOT)  17      ALT (SGPT)  14      WBC 9.20  8.61  9.68  9.68    Hemoglobin 12.6  13.1  12.6  11.9    Hematocrit 37.9  42.8  42.1  40.6    Platelets 189  218  219  218           Assessment and Plan     Diagnoses and all orders for this visit:    1. Well controlled type 2 diabetes mellitus with peripheral neuropathy (Primary)  A.m. blood sugars at goal  Will update hemoglobin A1c today  Continue metformin 1000 mg daily, Ozempic 2 mg weekly, Januvia 100 daily  Hopefully if A1c is at goal can discontinue metformin     Has been having bilateral numbness/tingling in her feet which I suspect is due to her diabetes  Obtain CBC to evaluate for elevated MCV  Can trial gabapentin to see if this will be helpful    -     Hemoglobin A1c  -     Basic metabolic panel  -     CBC & Differential    2. Sick sinus syndrome  3. Pacemaker  Follows with cardiology    4. Pure hypercholesterolemia  Pending lipid panel and ASCVD score, will likely plan to start statin since patient is diabetic  -     Lipid  Panel    5. Morning joint stiffness of right hand  Has only been occurring x 1 week, most likely OA given lack of other symptoms however will obtain RF and CCP to evaluate for rheumatoid arthritis  Instructed patient to try OTC diclofenac gel, call the office if stiffness/swelling/pain getting worse regardless of lab results as she could have seronegative RA    -     Rheumatoid Factor  -     Cyclic Citrul Peptide Antibody, IgG / IgA; Future    6. Morning joint stiffness of left hand  -     Rheumatoid Factor  -     Cyclic Citrul Peptide Antibody, IgG / IgA; Future    Other orders  -     albuterol sulfate  (90 Base) MCG/ACT inhaler; Inhale 2 puffs Every 4 (Four) Hours As Needed for Wheezing.  Dispense: 18 g; Refill: 2          I spent 110 minutes caring for Tessie on this date of service. This time includes time spent by me in the following activities:preparing for the visit, reviewing tests, obtaining and/or reviewing a separately obtained history, performing a medically appropriate examination and/or evaluation , counseling and educating the patient/family/caregiver, ordering medications, tests, or procedures, and documenting information in the medical record  Follow Up     Return in about 3 months (around 11/12/2024) for Recheck.

## 2024-08-13 ENCOUNTER — PATIENT MESSAGE (OUTPATIENT)
Dept: FAMILY MEDICINE CLINIC | Facility: CLINIC | Age: 54
End: 2024-08-13
Payer: MEDICARE

## 2024-08-13 ENCOUNTER — TELEPHONE (OUTPATIENT)
Dept: FAMILY MEDICINE CLINIC | Facility: CLINIC | Age: 54
End: 2024-08-13
Payer: MEDICARE

## 2024-08-13 DIAGNOSIS — M05.742 RHEUMATOID ARTHRITIS INVOLVING BOTH HANDS WITH POSITIVE RHEUMATOID FACTOR: Primary | ICD-10-CM

## 2024-08-13 DIAGNOSIS — M05.741 RHEUMATOID ARTHRITIS INVOLVING BOTH HANDS WITH POSITIVE RHEUMATOID FACTOR: Primary | ICD-10-CM

## 2024-08-13 LAB — CCP IGA+IGG SERPL IA-ACNC: 8 UNITS (ref 0–19)

## 2024-08-13 RX ORDER — ATORVASTATIN CALCIUM 40 MG/1
40 TABLET, FILM COATED ORAL DAILY
Qty: 90 TABLET | Refills: 3 | Status: SHIPPED | OUTPATIENT
Start: 2024-08-13

## 2024-08-13 NOTE — TELEPHONE ENCOUNTER
From: Tessie Conner  To: Reilly Diaz  Sent: 8/13/2024 1:11 PM EDT  Subject: RA Meds    I just spoke to the rheumatologist and they stated they wouldn't be able to get me in until November. is there anyway you can start me on some kind of medication for the RA until I see them in November? Please let me know.    Thanks,    Tessie Conner  [phone]

## 2024-08-13 NOTE — TELEPHONE ENCOUNTER
Caller: Tessie Conner    Relationship: Self    Best call back number: [phone]     What is the best time to reach you: ANY    Who are you requesting to speak with (clinical staff, provider,  specific staff member): CLINICAL    What was the call regarding: PATIENT WOULD LIKE TO DISCUSS RHEUMATOID ARTHRITIS TESTS AND ONGOING PAIN.    Is it okay if the provider responds through MyChart: CALL

## 2024-08-19 ENCOUNTER — LAB (OUTPATIENT)
Dept: FAMILY MEDICINE CLINIC | Facility: CLINIC | Age: 54
End: 2024-08-19
Payer: MEDICARE

## 2024-08-19 ENCOUNTER — OFFICE VISIT (OUTPATIENT)
Dept: FAMILY MEDICINE CLINIC | Facility: CLINIC | Age: 54
End: 2024-08-19
Payer: MEDICARE

## 2024-08-19 VITALS
WEIGHT: 214.2 LBS | OXYGEN SATURATION: 96 % | RESPIRATION RATE: 16 BRPM | HEART RATE: 69 BPM | BODY MASS INDEX: 37.95 KG/M2 | HEIGHT: 63 IN | DIASTOLIC BLOOD PRESSURE: 72 MMHG | SYSTOLIC BLOOD PRESSURE: 118 MMHG

## 2024-08-19 DIAGNOSIS — Z11.59 NEED FOR HEPATITIS B SCREENING TEST: ICD-10-CM

## 2024-08-19 DIAGNOSIS — M25.642 MORNING JOINT STIFFNESS OF LEFT HAND: Primary | ICD-10-CM

## 2024-08-19 DIAGNOSIS — M25.641 MORNING JOINT STIFFNESS OF HAND, RIGHT: ICD-10-CM

## 2024-08-19 DIAGNOSIS — Z79.899 ENCOUNTER FOR LONG-TERM (CURRENT) USE OF HIGH-RISK MEDICATION: ICD-10-CM

## 2024-08-19 PROCEDURE — 3044F HG A1C LEVEL LT 7.0%: CPT | Performed by: STUDENT IN AN ORGANIZED HEALTH CARE EDUCATION/TRAINING PROGRAM

## 2024-08-19 PROCEDURE — 99214 OFFICE O/P EST MOD 30 MIN: CPT | Performed by: STUDENT IN AN ORGANIZED HEALTH CARE EDUCATION/TRAINING PROGRAM

## 2024-08-19 PROCEDURE — 86706 HEP B SURFACE ANTIBODY: CPT | Performed by: STUDENT IN AN ORGANIZED HEALTH CARE EDUCATION/TRAINING PROGRAM

## 2024-08-19 PROCEDURE — 86803 HEPATITIS C AB TEST: CPT | Performed by: STUDENT IN AN ORGANIZED HEALTH CARE EDUCATION/TRAINING PROGRAM

## 2024-08-19 PROCEDURE — 86038 ANTINUCLEAR ANTIBODIES: CPT | Performed by: STUDENT IN AN ORGANIZED HEALTH CARE EDUCATION/TRAINING PROGRAM

## 2024-08-19 PROCEDURE — 1125F AMNT PAIN NOTED PAIN PRSNT: CPT | Performed by: STUDENT IN AN ORGANIZED HEALTH CARE EDUCATION/TRAINING PROGRAM

## 2024-08-19 PROCEDURE — 87340 HEPATITIS B SURFACE AG IA: CPT | Performed by: STUDENT IN AN ORGANIZED HEALTH CARE EDUCATION/TRAINING PROGRAM

## 2024-08-19 PROCEDURE — 86480 TB TEST CELL IMMUN MEASURE: CPT | Performed by: STUDENT IN AN ORGANIZED HEALTH CARE EDUCATION/TRAINING PROGRAM

## 2024-08-19 PROCEDURE — 80076 HEPATIC FUNCTION PANEL: CPT | Performed by: STUDENT IN AN ORGANIZED HEALTH CARE EDUCATION/TRAINING PROGRAM

## 2024-08-19 PROCEDURE — 36415 COLL VENOUS BLD VENIPUNCTURE: CPT | Performed by: STUDENT IN AN ORGANIZED HEALTH CARE EDUCATION/TRAINING PROGRAM

## 2024-08-19 PROCEDURE — 85652 RBC SED RATE AUTOMATED: CPT | Performed by: STUDENT IN AN ORGANIZED HEALTH CARE EDUCATION/TRAINING PROGRAM

## 2024-08-19 PROCEDURE — 86140 C-REACTIVE PROTEIN: CPT | Performed by: STUDENT IN AN ORGANIZED HEALTH CARE EDUCATION/TRAINING PROGRAM

## 2024-08-19 RX ORDER — GABAPENTIN 600 MG/1
600 TABLET ORAL 2 TIMES DAILY
COMMUNITY

## 2024-08-19 RX ORDER — MELOXICAM 7.5 MG/1
7.5 TABLET ORAL DAILY
Qty: 30 TABLET | Refills: 1 | Status: SHIPPED | OUTPATIENT
Start: 2024-08-19

## 2024-08-19 RX ORDER — NAPROXEN SODIUM 220 MG
220 TABLET ORAL 2 TIMES DAILY PRN
COMMUNITY
End: 2024-08-19

## 2024-08-19 NOTE — PROGRESS NOTES
"Chief Complaint  Chief Complaint   Patient presents with    Rheumatoid Arthritis       Subjective        Tessie Conner is a 54 y.o. female who presents to [facility].  History of Present Illness    Tessie is a 54-year-old female here for bilateral hand stiffness.      Hand stiffness  Bilateral hand stiffness/swelling each morning  Has been going on for 1.5-2 weeks  Taking gabapentin, Aleve without improvement  Also has some pins and needle sensation in her feet  Has noticed dry mouth and eyes for the last year  Denies any rash        Objective   /72   Pulse 69   Resp 16   Ht 160 cm (63\")   Wt 97.2 kg (214 lb 3.2 oz)   SpO2 96%   BMI 37.94 kg/m²     Estimated body mass index is 37.94 kg/m² as calculated from the following:    Height as of this encounter: 160 cm (63\").    Weight as of this encounter: 97.2 kg (214 lb 3.2 oz).     Physical Exam   GEN: In no acute distress, non toxic appearing  HEENT: EOMI. Mucous membranes moist. Oropharynx without erythema or exudate.   CV: Regular rate and rhythm, no murmurs. No extremity edema.   RESP: Lungs clear to auscultation bilaterally.  No signs of respiratory distress on room air.  SKIN: No rashes  MSK: Hands are normal in appearance without erythema, deformity, or effusion.   NEURO: AAO to person, place, and time. CN 2-12 intact grossly.   PSYCH: Affect normal, insight fair          Result Review :         Assessment and Plan     Diagnoses and all orders for this visit:    1. Morning joint stiffness of bilateral hands  Unclear etiology  She did have labs performed in office last week which showed normal CCP antibodies but elevated rheumatoid factor.  Will evaluate further with CRP, ESR, and JASON (with rflx to panel) to help confirm presence of inflammatory arthropathy as well as evaluate for other causes of her symptoms and elevated rheumatoid factor.    If labs are consistent with rheumatoid arthritis it would be ideal to start " DMARD therapy as soon as possible.  She has an appointment with rheumatologist in October.  Unfortunately, given her underlying histoplasmosis and the risk of disseminated infection she would be at increased risk with any medication that would suppress her immune system.  With that in mind, it would be reasonable to start hydroxychloroquine to see if this would be helpful until she can get into rheumatology.    In the meantime patient can try topical Voltaren gel as well as meloxicam to see if it will be helpful for her symptoms.    -     Sedimentation Rate  -     C-reactive Protein  -     JASON Direct Reflex to 11 Biomarker      2. Encounter for long-term (current) use of high-risk medication  -     QuantiFERON-TB Gold Plus ([facility] ONLY)  -     Hepatitis B Surface Antibody  -     Hepatitis B surface antigen  -     Hepatitis C Antibody  -     Hepatic Function Panel    3. Need for hepatitis B screening test  -     Hepatitis B Surface Antibody  -     Hepatitis B surface antigen    Other orders  -     meloxicam (Mobic) 7.5 MG tablet; Take 1 tablet by mouth Daily.  Dispense: 30 tablet; Refill: 1  -     Diclofenac Sodium (VOLTAREN) 1 % gel gel; Apply 4 g topically to the appropriate area as directed 4 (Four) Times a Day As Needed (for pain or swelling).  Dispense: 50 g; Refill: 1          I spent 32 minutes caring for Tessie on this date of service. This time includes time spent by me in the following activities:preparing for the visit, reviewing tests, performing a medically appropriate examination and/or evaluation , counseling and educating the patient/family/caregiver, ordering medications, tests, or procedures, and documenting information in the medical record.    Follow Up

## 2024-08-20 LAB
ALBUMIN SERPL-MCNC: 4.4 G/DL (ref 3.5–5.2)
ALP SERPL-CCNC: 114 U/L (ref 39–117)
ALT SERPL W P-5'-P-CCNC: 19 U/L (ref 1–33)
AST SERPL-CCNC: 23 U/L (ref 1–32)
BILIRUB CONJ SERPL-MCNC: <0.2 MG/DL (ref 0–0.3)
BILIRUB INDIRECT SERPL-MCNC: NORMAL MG/DL
BILIRUB SERPL-MCNC: 0.4 MG/DL (ref 0–1.2)
CRP SERPL-MCNC: 0.39 MG/DL (ref 0–0.5)
ERYTHROCYTE [SEDIMENTATION RATE] IN BLOOD: 9 MM/HR (ref 0–30)
HBV SURFACE AB SER RIA-ACNC: NORMAL
HBV SURFACE AG SERPL QL IA: NORMAL
HCV AB SER QL: NORMAL
PROT SERPL-MCNC: 7.3 G/DL (ref 6–8.5)

## 2024-08-21 LAB — ANA SER QL: NEGATIVE

## 2024-08-22 LAB
GAMMA INTERFERON BACKGROUND BLD IA-ACNC: 0.04 IU/ML
M TB IFN-G BLD-IMP: NEGATIVE
M TB IFN-G CD4+ BCKGRND COR BLD-ACNC: 0.04 IU/ML
M TB IFN-G CD4+CD8+ BCKGRND COR BLD-ACNC: 0.05 IU/ML
MITOGEN IGNF BCKGRD COR BLD-ACNC: >10 IU/ML
QUANTIFERON INCUBATION: NORMAL
SERVICE CMNT-IMP: NORMAL

## 2024-08-22 NOTE — TELEPHONE ENCOUNTER
Caller: Tessie Conner    Relationship to patient: Self    Best call back number: [phone]    Patient is needing: PATIENT STATED THAT THEOPHYLLINE THAT SHE STARTED IN THE HOSPITAL IS CAUSING HER HEART TO RACE AND SHE FEELS SHAKY. SHE IS REQUESTING TO STOP TAKING MEDICATION.            Sent pt a My Chart message with the availability of the provider's that are taking New Pt's at this time,and that she can make her own appt right  on the portal.

## 2024-08-23 ENCOUNTER — PATIENT MESSAGE (OUTPATIENT)
Dept: FAMILY MEDICINE CLINIC | Facility: CLINIC | Age: 54
End: 2024-08-23
Payer: MEDICARE

## 2024-08-23 RX ORDER — HYDROXYCHLOROQUINE SULFATE 200 MG/1
200 TABLET, FILM COATED ORAL DAILY
Qty: 30 TABLET | Refills: 1 | Status: SHIPPED | OUTPATIENT
Start: 2024-08-23

## 2024-09-03 ENCOUNTER — OFFICE VISIT (OUTPATIENT)
Dept: FAMILY MEDICINE CLINIC | Facility: CLINIC | Age: 54
End: 2024-09-03
Payer: MEDICARE

## 2024-09-03 VITALS
BODY MASS INDEX: 37.39 KG/M2 | WEIGHT: 211 LBS | DIASTOLIC BLOOD PRESSURE: 80 MMHG | RESPIRATION RATE: 20 BRPM | OXYGEN SATURATION: 96 % | HEART RATE: 74 BPM | SYSTOLIC BLOOD PRESSURE: 124 MMHG | HEIGHT: 63 IN

## 2024-09-03 DIAGNOSIS — L72.3 SEBACEOUS CYST: Primary | ICD-10-CM

## 2024-09-03 DIAGNOSIS — E11.42 WELL CONTROLLED TYPE 2 DIABETES MELLITUS WITH PERIPHERAL NEUROPATHY: ICD-10-CM

## 2024-09-03 PROCEDURE — 1159F MED LIST DOCD IN RCRD: CPT | Performed by: PHYSICIAN ASSISTANT

## 2024-09-03 PROCEDURE — 99213 OFFICE O/P EST LOW 20 MIN: CPT | Performed by: PHYSICIAN ASSISTANT

## 2024-09-03 PROCEDURE — 3044F HG A1C LEVEL LT 7.0%: CPT | Performed by: PHYSICIAN ASSISTANT

## 2024-09-03 PROCEDURE — 1125F AMNT PAIN NOTED PAIN PRSNT: CPT | Performed by: PHYSICIAN ASSISTANT

## 2024-09-03 PROCEDURE — 1160F RVW MEDS BY RX/DR IN RCRD: CPT | Performed by: PHYSICIAN ASSISTANT

## 2024-09-03 NOTE — PROGRESS NOTES
"Chief Complaint  Chief Complaint   Patient presents with    Cyst     In rt leg       Subjective        Tessie Conner is a 54 y.o. female who presents to [facility].  History of Present Illness  Patient presents today for concerns for right leg cyst.  She has had a cyst on her right outer thigh her entire life.  She reports for the past 3 weeks it has been causing her pain in which she cannot sleep on her right side and it is painful if the area has pressure on it.  She states the cyst has not grown in size over the last year.  She believes the area is slightly warm to touch.  She denies fever, numbness, tingling of her right leg.  In November 2023 she proceeded to the ER where they performed a CT of the thigh which showed a cystic multilobular mass, she underwent IUD with ultrasound guidance in which they were able to relieve pressure onto the nerve with expression.  They stated the sebaceous cyst was not infected.    She has also been on 0.5mg Ozempic for type 2 diabetes for about 2 months and has noticed that her weight loss has plateaued.  She would like to increase her dose.  She states she is tolerating this dose well.  She denies nausea, vomiting, or abdominal pain.    Objective   /80   Pulse 74   Resp 20   Ht 160 cm (62.99\")   Wt 95.7 kg (211 lb)   SpO2 96%   BMI 37.39 kg/m²     Estimated body mass index is 37.39 kg/m² as calculated from the following:    Height as of this encounter: 160 cm (62.99\").    Weight as of this encounter: 95.7 kg (211 lb).     Physical Exam   GEN: In no acute distress, non toxic appearing  CV: Regular rate and rhythm, no murmurs, 2+ peripheral pulses, No extremity edema.   RESP: Lungs clear to auscultation anteriorly and posteriorly in all lung fields bilaterally.  SKIN: about 1.5cm x 1cm cyst that is hard, immobile, and tender to palpation on lateral aspect of R thigh  PSYCH: Affect normal, insight fair      Result Review :            "   Assessment and Plan     Diagnoses and all orders for this visit:    1. Sebaceous cyst (Primary)  -     Ambulatory Referral to General Surgery  We discussed that the sebaceous cyst is most likely not infected as she is afebrile and it is not erythematous, edematous, warm to touch, or fluctuant.  We discussed that if she were to develop the symptoms, she can call us and we can send in Keflex.    2. Well controlled type 2 diabetes mellitus with peripheral neuropathy  As for her Ozempic, this was discussed with Dr. Diaz, who would prefer to keep her at the 0.5 Mg dose.  At their next appointment in November, her A1c will be evaluated, and he will make medication adjustments as needed.  We discussed for her to adhere to healthy lifestyle management, including increasing her vegetable and protein intake, less processed foods, drinking more water, and incorporating daily exercise into her regimen.          Follow Up     Return if symptoms worsen or fail to improve.

## 2024-09-09 RX ORDER — HYDROXYCHLOROQUINE SULFATE 200 MG/1
200 TABLET, FILM COATED ORAL DAILY
Qty: 30 TABLET | Refills: 1 | Status: SHIPPED | OUTPATIENT
Start: 2024-09-09 | End: 2024-09-13

## 2024-09-09 RX ORDER — MELOXICAM 7.5 MG/1
15 TABLET ORAL DAILY
Qty: 60 TABLET | Refills: 1 | Status: SHIPPED | OUTPATIENT
Start: 2024-09-09

## 2024-09-10 ENCOUNTER — TELEPHONE (OUTPATIENT)
Dept: PODIATRY | Facility: CLINIC | Age: 54
End: 2024-09-10
Payer: MEDICARE

## 2024-09-10 NOTE — TELEPHONE ENCOUNTER
Caller: Tessie Conner    Relationship to patient: Self    Best call back number: [phone]    Chief complaint: RIGHT FOOT SPURS    Type of visit: FOLLOW UP    Requested date: FIRST OF OCTOBER     Additional notes:PT IS CALLING TO SEE IF SHE CAN GET IN ASAP, SHE IS IN [place] AND WILL BE BACK AROUND THE BEGINNING OF OCTOBER.. PT IS IN A LOT OF PAIN AND STATED SHE CANNOT WAIT UNTIL OCTOBER 21ST (FIRST AVAILABLE) TO BE SEEN... PLEASE ADVISE..

## 2024-09-13 RX ORDER — HYDROXYCHLOROQUINE SULFATE 200 MG/1
400 TABLET, FILM COATED ORAL DAILY
Qty: 60 TABLET | Refills: 2 | Status: SHIPPED | OUTPATIENT
Start: 2024-09-13

## 2024-09-13 RX ORDER — HYDROXYCHLOROQUINE SULFATE 200 MG/1
400 TABLET, FILM COATED ORAL DAILY
Qty: 60 TABLET | Refills: 2 | Status: SHIPPED | OUTPATIENT
Start: 2024-09-13 | End: 2024-09-13 | Stop reason: SDUPTHER

## 2024-09-28 ENCOUNTER — HOSPITAL ENCOUNTER (OUTPATIENT)
Dept: CT IMAGING | Facility: HOSPITAL | Age: 54
Discharge: HOME | End: 2024-09-28
Payer: MEDICARE

## 2024-09-28 DIAGNOSIS — R91.8 LUNG NODULES: ICD-10-CM

## 2024-09-28 PROCEDURE — 71250 CT THORAX DX C-: CPT

## 2024-09-30 ENCOUNTER — OFFICE VISIT (OUTPATIENT)
Dept: PODIATRY | Facility: CLINIC | Age: 54
End: 2024-09-30
Payer: MEDICARE

## 2024-09-30 VITALS
BODY MASS INDEX: 37.39 KG/M2 | WEIGHT: 211 LBS | RESPIRATION RATE: 16 BRPM | HEIGHT: 63 IN | HEART RATE: 73 BPM | OXYGEN SATURATION: 98 %

## 2024-09-30 DIAGNOSIS — M21.861 ACQUIRED POSTERIOR EQUINUS OF RIGHT LOWER EXTREMITY: ICD-10-CM

## 2024-09-30 DIAGNOSIS — M79.671 RIGHT FOOT PAIN: Primary | ICD-10-CM

## 2024-09-30 DIAGNOSIS — M76.61 ACHILLES TENDINITIS, RIGHT LEG: ICD-10-CM

## 2024-09-30 DIAGNOSIS — S82.851S: ICD-10-CM

## 2024-09-30 PROCEDURE — 1160F RVW MEDS BY RX/DR IN RCRD: CPT | Performed by: PODIATRIST

## 2024-09-30 PROCEDURE — 99213 OFFICE O/P EST LOW 20 MIN: CPT | Performed by: PODIATRIST

## 2024-09-30 PROCEDURE — 1159F MED LIST DOCD IN RCRD: CPT | Performed by: PODIATRIST

## 2024-10-01 NOTE — PROGRESS NOTES
09/30/2024  Foot and Ankle Surgery - Established Patient/Follow-up  Provider: Dr. Felipe Beck DPM  Location: [facility]    Subjective:  Tessie Conner is a 54 y.o. female.     Chief Complaint   Patient presents with    Right Foot - Follow-up, Pain     Spur, heel pain    Follow-up     JOHN Estrada do 8/19/2024       History of Present Illness  The patient presents for evaluation of heel pain.    She reports experiencing heel pain, which she attributes to an ankle fracture. The pain is severe enough to hinder her ability to walk at times. She notes that the pain intensifies when she descends a hill. During a recent trip to [place], she was hospitalized due to a flare-up of the pain and was provided with crutches for mobility. Imaging tests revealed the presence of two bone spurs. She recalls the initial injury being so severe that it necessitated the use of a wheelchair, which led to post-traumatic stress.    She has lost approximately 40 pounds and has been engaging in low-impact exercises such as stationary biking and stretching. Despite her efforts, she finds physical therapy challenging due to the pain. Currently, she is focused on weight loss and fitness. Her pain levels have decreased compared to previous episodes. She avoids walking on sand as advised and spends most of her time indoors.      Allergies   Allergen Reactions    Tylenol [Acetaminophen] Hives and Itching    Pregabalin Rash       Current Outpatient Medications on File Prior to Visit   Medication Sig Dispense Refill    albuterol sulfate  (90 Base) MCG/ACT inhaler Inhale 2 puffs Every 4 (Four) Hours As Needed for Wheezing. 18 g 2    atorvastatin (Lipitor) 40 MG tablet Take 1 tablet by mouth Daily. 90 tablet 3    Budeson-Glycopyrrol-Formoterol (Breztri Aerosphere) 160-9-4.8 MCG/ACT aerosol inhaler Inhale 2 puffs 2 (Two) Times a Day.      Cholecalciferol 25 MCG (1000 UT) tablet Take 1 tablet by mouth Daily.      Diclofenac Sodium  "(VOLTAREN) 1 % gel gel Apply 4 g topically to the appropriate area as directed 4 (Four) Times a Day As Needed (for pain or swelling). 50 g 1    FeroSul 325 (65 Fe) MG tablet Take 1 tablet by mouth Daily With Breakfast.      gabapentin (NEURONTIN) 600 MG tablet Take 1 tablet by mouth 2 (Two) Times a Day.      Gemtesa 75 MG tablet Take 1 tablet by mouth Daily.      hydroxychloroquine (Plaquenil) 200 MG tablet Take 2 tablets by mouth Daily. 60 tablet 2    ipratropium-albuterol (DUO-NEB) 0.5-2.5 mg/3 ml nebulizer Take 3 mL by nebulization 4 (Four) Times a Day As Needed for Wheezing.      omeprazole (priLOSEC) 20 MG capsule Take 1 capsule by mouth Every Night.      Ozempic, 0.25 or 0.5 MG/DOSE, 2 MG/3ML solution pen-injector INJECT 0.5 MILLIGRAMS SUBCUTANEOUSLY ONCE PER WEEK FOR 28 DAYS      sertraline (ZOLOFT) 50 MG tablet Take 1 tablet by mouth Every Night.      SITagliptin (JANUVIA) 100 MG tablet Take 1 tablet by mouth Daily.      Vraylar 1.5 MG capsule capsule Take 1 capsule by mouth every night at bedtime.      meloxicam (Mobic) 7.5 MG tablet Take 2 tablets by mouth Daily. (Patient not taking: Reported on 9/30/2024) 60 tablet 1     No current facility-administered medications on file prior to visit.       Objective   Pulse 73   Resp 16   Ht 160 cm (62.99\")   Wt 95.7 kg (211 lb)   LMP  (LMP Unknown)   SpO2 98%   BMI 37.39 kg/m²     Foot/Ankle Exam  Physical Exam  GENERAL  Orientation:  AAOx3  Affect:  appropriate     VASCULAR      Right Foot Vascularity   Normal vascular exam    Dorsalis pedis:  2+  Posterior tibial:  2+  Skin temperature:  warm  Edema grading:  None  CFT:  < 3 seconds  Pedal hair growth:  Present  Varicosities:  none      Left Foot Vascularity   Normal vascular exam    Dorsalis pedis:  2+  Posterior tibial:  2+  Skin temperature:  warm  Edema grading:  None  CFT:  < 3 seconds  Pedal hair growth:  Present  Varicosities:  none     NEUROLOGIC      Right Foot Neurologic   Light touch sensation: " normal  Hot/Cold sensation: normal  Achilles reflex:  2+      Left Foot Neurologic   Light touch sensation: normal  Hot/Cold sensation:  normal  Achilles reflex:  2+     MUSCULOSKELETAL      Right Foot Musculoskeletal   Arch:  Normal      Left Foot Musculoskeletal   Arch:  Normal     MUSCLE STRENGTH      Right Foot Muscle Strength   Normal strength    Foot dorsiflexion:  5  Foot plantar flexion:  5  Foot inversion:  5  Foot eversion:  5      Left Foot Muscle Strength   Normal strength    Foot dorsiflexion:  5  Foot plantar flexion:  5  Foot inversion:  5  Foot eversion:  5     DERMATOLOGIC       Right Foot Dermatologic   Skin  Right foot skin is intact.   Nails comment:  Nails 1-5      Left Foot Dermatologic   Skin  Left foot skin is intact.   Nails comment:  Nails 1-5     TESTS      Right Foot Tests   Anterior drawer: negative  Varus tilt: negative      Left Foot Tests   Anterior drawer: negative  Varus tilt: negative     Right foot additional comments: Discomfort, swelling, and ecchymosis involving the right ankle. No gross deformity by visual inspection. No proximal fibular pain. Range of motion, muscle strength and instability testing deferred secondary to guarding.     01/02/2024  Incision sites are dry and stable with intact staples. No evidence of dehiscence or infection. Moderate resolving ecchymosis to the lower extremity. Rectus alignment of the ankle with limitation with range of motion, muscle strength secondary to guarding.     01/16/2024: Swelling has decreased. Range of motion has improved but remains somewhat limited. No progressive deformity or further issues.     02/13/2024  Continued swelling involving the perimalleolar region. Range of motion has improved. Ecchymosis has continued to dissipate.     5/2/2024: Discomfort with palpation involving the plantar medial calcaneal tuberosity region.  No gross deformity or instability.  No significant swelling involving the ankle.  Prominent improvement in  range of motion.  No ankle pain.  Moderate equinus contracture with knee extended and flexed.     7/11/24: Mild to moderate swelling involving the perimalleolar region.  No gross deformity or instability.  No other complicating features.  Less discomfort involving the plantar aspect of the right heel     9/30/24: Patient complains of pain involving the posterior aspect of the heel.  No obvious defect involving the Achilles tendon.  No significant hypertrophy.  Less discomfort with palpation involving the inferior aspect of the heel.  Moderate equinus contracture.  Mild discomfort involving the first metatarsal phalangeal joint region.  Continued soft tissue rigidity.  Ankle range of motion has continued to improve      Results  Imaging  Two bone spurs identified.    Assessment & Plan   Diagnoses and all orders for this visit:    1. Right foot pain (Primary)    2. Achilles tendinitis, right leg    3. Acquired posterior equinus of right lower extremity    4. Closed displaced trimalleolar fracture of lower leg, right, sequela      Assessment & Plan    The patient's symptoms are indicative of Achilles tendinitis. She was advised to continue her current exercise regimen but to reduce the intensity or resistance. Stretching exercises were recommended to be performed 3 to 5 times daily using a towel, band, or belt to pull the foot up without firing the muscles. She was also advised to use a foot rocker for stretching. If symptoms persist or worsen, an MRI may be considered. She was instructed to use her boot if the pain becomes severe. She was advised to continue low-impact exercises such as biking but to adjust the interval, time, or distance if needed. Other low-impact exercise options like rowing, swimming, and low-weight, high-repetition lifting exercises were suggested. She was advised to avoid high-impact activities and to rest when necessary.Reviewed proper basic stretching and manual therapy exercises along with  appropriate shoes and activity.  Discussed proper use and/or avoidance of OTC anti-inflammatories.  Patient is to call with any additional issues or concerns.  Greater than 20 minutes was spent before, during, and after evaluation for patient care.    Follow-up  Patient is scheduled for a follow-up visit in 2 months and imaging of the right foot.             Patient or patient representative verbalized consent for the use of Ambient Listening during the visit with  TORIE Beck DPM for chart documentation. 10/1/2024  06:30 EDT    TORIE Beck DPM

## 2024-10-07 PROCEDURE — 93294 REM INTERROG EVL PM/LDLS PM: CPT | Performed by: INTERNAL MEDICINE

## 2024-10-07 PROCEDURE — 93296 REM INTERROG EVL PM/IDS: CPT | Performed by: INTERNAL MEDICINE

## 2024-10-08 ENCOUNTER — OFFICE VISIT (OUTPATIENT)
Dept: CARDIOLOGY | Facility: CLINIC | Age: 54
End: 2024-10-08
Payer: MEDICARE

## 2024-10-08 ENCOUNTER — CLINICAL SUPPORT NO REQUIREMENTS (OUTPATIENT)
Dept: CARDIOLOGY | Facility: CLINIC | Age: 54
End: 2024-10-08
Payer: MEDICARE

## 2024-10-08 VITALS
BODY MASS INDEX: 38.28 KG/M2 | DIASTOLIC BLOOD PRESSURE: 53 MMHG | OXYGEN SATURATION: 99 % | HEART RATE: 74 BPM | HEIGHT: 62 IN | SYSTOLIC BLOOD PRESSURE: 106 MMHG | WEIGHT: 208 LBS

## 2024-10-08 DIAGNOSIS — Z95.0 PACEMAKER: Primary | ICD-10-CM

## 2024-10-08 DIAGNOSIS — I49.5 SICK SINUS SYNDROME: ICD-10-CM

## 2024-10-08 DIAGNOSIS — I49.5 SICK SINUS SYNDROME: Primary | ICD-10-CM

## 2024-10-08 DIAGNOSIS — I95.1 AUTONOMIC ORTHOSTATIC HYPOTENSION: ICD-10-CM

## 2024-10-08 PROCEDURE — 99213 OFFICE O/P EST LOW 20 MIN: CPT | Performed by: INTERNAL MEDICINE

## 2024-10-08 PROCEDURE — 1160F RVW MEDS BY RX/DR IN RCRD: CPT | Performed by: INTERNAL MEDICINE

## 2024-10-08 PROCEDURE — 1159F MED LIST DOCD IN RCRD: CPT | Performed by: INTERNAL MEDICINE

## 2024-10-08 RX ORDER — MIDODRINE HYDROCHLORIDE 5 MG/1
5 TABLET ORAL
Qty: 270 TABLET | Refills: 3 | Status: SHIPPED | OUTPATIENT
Start: 2024-10-08 | End: 2024-10-14

## 2024-10-08 RX ORDER — FERROUS SULFATE 325(65) MG
325 TABLET ORAL
Qty: 90 TABLET | Refills: 1 | Status: SHIPPED | OUTPATIENT
Start: 2024-10-08

## 2024-10-08 NOTE — TELEPHONE ENCOUNTER
Caller: Dalila Tessie GREG    Relationship: Self    Best call back number: [phone]     Requested Prescriptions:   Requested Prescriptions     Pending Prescriptions Disp Refills    FeroSul 325 (65 Fe) MG tablet       Sig: Take 1 tablet by mouth Daily With Breakfast.        Pharmacy where request should be sent: [facility] [address] [place] [address] [address] - [phone]  - [phone]     Last office visit with prescribing clinician: 8/19/2024   Last telemedicine visit with prescribing clinician: Visit date not found   Next office visit with prescribing clinician: 11/12/2024     Additional details provided by patient: PATIENT HAS A WEEKS WORTH OF MEDICINE LEFT    Does the patient have less than a 3 day supply:  [] Yes  [x] No        Oni Velarde Rep   10/08/24 10:54 EDT

## 2024-10-08 NOTE — PROGRESS NOTES
Progress note      Name: Tessie Conner ADMIT: (Not on file)   : 1970  PCP: Reilly Diaz DO    MRN: [id number] LOS: 0 days   AGE/SEX: 54 y.o. female  ROOM: Room/bed info not found     Chief Complaint   Patient presents with    Follow-up     Device check and follow up       Subjective       History of present illness  Tessie Conner is a 54-year-old female patient who has autonomic dysfunction, no history of CAD, has sick sinus syndrome with chronic bradycardia heart rate in the 40s resulting in dizziness. Patient received a dual-chamber pacemaker on 2023 and she is here today for follow-up.   She is doing better since the pacemaker implant, however she still experiencing dizzy spells but her blood pressure is low.  Previously she was on midodrine but that was discontinued.    Past Medical History:   Diagnosis Date    Abnormal ECG     Anemia     Anesthesia complication     cardiac arrest  with hysterectomy    Anxiety     Arthritis     Asthma 2020    allergy    Bipolar 1 disorder     Bradycardia     Chest pain     due to scarring from lung surgery      Chest pain 2020    Cholelithiasis 10/2022    COPD (chronic obstructive pulmonary disease)     Coronary artery disease     very minimal    Depression 2005    Diabetes mellitus 2002    borderline   resolved    Dyspnea on minimal exertion     Frequent UTI     Gastroesophageal reflux disease without esophagitis 07/15/2020    Heart murmur     FROM CHILDHOOD    Histoplasmosis     History of anemia     POST PREGNANCY    Hyperlipidemia     Hyperlipidemia 10/04/2016    Hypertension     Hypotension     Mass of upper lobe of right lung     Migraines     hx    Obesity     PONV (postoperative nausea and vomiting)     Sleep apnea 22    no machine    Spinal headache     Vertigo     Visual impairment     WEARS GLASSES    Vitamin D deficiency      Past Surgical History:   Procedure Laterality Date    ANKLE OPEN REDUCTION  INTERNAL FIXATION Right 2023    Procedure: ANKLE OPEN REDUCTION INTERNAL FIXATION;  Surgeon: TORIE Beck DPM;  Location: [facility] MAIN OR;  Service: Podiatry;  Laterality: Right;    APPENDECTOMY      CARDIAC CATHETERIZATION N/A 2020    Procedure: Left Heart Cath possible PCI, atherectomy, hemodynamic support;  Surgeon: Thomas Zamora MD;  Location: [facility] CATH INVASIVE LOCATION;  Service: Cardiology;  Laterality: N/A;    CARDIAC CATHETERIZATION  2020    CARDIAC ELECTROPHYSIOLOGY PROCEDURE N/A 2023    Procedure: Pacemaker DC new, [place] aware;  Surgeon: Rachel Espinoza MD;  Location: [facility] CATH INVASIVE LOCATION;  Service: Cardiovascular;  Laterality: N/A;     SECTION      FOOT/TOE TENDON REPAIR Left     HYSTERECTOMY      INSERT / REPLACE / REMOVE PACEMAKER  2023    LUNG BIOPSY Right 2020    LUNG LOBECTOMY Right 2022    pt had partial Right lobectomy and nodule removal    MASS EXCISION Right 2023    Procedure: LIPOMA EXCISION,THIGH;  Surgeon: Justo Shannon MD;  Location: [facility] MAIN OR;  Service: General;  Laterality: Right;    ROTATOR CUFF REPAIR Left     THORACOSCOPY VIDEO ASSISTED WITH LOBECTOMY Right 2022    Procedure: BRONCHOSCOPY, THORACOSCOPY VIDEO ASSISTED wedge resection X2, INTERCOSTAL NERVE BLOCK;  Surgeon: Ayla Carroll MD;  [facility] MAIN OR;  Service: Thoracic;  Laterality: Right;    TUBAL ABDOMINAL LIGATION       Family History   Problem Relation Age of Onset    Arthritis Mother     Cancer Mother     Depression Mother     Diabetes Mother     Early death Mother     Mental illness Mother     Anxiety disorder Mother     Alcohol abuse Father     Diabetes Father     Early death Father     Heart disease Father     Hyperlipidemia Father     Hypertension Father         Father    Vision loss Father     Heart attack Father     Heart disease Sister     Drug abuse Sister     Heart attack Sister      Hypertension Sister         Sister    Heart disease Sister     Heart disease Brother     Hypertension Brother     Heart attack Brother     Drug abuse Brother     Hypertension Brother     Heart disease Brother     Heart attack Brother     Cancer Maternal Grandmother     Malig Hyperthermia Neg Hx      Social History     Tobacco Use    Smoking status: Never     Passive exposure: Never    Smokeless tobacco: Never   Vaping Use    Vaping status: Never Used   Substance Use Topics    Alcohol use: Never     Comment: VERY RARE    Drug use: Never       Current Outpatient Medications:     albuterol sulfate  (90 Base) MCG/ACT inhaler, Inhale 2 puffs Every 4 (Four) Hours As Needed for Wheezing., Disp: 18 g, Rfl: 2    atorvastatin (Lipitor) 40 MG tablet, Take 1 tablet by mouth Daily., Disp: 90 tablet, Rfl: 3    Budeson-Glycopyrrol-Formoterol (Breztri Aerosphere) 160-9-4.8 MCG/ACT aerosol inhaler, Inhale 2 puffs 2 (Two) Times a Day., Disp: , Rfl:     Cholecalciferol 25 MCG (1000 UT) tablet, Take 1 tablet by mouth Daily., Disp: , Rfl:     Diclofenac Sodium (VOLTAREN) 1 % gel gel, Apply 4 g topically to the appropriate area as directed 4 (Four) Times a Day As Needed (for pain or swelling)., Disp: 50 g, Rfl: 1    FeroSul 325 (65 Fe) MG tablet, Take 1 tablet by mouth Daily With Breakfast., Disp: , Rfl:     gabapentin (NEURONTIN) 600 MG tablet, Take 1 tablet by mouth 2 (Two) Times a Day., Disp: , Rfl:     Gemtesa 75 MG tablet, Take 1 tablet by mouth Daily., Disp: , Rfl:     hydroxychloroquine (Plaquenil) 200 MG tablet, Take 2 tablets by mouth Daily., Disp: 60 tablet, Rfl: 2    ipratropium-albuterol (DUO-NEB) 0.5-2.5 mg/3 ml nebulizer, Take 3 mL by nebulization 4 (Four) Times a Day As Needed for Wheezing., Disp: , Rfl:     meloxicam (Mobic) 7.5 MG tablet, Take 2 tablets by mouth Daily., Disp: 60 tablet, Rfl: 1    omeprazole (priLOSEC) 20 MG capsule, Take 1 capsule by mouth Every Night., Disp: , Rfl:     Ozempic, 0.25 or 0.5  MG/DOSE, 2 MG/3ML solution pen-injector, INJECT 0.5 MILLIGRAMS SUBCUTANEOUSLY ONCE PER WEEK FOR 28 DAYS, Disp: , Rfl:     sertraline (ZOLOFT) 50 MG tablet, Take 1 tablet by mouth Every Night., Disp: , Rfl:     SITagliptin (JANUVIA) 100 MG tablet, Take 1 tablet by mouth Daily., Disp: , Rfl:     Vraylar 1.5 MG capsule capsule, Take 1 capsule by mouth every night at bedtime., Disp: , Rfl:     midodrine (PROAMATINE) 5 MG tablet, Take 1 tablet by mouth 3 (Three) Times a Day Before Meals., Disp: 270 tablet, Rfl: 3  Allergies:  Tylenol [acetaminophen] and Pregabalin      Physical Exam  VITALS REVIEWED    General:      well developed, in no acute distress.    Head:      normocephalic and atraumatic.    Eyes:      PERRL/EOM intact, conjunctiva and sclera clear with out nystagmus.    Neck:      no masses, thyromegaly,  trachea central with normal respiratory effort and PMI displaced laterally  Lungs:      Clear to auscultation bilaterally  Heart:       Regular rate and rhythm  Msk:      no deformity or scoliosis noted of thoracic or lumbar spine.    Pulses:      pulses normal in all 4 extremities.    Extremities:       No lower extremity edema  Neurologic:      no focal deficits.   alert oriented x3  Skin:      intact without lesions or rashes.    Psych:      alert and cooperative; normal mood and affect; normal attention span and concentration.      Result Review :               Pertinent cardiac workup    EKG 4/18/2023 sinus bradycardia 51 bpm.  Echo 4/26/2023 ejection fraction 60 to 65%  Heart cath 9/24/2020, no significant CAD.            Procedures        Assessment and Plan      Tessie Conner is a 54-year-old female patient who has no CAD, has autonomic dysfunction, chronic sinus bradycardia, for which she received a dual-chamber pacemaker on 6/30/2023.   Patient felt well after pacemaker but she still having dizzy spells especially when she is standing up.  Her blood pressure is low and orthostatic vitals also  showed drop in the blood pressure and increased heart rate.  I think it is best to restart midodrine.  As mentioned earlier previously she was on it but it was discontinued.  Otherwise her pacemaker is functioning appropriately no arrhythmias.  Follow-up in 6 months.    Diagnoses and all orders for this visit:    1. Sick sinus syndrome (Primary)  Overview:  Added automatically from request for surgery [id number]      2. Autonomic orthostatic hypotension    Other orders  -     midodrine (PROAMATINE) 5 MG tablet; Take 1 tablet by mouth 3 (Three) Times a Day Before Meals.  Dispense: 270 tablet; Refill: 3           Return in about 6 months (around 4/8/2025), or with device check.  Patient was given instructions and counseling regarding her condition or for health maintenance advice. Please see specific information pulled into the AVS if appropriate.       Electronically signed by Rachel Espinoza MD, 10/08/24, 9:55 AM EDT.

## 2024-10-14 ENCOUNTER — OFFICE VISIT (OUTPATIENT)
Dept: FAMILY MEDICINE CLINIC | Facility: CLINIC | Age: 54
End: 2024-10-14
Payer: MEDICARE

## 2024-10-14 VITALS
DIASTOLIC BLOOD PRESSURE: 63 MMHG | WEIGHT: 208.7 LBS | BODY MASS INDEX: 38.4 KG/M2 | RESPIRATION RATE: 18 BRPM | SYSTOLIC BLOOD PRESSURE: 102 MMHG | HEART RATE: 65 BPM | OXYGEN SATURATION: 99 % | HEIGHT: 62 IN

## 2024-10-14 DIAGNOSIS — I95.1 AUTONOMIC ORTHOSTATIC HYPOTENSION: Primary | ICD-10-CM

## 2024-10-14 PROCEDURE — 1125F AMNT PAIN NOTED PAIN PRSNT: CPT | Performed by: STUDENT IN AN ORGANIZED HEALTH CARE EDUCATION/TRAINING PROGRAM

## 2024-10-14 PROCEDURE — 99214 OFFICE O/P EST MOD 30 MIN: CPT | Performed by: STUDENT IN AN ORGANIZED HEALTH CARE EDUCATION/TRAINING PROGRAM

## 2024-10-14 PROCEDURE — 3044F HG A1C LEVEL LT 7.0%: CPT | Performed by: STUDENT IN AN ORGANIZED HEALTH CARE EDUCATION/TRAINING PROGRAM

## 2024-10-14 RX ORDER — MIDODRINE HYDROCHLORIDE 5 MG/1
10 TABLET ORAL
Start: 2024-10-14

## 2024-10-14 NOTE — PROGRESS NOTES
"Chief Complaint  Chief Complaint   Patient presents with    Dizziness     Low blood pressure    Shortness of Breath     Subjective        Tessie Conner is a 54 y.o. female who presents to [facility].    History of Present Illness  Here for acute visit.  BP this am at home was 70 / 40.    She is drinking lots of fluids.  Started on midodrine last week by Dr. Espinoza.  She does not feel it has been helpful.  She has been on ozempic for the last 4-5 months and states she has lost about 60 pounds.    She does not use any salt in her cooking.    She is having lightheadedness and dizziness.  Hydroxychloroquine was added about 2 months ago for RA.  She has appt w/ rheum tomorrow.      Objective   /63   Pulse 65   Resp 18   Ht 157.5 cm (62\")   Wt 94.7 kg (208 lb 11.2 oz)   SpO2 99%   BMI 38.17 kg/m²     Estimated body mass index is 38.17 kg/m² as calculated from the following:    Height as of this encounter: 157.5 cm (62\").    Weight as of this encounter: 94.7 kg (208 lb 11.2 oz).     Physical Exam   GEN: In no acute distress, non toxic appearing  HEENT: Pupils equal and reactive to light, sclera clear. Mucous membranes moist.   CV: Regular rate and rhythm, no murmurs, 1+ peripheral pulses, No extremity edema.   RESP: Lungs clear to auscultation anteriorly and posteriorly in all lung fields bilaterally.  NEURO: AAO to person, place, and time. CN 2-12 intact grossly.  PSYCH: Affect normal, insight fair      Result Review :              Assessment and Plan     Diagnoses and all orders for this visit:    1. Autonomic orthostatic hypotension (Primary)  She has known orthostatic hypotension, which was likely exacerbated by significant rapid weight loss.  Discussed making sure to eat regular meals.    Discussed increasing fluid intake, consider sports drinks with electrolytes.  Increase midodrine to 10 mg tid.    Keep appt w/ rheum tomorrow.  Discussed reasons to go to " ER.    Other orders  -     midodrine (PROAMATINE) 5 MG tablet; Take 2 tablets by mouth 3 (Three) Times a Day Before Meals.       Follow Up   Update us if no improvement.  Discussed reasons to go to ER.

## 2024-10-15 ENCOUNTER — TELEPHONE (OUTPATIENT)
Dept: FAMILY MEDICINE CLINIC | Facility: CLINIC | Age: 54
End: 2024-10-15

## 2024-10-15 NOTE — TELEPHONE ENCOUNTER
Caller: Tessie Conner    Relationship: Self    Best call back number: [phone]     What orders are you requesting (i.e. lab or imaging): DEXA SCAN    In what timeframe would the patient need to come in: ANY    Where will you receive your lab/imaging services: [facility]    Additional notes: PATIENT NEEDS DEXA SCAN ORDERS MOVED TO [facility]. ALSO PATIENT STATES THAT HER NEW INSURANCE [employer] DOESN'T LIST DR LANDIS AS A PROVIDER OPTION, ONLY DR TSANG. PLEASE ADVISE IF POSSIBLE.

## 2024-10-16 NOTE — OUTREACH NOTE
Caller: ANGELIC [facility]    Prep Survey    Flowsheet Row Responses   [facility] patient discharged from? [facility]   Is LACE score < 7 ? Yes   Emergency Room discharge w/ pulse ox? No   Eligibility [facility]   Date of Admission 04/05/22   Date of Discharge 04/06/22   Discharge Disposition Home or Self Care   Discharge diagnosis Cough and dyspnea   Does the patient have one of the following disease processes/diagnoses(primary or secondary)? Other   Does the patient have Home health ordered? No   Is there a DME ordered? Yes   What DME was ordered? nebulizer - [employer]    Prep survey completed? Yes          DEVANTE PAYNE - Registered Nurse

## 2024-10-18 ENCOUNTER — TRANSCRIBE ORDERS (OUTPATIENT)
Dept: ADMINISTRATIVE | Facility: HOSPITAL | Age: 54
End: 2024-10-18
Payer: MEDICARE

## 2024-10-18 ENCOUNTER — OFFICE VISIT (OUTPATIENT)
Dept: SURGERY | Facility: CLINIC | Age: 54
End: 2024-10-18
Payer: MEDICARE

## 2024-10-18 VITALS
WEIGHT: 204 LBS | OXYGEN SATURATION: 98 % | DIASTOLIC BLOOD PRESSURE: 63 MMHG | RESPIRATION RATE: 18 BRPM | BODY MASS INDEX: 37.54 KG/M2 | TEMPERATURE: 98.9 F | HEART RATE: 66 BPM | HEIGHT: 62 IN | SYSTOLIC BLOOD PRESSURE: 108 MMHG

## 2024-10-18 DIAGNOSIS — L72.3 SEBACEOUS CYST: Primary | ICD-10-CM

## 2024-10-18 DIAGNOSIS — M79.89 OTHER SPECIFIED SOFT TISSUE DISORDERS: ICD-10-CM

## 2024-10-18 DIAGNOSIS — R91.8 LUNG NODULES: Primary | ICD-10-CM

## 2024-10-18 PROCEDURE — 99214 OFFICE O/P EST MOD 30 MIN: CPT | Performed by: SURGERY

## 2024-10-18 PROCEDURE — 1160F RVW MEDS BY RX/DR IN RCRD: CPT | Performed by: SURGERY

## 2024-10-18 PROCEDURE — 1159F MED LIST DOCD IN RCRD: CPT | Performed by: SURGERY

## 2024-10-18 RX ORDER — CHLORHEXIDINE GLUCONATE ORAL RINSE 1.2 MG/ML
15 SOLUTION DENTAL EVERY 12 HOURS SCHEDULED
Status: CANCELLED | OUTPATIENT
Start: 2024-10-18

## 2024-10-19 DIAGNOSIS — E11.42 WELL CONTROLLED TYPE 2 DIABETES MELLITUS WITH PERIPHERAL NEUROPATHY: Primary | ICD-10-CM

## 2024-10-21 ENCOUNTER — TELEPHONE (OUTPATIENT)
Dept: FAMILY MEDICINE CLINIC | Facility: CLINIC | Age: 54
End: 2024-10-21
Payer: MEDICARE

## 2024-10-21 RX ORDER — CHOLECALCIFEROL (VITAMIN D3) 25 MCG
1000 TABLET ORAL DAILY
COMMUNITY

## 2024-10-21 NOTE — TELEPHONE ENCOUNTER
Caller: Tessie Conner    Relationship: Self    Best call back number:    [phone]       Requested Prescriptions:     Ozempic, 0.25 or 0.5 MG/DOSE, 2 MG/3ML solution pen-injector         Pharmacy where request should be sent: [id number]  [place] [id number] - [phone]  - [phone] FX     Last office visit with prescribing clinician: 8/19/2024   Last telemedicine visit with prescribing clinician: Visit date not found   Next office visit with prescribing clinician: 11/12/2024     Additional details provided by patient:     Does the patient have less than a 3 day supply:  [x] Yes  [] No    Would you like a call back once the refill request has been completed: [] Yes [] No    If the office needs to give you a call back, can they leave a voicemail: [] Yes [] No    Oni Candelaria Rep   10/21/24 09:39 EDT

## 2024-10-22 ENCOUNTER — TELEPHONE (OUTPATIENT)
Dept: CARDIOLOGY | Facility: CLINIC | Age: 54
End: 2024-10-22
Payer: MEDICARE

## 2024-10-22 ENCOUNTER — TELEPHONE (OUTPATIENT)
Dept: FAMILY MEDICINE CLINIC | Facility: CLINIC | Age: 54
End: 2024-10-22
Payer: MEDICARE

## 2024-10-22 DIAGNOSIS — Z78.0 ASYMPTOMATIC MENOPAUSAL STATE: Primary | ICD-10-CM

## 2024-10-22 DIAGNOSIS — I95.1 AUTONOMIC ORTHOSTATIC HYPOTENSION: Primary | ICD-10-CM

## 2024-10-22 RX ORDER — FLUDROCORTISONE ACETATE 0.1 MG/1
0.1 TABLET ORAL DAILY
Qty: 30 TABLET | Refills: 1 | Status: SHIPPED | OUTPATIENT
Start: 2024-10-22 | End: 2024-12-21

## 2024-10-22 RX ORDER — SEMAGLUTIDE 0.68 MG/ML
0.5 INJECTION, SOLUTION SUBCUTANEOUS WEEKLY
Qty: 3 ML | Refills: 3 | Status: SHIPPED | OUTPATIENT
Start: 2024-10-22

## 2024-10-22 NOTE — TELEPHONE ENCOUNTER
Spoke with patient. Having significant hypotension. PCP increased midodrine to 10mg TID with no improvement. Discussed with Dr. Espinoza, will add Florinef 0.1mg daily. Pt v/u.

## 2024-10-22 NOTE — TELEPHONE ENCOUNTER
Caller: Tessie Conner    Relationship: Self    Best call back number: [phone]    PATIENT HAS BEEN EXPERIENCING LOW BP FOR A COUPLE WEEKS.    TODAY IT IS 82/52    THE MIDODRINE SHE HAS BEEN TAKING SINCE OCT 8TH HAS NOT HELPED    AT NIGHT IT IS AROUND 70/40 AND SHE DOES EXPERIENCE DIZZINESS WITH THAT    SHE WAS JUST AT RHEUMATOLOGY AND THEY ADVISED HER TO CALL CARDIOLOGY

## 2024-10-23 ENCOUNTER — OFFICE VISIT (OUTPATIENT)
Dept: FAMILY MEDICINE CLINIC | Facility: CLINIC | Age: 54
End: 2024-10-23
Payer: MEDICARE

## 2024-10-23 VITALS
SYSTOLIC BLOOD PRESSURE: 129 MMHG | WEIGHT: 205.6 LBS | HEART RATE: 82 BPM | DIASTOLIC BLOOD PRESSURE: 63 MMHG | RESPIRATION RATE: 18 BRPM | BODY MASS INDEX: 37.84 KG/M2 | HEIGHT: 62 IN | OXYGEN SATURATION: 94 %

## 2024-10-23 DIAGNOSIS — K64.4 EXTERNAL HEMORRHOIDS: Primary | ICD-10-CM

## 2024-10-23 PROCEDURE — 3044F HG A1C LEVEL LT 7.0%: CPT | Performed by: STUDENT IN AN ORGANIZED HEALTH CARE EDUCATION/TRAINING PROGRAM

## 2024-10-23 PROCEDURE — 99213 OFFICE O/P EST LOW 20 MIN: CPT | Performed by: STUDENT IN AN ORGANIZED HEALTH CARE EDUCATION/TRAINING PROGRAM

## 2024-10-23 PROCEDURE — 1125F AMNT PAIN NOTED PAIN PRSNT: CPT | Performed by: STUDENT IN AN ORGANIZED HEALTH CARE EDUCATION/TRAINING PROGRAM

## 2024-10-23 RX ORDER — HYDROCORTISONE 25 MG/G
CREAM TOPICAL 2 TIMES DAILY
Qty: 28 G | Refills: 1 | Status: SHIPPED | OUTPATIENT
Start: 2024-10-23

## 2024-10-23 RX ORDER — POLYETHYLENE GLYCOL 3350 17 G/17G
17 POWDER, FOR SOLUTION ORAL DAILY
Qty: 510 G | Refills: 0 | Status: SHIPPED | OUTPATIENT
Start: 2024-10-23

## 2024-10-23 NOTE — PROGRESS NOTES
"Chief Complaint  Chief Complaint   Patient presents with    Hemorrhoids     Subjective        Tessie Conner is a 54 y.o. female who presents to [facility].    History of Present Illness  A week ago noticed blood in stool, then felt a lump.  She started using hemorrhoid cream but is not seeing any improvement.    She was concerned for hemorrhoid vs polyp so wanted it evaluated.  It is very sore and tender to touch.  She has been anxious about having a BM b/c of the discomfort.      Objective   /63   Pulse 82   Resp 18   Ht 157.5 cm (62\")   Wt 93.3 kg (205 lb 9.6 oz)   SpO2 94%   BMI 37.60 kg/m²     Estimated body mass index is 37.6 kg/m² as calculated from the following:    Height as of this encounter: 157.5 cm (62\").    Weight as of this encounter: 93.3 kg (205 lb 9.6 oz).     Physical Exam   GEN: In no acute distress, non toxic appearing  RECTAL: 2 external hemorrhoids w/ mild erythema, no bleeding or drainage.  + tenderness to palpation.       Result Review :              Assessment and Plan     Diagnoses and all orders for this visit:    1. External hemorrhoids (Primary)  Start topical anusol 2.5% topically bid.  Start miralax to keep BM's soft, easy to pass.  No straining.  Drink plenty of water.  -     Hydrocortisone, Perianal, (ANUSOL-HC) 2.5 % rectal cream; Insert  into the rectum 2 (Two) Times a Day.  Dispense: 28 g; Refill: 1  -     polyethylene glycol (MIRALAX) 17 GM/SCOOP powder; Take 17 g by mouth Daily.  Dispense: 510 g; Refill: 0       Follow Up   If no improvement w/ above see GI / colorectal  "

## 2024-10-25 ENCOUNTER — HOSPITAL ENCOUNTER (OUTPATIENT)
Dept: GENERAL RADIOLOGY | Facility: HOSPITAL | Age: 54
Discharge: HOME | End: 2024-10-25
Payer: MEDICARE

## 2024-10-25 ENCOUNTER — HOSPITAL ENCOUNTER (OUTPATIENT)
Dept: CARDIOLOGY | Facility: HOSPITAL | Age: 54
Discharge: HOME | End: 2024-10-25
Payer: MEDICARE

## 2024-10-25 ENCOUNTER — LAB (OUTPATIENT)
Dept: LAB | Facility: HOSPITAL | Age: 54
End: 2024-10-25
Payer: MEDICARE

## 2024-10-25 DIAGNOSIS — L72.3 SEBACEOUS CYST: ICD-10-CM

## 2024-10-25 DIAGNOSIS — M79.89 OTHER SPECIFIED SOFT TISSUE DISORDERS: ICD-10-CM

## 2024-10-25 LAB
ALBUMIN SERPL-MCNC: 4.1 G/DL (ref 3.5–5.2)
ALBUMIN/GLOB SERPL: 1.4 G/DL
ALP SERPL-CCNC: 131 U/L (ref 39–117)
ALT SERPL W P-5'-P-CCNC: 16 U/L (ref 1–33)
ANION GAP SERPL CALCULATED.3IONS-SCNC: 9.6 MMOL/L (ref 5–15)
AST SERPL-CCNC: 21 U/L (ref 1–32)
BASOPHILS # BLD AUTO: 0.03 10*3/MM3 (ref 0–0.2)
BASOPHILS NFR BLD AUTO: 0.4 % (ref 0–1.5)
BILIRUB SERPL-MCNC: 0.4 MG/DL (ref 0–1.2)
BUN SERPL-MCNC: 9 MG/DL (ref 6–20)
BUN/CREAT SERPL: 9.9 (ref 7–25)
CALCIUM SPEC-SCNC: 10 MG/DL (ref 8.6–10.5)
CHLORIDE SERPL-SCNC: 107 MMOL/L (ref 98–107)
CO2 SERPL-SCNC: 27.4 MMOL/L (ref 22–29)
CREAT SERPL-MCNC: 0.91 MG/DL (ref 0.57–1)
DEPRECATED RDW RBC AUTO: 45 FL (ref 37–54)
EGFRCR SERPLBLD CKD-EPI 2021: 75.6 ML/MIN/1.73
EOSINOPHIL # BLD AUTO: 0.01 10*3/MM3 (ref 0–0.4)
EOSINOPHIL NFR BLD AUTO: 0.1 % (ref 0.3–6.2)
ERYTHROCYTE [DISTWIDTH] IN BLOOD BY AUTOMATED COUNT: 13.8 % (ref 12.3–15.4)
GLOBULIN UR ELPH-MCNC: 3 GM/DL
GLUCOSE SERPL-MCNC: 72 MG/DL (ref 65–99)
HCT VFR BLD AUTO: 38 % (ref 34–46.6)
HGB BLD-MCNC: 12.5 G/DL (ref 12–15.9)
IMM GRANULOCYTES # BLD AUTO: 0.02 10*3/MM3 (ref 0–0.05)
IMM GRANULOCYTES NFR BLD AUTO: 0.3 % (ref 0–0.5)
LYMPHOCYTES # BLD AUTO: 1.42 10*3/MM3 (ref 0.7–3.1)
LYMPHOCYTES NFR BLD AUTO: 19.2 % (ref 19.6–45.3)
MCH RBC QN AUTO: 29.2 PG (ref 26.6–33)
MCHC RBC AUTO-ENTMCNC: 32.9 G/DL (ref 31.5–35.7)
MCV RBC AUTO: 88.8 FL (ref 79–97)
MONOCYTES # BLD AUTO: 0.6 10*3/MM3 (ref 0.1–0.9)
MONOCYTES NFR BLD AUTO: 8.1 % (ref 5–12)
NEUTROPHILS NFR BLD AUTO: 5.32 10*3/MM3 (ref 1.7–7)
NEUTROPHILS NFR BLD AUTO: 71.9 % (ref 42.7–76)
NRBC BLD AUTO-RTO: 0 /100 WBC (ref 0–0.2)
PLATELET # BLD AUTO: 221 10*3/MM3 (ref 140–450)
PMV BLD AUTO: 11.1 FL (ref 6–12)
POTASSIUM SERPL-SCNC: 4 MMOL/L (ref 3.5–5.2)
PROT SERPL-MCNC: 7.1 G/DL (ref 6–8.5)
RBC # BLD AUTO: 4.28 10*6/MM3 (ref 3.77–5.28)
SODIUM SERPL-SCNC: 144 MMOL/L (ref 136–145)
WBC NRBC COR # BLD AUTO: 7.4 10*3/MM3 (ref 3.4–10.8)

## 2024-10-25 PROCEDURE — 36415 COLL VENOUS BLD VENIPUNCTURE: CPT

## 2024-10-25 PROCEDURE — 85025 COMPLETE CBC W/AUTO DIFF WBC: CPT

## 2024-10-25 PROCEDURE — 93005 ELECTROCARDIOGRAM TRACING: CPT | Performed by: INTERNAL MEDICINE

## 2024-10-25 PROCEDURE — 71046 X-RAY EXAM CHEST 2 VIEWS: CPT

## 2024-10-25 PROCEDURE — 93005 ELECTROCARDIOGRAM TRACING: CPT | Performed by: SURGERY

## 2024-10-25 PROCEDURE — 80053 COMPREHEN METABOLIC PANEL: CPT

## 2024-10-27 LAB
QT INTERVAL: 424 MS
QT INTERVAL: 430 MS
QTC INTERVAL: 433 MS
QTC INTERVAL: 441 MS

## 2024-10-28 ENCOUNTER — TELEPHONE (OUTPATIENT)
Dept: SURGERY | Facility: CLINIC | Age: 54
End: 2024-10-28
Payer: MEDICARE

## 2024-10-28 NOTE — TELEPHONE ENCOUNTER
Caller: Tessie Conner    Relationship to patient: Self    Best call back number: [phone] (home)       Patient is needing: TO SPEAK WITH CLINICAL STAFF; MY PRE ADMISSION TESTING LABS (10.25.24) SHOWED ABNORMAL.     DO I NEED TO RESCHEDULE 11.4.24?

## 2024-10-28 NOTE — H&P (VIEW-ONLY)
GENERAL SURGERY CONSULTATION NOTE    Consult requested by: Dr. Lopez    Patient Care Team:  Reilly Diaz DO as PCP - General (Family Medicine)  Rachel Espinoza MD as Consulting Physician (Cardiology)  Ayla Carroll MD as Surgeon (Thoracic Surgery)    Reason for consult: Right leg sebaceous cyst    Subjective     Patient is a 54 y.o. female presents with a symptomatic right leg sebaceous cyst which she reports she has had her entire life.  She states that it did not cause her any issues until recently when it became painful and knotted.  She had this area incised and drained by the ER in 2023 and reports that she has had pain ever since that time.    Review of Systems   Constitutional:  Positive for activity change, fatigue and unexpected weight gain.   Respiratory:  Positive for cough and shortness of breath.    Cardiovascular:  Positive for palpitations.   Endocrine: Positive for polydipsia and polyuria.   Genitourinary:  Positive for frequency.   Neurological:  Positive for dizziness and light-headedness.        History  Past Medical History:   Diagnosis Date    Abnormal ECG     Anemia     Anesthesia complication 2003    cardiac arrest  with hysterectomy    Anxiety 2005    Arthritis     Asthma 04/17/2020    allergy    Bipolar 1 disorder     Bradycardia     Chest pain     due to scarring from lung surgery  2/21    Chest pain 09/24/2020    Cholelithiasis 10/2022    COPD (chronic obstructive pulmonary disease)     Coronary artery disease     very minimal    Depression 2005    Diabetes mellitus 2002    borderline   resolved    Diverticulitis of colon     Dyspnea on minimal exertion     Frequent UTI     Gastroesophageal reflux disease without esophagitis 07/15/2020    Heart murmur     FROM CHILDHOOD    Histoplasmosis     History of anemia     POST PREGNANCY    Hyperlipidemia     Hyperlipidemia 10/04/2016    Hypertension     Hypotension     Mass of upper lobe of right lung     Migraines     hx     Obesity     PONV (postoperative nausea and vomiting)     Sleep apnea 22    no machine    Spinal headache     Vertigo     Visual impairment     WEARS GLASSES    Vitamin D deficiency      Past Surgical History:   Procedure Laterality Date    ANKLE OPEN REDUCTION INTERNAL FIXATION Right 2023    Procedure: ANKLE OPEN REDUCTION INTERNAL FIXATION;  Surgeon: TORIE Beck DPM;  Location: [facility] MAIN OR;  Service: Podiatry;  Laterality: Right;    APPENDECTOMY      CARDIAC CATHETERIZATION N/A 2020    Procedure: Left Heart Cath possible PCI, atherectomy, hemodynamic support;  Surgeon: Thomas Zamora MD;  Location: [facility] CATH INVASIVE LOCATION;  Service: Cardiology;  Laterality: N/A;    CARDIAC CATHETERIZATION  2020    CARDIAC ELECTROPHYSIOLOGY PROCEDURE N/A 2023    Procedure: Pacemaker DC new, [place] aware;  Surgeon: Rachel Espinoza MD;  Location: [facility] CATH INVASIVE LOCATION;  Service: Cardiovascular;  Laterality: N/A;     SECTION      FOOT/TOE TENDON REPAIR Left     FRACTURE SURGERY  2023    Rt ankle    HYSTERECTOMY      INSERT / REPLACE / REMOVE PACEMAKER  2023    LUNG BIOPSY Right 2020    LUNG LOBECTOMY Right 2022    pt had partial Right lobectomy and nodule removal    MASS EXCISION Right 2023    Procedure: LIPOMA EXCISION,THIGH;  Surgeon: Justo Shannon MD;  Location: [facility] MAIN OR;  Service: General;  Laterality: Right;    ROTATOR CUFF REPAIR Left     THORACOSCOPY VIDEO ASSISTED WITH LOBECTOMY Right 2022    Procedure: BRONCHOSCOPY, THORACOSCOPY VIDEO ASSISTED wedge resection X2, INTERCOSTAL NERVE BLOCK;  Surgeon: Ayla Carroll MD;  Location: [facility] MAIN OR;  Service: Thoracic;  Laterality: Right;    TUBAL ABDOMINAL LIGATION       Family History   Problem Relation Age of Onset    Arthritis Mother     Cancer Mother     Depression Mother     Diabetes Mother     Early death Mother     Mental illness Mother   "   Anxiety disorder Mother     Miscarriages / Stillbirths Mother     Alcohol abuse Father     Diabetes Father     Early death Father     Heart disease Father     Hyperlipidemia Father     Hypertension Father         Father    Vision loss Father     Heart attack Father     Heart disease Sister     Drug abuse Sister     Heart attack Sister     Hypertension Sister         Sister    Heart disease Sister     Heart disease Brother     Hypertension Brother     Heart attack Brother     Drug abuse Brother     Hypertension Brother     Heart disease Brother     Heart attack Brother     Cancer Maternal Grandmother     Maldwight Hyperthermia Neg Hx      Social History     Tobacco Use    Smoking status: Never     Passive exposure: Never    Smokeless tobacco: Never   Vaping Use    Vaping status: Never Used   Substance Use Topics    Alcohol use: Never     Comment: VERY RARE    Drug use: Never     (Not in a hospital admission)    Allergies:  Tylenol [acetaminophen] and Pregabalin    Objective     Vital Signs  /63 (BP Location: Right arm, Patient Position: Sitting, Cuff Size: Adult)   Pulse 66   Temp 98.9 °F (37.2 °C) (Infrared)   Resp 18   Ht 157.5 cm (62\")   Wt 92.5 kg (204 lb)   SpO2 98%   BMI 37.31 kg/m²      Physical Exam  Vitals reviewed.   Constitutional:       Appearance: She is well-developed. She is obese.   HENT:      Head: Normocephalic and atraumatic.   Eyes:      Pupils: Pupils are equal, round, and reactive to light.   Cardiovascular:      Rate and Rhythm: Normal rate and regular rhythm.   Pulmonary:      Effort: Pulmonary effort is normal.      Breath sounds: Normal breath sounds.   Abdominal:      General: There is no distension.      Palpations: Abdomen is soft.      Tenderness: There is no abdominal tenderness.      Hernia: No hernia is present.   Musculoskeletal:         General: Normal range of motion.      Cervical back: Normal range of motion.      Comments: On the posterior aspect of the patient's " right thigh there is a firm subcutaneous mass which measures 2 cm in size.  The patient would like to have this removed.   Lymphadenopathy:      Cervical: No cervical adenopathy.   Skin:     General: Skin is warm and dry.      Findings: No rash.   Neurological:      Mental Status: She is alert and oriented to person, place, and time.         Results Review:   Lab Results (last 24 hours)       ** No results found for the last 24 hours. **          No radiology results for the last day      I reviewed the patient's new imaging results and agree with the interpretation.  I reviewed the patient's other test results and agree with the interpretation    Assessment & Plan   Right thigh lipoma    Will proceed with surgical excision of this 2 cm area of concern.  Discussed the risk, benefits, and alternatives to surgery, the patient understands, and agrees to proceed to the operating room for surgical excision    I discussed the patients findings and my recommendations with the patient.     Justo Shannon MD  10/28/24  17:34 EDT

## 2024-10-28 NOTE — PROGRESS NOTES
GENERAL SURGERY CONSULTATION NOTE    Consult requested by: Dr. Lopez    Patient Care Team:  Reilly Diaz DO as PCP - General (Family Medicine)  Rachel Espinoza MD as Consulting Physician (Cardiology)  Ayla Carroll MD as Surgeon (Thoracic Surgery)    Reason for consult: Right leg sebaceous cyst    Subjective     Patient is a 54 y.o. female presents with a symptomatic right leg sebaceous cyst which she reports she has had her entire life.  She states that it did not cause her any issues until recently when it became painful and knotted.  She had this area incised and drained by the ER in 2023 and reports that she has had pain ever since that time.    Review of Systems   Constitutional:  Positive for activity change, fatigue and unexpected weight gain.   Respiratory:  Positive for cough and shortness of breath.    Cardiovascular:  Positive for palpitations.   Endocrine: Positive for polydipsia and polyuria.   Genitourinary:  Positive for frequency.   Neurological:  Positive for dizziness and light-headedness.        History  Past Medical History:   Diagnosis Date    Abnormal ECG     Anemia     Anesthesia complication 2003    cardiac arrest  with hysterectomy    Anxiety 2005    Arthritis     Asthma 04/17/2020    allergy    Bipolar 1 disorder     Bradycardia     Chest pain     due to scarring from lung surgery  2/21    Chest pain 09/24/2020    Cholelithiasis 10/2022    COPD (chronic obstructive pulmonary disease)     Coronary artery disease     very minimal    Depression 2005    Diabetes mellitus 2002    borderline   resolved    Diverticulitis of colon     Dyspnea on minimal exertion     Frequent UTI     Gastroesophageal reflux disease without esophagitis 07/15/2020    Heart murmur     FROM CHILDHOOD    Histoplasmosis     History of anemia     POST PREGNANCY    Hyperlipidemia     Hyperlipidemia 10/04/2016    Hypertension     Hypotension     Mass of upper lobe of right lung     Migraines     hx     Obesity     PONV (postoperative nausea and vomiting)     Sleep apnea 22    no machine    Spinal headache     Vertigo     Visual impairment     WEARS GLASSES    Vitamin D deficiency      Past Surgical History:   Procedure Laterality Date    ANKLE OPEN REDUCTION INTERNAL FIXATION Right 2023    Procedure: ANKLE OPEN REDUCTION INTERNAL FIXATION;  Surgeon: TORIE Beck DPM;  Location: [facility] MAIN OR;  Service: Podiatry;  Laterality: Right;    APPENDECTOMY      CARDIAC CATHETERIZATION N/A 2020    Procedure: Left Heart Cath possible PCI, atherectomy, hemodynamic support;  Surgeon: Thomas Zamora MD;  Location: [facility] CATH INVASIVE LOCATION;  Service: Cardiology;  Laterality: N/A;    CARDIAC CATHETERIZATION  2020    CARDIAC ELECTROPHYSIOLOGY PROCEDURE N/A 2023    Procedure: Pacemaker DC new, [place] aware;  Surgeon: Rachel Espinoza MD;  Location: [facility] CATH INVASIVE LOCATION;  Service: Cardiovascular;  Laterality: N/A;     SECTION      FOOT/TOE TENDON REPAIR Left     FRACTURE SURGERY  2023    Rt ankle    HYSTERECTOMY      INSERT / REPLACE / REMOVE PACEMAKER  2023    LUNG BIOPSY Right 2020    LUNG LOBECTOMY Right 2022    pt had partial Right lobectomy and nodule removal    MASS EXCISION Right 2023    Procedure: LIPOMA EXCISION,THIGH;  Surgeon: Justo Shannon MD;  Location: [facility] MAIN OR;  Service: General;  Laterality: Right;    ROTATOR CUFF REPAIR Left     THORACOSCOPY VIDEO ASSISTED WITH LOBECTOMY Right 2022    Procedure: BRONCHOSCOPY, THORACOSCOPY VIDEO ASSISTED wedge resection X2, INTERCOSTAL NERVE BLOCK;  Surgeon: Ayla Carroll MD;  Location: [facility] MAIN OR;  Service: Thoracic;  Laterality: Right;    TUBAL ABDOMINAL LIGATION       Family History   Problem Relation Age of Onset    Arthritis Mother     Cancer Mother     Depression Mother     Diabetes Mother     Early death Mother     Mental illness Mother   "   Anxiety disorder Mother     Miscarriages / Stillbirths Mother     Alcohol abuse Father     Diabetes Father     Early death Father     Heart disease Father     Hyperlipidemia Father     Hypertension Father         Father    Vision loss Father     Heart attack Father     Heart disease Sister     Drug abuse Sister     Heart attack Sister     Hypertension Sister         Sister    Heart disease Sister     Heart disease Brother     Hypertension Brother     Heart attack Brother     Drug abuse Brother     Hypertension Brother     Heart disease Brother     Heart attack Brother     Cancer Maternal Grandmother     Maldwight Hyperthermia Neg Hx      Social History     Tobacco Use    Smoking status: Never     Passive exposure: Never    Smokeless tobacco: Never   Vaping Use    Vaping status: Never Used   Substance Use Topics    Alcohol use: Never     Comment: VERY RARE    Drug use: Never     (Not in a hospital admission)    Allergies:  Tylenol [acetaminophen] and Pregabalin    Objective     Vital Signs  /63 (BP Location: Right arm, Patient Position: Sitting, Cuff Size: Adult)   Pulse 66   Temp 98.9 °F (37.2 °C) (Infrared)   Resp 18   Ht 157.5 cm (62\")   Wt 92.5 kg (204 lb)   SpO2 98%   BMI 37.31 kg/m²      Physical Exam  Vitals reviewed.   Constitutional:       Appearance: She is well-developed. She is obese.   HENT:      Head: Normocephalic and atraumatic.   Eyes:      Pupils: Pupils are equal, round, and reactive to light.   Cardiovascular:      Rate and Rhythm: Normal rate and regular rhythm.   Pulmonary:      Effort: Pulmonary effort is normal.      Breath sounds: Normal breath sounds.   Abdominal:      General: There is no distension.      Palpations: Abdomen is soft.      Tenderness: There is no abdominal tenderness.      Hernia: No hernia is present.   Musculoskeletal:         General: Normal range of motion.      Cervical back: Normal range of motion.      Comments: On the posterior aspect of the patient's " right thigh there is a firm subcutaneous mass which measures 2 cm in size.  The patient would like to have this removed.   Lymphadenopathy:      Cervical: No cervical adenopathy.   Skin:     General: Skin is warm and dry.      Findings: No rash.   Neurological:      Mental Status: She is alert and oriented to person, place, and time.         Results Review:   Lab Results (last 24 hours)       ** No results found for the last 24 hours. **          No radiology results for the last day      I reviewed the patient's new imaging results and agree with the interpretation.  I reviewed the patient's other test results and agree with the interpretation    Assessment & Plan   Right thigh lipoma    Will proceed with surgical excision of this 2 cm area of concern.  Discussed the risk, benefits, and alternatives to surgery, the patient understands, and agrees to proceed to the operating room for surgical excision    I discussed the patients findings and my recommendations with the patient.     Justo Shannon MD  10/28/24  17:34 EDT

## 2024-10-30 ENCOUNTER — TELEPHONE (OUTPATIENT)
Dept: CARDIOLOGY | Facility: CLINIC | Age: 54
End: 2024-10-30
Payer: MEDICARE

## 2024-10-30 NOTE — TELEPHONE ENCOUNTER
Caller: Tessie Conner     Relationship: SELF    Best call back number: [phone]    What is your medical concern? LOW BLOOD PRESSURE, DIZZINESS, LIGHT HEAD    Is your provider already aware of this issue? YES    Have you been treated for this issue? YES, BUT MEDICATION ISN'T HELPING. PATIENT STATED WHEN SEATED BP /63 BUT WHEN SHE STANDS UP IT DROPPED TO 85/60. PLEASE ADVISE.

## 2024-10-31 ENCOUNTER — TELEPHONE (OUTPATIENT)
Dept: FAMILY MEDICINE CLINIC | Facility: CLINIC | Age: 54
End: 2024-10-31
Payer: MEDICARE

## 2024-10-31 ENCOUNTER — HOSPITAL ENCOUNTER (OUTPATIENT)
Dept: BONE DENSITY | Facility: HOSPITAL | Age: 54
Discharge: HOME | End: 2024-10-31
Admitting: STUDENT IN AN ORGANIZED HEALTH CARE EDUCATION/TRAINING PROGRAM
Payer: MEDICARE

## 2024-10-31 ENCOUNTER — HOSPITAL ENCOUNTER (EMERGENCY)
Facility: HOSPITAL | Age: 54
Discharge: HOME | End: 2024-10-31
Attending: EMERGENCY MEDICINE
Payer: MEDICARE

## 2024-10-31 VITALS
RESPIRATION RATE: 17 BRPM | OXYGEN SATURATION: 95 % | HEART RATE: 60 BPM | BODY MASS INDEX: 35.23 KG/M2 | DIASTOLIC BLOOD PRESSURE: 58 MMHG | HEIGHT: 64 IN | SYSTOLIC BLOOD PRESSURE: 107 MMHG | WEIGHT: 206.35 LBS | TEMPERATURE: 98.1 F

## 2024-10-31 DIAGNOSIS — Z78.0 ASYMPTOMATIC MENOPAUSAL STATE: ICD-10-CM

## 2024-10-31 DIAGNOSIS — I95.9 HYPOTENSION, UNSPECIFIED HYPOTENSION TYPE: Primary | ICD-10-CM

## 2024-10-31 LAB
ALBUMIN SERPL-MCNC: 4.1 G/DL (ref 3.5–5.2)
ALBUMIN/GLOB SERPL: 1.4 G/DL
ALP SERPL-CCNC: 133 U/L (ref 39–117)
ALT SERPL W P-5'-P-CCNC: 14 U/L (ref 1–33)
ANION GAP SERPL CALCULATED.3IONS-SCNC: 8.6 MMOL/L (ref 5–15)
AST SERPL-CCNC: 23 U/L (ref 1–32)
BACTERIA UR QL AUTO: ABNORMAL /HPF
BASOPHILS # BLD AUTO: 0.02 10*3/MM3 (ref 0–0.2)
BASOPHILS NFR BLD AUTO: 0.3 % (ref 0–1.5)
BILIRUB SERPL-MCNC: 0.4 MG/DL (ref 0–1.2)
BILIRUB UR QL STRIP: NEGATIVE
BUN SERPL-MCNC: 9 MG/DL (ref 6–20)
BUN/CREAT SERPL: 10 (ref 7–25)
CALCIUM SPEC-SCNC: 10.1 MG/DL (ref 8.6–10.5)
CHLORIDE SERPL-SCNC: 105 MMOL/L (ref 98–107)
CLARITY UR: CLEAR
CO2 SERPL-SCNC: 28.4 MMOL/L (ref 22–29)
COLOR UR: ABNORMAL
CORTIS SERPL-MCNC: 8.38 MCG/DL
CREAT SERPL-MCNC: 0.9 MG/DL (ref 0.57–1)
DEPRECATED RDW RBC AUTO: 48.7 FL (ref 37–54)
EGFRCR SERPLBLD CKD-EPI 2021: 76.6 ML/MIN/1.73
EOSINOPHIL # BLD AUTO: 0.01 10*3/MM3 (ref 0–0.4)
EOSINOPHIL NFR BLD AUTO: 0.1 % (ref 0.3–6.2)
ERYTHROCYTE [DISTWIDTH] IN BLOOD BY AUTOMATED COUNT: 14.7 % (ref 12.3–15.4)
GLOBULIN UR ELPH-MCNC: 2.9 GM/DL
GLUCOSE SERPL-MCNC: 88 MG/DL (ref 65–99)
GLUCOSE UR STRIP-MCNC: NEGATIVE MG/DL
HCT VFR BLD AUTO: 39 % (ref 34–46.6)
HGB BLD-MCNC: 12.3 G/DL (ref 12–15.9)
HGB UR QL STRIP.AUTO: NEGATIVE
HOLD SPECIMEN: NORMAL
HYALINE CASTS UR QL AUTO: ABNORMAL /LPF
IMM GRANULOCYTES # BLD AUTO: 0.04 10*3/MM3 (ref 0–0.05)
IMM GRANULOCYTES NFR BLD AUTO: 0.6 % (ref 0–0.5)
KETONES UR QL STRIP: NEGATIVE
LEUKOCYTE ESTERASE UR QL STRIP.AUTO: ABNORMAL
LYMPHOCYTES # BLD AUTO: 1.41 10*3/MM3 (ref 0.7–3.1)
LYMPHOCYTES NFR BLD AUTO: 19.7 % (ref 19.6–45.3)
MAGNESIUM SERPL-MCNC: 2.2 MG/DL (ref 1.6–2.6)
MCH RBC QN AUTO: 28.4 PG (ref 26.6–33)
MCHC RBC AUTO-ENTMCNC: 31.5 G/DL (ref 31.5–35.7)
MCV RBC AUTO: 90.1 FL (ref 79–97)
MONOCYTES # BLD AUTO: 0.53 10*3/MM3 (ref 0.1–0.9)
MONOCYTES NFR BLD AUTO: 7.4 % (ref 5–12)
NEUTROPHILS NFR BLD AUTO: 5.13 10*3/MM3 (ref 1.7–7)
NEUTROPHILS NFR BLD AUTO: 71.9 % (ref 42.7–76)
NITRITE UR QL STRIP: NEGATIVE
NRBC BLD AUTO-RTO: 0 /100 WBC (ref 0–0.2)
PH UR STRIP.AUTO: 7 [PH] (ref 5–8)
PLATELET # BLD AUTO: 195 10*3/MM3 (ref 140–450)
PMV BLD AUTO: 10.3 FL (ref 6–12)
POTASSIUM SERPL-SCNC: 4.1 MMOL/L (ref 3.5–5.2)
PROT SERPL-MCNC: 7 G/DL (ref 6–8.5)
PROT UR QL STRIP: NEGATIVE
RBC # BLD AUTO: 4.33 10*6/MM3 (ref 3.77–5.28)
RBC # UR STRIP: ABNORMAL /HPF
REF LAB TEST METHOD: ABNORMAL
SODIUM SERPL-SCNC: 142 MMOL/L (ref 136–145)
SP GR UR STRIP: 1.01 (ref 1–1.03)
SQUAMOUS #/AREA URNS HPF: ABNORMAL /HPF
T4 FREE SERPL-MCNC: 1.1 NG/DL (ref 0.93–1.7)
TROPONIN T SERPL HS-MCNC: 20 NG/L
TSH SERPL DL<=0.05 MIU/L-ACNC: 0.67 UIU/ML (ref 0.27–4.2)
UROBILINOGEN UR QL STRIP: ABNORMAL
WBC # UR STRIP: ABNORMAL /HPF
WBC NRBC COR # BLD AUTO: 7.14 10*3/MM3 (ref 3.4–10.8)
WHOLE BLOOD HOLD COAG: NORMAL
WHOLE BLOOD HOLD COAG: NORMAL

## 2024-10-31 PROCEDURE — 93005 ELECTROCARDIOGRAM TRACING: CPT | Performed by: EMERGENCY MEDICINE

## 2024-10-31 PROCEDURE — 77080 DXA BONE DENSITY AXIAL: CPT

## 2024-10-31 PROCEDURE — 84484 ASSAY OF TROPONIN QUANT: CPT | Performed by: EMERGENCY MEDICINE

## 2024-10-31 PROCEDURE — 99283 EMERGENCY DEPT VISIT LOW MDM: CPT

## 2024-10-31 PROCEDURE — 82533 TOTAL CORTISOL: CPT | Performed by: EMERGENCY MEDICINE

## 2024-10-31 PROCEDURE — 83735 ASSAY OF MAGNESIUM: CPT | Performed by: EMERGENCY MEDICINE

## 2024-10-31 PROCEDURE — 93005 ELECTROCARDIOGRAM TRACING: CPT

## 2024-10-31 PROCEDURE — 84439 ASSAY OF FREE THYROXINE: CPT | Performed by: EMERGENCY MEDICINE

## 2024-10-31 PROCEDURE — 81001 URINALYSIS AUTO W/SCOPE: CPT | Performed by: EMERGENCY MEDICINE

## 2024-10-31 PROCEDURE — 84443 ASSAY THYROID STIM HORMONE: CPT | Performed by: EMERGENCY MEDICINE

## 2024-10-31 PROCEDURE — 80053 COMPREHEN METABOLIC PANEL: CPT | Performed by: EMERGENCY MEDICINE

## 2024-10-31 PROCEDURE — 85025 COMPLETE CBC W/AUTO DIFF WBC: CPT | Performed by: EMERGENCY MEDICINE

## 2024-10-31 RX ORDER — SODIUM CHLORIDE 0.9 % (FLUSH) 0.9 %
10 SYRINGE (ML) INJECTION AS NEEDED
Status: DISCONTINUED | OUTPATIENT
Start: 2024-10-31 | End: 2024-10-31 | Stop reason: HOSPADM

## 2024-10-31 NOTE — TELEPHONE ENCOUNTER
Called patient. She is currently in [facility] ED for hypotension. She stated they were getting ready to discharge her home. Testing was negative. Phoned Dr. Espinoza and he is going to see her prior to discharge.

## 2024-10-31 NOTE — ED PROVIDER NOTES
Subjective   History of Present Illness  54-year-old female presents for hypotension.  Blood pressure running in 70s over 40s and she has been extremely lightheaded at home.  Recently started on outpatient basis on midodrine of which she is on the maximum dose and fludrocortisone.  States not significantly helping.  Does have history of autonomic instability.  States she has not had any chest pain.  States she has some baseline shortness of breath secondary to her prior lung resection but has not changed or worsened.  Denies having any pain elsewhere.  Review of Systems  See HPI  Past Medical History:   Diagnosis Date    Abnormal ECG     Anemia     Anesthesia complication 2003    cardiac arrest  with hysterectomy    Anxiety 2005    Arthritis     Asthma 04/17/2020    allergy    Bipolar 1 disorder     Bradycardia     Chest pain     due to scarring from lung surgery  2/21    Chest pain 09/24/2020    Cholelithiasis 10/2022    COPD (chronic obstructive pulmonary disease)     Coronary artery disease     very minimal    Depression 2005    Diabetes mellitus 2002    borderline   resolved    Diverticulitis of colon     Dyspnea on minimal exertion     Frequent UTI     Gastroesophageal reflux disease without esophagitis 07/15/2020    Heart murmur     FROM CHILDHOOD    Histoplasmosis     History of anemia     POST PREGNANCY    Hyperlipidemia     Hyperlipidemia 10/04/2016    Hypertension     Hypotension     Mass of upper lobe of right lung     Migraines     hx    Obesity 1999    PONV (postoperative nausea and vomiting)     Sleep apnea 2/28/22    no machine    Spinal headache     Vertigo     Visual impairment 2011    WEARS GLASSES    Vitamin D deficiency        Allergies   Allergen Reactions    Tylenol [Acetaminophen] Hives and Itching    Pregabalin Rash       Past Surgical History:   Procedure Laterality Date    ANKLE OPEN REDUCTION INTERNAL FIXATION Right 12/22/2023    Procedure: ANKLE OPEN REDUCTION INTERNAL FIXATION;  Surgeon:  TORIE Beck DPM;  Location: [facility] MAIN OR;  Service: Podiatry;  Laterality: Right;    APPENDECTOMY      CARDIAC CATHETERIZATION N/A 2020    Procedure: Left Heart Cath possible PCI, atherectomy, hemodynamic support;  Surgeon: Thomas Zamora MD;  Location: [facility] CATH INVASIVE LOCATION;  Service: Cardiology;  Laterality: N/A;    CARDIAC CATHETERIZATION  2020    CARDIAC ELECTROPHYSIOLOGY PROCEDURE N/A 2023    Procedure: Pacemaker DC new, [place] aware;  Surgeon: Rachel Espinoza MD;  Location: [facility] CATH INVASIVE LOCATION;  Service: Cardiovascular;  Laterality: N/A;     SECTION      FOOT/TOE TENDON REPAIR Left     FRACTURE SURGERY  2023    Rt ankle    HYSTERECTOMY      INSERT / REPLACE / REMOVE PACEMAKER  2023    LUNG BIOPSY Right 2020    LUNG LOBECTOMY Right 2022    pt had partial Right lobectomy and nodule removal    MASS EXCISION Right 2023    Procedure: LIPOMA EXCISION,THIGH;  Surgeon: Justo Shannon MD;  Location: [facility] MAIN OR;  Service: General;  Laterality: Right;    ROTATOR CUFF REPAIR Left     THORACOSCOPY VIDEO ASSISTED WITH LOBECTOMY Right 2022    Procedure: BRONCHOSCOPY, THORACOSCOPY VIDEO ASSISTED wedge resection X2, INTERCOSTAL NERVE BLOCK;  Surgeon: Ayla Carroll MD;  Location: [facility];  Service: Thoracic;  Laterality: Right;    TUBAL ABDOMINAL LIGATION         Family History   Problem Relation Age of Onset    Arthritis Mother     Cancer Mother     Depression Mother     Diabetes Mother     Early death Mother     Mental illness Mother     Anxiety disorder Mother     Miscarriages / Stillbirths Mother     Alcohol abuse Father     Diabetes Father     Early death Father     Heart disease Father     Hyperlipidemia Father     Hypertension Father         Father    Vision loss Father     Heart attack Father     Heart disease Sister     Drug abuse Sister     Heart attack Sister     Hypertension Sister          "Sister    Heart disease Sister     Heart disease Brother     Hypertension Brother     Heart attack Brother     Drug abuse Brother     Hypertension Brother     Heart disease Brother     Heart attack Brother     Cancer Maternal Grandmother     Malig Hyperthermia Neg Hx        Social History     Socioeconomic History    Marital status:    Tobacco Use    Smoking status: Never     Passive exposure: Never    Smokeless tobacco: Never   Vaping Use    Vaping status: Never Used   Substance and Sexual Activity    Alcohol use: Never     Comment: VERY RARE    Drug use: Never    Sexual activity: Not Currently     Partners: Male     Birth control/protection: None, Hysterectomy           Objective   Physical Exam  No acute distress, regular rate and rhythm, no tachypnea increased work of breathing, abdomen soft and nontender with rebound or guarding, elevated BMI, throughout station bilaterally, no murmur appreciated.  Procedures           ED Course      /58 (BP Location: Left arm, Patient Position: Sitting)   Pulse 60   Temp 98.1 °F (36.7 °C) (Oral)   Resp 17   Ht 161.3 cm (63.5\")   Wt 93.6 kg (206 lb 5.6 oz)   LMP  (LMP Unknown)   SpO2 95%   BMI 35.98 kg/m²   Labs Reviewed   COMPREHENSIVE METABOLIC PANEL - Abnormal; Notable for the following components:       Result Value    Alkaline Phosphatase 133 (*)     All other components within normal limits    Narrative:     GFR Normal >60  Chronic Kidney Disease <60  Kidney Failure <15     URINALYSIS W/ MICROSCOPIC IF INDICATED (NO CULTURE) - Abnormal; Notable for the following components:    Color, UA Dark Yellow (*)     Leuk Esterase, UA Moderate (2+) (*)     All other components within normal limits   SINGLE HS TROPONIN T - Abnormal; Notable for the following components:    HS Troponin T 20 (*)     All other components within normal limits    Narrative:     High Sensitive Troponin T Reference Range:  <14.0 ng/L- Negative Female for AMI  <22.0 ng/L- Negative Male " for AMI  >=14 - Abnormal Female indicating possible myocardial injury.  >=22 - Abnormal Male indicating possible myocardial injury.   Clinicians would have to utilize clinical acumen, EKG, Troponin, and serial changes to determine if it is an Acute Myocardial Infarction or myocardial injury due to an underlying chronic condition.        CBC WITH AUTO DIFFERENTIAL - Abnormal; Notable for the following components:    Eosinophil % 0.1 (*)     Immature Grans % 0.6 (*)     All other components within normal limits   URINALYSIS, MICROSCOPIC ONLY - Abnormal; Notable for the following components:    WBC, UA 6-10 (*)     Squamous Epithelial Cells, UA 3-6 (*)     All other components within normal limits   TSH - Normal   T4, FREE - Normal   MAGNESIUM - Normal   CORTISOL    Narrative:     Cortisol Reference Ranges:    Cortisol 6AM - 10AM Range: 6.02-18.40 mcg/dl  Cortisol 4PM - 8PM Range: 2.68-10.50 mcg/dl      Results may be falsely increased if patient taking Biotin.     CBC AND DIFFERENTIAL    Narrative:     The following orders were created for panel order CBC & Differential.  Procedure                               Abnormality         Status                     ---------                               -----------         ------                     CBC Auto Differential[[id number]]        Abnormal            Final result                 Please view results for these tests on the individual orders.   EXTRA TUBES    Narrative:     The following orders were created for panel order Extra Tubes.  Procedure                               Abnormality         Status                     ---------                               -----------         ------                     Gold Top - SST[[id number]]                                   Final result               Light Blue Top[[id number]]                                   Final result               Light Blue Top[[id number]]                                   Final result                  Please view results for these tests on the individual orders.   GOLD TOP - SST   LIGHT BLUE TOP   LIGHT BLUE TOP     .EDS  No orders to display                                              Medical Decision Making    Reviewed from echocardiogram from April 19, 2023 shows that patient with ejection fraction of 61 to 65% and aortic valve that is structurally normal with no regurgitation or stenosis.  Mitral valve was also grossly normal with no significant regurgitation.    EKG interpretation: 8:17 AM, rate 60, atrial paced rhythm, normal axis, normal QTc, no acute ischemic changes.  T wave inversions in leads V2 and V3 were also present on October 25 thousand 20 for EKG.    Patient blood pressure reassuring here.  Ambulated with nurse and patient states that she had very minimal dizziness and felt well ambulating.  Much better than she had previously.  Discussed that she has been taking her midodrine at 5 AM again around noon or 1 and then again around 8 PM and her dizziness is mainly when she is getting up around 3 to 4 AM to use the restroom.  Discussed moving back to the last dose of midodrine to closer to 9 as she gets in bed at 8 PM but does not usually go to sleep until 11 PM.  To help with her blood pressure overnight.  Also discussed moving her fludrocortisone from in the morning 2 at night if this does not help her symptoms.  Patient is agreeable with this plan.  Will follow-up with her cardiologist Dr. Tavon Menjivar.  Final diagnoses:   Hypotension, unspecified hypotension type       ED Disposition  ED Disposition       ED Disposition   Discharge    Condition   Stable    Comment   --               Reilly Diaz DO  [address]  [address]  [place] [id number] [phone]          Rachel Espinoza MD  [address] [facility] [place] [address] [phone]               Medication List      No changes were made to your prescriptions during this visit.            Tomer Jacinto,  MD  11/02/24 0044

## 2024-10-31 NOTE — TELEPHONE ENCOUNTER
Caller: Tessie Conner    Relationship to patient: Self    Best call back number: [phone]     New or established patient?  [] New  [x] Established    Date of discharge: 10.31.24    Facility discharged from: [facility]    Diagnosis/Symptoms: DIZZINESS, SOB    Length of stay (If applicable): 0 DAYS    Specialty Only: Did you see a [employer] health provider?    [] Yes  [] No  If so, who?     Additional Details: PT STATED NO SYMPTOMS RIGHT NOW, SAID SHE IS FEELING OKAY BUT BP WAS DROPPING AND SHE WAS WANTING TO LET OFFICE KNOW SHE HAS BEEN DISCHARGED FROM ED AND NEVER SAW DR PAYNE. HOSPITAL NOTE IS ADVISING PT TO FU WITH DR PAYNE, BUT NO TIMEFRAME LISTED.

## 2024-10-31 NOTE — ED NOTES
Ambulated pt without difficulty in hallway. Pt reported mild soa after ambulation and mild dizziness

## 2024-10-31 NOTE — DISCHARGE INSTRUCTIONS
Recommend moving your last dose of midodrine from 8 PM to 9 or 10 PM if you are able to help with your blood pressure overnight.  If your symptoms worsen, or if other new or concerning symptoms arise, please return to the emergency department for repeat evaluation.

## 2024-10-31 NOTE — TELEPHONE ENCOUNTER
Patient called reporting persistent dizziness. Dizziness occurs both while seated and also standing up. Blood pressures recently have been 70-80's/40-50's despite staying hydrated, drinking gatorade, and despite midodrine 10 mg TID and florinef 0.1 mg daily. Discussed with patient that she is on high dose of midodrine, which is why florinef likely added by cardiology. Patient planning on presenting to ED overnight for further evaluation due to persistence of symptoms.     Abdiel Brandt MD

## 2024-11-01 LAB
QT INTERVAL: 435 MS
QTC INTERVAL: 435 MS

## 2024-11-02 ENCOUNTER — PATIENT MESSAGE (OUTPATIENT)
Dept: FAMILY MEDICINE CLINIC | Facility: CLINIC | Age: 54
End: 2024-11-02
Payer: MEDICARE

## 2024-11-04 ENCOUNTER — ANESTHESIA (OUTPATIENT)
Dept: PERIOP | Facility: HOSPITAL | Age: 54
End: 2024-11-04
Payer: MEDICARE

## 2024-11-04 ENCOUNTER — ANESTHESIA EVENT (OUTPATIENT)
Dept: PERIOP | Facility: HOSPITAL | Age: 54
End: 2024-11-04
Payer: MEDICARE

## 2024-11-04 ENCOUNTER — HOSPITAL ENCOUNTER (OUTPATIENT)
Facility: HOSPITAL | Age: 54
Setting detail: HOSPITAL OUTPATIENT SURGERY
Discharge: HOME | End: 2024-11-04
Attending: SURGERY | Admitting: SURGERY
Payer: MEDICARE

## 2024-11-04 VITALS
SYSTOLIC BLOOD PRESSURE: 127 MMHG | HEIGHT: 63 IN | DIASTOLIC BLOOD PRESSURE: 68 MMHG | RESPIRATION RATE: 15 BRPM | BODY MASS INDEX: 36.75 KG/M2 | WEIGHT: 207.4 LBS | HEART RATE: 66 BPM | TEMPERATURE: 98.5 F | OXYGEN SATURATION: 99 %

## 2024-11-04 DIAGNOSIS — L72.3 SEBACEOUS CYST: ICD-10-CM

## 2024-11-04 LAB
GLUCOSE BLDC GLUCOMTR-MCNC: 103 MG/DL (ref 70–105)
GLUCOSE BLDC GLUCOMTR-MCNC: 98 MG/DL (ref 70–105)

## 2024-11-04 PROCEDURE — 25010000002 GLYCOPYRROLATE 0.2 MG/ML SOLUTION: Performed by: NURSE ANESTHETIST, CERTIFIED REGISTERED

## 2024-11-04 PROCEDURE — 25010000002 PROPOFOL 1000 MG/100ML EMULSION: Performed by: NURSE ANESTHETIST, CERTIFIED REGISTERED

## 2024-11-04 PROCEDURE — 25010000002 BUPIVACAINE (PF) 0.25 % SOLUTION: Performed by: SURGERY

## 2024-11-04 PROCEDURE — 27327 EXC THIGH/KNEE LES SC < 3 CM: CPT | Performed by: SURGERY

## 2024-11-04 PROCEDURE — 25010000002 ONDANSETRON PER 1 MG: Performed by: NURSE ANESTHETIST, CERTIFIED REGISTERED

## 2024-11-04 PROCEDURE — 25010000002 FENTANYL CITRATE (PF) 100 MCG/2ML SOLUTION: Performed by: NURSE ANESTHETIST, CERTIFIED REGISTERED

## 2024-11-04 PROCEDURE — 88304 TISSUE EXAM BY PATHOLOGIST: CPT | Performed by: SURGERY

## 2024-11-04 PROCEDURE — 25010000002 CEFAZOLIN PER 500 MG: Performed by: SURGERY

## 2024-11-04 PROCEDURE — 82948 REAGENT STRIP/BLOOD GLUCOSE: CPT

## 2024-11-04 PROCEDURE — 25810000003 LACTATED RINGERS PER 1000 ML: Performed by: SURGERY

## 2024-11-04 PROCEDURE — 25010000002 LIDOCAINE PF 1% 1 % SOLUTION: Performed by: NURSE ANESTHETIST, CERTIFIED REGISTERED

## 2024-11-04 PROCEDURE — 25010000002 MIDAZOLAM PER 1 MG: Performed by: NURSE ANESTHETIST, CERTIFIED REGISTERED

## 2024-11-04 PROCEDURE — 25010000002 DEXAMETHASONE PER 1 MG: Performed by: NURSE ANESTHETIST, CERTIFIED REGISTERED

## 2024-11-04 PROCEDURE — 25810000003 LACTATED RINGERS PER 1000 ML: Performed by: NURSE ANESTHETIST, CERTIFIED REGISTERED

## 2024-11-04 RX ORDER — CHLORHEXIDINE GLUCONATE ORAL RINSE 1.2 MG/ML
15 SOLUTION DENTAL EVERY 12 HOURS SCHEDULED
Status: DISCONTINUED | OUTPATIENT
Start: 2024-11-04 | End: 2024-11-04 | Stop reason: HOSPADM

## 2024-11-04 RX ORDER — DEXAMETHASONE SODIUM PHOSPHATE 4 MG/ML
INJECTION, SOLUTION INTRA-ARTICULAR; INTRALESIONAL; INTRAMUSCULAR; INTRAVENOUS; SOFT TISSUE AS NEEDED
Status: DISCONTINUED | OUTPATIENT
Start: 2024-11-04 | End: 2024-11-04 | Stop reason: SURG

## 2024-11-04 RX ORDER — ONDANSETRON 2 MG/ML
INJECTION INTRAMUSCULAR; INTRAVENOUS AS NEEDED
Status: DISCONTINUED | OUTPATIENT
Start: 2024-11-04 | End: 2024-11-04 | Stop reason: SURG

## 2024-11-04 RX ORDER — PROPOFOL 10 MG/ML
INJECTION, EMULSION INTRAVENOUS CONTINUOUS PRN
Status: DISCONTINUED | OUTPATIENT
Start: 2024-11-04 | End: 2024-11-04 | Stop reason: SURG

## 2024-11-04 RX ORDER — TRAMADOL HYDROCHLORIDE 50 MG/1
50 TABLET ORAL ONCE AS NEEDED
Status: COMPLETED | OUTPATIENT
Start: 2024-11-04 | End: 2024-11-04

## 2024-11-04 RX ORDER — LIDOCAINE HYDROCHLORIDE 10 MG/ML
INJECTION, SOLUTION EPIDURAL; INFILTRATION; INTRACAUDAL; PERINEURAL AS NEEDED
Status: DISCONTINUED | OUTPATIENT
Start: 2024-11-04 | End: 2024-11-04 | Stop reason: SURG

## 2024-11-04 RX ORDER — BUPIVACAINE HYDROCHLORIDE 2.5 MG/ML
INJECTION, SOLUTION EPIDURAL; INFILTRATION; INTRACAUDAL AS NEEDED
Status: DISCONTINUED | OUTPATIENT
Start: 2024-11-04 | End: 2024-11-04 | Stop reason: HOSPADM

## 2024-11-04 RX ORDER — PHENYLEPHRINE HCL IN 0.9% NACL 1 MG/10 ML
SYRINGE (ML) INTRAVENOUS AS NEEDED
Status: DISCONTINUED | OUTPATIENT
Start: 2024-11-04 | End: 2024-11-04 | Stop reason: SURG

## 2024-11-04 RX ORDER — SODIUM CHLORIDE, SODIUM LACTATE, POTASSIUM CHLORIDE, CALCIUM CHLORIDE 600; 310; 30; 20 MG/100ML; MG/100ML; MG/100ML; MG/100ML
20 INJECTION, SOLUTION INTRAVENOUS ONCE
Status: COMPLETED | OUTPATIENT
Start: 2024-11-04 | End: 2024-11-04

## 2024-11-04 RX ORDER — TRAMADOL HYDROCHLORIDE 50 MG/1
50 TABLET ORAL EVERY 6 HOURS PRN
Qty: 15 TABLET | Refills: 0 | Status: SHIPPED | OUTPATIENT
Start: 2024-11-04

## 2024-11-04 RX ORDER — NITROFURANTOIN 25; 75 MG/1; MG/1
100 CAPSULE ORAL 2 TIMES DAILY
Qty: 10 CAPSULE | Refills: 0 | Status: SHIPPED | OUTPATIENT
Start: 2024-11-04 | End: 2024-11-05 | Stop reason: SDUPTHER

## 2024-11-04 RX ORDER — LIDOCAINE HYDROCHLORIDE 10 MG/ML
0.5 INJECTION, SOLUTION EPIDURAL; INFILTRATION; INTRACAUDAL; PERINEURAL ONCE AS NEEDED
Status: DISCONTINUED | OUTPATIENT
Start: 2024-11-04 | End: 2024-11-04 | Stop reason: HOSPADM

## 2024-11-04 RX ORDER — FENTANYL CITRATE 50 UG/ML
INJECTION, SOLUTION INTRAMUSCULAR; INTRAVENOUS AS NEEDED
Status: DISCONTINUED | OUTPATIENT
Start: 2024-11-04 | End: 2024-11-04 | Stop reason: SURG

## 2024-11-04 RX ORDER — GLYCOPYRROLATE 0.2 MG/ML
INJECTION INTRAMUSCULAR; INTRAVENOUS AS NEEDED
Status: DISCONTINUED | OUTPATIENT
Start: 2024-11-04 | End: 2024-11-04 | Stop reason: SURG

## 2024-11-04 RX ORDER — MIDAZOLAM HYDROCHLORIDE 1 MG/ML
INJECTION, SOLUTION INTRAMUSCULAR; INTRAVENOUS AS NEEDED
Status: DISCONTINUED | OUTPATIENT
Start: 2024-11-04 | End: 2024-11-04 | Stop reason: SURG

## 2024-11-04 RX ORDER — SODIUM CHLORIDE 0.9 % (FLUSH) 0.9 %
10 SYRINGE (ML) INJECTION AS NEEDED
Status: DISCONTINUED | OUTPATIENT
Start: 2024-11-04 | End: 2024-11-04 | Stop reason: HOSPADM

## 2024-11-04 RX ORDER — SODIUM CHLORIDE, SODIUM LACTATE, POTASSIUM CHLORIDE, CALCIUM CHLORIDE 600; 310; 30; 20 MG/100ML; MG/100ML; MG/100ML; MG/100ML
INJECTION, SOLUTION INTRAVENOUS CONTINUOUS PRN
Status: DISCONTINUED | OUTPATIENT
Start: 2024-11-04 | End: 2024-11-04 | Stop reason: SURG

## 2024-11-04 RX ADMIN — CHLORHEXIDINE GLUCONATE, 0.12% ORAL RINSE 15 ML: 1.2 SOLUTION DENTAL at 10:02

## 2024-11-04 RX ADMIN — SODIUM CHLORIDE 2000 MG: 900 INJECTION INTRAVENOUS at 10:45

## 2024-11-04 RX ADMIN — MIDAZOLAM 2 MG: 1 INJECTION INTRAMUSCULAR; INTRAVENOUS at 10:50

## 2024-11-04 RX ADMIN — SODIUM CHLORIDE, SODIUM LACTATE, POTASSIUM CHLORIDE, AND CALCIUM CHLORIDE: .6; .31; .03; .02 INJECTION, SOLUTION INTRAVENOUS at 10:50

## 2024-11-04 RX ADMIN — LIDOCAINE HYDROCHLORIDE 50 MG: 10 INJECTION, SOLUTION EPIDURAL; INFILTRATION; INTRACAUDAL; PERINEURAL at 10:57

## 2024-11-04 RX ADMIN — MUPIROCIN 1 APPLICATION: 20 OINTMENT TOPICAL at 10:02

## 2024-11-04 RX ADMIN — Medication 10 ML: at 09:30

## 2024-11-04 RX ADMIN — PROPOFOL INJECTABLE EMULSION 150 MCG/KG/MIN: 10 INJECTION, EMULSION INTRAVENOUS at 10:57

## 2024-11-04 RX ADMIN — FENTANYL CITRATE 50 MCG: 50 INJECTION, SOLUTION INTRAMUSCULAR; INTRAVENOUS at 10:57

## 2024-11-04 RX ADMIN — GLYCOPYRROLATE 0.2 MG: 0.2 INJECTION INTRAMUSCULAR; INTRAVENOUS at 10:50

## 2024-11-04 RX ADMIN — ONDANSETRON 4 MG: 2 INJECTION, SOLUTION INTRAMUSCULAR; INTRAVENOUS at 10:51

## 2024-11-04 RX ADMIN — DEXAMETHASONE SODIUM PHOSPHATE 4 MG: 4 INJECTION, SOLUTION INTRAMUSCULAR; INTRAVENOUS at 10:51

## 2024-11-04 RX ADMIN — TRAMADOL HYDROCHLORIDE 50 MG: 50 TABLET, COATED ORAL at 12:04

## 2024-11-04 RX ADMIN — SODIUM CHLORIDE, POTASSIUM CHLORIDE, SODIUM LACTATE AND CALCIUM CHLORIDE 20 ML/HR: 600; 310; 30; 20 INJECTION, SOLUTION INTRAVENOUS at 10:43

## 2024-11-04 RX ADMIN — Medication 50 MCG: at 11:18

## 2024-11-04 NOTE — ANESTHESIA PREPROCEDURE EVALUATION
Anesthesia Evaluation     history of anesthetic complications:  PONV  NPO Solid Status: > 8 hours  NPO Liquid Status: > 8 hours           Airway   Mallampati: II  TM distance: >3 FB  Neck ROM: full  No difficulty expected  Dental - normal exam     Pulmonary - normal exam   (+) COPD, asthma,shortness of breath, sleep apnea on CPAP  Cardiovascular - normal exam    (+) pacemaker pacemaker, hypertension, valvular problems/murmurs, CAD, angina, GARCIA, hyperlipidemia      Neuro/Psych  (+) headaches, dizziness/light headedness, numbness, psychiatric history Anxiety  GI/Hepatic/Renal/Endo    (+) obesity, GERD well controlled, diabetes mellitus type 2    Musculoskeletal     Abdominal  - normal exam    Bowel sounds: normal.   Substance History      OB/GYN          Other   arthritis,                       Anesthesia Plan    ASA 4     MAC   total IV anesthesia  intravenous induction     Anesthetic plan, risks, benefits, and alternatives have been provided, discussed and informed consent has been obtained with: patient.  Pre-procedure education provided  Plan discussed with CRNA.        CODE STATUS:

## 2024-11-04 NOTE — DISCHARGE INSTRUCTIONS
Ok for discharge  Follow-up with me (Dr. Shannon) in 2 weeks  Regular diet as tolerated  Ok to shower starting tomorrow. No tub baths, pools, lakes, or streams for 1 week.  Ok to apply ice to incisions  Call my office or present to the ED with: fevers greater than 101.5, redness around the incisions, drainage from the incisions, intractable nausea/vomiting, or pain that is getting worse instead of better

## 2024-11-04 NOTE — OP NOTE
MASS SOFT TISSUE EXCISION  Operative Note    Patient Name:  Tessie Conner  YOB: 1970    Date of Surgery:  11/4/2024     Indications: Patient is a 54-year-old lady with a sebaceous cyst on the posterior aspect of her right thigh.    Pre-op Diagnosis:   Sebaceous cyst [L72.3]    Post-op Diagnosis:  Post-Op Diagnosis Codes:     * Sebaceous cyst [L72.3]    Procedure/CPT® Codes:      Procedure(s):  Excision of soft tissue mass from right posterior thigh measuring 2 cm    Staff:  Surgeon(s):  Justo Shannon MD    Anesthesia: Monitored Anesthesia Care    Estimated Blood Loss: minimal    Implants:    Nothing was implanted during the procedure    Specimen:          Specimens       ID Source Type Tests Collected By Collected At Frozen?    A Thigh, Right Tissue TISSUE PATHOLOGY EXAM   Justo Shannon MD 11/4/24 1109     Description: sebaceous cyst    This specimen was not marked as sent.            Findings: Sebaceous cyst removed via a 4 x 1.5 cm elliptical skin incision.    Complications: None, immediately    Description of Procedure: Obtaining informed consent in the preop holding area, the patient was brought the operating room placed in the lateral position.  SCDs were applied, and preoperative antibiotics were administered.  The patient then underwent uncomplicated duction of MAC anesthesia.  The posterior thigh was then prepped and draped in the usual sterile fashion, and after a brief timeout the procedure began.  I began by injecting local anesthesia and a 4 x 1.5 cm elliptical skin incision.  Then used a 15 blade to incise the skin.  The dermis and subcutaneous fat were removed immediately beneath this elliptical incision all the way down to the level of the muscle fascia to completely remove the sebaceous cyst.  Once the specimen was removed, it was passed off the field for pathologic review.  Hemostasis was then verified and then the wound was closed using interrupted 3-0  Vicryl to reapproximate the deep dermis and running 4-0 Monocryl in a subicular fashion reapproximate the skin.  The wound was dressed with skin glue.  The patient tolerated the procedure well, was awoken, and taken to PACU in satisfactory condition.    Justo Shannon MD     Date: 11/4/2024  Time: 11:25 EST

## 2024-11-04 NOTE — ANESTHESIA POSTPROCEDURE EVALUATION
Patient: Tessie Conner    Procedure Summary       Date: 11/04/24 Room / Location: [facility] OR  / [facility] MAIN OR    Anesthesia Start: 1050 Anesthesia Stop: 1132    Procedure: Excision of soft tissue mass from right posterior thigh measuring 2 cm (Right: Thigh) Diagnosis:       Sebaceous cyst      (Sebaceous cyst [L72.3])    Surgeons: Justo Shannon MD Provider: Michelet Morrison MD    Anesthesia Type: MAC ASA Status: 4            Anesthesia Type: MAC    Vitals  Vitals Value Taken Time   /57 11/04/24 1220   Temp 98.5 °F (36.9 °C) 11/04/24 1220   Pulse 60 11/04/24 1221   Resp 16 11/04/24 1220   SpO2 96 % 11/04/24 1221   Vitals shown include unfiled device data.        Post Anesthesia Care and Evaluation    Patient location during evaluation: PACU  Patient participation: complete - patient participated  Level of consciousness: awake  Pain scale: See nurse's notes for pain score.  Pain management: adequate    Airway patency: patent  Anesthetic complications: No anesthetic complications  PONV Status: none  Cardiovascular status: acceptable  Respiratory status: acceptable and spontaneous ventilation  Hydration status: acceptable    Comments: Patient seen and examined postoperatively; vital signs stable; SpO2 greater than or equal to 90%; cardiopulmonary status stable; nausea/vomiting adequately controlled; pain adequately controlled; no apparent anesthesia complications; patient discharged from anesthesia care when discharge criteria were met

## 2024-11-05 ENCOUNTER — TELEPHONE (OUTPATIENT)
Dept: FAMILY MEDICINE CLINIC | Facility: CLINIC | Age: 54
End: 2024-11-05

## 2024-11-05 LAB
LAB AP CASE REPORT: NORMAL
PATH REPORT.FINAL DX SPEC: NORMAL
PATH REPORT.GROSS SPEC: NORMAL

## 2024-11-05 RX ORDER — NITROFURANTOIN 25; 75 MG/1; MG/1
100 CAPSULE ORAL 2 TIMES DAILY
Qty: 10 CAPSULE | Refills: 0 | Status: SHIPPED | OUTPATIENT
Start: 2024-11-05 | End: 2024-11-10

## 2024-11-05 NOTE — TELEPHONE ENCOUNTER
I called patient and verified tat she wants this script sent to [facility] [place]. Can you re-send?

## 2024-11-05 NOTE — TELEPHONE ENCOUNTER
Caller: Tessie Conner    Relationship to patient: Self    Best call back number:     [phone]       Patient is needing: PATIENT NEEDS MACROBID SENT TO THE PHARMACY BELOW AND NOT THE [facility] PHARMACY IT WAS SENT TO    [facility]  [place], [place] - [address] - [phone]  - [phone] FX

## 2024-11-06 ENCOUNTER — TELEPHONE (OUTPATIENT)
Dept: SURGERY | Facility: CLINIC | Age: 54
End: 2024-11-06
Payer: MEDICARE

## 2024-11-06 NOTE — TELEPHONE ENCOUNTER
PO FU Call- spoke with pt. brett 2 wk po appt. Will fu then. Knows to call with any questions or concerns.      States doing alright. A little sore.

## 2024-11-07 ENCOUNTER — PATIENT OUTREACH (OUTPATIENT)
Dept: CASE MANAGEMENT | Facility: CLINIC | Age: 54
End: 2024-11-07
Payer: MEDICARE

## 2024-11-07 DIAGNOSIS — I95.1 AUTONOMIC ORTHOSTATIC HYPOTENSION: Primary | ICD-10-CM

## 2024-11-07 DIAGNOSIS — J45.909 ASTHMA, UNSPECIFIED ASTHMA SEVERITY, UNSPECIFIED WHETHER COMPLICATED, UNSPECIFIED WHETHER PERSISTENT: ICD-10-CM

## 2024-11-07 NOTE — OUTREACH NOTE
AMBULATORY CASE MANAGEMENT NOTE    Names and Relationships of Patient/Support Persons: Contact: Tessie Conner; Relationship: Self -     CCM Interim Update    Spoke with patient at this time regarding recent ER visit, identified self and role.  Patient states she is doing ok since her ER visit, but is still having issues with labile Bps.  She reports that sometimes her BP will be in the 120s/70s, and then will drop into the 80s/50s.  She doesn't notice any pattern to this.  Discussed ER recommendations to push back midodrine dose to 9AM, patient states she has done so and so far has not seen a change.  Discussed BP log, patient is not keeping a log at this time, recommended keeping a BP log and checking BP at least daily, as well as when she feels like her BP is dropping.  Advised she bring this log with her to her upcoming cardiology appt for MD to review.  Patient is agreeable.    Patient does report staying hydrated, states she is drinking gatorade as advised by the ER.  Patient mentioned UA from ER, reports issues with urinary frequency but denies other needs.  Discussed UA report and symptoms, advised that patient starts antibiotic prescribed by PCP for this.  Patient states she has picked the medication up and started it today, so she will continue to take it.  Did recommend her notifying PCP at upcoming appt if symptoms didn't resolve so urine culture could be ordered.    Offered assistance with chronic disease management through CCM, patient would like further information and is agreeable to [facility] sending [employer] message with further information.  Patient is agreeable to [facility] outreach in 1 week to f/u on CCM program and status of hypotension.  Patient denies any other needs at this time.    PLAN:  Outreach 1 week  CCM f/u  BP ok? Keeping log?  Appt w/PCP 11/12  Appt w/cardio 11/13      Adrienne PAYNE  Ambulatory Case Management    11/7/2024, 15:49 EST

## 2024-11-08 ENCOUNTER — TELEPHONE (OUTPATIENT)
Dept: FAMILY MEDICINE CLINIC | Facility: CLINIC | Age: 54
End: 2024-11-08

## 2024-11-08 ENCOUNTER — PATIENT OUTREACH (OUTPATIENT)
Dept: CASE MANAGEMENT | Facility: CLINIC | Age: 54
End: 2024-11-08
Payer: MEDICARE

## 2024-11-08 ENCOUNTER — NURSE TRIAGE (OUTPATIENT)
Dept: CALL CENTER | Facility: HOSPITAL | Age: 54
End: 2024-11-08
Payer: MEDICARE

## 2024-11-08 DIAGNOSIS — I95.1 AUTONOMIC ORTHOSTATIC HYPOTENSION: ICD-10-CM

## 2024-11-08 DIAGNOSIS — J45.909 ASTHMA, UNSPECIFIED ASTHMA SEVERITY, UNSPECIFIED WHETHER COMPLICATED, UNSPECIFIED WHETHER PERSISTENT: Primary | ICD-10-CM

## 2024-11-08 NOTE — OUTREACH NOTE
AMBULATORY CASE MANAGEMENT NOTE    Names and Relationships of Patient/Support Persons: Contact: Tessie Conner; Relationship: Self  Contact: [place] [facility] - Deepa; Relationship:  -     [facility] Interim Update    See telephone note from today.    Care Coordination    Call placed to [facility] Control to determine max dose of concern for Macrobid and next steps patient may need to take since taking 400mg of Macrobid a day x 3 days.  Deepa at [facility] verified with [facility] Pharmacist that patient should be fine after taking this dose, and that there are no further steps that need to be taken as a result.  Deepa did mention that Pharmacist felt that her symptoms could indicate and infection.      [facility] Interim Update    Attempted to call patient to update of the above, no answer, left message.  Will send [employer] message.          Adrienne PAYNE  Ambulatory Case Management    11/8/2024, 13:47 EST      [facility] Interim Update    Patient returned AC call, states she has been having chills for a few days no, denies any fever.  She is wondering if she should go ahead and go to the UC or ER for urine culture.  Advised that she should.  Verified status of thigh wound as patient had recent excision of cyst on her thigh, patient states it looks fine, denies any redness, swelling, or drainage.  Patient will go to UC.    Adrienne PAYNE  Ambulatory Case Management    11/8/2024, 14:26 EST

## 2024-11-08 NOTE — TELEPHONE ENCOUNTER
Spoke with patient, she didn't realize her surgeon had sent Macrobid home with her.  She picked up the Macrobid PCP sent in and has unknowingly been taking both of these prescriptions together (200mg Macrobid twice daily instead of 100mg twice daily).  Explained patient should only be taking one of these prescriptions, not both (100mg Macrobid twice daily only).  Patient reports chills, nausea, headache, fatigue.  Did advise UC or ER as there are no appointments available with any providers today.  Discussed that nausea could be a side effect of the antibiotic and ways to decrease GI side effects.  Also discussed that chills could indicate that patient may need a urine culture completed to ensure she is on the appropriate antibiotic.  Provided patient with the number for the [facility] 24/7 Call Center if she has any concerns this weekend, or advised to go to the UC/ER.  Verified with Deepa at [employer] that patient should be fine as she has not met max daily dose of concern.

## 2024-11-08 NOTE — TELEPHONE ENCOUNTER
Caller: Tessie Conner    Relationship: Self    Best call back number: [phone]     Who are you requesting to speak with (clinical staff, provider,  specific staff member): CLINICAL STAFF    What was the call regarding: WAS TAKING AN ANTIBIOTIC THAT DR LANDIS HAD PRESCRIBED BUT AFTER SURGERY ON 11/5 SHE WAS GIVEN ANOTHER ANTIBIOTIC AND HAS STARTED FEELING BAD.  SHE HAS CHILLS, NAUSEA, HEADACHE AND FATIGUE.  SHE CANNOT GET COMFORTABLE.  SHE FEELS SHE MAY BE TAKING TOO MANY ANTIBIOTICS. PLEASE CALL BACK TO ADVISE

## 2024-11-08 NOTE — TELEPHONE ENCOUNTER
I called and spoke to patient. She is taking two antibiotics daily. She thought that you prescribed one and the surgeon prescribed one. But they are both the Macrobid you prescribed. She called the other day and asked to have the macrobid you ordered resent to a different pharmacy and you did. Then, her son picked up the medication from [facility] pharmacy, where it was sent initially. Again she thought it was from 2 different providers. She is asking what she should do now.

## 2024-11-09 NOTE — TELEPHONE ENCOUNTER
Patient called reporting accidentally taking double the dose of Macrobid for the last 3 days due to having duplicate prescriptions. Reports headache and darker than normal urine. Also reports 1 x occurrence of blood on toilet tissue after BM.  Has history of hemorrhoids and reports having difficulty having BMs for the last week. Advised to call Poison control regarding double doses of medication. Patient then state she has already called them and they were not concerned. Advised on home care regarding rectal blood and when to call [facility] back.           Reason for Disposition   [1] DOUBLE DOSE (an extra dose or lesser amount) of prescription drug AND [2] any symptoms (e.g., dizziness, nausea, pain, sleepiness)    Additional Information   Negative: [1] Intentional drug overdose AND [2] suicidal thoughts or ideas   Negative: Drug overdose and triager unable to answer question   Negative: Caller requesting a renewal or refill of a medicine patient is currently taking   Negative: Caller requesting information unrelated to medicine   Negative: Caller requesting information about COVID-19 Vaccine   Negative: Caller requesting information about Emergency Contraception   Negative: Caller requesting information about Combined Birth Control Pills   Negative: Caller requesting information about Progestin Birth Control Pills   Negative: Caller requesting information about Post-Op pain or medicines   Negative: Caller requesting a prescription antibiotic (such as Penicillin) for Strep throat and has a positive culture result   Negative: Caller requesting a prescription anti-viral med (such as Tamiflu) and has influenza (flu) symptoms   Negative: Immunization reaction suspected   Negative: Rash while taking a medicine or within 3 days of stopping it   Negative: [1] Asthma and [2] having symptoms of asthma (cough, wheezing, etc.)   Negative: [1] Symptom of illness (e.g., headache, abdominal pain, earache, vomiting) AND [2] more than  "mild   Negative: Breastfeeding questions about mother's medicines and diet   Negative: MORE THAN A DOUBLE DOSE of a prescription or over-the-counter (OTC) drug    Answer Assessment - Initial Assessment Questions  1. NAME of MEDICINE: \"What medicine(s) are you calling about?\"      macrobid  2. QUESTION: \"What is your question?\" (e.g., double dose of medicine, side effect)      Patient accidentally was given 2 prescriptions of the same medication and took both for the last 3 days   3. PRESCRIBER: \"Who prescribed the medicine?\" Reason: if prescribed by specialist, call should be referred to that group.      PCP and Hospitalist   4. SYMPTOMS: \"Do you have any symptoms?\" If Yes, ask: \"What symptoms are you having?\"  \"How bad are the symptoms (e.g., mild, moderate, severe)      Headache, dark urine, now reports blood on toilet tissue after BM.   5. PREGNANCY:  \"Is there any chance that you are pregnant?\" \"When was your last menstrual period?\"      N/a    Protocols used: Medication Question Call-ADULT-    "

## 2024-11-11 ENCOUNTER — TELEPHONE (OUTPATIENT)
Dept: CARDIOLOGY | Facility: CLINIC | Age: 54
End: 2024-11-11
Payer: MEDICARE

## 2024-11-11 RX ORDER — FERROUS SULFATE 325(65) MG
325 TABLET ORAL
Qty: 90 TABLET | Refills: 1 | Status: SHIPPED | OUTPATIENT
Start: 2024-11-11

## 2024-11-11 NOTE — TELEPHONE ENCOUNTER
Caller: Tessie Conner     Relationship: PATIENT    Best call back number: [phone]    What is your medical concern? PT IS CALLING TO SPEAK TO SOMEONE ABOUT HER HEART RATE BEING LOW LAST NIGHT AROUND 12:03AM. SHE SAID IT WENT DOWN TO 53.    How long has this issue been going on? LAST NIGHT    Is your provider already aware of this issue? NO    Have you been treated for this issue? NO

## 2024-11-11 NOTE — TELEPHONE ENCOUNTER
Pt called back advised her t has an appt on the 13th of November with Dr Espinoza will go over than. If HR is dropping lower and pt becomes weak having SOA, dizziness  or symptoms worsen pt needs to go to ER to be seen. She does have a PM and wante. to know if it shoes HR being low while sleeping

## 2024-11-11 NOTE — TELEPHONE ENCOUNTER
Pt didn't answer the phone. Pt has an appt on the 13th of November with Dr Espinoza will go over than. If HR is dropping lower and pt becomes weak having SOA, dizziness  or symptoms worsen pt needs to go to ER to be seen.

## 2024-11-12 ENCOUNTER — LAB (OUTPATIENT)
Dept: FAMILY MEDICINE CLINIC | Facility: CLINIC | Age: 54
End: 2024-11-12
Payer: MEDICARE

## 2024-11-12 ENCOUNTER — OFFICE VISIT (OUTPATIENT)
Dept: FAMILY MEDICINE CLINIC | Facility: CLINIC | Age: 54
End: 2024-11-12
Payer: MEDICARE

## 2024-11-12 VITALS
SYSTOLIC BLOOD PRESSURE: 122 MMHG | HEART RATE: 60 BPM | OXYGEN SATURATION: 97 % | HEIGHT: 63 IN | DIASTOLIC BLOOD PRESSURE: 80 MMHG | BODY MASS INDEX: 36.64 KG/M2 | WEIGHT: 206.8 LBS

## 2024-11-12 DIAGNOSIS — Z12.4 SCREENING FOR CERVICAL CANCER: ICD-10-CM

## 2024-11-12 DIAGNOSIS — E78.2 MIXED HYPERLIPIDEMIA: Primary | ICD-10-CM

## 2024-11-12 DIAGNOSIS — E11.42 WELL CONTROLLED TYPE 2 DIABETES MELLITUS WITH PERIPHERAL NEUROPATHY: ICD-10-CM

## 2024-11-12 DIAGNOSIS — I95.1 AUTONOMIC ORTHOSTATIC HYPOTENSION: ICD-10-CM

## 2024-11-12 LAB
CHOLEST SERPL-MCNC: 162 MG/DL (ref 0–200)
HBA1C MFR BLD: 5 % (ref 4.8–5.6)
HDLC SERPL-MCNC: 40 MG/DL (ref 40–60)
LDLC SERPL CALC-MCNC: 100 MG/DL (ref 0–100)
LDLC/HDLC SERPL: 2.44 {RATIO}
TRIGL SERPL-MCNC: 123 MG/DL (ref 0–150)
VLDLC SERPL-MCNC: 22 MG/DL (ref 5–40)

## 2024-11-12 PROCEDURE — 99214 OFFICE O/P EST MOD 30 MIN: CPT | Performed by: STUDENT IN AN ORGANIZED HEALTH CARE EDUCATION/TRAINING PROGRAM

## 2024-11-12 PROCEDURE — 83036 HEMOGLOBIN GLYCOSYLATED A1C: CPT | Performed by: STUDENT IN AN ORGANIZED HEALTH CARE EDUCATION/TRAINING PROGRAM

## 2024-11-12 PROCEDURE — 36415 COLL VENOUS BLD VENIPUNCTURE: CPT | Performed by: STUDENT IN AN ORGANIZED HEALTH CARE EDUCATION/TRAINING PROGRAM

## 2024-11-12 PROCEDURE — 1125F AMNT PAIN NOTED PAIN PRSNT: CPT | Performed by: STUDENT IN AN ORGANIZED HEALTH CARE EDUCATION/TRAINING PROGRAM

## 2024-11-12 PROCEDURE — 3044F HG A1C LEVEL LT 7.0%: CPT | Performed by: STUDENT IN AN ORGANIZED HEALTH CARE EDUCATION/TRAINING PROGRAM

## 2024-11-12 PROCEDURE — 80061 LIPID PANEL: CPT | Performed by: STUDENT IN AN ORGANIZED HEALTH CARE EDUCATION/TRAINING PROGRAM

## 2024-11-12 RX ORDER — CARIPRAZINE 3 MG/1
3 CAPSULE, GELATIN COATED ORAL DAILY
COMMUNITY
Start: 2024-10-23

## 2024-11-12 RX ORDER — VIBEGRON 75 MG/1
75 TABLET, FILM COATED ORAL DAILY
Qty: 90 TABLET | Refills: 3 | Status: SHIPPED | OUTPATIENT
Start: 2024-11-12

## 2024-11-12 RX ORDER — ATORVASTATIN CALCIUM 40 MG/1
40 TABLET, FILM COATED ORAL DAILY
Qty: 90 TABLET | Refills: 3 | Status: SHIPPED | OUTPATIENT
Start: 2024-11-12

## 2024-11-12 NOTE — PROGRESS NOTES
"Chief Complaint  Chief Complaint   Patient presents with    Hyperlipidemia     3 month     Med Refill       Subjective        Tessie Conner is a 54 y.o. female who presents to [facility].  History of Present Illness    Tessie is a 54-year-old female here for follow-up of chronic conditions.      Hypotension  Follows with cardiology  Has been on midodrine 10 mg 3 times daily  Recently started fludrocortisone 0.1 mg daily  Blood pressures have been significantly better      T2DM   Januvia 100 mg daily, Ozempic 2 mg weekly  Stopped metformin 1000 mg 3 months ago due to A1c at goal      Hyperlipidemia  Started on atorvastatin 40 mg a few months ago          Objective   /80   Pulse 60   Ht 160 cm (63\")   Wt 93.8 kg (206 lb 12.8 oz)   SpO2 97%   BMI 36.63 kg/m²     Estimated body mass index is 36.63 kg/m² as calculated from the following:    Height as of this encounter: 160 cm (63\").    Weight as of this encounter: 93.8 kg (206 lb 12.8 oz).     Physical Exam   GEN: In no acute distress, non toxic appearing  HEENT: MMM. EOMI.   CV: No extremity edema.   RESP: No signs of respiratory distress.  SKIN: No rashes  MSK: No deformity.   NEURO: Moves all extremities equally. Alert and appropriate            Result Review :              Assessment and Plan     Diagnoses and all orders for this visit:    1. Mixed hyperlipidemia (Primary)  Started on atorvastatin 40 mg once daily in August  Goal LDL <70 given comorbid T2DM    -     Lipid Panel    2. Autonomic orthostatic hypotension  Unclear etiology  Follows with cardiology  I do think it may be worth looking into adrenal insufficiency as an underlying cause, however cortisol levels may be obscured due to the fludrocortisone that she is on  Advised her to discuss adrenal insufficiency workup with her cardiologist which she has an appointment with tomorrow      3. Well controlled type 2 diabetes mellitus with peripheral neuropathy  Most " recent hemoglobin A1c of 5.2% in August  Update this today-if blood sugars remain adequately controlled, plan to discontinue Januvia      -     Hemoglobin A1c              Follow Up     No follow-ups on file.

## 2024-11-13 ENCOUNTER — OFFICE VISIT (OUTPATIENT)
Dept: CARDIOLOGY | Facility: CLINIC | Age: 54
End: 2024-11-13
Payer: MEDICARE

## 2024-11-13 VITALS
SYSTOLIC BLOOD PRESSURE: 123 MMHG | HEART RATE: 71 BPM | DIASTOLIC BLOOD PRESSURE: 71 MMHG | HEIGHT: 63 IN | BODY MASS INDEX: 36.14 KG/M2 | WEIGHT: 204 LBS

## 2024-11-13 DIAGNOSIS — Z95.0 PACEMAKER: ICD-10-CM

## 2024-11-13 DIAGNOSIS — I95.1 AUTONOMIC ORTHOSTATIC HYPOTENSION: Primary | ICD-10-CM

## 2024-11-13 DIAGNOSIS — I49.5 SICK SINUS SYNDROME: ICD-10-CM

## 2024-11-13 NOTE — PROGRESS NOTES
Progress note      Name: Tessie Conner ADMIT: (Not on file)   : 1970  PCP: Reilly Diaz DO    MRN: [id number] LOS: 0 days   AGE/SEX: 54 y.o. female  ROOM: Room/bed info not found     Chief Complaint   Patient presents with    Follow-up     Hospital-low bp       Subjective       History of present illness  Tessie Conner is a 54-year-old female patient who has autonomic dysfunction, no history of CAD, has sick sinus syndrome with chronic bradycardia heart rate in the 40s resulting in dizziness. Patient received a dual-chamber pacemaker on 2023 and she is here today for follow-up.   Patient felt better after pacemaker implantation, however she still experiencing dizzy spells and drop in her blood pressure.  She had been on midodrine 10 mg 3 times a day and most recently after an ER visit fludrocortisone 0.1 mg was added.  Since addition of fludrocortisone her blood pressure seems to have stabilized.    Past Medical History:   Diagnosis Date    Abnormal ECG     Anemia     Anesthesia complication     cardiac arrest  with hysterectomy    Anxiety 2005    Arthritis     Asthma 2020    allergy    Bipolar 1 disorder     Bradycardia     Chest pain     due to scarring from lung surgery      Chest pain 2020    Cholelithiasis 10/2022    COPD (chronic obstructive pulmonary disease)     Coronary artery disease     very minimal    Depression 2005    Diabetes mellitus 2002    borderline   resolved    Diverticulitis of colon     Diverticulosis     Dyspnea on minimal exertion     Frequent UTI     Gastroesophageal reflux disease without esophagitis 07/15/2020    Heart murmur     FROM CHILDHOOD    Histoplasmosis     History of anemia     POST PREGNANCY    Hyperlipidemia     Hyperlipidemia 10/04/2016    Hypertension     Hypotension     Inflammatory bowel disease     Mass of upper lobe of right lung     Migraines     hx    Obesity     Pneumonia     PONV (postoperative nausea and vomiting)      Scoliosis     Sleep apnea 22    cpap    Spinal headache     Vertigo     Visual impairment     WEARS GLASSES    Vitamin D deficiency      Past Surgical History:   Procedure Laterality Date    ANKLE OPEN REDUCTION INTERNAL FIXATION Right 2023    Procedure: ANKLE OPEN REDUCTION INTERNAL FIXATION;  Surgeon: TORIE Beck DPM;  Location: [facility] MAIN OR;  Service: Podiatry;  Laterality: Right;    APPENDECTOMY      CARDIAC CATHETERIZATION N/A 2020    Procedure: Left Heart Cath possible PCI, atherectomy, hemodynamic support;  Surgeon: Thomas Zamora MD;  Location: [facility] CATH INVASIVE LOCATION;  Service: Cardiology;  Laterality: N/A;    CARDIAC CATHETERIZATION  2020    CARDIAC ELECTROPHYSIOLOGY PROCEDURE N/A 2023    Procedure: Pacemaker DC new, [place] aware;  Surgeon: Rachel Espinoza MD;  Location: [facility] CATH INVASIVE LOCATION;  Service: Cardiovascular;  Laterality: N/A;    CARDIAC SURGERY       SECTION      FOOT/TOE TENDON REPAIR Left     FRACTURE SURGERY  2023    Rt ankle    HYSTERECTOMY      INSERT / REPLACE / REMOVE PACEMAKER  2023    LUNG BIOPSY Right 2020    LUNG LOBECTOMY Right 2022    pt had partial Right lobectomy and nodule removal    MASS EXCISION Right 2023    Procedure: LIPOMA EXCISION,THIGH;  Surgeon: Justo Shannon MD;  Location: [facility] MAIN OR;  Service: General;  Laterality: Right;    MASS EXCISION Right 2024    Procedure: Excision of soft tissue mass from right posterior thigh measuring 2 cm;  Surgeon: Justo Shannon MD;  Location: [facility] MAIN OR;  Service: General;  Laterality: Right;    ROTATOR CUFF REPAIR Left     THORACOSCOPY VIDEO ASSISTED WITH LOBECTOMY Right 2022    Procedure: BRONCHOSCOPY, THORACOSCOPY VIDEO ASSISTED wedge resection X2, INTERCOSTAL NERVE BLOCK;  Surgeon: Ayla Carroll MD;  Location: [facility] MAIN OR;  Service: Thoracic;  Laterality: Right;    TUBAL ABDOMINAL  LIGATION  2002     Family History   Problem Relation Age of Onset    Arthritis Mother     Cancer Mother     Depression Mother     Diabetes Mother     Early death Mother     Mental illness Mother     Anxiety disorder Mother     Miscarriages / Stillbirths Mother     Alcohol abuse Father     Diabetes Father     Early death Father     Heart disease Father     Hyperlipidemia Father     Hypertension Father         Father    Vision loss Father     Heart attack Father     Heart disease Sister     Drug abuse Sister     Heart attack Sister     Hypertension Sister         Sister    Heart disease Sister     Heart disease Brother     Hypertension Brother     Heart attack Brother     Drug abuse Brother     Hypertension Brother     Heart disease Brother     Heart attack Brother     Cancer Maternal Grandmother     Malig Hyperthermia Neg Hx      Social History     Tobacco Use    Smoking status: Never     Passive exposure: Never    Smokeless tobacco: Never   Vaping Use    Vaping status: Never Used   Substance Use Topics    Alcohol use: Never     Comment: VERY RARE    Drug use: Never       Current Outpatient Medications:     albuterol sulfate  (90 Base) MCG/ACT inhaler, Inhale 2 puffs Every 4 (Four) Hours As Needed for Wheezing., Disp: 18 g, Rfl: 2    atorvastatin (Lipitor) 40 MG tablet, Take 1 tablet by mouth Daily., Disp: 90 tablet, Rfl: 3    Budeson-Glycopyrrol-Formoterol (Breztri Aerosphere) 160-9-4.8 MCG/ACT aerosol inhaler, Inhale 2 puffs 2 (Two) Times a Day., Disp: , Rfl:     cholecalciferol (Vitamin D) 25 MCG (1000 UT) tablet, Take 1 tablet by mouth Daily., Disp: , Rfl:     FeroSul 325 (65 Fe) MG tablet, Take 1 tablet by mouth Daily With Breakfast., Disp: 90 tablet, Rfl: 1    fludrocortisone 0.1 MG tablet, Take 1 tablet by mouth Daily for 60 days., Disp: 30 tablet, Rfl: 1    Gemtesa 75 MG tablet, Take 1 tablet by mouth Daily., Disp: 90 tablet, Rfl: 3    Hydrocortisone, Perianal, (ANUSOL-HC) 2.5 % rectal cream, Insert   into the rectum 2 (Two) Times a Day., Disp: 28 g, Rfl: 1    ipratropium-albuterol (DUO-NEB) 0.5-2.5 mg/3 ml nebulizer, Take 3 mL by nebulization 4 (Four) Times a Day As Needed for Wheezing., Disp: , Rfl:     midodrine (PROAMATINE) 5 MG tablet, Take 2 tablets by mouth 3 (Three) Times a Day Before Meals., Disp: , Rfl:     omeprazole (priLOSEC) 20 MG capsule, Take 1 capsule by mouth Every Night., Disp: 90 capsule, Rfl: 3    Ozempic, 0.25 or 0.5 MG/DOSE, 2 MG/3ML solution pen-injector, Inject 0.5 mg under the skin into the appropriate area as directed 1 (One) Time Per Week., Disp: 3 mL, Rfl: 3    sertraline (ZOLOFT) 50 MG tablet, Take 1 tablet by mouth Every Night., Disp: , Rfl:     Vraylar 3 MG capsule capsule, Take 1 capsule by mouth Daily., Disp: , Rfl:   Allergies:  Tylenol [acetaminophen] and Pregabalin      Physical Exam  VITALS REVIEWED    General:      well developed, in no acute distress.    Head:      normocephalic and atraumatic.    Eyes:      PERRL/EOM intact, conjunctiva and sclera clear with out nystagmus.    Neck:      no masses, thyromegaly,  trachea central with normal respiratory effort and PMI displaced laterally  Lungs:      Clear to auscultation bilaterally  Heart:       Regular rate and rhythm  Msk:      no deformity or scoliosis noted of thoracic or lumbar spine.    Pulses:      pulses normal in all 4 extremities.    Extremities:       No lower extremity edema  Neurologic:      no focal deficits.   alert oriented x3  Skin:      intact without lesions or rashes.    Psych:      alert and cooperative; normal mood and affect; normal attention span and concentration.      Result Review :               Pertinent cardiac workup    EKG 4/18/2023 sinus bradycardia 51 bpm.  Echo 4/26/2023 ejection fraction 60 to 65%  Heart cath 9/24/2020, no significant CAD.         Procedures        Assessment and Plan      Tessie Conner is a 54-year-old female patient who has no CAD, has autonomic dysfunction, chronic  sinus bradycardia, for which she received a dual-chamber pacemaker on 6/30/2023.   Patient felt well after pacemaker but she still having dizzy spells especially when she is standing up.   After the recent changes however she is feeling better, currently on midodrine 10 mg 3 times a day and fludrocortisone 0.1 once a day and her blood pressure is okay now.  Also her pacemaker is functioning appropriately with no arrhythmias.    Diagnoses and all orders for this visit:    1. Autonomic orthostatic hypotension (Primary)    2. Sick sinus syndrome  Overview:  Added automatically from request for surgery [id number]      3. Pacemaker           Return in about 6 months (around 5/13/2025), or device.  Patient was given instructions and counseling regarding her condition or for health maintenance advice. Please see specific information pulled into the AVS if appropriate.       Electronically signed by Rachel Espinoza MD, 11/13/24, 5:00 PM EST.

## 2024-11-18 ENCOUNTER — PATIENT OUTREACH (OUTPATIENT)
Dept: CASE MANAGEMENT | Facility: CLINIC | Age: 54
End: 2024-11-18
Payer: MEDICARE

## 2024-11-18 NOTE — OUTREACH NOTE
"AMBULATORY CASE MANAGEMENT NOTE    Names and Relationships of Patient/Support Persons: Contact: Tessie Conner; Relationship: Self -     Patient Outreach    Spoke with patient at this time for scheduled ACM outreach, patient states she has been doing well.  She reports that her BP has improved and is \"leveling out\" back to normal values, and as such she has had improvement in her dizziness.  She did f/u with PCP and cardio last week, cardio made no changes to her medications.  ACM noted BP values within normal range at both cardio and PCP appts.  Patient reports PCP took her off of the Januvia due to improved BG control.  Patient does wish to speak with PCP about testing for Santos's disease with an endocrinologist, as her cardiologist states he would not be able to do the testing.  Added request to appt notes for upcoming MWV this Friday.    Patient is also asking about vaccine care gaps.  Explained patient is due for pneumococcal, covid, flu and hep B vaccines.  Explained that only flu vaccine could be given in PCP office, other vaccines would need to be received at [facility], etc.  Patient will request flu vaccine at upcoming appt.  Patient would like to hold off on Hep B vaccine series at this time.    Patient verified she received information from this AC for CCM program.  She states that at this time since her BP has normalized she would like to decline CCM, but states she will reach out to ACM if things change.  Patient appreciative to AC for assistance and follow up, denies any other needs at this time.    Send Education  Questions/Answers      Flowsheet Row Most Recent Value   Annual Wellness Visit:  Patient Will Schedule  [Scheduled 11/22/24]   Advanced Directives: Patient Has            Adrienne PAYNE  Ambulatory Case Management    11/18/2024, 14:50 EST  "

## 2024-11-19 ENCOUNTER — OFFICE VISIT (OUTPATIENT)
Dept: SURGERY | Facility: CLINIC | Age: 54
End: 2024-11-19
Payer: MEDICARE

## 2024-11-19 VITALS
WEIGHT: 206.4 LBS | SYSTOLIC BLOOD PRESSURE: 127 MMHG | HEIGHT: 63 IN | TEMPERATURE: 98.6 F | HEART RATE: 68 BPM | RESPIRATION RATE: 18 BRPM | BODY MASS INDEX: 36.57 KG/M2 | DIASTOLIC BLOOD PRESSURE: 77 MMHG | OXYGEN SATURATION: 97 %

## 2024-11-19 DIAGNOSIS — L72.3 SEBACEOUS CYST: Primary | ICD-10-CM

## 2024-11-19 PROCEDURE — 1159F MED LIST DOCD IN RCRD: CPT | Performed by: SURGERY

## 2024-11-19 PROCEDURE — 1160F RVW MEDS BY RX/DR IN RCRD: CPT | Performed by: SURGERY

## 2024-11-19 PROCEDURE — 99024 POSTOP FOLLOW-UP VISIT: CPT | Performed by: SURGERY

## 2024-11-21 NOTE — PROGRESS NOTES
"Post-op Note      Subjective   Tessie Conner is a 54 y.o. female status post excision of posterior right thigh cyst on 11/4/2024.  Overall, the patient is doing okay.  She is still having some pain and feels like there is a knot there in this area.      Objective   /77 (BP Location: Left arm, Patient Position: Sitting, Cuff Size: Adult)   Pulse 68   Temp 98.6 °F (37 °C) (Infrared)   Resp 18   Ht 160 cm (63\")   Wt 93.6 kg (206 lb 6.4 oz)   LMP  (LMP Unknown)   SpO2 97%   BMI 36.56 kg/m²   Incision is well-healed with a normal amount of postoperative induration beneath the incision.  No surrounding erythema, ration, or drainage is noted.      Assessment & Plan   Patient is a 54-year-old lady status post excision of right posterior thigh cyst on 11/4/2024.    Pathology reviewed with patient which demonstrated epidermal inclusion cyst, no evidence of malignancy.  The pain which the patient was having before is now gone, and she is only having normal postoperative soreness in the area of her excision.  Discussed with the patient that the knot that she is feeling is a normal amount of postoperative induration and should improve over the coming months.  Activities as tolerated.  Follow-up as needed    Justo Shannon MD  11/21/2024  10:28 EST  "

## 2024-11-22 ENCOUNTER — OFFICE VISIT (OUTPATIENT)
Dept: FAMILY MEDICINE CLINIC | Facility: CLINIC | Age: 54
End: 2024-11-22
Payer: MEDICARE

## 2024-11-22 VITALS
SYSTOLIC BLOOD PRESSURE: 126 MMHG | DIASTOLIC BLOOD PRESSURE: 78 MMHG | WEIGHT: 208 LBS | OXYGEN SATURATION: 98 % | BODY MASS INDEX: 36.86 KG/M2 | HEART RATE: 65 BPM | HEIGHT: 63 IN

## 2024-11-22 DIAGNOSIS — Z00.00 MEDICARE ANNUAL WELLNESS VISIT, SUBSEQUENT: Primary | ICD-10-CM

## 2024-11-22 NOTE — PROGRESS NOTES
Subjective   The ABCs of the Annual Wellness Visit  Medicare Wellness Visit      Tessie Conner is a 54 y.o. patient who presents for a Medicare Wellness Visit.    The following portions of the patient's history were reviewed and   updated as appropriate: allergies, current medications, past family history, past medical history, past social history, past surgical history, and problem list.    Compared to one year ago, the patient's physical   health is better.  Compared to one year ago, the patient's mental   health is better.    Recent Hospitalizations:  This patient has had a [facility] admission record on file within the last 365 days.  Current Medical Providers:  Patient Care Team:  Reilly iDaz DO as PCP - General (Family Medicine)  Rachel Espinoza MD as Consulting Physician (Cardiology)  Ayla Carroll MD as Surgeon (Thoracic Surgery)  Adrienne Heredia RN as Ambulatory  ([facility])  Justo Shannon MD as Surgeon (Breast Surgery)    Outpatient Medications Prior to Visit   Medication Sig Dispense Refill    albuterol sulfate  (90 Base) MCG/ACT inhaler Inhale 2 puffs Every 4 (Four) Hours As Needed for Wheezing. 18 g 2    atorvastatin (Lipitor) 40 MG tablet Take 1 tablet by mouth Daily. 90 tablet 3    Budeson-Glycopyrrol-Formoterol (Breztri Aerosphere) 160-9-4.8 MCG/ACT aerosol inhaler Inhale 2 puffs 2 (Two) Times a Day.      cholecalciferol (Vitamin D) 25 MCG (1000 UT) tablet Take 1 tablet by mouth Daily.      FeroSul 325 (65 Fe) MG tablet Take 1 tablet by mouth Daily With Breakfast. 90 tablet 1    fludrocortisone 0.1 MG tablet Take 1 tablet by mouth Daily for 60 days. 30 tablet 1    Gemtesa 75 MG tablet Take 1 tablet by mouth Daily. 90 tablet 3    Hydrocortisone, Perianal, (ANUSOL-HC) 2.5 % rectal cream Insert  into the rectum 2 (Two) Times a Day. 28 g 1    ipratropium-albuterol (DUO-NEB) 0.5-2.5 mg/3 ml nebulizer Take 3 mL by nebulization 4 (Four) Times  a Day As Needed for Wheezing.      midodrine (PROAMATINE) 5 MG tablet Take 2 tablets by mouth 3 (Three) Times a Day Before Meals.      omeprazole (priLOSEC) 20 MG capsule Take 1 capsule by mouth Every Night. 90 capsule 3    Ozempic, 0.25 or 0.5 MG/DOSE, 2 MG/3ML solution pen-injector Inject 0.5 mg under the skin into the appropriate area as directed 1 (One) Time Per Week. 3 mL 3    sertraline (ZOLOFT) 50 MG tablet Take 1 tablet by mouth Every Night.      Vraylar 3 MG capsule capsule Take 1 capsule by mouth Daily.       No facility-administered medications prior to visit.     No opioid medication identified on active medication list. I have reviewed chart for other potential  high risk medication/s and harmful drug interactions in the elderly.      Aspirin is not on active medication list.  Aspirin use is not indicated based on review of current medical condition/s. Risk of harm outweighs potential benefits.  .    Patient Active Problem List   Diagnosis    Bipolar 1 disorder, depressed, moderate    Lithium adverse reaction    Vitamin D deficiency disease    Chest wall pain    Anxiety    Asthma    Bipolar disorder    Extrapyramidal and movement disorder    Hyperlipidemia    Posttraumatic stress disorder    Atypical chest pain    Abnormal EKG    Gastroesophageal reflux disease without esophagitis    Pleurisy    Diarrhea due to malabsorption    Unstable angina pectoris    Coronary artery disease due to calcified coronary lesion    Aftercare following joint replacement surgery    Primary osteoarthritis    Mass of lung    Spleen enlargement    Lung nodule < 6cm on CT    Recurrent UTI    Dry mouth    GARCIA (dyspnea on exertion)    Vaginal dryness    Histoplasmosis    Elevated left ventricular end-diastolic pressure    Obstructive sleep apnea on CPAP    Sick sinus syndrome    Pacemaker    Labile blood pressure    Well controlled type 2 diabetes mellitus with peripheral neuropathy    Rheumatoid arthritis involving both hands  "with positive rheumatoid factor    Autonomic orthostatic hypotension     Advance Care Planning Advance Directive is on file.  ACP discussion was held with the patient during this visit. Patient has an advance directive in EMR which is still valid.             Objective   Vitals:    24 0903   BP: 126/78   Pulse: 65   SpO2: 98%   Weight: 94.3 kg (208 lb)   Height: 160 cm (63\")   PainSc:   6   PainLoc: Hand  Comment: hands and legs       Estimated body mass index is 36.85 kg/m² as calculated from the following:    Height as of this encounter: 160 cm (63\").    Weight as of this encounter: 94.3 kg (208 lb).            Does the patient have evidence of cognitive impairment? No  Lab Results   Component Value Date    TRIG 123 2024    HDL 40 2024     2024    VLDL 22 2024    HGBA1C 5.00 2024                                                                                                Health  Risk Assessment    Smoking Status:  Social History     Tobacco Use   Smoking Status Never    Passive exposure: Never   Smokeless Tobacco Never     Alcohol Consumption:  Social History     Substance and Sexual Activity   Alcohol Use Never    Comment: VERY RARE       Fall Risk Screen  STEADI Fall Risk Assessment was completed, and patient is at HIGH risk for falls. Assessment completed on:2024    Depression Screening   Little interest or pleasure in doing things? Not at all   Feeling down, depressed, or hopeless? Not at all   PHQ-2 Total Score 0      Health Habits and Functional and Cognitive Screenin/22/2024     9:01 AM   Functional & Cognitive Status   Do you have difficulty preparing food and eating? No   Do you have difficulty bathing yourself, getting dressed or grooming yourself? No   Do you have difficulty using the toilet? No   Do you have difficulty moving around from place to place? No   Do you have trouble with steps or getting out of a bed or a chair? Yes   Current Diet " Well Balanced Diet   Dental Exam Up to date   Eye Exam Up to date   Exercise (times per week) 2 times per week   Current Exercises Include Stationary Bicycling/Spin Class   Do you need help using the phone?  No   Are you deaf or do you have serious difficulty hearing?  No   Do you need help to go to places out of walking distance? No   Do you need help shopping? No   Do you need help preparing meals?  No   Do you need help with housework?  No   Do you need help with laundry? No   Do you need help taking your medications? No   Do you need help managing money? No   Do you ever drive or ride in a car without wearing a seat belt? No   Have you felt unusual stress, anger or loneliness in the last month? Yes   Who do you live with? Spouse   If you need help, do you have trouble finding someone available to you? No   Have you been bothered in the last four weeks by sexual problems? No   Do you have difficulty concentrating, remembering or making decisions? Yes           Age-appropriate Screening Schedule:  Refer to the list below for future screening recommendations based on patient's age, sex and/or medical conditions. Orders for these recommended tests are listed in the plan section. The patient has been provided with a written plan.    Health Maintenance List  Health Maintenance   Topic Date Due    PAP SMEAR  08/07/2023    ANNUAL WELLNESS VISIT  02/17/2024    URINE MICROALBUMIN  02/17/2024    Pneumococcal Vaccine 0-64 (2 of 2 - PCV) 02/20/2024    COVID-19 Vaccine (5 - 2024-25 season) 09/01/2024    INFLUENZA VACCINE  03/31/2025 (Originally 8/1/2024)    Hepatitis B (1 of 3 - 19+ 3-dose series) 10/31/2025 (Originally 1/12/1989)    HEMOGLOBIN A1C  05/12/2025    DIABETIC EYE EXAM  07/26/2025    BMI FOLLOWUP  10/22/2025    LIPID PANEL  11/12/2025    COLORECTAL CANCER SCREENING  07/14/2026    MAMMOGRAM  10/16/2026    TDAP/TD VACCINES (2 - Td or Tdap) 11/09/2033    HEPATITIS C SCREENING  Completed    ZOSTER VACCINE  Completed  "                                                                                                                                               CMS Preventative Services Quick Reference  Risk Factors Identified During Encounter  None Identified    The above risks/problems have been discussed with the patient.  Pertinent information has been shared with the patient in the After Visit Summary.  An After Visit Summary and PPPS were made available to the patient.    Follow Up:   Next Medicare Wellness visit to be scheduled in 1 year.         Additional E&M Note during same encounter follows:  Patient has additional, significant, and separately identifiable condition(s)/problem(s) that require work above and beyond the Medicare Wellness Visit     Chief Complaint  Medicare Wellness-subsequent    Subjective   HPI  Tessie is also being seen today for an annual adult preventative physical exam.                 Objective   Vital Signs:  /78   Pulse 65   Ht 160 cm (63\")   Wt 94.3 kg (208 lb)   SpO2 98%   BMI 36.85 kg/m²   Physical Exam    GEN: In no acute distress, non toxic appearing  HEENT: Pupils equal and reactive to light, sclera clear. Mucous membranes moist. Oropharynx without erythema or exudate.   CV: Regular rate and rhythm, no murmurs., No extremity edema.   RESP: Lungs clear to auscultation in all lung fields bilaterally. No signs of respiratory distress.  ABD: Soft, nontender, nondistended.  SKIN: No rashes  MSK: No joint erythema, deformity, or effusion.   NEURO: AAO to person, place, and time. CN 2-12 intact grossly.   PSYCH: Affect normal, insight fair                  Assessment and Plan            Medicare annual wellness visit, subsequent    Doing well.    No indication for blood work at this time.    Immunizations  - Influenza: Received in office today  - COVID: Due  - Pneumonia: PCV 20 in office today      Screening  - Mammogram: Up-to-date, due October 2026  - Pap: Due, appointment with OB/GYN in " February 2025  -CRC: Up-to-date, due July 2026                       Follow Up   Return in about 3 months (around 2/22/2025) for Recheck.  Patient was given instructions and counseling regarding her condition or for health maintenance advice. Please see specific information pulled into the AVS if appropriate.

## 2024-12-02 DIAGNOSIS — E11.42 WELL CONTROLLED TYPE 2 DIABETES MELLITUS WITH PERIPHERAL NEUROPATHY: ICD-10-CM

## 2024-12-02 RX ORDER — SEMAGLUTIDE 0.68 MG/ML
0.5 INJECTION, SOLUTION SUBCUTANEOUS WEEKLY
Qty: 3 ML | Refills: 3 | Status: SHIPPED | OUTPATIENT
Start: 2024-12-02

## 2024-12-14 DIAGNOSIS — I95.1 AUTONOMIC ORTHOSTATIC HYPOTENSION: ICD-10-CM

## 2024-12-16 RX ORDER — FLUDROCORTISONE ACETATE 0.1 MG/1
0.1 TABLET ORAL DAILY
Qty: 30 TABLET | Refills: 2 | Status: SHIPPED | OUTPATIENT
Start: 2024-12-16

## 2024-12-16 RX ORDER — FLUDROCORTISONE ACETATE 0.1 MG/1
TABLET ORAL DAILY
Qty: 30 TABLET | Refills: 0 | Status: SHIPPED | OUTPATIENT
Start: 2024-12-16 | End: 2024-12-16 | Stop reason: SDUPTHER

## 2024-12-16 NOTE — TELEPHONE ENCOUNTER
Rx Refill Note  Requested Prescriptions     Pending Prescriptions Disp Refills    fludrocortisone 0.1 MG tablet 30 tablet 2     Sig: Take 1 tablet by mouth Daily.     Signed Prescriptions Disp Refills    fludrocortisone 0.1 MG tablet 30 tablet 0     Sig: Take 1 tablet by mouth once daily     Authorizing Provider: TYESHA GONZALES      Last office visit with prescribing clinician: Dr. Espinoza 11/13/24  Last telemedicine visit with prescribing clinician: Visit date not found   Next office visit with prescribing clinician: Dr. Espinoza 4/22/25                        Would you like a call back once the refill request has been completed: [] Yes [] No    If the office needs to give you a call back, can they leave a voicemail: [] Yes [] No    Joyce Finch MA  12/16/24, 09:40 EST

## 2024-12-16 NOTE — TELEPHONE ENCOUNTER
Received refill request for fludrocortisone .1mg today. Medication has end date of 12/21/24 per INGRID Salazar. Will send prescription request for review to the prescriber.

## 2024-12-16 NOTE — TELEPHONE ENCOUNTER
I approve this medication. It will not let me add on refills, so if you can please add at least 3 refills.

## 2024-12-18 DIAGNOSIS — I95.1 AUTONOMIC ORTHOSTATIC HYPOTENSION: ICD-10-CM

## 2024-12-18 RX ORDER — FLUDROCORTISONE ACETATE 0.1 MG/1
0.1 TABLET ORAL DAILY
Qty: 30 TABLET | Refills: 2 | OUTPATIENT
Start: 2024-12-18

## 2024-12-18 NOTE — TELEPHONE ENCOUNTER
Rx Refill Note  Requested Prescriptions     Refused Prescriptions Disp Refills    fludrocortisone 0.1 MG tablet 30 tablet 2     Sig: Take 1 tablet by mouth Daily.      Last office visit with prescribing clinician: Visit date not found   Last telemedicine visit with prescribing clinician: Visit date not found   Next office visit with prescribing clinician: Visit date not found                         Would you like a call back once the refill request has been completed: [] Yes [] No    If the office needs to give you a call back, can they leave a voicemail: [] Yes [] No    Joyce Finch MA  12/18/24, 09:35 EST

## 2024-12-18 NOTE — TELEPHONE ENCOUNTER
Spoke to patient about her refill request. Medication was sent in to desired pharmacy on 12/16/24 at 0941. Patient verbalized understanding and will contact pharmacy about prescription.

## 2025-01-07 ENCOUNTER — TELEPHONE (OUTPATIENT)
Dept: CARDIOLOGY | Facility: CLINIC | Age: 55
End: 2025-01-07
Payer: MEDICARE

## 2025-01-07 DIAGNOSIS — E11.42 WELL CONTROLLED TYPE 2 DIABETES MELLITUS WITH PERIPHERAL NEUROPATHY: ICD-10-CM

## 2025-01-07 DIAGNOSIS — I95.1 AUTONOMIC ORTHOSTATIC HYPOTENSION: ICD-10-CM

## 2025-01-07 RX ORDER — FLUDROCORTISONE ACETATE 0.1 MG/1
0.1 TABLET ORAL DAILY
Qty: 90 TABLET | Refills: 3 | Status: SHIPPED | OUTPATIENT
Start: 2025-01-07

## 2025-01-07 RX ORDER — MIDODRINE HYDROCHLORIDE 5 MG/1
10 TABLET ORAL
Qty: 270 TABLET | Refills: 1 | Status: SHIPPED | OUTPATIENT
Start: 2025-01-07

## 2025-01-07 RX ORDER — FLUDROCORTISONE ACETATE 0.1 MG/1
0.1 TABLET ORAL DAILY
Qty: 90 TABLET | Refills: 3 | Status: SHIPPED | OUTPATIENT
Start: 2025-01-07 | End: 2025-01-07 | Stop reason: SDUPTHER

## 2025-01-07 RX ORDER — SEMAGLUTIDE 0.68 MG/ML
0.5 INJECTION, SOLUTION SUBCUTANEOUS WEEKLY
Qty: 3 ML | Refills: 3 | Status: SHIPPED | OUTPATIENT
Start: 2025-01-07

## 2025-01-07 NOTE — TELEPHONE ENCOUNTER
Rx Refill Note  Requested Prescriptions     Pending Prescriptions Disp Refills    fludrocortisone 0.1 MG tablet 30 tablet 2     Sig: Take 1 tablet by mouth Daily.      Last office visit with prescribing clinician: 11/13/2024   Last telemedicine visit with prescribing clinician: Visit date not found   Next office visit with prescribing clinician: 4/22/2025                         Would you like a call back once the refill request has been completed: [] Yes [] No    If the office needs to give you a call back, can they leave a voicemail: [] Yes [] No    Julia Bolden MA  01/07/25, 11:46 EST

## 2025-01-09 ENCOUNTER — OFFICE VISIT (OUTPATIENT)
Dept: FAMILY MEDICINE CLINIC | Facility: CLINIC | Age: 55
End: 2025-01-09
Payer: MEDICARE

## 2025-01-09 VITALS
RESPIRATION RATE: 18 BRPM | SYSTOLIC BLOOD PRESSURE: 118 MMHG | BODY MASS INDEX: 36.14 KG/M2 | OXYGEN SATURATION: 98 % | HEART RATE: 81 BPM | HEIGHT: 63 IN | WEIGHT: 204 LBS | DIASTOLIC BLOOD PRESSURE: 62 MMHG

## 2025-01-09 DIAGNOSIS — J06.9 ACUTE URI: Primary | ICD-10-CM

## 2025-01-09 PROCEDURE — 1125F AMNT PAIN NOTED PAIN PRSNT: CPT | Performed by: NURSE PRACTITIONER

## 2025-01-09 PROCEDURE — 99213 OFFICE O/P EST LOW 20 MIN: CPT | Performed by: NURSE PRACTITIONER

## 2025-01-09 PROCEDURE — 1159F MED LIST DOCD IN RCRD: CPT | Performed by: NURSE PRACTITIONER

## 2025-01-09 PROCEDURE — 87428 SARSCOV & INF VIR A&B AG IA: CPT | Performed by: NURSE PRACTITIONER

## 2025-01-09 PROCEDURE — 1160F RVW MEDS BY RX/DR IN RCRD: CPT | Performed by: NURSE PRACTITIONER

## 2025-01-09 RX ORDER — ALBUTEROL SULFATE 90 UG/1
2 INHALANT RESPIRATORY (INHALATION) EVERY 4 HOURS PRN
Qty: 18 G | Refills: 2 | Status: SHIPPED | OUTPATIENT
Start: 2025-01-09

## 2025-01-09 RX ORDER — AZITHROMYCIN 250 MG/1
TABLET, FILM COATED ORAL
Qty: 6 TABLET | Refills: 0 | Status: SHIPPED | OUTPATIENT
Start: 2025-01-09

## 2025-01-09 RX ORDER — DEXTROMETHORPHAN HYDROBROMIDE AND PROMETHAZINE HYDROCHLORIDE 15; 6.25 MG/5ML; MG/5ML
5 SYRUP ORAL 4 TIMES DAILY PRN
Qty: 180 ML | Refills: 0 | Status: SHIPPED | OUTPATIENT
Start: 2025-01-09

## 2025-01-09 RX ORDER — PREDNISONE 20 MG/1
20 TABLET ORAL 2 TIMES DAILY
Qty: 10 TABLET | Refills: 0 | Status: SHIPPED | OUTPATIENT
Start: 2025-01-09

## 2025-01-09 NOTE — PROGRESS NOTES
"Chief Complaint  Cough (Has had cough for 5 days ), Nasal Congestion, and Headache  Subjective        Tessie Conner presents to [facility]  History of Present Illness  Pt comes in today with c/o ST, body aches, cough, SOA, sneezing, nasal congestion, and HA.   Symptoms started about 4-5 days ago.  Symptoms are getting worse.  Taking mucinex otc.     Cough  Associated symptoms include headaches.   Headache    Review of Systems   Respiratory:  Positive for cough.      Objective     Vital Signs:   /62   Pulse 81   Resp 18   Ht 160 cm (62.99\")   Wt 92.5 kg (204 lb)   SpO2 98%   BMI 36.15 kg/m²       BP Readings from Last 3 Encounters:   01/09/25 118/62   11/22/24 126/78   11/19/24 127/77       Wt Readings from Last 3 Encounters:   01/09/25 92.5 kg (204 lb)   11/22/24 94.3 kg (208 lb)   11/19/24 93.6 kg (206 lb 6.4 oz)     Physical Exam  Constitutional:       Appearance: She is well-developed.   HENT:      Right Ear: Tympanic membrane normal.      Left Ear: Tympanic membrane normal.      Nose: Congestion present.   Eyes:      Pupils: Pupils are equal, round, and reactive to light.   Cardiovascular:      Rate and Rhythm: Normal rate and regular rhythm.   Pulmonary:      Effort: Pulmonary effort is normal.      Breath sounds: Wheezing present.   Neurological:      Mental Status: She is alert and oriented to person, place, and time.        Result Review :                 Assessment and Plan    Diagnoses and all orders for this visit:    1. Acute URI (Primary)  -     POCT SARS-CoV-2 Antigen GAURANG + Flu  -     azithromycin (Zithromax Z-Refugio) 250 MG tablet; Take 2 tablets by mouth on day 1, then 1 tablet daily on days 2-5  Dispense: 6 tablet; Refill: 0  -     predniSONE (DELTASONE) 20 MG tablet; Take 1 tablet by mouth 2 (Two) Times a Day.  Dispense: 10 tablet; Refill: 0  -     albuterol sulfate  (90 Base) MCG/ACT inhaler; Inhale 2 puffs Every 4 (Four) Hours As Needed for " Wheezing.  Dispense: 18 g; Refill: 2  -     promethazine-dextromethorphan (PROMETHAZINE-DM) 6.25-15 MG/5ML syrup; Take 5 mL by mouth 4 (Four) Times a Day As Needed for Cough.  Dispense: 180 mL; Refill: 0    Flu/covid neg  Start meds as prescribed  Albuterol prn  Mucinex otc  During this office visit, we discussed the pertinent aspects of the visit and treatment recommendations. Pt verbalizes understanding. Follow up was discussed. Patient was given the opportunity to ask questions and discuss other concerns.         Follow Up   Return if symptoms worsen or fail to improve.  Patient was given instructions and counseling regarding her condition or for health maintenance advice. Please see specific information pulled into the AVS if appropriate.

## 2025-01-15 ENCOUNTER — TELEPHONE (OUTPATIENT)
Dept: FAMILY MEDICINE CLINIC | Facility: CLINIC | Age: 55
End: 2025-01-15
Payer: MEDICARE

## 2025-01-15 DIAGNOSIS — F31.32 BIPOLAR 1 DISORDER, DEPRESSED, MODERATE: Primary | Chronic | ICD-10-CM

## 2025-01-15 NOTE — TELEPHONE ENCOUNTER
Caller: Tessie Conner    Relationship: Self    Best call back number: [phone]    What is the medical concern/diagnosis:     What specialty or service is being requested: REFERRAL TO HER PSYCHIATRIST.     What is the provider, practice or medical service name: [facility]     What is the office location: [facility]     What is the office phone number: [phone] [address]    Any additional details:       PATIENT HAS AN APPOINTMENT TOMORROW MORNING AT 10 AM WITH THE PSYCHIATRIST     REQUESTING WE EMAIL THE REFERRAL TO:     EMAIL : [email or web address]

## 2025-01-25 ENCOUNTER — HOSPITAL ENCOUNTER (OUTPATIENT)
Dept: CT IMAGING | Facility: HOSPITAL | Age: 55
Discharge: HOME | End: 2025-01-25
Payer: MEDICARE

## 2025-01-25 DIAGNOSIS — R91.8 LUNG NODULES: ICD-10-CM

## 2025-01-25 PROCEDURE — 71250 CT THORAX DX C-: CPT

## 2025-02-06 ENCOUNTER — OFFICE VISIT (OUTPATIENT)
Dept: FAMILY MEDICINE CLINIC | Facility: CLINIC | Age: 55
End: 2025-02-06
Payer: MEDICARE

## 2025-02-06 VITALS
HEART RATE: 70 BPM | BODY MASS INDEX: 36.93 KG/M2 | DIASTOLIC BLOOD PRESSURE: 81 MMHG | OXYGEN SATURATION: 95 % | SYSTOLIC BLOOD PRESSURE: 127 MMHG | HEIGHT: 63 IN | RESPIRATION RATE: 18 BRPM | WEIGHT: 208.4 LBS

## 2025-02-06 DIAGNOSIS — R05.1 ACUTE COUGH: Primary | ICD-10-CM

## 2025-02-06 PROCEDURE — 1125F AMNT PAIN NOTED PAIN PRSNT: CPT | Performed by: NURSE PRACTITIONER

## 2025-02-06 PROCEDURE — 99213 OFFICE O/P EST LOW 20 MIN: CPT | Performed by: NURSE PRACTITIONER

## 2025-02-06 PROCEDURE — 1160F RVW MEDS BY RX/DR IN RCRD: CPT | Performed by: NURSE PRACTITIONER

## 2025-02-06 PROCEDURE — 87428 SARSCOV & INF VIR A&B AG IA: CPT | Performed by: NURSE PRACTITIONER

## 2025-02-06 PROCEDURE — 1159F MED LIST DOCD IN RCRD: CPT | Performed by: NURSE PRACTITIONER

## 2025-02-06 RX ORDER — BENZONATATE 200 MG/1
200 CAPSULE ORAL 3 TIMES DAILY PRN
Qty: 30 CAPSULE | Refills: 0 | Status: SHIPPED | OUTPATIENT
Start: 2025-02-06

## 2025-02-06 RX ORDER — PREDNISONE 20 MG/1
20 TABLET ORAL 2 TIMES DAILY
Qty: 10 TABLET | Refills: 0 | Status: SHIPPED | OUTPATIENT
Start: 2025-02-06

## 2025-02-06 NOTE — PROGRESS NOTES
"Chief Complaint  Headache, Nasal Congestion, Cough, and Shortness of Breath    Subjective          Tessie Conner presents to [facility]  Headache  Cough  Associated symptoms include headaches, myalgias, shortness of breath and wheezing. Pertinent negatives include no chest pain, ear pain, fever, postnasal drip or sore throat.   Shortness of Breath  Associated symptoms include headaches and wheezing. Pertinent negatives include no chest pain, ear pain, fever or sore throat.     Is here today with c/o respiratory illness for the past 3 days    Everyone at work in sick    She was sick about 1 month ago, symptoms fully resolved    Review of Systems   Constitutional:  Positive for activity change, diaphoresis and fatigue. Negative for appetite change and fever.   HENT:  Positive for congestion, sinus pressure and sinus pain. Negative for ear discharge, ear pain, postnasal drip, sore throat and trouble swallowing.    Respiratory:  Positive for cough, shortness of breath and wheezing.         Endorses that she is having lung pain at her surgery site, has h/o lung resection   Cardiovascular:  Positive for palpitations. Negative for chest pain.        Endorses infrequent palpitations   Gastrointestinal: Negative.    Genitourinary: Negative.    Musculoskeletal:  Positive for myalgias.   Neurological:  Positive for headaches.   Psychiatric/Behavioral:  Positive for sleep disturbance.      Objective   Vital Signs:  /81   Pulse 70   Resp 18   Ht 160 cm (62.99\")   Wt 94.5 kg (208 lb 6.4 oz)   SpO2 95%   BMI 36.93 kg/m²     BP Readings from Last 3 Encounters:   02/06/25 127/81   01/09/25 118/62   11/22/24 126/78        Wt Readings from Last 3 Encounters:   02/06/25 94.5 kg (208 lb 6.4 oz)   01/09/25 92.5 kg (204 lb)   11/22/24 94.3 kg (208 lb)        Physical Exam  Vitals reviewed.   Constitutional:       Appearance: Normal appearance. She is obese.   HENT:      Right Ear: Tympanic " membrane, ear canal and external ear normal.      Left Ear: Tympanic membrane, ear canal and external ear normal.      Mouth/Throat:      Mouth: Mucous membranes are moist.      Pharynx: Posterior oropharyngeal erythema present. No oropharyngeal exudate.   Cardiovascular:      Rate and Rhythm: Normal rate and regular rhythm.      Pulses: Normal pulses.      Heart sounds: Normal heart sounds.   Pulmonary:      Effort: Pulmonary effort is normal.      Breath sounds: Normal breath sounds.   Musculoskeletal:      Cervical back: No tenderness.   Lymphadenopathy:      Cervical: No cervical adenopathy.   Skin:     General: Skin is warm.   Neurological:      Mental Status: She is alert and oriented to person, place, and time.        Result Review :                   Assessment and Plan    Diagnoses and all orders for this visit:    1. Acute cough (Primary)  -     POCT SARS-CoV-2 Antigen GAURANG + Flu  -     benzonatate (TESSALON) 200 MG capsule; Take 1 capsule by mouth 3 (Three) Times a Day As Needed for Cough.  Dispense: 30 capsule; Refill: 0  -     predniSONE (DELTASONE) 20 MG tablet; Take 1 tablet by mouth 2 (Two) Times a Day.  Dispense: 10 tablet; Refill: 0    Negative for covid & flu       Follow Up   Return if symptoms worsen or fail to improve.  Patient was given instructions and counseling regarding her condition or for health maintenance advice. Please see specific information pulled into the AVS if appropriate.

## 2025-03-04 ENCOUNTER — HOSPITAL ENCOUNTER (EMERGENCY)
Facility: HOSPITAL | Age: 55
Discharge: HOME | End: 2025-03-04
Admitting: EMERGENCY MEDICINE
Payer: MEDICARE

## 2025-03-04 ENCOUNTER — APPOINTMENT (OUTPATIENT)
Dept: GENERAL RADIOLOGY | Facility: HOSPITAL | Age: 55
End: 2025-03-04
Payer: MEDICARE

## 2025-03-04 ENCOUNTER — TELEPHONE (OUTPATIENT)
Dept: PAIN MEDICINE | Facility: CLINIC | Age: 55
End: 2025-03-04
Payer: MEDICARE

## 2025-03-04 VITALS
TEMPERATURE: 98.3 F | OXYGEN SATURATION: 97 % | RESPIRATION RATE: 18 BRPM | HEART RATE: 60 BPM | BODY MASS INDEX: 36.86 KG/M2 | SYSTOLIC BLOOD PRESSURE: 118 MMHG | HEIGHT: 63 IN | DIASTOLIC BLOOD PRESSURE: 54 MMHG | WEIGHT: 208 LBS

## 2025-03-04 DIAGNOSIS — R07.89 CHEST WALL PAIN: Primary | ICD-10-CM

## 2025-03-04 DIAGNOSIS — G58.8 INTERCOSTAL NEURALGIA: Primary | ICD-10-CM

## 2025-03-04 LAB
ANION GAP SERPL CALCULATED.3IONS-SCNC: 9 MMOL/L (ref 5–15)
BASOPHILS # BLD AUTO: 0.03 10*3/MM3 (ref 0–0.2)
BASOPHILS NFR BLD AUTO: 0.4 % (ref 0–1.5)
BUN SERPL-MCNC: 13 MG/DL (ref 6–20)
BUN/CREAT SERPL: 16.9 (ref 7–25)
CALCIUM SPEC-SCNC: 9.8 MG/DL (ref 8.6–10.5)
CHLORIDE SERPL-SCNC: 103 MMOL/L (ref 98–107)
CO2 SERPL-SCNC: 29 MMOL/L (ref 22–29)
CREAT SERPL-MCNC: 0.77 MG/DL (ref 0.57–1)
DEPRECATED RDW RBC AUTO: 48.9 FL (ref 37–54)
EGFRCR SERPLBLD CKD-EPI 2021: 91.8 ML/MIN/1.73
EOSINOPHIL # BLD AUTO: 0.02 10*3/MM3 (ref 0–0.4)
EOSINOPHIL NFR BLD AUTO: 0.3 % (ref 0.3–6.2)
ERYTHROCYTE [DISTWIDTH] IN BLOOD BY AUTOMATED COUNT: 14.5 % (ref 12.3–15.4)
GLUCOSE SERPL-MCNC: 87 MG/DL (ref 65–99)
HCT VFR BLD AUTO: 39.8 % (ref 34–46.6)
HGB BLD-MCNC: 12.4 G/DL (ref 12–15.9)
IMM GRANULOCYTES # BLD AUTO: 0.04 10*3/MM3 (ref 0–0.05)
IMM GRANULOCYTES NFR BLD AUTO: 0.5 % (ref 0–0.5)
LYMPHOCYTES # BLD AUTO: 1.72 10*3/MM3 (ref 0.7–3.1)
LYMPHOCYTES NFR BLD AUTO: 22.3 % (ref 19.6–45.3)
MCH RBC QN AUTO: 28.4 PG (ref 26.6–33)
MCHC RBC AUTO-ENTMCNC: 31.2 G/DL (ref 31.5–35.7)
MCV RBC AUTO: 91.1 FL (ref 79–97)
MONOCYTES # BLD AUTO: 0.58 10*3/MM3 (ref 0.1–0.9)
MONOCYTES NFR BLD AUTO: 7.5 % (ref 5–12)
NEUTROPHILS NFR BLD AUTO: 5.34 10*3/MM3 (ref 1.7–7)
NEUTROPHILS NFR BLD AUTO: 69 % (ref 42.7–76)
NRBC BLD AUTO-RTO: 0 /100 WBC (ref 0–0.2)
PLATELET # BLD AUTO: 216 10*3/MM3 (ref 140–450)
PMV BLD AUTO: 9.7 FL (ref 6–12)
POTASSIUM SERPL-SCNC: 4.2 MMOL/L (ref 3.5–5.2)
RBC # BLD AUTO: 4.37 10*6/MM3 (ref 3.77–5.28)
SODIUM SERPL-SCNC: 141 MMOL/L (ref 136–145)
WBC NRBC COR # BLD AUTO: 7.73 10*3/MM3 (ref 3.4–10.8)

## 2025-03-04 PROCEDURE — 71046 X-RAY EXAM CHEST 2 VIEWS: CPT

## 2025-03-04 PROCEDURE — 85025 COMPLETE CBC W/AUTO DIFF WBC: CPT

## 2025-03-04 PROCEDURE — 96374 THER/PROPH/DIAG INJ IV PUSH: CPT

## 2025-03-04 PROCEDURE — 80048 BASIC METABOLIC PNL TOTAL CA: CPT

## 2025-03-04 PROCEDURE — 25010000002 ONDANSETRON PER 1 MG

## 2025-03-04 PROCEDURE — 96375 TX/PRO/DX INJ NEW DRUG ADDON: CPT

## 2025-03-04 PROCEDURE — 25010000002 DIPHENHYDRAMINE PER 50 MG

## 2025-03-04 PROCEDURE — 36415 COLL VENOUS BLD VENIPUNCTURE: CPT

## 2025-03-04 PROCEDURE — 25010000002 MORPHINE PER 10 MG

## 2025-03-04 PROCEDURE — 99283 EMERGENCY DEPT VISIT LOW MDM: CPT

## 2025-03-04 RX ORDER — DIPHENHYDRAMINE HYDROCHLORIDE 50 MG/ML
25 INJECTION INTRAMUSCULAR; INTRAVENOUS ONCE
Status: COMPLETED | OUTPATIENT
Start: 2025-03-04 | End: 2025-03-04

## 2025-03-04 RX ORDER — DIPHENHYDRAMINE HYDROCHLORIDE 50 MG/ML
INJECTION INTRAMUSCULAR; INTRAVENOUS
Status: COMPLETED
Start: 2025-03-04 | End: 2025-03-04

## 2025-03-04 RX ORDER — SODIUM CHLORIDE 0.9 % (FLUSH) 0.9 %
10 SYRINGE (ML) INJECTION AS NEEDED
Status: DISCONTINUED | OUTPATIENT
Start: 2025-03-04 | End: 2025-03-04 | Stop reason: HOSPADM

## 2025-03-04 RX ORDER — TRAMADOL HYDROCHLORIDE 50 MG/1
50 TABLET ORAL EVERY 6 HOURS PRN
Qty: 21 TABLET | Refills: 0 | Status: SHIPPED | OUTPATIENT
Start: 2025-03-04

## 2025-03-04 RX ORDER — ONDANSETRON 2 MG/ML
4 INJECTION INTRAMUSCULAR; INTRAVENOUS ONCE
Status: COMPLETED | OUTPATIENT
Start: 2025-03-04 | End: 2025-03-04

## 2025-03-04 RX ADMIN — ONDANSETRON 4 MG: 2 INJECTION INTRAMUSCULAR; INTRAVENOUS at 10:45

## 2025-03-04 RX ADMIN — DIPHENHYDRAMINE HYDROCHLORIDE 25 MG: 50 INJECTION, SOLUTION INTRAMUSCULAR; INTRAVENOUS at 10:53

## 2025-03-04 RX ADMIN — DIPHENHYDRAMINE HYDROCHLORIDE 25 MG: 50 INJECTION INTRAMUSCULAR; INTRAVENOUS at 10:53

## 2025-03-04 RX ADMIN — MORPHINE SULFATE 4 MG: 4 INJECTION, SOLUTION INTRAMUSCULAR; INTRAVENOUS at 10:45

## 2025-03-04 NOTE — TELEPHONE ENCOUNTER
Sent Tramadol. Fritz---schedule her for intercostal N block.   SHE NEEDS TO KEEP APPOINTMENT ON 3/6/25.     Shane Medina DO  Pain Management   [facility]

## 2025-03-04 NOTE — ED PROVIDER NOTES
Subjective   History of Present Illness  Chief complaint: Stabbing right lung pain that is been constant since Saturday.      Context: Patient is a 55-year-old female who presents to the ER with complaint of stabbing right lung pain that has been constant since Saturday.  Patient reports history of having a right upper lobe resection, states she was in pain management where they gave her nerve blocks, however she stopped going there about a year year and a half ago, and has a new appointment scheduled on Thursday but states that a nerve block is not scheduled for that day and she is needing something for pain..  Patient reports the pain has become too intense to wait till Thursday.  Patient denies any shortness of air, or pain with deep inspiration, states is just a constant stabbing pain in her right lower lobe.  Patient denies any chest pain, shortness of air, fever, abdominal pain, nausea/vomiting/diarrhea, urinary symptoms.        PCP: Donald alvarez    LMP: Hysterectomy          Review of Systems   Constitutional:  Negative for fever.       Past Medical History:   Diagnosis Date    Abnormal ECG     Anemia     Anesthesia complication 2003    cardiac arrest  with hysterectomy    Anxiety 2005    Arthritis     Asthma 04/17/2020    allergy    Bipolar 1 disorder     Bradycardia     Chest pain     due to scarring from lung surgery  2/21    Chest pain 09/24/2020    Cholelithiasis 10/2022    COPD (chronic obstructive pulmonary disease)     Coronary artery disease     very minimal    Depression 2005    Diabetes mellitus 2002    borderline   resolved    Diverticulitis of colon     Diverticulosis     Dyspnea on minimal exertion     Frequent UTI     Gastroesophageal reflux disease without esophagitis 07/15/2020    Heart murmur     FROM CHILDHOOD    Histoplasmosis     History of anemia     POST PREGNANCY    Hyperlipidemia     Hyperlipidemia 10/04/2016    Hypertension     Hypotension     Inflammatory bowel disease     Mass of  upper lobe of right lung     Migraines     hx    Obesity     Pneumonia     PONV (postoperative nausea and vomiting)     Scoliosis     Sleep apnea 22    cpap    Spinal headache     Vertigo     Visual impairment     WEARS GLASSES    Vitamin D deficiency        Allergies   Allergen Reactions    Morphine Itching     Itching and redness on chest    Tylenol [Acetaminophen] Hives and Itching    Pregabalin Rash       Past Surgical History:   Procedure Laterality Date    ANKLE OPEN REDUCTION INTERNAL FIXATION Right 2023    Procedure: ANKLE OPEN REDUCTION INTERNAL FIXATION;  Surgeon: TORIE Beck DPM;  Location: [facility] MAIN OR;  Service: Podiatry;  Laterality: Right;    APPENDECTOMY      CARDIAC CATHETERIZATION N/A 2020    Procedure: Left Heart Cath possible PCI, atherectomy, hemodynamic support;  Surgeon: Thomas Zamora MD;  Location: [facility] CATH INVASIVE LOCATION;  Service: Cardiology;  Laterality: N/A;    CARDIAC CATHETERIZATION  2020    CARDIAC ELECTROPHYSIOLOGY PROCEDURE N/A 2023    Procedure: Pacemaker DC new, [place] aware;  Surgeon: Rachel Espinoza MD;  Location: [facility] CATH INVASIVE LOCATION;  Service: Cardiovascular;  Laterality: N/A;    CARDIAC SURGERY       SECTION      FOOT/TOE TENDON REPAIR Left     FRACTURE SURGERY  2023    Rt ankle    HYSTERECTOMY      INSERT / REPLACE / REMOVE PACEMAKER  2023    LUNG BIOPSY Right 2020    LUNG LOBECTOMY Right 2022    pt had partial Right lobectomy and nodule removal    MASS EXCISION Right 2023    Procedure: LIPOMA EXCISION,THIGH;  Surgeon: Justo Shannon MD;  Location: [facility] MAIN OR;  Service: General;  Laterality: Right;    MASS EXCISION Right 2024    Procedure: Excision of soft tissue mass from right posterior thigh measuring 2 cm;  Surgeon: Justo Shannon MD;  Location: [facility] MAIN OR;  Service: General;  Laterality: Right;    ROTATOR CUFF REPAIR Left      THORACOSCOPY VIDEO ASSISTED WITH LOBECTOMY Right 02/08/2022    Procedure: BRONCHOSCOPY, THORACOSCOPY VIDEO ASSISTED wedge resection X2, INTERCOSTAL NERVE BLOCK;  Surgeon: Ayla Carroll MD;  Location: [facility] OR;  Service: Thoracic;  Laterality: Right;    TUBAL ABDOMINAL LIGATION  2002       Family History   Problem Relation Age of Onset    Arthritis Mother     Cancer Mother     Depression Mother     Diabetes Mother     Early death Mother     Mental illness Mother     Anxiety disorder Mother     Miscarriages / Stillbirths Mother     Alcohol abuse Father     Diabetes Father     Early death Father     Heart disease Father     Hyperlipidemia Father     Hypertension Father         Father    Vision loss Father     Heart attack Father     Heart disease Sister     Drug abuse Sister     Heart attack Sister     Hypertension Sister         Sister    Heart disease Sister     Heart disease Brother     Hypertension Brother     Heart attack Brother     Drug abuse Brother     Hypertension Brother     Heart disease Brother     Heart attack Brother     Cancer Maternal Grandmother     Malig Hyperthermia Neg Hx        Social History     Socioeconomic History    Marital status:    Tobacco Use    Smoking status: Never     Passive exposure: Never    Smokeless tobacco: Never   Vaping Use    Vaping status: Never Used   Substance and Sexual Activity    Alcohol use: Never     Comment: VERY RARE    Drug use: Never    Sexual activity: Not Currently     Partners: Male     Birth control/protection: None, Hysterectomy           Objective   Physical Exam  Vitals and nursing note reviewed.   Constitutional:       Appearance: Normal appearance.   HENT:      Head: Normocephalic.      Nose: Nose normal.      Mouth/Throat:      Mouth: Mucous membranes are moist.   Eyes:      Extraocular Movements: Extraocular movements intact.   Cardiovascular:      Rate and Rhythm: Normal rate and regular rhythm.      Pulses: Normal pulses.      Heart  "sounds: Normal heart sounds.   Pulmonary:      Effort: Pulmonary effort is normal.      Breath sounds: No wheezing.   Abdominal:      Palpations: Abdomen is soft.      Tenderness: There is no abdominal tenderness. There is no guarding or rebound.   Musculoskeletal:         General: Normal range of motion.      Cervical back: Normal range of motion.   Skin:     General: Skin is warm and dry.      Capillary Refill: Capillary refill takes less than 2 seconds.   Neurological:      General: No focal deficit present.      Mental Status: She is alert and oriented to person, place, and time.   Psychiatric:         Behavior: Behavior normal.         Procedures           ED Course              /64   Pulse 60   Temp 98.3 °F (36.8 °C) (Oral)   Resp 18   Ht 160 cm (63\")   Wt 94.3 kg (208 lb)   LMP  (LMP Unknown)   SpO2 98%   BMI 36.85 kg/m²   Labs Reviewed   CBC WITH AUTO DIFFERENTIAL - Abnormal; Notable for the following components:       Result Value    MCHC 31.2 (*)     All other components within normal limits   BASIC METABOLIC PANEL - Normal    Narrative:     GFR Categories in Chronic Kidney Disease (CKD)      GFR Category          GFR (mL/min/1.73)    Interpretation  G1                     90 or greater         Normal or high (1)  G2                      60-89                Mild decrease (1)  G3a                   45-59                Mild to moderate decrease  G3b                   30-44                Moderate to severe decrease  G4                    15-29                Severe decrease  G5                    14 or less           Kidney failure          (1)In the absence of evidence of kidney disease, neither GFR category G1 or G2 fulfill the criteria for CKD.    eGFR calculation 2021 CKD-EPI creatinine equation, which does not include race as a factor   CBC AND DIFFERENTIAL    Narrative:     The following orders were created for panel order CBC & Differential.  Procedure                               " Abnormality         Status                     ---------                               -----------         ------                     CBC Auto Differential[[id number]]        Abnormal            Final result                 Please view results for these tests on the individual orders.     Medications   sodium chloride 0.9 % flush 10 mL (has no administration in time range)   morphine injection 4 mg (4 mg Intravenous Given 3/4/25 1045)   ondansetron (ZOFRAN) injection 4 mg (4 mg Intravenous Given 3/4/25 1045)   diphenhydrAMINE (BENADRYL) injection 25 mg (25 mg Intravenous Given 3/4/25 1053)     XR Chest 2 View    Result Date: 3/4/2025  Impression: 1. No acute process. 2. Postsurgical changes of wedge resection of the right upper lobe. Electronically Signed: Nick Koch MD  3/4/2025 10:21 AM EST  Workstation ID: [id number]                                            Medical Decision Making  Chart review:  CT CHEST WO CONTRAST DIAGNOSTIC  Date of Exam: 1/25/2025 8:40 AM EST  7 mm or less noncalcified bilateral pulmonary nodules are unchanged from 5/13/2024. No new pulmonary nodules.  2. Stable right upper lobe wedge resection changes.      Radiology interpretation:  X-rays chest reviewed and interpreted by Silvio:  No acute process. Postsurgical changes of wedge resection of the right upper lobe.    Further interpretation by radiologist as above    Lab interpretation:  Labs all viewed by me and significant for: White count 7.73, hemoglobin 12.4, BUN 13, creatinine 0.77, potassium 4.2.    EKG was considered but was not emergently warranted at this time, patient denies any chest pain, shortness of air, and is experiencing chest wall pain.    IV was established and labs and scans were obtained to evaluate for infection, electrolyte abnormalities, anemia, bronchitis, pneumonia.  Patient is a 55-year-old female who presents to the ER for above complaint.  On initial examination patient was resting comfortably in the bed,  nontoxic in appearance with no signs and symptoms of distress.  Physical examination revealed lung sounds clear bilaterally, no tenderness noted to abdomen on palpation.  Patient reports her pain is currently 8/10 and patient was given IV morphine and IV Zofran for pain. EDT reports patient was asking instead of pain medication could she get a nerve block today.  Advised patient that we do not do nerve blocks of that nature in the ER.  Chart review showed that patient had a CT of her chest without contrast ordered on 10/25/2024 but it was not completed until 1/25/2025, results listed above.  RN reports that patient had mild reaction to morphine even though she pushed it slow, no anaphylaxis noted, no airway compromise, no shortness of air and patient was monitored afterwards.  Patient was given IV Benadryl.  Morphine was added to her allergies list.  On reexamination patient was resting comfortably in the bed, nontoxic appearance with no signs and symptoms of distress with friend at bedside.  Patient reports pain is still present however a little better after medication.  Discussed findings and decision to discharge.  Prescribed diclofenac for her chest wall pain.  Advised patient to keep her appointment with pain management on Thursday and to follow-up with her primary care and to call tomorrow to schedule an appointment.  Patient verbalized understanding of all discharge instructions.    Appropriate PPE worn during exam.  Discussed care with: Dr. Silvio choe discussed findings with patient who voices understanding of discharge instructions, signs and symptoms requiring return to ED; discharged improved and in stable condition with follow up for re-evaluation.  This document is intended for medical expert use only. Reading of this document by patients and/or patient's family without participating medical staff guidance may result in misinterpretation and unintended morbidity.  Any interpretation of such data is  the responsibility of the patient and/or family member responsible for the patient in concert with their primary or specialist providers, not to be left for sources of online searches such as VerbalizeIt, [employer] or similar queries. Relying on these approaches to knowledge may result in misinterpretation, misguided goals of care and even death should patients or family members try recommendations outside of the realm of professional medical care in a supervised inpatient environment.         Problems Addressed:  Chest wall pain: complicated acute illness or injury    Amount and/or Complexity of Data Reviewed  Labs: ordered.  Radiology: ordered.    Risk  Prescription drug management.        Final diagnoses:   Chest wall pain       ED Disposition  ED Disposition       ED Disposition   Discharge    Condition   Stable    Comment   --               Reilly Diaz DO  [address]  [address]  [place] [address] [phone]    Schedule an appointment as soon as possible for a visit   Call tomorrow to schedule an appointment.         Medication List        New Prescriptions      diclofenac 50 MG EC tablet  Commonly known as: VOLTAREN  Take 1 tablet by mouth 3 (Three) Times a Day.               Where to Get Your Medications        These medications were sent to [facility] - [place], [place] - [address]   - [phone]  - [phone] [address]  [address]  [place] [address]      Phone: [phone]   diclofenac 50 MG EC tablet            Mely Kraus APRN  03/04/25 7786

## 2025-03-04 NOTE — TELEPHONE ENCOUNTER
"Caller: Tessie Conner    Relationship to patient: Self    Best call back number: [phone]*    Patient is needing: PT IS CALLING TO SEE IF DR GRIJALVA CAN PRESCRIBE HER ANY MEDICATION TO HELP WITH THE PAIN SHE IS HAVING.. PT IS SCHEDULED TO SEE DR GRIJALVA ON 03/06/25.. SHE ALSO WANTS TO KNOW IF SHE CAN GO AHEAD AND BE SET UP FOR A NERVE BLOCK.. SHE STATES THAT SHE \"DOESN'T KNOW IF SHE CAN LAST TILL THURSDAY\".. PLEASE ADVISE..    [facility], [place] - [address]  - [phone] [facility] [phone]  [phone]       "

## 2025-03-04 NOTE — TELEPHONE ENCOUNTER
Caller: Tessie Conner    Relationship to patient: Self    Best call back number: [phone]*    Patient is needing: PT IS CALLING STATING THAT THE ER WONT GIVE HER A NERVE BLOCK INJECTION AND WANTS TO KNOW IF SHE CAN SCHEDULE WITH DR GRIJALVA.. PLEASE ADVISE..

## 2025-03-04 NOTE — DISCHARGE INSTRUCTIONS
Rest.  Drink plenty of fluids.  Take medications as prescribed.  Make sure to keep your appointment with pain management on Thursday.  Follow-up with primary care, call tomorrow to schedule an appointment.  Return to the ER for any new or worsening symptoms.

## 2025-03-05 NOTE — PROGRESS NOTES
Subjective   Tessie Conner is a 55 y.o. female is here for follow up for mid and upper back pain.  Patient was last seen on 9/28/2023.  No follow-up since then.  Returning due to increased right sided mid/upper back pain.  She is requesting repeating intercostal nerve blocks. Her pain has returned since last week.       On last visit:     Right sided mid/upper back pain is 7/10 on VAS, maximum 7/10.  Describes pain as sharp and stabbing.  Referred to right breast, right lateral ribs, right shoulder-completely resolved with nerve block previously  Pain is intermittent.  Improved with intercostal nerve blocks.  Worse with nighttime and deep breathing.  She has tried muscle relaxants, gabapentin, heat, cold without significant pain relief.      Previous Injection:   5/19/2023-right intercostal nerve block-5-9- 80% pain relief with functional improvement. Able to sleep better- >1 year of pain relief.    1/13/2023- R intercostal nerve block-5-9- 90% Pain relief. Functional improvement- 2 months..       Hx: Referred by Eileen Massey, ALEXIS, APRN for right chest pain and possible intercostal nerve block. 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 She had no pain after surgery up until 6/22.  She has tried gabapentin and Valium without significant pain relief. Scheduled to see Cardiologist for abnormalities in ECG as per patient.     PHQ-9- 19                     SOAPP- 12  [place] back disability scale -27     PMH:   DM-2, HTN,  anxiety, depression (suicde attempt long time back - currently very well controlled), migraines, ADY, history of spinal headache, bipolar 1, frequent UTI, history of histoplasmosis causing granuloma and s/p right upper lobe wedge resection-2/2022, left rotator cuff repair, s/p pacemaker for bradycardia.      Current Medications:   Tramadol PRN  Gabapentin 300 mg BID  Lidoderm 5% patch - insurance didn't cover it.   Aspirin 81  mg  Melatonin      Past Medications:  Tramadol - short prescription  Oxycodone- short prescription  Temazepam - for sleep  Valium 2 mg- took it one time.   Biomed Cream  Ztildo patches      Past Modalities:  TENS:                                                                          no                                                  Physical Therapy Within The Last 6 Months              no  Psychotherapy                                                            no  Massage Therapy                                                       no     Patient Complains Of:  Uro-Fecal Incontinence          no  Weight Gain/Loss                   no  Fever/Chills                             no  Weakness                               no         Current Outpatient Medications:     albuterol sulfate  (90 Base) MCG/ACT inhaler, Inhale 2 puffs Every 4 (Four) Hours As Needed for Wheezing., Disp: 18 g, Rfl: 2    atorvastatin (Lipitor) 40 MG tablet, Take 1 tablet by mouth Daily., Disp: 90 tablet, Rfl: 3    Budeson-Glycopyrrol-Formoterol (Breztri Aerosphere) 160-9-4.8 MCG/ACT aerosol inhaler, Inhale 2 puffs 2 (Two) Times a Day., Disp: , Rfl:     cholecalciferol (Vitamin D) 25 MCG (1000 UT) tablet, Take 1 tablet by mouth Daily., Disp: , Rfl:     diclofenac (VOLTAREN) 50 MG EC tablet, Take 1 tablet by mouth 3 (Three) Times a Day., Disp: 12 tablet, Rfl: 0    FeroSul 325 (65 Fe) MG tablet, Take 1 tablet by mouth Daily With Breakfast., Disp: 90 tablet, Rfl: 1    fludrocortisone 0.1 MG tablet, Take 1 tablet by mouth Daily., Disp: 90 tablet, Rfl: 3    Gemtesa 75 MG tablet, Take 1 tablet by mouth Daily., Disp: 90 tablet, Rfl: 3    Hydrocortisone, Perianal, (ANUSOL-HC) 2.5 % rectal cream, Insert  into the rectum 2 (Two) Times a Day., Disp: 28 g, Rfl: 1    ipratropium-albuterol (DUO-NEB) 0.5-2.5 mg/3 ml nebulizer, Take 3 mL by nebulization 4 (Four) Times a Day As Needed for Wheezing., Disp: , Rfl:     midodrine (PROAMATINE) 5 MG tablet,  Take 2 tablets by mouth 3 (Three) Times a Day Before Meals., Disp: 270 tablet, Rfl: 1    omeprazole (priLOSEC) 20 MG capsule, Take 1 capsule by mouth Every Night., Disp: 90 capsule, Rfl: 3    Ozempic, 0.25 or 0.5 MG/DOSE, 2 MG/3ML solution pen-injector, Inject 0.5 mg under the skin into the appropriate area as directed 1 (One) Time Per Week., Disp: 3 mL, Rfl: 3    sertraline (ZOLOFT) 50 MG tablet, Take 1 tablet by mouth Every Night., Disp: , Rfl:     traMADol (ULTRAM) 50 MG tablet, Take 1 tablet by mouth Every 6 (Six) Hours As Needed for Moderate Pain., Disp: 21 tablet, Rfl: 0    Vraylar 3 MG capsule capsule, Take 1 capsule by mouth Daily., Disp: , Rfl:     The following portions of the patient's history were reviewed and updated as appropriate: allergies, current medications, past family history, past medical history, past social history, past surgical history, and problem list.      REVIEW OF PERTINENT MEDICAL DATA    Past Medical History:   Diagnosis Date    Abnormal ECG     Anemia     Anesthesia complication 2003    cardiac arrest  with hysterectomy    Anxiety 2005    Arthritis     Asthma 04/17/2020    allergy    Bipolar 1 disorder     Bradycardia     Chest pain     due to scarring from lung surgery  2/21    Chest pain 09/24/2020    Cholelithiasis 10/2022    COPD (chronic obstructive pulmonary disease)     Coronary artery disease     very minimal    Depression 2005    Diabetes mellitus 2002    borderline   resolved    Diverticulitis of colon     Diverticulosis     Dyspnea on minimal exertion     Frequent UTI     Gastroesophageal reflux disease without esophagitis 07/15/2020    Heart murmur     FROM CHILDHOOD    Histoplasmosis     History of anemia     POST PREGNANCY    Hyperlipidemia     Hyperlipidemia 10/04/2016    Hypertension     Hypotension     Inflammatory bowel disease     Mass of upper lobe of right lung     Migraines     hx    Obesity 1999    Pneumonia     PONV (postoperative nausea and vomiting)      Scoliosis     Sleep apnea 22    cpap    Spinal headache     Vertigo     Visual impairment     WEARS GLASSES    Vitamin D deficiency      Past Surgical History:   Procedure Laterality Date    ANKLE OPEN REDUCTION INTERNAL FIXATION Right 2023    Procedure: ANKLE OPEN REDUCTION INTERNAL FIXATION;  Surgeon: TORIE Beck DPM;  Location: [facility] MAIN OR;  Service: Podiatry;  Laterality: Right;    APPENDECTOMY      CARDIAC CATHETERIZATION N/A 2020    Procedure: Left Heart Cath possible PCI, atherectomy, hemodynamic support;  Surgeon: Thomas Zamora MD;  Location: [facility] CATH INVASIVE LOCATION;  Service: Cardiology;  Laterality: N/A;    CARDIAC CATHETERIZATION  2020    CARDIAC ELECTROPHYSIOLOGY PROCEDURE N/A 2023    Procedure: Pacemaker DC new, [place] aware;  Surgeon: Rachel Espinoza MD;  Location: [facility] CATH INVASIVE LOCATION;  Service: Cardiovascular;  Laterality: N/A;    CARDIAC SURGERY       SECTION      FOOT/TOE TENDON REPAIR Left     FRACTURE SURGERY  2023    Rt ankle    HYSTERECTOMY      INSERT / REPLACE / REMOVE PACEMAKER  2023    LUNG BIOPSY Right 2020    LUNG LOBECTOMY Right 2022    pt had partial Right lobectomy and nodule removal    MASS EXCISION Right 2023    Procedure: LIPOMA EXCISION,THIGH;  Surgeon: Justo Shannon MD;  Location: [facility] MAIN OR;  Service: General;  Laterality: Right;    MASS EXCISION Right 2024    Procedure: Excision of soft tissue mass from right posterior thigh measuring 2 cm;  Surgeon: Justo Shannon MD;  Location: [facility] MAIN OR;  Service: General;  Laterality: Right;    ROTATOR CUFF REPAIR Left     THORACOSCOPY VIDEO ASSISTED WITH LOBECTOMY Right 2022    Procedure: BRONCHOSCOPY, THORACOSCOPY VIDEO ASSISTED wedge resection X2, INTERCOSTAL NERVE BLOCK;  Surgeon: Ayla Carroll MD;  Location: [facility] MAIN OR;  Service: Thoracic;  Laterality: Right;    TUBAL ABDOMINAL  LIGATION  2002     Family History   Problem Relation Age of Onset    Arthritis Mother     Cancer Mother     Depression Mother     Diabetes Mother     Early death Mother     Mental illness Mother     Anxiety disorder Mother     Miscarriages / Stillbirths Mother     Alcohol abuse Father     Diabetes Father     Early death Father     Heart disease Father     Hyperlipidemia Father     Hypertension Father         Father    Vision loss Father     Heart attack Father     Heart disease Sister     Drug abuse Sister     Heart attack Sister     Hypertension Sister         Sister    Heart disease Sister     Heart disease Brother     Hypertension Brother     Heart attack Brother     Drug abuse Brother     Hypertension Brother     Heart disease Brother     Heart attack Brother     Cancer Maternal Grandmother     Malig Hyperthermia Neg Hx      Social History     Socioeconomic History    Marital status:    Tobacco Use    Smoking status: Never     Passive exposure: Never    Smokeless tobacco: Never   Vaping Use    Vaping status: Never Used   Substance and Sexual Activity    Alcohol use: Never     Comment: VERY RARE    Drug use: Never    Sexual activity: Not Currently     Partners: Male     Birth control/protection: None, Hysterectomy         Review of Systems   Musculoskeletal:  Positive for arthralgias and back pain.         Vitals:    03/06/25 1502   BP: 124/71   Pulse: 66   Resp: 16   SpO2: 97%   Weight: 98.2 kg (216 lb 6.4 oz)   PainSc: 7            Objective   Physical Exam  Chest:       Musculoskeletal:         General: Tenderness present.        Arms:            Imaging Reviewed:  CT chest without contrast  - Postsurgical changes of right again noted with suture line at the right apex  - Increased subpleural opacity along with anterior chest wall on the right middle lobe.  Atelectasis versus pneumonia.  - Stable noncalcified nodules in right middle lobe.     MRI lumbar spine without contrast-6/16/2017  L3-4-mild facet  arthropathy with left paracentral disc protrusion causing moderate left neural foraminal narrowing.  L4-5-moderate bilateral facet arthropathy with small posterior disc endplate complex causing mild narrowing of the neural foramina.  L4 S1-mild to moderate bilateral facet arthropathy.         Assessment:    1. Intercostal neuralgia    2. High risk medication use                Plan:   1.  UDS-3/6/2025. Tramadol, she had morphine given at hospital.  UDS to be done as part on initial patient evaluation. Patient is on opioids and UDS should be positive for opioids prescribed and negative for medications not prescribed and any illicit drugs including marijuana to be considered for long term opioid therapy, repeat UDS will be done randomly for compliance with the medication prescribed.  2. She has stopped taking gabapentin.   3. ONLY Take Tramadol if in pain 50 mg qhs PRN. (#21 tabs -3/4/2025). Discussed with the patient regarding long-term side effects of opioids including but not limited to opioid induced hormonal suppression, hyperalgesia, fatigue, weight gain, possible opioid induced altered immune system, addiction, tolerance, dependence, risk of hearing loss and elevated risk of myocardial infarction. Proper use and potential life threatening side effects of over use discussed with patient. Patient states understanding of their use and risks.  4. Excellent relief with intercostal nerve block, but pain is returning now. She had >80% pain relief with 2 intercostal nerve block.  Will proceed with repeating R intercostal nerve block ribs 5-9.  Discussed the possibility of infection, bleeding, nerve damage, headache, increased pain, pneumothorax. Patient understands and agrees. Holding off on RFA due to proximity of pacemaker near ablation. She may consider this if intercostal N block doesn't last long enough.        RTC for injection and then 3 weeks follow up.     Shane Medina DO  Pain Management   [facility]            INSPECT REPORT    As part of the patient's treatment plan, I may be prescribing controlled substances. The patient has been made aware of appropriate use of such medications, including potential risk of somnolence, limited ability to drive and/or work safely, and the potential for dependence or overdose. It has also been made clear that these medications are for use by this patient only, without concomitant use of alcohol or other substances unless prescribed.     Patient has completed prescribing agreement detailing terms of continued prescribing of controlled substances, including monitoring INSPECT reports, urine drug screening, and pill counts if necessary. The patient is aware that inappropriate use will results in cessation of prescribing such medications.    INSPECT report has been reviewed and scanned into the patient's chart.

## 2025-03-06 ENCOUNTER — OFFICE VISIT (OUTPATIENT)
Dept: PAIN MEDICINE | Facility: CLINIC | Age: 55
End: 2025-03-06
Payer: MEDICARE

## 2025-03-06 VITALS
HEART RATE: 66 BPM | RESPIRATION RATE: 16 BRPM | WEIGHT: 216.4 LBS | OXYGEN SATURATION: 97 % | DIASTOLIC BLOOD PRESSURE: 71 MMHG | BODY MASS INDEX: 38.33 KG/M2 | SYSTOLIC BLOOD PRESSURE: 124 MMHG

## 2025-03-06 DIAGNOSIS — G58.8 INTERCOSTAL NEURALGIA: ICD-10-CM

## 2025-03-06 DIAGNOSIS — Z79.899 HIGH RISK MEDICATION USE: Primary | ICD-10-CM

## 2025-03-10 ENCOUNTER — TELEPHONE (OUTPATIENT)
Dept: CASE MANAGEMENT | Facility: CLINIC | Age: 55
End: 2025-03-10
Payer: MEDICARE

## 2025-03-11 ENCOUNTER — HOSPITAL ENCOUNTER (OUTPATIENT)
Dept: PAIN MEDICINE | Facility: HOSPITAL | Age: 55
Discharge: HOME | End: 2025-03-11
Payer: MEDICARE

## 2025-03-11 ENCOUNTER — TELEPHONE (OUTPATIENT)
Dept: CASE MANAGEMENT | Facility: CLINIC | Age: 55
End: 2025-03-11
Payer: MEDICARE

## 2025-03-11 VITALS
DIASTOLIC BLOOD PRESSURE: 66 MMHG | HEART RATE: 70 BPM | HEIGHT: 63 IN | OXYGEN SATURATION: 99 % | BODY MASS INDEX: 38.27 KG/M2 | RESPIRATION RATE: 16 BRPM | WEIGHT: 216 LBS | TEMPERATURE: 97.1 F | SYSTOLIC BLOOD PRESSURE: 125 MMHG

## 2025-03-11 DIAGNOSIS — G58.8 INTERCOSTAL NEURALGIA: Primary | ICD-10-CM

## 2025-03-11 DIAGNOSIS — R52 PAIN: ICD-10-CM

## 2025-03-11 PROCEDURE — 25010000002 LIDOCAINE PF 1% 1 % SOLUTION: Performed by: STUDENT IN AN ORGANIZED HEALTH CARE EDUCATION/TRAINING PROGRAM

## 2025-03-11 PROCEDURE — 64420 NJX AA&/STRD NTRCOST NRV 1: CPT | Performed by: STUDENT IN AN ORGANIZED HEALTH CARE EDUCATION/TRAINING PROGRAM

## 2025-03-11 PROCEDURE — 64421 NJX AA&/STRD NTRCOST NRV EA: CPT | Performed by: STUDENT IN AN ORGANIZED HEALTH CARE EDUCATION/TRAINING PROGRAM

## 2025-03-11 PROCEDURE — 25010000002 BUPIVACAINE (PF) 0.25 % SOLUTION: Performed by: STUDENT IN AN ORGANIZED HEALTH CARE EDUCATION/TRAINING PROGRAM

## 2025-03-11 PROCEDURE — 25010000002 DEXAMETHASONE SODIUM PHOSPHATE 10 MG/ML SOLUTION: Performed by: STUDENT IN AN ORGANIZED HEALTH CARE EDUCATION/TRAINING PROGRAM

## 2025-03-11 PROCEDURE — 77003 FLUOROGUIDE FOR SPINE INJECT: CPT

## 2025-03-11 RX ORDER — LIDOCAINE HYDROCHLORIDE 10 MG/ML
5 INJECTION, SOLUTION EPIDURAL; INFILTRATION; INTRACAUDAL; PERINEURAL ONCE
Status: COMPLETED | OUTPATIENT
Start: 2025-03-11 | End: 2025-03-11

## 2025-03-11 RX ORDER — DEXAMETHASONE SODIUM PHOSPHATE 10 MG/ML
10 INJECTION, SOLUTION INTRAMUSCULAR; INTRAVENOUS ONCE
Status: COMPLETED | OUTPATIENT
Start: 2025-03-11 | End: 2025-03-11

## 2025-03-11 RX ORDER — BUPIVACAINE HYDROCHLORIDE 2.5 MG/ML
10 INJECTION, SOLUTION EPIDURAL; INFILTRATION; INTRACAUDAL ONCE
Status: COMPLETED | OUTPATIENT
Start: 2025-03-11 | End: 2025-03-11

## 2025-03-11 RX ADMIN — LIDOCAINE HYDROCHLORIDE 5 ML: 10 INJECTION, SOLUTION EPIDURAL; INFILTRATION; INTRACAUDAL; PERINEURAL at 15:04

## 2025-03-11 RX ADMIN — BUPIVACAINE HYDROCHLORIDE 10 ML: 2.5 INJECTION, SOLUTION EPIDURAL; INFILTRATION; INTRACAUDAL; PERINEURAL at 15:05

## 2025-03-11 RX ADMIN — DEXAMETHASONE SODIUM PHOSPHATE 10 MG: 10 INJECTION, SOLUTION INTRAMUSCULAR; INTRAVENOUS at 15:05

## 2025-03-11 NOTE — H&P
H and P reviewed from previous visit and no changes to patient's clinical presentation. Will proceed with procedure as planned.     Shane Medina DO  Pain Management   [facility]

## 2025-03-11 NOTE — PROCEDURES
PREOPERATIVE DIAGNOSIS:       1. Intercostal Neuralgia RIGHT Ribs 5, 6, 7,8, 9    POSTOPERATIVE DIAGNOSIS:  Same.    ANESTHESIA:   Local    PROCEDURE IN DETAIL:    After obtaining informed consent from the patient, pre-procedure blood pressure and pulse were stable and recorded in patients clinic chart.   The patient was brought to the procedure room and placed in the prone position on fluoroscopy table. The patient’s back was prepped with antiseptic solution and draped in the usual sterile fashion. The skin over the 5th to 9th rib on the right side was identified under fluoroscopic guidance and infiltrated with 1% lidocaine for local anesthesia via 22 gauge needle. 22 gauge 1.5 inch spinal needle was inserted through the skin under fluoroscopic guidance. The lower border of the 9th rib was touched. The needle was walked off the rib in between the intercostal space.  Confirmation was done by injecting 1 cc of the omnipaque dye. Good spread of the dye was seen in the paola- posterior direction in between the 10th and the 9th rib. 2 cc of 0.25% bupivacine with dexamethasone was injected. The procedure was repeated from 5th to 8th rib. A total of 10 cc of 0.25% bupivacaine with 10 mg dexamethasone was injected. The patient tolerated it very well.       Following the procedure the patient's vital signs were stable. The patient was discharged home in good condition after being given discharge instructions.    COMPLICATIONS None     Shane Medina DO  Pain Management   [facility]

## 2025-03-12 ENCOUNTER — TELEPHONE (OUTPATIENT)
Dept: FAMILY MEDICINE CLINIC | Facility: CLINIC | Age: 55
End: 2025-03-12
Payer: MEDICARE

## 2025-03-12 ENCOUNTER — TELEPHONE (OUTPATIENT)
Dept: PAIN MEDICINE | Facility: HOSPITAL | Age: 55
End: 2025-03-12
Payer: MEDICARE

## 2025-03-12 NOTE — TELEPHONE ENCOUNTER
PT IS DUE FOR A PAP. PT HAS ALREADY HAD A PHYSICAL. WOULD DR LANDIS DO PAP ONLY OR SHOULD PT SCHEDULE WITH GYNO?

## 2025-03-17 DIAGNOSIS — I95.1 AUTONOMIC ORTHOSTATIC HYPOTENSION: Primary | ICD-10-CM

## 2025-03-17 DIAGNOSIS — E11.42 WELL CONTROLLED TYPE 2 DIABETES MELLITUS WITH PERIPHERAL NEUROPATHY: ICD-10-CM

## 2025-03-17 RX ORDER — MIDODRINE HYDROCHLORIDE 5 MG/1
TABLET ORAL
Qty: 540 TABLET | Refills: 0 | Status: SHIPPED | OUTPATIENT
Start: 2025-03-17

## 2025-03-17 RX ORDER — SEMAGLUTIDE 0.68 MG/ML
INJECTION, SOLUTION SUBCUTANEOUS
Qty: 9 ML | Refills: 1 | Status: SHIPPED | OUTPATIENT
Start: 2025-03-17

## 2025-03-17 NOTE — TELEPHONE ENCOUNTER
Rx Refill Note  Requested Prescriptions     Signed Prescriptions Disp Refills    midodrine (PROAMATINE) 5 MG tablet 540 tablet 0     Sig: TAKE 2 TABLETS BY MOUTH THREE TIMES DAILY BEFORE MEALS     Authorizing Provider: ANIL MIRANDA     Ordering User: JENNY REYES      Last office visit with prescribing clinician: 11/13/2024   Last telemedicine visit with prescribing clinician: Visit date not found   Next office visit with prescribing clinician: 5/6/2025                         Would you like a call back once the refill request has been completed: [] Yes [] No    If the office needs to give you a call back, can they leave a voicemail: [] Yes [] No    Jenny Reyes MA  03/17/25, 08:47 EDT

## 2025-03-24 ENCOUNTER — HOSPITAL ENCOUNTER (EMERGENCY)
Facility: HOSPITAL | Age: 55
Discharge: HOME | End: 2025-03-24
Admitting: EMERGENCY MEDICINE
Payer: MEDICARE

## 2025-03-24 ENCOUNTER — APPOINTMENT (OUTPATIENT)
Dept: GENERAL RADIOLOGY | Facility: HOSPITAL | Age: 55
End: 2025-03-24
Payer: MEDICARE

## 2025-03-24 VITALS
DIASTOLIC BLOOD PRESSURE: 56 MMHG | HEIGHT: 63 IN | WEIGHT: 216.27 LBS | TEMPERATURE: 98.1 F | SYSTOLIC BLOOD PRESSURE: 106 MMHG | HEART RATE: 60 BPM | OXYGEN SATURATION: 98 % | BODY MASS INDEX: 38.32 KG/M2 | RESPIRATION RATE: 18 BRPM

## 2025-03-24 DIAGNOSIS — R07.9 CHEST PAIN, UNSPECIFIED TYPE: Primary | ICD-10-CM

## 2025-03-24 LAB
ALBUMIN SERPL-MCNC: 3.7 G/DL (ref 3.5–5.2)
ALBUMIN/GLOB SERPL: 1.6 G/DL
ALP SERPL-CCNC: 125 U/L (ref 39–117)
ALT SERPL W P-5'-P-CCNC: 14 U/L (ref 1–33)
ANION GAP SERPL CALCULATED.3IONS-SCNC: 9.3 MMOL/L (ref 5–15)
AST SERPL-CCNC: 17 U/L (ref 1–32)
BASOPHILS # BLD AUTO: 0.02 10*3/MM3 (ref 0–0.2)
BASOPHILS NFR BLD AUTO: 0.2 % (ref 0–1.5)
BILIRUB SERPL-MCNC: 0.3 MG/DL (ref 0–1.2)
BUN SERPL-MCNC: 8 MG/DL (ref 6–20)
BUN/CREAT SERPL: 11.1 (ref 7–25)
CALCIUM SPEC-SCNC: 9.4 MG/DL (ref 8.6–10.5)
CHLORIDE SERPL-SCNC: 106 MMOL/L (ref 98–107)
CO2 SERPL-SCNC: 26.7 MMOL/L (ref 22–29)
CREAT SERPL-MCNC: 0.72 MG/DL (ref 0.57–1)
DEPRECATED RDW RBC AUTO: 45.8 FL (ref 37–54)
EGFRCR SERPLBLD CKD-EPI 2021: 99.5 ML/MIN/1.73
EOSINOPHIL # BLD AUTO: 0.03 10*3/MM3 (ref 0–0.4)
EOSINOPHIL NFR BLD AUTO: 0.3 % (ref 0.3–6.2)
ERYTHROCYTE [DISTWIDTH] IN BLOOD BY AUTOMATED COUNT: 14 % (ref 12.3–15.4)
GEN 5 1HR TROPONIN T REFLEX: 7 NG/L
GLOBULIN UR ELPH-MCNC: 2.3 GM/DL
GLUCOSE SERPL-MCNC: 96 MG/DL (ref 65–99)
HCT VFR BLD AUTO: 38.3 % (ref 34–46.6)
HGB BLD-MCNC: 12.1 G/DL (ref 12–15.9)
IMM GRANULOCYTES # BLD AUTO: 0.04 10*3/MM3 (ref 0–0.05)
IMM GRANULOCYTES NFR BLD AUTO: 0.4 % (ref 0–0.5)
LARGE PLATELETS: NORMAL
LYMPHOCYTES # BLD AUTO: 2.6 10*3/MM3 (ref 0.7–3.1)
LYMPHOCYTES NFR BLD AUTO: 26 % (ref 19.6–45.3)
MCH RBC QN AUTO: 28.3 PG (ref 26.6–33)
MCHC RBC AUTO-ENTMCNC: 31.6 G/DL (ref 31.5–35.7)
MCV RBC AUTO: 89.7 FL (ref 79–97)
MONOCYTES # BLD AUTO: 0.86 10*3/MM3 (ref 0.1–0.9)
MONOCYTES NFR BLD AUTO: 8.6 % (ref 5–12)
NEUTROPHILS NFR BLD AUTO: 6.46 10*3/MM3 (ref 1.7–7)
NEUTROPHILS NFR BLD AUTO: 64.5 % (ref 42.7–76)
NRBC BLD AUTO-RTO: 0 /100 WBC (ref 0–0.2)
NT-PROBNP SERPL-MCNC: 113 PG/ML (ref 0–900)
PLATELET # BLD AUTO: 200 10*3/MM3 (ref 140–450)
PMV BLD AUTO: 10.7 FL (ref 6–12)
POTASSIUM SERPL-SCNC: 3.2 MMOL/L (ref 3.5–5.2)
PROT SERPL-MCNC: 6 G/DL (ref 6–8.5)
RBC # BLD AUTO: 4.27 10*6/MM3 (ref 3.77–5.28)
RBC MORPH BLD: NORMAL
SMALL PLATELETS BLD QL SMEAR: ADEQUATE
SODIUM SERPL-SCNC: 142 MMOL/L (ref 136–145)
TROPONIN T NUMERIC DELTA: 0 NG/L
TROPONIN T SERPL HS-MCNC: 7 NG/L
WBC MORPH BLD: NORMAL
WBC NRBC COR # BLD AUTO: 10.01 10*3/MM3 (ref 3.4–10.8)
WHOLE BLOOD HOLD COAG: NORMAL

## 2025-03-24 PROCEDURE — 93005 ELECTROCARDIOGRAM TRACING: CPT

## 2025-03-24 PROCEDURE — 85007 BL SMEAR W/DIFF WBC COUNT: CPT

## 2025-03-24 PROCEDURE — 85025 COMPLETE CBC W/AUTO DIFF WBC: CPT

## 2025-03-24 PROCEDURE — 71045 X-RAY EXAM CHEST 1 VIEW: CPT

## 2025-03-24 PROCEDURE — 80053 COMPREHEN METABOLIC PANEL: CPT

## 2025-03-24 PROCEDURE — 36415 COLL VENOUS BLD VENIPUNCTURE: CPT

## 2025-03-24 PROCEDURE — 83880 ASSAY OF NATRIURETIC PEPTIDE: CPT

## 2025-03-24 PROCEDURE — 99284 EMERGENCY DEPT VISIT MOD MDM: CPT

## 2025-03-24 PROCEDURE — 84484 ASSAY OF TROPONIN QUANT: CPT

## 2025-03-24 RX ORDER — ASPIRIN 81 MG/1
325 TABLET, CHEWABLE ORAL ONCE
Status: COMPLETED | OUTPATIENT
Start: 2025-03-24 | End: 2025-03-24

## 2025-03-24 RX ORDER — POTASSIUM CHLORIDE 1500 MG/1
40 TABLET, EXTENDED RELEASE ORAL ONCE
Status: COMPLETED | OUTPATIENT
Start: 2025-03-24 | End: 2025-03-24

## 2025-03-24 RX ORDER — SODIUM CHLORIDE 0.9 % (FLUSH) 0.9 %
10 SYRINGE (ML) INJECTION AS NEEDED
Status: DISCONTINUED | OUTPATIENT
Start: 2025-03-24 | End: 2025-03-25 | Stop reason: HOSPADM

## 2025-03-24 RX ORDER — NITROGLYCERIN 0.4 MG/1
0.4 TABLET SUBLINGUAL
Status: DISCONTINUED | OUTPATIENT
Start: 2025-03-24 | End: 2025-03-25 | Stop reason: HOSPADM

## 2025-03-24 RX ADMIN — ASPIRIN 81 MG CHEWABLE TABLET 325 MG: 81 TABLET CHEWABLE at 21:18

## 2025-03-24 RX ADMIN — NITROGLYCERIN 0.4 MG: 0.4 TABLET SUBLINGUAL at 22:24

## 2025-03-24 RX ADMIN — POTASSIUM CHLORIDE 40 MEQ: 1500 TABLET, EXTENDED RELEASE ORAL at 23:04

## 2025-03-25 LAB
QT INTERVAL: 417 MS
QTC INTERVAL: 424 MS

## 2025-03-25 NOTE — DISCHARGE INSTRUCTIONS
Rest.  Drink plenty of fluids.  Take home medications as prescribed.  Follow-up with cardiologist, call tomorrow to schedule an appointment.  Follow-up with primary care, call tomorrow to schedule appointment.  Return to the ER for any new or worsening symptoms.

## 2025-03-25 NOTE — ED PROVIDER NOTES
Subjective   History of Present Illness  Chief complaint: Left-sided chest pain that radiates through to back that started at 1730 today.      Context: Patient is a 55-year-old female with a history of hypertension, hyperlipidemia and pacemaker, who presents to the ER with complaints of intermittent sharp left-sided chest pain that only lasts a couple of seconds.  Patient reports that these episodes started at approximately 1730 this afternoon while she was driving home from work.  Patient denies any aggravating or alleviating factors.  Patient denies any associated shortness of air, fever, abdominal pain, nausea/vomiting/diarrhea.  Patient reports she has recently started a new job.    Cardiologist: Olga    PCP: Donald alvarez    LMP: Hysterectomy          Review of Systems   Constitutional:  Negative for fever.       Past Medical History:   Diagnosis Date    Abnormal ECG     Anemia     Anesthesia complication 2003    cardiac arrest  with hysterectomy    Anxiety 2005    Arthritis     Asthma 04/17/2020    allergy    Bipolar 1 disorder     Bradycardia     Chest pain     due to scarring from lung surgery  2/21    Chest pain 09/24/2020    Cholelithiasis 10/2022    COPD (chronic obstructive pulmonary disease)     Coronary artery disease     very minimal    Depression 2005    Diabetes mellitus 2002    borderline   resolved    Diverticulitis of colon     Diverticulosis     Dyspnea on minimal exertion     Frequent UTI     Gastroesophageal reflux disease without esophagitis 07/15/2020    Heart murmur     FROM CHILDHOOD    Histoplasmosis     History of anemia     POST PREGNANCY    Hyperlipidemia     Hyperlipidemia 10/04/2016    Hypertension     Hypotension     Inflammatory bowel disease     Mass of upper lobe of right lung     Migraines     hx    Obesity 1999    Pneumonia     PONV (postoperative nausea and vomiting)     Scoliosis     Sleep apnea 2/28/22    cpap    Spinal headache     Vertigo     Visual impairment 2011     WEARS GLASSES    Vitamin D deficiency        Allergies   Allergen Reactions    Morphine Itching     Itching and redness on chest    Tylenol [Acetaminophen] Hives and Itching    Pregabalin Rash       Past Surgical History:   Procedure Laterality Date    ANKLE OPEN REDUCTION INTERNAL FIXATION Right 2023    Procedure: ANKLE OPEN REDUCTION INTERNAL FIXATION;  Surgeon: TORIE Beck DPM;  Location: [facility] MAIN OR;  Service: Podiatry;  Laterality: Right;    APPENDECTOMY      CARDIAC CATHETERIZATION N/A 2020    Procedure: Left Heart Cath possible PCI, atherectomy, hemodynamic support;  Surgeon: Thomas Zamora MD;  Location: [facility] CATH INVASIVE LOCATION;  Service: Cardiology;  Laterality: N/A;    CARDIAC CATHETERIZATION  2020    CARDIAC ELECTROPHYSIOLOGY PROCEDURE N/A 2023    Procedure: Pacemaker DC new, Parkin aware;  Surgeon: Rachel Espinoza MD;  Location: [facility] CATH INVASIVE LOCATION;  Service: Cardiovascular;  Laterality: N/A;    CARDIAC SURGERY       SECTION      FOOT/TOE TENDON REPAIR Left     FRACTURE SURGERY  2023    Rt ankle    HYSTERECTOMY      INSERT / REPLACE / REMOVE PACEMAKER  2023    LUNG BIOPSY Right 2020    LUNG LOBECTOMY Right 2022    pt had partial Right lobectomy and nodule removal    MASS EXCISION Right 2023    Procedure: LIPOMA EXCISION,THIGH;  Surgeon: Justo Shannon MD;  Location: [facility] MAIN OR;  Service: General;  Laterality: Right;    MASS EXCISION Right 2024    Procedure: Excision of soft tissue mass from right posterior thigh measuring 2 cm;  Surgeon: Justo Shannon MD;  Location: [facility] MAIN OR;  Service: General;  Laterality: Right;    ROTATOR CUFF REPAIR Left     THORACOSCOPY VIDEO ASSISTED WITH LOBECTOMY Right 2022    Procedure: BRONCHOSCOPY, THORACOSCOPY VIDEO ASSISTED wedge resection X2, INTERCOSTAL NERVE BLOCK;  Surgeon: Ayla Carroll MD;  Location: [facility] MAIN OR;   Service: Thoracic;  Laterality: Right;    TUBAL ABDOMINAL LIGATION  2002       Family History   Problem Relation Age of Onset    Arthritis Mother     Cancer Mother     Depression Mother     Diabetes Mother     Early death Mother     Mental illness Mother     Anxiety disorder Mother     Miscarriages / Stillbirths Mother     Alcohol abuse Father     Diabetes Father     Early death Father     Heart disease Father     Hyperlipidemia Father     Hypertension Father         Father    Vision loss Father     Heart attack Father     Heart disease Sister     Drug abuse Sister     Heart attack Sister     Hypertension Sister         Sister    Heart disease Sister     Heart disease Brother     Hypertension Brother     Heart attack Brother     Drug abuse Brother     Hypertension Brother     Heart disease Brother     Heart attack Brother     Cancer Maternal Grandmother     Malig Hyperthermia Neg Hx        Social History     Socioeconomic History    Marital status:    Tobacco Use    Smoking status: Never     Passive exposure: Never    Smokeless tobacco: Never   Vaping Use    Vaping status: Never Used   Substance and Sexual Activity    Alcohol use: Never     Comment: VERY RARE    Drug use: Never    Sexual activity: Not Currently     Partners: Male     Birth control/protection: None, Hysterectomy           Objective   Physical Exam  Vitals and nursing note reviewed.   Constitutional:       Appearance: Normal appearance. She is well-developed.   HENT:      Head: Normocephalic.      Nose: Nose normal.      Mouth/Throat:      Mouth: Mucous membranes are moist.   Eyes:      Extraocular Movements: Extraocular movements intact.   Cardiovascular:      Rate and Rhythm: Normal rate and regular rhythm.      Heart sounds: Murmur heard.   Pulmonary:      Effort: Pulmonary effort is normal.      Breath sounds: Normal breath sounds. No wheezing.   Chest:      Chest wall: No tenderness or crepitus.   Abdominal:      Palpations: Abdomen is  "soft. There is no mass.      Tenderness: There is no abdominal tenderness.   Musculoskeletal:         General: Normal range of motion.      Cervical back: Normal range of motion.      Right lower leg: No edema.      Left lower leg: No edema.   Skin:     General: Skin is warm and dry.      Capillary Refill: Capillary refill takes less than 2 seconds.   Neurological:      General: No focal deficit present.      Mental Status: She is alert and oriented to person, place, and time.   Psychiatric:         Mood and Affect: Mood normal.         Behavior: Behavior normal.         Procedures           ED Course            /56   Pulse 60   Temp 98.1 °F (36.7 °C)   Resp 18   Ht 160 cm (63\")   Wt 98.1 kg (216 lb 4.3 oz)   LMP  (LMP Unknown)   SpO2 98%   BMI 38.31 kg/m²   Labs Reviewed   COMPREHENSIVE METABOLIC PANEL - Abnormal; Notable for the following components:       Result Value    Potassium 3.2 (*)     Alkaline Phosphatase 125 (*)     All other components within normal limits    Narrative:     GFR Categories in Chronic Kidney Disease (CKD)      GFR Category          GFR (mL/min/1.73)    Interpretation  G1                     90 or greater         Normal or high (1)  G2                      60-89                Mild decrease (1)  G3a                   45-59                Mild to moderate decrease  G3b                   30-44                Moderate to severe decrease  G4                    15-29                Severe decrease  G5                    14 or less           Kidney failure          (1)In the absence of evidence of kidney disease, neither GFR category G1 or G2 fulfill the criteria for CKD.    eGFR calculation 2021 CKD-EPI creatinine equation, which does not include race as a factor   BNP (IN-HOUSE) - Normal    Narrative:     This assay is used as an aid in the diagnosis of individuals suspected of having heart failure. It can be used as an aid in the diagnosis of acute decompensated heart failure " (ADHF) in patients presenting with signs and symptoms of ADHF to the emergency department (ED). In addition, NT-proBNP of <300 pg/mL indicates ADHF is not likely.    Age Range Result Interpretation  NT-proBNP Concentration (pg/mL:      <50             Positive            >450                   Gray                 300-450                    Negative             <300    50-75           Positive            >900                  Gray                300-900                  Negative            <300      >75             Positive            >1800                  Gray                300-1800                  Negative            <300   TROPONIN - Normal    Narrative:     High Sensitive Troponin T Reference Range:  <14.0 ng/L- Negative Female for AMI  <22.0 ng/L- Negative Male for AMI  >=14 - Abnormal Female indicating possible myocardial injury.  >=22 - Abnormal Male indicating possible myocardial injury.   Clinicians would have to utilize clinical acumen, EKG, Troponin, and serial changes to determine if it is an Acute Myocardial Infarction or myocardial injury due to an underlying chronic condition.        CBC WITH AUTO DIFFERENTIAL - Normal   HIGH SENSITIVITIY TROPONIN T 1HR - Normal    Narrative:     High Sensitive Troponin T Reference Range:  <14.0 ng/L- Negative Female for AMI  <22.0 ng/L- Negative Male for AMI  >=14 - Abnormal Female indicating possible myocardial injury.  >=22 - Abnormal Male indicating possible myocardial injury.   Clinicians would have to utilize clinical acumen, EKG, Troponin, and serial changes to determine if it is an Acute Myocardial Infarction or myocardial injury due to an underlying chronic condition.        SCAN SLIDE   CBC AND DIFFERENTIAL    Narrative:     The following orders were created for panel order CBC & Differential.  Procedure                               Abnormality         Status                     ---------                               -----------         ------                      CBC Auto Differential[[id number]]        Normal              Final result               Scan Slide[[id number]]                                       Final result                 Please view results for these tests on the individual orders.   EXTRA TUBES    Narrative:     The following orders were created for panel order Extra Tubes.  Procedure                               Abnormality         Status                     ---------                               -----------         ------                     Light Blue Top[[id number]]                                   Final result                 Please view results for these tests on the individual orders.   LIGHT BLUE TOP     Medications   sodium chloride 0.9 % flush 10 mL (has no administration in time range)   nitroglycerin (NITROSTAT) SL tablet 0.4 mg (0.4 mg Sublingual Not Given 3/24/25 2230)   aspirin chewable tablet 325 mg (325 mg Oral Given 3/24/25 2118)   potassium chloride (KLOR-CON M20) CR tablet 40 mEq (40 mEq Oral Given 3/24/25 2304)     XR Chest 1 View  Result Date: 3/24/2025  Impression: No definite cardiopulmonary disease. A small left pleural effusion is not definitively excluded. Electronically Signed: David Pollack MD  3/24/2025 9:52 PM EDT  Workstation ID: [id number]          HEART Score: 3   Shared Decision Making  I discussed the findings with the patient/patient representative who is in agreement with the treatment plan and the final disposition.  Risks and benefits of discharge and/or observation/admission were discussed: Yes                                      Medical Decision Making  Radiology interpretation:  X-rays chest reviewed and interpreted by Marlin: No definite cardiopulmonary disease. A small left pleural effusion is not definitively excluded.  Further interpretation by radiologist as above    Lab interpretation:  Labs all viewed by me and significant for: White count 10.01, hemoglobin 12.1, BUN 8, creatinine 0.72, potassium  3.2, ALT 14, AST 17, alk phos 125, , troponin 7, 1 hour troponin 7.    EKG viewed and interpreted by Dr. Isaac: Sinus rhythm, rate 62, QTc 424, no acute ST elevation noted.  comparison: Dated 10/31/2024.  Atrial paced rhythm, none specific T wave abnormalities, rate 60, QTc 435.    IV was established and labs and scans were obtained to evaluate for infection, electrolyte abnormalities, anemia, MI, cardiomegaly, bronchitis, pneumonia, all-inclusive list.  Patient is a 55-year-old female with a history of hypertension, hyperlipidemia, and pacemaker who presents to the ER for above complaint.  On initial examination patient was resting comfortably in the bed, nontoxic in appearance with no signs and symptoms of distress.  Physical examination revealed no tenderness to chest on palpation, lungs clear bilaterally on auscultation, no tenderness noted to abdomen on palpation, no edema noted to bilateral lower extremities.  Patient denies any pain at this.  Patient was given p.o. aspirin and sublingual nitro for chest pain protocol.  Labs were indicative of hypokalemia, and patient was given p.o. potassium.  RN reports the patient is stating that she is little lightheaded, but that she had the sharp pain in her chest again, and it looks as though her pacemaker is kicked in and her rhythm is now paced rhythm and asked if we could interrogate the pacemaker.  Pacemaker interrogation was completed and was unremarkable.  On reexamination patient was resting comfortably in the bed, nontoxic in appearance with no signs and symptoms of distress.  Patient has been hemodynamically stable and well-appearing during this ER visit.  Discussed findings and decision to discharge.  Advised patient to rest, drink plenty of fluids, take home medications as prescribed.  Follow-up with cardiologist, and to call tomorrow to schedule an appointment.  Follow-up with primary care and to call tomorrow to schedule appointment.  Advised her to  return to the ER for new or worsening symptoms.  Patient verbalized understanding of all discharge instructions.    Appropriate PPE worn during exam.    i discussed findings with patient who voices understanding of discharge instructions, signs and symptoms requiring return to ED; discharged improved and in stable condition with follow up for re-evaluation.  This document is intended for medical expert use only. Reading of this document by patients and/or patient's family without participating medical staff guidance may result in misinterpretation and unintended morbidity.  Any interpretation of such data is the responsibility of the patient and/or family member responsible for the patient in concert with their primary or specialist providers, not to be left for sources of online searches such as [employer], [employer] or similar queries. Relying on these approaches to knowledge may result in misinterpretation, misguided goals of care and even death should patients or family members try recommendations outside of the realm of professional medical care in a supervised inpatient environment.         Problems Addressed:  Chest pain, unspecified type: complicated acute illness or injury    Amount and/or Complexity of Data Reviewed  Labs: ordered.  Radiology: ordered.    Risk  OTC drugs.  Prescription drug management.        Final diagnoses:   Chest pain, unspecified type       ED Disposition  ED Disposition       ED Disposition   Discharge    Condition   Stable    Comment   --               Rachel Espinoza MD  [address] [facility] [place] [address] [phone]    Schedule an appointment as soon as possible for a visit   Call tomorrow to schedule an appointment.    Reilly Diaz DO  [address] [facility]  [address]  [place] [address] [phone]    Schedule an appointment as soon as possible for a visit   Call tomorrow to schedule an appointment.         Medication List      No changes were made to your  prescriptions during this visit.            Mely Kraus, APRN  03/24/25 0647

## 2025-04-15 ENCOUNTER — OFFICE VISIT (OUTPATIENT)
Dept: CARDIOLOGY | Facility: CLINIC | Age: 55
End: 2025-04-15
Payer: MEDICARE

## 2025-04-15 VITALS
WEIGHT: 218.4 LBS | OXYGEN SATURATION: 100 % | DIASTOLIC BLOOD PRESSURE: 51 MMHG | BODY MASS INDEX: 38.7 KG/M2 | HEART RATE: 64 BPM | SYSTOLIC BLOOD PRESSURE: 120 MMHG | HEIGHT: 63 IN

## 2025-04-15 DIAGNOSIS — I95.1 AUTONOMIC ORTHOSTATIC HYPOTENSION: ICD-10-CM

## 2025-04-15 DIAGNOSIS — Z95.0 PACEMAKER: ICD-10-CM

## 2025-04-15 DIAGNOSIS — I49.5 SICK SINUS SYNDROME: Primary | ICD-10-CM

## 2025-04-15 RX ORDER — LURASIDONE HYDROCHLORIDE 20 MG/1
TABLET, FILM COATED ORAL
COMMUNITY
Start: 2025-03-05

## 2025-04-15 RX ORDER — NYSTATIN 100000 U/G
OINTMENT TOPICAL
COMMUNITY
Start: 2025-03-30

## 2025-04-15 NOTE — PROGRESS NOTES
Progress note      Name: Tessie Conner ADMIT: (Not on file)   : 1970  PCP: Reilly Diaz DO    MRN: [id number] LOS: 0 days   AGE/SEX: 55 y.o. female  ROOM: Room/bed info not found     Chief Complaint   Patient presents with    Chest Pain     Started feeling light headed, dizzy, sob,nauseated       Subjective       History of present illness  Tessie Conner is a 55-year-old female patient who has autonomic dysfunction, no history of CAD, has sick sinus syndrome with chronic bradycardia heart rate in the 40s resulting in dizziness. Patient received a dual-chamber pacemaker on 2023 and she is here today for follow-up.   Patient felt better after pacemaker implantation, however she still experiencing dizzy spells and drop in her blood pressure.   Patient is now on fludrocortisone 0.1 mg once a day and also takes midodrine 5 mg 4 times a day.    Past Medical History:   Diagnosis Date    Abnormal ECG     Anemia     Anesthesia complication     cardiac arrest  with hysterectomy    Anxiety 2005    Arthritis     Asthma 2020    allergy    Bipolar 1 disorder     Bradycardia     Chest pain     due to scarring from lung surgery      Chest pain 2020    Cholelithiasis 10/2022    COPD (chronic obstructive pulmonary disease)     Coronary artery disease     very minimal    Depression 2005    Diabetes mellitus 2002    borderline   resolved    Diverticulitis of colon     Diverticulosis     Dyspnea on minimal exertion     Frequent UTI     Gastroesophageal reflux disease without esophagitis 07/15/2020    Heart murmur     FROM CHILDHOOD    Histoplasmosis     History of anemia     POST PREGNANCY    Hyperlipidemia     Hyperlipidemia 10/04/2016    Hypertension     Hypotension     Inflammatory bowel disease     Mass of upper lobe of right lung     Migraines     hx    Neuropathy in diabetes     Obesity     Plantar fasciitis     Pneumonia     PONV (postoperative nausea and vomiting)     Scoliosis      Sleep apnea 22    cpap    Spinal headache     Vertigo     Visual impairment     WEARS GLASSES    Vitamin D deficiency      Past Surgical History:   Procedure Laterality Date    ANKLE OPEN REDUCTION INTERNAL FIXATION Right 2023    Procedure: ANKLE OPEN REDUCTION INTERNAL FIXATION;  Surgeon: TORIE Beck DPM;  Location: [facility] MAIN OR;  Service: Podiatry;  Laterality: Right;    APPENDECTOMY      CARDIAC CATHETERIZATION N/A 2020    Procedure: Left Heart Cath possible PCI, atherectomy, hemodynamic support;  Surgeon: Thomas Zamora MD;  Location: [facility] CATH INVASIVE LOCATION;  Service: Cardiology;  Laterality: N/A;    CARDIAC CATHETERIZATION  2020    CARDIAC ELECTROPHYSIOLOGY PROCEDURE N/A 2023    Procedure: Pacemaker DC new, [place] aware;  Surgeon: Rachel Espinoza MD;  Location: [facility] CATH INVASIVE LOCATION;  Service: Cardiovascular;  Laterality: N/A;    CARDIAC SURGERY       SECTION      FOOT/TOE TENDON REPAIR Left     FRACTURE SURGERY  2023    Rt ankle    HYSTERECTOMY      INSERT / REPLACE / REMOVE PACEMAKER  2023    LUNG BIOPSY Right 2020    LUNG LOBECTOMY Right 2022    pt had partial Right lobectomy and nodule removal    MASS EXCISION Right 2023    Procedure: LIPOMA EXCISION,THIGH;  Surgeon: Justo Shannon MD;  Location: [facility] MAIN OR;  Service: General;  Laterality: Right;    MASS EXCISION Right 2024    Procedure: Excision of soft tissue mass from right posterior thigh measuring 2 cm;  Surgeon: Justo Shannon MD;  Location: [facility] MAIN OR;  Service: General;  Laterality: Right;    ROTATOR CUFF REPAIR Left     TENOTOMY ACHILLES TENDON      THORACOSCOPY VIDEO ASSISTED WITH LOBECTOMY Right 2022    Procedure: BRONCHOSCOPY, THORACOSCOPY VIDEO ASSISTED wedge resection X2, INTERCOSTAL NERVE BLOCK;  Surgeon: Ayla Carroll MD;  Location: [facility] MAIN OR;  Service: Thoracic;  Laterality: Right;     TUBAL ABDOMINAL LIGATION  2002     Family History   Problem Relation Age of Onset    Arthritis Mother     Cancer Mother     Depression Mother     Diabetes Mother     Early death Mother     Mental illness Mother     Anxiety disorder Mother     Miscarriages / Stillbirths Mother     Alcohol abuse Father     Diabetes Father     Early death Father     Heart disease Father     Hyperlipidemia Father     Hypertension Father         Father    Vision loss Father     Heart attack Father     Heart disease Sister     Drug abuse Sister     Heart attack Sister     Hypertension Sister         Sister    Heart disease Sister     Heart disease Brother     Hypertension Brother     Heart attack Brother     Drug abuse Brother     Hypertension Brother     Heart disease Brother     Heart attack Brother     Cancer Maternal Grandmother     Heart disease Sister     Hypertension Sister         Sister    Heart attack Sister     Malig Hyperthermia Neg Hx      Social History     Tobacco Use    Smoking status: Never     Passive exposure: Never    Smokeless tobacco: Never   Vaping Use    Vaping status: Never Used   Substance Use Topics    Alcohol use: Never     Comment: VERY RARE    Drug use: Never       Current Outpatient Medications:     albuterol sulfate  (90 Base) MCG/ACT inhaler, Inhale 2 puffs Every 4 (Four) Hours As Needed for Wheezing., Disp: 18 g, Rfl: 2    atorvastatin (Lipitor) 40 MG tablet, Take 1 tablet by mouth Daily., Disp: 90 tablet, Rfl: 3    Budeson-Glycopyrrol-Formoterol (Breztri Aerosphere) 160-9-4.8 MCG/ACT aerosol inhaler, Inhale 2 puffs 2 (Two) Times a Day., Disp: , Rfl:     cholecalciferol (Vitamin D) 25 MCG (1000 UT) tablet, Take 1 tablet by mouth Daily., Disp: , Rfl:     FeroSul 325 (65 Fe) MG tablet, Take 1 tablet by mouth Daily With Breakfast., Disp: 90 tablet, Rfl: 1    fludrocortisone 0.1 MG tablet, Take 1 tablet by mouth Daily., Disp: 90 tablet, Rfl: 3    Gemtesa 75 MG tablet, Take 1 tablet by mouth Daily.,  Disp: 90 tablet, Rfl: 3    Hydrocortisone, Perianal, (ANUSOL-HC) 2.5 % rectal cream, Insert  into the rectum 2 (Two) Times a Day., Disp: 28 g, Rfl: 1    ipratropium-albuterol (DUO-NEB) 0.5-2.5 mg/3 ml nebulizer, Take 3 mL by nebulization 4 (Four) Times a Day As Needed for Wheezing., Disp: , Rfl:     Lurasidone HCl (LATUDA) 20 MG tablet tablet, , Disp: , Rfl:     midodrine (PROAMATINE) 5 MG tablet, TAKE 2 TABLETS BY MOUTH THREE TIMES DAILY BEFORE MEALS, Disp: 540 tablet, Rfl: 0    nystatin (MYCOSTATIN) 431414 UNIT/GM ointment, Apply  topically to the appropriate area as directed., Disp: , Rfl:     omeprazole (priLOSEC) 20 MG capsule, Take 1 capsule by mouth Every Night., Disp: 90 capsule, Rfl: 3    Ozempic, 0.25 or 0.5 MG/DOSE, 2 MG/3ML solution pen-injector, INJECT 0.5MG UNDER THE SKIN ONE TIME WEEKLY INTO THE APPROPRIATE AREA AS DIRECTED, Disp: 9 mL, Rfl: 1    sertraline (ZOLOFT) 50 MG tablet, Take 1 tablet by mouth Every Night., Disp: , Rfl:     traMADol (ULTRAM) 50 MG tablet, Take 1 tablet by mouth Every 6 (Six) Hours As Needed for Moderate Pain., Disp: 21 tablet, Rfl: 0  Allergies:  Morphine, Tylenol [acetaminophen], and Pregabalin      Physical Exam  VITALS REVIEWED    General:      well developed, in no acute distress.    Head:      normocephalic and atraumatic.    Eyes:      PERRL/EOM intact, conjunctiva and sclera clear with out nystagmus.    Neck:      no masses, thyromegaly,  trachea central with normal respiratory effort and PMI displaced laterally  Lungs:      Clear to auscultation bilaterally  Heart:       Regular rate and rhythm  Msk:      no deformity or scoliosis noted of thoracic or lumbar spine.    Pulses:      pulses normal in all 4 extremities.    Extremities:       No lower extremity edema  Neurologic:      no focal deficits.   alert oriented x3  Skin:      intact without lesions or rashes.    Psych:      alert and cooperative; normal mood and affect; normal attention span and concentration.       Result Review :               Pertinent cardiac workup    EKG 4/18/2023 sinus bradycardia 51 bpm.  Echo 4/26/2023 ejection fraction 60 to 65%  Heart cath 9/24/2020, no significant CAD.         ECG 12 Lead    Date/Time: 4/15/2025 1:06 PM  Performed by: Rachel Espinoza MD    Authorized by: Rachel Espinoza MD  Comparison: compared with previous ECG   Similar to previous ECG  Rhythm: sinus rhythm  Rate: normal  BPM: 67  Conduction: conduction normal  ST Segments: ST segments normal  QRS axis: normal  Other findings: non-specific ST-T wave changes              Assessment and Plan      Tessie Conner is a 55-year-old female patient who has no CAD, has autonomic dysfunction, chronic sinus bradycardia, for which she received a dual-chamber pacemaker on 6/30/2023.   Patient felt well after pacemaker but she still having dizzy spells especially when she is standing up.   She is on fludrocortisone 0.1 mg once a day and midodrine 5 mg 4 times a day.  Today I increased her pacing rate to DDDR 70 hopefully with a faster heart rate she might feel better in terms of dizzy spells.    Diagnoses and all orders for this visit:    1. Sick sinus syndrome (Primary)  Overview:  Added automatically from request for surgery [id number]      2. Pacemaker    3. Autonomic orthostatic hypotension           Return in about 6 months (around 10/15/2025), or device.  Patient was given instructions and counseling regarding her condition or for health maintenance advice. Please see specific information pulled into the AVS if appropriate.       Electronically signed by Rachel Espinoza MD, 04/15/25, 1:09 PM EDT.

## 2025-05-16 ENCOUNTER — OFFICE VISIT (OUTPATIENT)
Dept: FAMILY MEDICINE CLINIC | Facility: CLINIC | Age: 55
End: 2025-05-16
Payer: MEDICARE

## 2025-05-16 ENCOUNTER — LAB (OUTPATIENT)
Dept: FAMILY MEDICINE CLINIC | Facility: CLINIC | Age: 55
End: 2025-05-16
Payer: MEDICARE

## 2025-05-16 VITALS
OXYGEN SATURATION: 98 % | SYSTOLIC BLOOD PRESSURE: 126 MMHG | HEART RATE: 75 BPM | BODY MASS INDEX: 40.08 KG/M2 | DIASTOLIC BLOOD PRESSURE: 72 MMHG | WEIGHT: 226.2 LBS | RESPIRATION RATE: 20 BRPM | HEIGHT: 63 IN

## 2025-05-16 DIAGNOSIS — R35.0 URINARY FREQUENCY: ICD-10-CM

## 2025-05-16 DIAGNOSIS — N30.01 ACUTE CYSTITIS WITH HEMATURIA: Primary | ICD-10-CM

## 2025-05-16 DIAGNOSIS — E11.42 WELL CONTROLLED TYPE 2 DIABETES MELLITUS WITH PERIPHERAL NEUROPATHY: ICD-10-CM

## 2025-05-16 PROBLEM — R68.2 DRY MOUTH: Status: RESOLVED | Noted: 2022-11-01 | Resolved: 2025-05-16

## 2025-05-16 PROBLEM — R07.89 CHEST WALL PAIN: Status: RESOLVED | Noted: 2020-04-03 | Resolved: 2025-05-16

## 2025-05-16 PROBLEM — R07.89 ATYPICAL CHEST PAIN: Status: RESOLVED | Noted: 2020-05-24 | Resolved: 2025-05-16

## 2025-05-16 PROBLEM — Z47.1 AFTERCARE FOLLOWING JOINT REPLACEMENT SURGERY: Status: RESOLVED | Noted: 2021-08-27 | Resolved: 2025-05-16

## 2025-05-16 PROBLEM — R09.1 PLEURISY: Status: RESOLVED | Noted: 2020-07-30 | Resolved: 2025-05-16

## 2025-05-16 PROBLEM — R91.8 MASS OF LUNG: Status: RESOLVED | Noted: 2021-11-30 | Resolved: 2025-05-16

## 2025-05-16 LAB
BILIRUB BLD-MCNC: NEGATIVE MG/DL
CLARITY, POC: ABNORMAL
COLOR UR: YELLOW
GLUCOSE UR STRIP-MCNC: NEGATIVE MG/DL
KETONES UR QL: NEGATIVE
LEUKOCYTE EST, POC: ABNORMAL
NITRITE UR-MCNC: POSITIVE MG/ML
PH UR: 6 [PH] (ref 5–8)
PROT UR STRIP-MCNC: NEGATIVE MG/DL
RBC # UR STRIP: ABNORMAL /UL
SP GR UR: 1.02 (ref 1–1.03)
UROBILINOGEN UR QL: ABNORMAL

## 2025-05-16 PROCEDURE — 36415 COLL VENOUS BLD VENIPUNCTURE: CPT | Performed by: STUDENT IN AN ORGANIZED HEALTH CARE EDUCATION/TRAINING PROGRAM

## 2025-05-16 PROCEDURE — 83036 HEMOGLOBIN GLYCOSYLATED A1C: CPT | Performed by: STUDENT IN AN ORGANIZED HEALTH CARE EDUCATION/TRAINING PROGRAM

## 2025-05-16 RX ORDER — NITROFURANTOIN 25; 75 MG/1; MG/1
100 CAPSULE ORAL 2 TIMES DAILY
Qty: 10 CAPSULE | Refills: 0 | Status: SHIPPED | OUTPATIENT
Start: 2025-05-16 | End: 2025-05-21

## 2025-05-16 RX ORDER — ASCORBIC ACID 100 MG
TABLET,CHEWABLE ORAL
COMMUNITY
Start: 2025-04-01

## 2025-05-16 NOTE — PROGRESS NOTES
"Chief Complaint  Chief Complaint   Patient presents with    Back Pain    Urinary Frequency     Lots of pressure       Subjective        Tessie Conner is a 55 y.o. female who presents to [facility].  History of Present Illness    Tessie is a 55-year-old female here for suprapubic pressure.    Patient reports suprapubic pressure and low back pain that started today.  Has had similar symptoms in the past with UTI.  States that she used to get UTIs frequently however not for the last few years.  Denies any recent fever.            Objective   /72   Pulse 75   Resp 20   Ht 160 cm (63\")   Wt 103 kg (226 lb 3.2 oz)   SpO2 98%   BMI 40.07 kg/m²     Estimated body mass index is 40.07 kg/m² as calculated from the following:    Height as of this encounter: 160 cm (63\").    Weight as of this encounter: 103 kg (226 lb 3.2 oz).     Physical Exam   GEN: In no acute distress, non toxic appearing  HEENT: MMM. EOMI.   CV: No extremity edema.   RESP: No signs of respiratory distress.  SKIN: No rashes  MSK: No deformity.  Mild tenderness to palpation of the right paraspinal musculature of the lower lumbar.  No midline tenderness or step-off.  No CVA tenderness.  NEURO: Moves all extremities equally. Alert and appropriate              Result Review :              Assessment and Plan     Diagnoses and all orders for this visit:    1. Acute cystitis with hematuria (Primary)  Point-of-care urinalysis consistent with UTI with small blood, positive nitrite, positive leukocytes.  Most recent urine culture I can find was in 2022 which showed pansensitive E. coli.    Will treat empirically with Macrobid.  Send urine for culture.  Return to the office if not improving/worsening.    -     POCT urinalysis dipstick, multipro  -     Urine Culture - Urine, Urine, Clean Catch      Other orders  -     nitrofurantoin, macrocrystal-monohydrate, (Macrobid) 100 MG capsule; Take 1 capsule by mouth 2 (Two) Times " a Day for 5 days.  Dispense: 10 capsule; Refill: 0            Follow Up     Return in about 7 months (around 12/16/2025) for Medicare Wellness.

## 2025-05-17 LAB — HBA1C MFR BLD: 5.5 % (ref 4.8–5.6)

## 2025-06-02 DIAGNOSIS — I95.1 AUTONOMIC ORTHOSTATIC HYPOTENSION: ICD-10-CM

## 2025-06-02 RX ORDER — FERROUS SULFATE 325(65) MG
1 TABLET ORAL
Qty: 90 TABLET | Refills: 1 | Status: SHIPPED | OUTPATIENT
Start: 2025-06-02

## 2025-06-02 RX ORDER — MIDODRINE HYDROCHLORIDE 5 MG/1
5 TABLET ORAL 4 TIMES DAILY
Qty: 360 TABLET | Refills: 1 | Status: SHIPPED | OUTPATIENT
Start: 2025-06-02

## 2025-06-02 NOTE — TELEPHONE ENCOUNTER
Rx Refill Note  Requested Prescriptions     Signed Prescriptions Disp Refills    midodrine (PROAMATINE) 5 MG tablet 360 tablet 1     Sig: Take 1 tablet by mouth 4 (Four) Times a Day.     Authorizing Provider: ANIL MIRANDA     Ordering User: SARAH BARRAGAN      Last office visit with prescribing clinician: 4/15/2025   Last telemedicine visit with prescribing clinician: Visit date not found   Next office visit with prescribing clinician: 10/28/2025                         Would you like a call back once the refill request has been completed: [] Yes [] No    If the office needs to give you a call back, can they leave a voicemail: [] Yes [] No    Sarah Barragan MA  06/02/25, 08:02 EDT    Office Visit with Anil Miranda MD (04/15/2025)

## 2025-06-04 ENCOUNTER — LAB (OUTPATIENT)
Dept: FAMILY MEDICINE CLINIC | Facility: CLINIC | Age: 55
End: 2025-06-04
Payer: MEDICARE

## 2025-06-04 ENCOUNTER — OFFICE VISIT (OUTPATIENT)
Dept: FAMILY MEDICINE CLINIC | Facility: CLINIC | Age: 55
End: 2025-06-04
Payer: MEDICARE

## 2025-06-04 VITALS
RESPIRATION RATE: 18 BRPM | OXYGEN SATURATION: 96 % | SYSTOLIC BLOOD PRESSURE: 129 MMHG | DIASTOLIC BLOOD PRESSURE: 68 MMHG | BODY MASS INDEX: 42.13 KG/M2 | HEIGHT: 63 IN | HEART RATE: 70 BPM | WEIGHT: 237.8 LBS

## 2025-06-04 DIAGNOSIS — M54.50 ACUTE BILATERAL LOW BACK PAIN WITHOUT SCIATICA: ICD-10-CM

## 2025-06-04 DIAGNOSIS — M15.0 PRIMARY OSTEOARTHRITIS INVOLVING MULTIPLE JOINTS: ICD-10-CM

## 2025-06-04 DIAGNOSIS — R30.0 DYSURIA: Primary | ICD-10-CM

## 2025-06-04 DIAGNOSIS — N39.0 RECURRENT UTI: ICD-10-CM

## 2025-06-04 LAB
BILIRUB BLD-MCNC: NEGATIVE MG/DL
CLARITY, POC: CLEAR
COLOR UR: YELLOW
GLUCOSE UR STRIP-MCNC: NEGATIVE MG/DL
KETONES UR QL: NEGATIVE
LEUKOCYTE EST, POC: NEGATIVE
NITRITE UR-MCNC: NEGATIVE MG/ML
PH UR: 6 [PH] (ref 5–8)
PROT UR STRIP-MCNC: NEGATIVE MG/DL
RBC # UR STRIP: NEGATIVE /UL
SP GR UR: 1.02 (ref 1–1.03)
UROBILINOGEN UR QL: NORMAL

## 2025-06-04 PROCEDURE — 87086 URINE CULTURE/COLONY COUNT: CPT | Performed by: STUDENT IN AN ORGANIZED HEALTH CARE EDUCATION/TRAINING PROGRAM

## 2025-06-04 PROCEDURE — 3044F HG A1C LEVEL LT 7.0%: CPT | Performed by: INTERNAL MEDICINE

## 2025-06-04 PROCEDURE — 81003 URINALYSIS AUTO W/O SCOPE: CPT | Performed by: INTERNAL MEDICINE

## 2025-06-04 PROCEDURE — 1125F AMNT PAIN NOTED PAIN PRSNT: CPT | Performed by: INTERNAL MEDICINE

## 2025-06-04 PROCEDURE — 99214 OFFICE O/P EST MOD 30 MIN: CPT | Performed by: INTERNAL MEDICINE

## 2025-06-04 RX ORDER — CYCLOBENZAPRINE HCL 10 MG
10 TABLET ORAL 3 TIMES DAILY PRN
Qty: 30 TABLET | Refills: 0 | Status: SHIPPED | OUTPATIENT
Start: 2025-06-04

## 2025-06-04 NOTE — PROGRESS NOTES
Chief Complaint  Back Pain and Urinary Frequency    HPI:    Tessie Conner presents to [facility]    Patient is a 55-year-old female with a history of sick sinus syndrome status post pacemaker placement, hyperlipidemia, coronary artery disease, type 2 diabetes, GERD, bipolar disease, anxiety, PTSD, rheumatoid arthritis presenting for evaluation of urinary symptoms.    Patient recently evaluated by PCP on 5/16/2025 for suprapubic pressure.  Similar symptoms reported with prior UTIs.  UA with small blood, positive nitrites, and positive leukocytes.  Patient empirically treated with Macrobid based on urine culture with pansensitive E. coli from 10/29/2022.    Patient overall had been doing well after completing antibiotics. She reports that this morning she had an episode of dysuria and sensation that she need to use the bathroom. Denies frequency, hematuria, flank pain, fever, chills, nausea, or vomiting. Denies abdominal pain, diarrhea, constipation, melena, hematochezia, or jaundice. Previously history of nephrolithiasis about a year or two ago. Denies STIs. Not currently sexually active Patient previously followed with urology due to UTIs. Denieshospital admissions, or multidrug resistant organisms. Patient staying well hydrated.     Patient reports that she also has been having low bilateral low back pain that has been ongoing for the past three weeks. Pain described as an intermittent, non-radiating, throbbing pain. Nothing really makes better or worse. Denies fever, chills, nausea, vomiting. Denies numbness, tingling, weakness, saddle anesthesia, urinary/fecal incontinence, focal sensory/motor deficit. Denies recent trauma, bending/twisting, injury, or overuse. Denies history of ongoing cancer or immunocompromised state. Denies prior or recent imaging of back. Does have osteoarthritis. Denies prior physical therapy, back corticosteroid injections, surgeries, or  procedure.      Review of Systems:  ROS negative unless otherwise noted in HPI above.    Past Medical History:   Diagnosis Date    Abnormal ECG     Anemia     Anesthesia complication 2003    cardiac arrest  with hysterectomy    Anxiety 2005    Arthritis     Asthma 04/17/2020    allergy    Bipolar 1 disorder     Bradycardia     Chest pain     due to scarring from lung surgery  2/21    Chest pain 09/24/2020    Cholelithiasis 10/2022    COPD (chronic obstructive pulmonary disease)     Coronary artery disease     very minimal    Depression 2005    Diabetes mellitus 2002    borderline   resolved    Diverticulitis of colon     Diverticulosis     Dyspnea on minimal exertion     Frequent UTI     Gastroesophageal reflux disease without esophagitis 07/15/2020    Heart murmur     FROM CHILDHOOD    Histoplasmosis     History of anemia     POST PREGNANCY    Hyperlipidemia     Hyperlipidemia 10/04/2016    Hypertension     Hypotension     Inflammatory bowel disease     Mass of upper lobe of right lung     Migraines     hx    Neuropathy in diabetes     Obesity 1999    Plantar fasciitis     Pneumonia     PONV (postoperative nausea and vomiting)     Scoliosis     Sleep apnea 2/28/22    cpap    Spinal headache     Vertigo     Visual impairment 2011    WEARS GLASSES    Vitamin D deficiency          Current Outpatient Medications:     albuterol sulfate  (90 Base) MCG/ACT inhaler, Inhale 2 puffs Every 4 (Four) Hours As Needed for Wheezing., Disp: 18 g, Rfl: 2    Ascorbic Acid (Vitamin C) 100 MG chewable tablet, , Disp: , Rfl:     atorvastatin (Lipitor) 40 MG tablet, Take 1 tablet by mouth Daily., Disp: 90 tablet, Rfl: 3    Budeson-Glycopyrrol-Formoterol (Breztri Aerosphere) 160-9-4.8 MCG/ACT aerosol inhaler, Inhale 2 puffs 2 (Two) Times a Day., Disp: , Rfl:     cholecalciferol (Vitamin D) 25 MCG (1000 UT) tablet, Take 1 tablet by mouth Daily., Disp: , Rfl:     ferrous sulfate (FeroSul) 325 (65 FE) MG tablet, TAKE 1 TABLET EVERY  "DAY WITH BREAKFAST, Disp: 90 tablet, Rfl: 1    fludrocortisone 0.1 MG tablet, Take 1 tablet by mouth Daily., Disp: 90 tablet, Rfl: 3    Gemtesa 75 MG tablet, Take 1 tablet by mouth Daily., Disp: 90 tablet, Rfl: 3    ipratropium-albuterol (DUO-NEB) 0.5-2.5 mg/3 ml nebulizer, Take 3 mL by nebulization 4 (Four) Times a Day As Needed for Wheezing., Disp: , Rfl:     Lurasidone HCl (LATUDA) 20 MG tablet tablet, , Disp: , Rfl:     midodrine (PROAMATINE) 5 MG tablet, Take 1 tablet by mouth 4 (Four) Times a Day., Disp: 360 tablet, Rfl: 1    Ozempic, 0.25 or 0.5 MG/DOSE, 2 MG/3ML solution pen-injector, INJECT 0.5MG UNDER THE SKIN ONE TIME WEEKLY INTO THE APPROPRIATE AREA AS DIRECTED, Disp: 9 mL, Rfl: 1    sertraline (ZOLOFT) 50 MG tablet, Take 1 tablet by mouth Every Night., Disp: , Rfl:     traMADol (ULTRAM) 50 MG tablet, Take 1 tablet by mouth Every 6 (Six) Hours As Needed for Moderate Pain., Disp: 21 tablet, Rfl: 0    cyclobenzaprine (FLEXERIL) 10 MG tablet, Take 1 tablet by mouth 3 (Three) Times a Day As Needed for Muscle Spasms., Disp: 30 tablet, Rfl: 0    Social History     Socioeconomic History    Marital status:    Tobacco Use    Smoking status: Never     Passive exposure: Never    Smokeless tobacco: Never   Vaping Use    Vaping status: Never Used   Substance and Sexual Activity    Alcohol use: Never     Comment: VERY RARE    Drug use: Never    Sexual activity: Not Currently     Partners: Male     Birth control/protection: None, Hysterectomy        Objective   Vital Signs:  /68   Pulse 70   Resp 18   Ht 160 cm (63\")   Wt 108 kg (237 lb 12.8 oz)   SpO2 96%   BMI 42.12 kg/m²   Estimated body mass index is 42.12 kg/m² as calculated from the following:    Height as of this encounter: 160 cm (63\").    Weight as of this encounter: 108 kg (237 lb 12.8 oz).    Physical Exam:  General: Well-appearing patient, no apparent distress  Cardiac: Regular rate and rhythm, normal S1/S2, no murmur, rubs or gallops, no " lower extremity edema  Lungs: Clear to auscultation bilaterally, no crackles or wheezes  Abdomen: Soft, non-tender, no guarding or rebound tenderness,, no suprapubic tenderness  Skin: No significant rashes or lesions  MSK: Grossly normal tone and strength, no point tenderness over cervical, thoracic, or lumbar spinous processes, reproducible tenderness palpation of lumbar paraspinal muscles bilaterally, 5 out of 5 strength in upper and lower extremities bilaterally, no CVA tenderness  Neuro: Alert and oriented x3, CN II-XII grossly intact, normal sensation in upper and lower extremities bilaterally  Psych: Appropriate mood and affect    Assessment and Plan:    (R30.0) Dysuria  (N39.0) Recurrent UTI   Assessment: Patient recently treated for UTI with Macrobid and had isolated episode of faint dysuria and sensation that she has to go.  UA completely unremarkable.  Continuing to monitor symptoms given minor symptoms and negative UA.  Patient aware of signs symptoms which should prompt reevaluation.  Plan:   - POCT urinalysis dipstick, multipro  - Hold on antibiotics for now  - Stay well-hydrated, cranberry juice  - Follow-up if new or worsening symptoms    (M54.50) Acute bilateral low back pain without sciatica  Assessment: Patient with ongoing back pain for about 3 weeks. Most likely secondary to degenerative changes as patient has significant known osteoarthritis.  Unlikely kidney stones as pain reproducible on exam and does not have significant urine abnormalities.  No red flag symptoms. No current indications for imaging. Will treat conservatively for now and closely monitor. Discussed signs and symptoms which should prompt reevaluation and consideration of additional workup.   Plan:  - Over-the-counter medications including NSAIDs as needed  - Muscle relaxer 3 times daily as needed  - Activity modification  - Heat/ice as needed  - Hold on physical therapy consult for now as patient wants to try conservative  treatments; low threshold to order  - Hold on imaging for now    (M15.0) Primary osteoarthritis involving multiple joints  Assessment: Follows with rheumatology, most recently 6/4/2025.  Patient felt to have osteoarthritis with erosive osteoarthritis of the hands.  Prior empiric trial of hydroxychloroquine was not effective.  Rheumatology currently recommending symptomatic treatment.  No current indications for additional rheumatological workup.  Plan:  - OTC analgesia.  - Activity modification    Patient was given instructions and counseling regarding her condition or for health maintenance advice. Please see specific information pulled into the AVS if appropriate.       Dr Abdiel Brandt   Internal Medicine Physician  [facility]  [address], [address]  [place] [address]

## 2025-06-04 NOTE — PATIENT INSTRUCTIONS
Low back pain  Plan:  - Over-the-counter medications including NSAIDs as needed  - Muscle relaxer 3 times daily as needed  - Activity modification  - Heat/ice as needed  - Hold on physical therapy consult for now; low threshold to order  - Hold on imaging for now    Stay hydrated  No antibiotics for now    Follow up if new/worsening symptoms

## 2025-06-05 LAB — BACTERIA SPEC AEROBE CULT: NORMAL

## 2025-06-07 ENCOUNTER — APPOINTMENT (OUTPATIENT)
Dept: CT IMAGING | Facility: HOSPITAL | Age: 55
End: 2025-06-07
Payer: MEDICARE

## 2025-06-07 ENCOUNTER — HOSPITAL ENCOUNTER (EMERGENCY)
Facility: HOSPITAL | Age: 55
Discharge: HOME | End: 2025-06-07
Payer: MEDICARE

## 2025-06-07 VITALS
RESPIRATION RATE: 18 BRPM | OXYGEN SATURATION: 98 % | DIASTOLIC BLOOD PRESSURE: 61 MMHG | HEIGHT: 63 IN | SYSTOLIC BLOOD PRESSURE: 139 MMHG | BODY MASS INDEX: 39.26 KG/M2 | TEMPERATURE: 98.5 F | HEART RATE: 73 BPM | WEIGHT: 221.56 LBS

## 2025-06-07 DIAGNOSIS — R51.9 ACUTE NONINTRACTABLE HEADACHE, UNSPECIFIED HEADACHE TYPE: Primary | ICD-10-CM

## 2025-06-07 LAB
HOLD SPECIMEN: NORMAL
HOLD SPECIMEN: NORMAL
WHOLE BLOOD HOLD COAG: NORMAL
WHOLE BLOOD HOLD SPECIMEN: NORMAL

## 2025-06-07 PROCEDURE — 93005 ELECTROCARDIOGRAM TRACING: CPT | Performed by: PHYSICIAN ASSISTANT

## 2025-06-07 PROCEDURE — 25010000002 KETOROLAC TROMETHAMINE PER 15 MG: Performed by: PHYSICIAN ASSISTANT

## 2025-06-07 PROCEDURE — 25010000002 METOCLOPRAMIDE PER 10 MG: Performed by: PHYSICIAN ASSISTANT

## 2025-06-07 PROCEDURE — 25010000002 DIPHENHYDRAMINE PER 50 MG: Performed by: PHYSICIAN ASSISTANT

## 2025-06-07 PROCEDURE — 25810000003 SODIUM CHLORIDE 0.9 % SOLUTION: Performed by: PHYSICIAN ASSISTANT

## 2025-06-07 PROCEDURE — 96374 THER/PROPH/DIAG INJ IV PUSH: CPT

## 2025-06-07 PROCEDURE — 99284 EMERGENCY DEPT VISIT MOD MDM: CPT

## 2025-06-07 PROCEDURE — 70450 CT HEAD/BRAIN W/O DYE: CPT

## 2025-06-07 PROCEDURE — 96375 TX/PRO/DX INJ NEW DRUG ADDON: CPT

## 2025-06-07 RX ORDER — DIPHENHYDRAMINE HYDROCHLORIDE 50 MG/ML
25 INJECTION, SOLUTION INTRAMUSCULAR; INTRAVENOUS ONCE
Status: COMPLETED | OUTPATIENT
Start: 2025-06-07 | End: 2025-06-07

## 2025-06-07 RX ORDER — METOCLOPRAMIDE HYDROCHLORIDE 5 MG/ML
10 INJECTION INTRAMUSCULAR; INTRAVENOUS ONCE
Status: COMPLETED | OUTPATIENT
Start: 2025-06-07 | End: 2025-06-07

## 2025-06-07 RX ORDER — KETOROLAC TROMETHAMINE 30 MG/ML
30 INJECTION, SOLUTION INTRAMUSCULAR; INTRAVENOUS ONCE
Status: COMPLETED | OUTPATIENT
Start: 2025-06-07 | End: 2025-06-07

## 2025-06-07 RX ADMIN — SODIUM CHLORIDE 1000 ML: 900 INJECTION, SOLUTION INTRAVENOUS at 16:39

## 2025-06-07 RX ADMIN — DIPHENHYDRAMINE HYDROCHLORIDE 25 MG: 50 INJECTION, SOLUTION INTRAMUSCULAR; INTRAVENOUS at 16:38

## 2025-06-07 RX ADMIN — KETOROLAC TROMETHAMINE 30 MG: 30 INJECTION INTRAMUSCULAR; INTRAVENOUS at 17:35

## 2025-06-07 RX ADMIN — METOCLOPRAMIDE 10 MG: 5 INJECTION, SOLUTION INTRAMUSCULAR; INTRAVENOUS at 16:39

## 2025-06-07 NOTE — DISCHARGE INSTRUCTIONS
Ibuprofen for headache  Caffeine can sometimes also help headache  Rest when you get home for remainder of the weekend  Return to the emergency department any worsening symptoms.

## 2025-06-07 NOTE — ED PROVIDER NOTES
Subjective   History of Present Illness  55-year-old female presents emergency room with complaints of headache.  Patient states that the headache began this morning she states this is the worst headache she has ever had.  Patient states have had a history of migraines have not had a migraine in a while and this is not my normal migraine symptoms.  Patient denies auras, nausea, vomiting, photophobia or phonophobia.  Patient also states that she had a fluttering this morning on the right side of her chest with no shortness of breath or chest pain she states it happened when she was driving.  Patient states symptoms only lasted a few seconds.        Review of Systems   Constitutional:  Negative for chills, fatigue and fever.   Respiratory:  Negative for chest tightness and shortness of breath.    Cardiovascular:  Negative for chest pain and palpitations.   Gastrointestinal:  Negative for nausea and vomiting.   Neurological:  Positive for headaches. Negative for dizziness, facial asymmetry, speech difficulty, weakness, light-headedness and numbness.       Past Medical History:   Diagnosis Date    Abnormal ECG     Anemia     Anesthesia complication 2003    cardiac arrest  with hysterectomy    Anxiety 2005    Arthritis     Asthma 04/17/2020    allergy    Bipolar 1 disorder     Bradycardia     Chest pain     due to scarring from lung surgery  2/21    Chest pain 09/24/2020    Cholelithiasis 10/2022    COPD (chronic obstructive pulmonary disease)     Coronary artery disease     very minimal    Depression 2005    Diabetes mellitus 2002    borderline   resolved    Diverticulitis of colon     Diverticulosis     Dyspnea on minimal exertion     Frequent UTI     Gastroesophageal reflux disease without esophagitis 07/15/2020    Heart murmur     FROM CHILDHOOD    Histoplasmosis     History of anemia     POST PREGNANCY    Hyperlipidemia     Hyperlipidemia 10/04/2016    Hypertension     Hypotension     Inflammatory bowel disease      Mass of upper lobe of right lung     Migraines     hx    Neuropathy in diabetes     Obesity     Plantar fasciitis     Pneumonia     PONV (postoperative nausea and vomiting)     Scoliosis     Sleep apnea 22    cpap    Spinal headache     Vertigo     Visual impairment     WEARS GLASSES    Vitamin D deficiency        Allergies   Allergen Reactions    Morphine Itching     Itching and redness on chest    Tylenol [Acetaminophen] Hives and Itching    Pregabalin Rash       Past Surgical History:   Procedure Laterality Date    ANKLE OPEN REDUCTION INTERNAL FIXATION Right 2023    Procedure: ANKLE OPEN REDUCTION INTERNAL FIXATION;  Surgeon: TORIE Beck DPM;  Location: [facility] MAIN OR;  Service: Podiatry;  Laterality: Right;    APPENDECTOMY      CARDIAC CATHETERIZATION N/A 2020    Procedure: Left Heart Cath possible PCI, atherectomy, hemodynamic support;  Surgeon: Thomas Zamora MD;  Location: [facility] CATH INVASIVE LOCATION;  Service: Cardiology;  Laterality: N/A;    CARDIAC CATHETERIZATION  2020    CARDIAC ELECTROPHYSIOLOGY PROCEDURE N/A 2023    Procedure: Pacemaker DC new, [place] aware;  Surgeon: Rachel Espinoza MD;  Location: [facility] CATH INVASIVE LOCATION;  Service: Cardiovascular;  Laterality: N/A;    CARDIAC SURGERY       SECTION      FOOT/TOE TENDON REPAIR Left     FRACTURE SURGERY  2023    Rt ankle    HYSTERECTOMY      INSERT / REPLACE / REMOVE PACEMAKER  2023    LUNG BIOPSY Right 2020    LUNG LOBECTOMY Right 2022    pt had partial Right lobectomy and nodule removal    MASS EXCISION Right 2023    Procedure: LIPOMA EXCISION,THIGH;  Surgeon: Justo Shannon MD;  Location: [facility] MAIN OR;  Service: General;  Laterality: Right;    MASS EXCISION Right 2024    Procedure: Excision of soft tissue mass from right posterior thigh measuring 2 cm;  Surgeon: Justo Shannon MD;  Location: [facility] MAIN OR;  Service:  General;  Laterality: Right;    ROTATOR CUFF REPAIR Left     TENOTOMY ACHILLES TENDON      THORACOSCOPY VIDEO ASSISTED WITH LOBECTOMY Right 02/08/2022    Procedure: BRONCHOSCOPY, THORACOSCOPY VIDEO ASSISTED wedge resection X2, INTERCOSTAL NERVE BLOCK;  Surgeon: Ayla Carroll MD;  Location: [facility] OR;  Service: Thoracic;  Laterality: Right;    TUBAL ABDOMINAL LIGATION  2002       Family History   Problem Relation Age of Onset    Arthritis Mother     Cancer Mother     Depression Mother     Diabetes Mother     Early death Mother     Mental illness Mother     Anxiety disorder Mother     Miscarriages / Stillbirths Mother     Alcohol abuse Father     Diabetes Father     Early death Father     Heart disease Father     Hyperlipidemia Father     Hypertension Father         Father    Vision loss Father     Heart attack Father     Heart disease Sister     Drug abuse Sister     Heart attack Sister     Hypertension Sister         Sister    Heart disease Sister     Heart disease Brother     Hypertension Brother     Heart attack Brother     Drug abuse Brother     Hypertension Brother     Heart disease Brother     Heart attack Brother     Cancer Maternal Grandmother     Heart disease Sister     Hypertension Sister         Sister    Heart attack Sister     Malig Hyperthermia Neg Hx        Social History     Socioeconomic History    Marital status:    Tobacco Use    Smoking status: Never     Passive exposure: Never    Smokeless tobacco: Never   Vaping Use    Vaping status: Never Used   Substance and Sexual Activity    Alcohol use: Never     Comment: VERY RARE    Drug use: Never    Sexual activity: Not Currently     Partners: Male     Birth control/protection: None, Hysterectomy           Objective   Physical Exam  Vitals and nursing note reviewed.   Constitutional:       General: She is not in acute distress.     Appearance: Normal appearance. She is not ill-appearing, toxic-appearing or diaphoretic.   HENT:      Head:  "Normocephalic and atraumatic.   Eyes:      General: No scleral icterus.     Pupils: Pupils are equal, round, and reactive to light.   Cardiovascular:      Rate and Rhythm: Normal rate and regular rhythm.   Musculoskeletal:      Cervical back: Normal range of motion and neck supple. No rigidity.   Skin:     General: Skin is warm and dry.   Neurological:      General: No focal deficit present.      Mental Status: She is alert and oriented to person, place, and time.   Psychiatric:         Mood and Affect: Mood normal.         Behavior: Behavior normal.         Procedures           ED Course      /61   Pulse 73   Temp 98.5 °F (36.9 °C) (Oral)   Resp 18   Ht 160 cm (63\")   Wt 101 kg (221 lb 9 oz)   LMP  (LMP Unknown)   SpO2 98%   BMI 39.25 kg/m²   Labs Reviewed   RAINBOW DRAW    Narrative:     The following orders were created for panel order [place] Draw.  Procedure                               Abnormality         Status                     ---------                               -----------         ------                     Green Top (Gel)[[id number]]                                  Final result               Lavender Top[[id number]]                                     Final result               Gold Top - SST[[id number]]                                   Final result               Light Blue Top[[id number]]                                   Final result                 Please view results for these tests on the individual orders.   GREEN TOP   LAVENDER TOP   GOLD TOP - SST   LIGHT BLUE TOP     Medications   diphenhydrAMINE (BENADRYL) injection 25 mg (25 mg Intravenous Given 6/7/25 1638)   metoclopramide (REGLAN) injection 10 mg (10 mg Intravenous Given 6/7/25 1639)   sodium chloride 0.9 % bolus 1,000 mL (0 mL Intravenous Stopped 6/7/25 1851)   ketorolac (TORADOL) injection 30 mg (30 mg Intravenous Given 6/7/25 1735)     CT Head Without Contrast  Result Date: 6/7/2025  Impression: No acute intracranial " abnormality. Electronically Signed: Esa Yarbrough MD  6/7/2025 5:08 PM EDT  Workstation ID: [id number]                                                     Medical Decision Making  55-year-old female presents emergency room complaints of headache.  Headaches located on left side of the head patient states is the worst headache she is ever had.  She does have a history of migraine she states is not normal migraine.  She denies nausea photophobia or phonophobia.  She denies any recent head injuries neck stiffness fever chills.  She denies dizziness or lightheadedness.  Physical exam HEENT is normocephalic and nontraumatic lungs clear to auscultation bilaterally heart regular rate without murmur abdomen soft nontender nondistended.  Skin warm and dry and extremities are nonedematous.  Vital signs BP is 139/61 temperature 98.5 heart rate 73 respirations 18 SpO2 room air is 98%.  ER course patient received head CT without contrast per radiologist no intracranial abnormalities noted.  EKG was obtained normal sinus rhythm rate 70 no ST elevation or depression independently interpreted by Dr. Anguiano.  Patient received Benadryl 25 mg IV 1 L normal saline IV Reglan 10 mg IV with decent relief of headache after head CT was reported normal patient received 30 mg IV of Toradol patient states headache had not resolved.  Advised  that chest fluttering is most likely muscle fasciculation on the right side of the chest wall.  Patient was advised if any symptoms worsen throughout the weekend please return to the emergency department however she was discharged home in stable condition to continue ibuprofen, calf pain and resting in dark room.    Problems Addressed:  Acute nonintractable headache, unspecified headache type: complicated acute illness or injury    Amount and/or Complexity of Data Reviewed  Radiology: ordered.  ECG/medicine tests: ordered.    Risk  Prescription drug management.        Final diagnoses:   Acute nonintractable  headache, unspecified headache type       ED Disposition  ED Disposition       ED Disposition   Discharge    Condition   Stable    Comment   --               eRilly Diaz DO  [address] [id number] [phone]    Schedule an appointment as soon as possible for a visit       [facility] EMERGENCY DEPARTMENT  [facility] [address] [phone]    If symptoms worsen         Medication List      No changes were made to your prescriptions during this visit.            Oscar Cole PALatanyaC  06/07/25 [id number]

## 2025-06-08 LAB
QT INTERVAL: 400 MS
QTC INTERVAL: 433 MS

## 2025-06-09 ENCOUNTER — TELEPHONE (OUTPATIENT)
Dept: CARDIOLOGY | Facility: CLINIC | Age: 55
End: 2025-06-09
Payer: MEDICARE

## 2025-06-09 NOTE — TELEPHONE ENCOUNTER
Caller: Tessie Conner    Relationship to patient: Self    Best call back number: [phone]    Patient is needing: PATIENT REQUESTING CALL BACK TO GO OVER EKG RESULTS FROM THE HOSPITAL.

## 2025-06-09 NOTE — TELEPHONE ENCOUNTER
Called pt advised per Natalya GUALLPA and Dr Espinoza   She is atrial paced in sinus rhythm. It looks good.      Pt V/U

## 2025-06-15 DIAGNOSIS — E11.42 WELL CONTROLLED TYPE 2 DIABETES MELLITUS WITH PERIPHERAL NEUROPATHY: ICD-10-CM

## 2025-06-16 RX ORDER — SEMAGLUTIDE 0.68 MG/ML
0.5 INJECTION, SOLUTION SUBCUTANEOUS WEEKLY
Qty: 3 ML | Refills: 3 | Status: SHIPPED | OUTPATIENT
Start: 2025-06-16

## 2025-06-20 ENCOUNTER — OFFICE VISIT (OUTPATIENT)
Dept: FAMILY MEDICINE CLINIC | Facility: CLINIC | Age: 55
End: 2025-06-20
Payer: MEDICARE

## 2025-06-20 ENCOUNTER — LAB (OUTPATIENT)
Dept: FAMILY MEDICINE CLINIC | Facility: CLINIC | Age: 55
End: 2025-06-20
Payer: MEDICARE

## 2025-06-20 VITALS
HEART RATE: 75 BPM | DIASTOLIC BLOOD PRESSURE: 68 MMHG | BODY MASS INDEX: 39.69 KG/M2 | SYSTOLIC BLOOD PRESSURE: 140 MMHG | WEIGHT: 224 LBS | HEIGHT: 63 IN | OXYGEN SATURATION: 97 %

## 2025-06-20 DIAGNOSIS — M19.041 PRIMARY OSTEOARTHRITIS OF BOTH HANDS: ICD-10-CM

## 2025-06-20 DIAGNOSIS — M79.642 BILATERAL HAND PAIN: Primary | ICD-10-CM

## 2025-06-20 DIAGNOSIS — M19.042 PRIMARY OSTEOARTHRITIS OF BOTH HANDS: ICD-10-CM

## 2025-06-20 DIAGNOSIS — M79.641 BILATERAL HAND PAIN: Primary | ICD-10-CM

## 2025-06-20 DIAGNOSIS — R25.2 LEG CRAMPS: ICD-10-CM

## 2025-06-20 DIAGNOSIS — M79.675 PAIN OF TOE OF LEFT FOOT: ICD-10-CM

## 2025-06-20 DIAGNOSIS — M79.10 MYALGIA: ICD-10-CM

## 2025-06-20 PROBLEM — M05.741 RHEUMATOID ARTHRITIS INVOLVING BOTH HANDS WITH POSITIVE RHEUMATOID FACTOR: Status: RESOLVED | Noted: 2024-08-13 | Resolved: 2025-06-20

## 2025-06-20 PROBLEM — M05.742 RHEUMATOID ARTHRITIS INVOLVING BOTH HANDS WITH POSITIVE RHEUMATOID FACTOR: Status: RESOLVED | Noted: 2024-08-13 | Resolved: 2025-06-20

## 2025-06-20 LAB
ALBUMIN SERPL-MCNC: 4.2 G/DL (ref 3.5–5.2)
ALBUMIN/GLOB SERPL: 1.4 G/DL
ALP SERPL-CCNC: 133 U/L (ref 39–117)
ALT SERPL W P-5'-P-CCNC: 17 U/L (ref 1–33)
ANION GAP SERPL CALCULATED.3IONS-SCNC: 10.5 MMOL/L (ref 5–15)
AST SERPL-CCNC: 19 U/L (ref 1–32)
BILIRUB SERPL-MCNC: 0.4 MG/DL (ref 0–1.2)
BUN SERPL-MCNC: 14 MG/DL (ref 6–20)
BUN/CREAT SERPL: 19.4 (ref 7–25)
CALCIUM SPEC-SCNC: 9.9 MG/DL (ref 8.6–10.5)
CHLORIDE SERPL-SCNC: 107 MMOL/L (ref 98–107)
CK SERPL-CCNC: 93 U/L (ref 20–180)
CO2 SERPL-SCNC: 26.5 MMOL/L (ref 22–29)
CREAT SERPL-MCNC: 0.72 MG/DL (ref 0.57–1)
EGFRCR SERPLBLD CKD-EPI 2021: 99.5 ML/MIN/1.73
GLOBULIN UR ELPH-MCNC: 3 GM/DL
GLUCOSE SERPL-MCNC: 82 MG/DL (ref 65–99)
MAGNESIUM SERPL-MCNC: 2.3 MG/DL (ref 1.6–2.6)
POTASSIUM SERPL-SCNC: 3.9 MMOL/L (ref 3.5–5.2)
PROT SERPL-MCNC: 7.2 G/DL (ref 6–8.5)
SODIUM SERPL-SCNC: 144 MMOL/L (ref 136–145)

## 2025-06-20 PROCEDURE — 82550 ASSAY OF CK (CPK): CPT | Performed by: STUDENT IN AN ORGANIZED HEALTH CARE EDUCATION/TRAINING PROGRAM

## 2025-06-20 PROCEDURE — 80053 COMPREHEN METABOLIC PANEL: CPT | Performed by: STUDENT IN AN ORGANIZED HEALTH CARE EDUCATION/TRAINING PROGRAM

## 2025-06-20 PROCEDURE — 83735 ASSAY OF MAGNESIUM: CPT | Performed by: STUDENT IN AN ORGANIZED HEALTH CARE EDUCATION/TRAINING PROGRAM

## 2025-06-20 PROCEDURE — 36415 COLL VENOUS BLD VENIPUNCTURE: CPT | Performed by: STUDENT IN AN ORGANIZED HEALTH CARE EDUCATION/TRAINING PROGRAM

## 2025-06-20 RX ORDER — TRAMADOL HYDROCHLORIDE 50 MG/1
50 TABLET ORAL EVERY 6 HOURS PRN
Qty: 30 TABLET | Refills: 0 | Status: SHIPPED | OUTPATIENT
Start: 2025-06-20

## 2025-06-20 NOTE — PROGRESS NOTES
"Chief Complaint  Chief Complaint   Patient presents with    Arthritis     Hands and legs     Toe Pain     Left foot, pinky toe, x 1 month       Subjective        Tessie Conner is a 55 y.o. female who presents to [facility].  History of Present Illness    Tessie is a 55-year-old female here for multiple concerns.    Bilateral hand pain  Was previously seen by rheumatology due to potential RA.  She states they told her they did not think she had RA and that her pain was due to osteoarthritis.  She has previously been on Aleve, topical Voltaren, and hydroxychloroquine without any improvement.  Pain was severe only in the mornings but now is constant throughout the day.  It is mostly located in her knuckles  She had x-rays done at some point and does not remember the results.  Review of rheumatology office notes states the x-rays showed \"nonspecific without erosive changes).  In addition to hand pain she has also had aching pain in her bilateral thighs for the last few weeks.      Toe pain  Approximately 1 month ago she stubbed her left pinky toe  Pain was severe at first and got better after a few days, however has not improved since then      Leg cramps  Reports cramps in bilateral calves (left worse than right) for several months  Happens 3-4 times per week, sometimes waking her up from sleep        Objective   /68   Pulse 75   Ht 160 cm (63\")   Wt 102 kg (224 lb)   SpO2 97%   BMI 39.68 kg/m²     Estimated body mass index is 39.68 kg/m² as calculated from the following:    Height as of this encounter: 160 cm (63\").    Weight as of this encounter: 102 kg (224 lb).     Physical Exam   GEN: In no acute distress, non toxic appearing  HEENT: MMM. EOMI.   CV: No extremity edema.   RESP: No signs of respiratory distress.  SKIN: No rashes  MSK: No deformity appreciated within the hands.  No tenderness or obvious swelling.  Left fifth digit is moderately tender to palpation over " the lateral portion of the distal phalanx.  No obvious swelling, bruising.  NEURO: Moves all extremities equally. Alert and appropriate                Result Review :              Assessment and Plan     Diagnoses and all orders for this visit:    1. Bilateral hand pain (Primary)  Chronic, uncontrolled condition  Has tried multiple medications without any improvement  Will start tramadol 50 mg as needed  She has had problems with sedation with medications in the past-recommend starting with half a tablet at first to see how she tolerates    -     traMADol (ULTRAM) 50 MG tablet; Take 1 tablet by mouth Every 6 (Six) Hours As Needed for Moderate Pain.  Dispense: 30 tablet; Refill: 0    -     Ambulatory Referral to Hand Surgery    2. Myalgia  Will obtain labs as noted below to evaluate for any secondary causes    -     Comprehensive Metabolic Panel  -     CK    3. Leg cramps  Labs as noted below to evaluate for secondary causes  Recommend regular calf stretching, especially before bedtime    -     Comprehensive Metabolic Panel  -     Magnesium    4. Pain of toe of left foot  Given the prolonged pain, will obtain x-ray to evaluate for fracture    -     XR Foot 3+ View Left; Future    5. Primary osteoarthritis of both hands    -     Ambulatory Referral to Hand Surgery              Follow Up     No follow-ups on file.

## 2025-06-22 ENCOUNTER — HOSPITAL ENCOUNTER (OUTPATIENT)
Dept: GENERAL RADIOLOGY | Facility: HOSPITAL | Age: 55
Discharge: HOME | End: 2025-06-22
Admitting: STUDENT IN AN ORGANIZED HEALTH CARE EDUCATION/TRAINING PROGRAM
Payer: MEDICARE

## 2025-06-22 DIAGNOSIS — M79.675 PAIN OF TOE OF LEFT FOOT: ICD-10-CM

## 2025-06-22 PROCEDURE — 73630 X-RAY EXAM OF FOOT: CPT

## 2025-06-26 ENCOUNTER — TELEPHONE (OUTPATIENT)
Dept: FAMILY MEDICINE CLINIC | Facility: CLINIC | Age: 55
End: 2025-06-26

## 2025-06-26 NOTE — TELEPHONE ENCOUNTER
Caller: Tessie Conner    Relationship to patient: Self    Best call back number: [phone]    Patient is needing:   PATIENT IS WANTING TO KNOW IF YOU COULD REQUEST A RUSH ON HER XRAY RESULTS.    PLEASE ADVISE.

## 2025-07-07 ENCOUNTER — LAB (OUTPATIENT)
Dept: FAMILY MEDICINE CLINIC | Facility: CLINIC | Age: 55
End: 2025-07-07
Payer: MEDICARE

## 2025-07-07 ENCOUNTER — OFFICE VISIT (OUTPATIENT)
Dept: FAMILY MEDICINE CLINIC | Facility: CLINIC | Age: 55
End: 2025-07-07
Payer: MEDICARE

## 2025-07-07 VITALS
DIASTOLIC BLOOD PRESSURE: 62 MMHG | SYSTOLIC BLOOD PRESSURE: 120 MMHG | BODY MASS INDEX: 39.27 KG/M2 | HEART RATE: 75 BPM | WEIGHT: 221.6 LBS | OXYGEN SATURATION: 97 % | HEIGHT: 63 IN

## 2025-07-07 DIAGNOSIS — Z12.4 PAP SMEAR FOR CERVICAL CANCER SCREENING: Primary | ICD-10-CM

## 2025-07-07 DIAGNOSIS — Z12.4 PAP SMEAR FOR CERVICAL CANCER SCREENING: ICD-10-CM

## 2025-07-07 LAB
MC_CV_MDC_IDC_RATE_1: 160
MC_CV_MDC_IDC_ZONE_ID: 1
MDC_IDC_MSMT_BATTERY_REMAINING_LONGEVITY: 132 MO
MDC_IDC_MSMT_BATTERY_REMAINING_PERCENTAGE: 100 %
MDC_IDC_MSMT_BATTERY_STATUS: NORMAL
MDC_IDC_MSMT_LEADCHNL_RA_DTM: NORMAL
MDC_IDC_MSMT_LEADCHNL_RA_IMPEDANCE_VALUE: 640
MDC_IDC_MSMT_LEADCHNL_RA_PACING_THRESHOLD_AMPLITUDE: 1.3
MDC_IDC_MSMT_LEADCHNL_RA_PACING_THRESHOLD_POLARITY: NORMAL
MDC_IDC_MSMT_LEADCHNL_RA_PACING_THRESHOLD_PULSEWIDTH: 0.4
MDC_IDC_MSMT_LEADCHNL_RA_SENSING_INTR_AMPL: 2
MDC_IDC_MSMT_LEADCHNL_RV_DTM: NORMAL
MDC_IDC_MSMT_LEADCHNL_RV_IMPEDANCE_VALUE: 604
MDC_IDC_MSMT_LEADCHNL_RV_PACING_THRESHOLD_AMPLITUDE: 1
MDC_IDC_MSMT_LEADCHNL_RV_PACING_THRESHOLD_POLARITY: NORMAL
MDC_IDC_MSMT_LEADCHNL_RV_PACING_THRESHOLD_PULSEWIDTH: 0.4
MDC_IDC_MSMT_LEADCHNL_RV_SENSING_INTR_AMPL: 15.7
MDC_IDC_PG_IMPLANT_DTM: NORMAL
MDC_IDC_PG_MFG: NORMAL
MDC_IDC_PG_MODEL: NORMAL
MDC_IDC_PG_SERIAL: NORMAL
MDC_IDC_PG_TYPE: NORMAL
MDC_IDC_SESS_DTM: NORMAL
MDC_IDC_SESS_TYPE: NORMAL
MDC_IDC_SET_BRADY_AT_MODE_SWITCH_RATE: 170
MDC_IDC_SET_BRADY_LOWRATE: 70
MDC_IDC_SET_BRADY_MAX_SENSOR_RATE: 130
MDC_IDC_SET_BRADY_MAX_TRACKING_RATE: 130
MDC_IDC_SET_BRADY_MODE: NORMAL
MDC_IDC_SET_BRADY_PAV_DELAY: 220
MDC_IDC_SET_BRADY_SAV_DELAY: 220
MDC_IDC_SET_LEADCHNL_RA_PACING_AMPLITUDE: 3.5
MDC_IDC_SET_LEADCHNL_RA_PACING_POLARITY: NORMAL
MDC_IDC_SET_LEADCHNL_RA_PACING_PULSEWIDTH: 0.4
MDC_IDC_SET_LEADCHNL_RA_SENSING_POLARITY: NORMAL
MDC_IDC_SET_LEADCHNL_RA_SENSING_SENSITIVITY: 0.5
MDC_IDC_SET_LEADCHNL_RV_PACING_AMPLITUDE: 2.5
MDC_IDC_SET_LEADCHNL_RV_PACING_POLARITY: NORMAL
MDC_IDC_SET_LEADCHNL_RV_PACING_PULSEWIDTH: 0.4
MDC_IDC_SET_LEADCHNL_RV_SENSING_POLARITY: NORMAL
MDC_IDC_SET_LEADCHNL_RV_SENSING_SENSITIVITY: 2.5
MDC_IDC_SET_ZONE_STATUS: NORMAL
MDC_IDC_SET_ZONE_TYPE: NORMAL
MDC_IDC_STAT_AT_BURDEN_PERCENT: 0
MDC_IDC_STAT_BRADY_RA_PERCENT_PACED: 40
MDC_IDC_STAT_BRADY_RV_PERCENT_PACED: 0

## 2025-07-07 PROCEDURE — 3015F CERV CANCER SCREEN DOCD: CPT | Performed by: STUDENT IN AN ORGANIZED HEALTH CARE EDUCATION/TRAINING PROGRAM

## 2025-07-07 PROCEDURE — 1125F AMNT PAIN NOTED PAIN PRSNT: CPT | Performed by: STUDENT IN AN ORGANIZED HEALTH CARE EDUCATION/TRAINING PROGRAM

## 2025-07-07 PROCEDURE — 3044F HG A1C LEVEL LT 7.0%: CPT | Performed by: STUDENT IN AN ORGANIZED HEALTH CARE EDUCATION/TRAINING PROGRAM

## 2025-07-07 NOTE — PROGRESS NOTES
"Chief Complaint  Chief Complaint   Patient presents with    Gynecologic Exam     PAP       Subjective        Tessie Conner is a 55 y.o. female who presents to [facility].  History of Present Illness    Tessie is a 55-year-old female here for Pap.    Patient reports hysterectomy 20+ years ago but is unsure if they removed her cervix.  She did have Pap performed in 2020 that was normal and negative for HPV.      Objective   /62   Pulse 75   Ht 160 cm (63\")   Wt 101 kg (221 lb 9.6 oz)   SpO2 97%   BMI 39.25 kg/m²     Estimated body mass index is 39.25 kg/m² as calculated from the following:    Height as of this encounter: 160 cm (63\").    Weight as of this encounter: 101 kg (221 lb 9.6 oz).     Physical Exam   Female chaperone present for speculum exam.    GEN: In no acute distress, non toxic appearing  HEENT: MMM. EOMI.   CV: No extremity edema.   RESP: No signs of respiratory distress.  SKIN: No rashes  MSK: No deformity.   NEURO: Moves all extremities equally. Alert and appropriate  : External genitalia normal in appearance.  Unable to visualize cervix.           Result Review :              Assessment and Plan     Diagnoses and all orders for this visit:    1. Pap smear for cervical cancer screening (Primary)  Pap with HPV testing obtained and sent to lab  If normal can repeat in 5 years    -     IGP,Aptima HPV,Age Gdln; Future            Follow Up     No follow-ups on file.    "

## 2025-07-14 LAB
AGE GDLN ACOG TESTING: NORMAL
CYTOLOGIST CVX/VAG CYTO: NORMAL
CYTOLOGY CVX/VAG DOC CYTO: NORMAL
CYTOLOGY CVX/VAG DOC THIN PREP: NORMAL
DX ICD CODE: NORMAL
HPV GENOTYPE REFLEX: NORMAL
HPV I/H RISK 4 DNA CVX QL PROBE+SIG AMP: NEGATIVE
Lab: NORMAL
OTHER STN SPEC: NORMAL
SERVICE CMNT-IMP: NORMAL
STAT OF ADQ CVX/VAG CYTO-IMP: NORMAL

## 2025-07-15 PROCEDURE — 87086 URINE CULTURE/COLONY COUNT: CPT | Performed by: FAMILY MEDICINE

## 2025-07-15 PROCEDURE — 87186 SC STD MICRODIL/AGAR DIL: CPT | Performed by: FAMILY MEDICINE

## 2025-07-17 ENCOUNTER — TELEPHONE (OUTPATIENT)
Dept: URGENT CARE | Facility: CLINIC | Age: 55
End: 2025-07-17
Payer: MEDICARE

## 2025-07-17 DIAGNOSIS — R31.9 URINARY TRACT INFECTION WITH HEMATURIA, SITE UNSPECIFIED: Primary | ICD-10-CM

## 2025-07-17 DIAGNOSIS — N39.0 URINARY TRACT INFECTION WITH HEMATURIA, SITE UNSPECIFIED: Primary | ICD-10-CM

## 2025-07-17 RX ORDER — CEFDINIR 300 MG/1
300 CAPSULE ORAL 2 TIMES DAILY
Qty: 14 CAPSULE | Refills: 0 | Status: SHIPPED | OUTPATIENT
Start: 2025-07-17 | End: 2025-07-24

## 2025-07-17 NOTE — TELEPHONE ENCOUNTER
Not doing better on bactrim, urine culture not final. Stop bactrim, will send another abx to pharmacy.

## 2025-07-20 ENCOUNTER — HOSPITAL ENCOUNTER (EMERGENCY)
Facility: HOSPITAL | Age: 55
Discharge: HOME | End: 2025-07-20
Admitting: EMERGENCY MEDICINE
Payer: MEDICARE

## 2025-07-20 ENCOUNTER — TELEPHONE (OUTPATIENT)
Dept: FAMILY MEDICINE CLINIC | Facility: CLINIC | Age: 55
End: 2025-07-20
Payer: MEDICARE

## 2025-07-20 ENCOUNTER — APPOINTMENT (OUTPATIENT)
Dept: CT IMAGING | Facility: HOSPITAL | Age: 55
End: 2025-07-20
Payer: MEDICARE

## 2025-07-20 VITALS
DIASTOLIC BLOOD PRESSURE: 53 MMHG | SYSTOLIC BLOOD PRESSURE: 127 MMHG | HEIGHT: 63 IN | HEART RATE: 70 BPM | BODY MASS INDEX: 39.14 KG/M2 | OXYGEN SATURATION: 95 % | TEMPERATURE: 98.4 F | RESPIRATION RATE: 18 BRPM | WEIGHT: 220.9 LBS

## 2025-07-20 DIAGNOSIS — R30.0 DYSURIA: Primary | ICD-10-CM

## 2025-07-20 DIAGNOSIS — R10.30 LOWER ABDOMINAL PAIN: ICD-10-CM

## 2025-07-20 LAB
ALBUMIN SERPL-MCNC: 4.1 G/DL (ref 3.5–5.2)
ALBUMIN/GLOB SERPL: 1.6 G/DL
ALP SERPL-CCNC: 140 U/L (ref 39–117)
ALT SERPL W P-5'-P-CCNC: 17 U/L (ref 1–33)
ANION GAP SERPL CALCULATED.3IONS-SCNC: 11.9 MMOL/L (ref 5–15)
AST SERPL-CCNC: 22 U/L (ref 1–32)
BACTERIA UR QL AUTO: NORMAL /HPF
BASOPHILS # BLD AUTO: 0.04 10*3/MM3 (ref 0–0.2)
BASOPHILS NFR BLD AUTO: 0.6 % (ref 0–1.5)
BILIRUB SERPL-MCNC: 0.5 MG/DL (ref 0–1.2)
BILIRUB UR QL STRIP: NEGATIVE
BUN SERPL-MCNC: 9 MG/DL (ref 6–20)
BUN/CREAT SERPL: 12.3 (ref 7–25)
CALCIUM SPEC-SCNC: 9.6 MG/DL (ref 8.6–10.5)
CHLORIDE SERPL-SCNC: 104 MMOL/L (ref 98–107)
CLARITY UR: CLEAR
CO2 SERPL-SCNC: 25.1 MMOL/L (ref 22–29)
COLOR UR: YELLOW
CREAT SERPL-MCNC: 0.73 MG/DL (ref 0.57–1)
DEPRECATED RDW RBC AUTO: 45.9 FL (ref 37–54)
EGFRCR SERPLBLD CKD-EPI 2021: 97.9 ML/MIN/1.73
EOSINOPHIL # BLD AUTO: 0.01 10*3/MM3 (ref 0–0.4)
EOSINOPHIL NFR BLD AUTO: 0.1 % (ref 0.3–6.2)
ERYTHROCYTE [DISTWIDTH] IN BLOOD BY AUTOMATED COUNT: 13.9 % (ref 12.3–15.4)
GLOBULIN UR ELPH-MCNC: 2.6 GM/DL
GLUCOSE SERPL-MCNC: 100 MG/DL (ref 65–99)
GLUCOSE UR STRIP-MCNC: NEGATIVE MG/DL
HCT VFR BLD AUTO: 38.3 % (ref 34–46.6)
HGB BLD-MCNC: 12 G/DL (ref 12–15.9)
HGB UR QL STRIP.AUTO: NEGATIVE
HYALINE CASTS UR QL AUTO: NORMAL /LPF
IMM GRANULOCYTES # BLD AUTO: 0.02 10*3/MM3 (ref 0–0.05)
IMM GRANULOCYTES NFR BLD AUTO: 0.3 % (ref 0–0.5)
KETONES UR QL STRIP: NEGATIVE
LEUKOCYTE ESTERASE UR QL STRIP.AUTO: ABNORMAL
LYMPHOCYTES # BLD AUTO: 1.84 10*3/MM3 (ref 0.7–3.1)
LYMPHOCYTES NFR BLD AUTO: 26 % (ref 19.6–45.3)
MCH RBC QN AUTO: 28.4 PG (ref 26.6–33)
MCHC RBC AUTO-ENTMCNC: 31.3 G/DL (ref 31.5–35.7)
MCV RBC AUTO: 90.5 FL (ref 79–97)
MONOCYTES # BLD AUTO: 0.59 10*3/MM3 (ref 0.1–0.9)
MONOCYTES NFR BLD AUTO: 8.3 % (ref 5–12)
NEUTROPHILS NFR BLD AUTO: 4.59 10*3/MM3 (ref 1.7–7)
NEUTROPHILS NFR BLD AUTO: 64.7 % (ref 42.7–76)
NITRITE UR QL STRIP: NEGATIVE
NRBC BLD AUTO-RTO: 0 /100 WBC (ref 0–0.2)
PH UR STRIP.AUTO: <=5 [PH] (ref 5–8)
PLATELET # BLD AUTO: 211 10*3/MM3 (ref 140–450)
PMV BLD AUTO: 9.7 FL (ref 6–12)
POTASSIUM SERPL-SCNC: 3.5 MMOL/L (ref 3.5–5.2)
PROT SERPL-MCNC: 6.7 G/DL (ref 6–8.5)
PROT UR QL STRIP: NEGATIVE
RBC # BLD AUTO: 4.23 10*6/MM3 (ref 3.77–5.28)
RBC # UR STRIP: NORMAL /HPF
REF LAB TEST METHOD: NORMAL
SODIUM SERPL-SCNC: 141 MMOL/L (ref 136–145)
SP GR UR STRIP: 1.01 (ref 1–1.03)
SQUAMOUS #/AREA URNS HPF: NORMAL /HPF
UROBILINOGEN UR QL STRIP: ABNORMAL
WBC # UR STRIP: NORMAL /HPF
WBC NRBC COR # BLD AUTO: 7.09 10*3/MM3 (ref 3.4–10.8)

## 2025-07-20 PROCEDURE — 80053 COMPREHEN METABOLIC PANEL: CPT

## 2025-07-20 PROCEDURE — 25010000002 KETOROLAC TROMETHAMINE PER 15 MG

## 2025-07-20 PROCEDURE — 85025 COMPLETE CBC W/AUTO DIFF WBC: CPT

## 2025-07-20 PROCEDURE — 96374 THER/PROPH/DIAG INJ IV PUSH: CPT

## 2025-07-20 PROCEDURE — 74176 CT ABD & PELVIS W/O CONTRAST: CPT

## 2025-07-20 PROCEDURE — 96375 TX/PRO/DX INJ NEW DRUG ADDON: CPT

## 2025-07-20 PROCEDURE — 81001 URINALYSIS AUTO W/SCOPE: CPT

## 2025-07-20 PROCEDURE — 25010000002 ONDANSETRON PER 1 MG

## 2025-07-20 PROCEDURE — 99284 EMERGENCY DEPT VISIT MOD MDM: CPT

## 2025-07-20 RX ORDER — PHENAZOPYRIDINE HYDROCHLORIDE 200 MG/1
200 TABLET, FILM COATED ORAL 3 TIMES DAILY PRN
Qty: 21 TABLET | Refills: 0 | Status: SHIPPED | OUTPATIENT
Start: 2025-07-20

## 2025-07-20 RX ORDER — KETOROLAC TROMETHAMINE 30 MG/ML
15 INJECTION, SOLUTION INTRAMUSCULAR; INTRAVENOUS ONCE
Status: COMPLETED | OUTPATIENT
Start: 2025-07-20 | End: 2025-07-20

## 2025-07-20 RX ORDER — ONDANSETRON 2 MG/ML
4 INJECTION INTRAMUSCULAR; INTRAVENOUS ONCE
Status: COMPLETED | OUTPATIENT
Start: 2025-07-20 | End: 2025-07-20

## 2025-07-20 RX ADMIN — ONDANSETRON 4 MG: 2 INJECTION, SOLUTION INTRAMUSCULAR; INTRAVENOUS at 15:24

## 2025-07-20 RX ADMIN — KETOROLAC TROMETHAMINE 15 MG: 30 INJECTION INTRAMUSCULAR; INTRAVENOUS at 15:24

## 2025-07-20 NOTE — ED PROVIDER NOTES
Subjective   History of Present Illness  Patient is a 55-year-old female with PMH of COPD, CAD, DM, chronic UTIs, HTN, HLD presenting to the ED for lower abdominal pain and dysuria ongoing for the past week.  Patient was recently seen at urgent care a week ago, was diagnosed with a staph infection in her urine, was initially started on Bactrim then switched over to cefdinir.  Patient reports worsening dysuria and constant lower abdominal pain with intermittent left flank pain.  She describes feeling of razor blades with urination, rates her pain an 8 out of 10 at its worst.  She also reports diarrhea that started yesterday, nonbloody.  She denies any fever, chills, nausea, vomiting, constipation, hematuria.        Review of Systems   Constitutional:  Negative for chills and fever.   Gastrointestinal:  Positive for abdominal pain and diarrhea. Negative for constipation, nausea and vomiting.   Genitourinary:  Positive for dysuria. Negative for hematuria.       Past Medical History:   Diagnosis Date    Abnormal ECG     Anemia     Anesthesia complication 2003    cardiac arrest  with hysterectomy    Anxiety 2005    Arthritis     Asthma 04/17/2020    allergy    Bipolar 1 disorder     Bradycardia     Chest pain     due to scarring from lung surgery  2/21    Chest pain 09/24/2020    Cholelithiasis 10/2022    COPD (chronic obstructive pulmonary disease)     Coronary artery disease     very minimal    Depression 2005    Diabetes mellitus 2002    borderline   resolved    Diverticulitis of colon     Diverticulosis     Dyspnea on minimal exertion     Frequent UTI     Gastroesophageal reflux disease without esophagitis 07/15/2020    Heart murmur     FROM CHILDHOOD    Histoplasmosis     History of anemia     POST PREGNANCY    Hyperlipidemia     Hyperlipidemia 10/04/2016    Hypertension     Hypotension     Inflammatory bowel disease     Mass of upper lobe of right lung     Migraines     hx    Neuropathy in diabetes     Obesity 1999     Plantar fasciitis     Pneumonia     PONV (postoperative nausea and vomiting)     Scoliosis     Sleep apnea 22    cpap    Spinal headache     Vertigo     Visual impairment     WEARS GLASSES    Vitamin D deficiency        Allergies   Allergen Reactions    Morphine Itching     Itching and redness on chest    Tylenol [Acetaminophen] Hives and Itching    Pregabalin Rash       Past Surgical History:   Procedure Laterality Date    ANKLE OPEN REDUCTION INTERNAL FIXATION Right 2023    Procedure: ANKLE OPEN REDUCTION INTERNAL FIXATION;  Surgeon: TORIE Beck DPM;  Location: [facility] MAIN OR;  Service: Podiatry;  Laterality: Right;    APPENDECTOMY      CARDIAC CATHETERIZATION N/A 2020    Procedure: Left Heart Cath possible PCI, atherectomy, hemodynamic support;  Surgeon: Thomas Zamora MD;  Location: [facility] CATH INVASIVE LOCATION;  Service: Cardiology;  Laterality: N/A;    CARDIAC CATHETERIZATION  2020    CARDIAC ELECTROPHYSIOLOGY PROCEDURE N/A 2023    Procedure: Pacemaker DC new, [place] aware;  Surgeon: Rachel Espinoza MD;  Location: [facility] CATH INVASIVE LOCATION;  Service: Cardiovascular;  Laterality: N/A;    CARDIAC SURGERY       SECTION      FOOT/TOE TENDON REPAIR Left     FRACTURE SURGERY  2023    Rt ankle    HYSTERECTOMY      INSERT / REPLACE / REMOVE PACEMAKER  2023    LUNG BIOPSY Right 2020    LUNG LOBECTOMY Right 2022    pt had partial Right lobectomy and nodule removal    MASS EXCISION Right 2023    Procedure: LIPOMA EXCISION,THIGH;  Surgeon: Justo Shannon MD;  Location: [facility] MAIN OR;  Service: General;  Laterality: Right;    MASS EXCISION Right 2024    Procedure: Excision of soft tissue mass from right posterior thigh measuring 2 cm;  Surgeon: Justo Shannon MD;  Location: [facility] MAIN OR;  Service: General;  Laterality: Right;    ROTATOR CUFF REPAIR Left     TENOTOMY ACHILLES TENDON       THORACOSCOPY VIDEO ASSISTED WITH LOBECTOMY Right 02/08/2022    Procedure: BRONCHOSCOPY, THORACOSCOPY VIDEO ASSISTED wedge resection X2, INTERCOSTAL NERVE BLOCK;  Surgeon: Ayla Carroll MD;  Location: [facility] OR;  Service: Thoracic;  Laterality: Right;    TUBAL ABDOMINAL LIGATION  2002       Family History   Problem Relation Age of Onset    Arthritis Mother     Cancer Mother     Depression Mother     Diabetes Mother     Early death Mother     Mental illness Mother     Anxiety disorder Mother     Miscarriages / Stillbirths Mother     Alcohol abuse Father     Diabetes Father     Early death Father     Heart disease Father     Hyperlipidemia Father     Hypertension Father         Father    Vision loss Father     Heart attack Father     Heart disease Sister     Drug abuse Sister     Heart attack Sister     Hypertension Sister         Sister    Heart disease Sister     Heart disease Brother     Hypertension Brother     Heart attack Brother     Drug abuse Brother     Hypertension Brother     Heart disease Brother     Heart attack Brother     Cancer Maternal Grandmother     Heart disease Sister     Hypertension Sister         Sister    Heart attack Sister     Malig Hyperthermia Neg Hx        Social History     Socioeconomic History    Marital status:    Tobacco Use    Smoking status: Never     Passive exposure: Never    Smokeless tobacco: Never   Vaping Use    Vaping status: Never Used   Substance and Sexual Activity    Alcohol use: Never     Comment: VERY RARE    Drug use: Never    Sexual activity: Not Currently     Partners: Male     Birth control/protection: None, Hysterectomy           Objective   Physical Exam  Constitutional:       Appearance: Normal appearance.   HENT:      Head: Normocephalic and atraumatic.      Mouth/Throat:      Mouth: Mucous membranes are moist.   Eyes:      Extraocular Movements: Extraocular movements intact.   Cardiovascular:      Rate and Rhythm: Normal rate and regular rhythm.  "     Pulses: Normal pulses.      Heart sounds: Normal heart sounds.   Pulmonary:      Effort: Pulmonary effort is normal.      Breath sounds: Normal breath sounds.   Abdominal:      General: Abdomen is flat. Bowel sounds are normal.      Palpations: Abdomen is soft.      Tenderness: There is abdominal tenderness in the right lower quadrant, suprapubic area and left lower quadrant. There is no right CVA tenderness or left CVA tenderness.   Musculoskeletal:         General: Normal range of motion.      Cervical back: Normal range of motion.      Right lower leg: No edema.      Left lower leg: No edema.   Skin:     General: Skin is warm and dry.      Capillary Refill: Capillary refill takes less than 2 seconds.   Neurological:      General: No focal deficit present.      Mental Status: She is alert and oriented to person, place, and time.   Psychiatric:         Mood and Affect: Mood normal.         Behavior: Behavior normal.         Procedures           ED Course      /53   Pulse 70   Temp 98.4 °F (36.9 °C)   Resp 18   Ht 160 cm (63\")   Wt 100 kg (220 lb 14.4 oz)   LMP  (LMP Unknown)   SpO2 95%   BMI 39.13 kg/m²   Labs Reviewed   COMPREHENSIVE METABOLIC PANEL - Abnormal; Notable for the following components:       Result Value    Glucose 100 (*)     Alkaline Phosphatase 140 (*)     All other components within normal limits    Narrative:     GFR Categories in Chronic Kidney Disease (CKD)              GFR Category          GFR (mL/min/1.73)    Interpretation  G1                    90 or greater        Normal or high (1)  G2                    60-89                Mild decrease (1)  G3a                   45-59                Mild to moderate decrease  G3b                   30-44                Moderate to severe decrease  G4                    15-29                Severe decrease  G5                    14 or less           Kidney failure    (1)In the absence of evidence of kidney disease, neither GFR " category G1 or G2 fulfill the criteria for CKD.    eGFR calculation 2021 CKD-EPI creatinine equation, which does not include race as a factor   URINALYSIS W/ CULTURE IF INDICATED - Abnormal; Notable for the following components:    Leuk Esterase, UA Trace (*)     All other components within normal limits    Narrative:     In absence of clinical symptoms, the presence of pyuria, bacteria, and/or nitrites on the urinalysis result does not correlate with infection.   CBC WITH AUTO DIFFERENTIAL - Abnormal; Notable for the following components:    MCHC 31.3 (*)     Eosinophil % 0.1 (*)     All other components within normal limits   URINALYSIS, MICROSCOPIC ONLY   CBC AND DIFFERENTIAL    Narrative:     The following orders were created for panel order CBC & Differential.  Procedure                               Abnormality         Status                     ---------                               -----------         ------                     CBC Auto Differential[[id number]]        Abnormal            Final result                 Please view results for these tests on the individual orders.     Medications   ketorolac (TORADOL) injection 15 mg (15 mg Intravenous Given 7/20/25 1524)   ondansetron (ZOFRAN) injection 4 mg (4 mg Intravenous Given 7/20/25 1524)     CT Abdomen Pelvis Without Contrast  Result Date: 7/20/2025  Impression: 1.No acute abnormality identified within the abdomen or pelvis. 2.No process or urinary tract calculi identified. Electronically Signed: Salvador Huitron MD  7/20/2025 3:48 PM EDT  Workstation ID: [id number]                                                     Medical Decision Making  Chart review: 7/15/2025 patient was seen at urgent care by Dr. Carrasco with reports of burning sensation, urgency, frequency.  Urine dipstick showed trace blood and moderate leukocytes, was discharged with Pyridium and cefdinir.    Patient presented to the ED for the above complaint.    Patient underwent the above exam  and evaluation.    While in the ED patient was placed in a gown and IV was established.  CT abdomen pelvis and blood work was obtained to assess for obstruction, abscess, perspiration, infection, electrolyte normality, dehydration.  Patient was given IV Toradol and Zofran.  Upon reevaluation patient resting comfortably, vital stable on room air.  Discussed findings with patient.  She will be discharged her with Pyridium.  Educated patient to take medication as prescribed as needed, increase fluid intake, follow-up closely with urology, follow close with PCP, and strict return precautions were discussed.  Patient voiced understanding, agreeable with dispo plan.  Patient ambulating independently without difficulty at time of discharge.    Labs were independently interpreted by myself and deemed remarkable for the following: No leukocytosis, hemoglobin stable at 12.0, no electrolyte abnormalities, urinalysis showed trace leuk esterase, no bacteria pyuria or hematuria.  CT abdomen pelvis independently interpreted by Dr. Moralez and reviewed by myself showing: No findings of obstruction, abscess, or perforation.    Appropriate PPE was worn during each patient encounter.    Note Disclaimer: At [facility], we believe that sharing information builds trust and better relationships. You are receiving this note because you are receiving care at [facility] or recently visited. It is possible you will see health information before a provider has talked with you about it. This kind of information can be easy to misunderstand. To help you fully understand what it means for your health, we urge you to discuss this note with your provider.    Discussed this patient with Dr. Moralez who agrees with plan.      Problems Addressed:  Dysuria: complicated acute illness or injury  Lower abdominal pain: complicated acute illness or injury    Amount and/or Complexity of Data Reviewed  Labs: ordered.  Radiology:  ordered.    Risk  Prescription drug management.        Final diagnoses:   Dysuria   Lower abdominal pain       ED Disposition  ED Disposition       ED Disposition   Discharge    Condition   Stable    Comment   --               Reilly Diaz DO  [address]  [address]  [place] [id number] [phone]    Schedule an appointment as soon as possible for a visit       [facility] - [address] [address]  [phone]  Schedule an appointment as soon as possible for a visit            Medication List        New Prescriptions      phenazopyridine 200 MG tablet  Commonly known as: PYRIDIUM  Take 1 tablet by mouth 3 (Three) Times a Day As Needed for Bladder Spasms.               Where to Get Your Medications        These medications were sent to [facility] - [place] [address] [address]   - [phone] [place] [phone]   [address]  [place] [address]      Phone: [phone]   phenazopyridine 200 MG tablet            Yenny Pozo PA-C  07/20/25 1957

## 2025-07-20 NOTE — DISCHARGE INSTRUCTIONS
200mg Depo testosterone injection given per protocol.  Written order from Dr. Paul  Pt aware to return to clinic in 14 days for next injection.  Pt tolerated procedure well without incident.    See MAR for documentation.     Continue antibiotic previously prescribed until complete.  Take Pyridium as prescribed as needed.  Increase fluid intake.    Follow-up with urology.    Follow-up closely with PCP.    Return to the ED for any new or worsening symptoms.

## 2025-07-20 NOTE — TELEPHONE ENCOUNTER
Patient called on call provider regarding urgent care urine results. Discussed findings and antibiotics being changed. Patient to follow up with clinic in a few days of not seeing improvement for additional considerations.     Abdiel Brandt MD

## 2025-08-13 DIAGNOSIS — N39.0 RECURRENT UTI: Primary | ICD-10-CM

## 2025-08-13 RX ORDER — ATORVASTATIN CALCIUM 40 MG/1
40 TABLET, FILM COATED ORAL DAILY
Qty: 90 TABLET | Refills: 3 | Status: SHIPPED | OUTPATIENT
Start: 2025-08-13

## 2025-08-13 RX ORDER — OMEPRAZOLE 20 MG/1
20 CAPSULE, DELAYED RELEASE ORAL NIGHTLY
Qty: 90 CAPSULE | Refills: 3 | Status: SHIPPED | OUTPATIENT
Start: 2025-08-13

## 2025-08-22 ENCOUNTER — HOSPITAL ENCOUNTER (EMERGENCY)
Facility: HOSPITAL | Age: 55
Discharge: HOME | End: 2025-08-22
Attending: EMERGENCY MEDICINE
Payer: MEDICARE

## 2025-08-22 ENCOUNTER — APPOINTMENT (OUTPATIENT)
Dept: GENERAL RADIOLOGY | Facility: HOSPITAL | Age: 55
End: 2025-08-22
Payer: MEDICARE

## 2025-08-27 ENCOUNTER — PATIENT OUTREACH (OUTPATIENT)
Dept: CASE MANAGEMENT | Facility: OTHER | Age: 55
End: 2025-08-27
Payer: MEDICARE

## (undated) DEVICE — DRILL,  2.8 X 140MM, SOLID, MEASURING, LONG, AO: Brand: BABY GORILLA®/GORILLA® PLATING SYSTEM

## (undated) DEVICE — C-ARM: Brand: UNBRANDED

## (undated) DEVICE — KT SURG TURNOVER 050

## (undated) DEVICE — SYR LUERLOK 20CC BX/50

## (undated) DEVICE — VLV SXN ENDO DISP STRL

## (undated) DEVICE — TOTAL TRAY, 16FR 10ML SIL FOLEY, URN: Brand: MEDLINE

## (undated) DEVICE — TB PROSHIELD PROTECT PLSTC CLR

## (undated) DEVICE — PATIENT RETURN ELECTRODE, SINGLE-USE, CONTACT QUALITY MONITORING, ADULT, WITH 9FT CORD, FOR PATIENTS WEIGING OVER 33LBS. (15KG): Brand: MEGADYNE

## (undated) DEVICE — BNDG ESMARK 4IN 12FT LF STRL BLU

## (undated) DEVICE — SUT SILK 0 TIES 30IN A306H

## (undated) DEVICE — CONN TBG Y 5 IN 1 LF STRL

## (undated) DEVICE — CATH DIAG IMPULSE FR4 6F 100CM

## (undated) DEVICE — CATH DIAG IMPULSE PIG .056 6F 110CM

## (undated) DEVICE — SPNG LAP CIGARETTE KITTNER 5MM STRL PK/5

## (undated) DEVICE — DRAPE,U/ SHT,SPLIT,PLAS,STERIL: Brand: MEDLINE

## (undated) DEVICE — PK TRY HEART CATH 50

## (undated) DEVICE — GOWN,REINFORCE,POLY,SIRUS,BREATH SLV,XLG: Brand: MEDLINE

## (undated) DEVICE — SUT MNCRYL PLS ANTIB UD 4/0 PS2 18IN

## (undated) DEVICE — GLV SURG SIGNATURE ESSENTIAL PF LTX SZ8

## (undated) DEVICE — UNDERGLV SURG BIOGEL INDICAT PI SZ8 BLU

## (undated) DEVICE — CONTN STRL 32OZ

## (undated) DEVICE — UNIVERSAL STAPLER: Brand: ENDO GIA ULTRA

## (undated) DEVICE — ANTIBACTERIAL UNDYED BRAIDED (POLYGLACTIN 910), SYNTHETIC ABSORBABLE SUTURE: Brand: COATED VICRYL

## (undated) DEVICE — COVER,MAYO STAND,STERILE: Brand: MEDLINE

## (undated) DEVICE — BANDAGE,GAUZE,BULKEE II,4.5"X4.1YD,STRL: Brand: MEDLINE

## (undated) DEVICE — SPLNT SCOTCHCAST QUICKSTEP DBL/SD/FELT FIBRGLS 4X30IN WHT

## (undated) DEVICE — SYR ANGIO FNGR FIX CONTRL MLL 10ML

## (undated) DEVICE — GLV SURG BIOGEL M LTX PF 7 1/2

## (undated) DEVICE — K-WIRE, SINGLE ENDED TROCAR TIP, SMOOTH, 1.2 X 150MM
Type: IMPLANTABLE DEVICE | Site: ANKLE | Status: NON-FUNCTIONAL
Brand: MONSTER SCREW SYSTEM
Removed: 2023-12-22

## (undated) DEVICE — DRSNG GZ CURAD XEROFORM PETROLTM OVERWRP 1X8IN STRL

## (undated) DEVICE — DRILL, 2.6 X 130MM, CANNULATED, AO: Brand: MONSTER SCREW SYSTEM

## (undated) DEVICE — DRILL,  2.4 X 140MM, SOLID, MEASURING, LONG, AO: Brand: BABY GORILLA®/GORILLA® PLATING SYSTEM

## (undated) DEVICE — GLV SURG BIOGEL LTX PF 6

## (undated) DEVICE — SOLUTION,WATER,IRRIGATION,1000ML,STERILE: Brand: MEDLINE

## (undated) DEVICE — ELECTRD DEFIB M/FUNC PROPADZ RADIOL 2PK

## (undated) DEVICE — TBG PENCL TELESCP MEGADYNE SMOKE EVAC 15FT

## (undated) DEVICE — CVR HNDL LT SURG ACCSSRY BLU STRL

## (undated) DEVICE — OLIVE WIRE, SMOOTH, 1.4MM
Type: IMPLANTABLE DEVICE | Site: ANKLE | Status: NON-FUNCTIONAL
Brand: BABY GORILLA/GORILLA PLATING SYSTEM
Removed: 2023-12-22

## (undated) DEVICE — APPL CHLORAPREP HI/LITE 26ML ORNG

## (undated) DEVICE — UNIVERSAL PACK: Brand: CARDINAL HEALTH

## (undated) DEVICE — SMOKE EVACUATION TUBING WITH 7/8 IN TO 1/4 IN REDUCER: Brand: BUFFALO FILTER

## (undated) DEVICE — WOUND RETRACTOR AND PROTECTOR: Brand: ALEXIS WOUND PROTECTOR-RETRACTOR

## (undated) DEVICE — GOWN,REINFRCE,POLY,SIRUS,BREATH SLV,XXLG: Brand: MEDLINE

## (undated) DEVICE — PROXIMATE RH ROTATING HEAD SKIN STAPLERS (35 WIDE) CONTAINS 35 STAINLESS STEEL STAPLES: Brand: PROXIMATE

## (undated) DEVICE — ADHS SKIN PREMIERPRO EXOFIN TOPICAL HI/VISC .5ML

## (undated) DEVICE — PINNACLE INTRODUCER SHEATH: Brand: PINNACLE

## (undated) DEVICE — DISPOSABLE TOURNIQUET CUFF 34"X4", 1-LINE, BLUE, STERILE, 1EA/PK, 10PK/CS: Brand: ASP MEDICAL

## (undated) DEVICE — VISUALIZATION SYSTEM: Brand: CLEARIFY

## (undated) DEVICE — LOU THORACIC: Brand: MEDLINE INDUSTRIES, INC.

## (undated) DEVICE — SKIN PREP TRAY W/CHG: Brand: MEDLINE INDUSTRIES, INC.

## (undated) DEVICE — INTENDED FOR TISSUE SEPARATION, AND OTHER PROCEDURES THAT REQUIRE A SHARP SURGICAL BLADE TO PUNCTURE OR CUT.: Brand: BARD-PARKER ® CARBON RIB-BACK BLADES

## (undated) DEVICE — ELECTRD BLD EZ CLN MOD 6.5IN

## (undated) DEVICE — GW PTFE EMERALD HEPCOAT FC J TIP STD .035 3MM 150CM

## (undated) DEVICE — APPL CHLORAPREP 26ML CLR

## (undated) DEVICE — DECANTER: Brand: UNBRANDED

## (undated) DEVICE — PENCL ES MEGADINE EZ/CLEAN BUTN W/HOLSTR 10FT

## (undated) DEVICE — DRP SLUSH WARMR MACH CIR 44X44IN

## (undated) DEVICE — OVER DRILL, 3.5 X 110MM, SOLID, AO: Brand: BABY GORILLA®/GORILLA® PLATING SYSTEM

## (undated) DEVICE — TUBING, SUCTION, 1/4" X 20', STRAIGHT: Brand: MEDLINE INDUSTRIES, INC.

## (undated) DEVICE — VLV PRT BRONCH LIP LTX DISP

## (undated) DEVICE — ELECTRD BLD EZ CLN MOD XLNG 2.75IN

## (undated) DEVICE — CATH DIAG IMPULSE FL4 6F 100CM

## (undated) DEVICE — THE STERILE LIGHT HANDLE COVER IS USED WITH STERIS SURGICAL LIGHTING AND VISUALIZATION SYSTEMS.

## (undated) DEVICE — SOL IRRIG NACL 1000ML

## (undated) DEVICE — SYR LUERLOK 30CC

## (undated) DEVICE — ST ACC MICROPUNCTURE STFF/CANN PLAT/TP 4F 21G 40CM

## (undated) DEVICE — PENCL SMOKE/EVAC MEGADYNE TELESCP 15FT

## (undated) DEVICE — UNDERGLV SURG BIOGEL/PI PF SYNTH SURG SZ8.5 BLU 50/BX

## (undated) DEVICE — THE STERILE CAMERA HANDLE COVER IS FOR USE WITH THE STERIS SURGICAL LIGHTING AND VISUALIZATION SYSTEMS.

## (undated) DEVICE — SYR LL TP 10ML STRL

## (undated) DEVICE — ADHS SKIN SURG TISS VISC PREMIERPRO EXOFIN HI/VISC FAST/DRY

## (undated) DEVICE — PK EXTREM 50

## (undated) DEVICE — COUNTERSINK, 4.0 HEADED: Brand: MONSTER® SCREW SYSTEM

## (undated) DEVICE — TOWEL,OR,DSP,ST,WHITE,DLX,4/PK,20PK/CS: Brand: MEDLINE

## (undated) DEVICE — CONTAINER,SPECIMEN,OR STERILE,4OZ: Brand: MEDLINE

## (undated) DEVICE — SUT SILK 0 PSL 18IN 580H